# Patient Record
Sex: MALE | Race: WHITE | NOT HISPANIC OR LATINO
[De-identification: names, ages, dates, MRNs, and addresses within clinical notes are randomized per-mention and may not be internally consistent; named-entity substitution may affect disease eponyms.]

---

## 2017-05-09 ENCOUNTER — APPOINTMENT (OUTPATIENT)
Dept: SURGICAL ONCOLOGY | Facility: CLINIC | Age: 62
End: 2017-05-09

## 2017-05-09 VITALS
BODY MASS INDEX: 23.34 KG/M2 | HEIGHT: 70 IN | WEIGHT: 163 LBS | OXYGEN SATURATION: 94 % | SYSTOLIC BLOOD PRESSURE: 138 MMHG | HEART RATE: 62 BPM | DIASTOLIC BLOOD PRESSURE: 77 MMHG

## 2017-05-09 DIAGNOSIS — Z92.21 PERSONAL HISTORY OF ANTINEOPLASTIC CHEMOTHERAPY: ICD-10-CM

## 2017-05-09 DIAGNOSIS — M87.052 IDIOPATHIC ASEPTIC NECROSIS OF LEFT FEMUR: ICD-10-CM

## 2017-05-09 DIAGNOSIS — D03.59 MELANOMA IN SITU OF OTHER PART OF TRUNK: ICD-10-CM

## 2017-05-09 DIAGNOSIS — M26.69 OTHER SPECIFIED DISORDERS OF TEMPOROMANDIBULAR JOINT: ICD-10-CM

## 2017-05-09 DIAGNOSIS — M87.051 IDIOPATHIC ASEPTIC NECROSIS OF RIGHT FEMUR: ICD-10-CM

## 2017-05-09 DIAGNOSIS — H33.23 SEROUS RETINAL DETACHMENT, BILATERAL: ICD-10-CM

## 2017-05-09 DIAGNOSIS — Z87.81 PERSONAL HISTORY OF (HEALED) TRAUMATIC FRACTURE: ICD-10-CM

## 2017-05-09 DIAGNOSIS — Z78.9 OTHER SPECIFIED HEALTH STATUS: ICD-10-CM

## 2017-05-09 PROBLEM — Z00.00 ENCOUNTER FOR PREVENTIVE HEALTH EXAMINATION: Noted: 2017-05-09

## 2017-05-12 ENCOUNTER — RESULT REVIEW (OUTPATIENT)
Age: 62
End: 2017-05-12

## 2017-05-12 ENCOUNTER — OUTPATIENT (OUTPATIENT)
Dept: OUTPATIENT SERVICES | Facility: HOSPITAL | Age: 62
LOS: 1 days | End: 2017-05-12
Payer: COMMERCIAL

## 2017-05-12 DIAGNOSIS — D03.59 MELANOMA IN SITU OF OTHER PART OF TRUNK: ICD-10-CM

## 2017-05-12 PROCEDURE — 88321 CONSLTJ&REPRT SLD PREP ELSWR: CPT

## 2017-05-18 ENCOUNTER — OUTPATIENT (OUTPATIENT)
Dept: OUTPATIENT SERVICES | Facility: HOSPITAL | Age: 62
LOS: 1 days | End: 2017-05-18
Payer: COMMERCIAL

## 2017-05-18 VITALS
SYSTOLIC BLOOD PRESSURE: 132 MMHG | HEIGHT: 68.5 IN | WEIGHT: 167.99 LBS | TEMPERATURE: 98 F | RESPIRATION RATE: 16 BRPM | DIASTOLIC BLOOD PRESSURE: 80 MMHG | HEART RATE: 60 BPM

## 2017-05-18 DIAGNOSIS — C43.59 MALIGNANT MELANOMA OF OTHER PART OF TRUNK: ICD-10-CM

## 2017-05-18 DIAGNOSIS — H26.9 UNSPECIFIED CATARACT: Chronic | ICD-10-CM

## 2017-05-18 DIAGNOSIS — C43.9 MALIGNANT MELANOMA OF SKIN, UNSPECIFIED: ICD-10-CM

## 2017-05-18 LAB
BUN SERPL-MCNC: 12 MG/DL — SIGNIFICANT CHANGE UP (ref 7–23)
CALCIUM SERPL-MCNC: 9.4 MG/DL — SIGNIFICANT CHANGE UP (ref 8.4–10.5)
CHLORIDE SERPL-SCNC: 103 MMOL/L — SIGNIFICANT CHANGE UP (ref 98–107)
CO2 SERPL-SCNC: 28 MMOL/L — SIGNIFICANT CHANGE UP (ref 22–31)
CREAT SERPL-MCNC: 1.15 MG/DL — SIGNIFICANT CHANGE UP (ref 0.5–1.3)
GLUCOSE SERPL-MCNC: 86 MG/DL — SIGNIFICANT CHANGE UP (ref 70–99)
HCT VFR BLD CALC: 40.4 % — SIGNIFICANT CHANGE UP (ref 39–50)
HGB BLD-MCNC: 12.9 G/DL — LOW (ref 13–17)
MCHC RBC-ENTMCNC: 31.9 % — LOW (ref 32–36)
MCHC RBC-ENTMCNC: 34.3 PG — HIGH (ref 27–34)
MCV RBC AUTO: 107.4 FL — HIGH (ref 80–100)
PLATELET # BLD AUTO: 283 K/UL — SIGNIFICANT CHANGE UP (ref 150–400)
PMV BLD: 12.3 FL — SIGNIFICANT CHANGE UP (ref 7–13)
POTASSIUM SERPL-MCNC: 4.5 MMOL/L — SIGNIFICANT CHANGE UP (ref 3.5–5.3)
POTASSIUM SERPL-SCNC: 4.5 MMOL/L — SIGNIFICANT CHANGE UP (ref 3.5–5.3)
RBC # BLD: 3.76 M/UL — LOW (ref 4.2–5.8)
RBC # FLD: 14.7 % — HIGH (ref 10.3–14.5)
SODIUM SERPL-SCNC: 146 MMOL/L — HIGH (ref 135–145)
WBC # BLD: 87.32 K/UL — CRITICAL HIGH (ref 3.8–10.5)
WBC # FLD AUTO: 87.32 K/UL — CRITICAL HIGH (ref 3.8–10.5)

## 2017-05-18 PROCEDURE — 93010 ELECTROCARDIOGRAM REPORT: CPT

## 2017-05-18 NOTE — H&P PST ADULT - ITE SK HX ROS MEA POS PC
tumor/left upper back biopsy showed melanoma multiple skin moles; left upper back biopsy showed melanoma/tumor

## 2017-05-18 NOTE — H&P PST ADULT - NSANTHOSAYNRD_GEN_A_CORE
No. JACQUELINE screening performed.  STOP BANG Legend: 0-2 = LOW Risk; 3-4 = INTERMEDIATE Risk; 5-8 = HIGH Risk/never tested

## 2017-05-18 NOTE — H&P PST ADULT - PROBLEM SELECTOR PLAN 1
Scheduled for wide excision melanoma left back on 5/31/17.  Labs pending.  EKG in chart.  Preop instructions provided to pt.   Pt going for medical clearance to PCP, will obtain copy

## 2017-05-18 NOTE — H&P PST ADULT - FAMILY HISTORY
Father  Still living? No  Family history of multiple myeloma, Age at diagnosis: Age Unknown     Mother  Still living? Yes, Estimated age: 81-90  Family history of malignant melanoma, Age at diagnosis: Age Unknown

## 2017-05-18 NOTE — H&P PST ADULT - NEGATIVE GENERAL GENITOURINARY SYMPTOMS
no urinary hesitancy/no renal colic/no dysuria/normal urinary frequency/no nocturia/no incontinence/no hematuria

## 2017-05-18 NOTE — H&P PST ADULT - ASSESSMENT
RIGHT RETINAL DETACHMENT 61 yrs old male presents for preop eval to have wide excision melanoma left back on 5/31/17.

## 2017-05-18 NOTE — H&P PST ADULT - PMH
Avascular Necrosis of Femur Head  B/L  Bulging Disc  LUMBAR  CLL (Chronic Lymphocytic Leukemia)  SINCE 1988  Deviated nasal septum    Herniated Cervical Disc    Melanoma    Osteopenia    TMJ Syndrome  limited jaw opening due to TMJ Avascular Necrosis of Femur Head  B/L  Bulging Disc  LUMBAR  CLL (Chronic Lymphocytic Leukemia)  SINCE 1988  Deviated nasal septum    Herniated Cervical Disc    Melanoma    Nasal congestion    Osteopenia    Rosacea    TMJ Syndrome  limited jaw opening due to TMJ

## 2017-05-18 NOTE — H&P PST ADULT - PSH
Abnormal Jaw Closure  LEFT JAW SURGERY - 1988  Avascular Necrosis of Femur Head  LEFT - CORE DECOMPRESSION - 1993  Avascular Necrosis of Femur Head  RIGHT - CORE DECOMPRESSION- 1993  S/P Splenectomy  1993  S/P Tonsillectomy  1960  Status Post Eye Surgery X 3  3/2011 right eye scleral buckle; left eye in 2013 Abnormal Jaw Closure  LEFT JAW SURGERY - 1988  Avascular Necrosis of Femur Head  LEFT - CORE DECOMPRESSION - 1993  Avascular Necrosis of Femur Head  RIGHT - CORE DECOMPRESSION- 1993  Bilateral cataracts  removedin 2011  S/P Splenectomy  1993  S/P Tonsillectomy  1960  Status Post Eye Surgery X 3  3/2011 right eye scleral buckle; left eye in 2013

## 2017-05-18 NOTE — H&P PST ADULT - HISTORY OF PRESENT ILLNESS
61 yrs old male with h/o     56 Y/O MALE PRESENTS WITH H/O RIGHT RETINAL DETACHMENT. HE HAD 2  RIGHT EYE PROCEDURES WITHIN THE LAST MONTH FOR RETINAL TEAR . HE IS HERE FOR PST PRIOR TO SCLERAL BUCKLE. 61 yrs old male who had biopsy of a skin mole on the left upper back that showed melanoma and so pt present for  preop eval to have wide excision melanoma left back on 5/31/17. Pt stated that he has many skin moles and so he goes to his dermatologist every year for skin check.

## 2017-05-31 ENCOUNTER — RESULT REVIEW (OUTPATIENT)
Age: 62
End: 2017-05-31

## 2017-05-31 ENCOUNTER — APPOINTMENT (OUTPATIENT)
Dept: SURGICAL ONCOLOGY | Facility: AMBULATORY SURGERY CENTER | Age: 62
End: 2017-05-31

## 2017-05-31 ENCOUNTER — OUTPATIENT (OUTPATIENT)
Dept: OUTPATIENT SERVICES | Facility: HOSPITAL | Age: 62
LOS: 1 days | Discharge: HOME | End: 2017-05-31
Payer: COMMERCIAL

## 2017-05-31 VITALS
HEART RATE: 64 BPM | RESPIRATION RATE: 18 BRPM | TEMPERATURE: 98 F | SYSTOLIC BLOOD PRESSURE: 113 MMHG | OXYGEN SATURATION: 100 % | DIASTOLIC BLOOD PRESSURE: 64 MMHG

## 2017-05-31 VITALS
TEMPERATURE: 98 F | OXYGEN SATURATION: 100 % | RESPIRATION RATE: 16 BRPM | SYSTOLIC BLOOD PRESSURE: 128 MMHG | DIASTOLIC BLOOD PRESSURE: 72 MMHG | WEIGHT: 167.55 LBS | HEART RATE: 58 BPM

## 2017-05-31 DIAGNOSIS — H26.9 UNSPECIFIED CATARACT: Chronic | ICD-10-CM

## 2017-05-31 DIAGNOSIS — C43.59 MALIGNANT MELANOMA OF OTHER PART OF TRUNK: ICD-10-CM

## 2017-05-31 PROCEDURE — 88305 TISSUE EXAM BY PATHOLOGIST: CPT | Mod: 26

## 2017-05-31 NOTE — ASU DISCHARGE PLAN (ADULT/PEDIATRIC). - INSTRUCTIONS
progress slowly,avoid any foods that are spicy, greasy or fatty, increase fluids and resume regular diet in am for 10 days appointment with Dr. Yu

## 2017-05-31 NOTE — ASU DISCHARGE PLAN (ADULT/PEDIATRIC). - NOTIFY
Bleeding that does not stop/Swelling that continues/Unable to Urinate/Persistent Nausea and Vomiting/Pain not relieved by Medications/Fever greater than 101

## 2017-06-01 ENCOUNTER — TRANSCRIPTION ENCOUNTER (OUTPATIENT)
Age: 62
End: 2017-06-01

## 2017-06-06 LAB — SURGICAL PATHOLOGY STUDY: SIGNIFICANT CHANGE UP

## 2017-06-08 ENCOUNTER — APPOINTMENT (OUTPATIENT)
Dept: SURGICAL ONCOLOGY | Facility: CLINIC | Age: 62
End: 2017-06-08

## 2017-06-08 VITALS
HEART RATE: 58 BPM | RESPIRATION RATE: 15 BRPM | BODY MASS INDEX: 24.14 KG/M2 | OXYGEN SATURATION: 98 % | SYSTOLIC BLOOD PRESSURE: 121 MMHG | WEIGHT: 163 LBS | DIASTOLIC BLOOD PRESSURE: 71 MMHG | HEIGHT: 69 IN

## 2017-10-05 ENCOUNTER — APPOINTMENT (OUTPATIENT)
Dept: SURGICAL ONCOLOGY | Facility: CLINIC | Age: 62
End: 2017-10-05

## 2018-07-20 PROBLEM — J34.2 DEVIATED NASAL SEPTUM: Chronic | Status: ACTIVE | Noted: 2017-05-18

## 2018-07-20 PROBLEM — R09.81 NASAL CONGESTION: Chronic | Status: ACTIVE | Noted: 2017-05-18

## 2018-07-20 PROBLEM — L71.9 ROSACEA, UNSPECIFIED: Chronic | Status: ACTIVE | Noted: 2017-05-18

## 2018-07-20 PROBLEM — C43.9 MALIGNANT MELANOMA OF SKIN, UNSPECIFIED: Chronic | Status: ACTIVE | Noted: 2017-05-18

## 2018-07-23 RX ORDER — IMMUNE GLOBULIN,GAMMA(IGG) 5 %
30 VIAL (ML) INTRAVENOUS ONCE
Qty: 0 | Refills: 0 | Status: COMPLETED | OUTPATIENT
Start: 2018-07-24 | End: 2018-07-24

## 2018-07-23 RX ORDER — ACETAMINOPHEN 500 MG
650 TABLET ORAL ONCE
Qty: 0 | Refills: 0 | Status: COMPLETED | OUTPATIENT
Start: 2018-07-24 | End: 2018-07-24

## 2018-07-23 RX ORDER — DIPHENHYDRAMINE HCL 50 MG
25 CAPSULE ORAL ONCE
Qty: 0 | Refills: 0 | Status: COMPLETED | OUTPATIENT
Start: 2018-07-24 | End: 2018-07-24

## 2018-07-24 ENCOUNTER — OUTPATIENT (OUTPATIENT)
Dept: OUTPATIENT SERVICES | Facility: HOSPITAL | Age: 63
LOS: 1 days | Discharge: HOME | End: 2018-07-24
Payer: COMMERCIAL

## 2018-07-24 DIAGNOSIS — H26.9 UNSPECIFIED CATARACT: Chronic | ICD-10-CM

## 2018-07-24 DIAGNOSIS — C91.10 CHRONIC LYMPHOCYTIC LEUKEMIA OF B-CELL TYPE NOT HAVING ACHIEVED REMISSION: ICD-10-CM

## 2018-07-24 PROCEDURE — 96375 TX/PRO/DX INJ NEW DRUG ADDON: CPT

## 2018-07-24 PROCEDURE — 96365 THER/PROPH/DIAG IV INF INIT: CPT

## 2018-07-24 PROCEDURE — 96366 THER/PROPH/DIAG IV INF ADDON: CPT

## 2018-07-24 RX ORDER — DEXAMETHASONE 0.5 MG/5ML
10 ELIXIR ORAL ONCE
Qty: 0 | Refills: 0 | Status: COMPLETED | OUTPATIENT
Start: 2018-07-24 | End: 2018-07-24

## 2018-07-24 RX ADMIN — Medication 102 MILLIGRAM(S): at 09:45

## 2018-07-24 RX ADMIN — Medication 25 MILLIGRAM(S): at 09:35

## 2018-07-24 RX ADMIN — Medication 75 GRAM(S): at 10:26

## 2018-07-24 RX ADMIN — Medication 650 MILLIGRAM(S): at 09:36

## 2018-07-25 PROBLEM — M26.69: Status: RESOLVED | Noted: 2017-05-09 | Resolved: 2018-07-25

## 2018-08-21 ENCOUNTER — OUTPATIENT (OUTPATIENT)
Dept: OUTPATIENT SERVICES | Facility: HOSPITAL | Age: 63
LOS: 1 days | End: 2018-08-21
Payer: COMMERCIAL

## 2018-08-21 DIAGNOSIS — C91.10 CHRONIC LYMPHOCYTIC LEUKEMIA OF B-CELL TYPE NOT HAVING ACHIEVED REMISSION: ICD-10-CM

## 2018-08-21 DIAGNOSIS — H26.9 UNSPECIFIED CATARACT: Chronic | ICD-10-CM

## 2018-08-21 PROCEDURE — 96366 THER/PROPH/DIAG IV INF ADDON: CPT

## 2018-08-21 PROCEDURE — 96375 TX/PRO/DX INJ NEW DRUG ADDON: CPT

## 2018-08-21 PROCEDURE — 96365 THER/PROPH/DIAG IV INF INIT: CPT

## 2018-08-21 RX ORDER — IMMUNE GLOBULIN,GAMMA(IGG) 5 %
30 VIAL (ML) INTRAVENOUS ONCE
Qty: 0 | Refills: 0 | Status: COMPLETED | OUTPATIENT
Start: 2018-08-21 | End: 2018-08-21

## 2018-08-21 RX ORDER — DIPHENHYDRAMINE HCL 50 MG
25 CAPSULE ORAL ONCE
Qty: 0 | Refills: 0 | Status: COMPLETED | OUTPATIENT
Start: 2018-08-21 | End: 2018-08-21

## 2018-08-21 RX ORDER — ACETAMINOPHEN 500 MG
650 TABLET ORAL ONCE
Qty: 0 | Refills: 0 | Status: COMPLETED | OUTPATIENT
Start: 2018-08-21 | End: 2018-08-21

## 2018-08-21 RX ORDER — DEXAMETHASONE 0.5 MG/5ML
10 ELIXIR ORAL ONCE
Qty: 0 | Refills: 0 | Status: COMPLETED | OUTPATIENT
Start: 2018-08-21 | End: 2018-08-21

## 2018-08-21 RX ADMIN — Medication 102 MILLIGRAM(S): at 10:09

## 2018-08-21 RX ADMIN — Medication 25 MILLIGRAM(S): at 10:08

## 2018-08-21 RX ADMIN — Medication 75 GRAM(S): at 10:39

## 2018-08-21 RX ADMIN — Medication 650 MILLIGRAM(S): at 10:08

## 2018-09-18 ENCOUNTER — OUTPATIENT (OUTPATIENT)
Dept: OUTPATIENT SERVICES | Facility: HOSPITAL | Age: 63
LOS: 1 days | End: 2018-09-18
Payer: COMMERCIAL

## 2018-09-18 VITALS
SYSTOLIC BLOOD PRESSURE: 122 MMHG | TEMPERATURE: 98 F | OXYGEN SATURATION: 100 % | DIASTOLIC BLOOD PRESSURE: 73 MMHG | HEART RATE: 55 BPM | HEIGHT: 70 IN | RESPIRATION RATE: 16 BRPM | WEIGHT: 160.94 LBS

## 2018-09-18 VITALS
DIASTOLIC BLOOD PRESSURE: 65 MMHG | HEART RATE: 62 BPM | OXYGEN SATURATION: 97 % | TEMPERATURE: 98 F | RESPIRATION RATE: 14 BRPM | SYSTOLIC BLOOD PRESSURE: 119 MMHG

## 2018-09-18 DIAGNOSIS — H26.9 UNSPECIFIED CATARACT: Chronic | ICD-10-CM

## 2018-09-18 DIAGNOSIS — C91.10 CHRONIC LYMPHOCYTIC LEUKEMIA OF B-CELL TYPE NOT HAVING ACHIEVED REMISSION: ICD-10-CM

## 2018-09-18 PROCEDURE — 96375 TX/PRO/DX INJ NEW DRUG ADDON: CPT

## 2018-09-18 PROCEDURE — 96365 THER/PROPH/DIAG IV INF INIT: CPT

## 2018-09-18 PROCEDURE — 96366 THER/PROPH/DIAG IV INF ADDON: CPT

## 2018-09-18 RX ORDER — IMMUNE GLOBULIN,GAMMA(IGG) 5 %
30 VIAL (ML) INTRAVENOUS ONCE
Qty: 0 | Refills: 0 | Status: COMPLETED | OUTPATIENT
Start: 2018-09-18 | End: 2018-09-18

## 2018-09-18 RX ORDER — ACETAMINOPHEN 500 MG
650 TABLET ORAL ONCE
Qty: 0 | Refills: 0 | Status: COMPLETED | OUTPATIENT
Start: 2018-09-18 | End: 2018-09-18

## 2018-09-18 RX ORDER — DEXAMETHASONE 0.5 MG/5ML
10 ELIXIR ORAL ONCE
Qty: 0 | Refills: 0 | Status: COMPLETED | OUTPATIENT
Start: 2018-09-18 | End: 2018-09-18

## 2018-09-18 RX ORDER — IMMUNE GLOBULIN (HUMAN) 10 G/100ML
30 INJECTION INTRAVENOUS; SUBCUTANEOUS ONCE
Qty: 0 | Refills: 0 | Status: DISCONTINUED | OUTPATIENT
Start: 2018-09-18 | End: 2018-09-18

## 2018-09-18 RX ORDER — DIPHENHYDRAMINE HCL 50 MG
25 CAPSULE ORAL ONCE
Qty: 0 | Refills: 0 | Status: COMPLETED | OUTPATIENT
Start: 2018-09-18 | End: 2018-09-18

## 2018-09-18 RX ADMIN — Medication 25 MILLIGRAM(S): at 10:17

## 2018-09-18 RX ADMIN — Medication 102 MILLIGRAM(S): at 10:19

## 2018-09-18 RX ADMIN — Medication 75 GRAM(S): at 11:07

## 2018-09-18 RX ADMIN — Medication 650 MILLIGRAM(S): at 10:17

## 2018-09-18 NOTE — DISCHARGE INSTRUCTIONS: CHEMOTHERAPY - DC SYMPTOM 1
Fever of 100.5F or above Fever of 100.5F or above . Call with questions and concerns.Continue home medications

## 2018-10-16 ENCOUNTER — OUTPATIENT (OUTPATIENT)
Dept: OUTPATIENT SERVICES | Facility: HOSPITAL | Age: 63
LOS: 1 days | End: 2018-10-16
Payer: COMMERCIAL

## 2018-10-16 VITALS
OXYGEN SATURATION: 96 % | RESPIRATION RATE: 14 BRPM | TEMPERATURE: 98 F | SYSTOLIC BLOOD PRESSURE: 125 MMHG | DIASTOLIC BLOOD PRESSURE: 68 MMHG | HEART RATE: 68 BPM

## 2018-10-16 VITALS
SYSTOLIC BLOOD PRESSURE: 139 MMHG | TEMPERATURE: 98 F | OXYGEN SATURATION: 99 % | DIASTOLIC BLOOD PRESSURE: 76 MMHG | HEIGHT: 70 IN | RESPIRATION RATE: 14 BRPM | WEIGHT: 162.04 LBS | HEART RATE: 67 BPM

## 2018-10-16 DIAGNOSIS — H26.9 UNSPECIFIED CATARACT: Chronic | ICD-10-CM

## 2018-10-16 DIAGNOSIS — C91.10 CHRONIC LYMPHOCYTIC LEUKEMIA OF B-CELL TYPE NOT HAVING ACHIEVED REMISSION: ICD-10-CM

## 2018-10-16 PROCEDURE — 96365 THER/PROPH/DIAG IV INF INIT: CPT

## 2018-10-16 PROCEDURE — 96366 THER/PROPH/DIAG IV INF ADDON: CPT

## 2018-10-16 PROCEDURE — 96375 TX/PRO/DX INJ NEW DRUG ADDON: CPT

## 2018-10-16 RX ORDER — IMMUNE GLOBULIN,GAMMA(IGG) 5 %
30 VIAL (ML) INTRAVENOUS ONCE
Qty: 0 | Refills: 0 | Status: COMPLETED | OUTPATIENT
Start: 2018-10-16 | End: 2018-10-16

## 2018-10-16 RX ORDER — MOMETASONE FUROATE 50 UG/1
2 SPRAY NASAL
Qty: 0 | Refills: 0 | COMMUNITY

## 2018-10-16 RX ORDER — DEXAMETHASONE 0.5 MG/5ML
10 ELIXIR ORAL ONCE
Qty: 0 | Refills: 0 | Status: COMPLETED | OUTPATIENT
Start: 2018-10-16 | End: 2018-10-16

## 2018-10-16 RX ORDER — CETIRIZINE HYDROCHLORIDE 10 MG/1
2.5 TABLET ORAL
Qty: 0 | Refills: 0 | COMMUNITY

## 2018-10-16 RX ORDER — DIPHENHYDRAMINE HCL 50 MG
25 CAPSULE ORAL ONCE
Qty: 0 | Refills: 0 | Status: COMPLETED | OUTPATIENT
Start: 2018-10-16 | End: 2018-10-16

## 2018-10-16 RX ORDER — ACETAMINOPHEN 500 MG
650 TABLET ORAL ONCE
Qty: 0 | Refills: 0 | Status: COMPLETED | OUTPATIENT
Start: 2018-10-16 | End: 2018-10-16

## 2018-10-16 RX ORDER — IMMUNE GLOBULIN (HUMAN) 10 G/100ML
30 INJECTION INTRAVENOUS; SUBCUTANEOUS ONCE
Qty: 0 | Refills: 0 | Status: DISCONTINUED | OUTPATIENT
Start: 2018-10-16 | End: 2018-10-16

## 2018-10-16 RX ADMIN — Medication 75 GRAM(S): at 10:50

## 2018-10-16 RX ADMIN — Medication 650 MILLIGRAM(S): at 10:29

## 2018-10-16 RX ADMIN — Medication 25 MILLIGRAM(S): at 10:29

## 2018-10-16 RX ADMIN — Medication 102 MILLIGRAM(S): at 10:05

## 2018-11-13 ENCOUNTER — OUTPATIENT (OUTPATIENT)
Dept: OUTPATIENT SERVICES | Facility: HOSPITAL | Age: 63
LOS: 1 days | End: 2018-11-13
Payer: COMMERCIAL

## 2018-11-13 VITALS
RESPIRATION RATE: 15 BRPM | OXYGEN SATURATION: 99 % | WEIGHT: 162.92 LBS | HEIGHT: 70 IN | DIASTOLIC BLOOD PRESSURE: 71 MMHG | HEART RATE: 59 BPM | TEMPERATURE: 98 F | SYSTOLIC BLOOD PRESSURE: 135 MMHG

## 2018-11-13 VITALS
SYSTOLIC BLOOD PRESSURE: 123 MMHG | RESPIRATION RATE: 14 BRPM | HEART RATE: 71 BPM | DIASTOLIC BLOOD PRESSURE: 69 MMHG | OXYGEN SATURATION: 97 % | TEMPERATURE: 98 F

## 2018-11-13 DIAGNOSIS — H26.9 UNSPECIFIED CATARACT: Chronic | ICD-10-CM

## 2018-11-13 DIAGNOSIS — C91.10 CHRONIC LYMPHOCYTIC LEUKEMIA OF B-CELL TYPE NOT HAVING ACHIEVED REMISSION: ICD-10-CM

## 2018-11-13 PROCEDURE — 96366 THER/PROPH/DIAG IV INF ADDON: CPT

## 2018-11-13 PROCEDURE — 96365 THER/PROPH/DIAG IV INF INIT: CPT

## 2018-11-13 PROCEDURE — 96367 TX/PROPH/DG ADDL SEQ IV INF: CPT

## 2018-11-13 RX ORDER — ACETAMINOPHEN 500 MG
650 TABLET ORAL ONCE
Qty: 0 | Refills: 0 | Status: COMPLETED | OUTPATIENT
Start: 2018-11-13 | End: 2018-11-13

## 2018-11-13 RX ORDER — IMMUNE GLOBULIN (HUMAN) 10 G/100ML
30 INJECTION INTRAVENOUS; SUBCUTANEOUS ONCE
Qty: 0 | Refills: 0 | Status: DISCONTINUED | OUTPATIENT
Start: 2018-11-13 | End: 2018-11-13

## 2018-11-13 RX ORDER — IMMUNE GLOBULIN,GAMMA(IGG) 5 %
30 VIAL (ML) INTRAVENOUS ONCE
Qty: 0 | Refills: 0 | Status: COMPLETED | OUTPATIENT
Start: 2018-11-13 | End: 2018-11-13

## 2018-11-13 RX ORDER — DIPHENHYDRAMINE HCL 50 MG
25 CAPSULE ORAL ONCE
Qty: 0 | Refills: 0 | Status: COMPLETED | OUTPATIENT
Start: 2018-11-13 | End: 2018-11-13

## 2018-11-13 RX ORDER — DEXAMETHASONE 0.5 MG/5ML
10 ELIXIR ORAL ONCE
Qty: 0 | Refills: 0 | Status: COMPLETED | OUTPATIENT
Start: 2018-11-13 | End: 2018-11-13

## 2018-11-13 RX ADMIN — Medication 25 MILLIGRAM(S): at 10:09

## 2018-11-13 RX ADMIN — Medication 650 MILLIGRAM(S): at 10:09

## 2018-11-13 RX ADMIN — Medication 102 MILLIGRAM(S): at 10:08

## 2018-11-13 RX ADMIN — Medication 75 GRAM(S): at 10:54

## 2018-12-10 RX ORDER — IMMUNE GLOBULIN (HUMAN) 10 G/100ML
30 INJECTION INTRAVENOUS; SUBCUTANEOUS ONCE
Qty: 0 | Refills: 0 | Status: DISCONTINUED | OUTPATIENT
Start: 2018-12-11 | End: 2018-12-11

## 2018-12-11 ENCOUNTER — OUTPATIENT (OUTPATIENT)
Dept: OUTPATIENT SERVICES | Facility: HOSPITAL | Age: 63
LOS: 1 days | End: 2018-12-11
Payer: COMMERCIAL

## 2018-12-11 VITALS
TEMPERATURE: 98 F | RESPIRATION RATE: 14 BRPM | OXYGEN SATURATION: 96 % | SYSTOLIC BLOOD PRESSURE: 126 MMHG | DIASTOLIC BLOOD PRESSURE: 70 MMHG | HEART RATE: 63 BPM

## 2018-12-11 VITALS
RESPIRATION RATE: 14 BRPM | HEART RATE: 62 BPM | OXYGEN SATURATION: 97 % | SYSTOLIC BLOOD PRESSURE: 131 MMHG | HEIGHT: 70 IN | TEMPERATURE: 98 F | WEIGHT: 162.04 LBS | DIASTOLIC BLOOD PRESSURE: 74 MMHG

## 2018-12-11 DIAGNOSIS — C91.10 CHRONIC LYMPHOCYTIC LEUKEMIA OF B-CELL TYPE NOT HAVING ACHIEVED REMISSION: ICD-10-CM

## 2018-12-11 DIAGNOSIS — H26.9 UNSPECIFIED CATARACT: Chronic | ICD-10-CM

## 2018-12-11 PROCEDURE — 96367 TX/PROPH/DG ADDL SEQ IV INF: CPT

## 2018-12-11 PROCEDURE — 96366 THER/PROPH/DIAG IV INF ADDON: CPT

## 2018-12-11 PROCEDURE — 96365 THER/PROPH/DIAG IV INF INIT: CPT

## 2018-12-11 RX ORDER — SODIUM CHLORIDE 9 MG/ML
3 INJECTION INTRAMUSCULAR; INTRAVENOUS; SUBCUTANEOUS ONCE
Qty: 0 | Refills: 0 | Status: COMPLETED | OUTPATIENT
Start: 2018-12-11 | End: 2018-12-11

## 2018-12-11 RX ORDER — DIPHENHYDRAMINE HCL 50 MG
25 CAPSULE ORAL ONCE
Qty: 0 | Refills: 0 | Status: COMPLETED | OUTPATIENT
Start: 2018-12-11 | End: 2018-12-11

## 2018-12-11 RX ORDER — IMMUNE GLOBULIN,GAMMA(IGG) 5 %
30 VIAL (ML) INTRAVENOUS ONCE
Qty: 0 | Refills: 0 | Status: COMPLETED | OUTPATIENT
Start: 2018-12-11 | End: 2018-12-11

## 2018-12-11 RX ORDER — ACETAMINOPHEN 500 MG
650 TABLET ORAL ONCE
Qty: 0 | Refills: 0 | Status: COMPLETED | OUTPATIENT
Start: 2018-12-11 | End: 2018-12-11

## 2018-12-11 RX ORDER — DEXAMETHASONE 0.5 MG/5ML
10 ELIXIR ORAL ONCE
Qty: 0 | Refills: 0 | Status: COMPLETED | OUTPATIENT
Start: 2018-12-11 | End: 2018-12-11

## 2018-12-11 RX ADMIN — Medication 25 MILLIGRAM(S): at 09:53

## 2018-12-11 RX ADMIN — Medication 75 GRAM(S): at 10:35

## 2018-12-11 RX ADMIN — SODIUM CHLORIDE 3 MILLILITER(S): 9 INJECTION INTRAMUSCULAR; INTRAVENOUS; SUBCUTANEOUS at 13:40

## 2018-12-11 RX ADMIN — Medication 102 MILLIGRAM(S): at 09:53

## 2018-12-11 RX ADMIN — Medication 650 MILLIGRAM(S): at 09:51

## 2019-09-26 ENCOUNTER — OUTPATIENT (OUTPATIENT)
Dept: OUTPATIENT SERVICES | Facility: HOSPITAL | Age: 64
LOS: 1 days | End: 2019-09-26
Payer: COMMERCIAL

## 2019-09-26 VITALS
HEART RATE: 59 BPM | TEMPERATURE: 98 F | WEIGHT: 188.05 LBS | RESPIRATION RATE: 16 BRPM | DIASTOLIC BLOOD PRESSURE: 69 MMHG | OXYGEN SATURATION: 99 % | SYSTOLIC BLOOD PRESSURE: 126 MMHG | HEIGHT: 70 IN

## 2019-09-26 DIAGNOSIS — J34.2 DEVIATED NASAL SEPTUM: ICD-10-CM

## 2019-09-26 DIAGNOSIS — J34.89 OTHER SPECIFIED DISORDERS OF NOSE AND NASAL SINUSES: ICD-10-CM

## 2019-09-26 DIAGNOSIS — H26.9 UNSPECIFIED CATARACT: Chronic | ICD-10-CM

## 2019-09-26 DIAGNOSIS — Z01.818 ENCOUNTER FOR OTHER PREPROCEDURAL EXAMINATION: ICD-10-CM

## 2019-09-26 LAB
ALBUMIN SERPL ELPH-MCNC: 4.6 G/DL — SIGNIFICANT CHANGE UP (ref 3.3–5)
ALP SERPL-CCNC: 58 U/L — SIGNIFICANT CHANGE UP (ref 40–120)
ALT FLD-CCNC: 19 U/L — SIGNIFICANT CHANGE UP (ref 12–78)
ANION GAP SERPL CALC-SCNC: 3 MMOL/L — LOW (ref 5–17)
APTT BLD: 31.7 SEC — SIGNIFICANT CHANGE UP (ref 28.5–37)
AST SERPL-CCNC: 7 U/L — LOW (ref 15–37)
BILIRUB SERPL-MCNC: 2.3 MG/DL — HIGH (ref 0.2–1.2)
BUN SERPL-MCNC: 18 MG/DL — SIGNIFICANT CHANGE UP (ref 7–23)
CALCIUM SERPL-MCNC: 9 MG/DL — SIGNIFICANT CHANGE UP (ref 8.5–10.1)
CHLORIDE SERPL-SCNC: 107 MMOL/L — SIGNIFICANT CHANGE UP (ref 96–108)
CO2 SERPL-SCNC: 32 MMOL/L — HIGH (ref 22–31)
CREAT SERPL-MCNC: 0.96 MG/DL — SIGNIFICANT CHANGE UP (ref 0.5–1.3)
GLUCOSE SERPL-MCNC: 97 MG/DL — SIGNIFICANT CHANGE UP (ref 70–99)
HCT VFR BLD CALC: 29.1 % — LOW (ref 39–50)
HGB BLD-MCNC: 9.8 G/DL — LOW (ref 13–17)
INR BLD: 0.99 RATIO — SIGNIFICANT CHANGE UP (ref 0.88–1.16)
MCHC RBC-ENTMCNC: 33.7 GM/DL — SIGNIFICANT CHANGE UP (ref 32–36)
MCHC RBC-ENTMCNC: 38.1 PG — HIGH (ref 27–34)
MCV RBC AUTO: 113.2 FL — HIGH (ref 80–100)
NRBC # BLD: 0 /100 WBCS — SIGNIFICANT CHANGE UP (ref 0–0)
PLATELET # BLD AUTO: 433 K/UL — HIGH (ref 150–400)
POTASSIUM SERPL-MCNC: 4.4 MMOL/L — SIGNIFICANT CHANGE UP (ref 3.5–5.3)
POTASSIUM SERPL-SCNC: 4.4 MMOL/L — SIGNIFICANT CHANGE UP (ref 3.5–5.3)
PROT SERPL-MCNC: 7 G/DL — SIGNIFICANT CHANGE UP (ref 6–8.3)
PROTHROM AB SERPL-ACNC: 11.3 SEC — SIGNIFICANT CHANGE UP (ref 10–12.9)
RBC # BLD: 2.57 M/UL — LOW (ref 4.2–5.8)
RBC # FLD: 14.8 % — HIGH (ref 10.3–14.5)
SODIUM SERPL-SCNC: 142 MMOL/L — SIGNIFICANT CHANGE UP (ref 135–145)
WBC # BLD: 14.4 K/UL — HIGH (ref 3.8–10.5)
WBC # FLD AUTO: 14.4 K/UL — HIGH (ref 3.8–10.5)

## 2019-09-26 PROCEDURE — 93010 ELECTROCARDIOGRAM REPORT: CPT

## 2019-09-26 PROCEDURE — 36415 COLL VENOUS BLD VENIPUNCTURE: CPT

## 2019-09-26 PROCEDURE — 93005 ELECTROCARDIOGRAM TRACING: CPT

## 2019-09-26 PROCEDURE — 85610 PROTHROMBIN TIME: CPT

## 2019-09-26 PROCEDURE — G0463: CPT

## 2019-09-26 PROCEDURE — 85730 THROMBOPLASTIN TIME PARTIAL: CPT

## 2019-09-26 PROCEDURE — 85027 COMPLETE CBC AUTOMATED: CPT

## 2019-09-26 PROCEDURE — 80053 COMPREHEN METABOLIC PANEL: CPT

## 2019-09-26 RX ORDER — FLUTICASONE PROPIONATE 50 MCG
1 SPRAY, SUSPENSION NASAL
Qty: 0 | Refills: 0 | DISCHARGE

## 2019-09-26 RX ORDER — FOLIC ACID 0.8 MG
1 TABLET ORAL
Qty: 0 | Refills: 0 | DISCHARGE

## 2019-09-26 NOTE — H&P PST ADULT - NSICDXFAMILYHX_GEN_ALL_CORE_FT
FAMILY HISTORY:  FH: blood dyscrasia, MGUS  FH: hypertension, mother  FH: renal failure, mother    Father  Still living? No  Family history of multiple myeloma, Age at diagnosis: Age Unknown    Mother  Still living? Yes, Estimated age: 81-90  Family history of malignant melanoma, Age at diagnosis: Age Unknown

## 2019-09-26 NOTE — H&P PST ADULT - HISTORY OF PRESENT ILLNESS
63 yo male scheduled for Septonasal Reconstruction Bilateral Submucous Resection Turbinates Nasal Valve repair Open Reduction Nasal Fracture on 10/8/19 with Dr Madera.  Patient states he broke his nose 2 -3 times a s a child.  Patient has had  sinus infections, difficulty breathing from right nostril.

## 2019-09-26 NOTE — H&P PST ADULT - NSANTHOSAYNRD_GEN_A_CORE
No. JACQUELINE screening performed.  STOP BANG Legend: 0-2 = LOW Risk; 3-4 = INTERMEDIATE Risk; 5-8 = HIGH Risk

## 2019-09-26 NOTE — H&P PST ADULT - ASSESSMENT
63 yo male scheduled for Septonasal Reconstruction Bilateral Submucous Resection Turbinates Nasal Valve repair Open Reduction Nasal Fracture on 10/8/19 with Dr Madera.

## 2019-09-26 NOTE — H&P PST ADULT - NSICDXPASTMEDICALHX_GEN_ALL_CORE_FT
PAST MEDICAL HISTORY:  Anemia     Avascular Necrosis of Femur Head B/L    Bulging Disc LUMBAR    CLL (Chronic Lymphocytic Leukemia) SINCE 1988    Deviated nasal septum     Deviated nasal septum     Herniated Cervical Disc     Melanoma     Nasal congestion     Osteopenia     Other specified disorders of nose and nasal sinuses     Rosacea     TMJ Syndrome limited jaw opening due to TMJ

## 2019-09-26 NOTE — H&P PST ADULT - NSICDXPROBLEM_GEN_ALL_CORE_FT
63 yo male scheduled for Septonasal Reconstruction Bilateral Submucous Resection Turbinates Nasal Valve repair Open Reduction Nasal Fracture on 10/8/19 with Dr Madera.  Check labs cbc cmp pt ptt  ekg  medical clearance  oncology clearance  patient is aware that he may be here 3-6 hours post op to be monitored  10/1 stop multivitamin  morning of surgery no medication  Dr Sandra spoke with patient and his wife about anesthesia  Patient verbalizes understanding of instruction

## 2019-10-07 ENCOUNTER — TRANSCRIPTION ENCOUNTER (OUTPATIENT)
Age: 64
End: 2019-10-07

## 2019-10-08 ENCOUNTER — OUTPATIENT (OUTPATIENT)
Dept: OUTPATIENT SERVICES | Facility: HOSPITAL | Age: 64
LOS: 1 days | End: 2019-10-08
Payer: COMMERCIAL

## 2019-10-08 ENCOUNTER — RESULT REVIEW (OUTPATIENT)
Age: 64
End: 2019-10-08

## 2019-10-08 VITALS
SYSTOLIC BLOOD PRESSURE: 119 MMHG | RESPIRATION RATE: 18 BRPM | DIASTOLIC BLOOD PRESSURE: 75 MMHG | HEART RATE: 75 BPM | OXYGEN SATURATION: 98 %

## 2019-10-08 VITALS
SYSTOLIC BLOOD PRESSURE: 138 MMHG | OXYGEN SATURATION: 99 % | WEIGHT: 160.06 LBS | HEART RATE: 61 BPM | RESPIRATION RATE: 13 BRPM | HEIGHT: 70 IN | TEMPERATURE: 98 F | DIASTOLIC BLOOD PRESSURE: 69 MMHG

## 2019-10-08 DIAGNOSIS — Z01.818 ENCOUNTER FOR OTHER PREPROCEDURAL EXAMINATION: ICD-10-CM

## 2019-10-08 DIAGNOSIS — J34.89 OTHER SPECIFIED DISORDERS OF NOSE AND NASAL SINUSES: ICD-10-CM

## 2019-10-08 DIAGNOSIS — H26.9 UNSPECIFIED CATARACT: Chronic | ICD-10-CM

## 2019-10-08 DIAGNOSIS — J34.2 DEVIATED NASAL SEPTUM: ICD-10-CM

## 2019-10-08 PROCEDURE — 88304 TISSUE EXAM BY PATHOLOGIST: CPT | Mod: 26

## 2019-10-08 PROCEDURE — 88311 DECALCIFY TISSUE: CPT

## 2019-10-08 PROCEDURE — 88304 TISSUE EXAM BY PATHOLOGIST: CPT

## 2019-10-08 PROCEDURE — C1889: CPT

## 2019-10-08 PROCEDURE — 30465 REPAIR NASAL STENOSIS: CPT

## 2019-10-08 PROCEDURE — 30520 REPAIR OF NASAL SEPTUM: CPT

## 2019-10-08 PROCEDURE — 88311 DECALCIFY TISSUE: CPT | Mod: 26

## 2019-10-08 PROCEDURE — 30140 RESECT INFERIOR TURBINATE: CPT | Mod: 50

## 2019-10-08 RX ORDER — FOLIC ACID 0.8 MG
1 TABLET ORAL
Qty: 0 | Refills: 0 | DISCHARGE

## 2019-10-08 RX ORDER — CEFAZOLIN SODIUM 1 G
2000 VIAL (EA) INJECTION ONCE
Refills: 0 | Status: DISCONTINUED | OUTPATIENT
Start: 2019-10-08 | End: 2019-10-08

## 2019-10-08 RX ORDER — ONDANSETRON 8 MG/1
4 TABLET, FILM COATED ORAL ONCE
Refills: 0 | Status: DISCONTINUED | OUTPATIENT
Start: 2019-10-08 | End: 2019-10-08

## 2019-10-08 RX ORDER — AZELASTINE HYDROCHLORIDE AND FLUTICASONE PROPIONATE 137; 50 UG/1; UG/1
1 SPRAY, METERED NASAL
Qty: 0 | Refills: 0 | DISCHARGE

## 2019-10-08 RX ORDER — HYDROMORPHONE HYDROCHLORIDE 2 MG/ML
0.5 INJECTION INTRAMUSCULAR; INTRAVENOUS; SUBCUTANEOUS
Refills: 0 | Status: DISCONTINUED | OUTPATIENT
Start: 2019-10-08 | End: 2019-10-08

## 2019-10-08 RX ORDER — OXYCODONE HYDROCHLORIDE 5 MG/1
5 TABLET ORAL ONCE
Refills: 0 | Status: DISCONTINUED | OUTPATIENT
Start: 2019-10-08 | End: 2019-10-08

## 2019-10-08 RX ORDER — SODIUM CHLORIDE 9 MG/ML
1000 INJECTION, SOLUTION INTRAVENOUS
Refills: 0 | Status: DISCONTINUED | OUTPATIENT
Start: 2019-10-08 | End: 2019-10-08

## 2019-10-08 RX ORDER — RANITIDINE HYDROCHLORIDE 150 MG/1
1 TABLET, FILM COATED ORAL
Qty: 0 | Refills: 0 | DISCHARGE

## 2019-10-08 RX ORDER — FOLIC ACID 0.8 MG
1 TABLET ORAL
Qty: 0 | Refills: 0 | DISCHARGE
Start: 2019-10-08

## 2019-10-08 RX ORDER — OXYCODONE HYDROCHLORIDE 5 MG/1
10 TABLET ORAL ONCE
Refills: 0 | Status: DISCONTINUED | OUTPATIENT
Start: 2019-10-08 | End: 2019-10-08

## 2019-10-08 RX ADMIN — SODIUM CHLORIDE 50 MILLILITER(S): 9 INJECTION, SOLUTION INTRAVENOUS at 07:03

## 2019-10-08 NOTE — ASU PATIENT PROFILE, ADULT - PMH
Anemia    Avascular Necrosis of Femur Head  B/L  Bulging Disc  LUMBAR  CLL (Chronic Lymphocytic Leukemia)  SINCE 1988  Deviated nasal septum    Deviated nasal septum    Herniated Cervical Disc    Melanoma    Nasal congestion    Osteopenia    Other specified disorders of nose and nasal sinuses    Rosacea    TMJ Syndrome  limited jaw opening due to TMJ

## 2019-10-08 NOTE — BRIEF OPERATIVE NOTE - NSICDXBRIEFPOSTOP_GEN_ALL_CORE_FT
POST-OP DIAGNOSIS:  Nasal valve stenosis 08-Oct-2019 14:14:28  Gentry Madera  Closed fracture of nasal bone, sequela 08-Oct-2019 14:14:15  Gentry Madera  Deviation of inferior turbinate 08-Oct-2019 14:13:59  Gentry Madera  Deviated nasal septum 08-Oct-2019 14:13:49  Gentry Madera

## 2019-10-08 NOTE — ASU DISCHARGE PLAN (ADULT/PEDIATRIC) - CARE COORDINATION DISCHARGE PLANNING
This note also relates to the following rows which could not be included:  Pulse - Cannot attach notes to unvalidated device data       07/11/18 0640 07/11/18 1045   Vital Signs   /60 (!) 114/50   Temp 98.1 °F (36.7 °C) 98.1 °F (36.7 °C)   Weight 236 lb 15.9 oz (107.5 kg) 233 lb 0.4 oz (105.7 kg)   Weight Method Bed scale Bed scale   Post-Hemodialysis Assessment   Post-Treatment Procedures --  Blood returned; Access bleeding time < 10 minutes   Machine Disinfection Process --  Acid/Vinegar Clean;Heat Disinfect; Exterior Machine Disinfection   Total Liters Processed (l/min) --  67.2 l/min   Dialyzer Clearance --  Heavily streaked   Duration of Treatment (minutes) --  210 minutes   Heparin amount administered during treatment (units) --  0 units   Hemodialysis Intake (ml) --  700 ml   Hemodialysis Output (ml) --  2200 ml   NET Removed (ml) --  1500 ml   Tolerated Treatment --  Good   Stable treatment. Patient tolerated fluid removal well. Dressing dry and intact. Treatment record printed and scanned for EMR. Report given to [facility] AND [facility].
No
fall

## 2019-10-08 NOTE — ASU PREOP CHECKLIST - BMI (KG/M2)
After Visit Summary - Transition      Demographics     Patient transferred to Dr Terrell to reschedule 6-5 case for diarrhea caused by < in her methadone.  Office notified too by epic.    Last edited by Macey Porter RN on 06/01/17 at 1217    Address: Home Phone: Work Phone: Mobile Phone:    [address]   [place] [address]    [phone] -- [phone]    SSN: Insurance: Marital Status: Mormon:    [id number] [facility] MEDICARE SOLUTION [facility] [employer] (None, None)      Additional Demographic Information     Date Of Birth Sex Race Ethnicity Preferred Language Preferred Written Language    1966 Female White Not of  or  Origin English English      Family Information     None           Patient Discharge Summary   6/5/2017    Randi rAcos            Please bring this medication reconciliation form to your next doctor’s appointment(s). Please update the form if you stop taking any of these medications or you start taking any new medications including over the counter medications. Also please carry a copy of this form with you at all times in the event of an emergency.    A copy of these discharge instructions was reviewed with and given to the patient/patient representative/Guardian/Caregiver at discharge.          The doctor who took care of you in the hospital     Provider Specialty    Alexx Terrell MD Gastroenterology      Admission Dx     screening/Medicare      Problem List as of 6/5/2017  Date Reviewed: 5/17/2017             ICD-10-CM Priority Class Noted - Resolved    RESOLVED: Type II or unspecified type diabetes mellitus without mention of complication, not stated as uncontrolled E11.9   6/19/2014 - 2/21/2017    Essential hypertension, benign I10   6/19/2014 - Present    Pure hypercholesterolemia E78.00   6/19/2014 - Present    ADHD (attention deficit hyperactivity disorder) F90.9   6/19/2014 - Present    Anxiety state,  unspecified F41.1   6/19/2014 - Present    Tobacco abuse Z72.0   6/19/2014 - Present    Asthma J45.909   6/19/2014 - Present    GERD (gastroesophageal reflux disease) K21.9   6/19/2014 - Present    Allergic rhinitis J30.9   6/19/2014 - Present    Chronic back pain M54.9, G89.29   9/3/2014 - Present    RESOLVED: Recurrent abdominal wound abscess T81.4XXA   1/14/2015 - 5/22/2015    RESOLVED: Abscess of abdominal wall L02.211   1/14/2015 - 5/22/2015    RESOLVED: Cellulitis of abdominal wall L03.311   2/23/2015 - 5/22/2015    Open abdominal wall wound S31.109A   6/16/2015 - Present    DM (diabetes mellitus), type 2, uncontrolled (CMS/[facility]) E11.65   6/16/2015 - Present    Abscess or cellulitis of abdominal wall [id number]   6/16/2015 - Present    Failed back syndrome of lumbar spine M96.1 Low  8/18/2015 - Present    Chronic constipation K59.09 Low  10/8/2016 - Present    Medication management Z79.899 Low  3/22/2017 - Present      Allergies as of 6/5/2017        Reactions    Penicillins RASH           Discharge Medications              What to Do with Your Medications      CONTINUE taking these medications which have NOT CHANGED        Details    Morning Noon Evening Bedtime As needed    * alprazolam 2 MG tablet   Commonly known as:  XANAX        Take 2 mg by mouth 3 times daily as needed.                            * ALPRAZolam 1 MG tablet   Commonly known as:  XANAX        Take 1 tablet by mouth 2 times daily as needed for Anxiety.   Authorizing Provider:  Dc Dawkins                            amLODIPine 5 MG tablet   Commonly known as:  NORVASC        Take 1 tablet by mouth daily.   Authorizing Provider:  Dc Dawkins                            * B-D ULTRA-FINE 33 LANCETS Misc        CHECK BLOOD SUGAR 3 TO 4 TIMES DAILY.   Authorizing Provider:  Dc Dawkins                            * PHARMACIST CHOICE LANCETS Misc        Test blood sugar 2 times daily as directed. Diagnosis: E 11.65 DM II uncontrolled.  Meter: Accu check   Authorizing Provider:  Dc Dawkins                            blood glucose test strip        Test blood sugar 2 times daily as directed. Diagnosis: E 11.65 DM II uncontrolled. Meter: Accu check   Authorizing Provider:  Dc Dawkins                            cholecalciferol 1000 UNITS tablet   Commonly known as:  VITAMIN D3        Take 1,000 Units by mouth daily.                            esomeprazole 40 MG capsule   Commonly known as:  NexIUM        Take 1 capsule by mouth every other day.   Authorizing Provider:  Dc Dawkins                            fluticasone 220 MCG/ACT inhaler   Commonly known as:  FLOVENT HFA        Inhale 1 puff into the lungs 2 times daily.   Authorizing Provider:  Dc Dawkins                            fluticasone 50 MCG/ACT nasal spray   Commonly known as:  FLONASE        Spray 2 sprays in each nostril daily.   Authorizing Provider:  Dc Dawkins                            HUMALOG KWIKPEN 100 UNIT/ML pen-injector        Generic drug:  insulin lispro   USE SLIDING SCALE 3 TIMES DAILY BEFORE MEALS   Authorizing Provider:  Dc Dawkins                            insulin glargine 100 UNIT/ML pen-injector   Commonly known as:  LANTUS SOLOSTAR        Inject 70 Units into the skin nightly.   Authorizing Provider:  Dc Dawkins                            lidocaine 5 % ointment   Commonly known as:  XYLOCAINE        Apply 2 grams up to 4 times daily to affected area   Authorizing Provider:  Dc Dawkins   Comment:  90 day supply                            losartan-hydrochlorothiazide 100-12.5 MG per tablet   Commonly known as:  HYZAAR        Take 1 tablet by mouth daily.   Authorizing Provider:  Dc Dawkins                            metformin 500 MG 24 hr tablet   Commonly known as:  GLUCOPHAGE-XR        Take 2 tablets by mouth daily (with breakfast).   Authorizing Provider:  Dc Dawkins                            methaDONE 10  MG tablet   Commonly known as:  DOLOPHINE        Take 3 in morning, 2 afternoon and 2 pm for 2 weeks then 2-2-2 the next 2 weeks.   Authorizing Provider:  Dc Dawkins                            * NYSTOP 984802 UNIT/GM powder        Generic drug:  nystatin   Apply tid prn   Authorizing Provider:  Dc Dawkins                            * nystatin [id number] UNIT/GM cream   Commonly known as:  MYCOSTATIN        APPLY TO AFFECTED AREA AS NEEDED.   Authorizing Provider:  Dc Dawkins                            PHARMACIST CHOICE AUTOCODE SYS w/Device Kit        Test blood sugar 2 times daily as directed. Diagnosis: E11.65 DM II uncontrolled. Meter: Accu check   Authorizing Provider:  Dc Dawkins                            polyethylene glycol powder   Commonly known as:  MIRALAX        Take 17 g by mouth daily.   Authorizing Provider:  Dc Dawkins                            pravastatin 40 MG tablet   Commonly known as:  PRAVACHOL        Take 1 tablet by mouth daily.   Authorizing Provider:  Dc Dawkins                            PROAIR  (90 Base) MCG/ACT inhaler        Generic drug:  albuterol   INHALE 2 PUFFS INTO THE LUNGS EVERY 4 HOURS AS NEEDED FOR SHORTNESS OF BREATH OR WHEEZE.   Authorizing Provider:  Dc SABILLONKO UNIFINE PENTIPS PLUS 31G X 5 MM Misc        Generic drug:  Insulin Pen Needle   USE THREE TO FOUR TIMES DAILY   Authorizing Provider:  Dc Dawkins                                                   * Notice:  This list has 6 medication(s) that are the same as other medications prescribed for you. Read the directions carefully, and ask your doctor or other care provider to review them with you.      STOP taking these medications, discuss with your Pharmacist        Reason for stopping Comments    HYDROmorphone 4 MG tablet   Commonly known as:  DILAUDID             NOVOLOG FLEXPEN 100 UNIT/ML pen-injector   Generic drug:  insulin aspart                                        Contact your doctor for follow-up appointment if not already scheduled.     Follow-up information has not been specified.      Pending Labs/Results     Any lab or diagnostic test results that are \"pending\" at time of discharge are listed below. If no results display then none were \"pending\" at time of discharge. Depending on when the test was completed results may be available within 3-5 days. Results can be reviewed with your provider at your next visits or through [facility]. If needed contact the provider office for additional information.         Height and Weight     Date and Time Height Weight Birthweight BMI (Calculated) [facility]    06/05/17 0820 5' 5\" (1.651 m) 117.3 kg -- 43.12 SLF      Smoking Status     Tobacco Use     Current Every Day Smoker; Smoked 0.5 packs/day for 20 years; Smoked: Cigarettes.    Smokeless Tobacco:  Never used smokeless tobacco.            Code Status Information     Code Status    Full Resuscitation      Diet     Start       Ordered    06/05/17 0958  Regular Diet  DIET EFFECTIVE NOW     Question:  Diet Modifiers  Answer:  Regular    06/05/17 0957      Your To Do List     Future Appointments Provider Department Dept Phone    6/7/2017 5:30 PM [facility] - MRI Scan [phone]    1. No dietary restrictions. 2. You will be asked to remove all eye makeup, hair pins, barrettes, jewelry, watches, eyeglasses, dentures/partials, billfold and all metal before entering the scan room. 3. Wear clothing that has no metal on it. You may be asked to put on gown. 4. Patients taking or being given oral sedation MUST be NPO for 4 hours prior to exam and have someone to take them home. Let them know they may have to stay 1 hours after exam for observation. 5. Tell patient to arrive 15 minutes prior to exam for registration. 6. Read to patient exam date and time to confirm and name and address of site being scheduled at - Ask if they need directions to site.     6/14/2017 11:00 AM Dc Dawkins MD [facility] [phone]    7/21/2017 1:00 PM Dc Dawkins MD [facility] [phone]      Functional & Cognitive Status       Most Recent Value    Are you deaf or do you have serious difficulty  hearing?  N    Are you blind or do you have serious difficulty seeing, even when wearing glasses? N    Because of a physical, mental, or emotional condition, do you have serious difficulty concentrating, remembering or making decisions?      Do you have serious difficulty walking or climbing stairs?     Do you have difficulty dressing or bathing?     Because of a physical, mental, or emotional condition, do you have difficulty doing errands alone?       Isolation     No Isolation      Vital Signs and Pain Assessment       Most Recent Value    Temperature 97.9 °F (36.6 °C) Filed at: 06/05/2017 0820    Temperature Source Temporal Art Filed at: 06/05/2017 0820    Heart Rate 84 Filed at: 06/05/2017 0945    Heart Rate Source Monitor Filed at: 06/05/2017 0945    /57 Filed at: 06/05/2017 0945    BP Method Automatic Filed at: 06/05/2017 0945    Resp Rate 18 Filed at: 06/05/2017 0945    Pulse Ox 90 % Filed at: 06/05/2017 0945    Pain Type Chronic pain Filed at: 06/05/2017 0820    Pain at Discharge 0 Filed at: 06/05/2017 0945      Patient Lines/Drains/Airways Status    Active LDA's (Lines/Drains/Airways/Wounds)        Wound Abdomen Midline;Superior Non-pressure ulcer    Date First Assessed 01/14/15        Time First Assessed 1022   Days: 873    Modifier: Midline;Superior   Present at Time of Hospital Admission: Yes     Wound Type: Non-pressure ulcer   Location: Abdomen            Wound Abdomen Midline;Middle Surgical incision    Date First Assessed 01/14/15        Time First Assessed 1024   Days: 873    Modifier: Midline;Middle   Present at Time of Hospital Admission: Yes     Wound Type: Surgical incision   Location: Abdomen            Wound  Abdomen Right Non-pressure ulcer    Date First Assessed 01/14/15        Time First Assessed 1026   Days: 873    Laterality: Right   Initial Pressure Injury Stage: Not a pressure ulcer     Wound Type: Non-pressure ulcer   Location: Abdomen                 Assessment       Most Recent Value    Sensory Support Devices Eyeglasses, Dentures Filed at: 06/05/2017 0816      Learning Assessment Questions     Does the primary learner have any barriers to learning?:  No Barriers JESSICA Dawson RN (Nurse) - 06/05/17 0827    What is the primary language of the primary learner?:  English JESSICA Dawson RN (Nurse) - 06/05/17 0827    Is an  required?:  No JESSICA Dawson RN (Nurse) - 06/05/17 0827    How does the primary learner prefer to learn new concepts?:  Listening, Reading JESSICA Dawson RN (Nurse) - 06/05/17 0827        Discharge Instructions        POST COLONOSCOPY CARE    During your procedure today we found:  A normal appearing colon.      You may experience abdominal bloating or cramps due to air used to inflate the colon during the procedure.  This is common and can be relieved by walking, lying on your left side with knees bent, and passing gas.  A small amount of rectal bleeding may occur, especially if polyps or biopsies were removed. Do not use laxatives or enemas for one week following your procedure.    Your last dose of pain medication was at_______none_________    If any of the following problems occur, call us at [phone].    Severe pain/ discomfort in abdomen  Nausea and/or vomiting  Temperature above 101 degrees F  New/ increased bleeding from mouth or rectum, or black, tarry stools  Abdominal bloating not relieved by belching or passing gas  Chest pain or shortness of breath    Follow Up Plan:  1. Resume your regular diet  2. Colonoscopy in 10 years.    Due to sedation you received, you may not remember the procedure or the doctor’s explanation afterward.   Our medications sometimes cause dizziness, drowsiness and impaired judgment.  Therefore, please follow these recommendations for the next 24 hours.    Do not drive a car or operate any machinery. Have a responsible adult drive you home.  Do not make critical decisions or sign any legal documents.  Do not drink alcohol  Do not return to work, or perform any tasks that require coordinated activity    Patient and patient representative have verbalized  understanding of these instructions and have  received a copy.     We would like to take this opportunity to thank you for choosing [facility]. Because your confidence in your caregivers is very important to us, it is our commitment to always treat you and your family with courtesy and respect. Our goal is to always answer any questions or worries or concerns you may have about the care you received If you have any suggestions for improvement or worries or concerns please do not hesitate to let us know.                                                                         Signature of  Patient Representative:             Signature of Discharge Nurse :     Date:    Time:           Discharge References/Attachments     None      Recommendations for Discharge        Recommendations for Discharge Planning    Recommendations for Discharge: Nursing - Medication Strategies    Recommendations for Discharge: SW/CM    Recommendations for Discharge: RT    Recommendation for Discharge: PT    Recommendations for Discharge: OT    Recommendations for Discharge: SLP    Recommendations for Discharge: Nutrition                         Patient Care Team        Relationship Specialty Notifications Start End    Dc Dawkins MD PCP - General Family Practice  5/30/14       Lab Results     Procedure Component Value Units Date/Time    Potassium Level [[id number]] Collected:  06/05/17 0815    Order Status:  Completed Specimen:  Blood+grp Updated:  06/05/17 0838      Potassium 3.7 mmol/L       Microbiology Results     None       23

## 2019-10-08 NOTE — BRIEF OPERATIVE NOTE - NSICDXBRIEFPROCEDURE_GEN_ALL_CORE_FT
PROCEDURES:  Deviated nasal septum repair 08-Oct-2019 14:12:10  Gentry Madera  ORIF or OREF, fracture, nasal bone 08-Oct-2019 14:11:45  Gentry Madera  Reconstruction, nasal septum, with submucous resection 08-Oct-2019 14:11:32  Gentry Madera

## 2019-10-08 NOTE — ASU DISCHARGE PLAN (ADULT/PEDIATRIC) - CARE PROVIDER_API CALL
Gentry Madera)  Facial Plastic and Reconstructive Surgery; Otolaryngology  [address] [place] [address]  Phone: [phone]  Fax: [phone]  Follow Up Time:

## 2019-10-08 NOTE — ASU PATIENT PROFILE, ADULT - PSH
Abnormal Jaw Closure  LEFT JAW SURGERY - 1988  Avascular Necrosis of Femur Head  LEFT - CORE DECOMPRESSION - 1993  Avascular Necrosis of Femur Head  RIGHT - CORE DECOMPRESSION- 1993  Bilateral cataracts  removed in 2011  S/P Splenectomy  1993  S/P Tonsillectomy  1960  Status Post Eye Surgery X 3  3/2011 right eye scleral buckle; left eye in 2013

## 2019-10-08 NOTE — BRIEF OPERATIVE NOTE - NSICDXBRIEFPREOP_GEN_ALL_CORE_FT
PRE-OP DIAGNOSIS:  Open fracture of nasal bone, sequela 08-Oct-2019 14:13:31  Gentry Madera  Hypertrophy, nasal, turbinate 08-Oct-2019 14:13:18  Gentry Madera  Deviated nasal septum 08-Oct-2019 14:13:10  Gentry Madera

## 2019-10-10 LAB — SURGICAL PATHOLOGY STUDY: SIGNIFICANT CHANGE UP

## 2020-01-03 NOTE — ASU DISCHARGE PLAN (ADULT/PEDIATRIC). - NURSING INSTRUCTIONS
Facsimile Cover Letter 1/3/2020 [facility] 
[address] 
[phone] To:   
Phone:  
Fax:  
 
 
From:   
Phone:   
Fax: CONFIDENTIALITY NOTICE The document accompanying this telecopy transmission contain confidential information, belonging to the sender, which is legally privileged. This information is intended only for the use of the individual or entity named above. The authorized recipient of this information is prohibited from disclosing this information to any other party and is required to destroy the information after its stated need has been fulfilled, unless otherwise required by state law. If you are not the intended recipient, you are hereby notified that any disclosure, copying, distribution, or action taken in reliance on the contents of these documents is strictly prohibited. If you have received this telecopy in error, please notify the sender immediately to arrange for return of these documents. Narcotic pain medicine is constipating , Buy over the counter stool softener and take as instructed on the bottle.   DO NOT take any Tylenol (Acetaminophen) or narcotics containing Tylenol until after  _8:25 pm_____ . You received Tylenol during your operation and it can cause damage to your liver if too much is taken within a 24 hour time period.

## 2020-06-17 ENCOUNTER — RESULT REVIEW (OUTPATIENT)
Age: 65
End: 2020-06-17

## 2020-07-27 ENCOUNTER — INPATIENT (INPATIENT)
Facility: HOSPITAL | Age: 65
LOS: 0 days | Discharge: HOME | DRG: 641 | End: 2020-07-28
Attending: FAMILY MEDICINE | Admitting: FAMILY MEDICINE
Payer: COMMERCIAL

## 2020-07-27 VITALS
RESPIRATION RATE: 17 BRPM | SYSTOLIC BLOOD PRESSURE: 117 MMHG | TEMPERATURE: 97 F | DIASTOLIC BLOOD PRESSURE: 65 MMHG | OXYGEN SATURATION: 100 % | HEART RATE: 78 BPM | WEIGHT: 149.91 LBS

## 2020-07-27 DIAGNOSIS — R55 SYNCOPE AND COLLAPSE: ICD-10-CM

## 2020-07-27 DIAGNOSIS — C91.10 CHRONIC LYMPHOCYTIC LEUKEMIA OF B-CELL TYPE NOT HAVING ACHIEVED REMISSION: ICD-10-CM

## 2020-07-27 DIAGNOSIS — I48.91 UNSPECIFIED ATRIAL FIBRILLATION: ICD-10-CM

## 2020-07-27 DIAGNOSIS — D64.9 ANEMIA, UNSPECIFIED: ICD-10-CM

## 2020-07-27 DIAGNOSIS — H26.9 UNSPECIFIED CATARACT: Chronic | ICD-10-CM

## 2020-07-27 PROBLEM — J34.89 OTHER SPECIFIED DISORDERS OF NOSE AND NASAL SINUSES: Chronic | Status: ACTIVE | Noted: 2019-09-26

## 2020-07-27 PROBLEM — J34.2 DEVIATED NASAL SEPTUM: Chronic | Status: ACTIVE | Noted: 2019-09-26

## 2020-07-27 LAB
ALBUMIN SERPL ELPH-MCNC: 4.2 G/DL — SIGNIFICANT CHANGE UP (ref 3.3–5)
ALP SERPL-CCNC: 56 U/L — SIGNIFICANT CHANGE UP (ref 40–120)
ALT FLD-CCNC: 11 U/L — LOW (ref 12–78)
ANION GAP SERPL CALC-SCNC: 9 MMOL/L — SIGNIFICANT CHANGE UP (ref 5–17)
ANISOCYTOSIS BLD QL: SLIGHT — SIGNIFICANT CHANGE UP
APPEARANCE UR: CLEAR — SIGNIFICANT CHANGE UP
APTT BLD: 27.7 SEC — SIGNIFICANT CHANGE UP (ref 27.5–35.5)
AST SERPL-CCNC: 5 U/L — LOW (ref 15–37)
BASOPHILS # BLD AUTO: 0 K/UL — SIGNIFICANT CHANGE UP (ref 0–0.2)
BASOPHILS NFR BLD AUTO: 0 % — SIGNIFICANT CHANGE UP (ref 0–2)
BILIRUB DIRECT SERPL-MCNC: 0.3 MG/DL — HIGH (ref 0.05–0.2)
BILIRUB INDIRECT FLD-MCNC: 2.9 MG/DL — HIGH (ref 0.2–1)
BILIRUB SERPL-MCNC: 2.6 MG/DL — HIGH (ref 0.2–1.2)
BILIRUB SERPL-MCNC: 3.2 MG/DL — HIGH (ref 0.2–1.2)
BILIRUB UR-MCNC: NEGATIVE — SIGNIFICANT CHANGE UP
BLD GP AB SCN SERPL QL: SIGNIFICANT CHANGE UP
BUN SERPL-MCNC: 19 MG/DL — SIGNIFICANT CHANGE UP (ref 7–23)
CALCIUM SERPL-MCNC: 8.8 MG/DL — SIGNIFICANT CHANGE UP (ref 8.5–10.1)
CHLORIDE SERPL-SCNC: 107 MMOL/L — SIGNIFICANT CHANGE UP (ref 96–108)
CK MB CFR SERPL CALC: <1 NG/ML — SIGNIFICANT CHANGE UP (ref 0–3.6)
CO2 SERPL-SCNC: 25 MMOL/L — SIGNIFICANT CHANGE UP (ref 22–31)
COLOR SPEC: YELLOW — SIGNIFICANT CHANGE UP
CREAT SERPL-MCNC: 1.3 MG/DL — SIGNIFICANT CHANGE UP (ref 0.5–1.3)
DIFF PNL FLD: ABNORMAL
DIR ANTIGLOB POLYSPECIFIC INTERPRETATION: SIGNIFICANT CHANGE UP
EOSINOPHIL # BLD AUTO: 0 K/UL — SIGNIFICANT CHANGE UP (ref 0–0.5)
EOSINOPHIL NFR BLD AUTO: 0 % — SIGNIFICANT CHANGE UP (ref 0–6)
EPI CELLS # UR: SIGNIFICANT CHANGE UP
GIANT PLATELETS BLD QL SMEAR: PRESENT — SIGNIFICANT CHANGE UP
GLUCOSE SERPL-MCNC: 152 MG/DL — HIGH (ref 70–99)
GLUCOSE UR QL: NEGATIVE — SIGNIFICANT CHANGE UP
HCT VFR BLD CALC: 24.8 % — LOW (ref 39–50)
HGB BLD-MCNC: 8.1 G/DL — LOW (ref 13–17)
HOWELL-JOLLY BOD BLD QL SMEAR: PRESENT — SIGNIFICANT CHANGE UP
INR BLD: 1.14 RATIO — SIGNIFICANT CHANGE UP (ref 0.88–1.16)
KETONES UR-MCNC: NEGATIVE — SIGNIFICANT CHANGE UP
LACTATE SERPL-SCNC: 1.6 MMOL/L — SIGNIFICANT CHANGE UP (ref 0.7–2)
LACTATE SERPL-SCNC: 2.7 MMOL/L — HIGH (ref 0.7–2)
LEUKOCYTE ESTERASE UR-ACNC: NEGATIVE — SIGNIFICANT CHANGE UP
LG PLATELETS BLD QL AUTO: SLIGHT — SIGNIFICANT CHANGE UP
LYMPHOCYTES # BLD AUTO: 63.8 K/UL — HIGH (ref 1–3.3)
LYMPHOCYTES # BLD AUTO: 86 % — HIGH (ref 13–44)
MACROCYTES BLD QL: SIGNIFICANT CHANGE UP
MAGNESIUM SERPL-MCNC: 2.2 MG/DL — SIGNIFICANT CHANGE UP (ref 1.6–2.6)
MANUAL SMEAR VERIFICATION: SIGNIFICANT CHANGE UP
MCHC RBC-ENTMCNC: 32.7 GM/DL — SIGNIFICANT CHANGE UP (ref 32–36)
MCHC RBC-ENTMCNC: 37.3 PG — HIGH (ref 27–34)
MCV RBC AUTO: 114.3 FL — HIGH (ref 80–100)
MONOCYTES # BLD AUTO: 1.48 K/UL — HIGH (ref 0–0.9)
MONOCYTES NFR BLD AUTO: 2 % — SIGNIFICANT CHANGE UP (ref 2–14)
NEUTROPHILS # BLD AUTO: 8.9 K/UL — HIGH (ref 1.8–7.4)
NEUTROPHILS NFR BLD AUTO: 10 % — LOW (ref 43–77)
NEUTS BAND # BLD: 2 % — SIGNIFICANT CHANGE UP (ref 0–8)
NITRITE UR-MCNC: NEGATIVE — SIGNIFICANT CHANGE UP
NRBC # BLD: 0 — SIGNIFICANT CHANGE UP
NRBC # BLD: SIGNIFICANT CHANGE UP /100 WBCS (ref 0–0)
NT-PROBNP SERPL-SCNC: 900 PG/ML — HIGH (ref 0–125)
PH UR: 8 — SIGNIFICANT CHANGE UP (ref 5–8)
PLAT MORPH BLD: ABNORMAL
PLATELET # BLD AUTO: 446 K/UL — HIGH (ref 150–400)
POLYCHROMASIA BLD QL SMEAR: SLIGHT — SIGNIFICANT CHANGE UP
POTASSIUM SERPL-MCNC: 3.9 MMOL/L — SIGNIFICANT CHANGE UP (ref 3.5–5.3)
POTASSIUM SERPL-SCNC: 3.9 MMOL/L — SIGNIFICANT CHANGE UP (ref 3.5–5.3)
PROT SERPL-MCNC: 6.9 G/DL — SIGNIFICANT CHANGE UP (ref 6–8.3)
PROT UR-MCNC: 15
PROTHROM AB SERPL-ACNC: 13.3 SEC — SIGNIFICANT CHANGE UP (ref 10.6–13.6)
RBC # BLD: 2.17 M/UL — LOW (ref 4.2–5.8)
RBC # BLD: 2.28 M/UL — LOW (ref 4.2–5.8)
RBC # FLD: 16.2 % — HIGH (ref 10.3–14.5)
RBC BLD AUTO: ABNORMAL
RBC CASTS # UR COMP ASSIST: SIGNIFICANT CHANGE UP /HPF (ref 0–4)
RETICS #: 31.7 K/UL — SIGNIFICANT CHANGE UP (ref 25–125)
RETICS/RBC NFR: 1.4 % — SIGNIFICANT CHANGE UP (ref 0.5–2.5)
SARS-COV-2 RNA SPEC QL NAA+PROBE: SIGNIFICANT CHANGE UP
SMUDGE CELLS # BLD: PRESENT — SIGNIFICANT CHANGE UP
SODIUM SERPL-SCNC: 141 MMOL/L — SIGNIFICANT CHANGE UP (ref 135–145)
SP GR SPEC: 1.01 — SIGNIFICANT CHANGE UP (ref 1.01–1.02)
TROPONIN I SERPL-MCNC: <.015 NG/ML — SIGNIFICANT CHANGE UP (ref 0.01–0.04)
TSH SERPL-MCNC: 5.82 UIU/ML — HIGH (ref 0.36–3.74)
UROBILINOGEN FLD QL: NEGATIVE — SIGNIFICANT CHANGE UP
WBC # BLD: 74.19 K/UL — CRITICAL HIGH (ref 3.8–10.5)
WBC # FLD AUTO: 74.19 K/UL — CRITICAL HIGH (ref 3.8–10.5)

## 2020-07-27 PROCEDURE — 99223 1ST HOSP IP/OBS HIGH 75: CPT

## 2020-07-27 PROCEDURE — 70450 CT HEAD/BRAIN W/O DYE: CPT | Mod: 26

## 2020-07-27 PROCEDURE — 93010 ELECTROCARDIOGRAM REPORT: CPT

## 2020-07-27 PROCEDURE — 71045 X-RAY EXAM CHEST 1 VIEW: CPT | Mod: 26

## 2020-07-27 PROCEDURE — 99284 EMERGENCY DEPT VISIT MOD MDM: CPT

## 2020-07-27 RX ORDER — ACETAMINOPHEN 500 MG
650 TABLET ORAL ONCE
Refills: 0 | Status: COMPLETED | OUTPATIENT
Start: 2020-07-27 | End: 2020-07-27

## 2020-07-27 RX ORDER — L.ACIDOPH/B.ANIMALIS/B.LONGUM 15B CELL
0 CAPSULE ORAL
Qty: 0 | Refills: 0 | DISCHARGE

## 2020-07-27 RX ORDER — FOLIC ACID 0.8 MG
1 TABLET ORAL DAILY
Refills: 0 | Status: DISCONTINUED | OUTPATIENT
Start: 2020-07-27 | End: 2020-07-28

## 2020-07-27 RX ORDER — SODIUM CHLORIDE 9 MG/ML
1000 INJECTION, SOLUTION INTRAVENOUS
Refills: 0 | Status: DISCONTINUED | OUTPATIENT
Start: 2020-07-27 | End: 2020-07-28

## 2020-07-27 RX ORDER — HEPARIN SODIUM 5000 [USP'U]/ML
5000 INJECTION INTRAVENOUS; SUBCUTANEOUS EVERY 8 HOURS
Refills: 0 | Status: DISCONTINUED | OUTPATIENT
Start: 2020-07-27 | End: 2020-07-28

## 2020-07-27 RX ORDER — ONDANSETRON 8 MG/1
4 TABLET, FILM COATED ORAL ONCE
Refills: 0 | Status: COMPLETED | OUTPATIENT
Start: 2020-07-27 | End: 2020-07-27

## 2020-07-27 RX ORDER — DIPHENHYDRAMINE HCL 50 MG
25 CAPSULE ORAL ONCE
Refills: 0 | Status: COMPLETED | OUTPATIENT
Start: 2020-07-27 | End: 2020-07-27

## 2020-07-27 RX ORDER — SODIUM CHLORIDE 9 MG/ML
1000 INJECTION INTRAMUSCULAR; INTRAVENOUS; SUBCUTANEOUS ONCE
Refills: 0 | Status: COMPLETED | OUTPATIENT
Start: 2020-07-27 | End: 2020-07-27

## 2020-07-27 RX ADMIN — ONDANSETRON 4 MILLIGRAM(S): 8 TABLET, FILM COATED ORAL at 08:28

## 2020-07-27 RX ADMIN — Medication 650 MILLIGRAM(S): at 10:05

## 2020-07-27 RX ADMIN — SODIUM CHLORIDE 1000 MILLILITER(S): 9 INJECTION INTRAMUSCULAR; INTRAVENOUS; SUBCUTANEOUS at 08:28

## 2020-07-27 RX ADMIN — SODIUM CHLORIDE 50 MILLILITER(S): 9 INJECTION, SOLUTION INTRAVENOUS at 15:59

## 2020-07-27 RX ADMIN — Medication 25 MILLIGRAM(S): at 10:05

## 2020-07-27 RX ADMIN — SODIUM CHLORIDE 50 MILLILITER(S): 9 INJECTION, SOLUTION INTRAVENOUS at 22:18

## 2020-07-27 RX ADMIN — HEPARIN SODIUM 5000 UNIT(S): 5000 INJECTION INTRAVENOUS; SUBCUTANEOUS at 22:18

## 2020-07-27 NOTE — H&P ADULT - PROBLEM SELECTOR PLAN 2
? from Imbruvica, hold it as per hem. Monitor on tele. DENNIS score 0, hold off AC.   Continue with iv hydration.   Cards eval appreciated.

## 2020-07-27 NOTE — H&P ADULT - PROBLEM SELECTOR PLAN 1
Vasovagal vs Cardiac - Cards eval appreciated, continue with Aspirin, DENNIS score 0 hold off Anti coagulation. Follow up Echo. Continue with gentle hydration.

## 2020-07-27 NOTE — ED PROVIDER NOTE - PROGRESS NOTE DETAILS
notified cardiology Dr. EMILIANO Harden, will consult, paged heme/onc Dr. LISSA Godinez, awaiting call back spoke with Dr. Godinez, case discussed, labs reviwed, patient to get one unit PRBC in the ER, premedicate with tylenol and bendaryl, may dc home after transfusion and follow up as outpatient if cardiac cleared in ER spoke with Dr. Kong, will admit to Dr. Wood

## 2020-07-27 NOTE — H&P ADULT - NSICDXFAMILYHX_GEN_ALL_CORE_FT
FAMILY HISTORY:  Family history of malignant melanoma, age 89  Family history of multiple myeloma  FH: blood dyscrasia, MGUS  FH: hypertension, mother  FH: renal failure, mother

## 2020-07-27 NOTE — CONSULT NOTE ADULT - SUBJECTIVE AND OBJECTIVE BOX
Patient is a 64y old  Male who presents with a chief complaint of dizziness/syncope    HPI:  65 yo male well known to our group (currently following with Dr. Godinez) with long standing history of CLL dating back to 1990s, previously treated with Fludarabine intermittently up until 2013 and again treated with Fludarabine in 5/2019 as he declined other type of treatment at that time. Noted to have fluctuating WBC counts but most recently in 2020 developed anemia. He has never had any systemic LAD and never had organomegally but due to cytopenias (specifically macrocytic anemia), repeat bone marrow aspirate/biopsy was done which showed evidence of CLL and myelodysplasia with fibrosis. [facility] were not diagnostic for any specific entity. [facility] March 2020 [facility], he was started on Imbruvica at 420mg daily with instructions to hydrate aggressively which he has not been able to do.   On 7/26/20 he had episode of syncope where he reports having lost consciousness; he presented to the ER where he was found to have Atrial Fibrillation (rate controlled) and seen by cardiology.  Asked now for hematology evaluation       ROS:  Negative except for: syncope, denies any headache, chest pain SOB, Diarrhea or constipation    PAST MEDICAL & SURGICAL HISTORY:  Anemia  Other specified disorders of nose and nasal sinuses  Deviated nasal septum  Rosacea  Nasal congestion  Deviated nasal septum  Melanoma  Bulging Disc: LUMBAR  Herniated Cervical Disc  TMJ Syndrome: limited jaw opening due to TMJ  Avascular Necrosis of Femur Head: B/L  CLL (Chronic Lymphocytic Leukemia): SINCE 1988  Osteopenia  Bilateral cataracts: removed in 2011  S/P Tonsillectomy: 1960  Status Post Eye Surgery X 3: 3/2011 right eye scleral buckle; left eye in 2013  Abnormal Jaw Closure: LEFT JAW SURGERY - 1988  Avascular Necrosis of Femur Head: LEFT - CORE DECOMPRESSION - 1993  Avascular Necrosis of Femur Head: RIGHT - CORE DECOMPRESSION- 1993  S/P Splenectomy: 1993      SOCIAL HISTORY:  Lives at home with wife  denies excessive ETOH use; Denies tobacco use    FAMILY HISTORY:  FH: renal failure: mother  FH: hypertension: mother  FH: blood dyscrasia: MGUS/Waldenstrom's Macroblobulinemia in mother who is 90    MEDICATIONS  (STANDING):    folic acid 1mg daily  Imbruvica 420mg daily  Pepcid 20mg daily  MVI daily  Aztaline spray  Allegra 60mg daily      Allergies    No Known Allergies      Vital Signs Last 24 Hrs  T(C): 36.4 (27 Jul 2020 11:14), Max: 36.4 (27 Jul 2020 10:07)  T(F): 97.6 (27 Jul 2020 11:14), Max: 97.6 (27 Jul 2020 10:07)  HR: 90 (27 Jul 2020 11:14) (78 - 90)  BP: 124/63 (27 Jul 2020 11:14) (115/64 - 124/63)  RR: 16 (27 Jul 2020 11:14) (16 - 17)  SpO2: 100% (27 Jul 2020 11:14) (100% - 100%)    PHYSICAL EXAM  General: adult in NAD  HEENT: clear oropharynx, anicteric sclera, pink conjunctivae  Neck: supple  CV: normal S1S2 with no murmur rubs or gallops  Lungs: clear to auscultation, no wheezes, no rhales  Abdomen: soft non-tender non-distended, no hepato/splenomegaly  Ext: no clubbing cyanosis or edema  Skin: no rashes and no petichiae  Neuro: alert and oriented X3 no focal deficits      LABS:    CBC Full  -  ( 27 Jul 2020 07:57 )  WBC Count : 74.19 K/uL  RBC Count : 2.17 M/uL  Hemoglobin : 8.1 g/dL  Hematocrit : 24.8 %  Platelet Count - Automated : 446 K/uL  Mean Cell Volume : 114.3 fl  Mean Cell Hemoglobin : 37.3 pg  Mean Cell Hemoglobin Concentration : 32.7 gm/dL  Auto Neutrophil # : 8.90 K/uL  Auto Lymphocyte # : 63.80 K/uL  Auto Monocyte # : 1.48 K/uL  Auto Eosinophil # : 0.00 K/uL  Auto Basophil # : 0.00 K/uL  Auto Neutrophil % : 10.0 %  Auto Lymphocyte % : 86.0 %  Auto Monocyte % : 2.0 %  Auto Eosinophil % : 0.0 %  Auto Basophil % : 0.0 %    07-27    141  |  107  |  19  ----------------------------<  152<H>  3.9   |  25  |  1.30    Ca    8.8      27 Jul 2020 07:57  Mg     2.2     07-27    TPro  6.9  /  Alb  4.2  /  TBili  2.6<H>  /  DBili  x   /  AST  5<L>  /  ALT  11<L>  /  AlkPhos  56  07-27    PT/INR - ( 27 Jul 2020 07:57 )   PT: 13.3 sec;   INR: 1.14 ratio    PTT - ( 27 Jul 2020 07:57 )  PTT:27.7 sec    BLOOD SMEAR INTERPRETATION:    * RBC - macrocytic, hypochromic, no anisiocytosis or poikiolocytosis; no evidence of polychromasia  * WBC - increased WBCS with predominantly lymphocytes mostly small and mature; neutrophils with normal morphology, few smudge cells; no left shift, no blasts  * Platelets - normal in number and morphology

## 2020-07-27 NOTE — H&P ADULT - HISTORY OF PRESENT ILLNESS
64 M with pmhx cll, on chemo p/w syncope. Patient states after taking shower he felt very dizzy almost about to pass out.   Patient with long standing history CLL dating back to 1990s, treated with Fludarabine, s/p  repeat bone marrow aspirate/ biopsy shows CLL and myelodysplasia with fibrosis. Patient was started on Imbruca On 7/26/20.   No hx chest pain / palpitations.  Denies head injury.     In the ed patient was given fluid bolus for low blood pressure, seen by cards as patient noted to have A.fib with rvr. 64 M with pmhx CLL on chemo p/w syncope. Patient states after taking shower he felt very dizzy almost about to pass out.   Patient with long standing history CLL dating back to 1990s, treated with Fludarabine, s/p  repeat bone marrow aspirate/ biopsy shows CLL and myelodysplasia with fibrosis. Patient was started on Imbruca On 7/26/20.   No hx chest pain / palpitations.  Denies head injury.     In the ed patient was given fluid bolus for low blood pressure, seen by cards as patient noted to have A.fib with rvr.   CT head no bleed/ CVA.

## 2020-07-27 NOTE — ED PROVIDER NOTE - CARE PLAN
Principal Discharge DX:	Syncope, unspecified syncope type Principal Discharge DX:	Syncope, unspecified syncope type  Secondary Diagnosis:	Anemia, unspecified type

## 2020-07-27 NOTE — ED PROVIDER NOTE - OBJECTIVE STATEMENT
64 male h/o CLL on oral Imbruvica daily presents to ER by ambulance from home with report of syncope. Patient states he felt well yesterday and woke up as usual this morning, took his shower and was to prepare his breakfast, felt sudden onset of dizziness, light headed, wife helped him to the bathroom and passed out, lowered to the ground. Patient states he became diaphoretic, does not recall passing out, feeling nausea and light headed. Denies chest pain, no headache, no vomiting, had normal BM today.

## 2020-07-27 NOTE — ED ADULT TRIAGE NOTE - CHIEF COMPLAINT QUOTE
Lightheaded this am.  Up to BR.  Passed out in BR.  Amnesic to event.  Hx leukemia.  Anemia with last transfusion 30 years ago.  Initail BP 90/60

## 2020-07-27 NOTE — H&P ADULT - NSICDXPASTSURGICALHX_GEN_ALL_CORE_FT
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

## 2020-07-27 NOTE — CONSULT NOTE ADULT - ASSESSMENT
64 male h/o CLL on oral Imbruvica daily presents to ER by ambulance from home with report of syncope.    syncope/dizziness  - pt with  - EKG: a fib not found on previous EKG  - Monitor and replete lytes, keep K>4, Mg>2.  - Patient is not complaining of any cardiac symptoms at this time.  - No clear evidence of acute ischemia, trops negative. Will follow up second set.    - No meaningful evidence of volume overload.  - Previous TTE shows ___.  - BP well controlled, monitor routine hemodynamics.    - Other cardiovascular workup will depend on clinical course.  - All other workup per primary team.  - Will continue to follow. 64 male h/o CLL on oral Imbruvica daily presents to ER by ambulance from home with report of syncope. Patient began to feel lightheaded this morning while making his morning coffee and synopsized in his bathroom, witnessed by wife. Patient presenting with syncopal episodes likely 2/2 dehydration in setting of Imbruvica treatment.     syncope/dizziness  - pt with CLL had syncopal episodes after feeling lightheaded this morning, likely vasovagal episode 2/2 dehydration vs anemia vs CLL crisis  - patient appears dry on physical exam  - BP well controlled, monitor routine hemodynamics.  - Monitor and replete lytes, keep K>4, Mg>2.  - lactate 2/7, TSH 5.82, , WBC 74.19, Hgb 8.1  - No clear evidence of acute ischemia, trops negative. Will follow up second set.    a-fib  - pt currently rate controlled a-fib, doesn't take cardiac medications  - EKG: a fib not found on previous EKG  - recommend remote tele  - CHADVASC 0  - will discuss AC with heme/onc in setting of afib and CLL    - Other cardiovascular workup will depend on clinical course.  - All other workup per primary team.  - Will continue to follow. 64 male h/o CLL on oral Imbruvica daily presents to ER by ambulance from home with report of syncope. Patient began to feel lightheaded this morning while making his morning coffee and synopsized in his bathroom, witnessed by wife. Patient presenting with syncopal episodes likely 2/2 dehydration in setting of Imbruvica treatment.     syncope/dizziness  - pt with CLL had syncopal episodes after feeling lightheaded this morning, likely vasovagal episode 2/2 dehydration vs anemia vs CLL crisis  - patient appears dry on physical exam  - BP well controlled, monitor routine hemodynamics.  - Monitor and replete lytes, keep K>4, Mg>2.  - lactate 2/7, TSH 5.82, , WBC 74.19, Hgb 8.1  - No clear evidence of acute ischemia, trops negative. Will follow up second set.    a-fib  - pt currently rate controlled a-fib, doesn't take cardiac medications  - EKG: a fib not found on previous EKG  - recommend remote tele  - CHADVASC 1 (pt will turn 65 on 8/9/20)  - will discuss AC with heme/onc in setting of afib and CLL    - Other cardiovascular workup will depend on clinical course.  - All other workup per primary team.  - Will continue to follow. 64 male h/o CLL on oral Imbruvica daily presents to ER by ambulance from home with report of syncope. Patient began to feel lightheaded this morning while making his morning coffee and synopsized in his bathroom, witnessed by wife. Patient presenting with syncopal episodes likely 2/2 dehydration in setting of Imbruvica treatment.     syncope/dizziness  - pt with CLL had syncopal episodes after feeling lightheaded this morning, likely vasovagal episode 2/2 dehydration vs anemia vs CLL crisis  - patient appears dry on physical exam  - BP well controlled, monitor routine hemodynamics.  - Monitor and replete lytes, keep K>4, Mg>2.  - lactate 2/7, TSH 5.82, , WBC 74.19, Hgb 8.1  - No clear evidence of acute ischemia, trops negative. Will follow up second set.    a-fib  - pt currently rate controlled a-fib, doesn't take cardiac medications  - EKG: a fib not found on previous EKG  - recommend remote tele  - CHADVASC 1 (pt will turn 65 on 8/9/20)  - recommend ASA  - will discuss AC with heme/onc in setting of afib and CLL    - Other cardiovascular workup will depend on clinical course.  - All other workup per primary team.  - Will continue to follow. 64 male h/o CLL on oral Imbruvica daily presents to ER by ambulance from home with report of syncope. Patient began to feel lightheaded this morning while making his morning coffee and synopsized in his bathroom, witnessed by wife. Patient presenting with syncopal episodes likely 2/2 dehydration in setting of Imbruvica treatment.     syncope/dizziness  - pt with CLL had syncopal episodes after feeling lightheaded this morning, likely vasovagal episode 2/2 dehydration   - patient appears dry on physical exam  - check orthostatics.   - IVF  - BP well controlled, monitor routine hemodynamics.  - Monitor and replete lytes, keep K>4, Mg>2.  - lactate 2/7, TSH 5.82, , WBC 74.19, Hgb 8.1  - No clear evidence of acute ischemia, trops negative. Will follow up second set.    a-fib  - pt currently rate controlled a-fib, doesn't take cardiac medications  - this may be related to the Imbruvica  - EKG: a fib not found on previous EKG  - recommend remote tele  - CHADVASC 1 (pt will turn 65 on 8/9/20)  - recommend ASA  - currently rate controlled without AVN agents.     - Further cardiac workup will depend on clinical course.   - Other cardiovascular workup will depend on clinical course.  - All other workup per primary team.  - Will continue to follow.

## 2020-07-27 NOTE — CONSULT NOTE ADULT - SUBJECTIVE AND OBJECTIVE BOX
For episode of loss of consciousness and the prodrome of feeling lightheaded, ?blurry vision, sweaty, this is most likely vasovagal syncopal event with the patient being hypotensive.  There is no clear signs on examination to suggest a new cerebrovascular accident has ensued and there is no history to suggest underlying epilepsy but with episode of grunting or snoring would recommend at some point EEG   tele eval   monitor sbp   spoke to family

## 2020-07-27 NOTE — H&P ADULT - PROBLEM SELECTOR PLAN 3
Leukocytosis form CLL, follow up blood cultures r/o occult infection. ID consult  appreciated.   Monitor off abx.

## 2020-07-27 NOTE — CONSULT NOTE ADULT - SUBJECTIVE AND OBJECTIVE BOX
HPI:  63 yo male PMH CLL on Imbruvica at 420mg daily started in March 2020 presented to the hospital with cc of syncope where he reports having lost consciousness found to have Atrial Fibrillation and seen by cardiology. Also hypotensive. Denies fever chills n/v/d CP SOB abd pain urinary symptoms. No rash.  WBC 74K. No recent antibiotic use.    Infectious DIsease consult was called to evaluate pt or possible infectious process.      Past Medical & Surgical Hx:  PAST MEDICAL & SURGICAL HISTORY:  Anemia  Other specified disorders of nose and nasal sinuses  Deviated nasal septum  Rosacea  Nasal congestion  Deviated nasal septum  Melanoma  Bulging Disc: LUMBAR  Herniated Cervical Disc  TMJ Syndrome: limited jaw opening due to TMJ  Avascular Necrosis of Femur Head: B/L  CLL (Chronic Lymphocytic Leukemia): SINCE 1988  Osteopenia  Bilateral cataracts: removed in 2011  S/P Tonsillectomy: 1960  Status Post Eye Surgery X 3: 3/2011 right eye scleral buckle; left eye in 2013  Abnormal Jaw Closure: LEFT JAW SURGERY - 1988  Avascular Necrosis of Femur Head: LEFT - CORE DECOMPRESSION - 1993  Avascular Necrosis of Femur Head: RIGHT - CORE DECOMPRESSION- 1993  S/P Splenectomy: 1993      Social History--  EtOH: denies   Tobacco: denies   Drug Use: denies     FAMILY HISTORY:  FH: renal failure: mother  FH: hypertension: mother  FH: blood dyscrasia: MGUS  Family history of malignant melanoma: age 89  Family history of multiple myeloma      Allergies  No Known Allergies    Intolerances  NONE      Home Medications:  folic acid 1 mg oral tablet: 1 tab(s) orally once a day (27 Jul 2020 13:01)  Imbruvica 420 mg oral tablet: 1 tab(s) orally once a day (27 Jul 2020 13:01)      Current Inpatient Medications :    ANTIBIOTICS:       OTHER RELEVANT MEDICATIONS :  folic acid 1 milliGRAM(s) Oral daily  heparin   Injectable 5000 Unit(s) SubCutaneous every 8 hours  lactated ringers. 1000 milliLiter(s) IV Continuous <Continuous>      ROS:  CONSTITUTIONAL:  Negative fever or chills  EYES:  Negative  blurry vision or double vision  CARDIOVASCULAR:  Negative for chest pain or palpitations  RESPIRATORY:  Negative for cough, wheezing, or SOB   GASTROINTESTINAL:  Negative for nausea, vomiting, diarrhea, constipation, or abdominal pain  GENITOURINARY:  Negative frequency, urgency , dysuria or hematuria   NEUROLOGIC:  No headache, confusion, dizziness, lightheadedness +syncope  All other systems were reviewed and are negative      Physical Exam:  Vital Signs Last 24 Hrs  T(C): 37.2 (27 Jul 2020 19:27), Max: 37.2 (27 Jul 2020 19:27)  T(F): 98.9 (27 Jul 2020 19:27), Max: 98.9 (27 Jul 2020 19:27)  HR: 73 (27 Jul 2020 19:27) (67 - 90)  BP: 118/67 (27 Jul 2020 19:27) (115/64 - 124/63)  BP(mean): 74 (27 Jul 2020 14:30) (74 - 74)  RR: 18 (27 Jul 2020 19:27) (16 - 18)  SpO2: 94% (27 Jul 2020 19:27) (94% - 100%)  Weight (kg): 68 (07-27 @ 14:30)    General: in no acute distress  Eyes: sclera anicteric, pupils equal and reactive to light  ENMT: buccal mucosa moist, pharynx not injected  Neck: supple, trachea midline  Lungs: clear, no wheeze/rhonchi  Cardiovascular: regular rate and rhythm, S1 S2  Abdomen: soft, nontender, no organomegaly present, bowel sounds normal  Neurological:  alert and oriented x3, Cranial Nerves II-XII grossly intact  Skin:no increased ecchymosis/petechiae/purpura  Lymph Nodes: no palpable cervical/supraclavicular lymph nodes enlargements  Extremities: no cyanosis/clubbing/edema    Labs:                         8.1    74.19 )-----------( 446      ( 27 Jul 2020 07:57 )             24.8   Manual Differential (07.27.20 @ 07:57)    Platelet Morphology: Abnormal    Jeong-Jolly Bodies: Present    Macrocytosis: Moderate    Anisocytosis: Slight    Red Cell Morphology: Abnormal    Giant Platelets: Present    Large Platelets: Slight    Manual Smear Verification: Performed    Polychromasia: Slight    Band Neutrophils %: 2.0 %    Nucleated RBC: 0    Smudge Cells: Present    07-27    141  |  107  |  19  ----------------------------<  152<H>  3.9   |  25  |  1.30    Ca    8.8      27 Jul 2020 07:57  Mg     2.2     07-27    TPro  x   /  Alb  x   /  TBili  3.2<H>  /  DBili  .30<H>  /  AST  x   /  ALT  x   /  AlkPhos  x   07-27      RECENT CULTURES:  pending    RADIOLOGY & ADDITIONAL STUDIES:    EXAM:  XR CHEST PORTABLE URGENT 1V                            PROCEDURE DATE:  07/27/2020          INTERPRETATION:  CLINICAL INDICATION: 64 years  Male with syncope.    COMPARISON: None    2 AP views of the chest demonstrate the lungs to be clear. There is no pleural effusion. There is no pneumothorax.    The heart is normal in size. There is no mediastinal or hilar mass.    The pulmonary vasculature is normal.    Mild thoracic degenerative changes are present.    IMPRESSION:    No active disease.    Assessment :   63 yo male PMH CLL on Imbruvica at 420mg daily started in March 2020 presented to the hospital with cc of syncope where he reports having lost consciousness found to have Atrial Fibrillation and seen by cardiology. Also hypotensive. Denies fever chills n/v/d CP SOB abd pain urinary symptoms. No rash.  WBC 74K. No recent antibiotic use. Doubt infectious. Likely dehydration. Was instructed by his oncologist to aggressively hydrate while on Imbruvica, he was unable to do so. Leukocytosis sec CLL thus not a reliable indicator for infectious process.      Plan :   Defer antibiotics  Trend temps and cbc  Fu cultures  IVF  Heme onc following    D/w Dr Tang      Continue with present regime .  Approptiate use of antibiotics and adverse effects reviewed.      I have discussed the above plan of care with patient/family in detail. They expressed understanding of the treatment plan . Risks, benefits and alternatives discussed in detail. I have asked if they have any questions or concerns and appropriately addressed them to the best of my ability .      > 45 minutes spent in direct patient care reviewing  the notes, lab data/ imaging , discussion with multidisciplinary team. All questions were addressed and answered to the best of my capacity .    Thank you for allowing me to participate in the care of your patient .      Corin Espinal MD  Infectious Disease  [phone]

## 2020-07-27 NOTE — CONSULT NOTE ADULT - ASSESSMENT
63 yo male with history of CLL previously treated with Fludarabine, more recently found to have progressive macrocytic anemia with repeat bone marrow in 2/2020 showed CLL with complex cytogenetics including add13q, del11q, t(5;8) with no clear targets on [employer] (JUAN DIEGO, APC, FLT1 and subclonal SF3B1 mutations); thought to possibly also have MDS contributing to anemia.  Patient was started on Imbruvica 420mg daily in 3/2020 with little change in blood indices (WBC 70K), now presenting after syncopal episode and found in ER to have A-fib; currently rhythm is normal back in sinus    - appreciate cardiology input and patient will be started on aspirin due to DENNIS score of 0  - A-fib likely induced as a result of Imbruvica which can cause cardiac arrythmias; okay to hold imbruvica while inpatient  - would check LDH, Total and Direct Bili, Haptoglobin and Marva test to rule out hemolytic anemia that can at times be associated with CLL  - agree to hydrate and transfuse 1 unit pRBC as ordered  - case discussed with Cardiology (Dr. Trenton Harden)  - will follow with you.

## 2020-07-27 NOTE — CONSULT NOTE ADULT - ATTENDING COMMENTS
I saw and examined the patient personally. Spoke with above provider regarding this case. I reviewed the above findings completely.  I agree with the above history, physical, and plan which I have edited where appropriate.     64 male h/o CLL on oral Imbruvica daily presents to ER by ambulance from home with report of syncope.   - he had a syncopal episode that likely was vagal. he appears dry on exam. check orthostatics.   - Now in AF. monitor tele. Appears that Imbruvica may have caused AF. would not stop treatment as pt is rate controlled. CHADSVaSc 1 start ASA  - Monitor and replete electrolytes. Keep K>4.0 and Mg>2.0.  - trend lactate Cr.   - Further cardiac workup will depend on clinical course.

## 2020-07-27 NOTE — CONSULT NOTE ADULT - SUBJECTIVE AND OBJECTIVE BOX
CHARTING IN PROGRESS    Patient is a 64y old  Male who presents with a chief complaint of syncope    HPI: 64 male h/o CLL on oral Imbruvica daily presents to ER by ambulance from home with report of syncope. Patient states he felt well yesterday and woke up as usual this morning, took his shower and was to prepare his breakfast, felt sudden onset of dizziness, light headed, wife helped him to the bathroom and passed out, lowered to the ground. Patient states he became diaphoretic, does not recall passing out, feeling nausea and light headed. Denies chest pain, no headache, no vomiting, had normal BM today    PAST MEDICAL & SURGICAL HISTORY:  Anemia  Other specified disorders of nose and nasal sinuses  Deviated nasal septum  Rosacea  Nasal congestion  Deviated nasal septum  Melanoma  Bulging Disc: LUMBAR  Herniated Cervical Disc  TMJ Syndrome: limited jaw opening due to TMJ  Avascular Necrosis of Femur Head: B/L  CLL (Chronic Lymphocytic Leukemia): SINCE 1988  Osteopenia  Bilateral cataracts: removed in 2011  S/P Tonsillectomy: 1960  Status Post Eye Surgery X 3: 3/2011 right eye scleral buckle; left eye in 2013  Abnormal Jaw Closure: LEFT JAW SURGERY - 1988  Avascular Necrosis of Femur Head: LEFT - CORE DECOMPRESSION - 1993  Avascular Necrosis of Femur Head: RIGHT - CORE DECOMPRESSION- 1993  S/P Splenectomy: 1993    ECHO  FINDINGS:    MEDICATIONS  (STANDING):    MEDICATIONS  (PRN):      FAMILY HISTORY:  FH: renal failure: mother  FH: hypertension: mother  FH: blood dyscrasia: MGUS  Family history of malignant melanoma (Mother): age 89  Family history of multiple myeloma (Father)  Denies Family history of CAD or early MI    Constitutional: denies fever, chills  HEENT: denies blurry vision, difficulty hearing  Respiratory: denies SOB, PARKER, cough  Cardiovascular: denies CP, palpitations, orthopnea, PND, LE edema  Gastrointestinal: denies nausea, vomiting, abdominal pain  Genitourinary: denies urinary changes  Skin: Denies rashes, itching  Neurologic: denies headache, weakness, dizziness  Hematology/Oncology: denies bleeding, easy bruising  ROS negative except as noted above    SOCIAL HISTORY:  No tobacco, Alcohol or Ddrug use    Vital Signs Last 24 Hrs  T(C): 36.3 (27 Jul 2020 07:37), Max: 36.3 (27 Jul 2020 07:37)  T(F): 97.4 (27 Jul 2020 07:37), Max: 97.4 (27 Jul 2020 07:37)  HR: 82 (27 Jul 2020 08:00) (78 - 82)  BP: 118/56 (27 Jul 2020 08:00) (117/65 - 118/56)  RR: 17 (27 Jul 2020 07:37) (17 - 17)  SpO2: 100% (27 Jul 2020 07:37) (100% - 100%)    Physical Exam:  General: Well developed, well nourished, NAD  HEENT: NCAT, PERRLA, EOMI bl, moist mucous membranes   Neck: Supple, nontender, no mass  Neurology: A&Ox3, nonfocal, sensation intact   Respiratory: CTA B/L, No W/R/R  CV: RRR, +S1/S2, no murmurs, rubs or gallops  Abdominal: Soft, NT, ND +BSx4, no palpable masses  Extremities: No C/C/E, + peripheral pulses  MSK: Normal ROM, no joint erythema or warmth, no joint swelling   Heme: No obvious ecchymosis or petechiae   Skin: warm, dry, normal color      ECG: afib    I&O's Detail      LABS:                        8.1    74.19 )-----------( 446      ( 27 Jul 2020 07:57 )             24.8     07-27    141  |  107  |  19  ----------------------------<  152<H>  3.9   |  25  |  1.30    Ca    8.8      27 Jul 2020 07:57  Mg     2.2     07-27    TPro  6.9  /  Alb  4.2  /  TBili  2.6<H>  /  DBili  x   /  AST  5<L>  /  ALT  11<L>  /  AlkPhos  56  07-27    CARDIAC MARKERS ( 27 Jul 2020 07:57 )  <.015 ng/mL / x     / x     / x     / <1.0 ng/mL      PT/INR - ( 27 Jul 2020 07:57 )   PT: 13.3 sec;   INR: 1.14 ratio         PTT - ( 27 Jul 2020 07:57 )  PTT:27.7 sec    I&O's Summary    BNPSerum Pro-Brain Natriuretic Peptide: 900 pg/mL (07-27 @ 07:57)    RADIOLOGY & ADDITIONAL STUDIES: Patient is a 64y old  Male who presents with a chief complaint of syncope    HPI: 64 male h/o CLL on oral Imbruvica daily presents to ER by ambulance from home with report of syncope. Patient states he felt well yesterday and woke up as usual this morning, took his shower and was to prepare his breakfast, felt sudden onset of dizziness, light headed, wife helped him to the bathroom and passed out, lowered to the ground. Patient states he became diaphoretic, does not recall passing out, feeling nausea and light headed. Denies chest pain, no headache, no vomiting, had normal BM today    PAST MEDICAL & SURGICAL HISTORY:  Anemia  Other specified disorders of nose and nasal sinuses  Deviated nasal septum  Rosacea  Nasal congestion  Deviated nasal septum  Melanoma  Bulging Disc: LUMBAR  Herniated Cervical Disc  TMJ Syndrome: limited jaw opening due to TMJ  Avascular Necrosis of Femur Head: B/L  CLL (Chronic Lymphocytic Leukemia): SINCE 1988  Osteopenia  Bilateral cataracts: removed in 2011  S/P Tonsillectomy: 1960  Status Post Eye Surgery X 3: 3/2011 right eye scleral buckle; left eye in 2013  Abnormal Jaw Closure: LEFT JAW SURGERY - 1988  Avascular Necrosis of Femur Head: LEFT - CORE DECOMPRESSION - 1993  Avascular Necrosis of Femur Head: RIGHT - CORE DECOMPRESSION- 1993  S/P Splenectomy: 1993    ECHO  FINDINGS:    MEDICATIONS  (STANDING):    MEDICATIONS  (PRN):      FAMILY HISTORY:  FH: renal failure: mother  FH: hypertension: mother  FH: blood dyscrasia: MGUS  Family history of malignant melanoma (Mother): age 89  Family history of multiple myeloma (Father)  Denies Family history of CAD or early MI    Constitutional: denies fever, chills  HEENT: denies blurry vision, difficulty hearing  Respiratory: denies SOB, PARKER, cough  Cardiovascular: denies CP, palpitations, orthopnea, PND, LE edema  Gastrointestinal: denies nausea, vomiting, abdominal pain  Genitourinary: denies urinary changes  Skin: Denies rashes, itching  Neurologic: denies headache, weakness, dizziness  Hematology/Oncology: denies bleeding, easy bruising  ROS negative except as noted above    SOCIAL HISTORY:  No tobacco, Alcohol or Ddrug use    Vital Signs Last 24 Hrs  T(C): 36.3 (27 Jul 2020 07:37), Max: 36.3 (27 Jul 2020 07:37)  T(F): 97.4 (27 Jul 2020 07:37), Max: 97.4 (27 Jul 2020 07:37)  HR: 82 (27 Jul 2020 08:00) (78 - 82)  BP: 118/56 (27 Jul 2020 08:00) (117/65 - 118/56)  RR: 17 (27 Jul 2020 07:37) (17 - 17)  SpO2: 100% (27 Jul 2020 07:37) (100% - 100%)    Physical Exam:  General: Well developed, well nourished, NAD  HEENT: NCAT, PERRLA, EOMI bl, dry mucous membranes   Neck: Supple, nontender, no mass  Neurology: A&Ox3, nonfocal, sensation intact   Respiratory: CTA B/L, No W/R/R  CV: RRR, +S1/S2, no murmurs, rubs or gallops  Abdominal: Soft, NT, ND +BSx4, no palpable masses  Extremities: No C/C/E, + peripheral pulses  MSK: Normal ROM, no joint erythema or warmth, no joint swelling   Heme: No obvious ecchymosis or petechiae   Skin: warm, dry, normal color      ECG: afib    I&O's Detail      LABS:                        8.1    74.19 )-----------( 446      ( 27 Jul 2020 07:57 )             24.8     07-27    141  |  107  |  19  ----------------------------<  152<H>  3.9   |  25  |  1.30    Ca    8.8      27 Jul 2020 07:57  Mg     2.2     07-27    TPro  6.9  /  Alb  4.2  /  TBili  2.6<H>  /  DBili  x   /  AST  5<L>  /  ALT  11<L>  /  AlkPhos  56  07-27    CARDIAC MARKERS ( 27 Jul 2020 07:57 )  <.015 ng/mL / x     / x     / x     / <1.0 ng/mL      PT/INR - ( 27 Jul 2020 07:57 )   PT: 13.3 sec;   INR: 1.14 ratio         PTT - ( 27 Jul 2020 07:57 )  PTT:27.7 sec    I&O's Summary    BNPSerum Pro-Brain Natriuretic Peptide: 900 pg/mL (07-27 @ 07:57)    RADIOLOGY & ADDITIONAL STUDIES:

## 2020-07-27 NOTE — ED ADULT NURSE NOTE - OBJECTIVE STATEMENT
pt with complaints of syncope this morning. pt was making a cup of coffee and became lightheaded and passed out. pt was feeling weak upon arrival, pt able to verbalize needs and follow commands. pt also nauseous and was dry heaving.

## 2020-07-27 NOTE — ED PROVIDER NOTE - CLINICAL SUMMARY MEDICAL DECISION MAKING FREE TEXT BOX
syncope, diaphoresis, h/o CLL, f/u labs, cardiac enzymes, cultures, ekg, chest xray, iv fluids, orthostatics

## 2020-07-28 ENCOUNTER — TRANSCRIPTION ENCOUNTER (OUTPATIENT)
Age: 65
End: 2020-07-28

## 2020-07-28 VITALS
SYSTOLIC BLOOD PRESSURE: 133 MMHG | OXYGEN SATURATION: 94 % | TEMPERATURE: 98 F | RESPIRATION RATE: 18 BRPM | DIASTOLIC BLOOD PRESSURE: 56 MMHG | HEART RATE: 49 BPM

## 2020-07-28 LAB
BASOPHILS # BLD AUTO: 0 K/UL — SIGNIFICANT CHANGE UP (ref 0–0.2)
BASOPHILS NFR BLD AUTO: 0 % — SIGNIFICANT CHANGE UP (ref 0–2)
CULTURE RESULTS: NO GROWTH — SIGNIFICANT CHANGE UP
EOSINOPHIL # BLD AUTO: 0 K/UL — SIGNIFICANT CHANGE UP (ref 0–0.5)
EOSINOPHIL NFR BLD AUTO: 0 % — SIGNIFICANT CHANGE UP (ref 0–6)
HAPTOGLOB SERPL-MCNC: 114 MG/DL — SIGNIFICANT CHANGE UP (ref 34–200)
HCT VFR BLD CALC: 25.2 % — LOW (ref 39–50)
HCV AB S/CO SERPL IA: 0.08 S/CO — SIGNIFICANT CHANGE UP (ref 0–0.99)
HCV AB SERPL-IMP: SIGNIFICANT CHANGE UP
HGB BLD-MCNC: 8.1 G/DL — LOW (ref 13–17)
LDH SERPL L TO P-CCNC: 228 U/L — SIGNIFICANT CHANGE UP (ref 50–242)
LYMPHOCYTES # BLD AUTO: 58.86 K/UL — HIGH (ref 1–3.3)
LYMPHOCYTES # BLD AUTO: 89 % — HIGH (ref 13–44)
MCHC RBC-ENTMCNC: 32.1 GM/DL — SIGNIFICANT CHANGE UP (ref 32–36)
MCHC RBC-ENTMCNC: 36.5 PG — HIGH (ref 27–34)
MCV RBC AUTO: 113.5 FL — HIGH (ref 80–100)
MONOCYTES # BLD AUTO: 0.66 K/UL — SIGNIFICANT CHANGE UP (ref 0–0.9)
MONOCYTES NFR BLD AUTO: 1 % — LOW (ref 2–14)
NEUTROPHILS # BLD AUTO: 6.61 K/UL — SIGNIFICANT CHANGE UP (ref 1.8–7.4)
NEUTROPHILS NFR BLD AUTO: 10 % — LOW (ref 43–77)
NRBC # BLD: SIGNIFICANT CHANGE UP /100 WBCS (ref 0–0)
PLATELET # BLD AUTO: 416 K/UL — HIGH (ref 150–400)
RBC # BLD: 2.22 M/UL — LOW (ref 4.2–5.8)
RBC # FLD: 20.9 % — HIGH (ref 10.3–14.5)
SARS-COV-2 IGG SERPL QL IA: NEGATIVE — SIGNIFICANT CHANGE UP
SARS-COV-2 IGM SERPL IA-ACNC: 0.3 INDEX — SIGNIFICANT CHANGE UP
SPECIMEN SOURCE: SIGNIFICANT CHANGE UP
WBC # BLD: 66.14 K/UL — CRITICAL HIGH (ref 3.8–10.5)
WBC # FLD AUTO: 66.14 K/UL — CRITICAL HIGH (ref 3.8–10.5)

## 2020-07-28 PROCEDURE — 83605 ASSAY OF LACTIC ACID: CPT

## 2020-07-28 PROCEDURE — 82248 BILIRUBIN DIRECT: CPT

## 2020-07-28 PROCEDURE — 86769 SARS-COV-2 COVID-19 ANTIBODY: CPT

## 2020-07-28 PROCEDURE — 83735 ASSAY OF MAGNESIUM: CPT

## 2020-07-28 PROCEDURE — 85610 PROTHROMBIN TIME: CPT

## 2020-07-28 PROCEDURE — 71045 X-RAY EXAM CHEST 1 VIEW: CPT

## 2020-07-28 PROCEDURE — 86803 HEPATITIS C AB TEST: CPT

## 2020-07-28 PROCEDURE — 99053 MED SERV 10PM-8AM 24 HR FAC: CPT

## 2020-07-28 PROCEDURE — 86900 BLOOD TYPING SEROLOGIC ABO: CPT

## 2020-07-28 PROCEDURE — P9016: CPT

## 2020-07-28 PROCEDURE — 85045 AUTOMATED RETICULOCYTE COUNT: CPT

## 2020-07-28 PROCEDURE — 84484 ASSAY OF TROPONIN QUANT: CPT

## 2020-07-28 PROCEDURE — 82553 CREATINE MB FRACTION: CPT

## 2020-07-28 PROCEDURE — 86923 COMPATIBILITY TEST ELECTRIC: CPT

## 2020-07-28 PROCEDURE — 99232 SBSQ HOSP IP/OBS MODERATE 35: CPT

## 2020-07-28 PROCEDURE — 95816 EEG AWAKE AND DROWSY: CPT

## 2020-07-28 PROCEDURE — 86850 RBC ANTIBODY SCREEN: CPT

## 2020-07-28 PROCEDURE — 85730 THROMBOPLASTIN TIME PARTIAL: CPT

## 2020-07-28 PROCEDURE — 81001 URINALYSIS AUTO W/SCOPE: CPT

## 2020-07-28 PROCEDURE — 84443 ASSAY THYROID STIM HORMONE: CPT

## 2020-07-28 PROCEDURE — 99285 EMERGENCY DEPT VISIT HI MDM: CPT

## 2020-07-28 PROCEDURE — 87040 BLOOD CULTURE FOR BACTERIA: CPT

## 2020-07-28 PROCEDURE — 86901 BLOOD TYPING SEROLOGIC RH(D): CPT

## 2020-07-28 PROCEDURE — 96374 THER/PROPH/DIAG INJ IV PUSH: CPT

## 2020-07-28 PROCEDURE — 87635 SARS-COV-2 COVID-19 AMP PRB: CPT

## 2020-07-28 PROCEDURE — 80053 COMPREHEN METABOLIC PANEL: CPT

## 2020-07-28 PROCEDURE — 86880 COOMBS TEST DIRECT: CPT

## 2020-07-28 PROCEDURE — 87086 URINE CULTURE/COLONY COUNT: CPT

## 2020-07-28 PROCEDURE — 83615 LACTATE (LD) (LDH) ENZYME: CPT

## 2020-07-28 PROCEDURE — 85027 COMPLETE CBC AUTOMATED: CPT

## 2020-07-28 PROCEDURE — 36415 COLL VENOUS BLD VENIPUNCTURE: CPT

## 2020-07-28 PROCEDURE — 70450 CT HEAD/BRAIN W/O DYE: CPT

## 2020-07-28 PROCEDURE — 83010 ASSAY OF HAPTOGLOBIN QUANT: CPT

## 2020-07-28 PROCEDURE — 83880 ASSAY OF NATRIURETIC PEPTIDE: CPT

## 2020-07-28 PROCEDURE — 82962 GLUCOSE BLOOD TEST: CPT

## 2020-07-28 PROCEDURE — 36430 TRANSFUSION BLD/BLD COMPNT: CPT

## 2020-07-28 PROCEDURE — 93005 ELECTROCARDIOGRAM TRACING: CPT

## 2020-07-28 RX ADMIN — HEPARIN SODIUM 5000 UNIT(S): 5000 INJECTION INTRAVENOUS; SUBCUTANEOUS at 05:01

## 2020-07-28 RX ADMIN — Medication 1 MILLIGRAM(S): at 11:16

## 2020-07-28 RX ADMIN — HEPARIN SODIUM 5000 UNIT(S): 5000 INJECTION INTRAVENOUS; SUBCUTANEOUS at 14:27

## 2020-07-28 NOTE — DISCHARGE NOTE PROVIDER - NSDCMRMEDTOKEN_GEN_ALL_CORE_FT
folic acid 1 mg oral tablet: 1 tab(s) orally once a day  Imbruvica 420 mg oral tablet: 1 tab(s) orally once a day

## 2020-07-28 NOTE — PROGRESS NOTE ADULT - SUBJECTIVE AND OBJECTIVE BOX
SARAHI BARNHART is a 64yMale , patient examined and chart reviewed.     INTERVAL HPI/ OVERNIGHT EVENTS:   Feeling better, No events.   Afebrile.    PAST MEDICAL & SURGICAL HISTORY:  Anemia  Other specified disorders of nose and nasal sinuses  Deviated nasal septum  Rosacea  Nasal congestion  Deviated nasal septum  Melanoma  Bulging Disc: LUMBAR  Herniated Cervical Disc  TMJ Syndrome: limited jaw opening due to TMJ  Avascular Necrosis of Femur Head: B/L  CLL (Chronic Lymphocytic Leukemia): SINCE   Osteopenia  Bilateral cataracts: removed in   S/P Tonsillectomy: 1960  Status Post Eye Surgery X 3: 3/2011 right eye scleral buckle; left eye in   Abnormal Jaw Closure: LEFT JAW SURGERY -   Avascular Necrosis of Femur Head: LEFT - CORE DECOMPRESSION -   Avascular Necrosis of Femur Head: RIGHT - CORE DECOMPRESSION-   S/P Splenectomy:       For details regarding the patient's social history, family history, and other miscellaneous elements, please refer the initial infectious diseases consultation and/or the admitting history and physical examination for this admission.    ROS:  CONSTITUTIONAL:  Negative fever or chills  EYES:  Negative  blurry vision or double vision  CARDIOVASCULAR:  Negative for chest pain or palpitations  RESPIRATORY:  Negative for cough, wheezing, or SOB   GASTROINTESTINAL:  Negative for nausea, vomiting, diarrhea, constipation, or abdominal pain  GENITOURINARY:  Negative frequency, urgency or dysuria  NEUROLOGIC:  No headache, confusion, dizziness, lightheadedness  All other systems were reviewed and are negative       Current inpatient medications :    ANTIBIOTICS/RELEVANT:      folic acid 1 milliGRAM(s) Oral daily  heparin   Injectable 5000 Unit(s) SubCutaneous every 8 hours  lactated ringers. 1000 milliLiter(s) IV Continuous <Continuous>      Objective:     @  @ 07:00  --------------------------------------------------------  IN: 600 mL / OUT: 0 mL / NET: 600 mL     @  @ 12:50  --------------------------------------------------------  IN: 220 mL / OUT: 0 mL / NET: 220 mL      T(C): 36.6 (20 @ 11:15), Max: 37.2 (20 @ 19:27)  HR: 53 (20 @ 11:15) (53 - 73)  BP: 121/63 (20 @ 11:15) (112/70 - 121/69)  RR: 16 (20 @ 11:15) (16 - 18)  SpO2: 94% (20 @ 11:15) (94% - 98%)    Physical Exam:  General: well developed well nourished, in no acute distress  Eyes: sclera anicteric, pupils equal and reactive to light  ENMT: buccal mucosa moist, pharynx not injected  Neck: supple, trachea midline  Lungs: clear, no wheeze/rhonchi  Cardiovascular: regular rate and rhythm, S1 S2  Abdomen: soft, nontender, no organomegaly present, bowel sounds normal  Neurological: alert and oriented x3, Cranial Nerves II-XII grossly intact  Skin: no increased ecchymosis/petechiae/purpura  Lymph Nodes: no palpable cervical/supraclavicular lymph nodes enlargements  Extremities: no cyanosis/clubbing/edema      LABS:                        8.1    74.19 )-----------( 446      ( 2020 07:57 )             24.8           141  |  107  |  19  ----------------------------<  152<H>  3.9   |  25  |  1.30    Ca    8.8      2020 07:57  Mg     2.2         TPro  x   /  Alb  x   /  TBili  3.2<H>  /  DBili  .30<H>  /  AST  x   /  ALT  x   /  AlkPhos  x         PT/INR - ( 2020 07:57 )   PT: 13.3 sec;   INR: 1.14 ratio         PTT - ( 2020 07:57 )  PTT:27.7 sec  Urinalysis Basic - ( 2020 11:17 )    Color: Yellow / Appearance: Clear / S.010 / pH: x  Gluc: x / Ketone: Negative  / Bili: Negative / Urobili: Negative   Blood: x / Protein: 15 / Nitrite: Negative   Leuk Esterase: Negative / RBC: 0-2 /HPF / WBC x   Sq Epi: x / Non Sq Epi: Occasional / Bacteria: x    MICROBIOLOGY:  Culture - Urine (collected 2020 16:18)  Source: .Urine Clean Catch (Midstream)  Final Report (2020 11:08):    No growth      RADIOLOGY & ADDITIONAL STUDIES:  EXAM:  XR CHEST PORTABLE URGENT 1V                            PROCEDURE DATE:  2020          INTERPRETATION:  CLINICAL INDICATION: 64 years  Male with syncope.    COMPARISON: None    2 AP views of the chest demonstrate the lungs to be clear. There is no pleural effusion. There is no pneumothorax.    The heart is normal in size. There is no mediastinal or hilar mass.    The pulmonary vasculature is normal.    Mild thoracic degenerative changes are present.    IMPRESSION:    No active disease.    Assessment :   63 yo male PMH CLL on Imbruvica at 420mg daily started in 2020 presented to the hospital with cc of syncope where he reports having lost consciousness found to have Atrial Fibrillation. Sp hypotension. WBC 74K. No recent antibiotic use. Doubt infectious. Likely dehydration. Was instructed by his oncologist to aggressively hydrate while on Imbruvica, he was unable to do so. Leukocytosis sec CLL thus not a reliable indicator for infectious process. Overall clinically improved with hydration.       Plan :   Defer antibiotics  Trend temps and cbc  Fu cultures  IVF  Heme onc following    D/w Dr Tang        Continue with present regiment.  Appropriate use of antibiotics and adverse effects reviewed.      I have discussed the above plan of care with patient/ family in detail. They expressed understanding of the  treatment plan . Risks, benefits and alternatives discussed in detail. I have asked if they have any questions or concerns and appropriately addressed them to the best of my ability .    > 35 minutes were spent in direct patient care reviewing notes, medications ,labs data/ imaging , discussion with multidisciplinary team.    Thank you for allowing me to participate in care of your patient .    Corin Espinal MD  Infectious Disease  [phone]

## 2020-07-28 NOTE — PROGRESS NOTE ADULT - PROBLEM SELECTOR PLAN 1
Vasovagal / from Imbruvica - Cards eval appreciated, continue with Aspirin, DENNIS score 0 hold off Anti coagulation. s/p hydration, d/c planning.

## 2020-07-28 NOTE — DISCHARGE NOTE PROVIDER - NSDCCPCAREPLAN_GEN_ALL_CORE_FT
PRINCIPAL DISCHARGE DIAGNOSIS  Diagnosis: Syncope, unspecified syncope type  Assessment and Plan of Treatment: vasovagal, s/p prbc transfusion, cards, hem consulted.      SECONDARY DISCHARGE DIAGNOSES  Diagnosis: Anemia, unspecified type  Assessment and Plan of Treatment: s/p prbc transfusion.

## 2020-07-28 NOTE — PROGRESS NOTE ADULT - ASSESSMENT
64 male h/o CLL on oral Imbruvica daily presents to ER by ambulance from home with report of syncope. Patient began to feel lightheaded this morning while making his morning coffee and synopsized in his bathroom, witnessed by wife. Patient presenting with syncopal episodes likely 2/2 dehydration in setting of Imbruvica treatment.     - Syncope   - Episode likely vasovagal in the setting of dehydration    - Lactate, Blood: 1.6 <--2.7   - Orthostatics negative in ED  - Patient receiving IVF at present. Encourage Po hydration   - BP: 121/69 (07-28-20 @ 07:30) (112/70 - 124/63) BP stable   - No clear evidence of acute ischemia. Troponin I, Serum: <.015 ng/mL    A-fib  - Found to be in AFib on admission EKG. Appears that Imbruvica may have caused AF. Would not stop treatment   - Patient was on telemetry which showed no A fib, sinus edwar 50-60s, rate controlled. Likely paroxysmal  - Pt doesn't take cardiac medications  - Would not initiate any meds at this point. [id number] Score 1. Would start ASA  - Patient can follow up outpatient for Holter to assess A fib burden     - Monitor and replete lytes, keep K>4, Mg>2.  - All other medical needs as per primary team.  - Other cardiovascular workup will depend on clinical course.  - Will continue to follow.    Loli Bush, MS [facility], [facility]  Nurse Practitioner- Cardiology   Spectra #[phone]
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  Started on Imbruvica 420mg daily in 3/2020 with little change in blood indices (WBC 70K)  Adm w syncope thought vasovagal and also found w paroxysmal A fib  Hgb 8.1 on adm and given one unit PRBC      -no repeat CBC available at present  -clinically stable no recur of symptoms  -will follow up in office if being discharged, otherwise labs ordered for tomorrow AM
64 M with pmhx cll, on chemo p/w syncope.

## 2020-07-28 NOTE — DISCHARGE NOTE PROVIDER - CARE PROVIDER_API CALL
Reagan Mason  NEUROLOGY  [address] [place] [address]  Phone: [phone]  Fax: [phone]  Follow Up Time:

## 2020-07-28 NOTE — PROGRESS NOTE ADULT - ATTENDING COMMENTS
I personally saw and examined the patient in detail.  I have spoken to the above provider regarding the assessment and plan of care.  I reviewed the above assessment and plan of care, and agree.  I have made changes in the body of the note where appropriate.  no cardiac objection to d/c

## 2020-07-28 NOTE — DISCHARGE NOTE NURSING/CASE MANAGEMENT/SOCIAL WORK - PATIENT PORTAL LINK FT
You can access the FollowMyHealth Patient Portal offered by [facility] by registering at the following website: [email or web address]. By joining [facility]’s FollowMyHealth portal, you will also be able to view your health information using other applications (apps) compatible with our system.

## 2020-07-28 NOTE — PROGRESS NOTE ADULT - SUBJECTIVE AND OBJECTIVE BOX
Neurology follow up note    SARAHI [id number]      Interval History:    Patient feels ok no new complaints.    MEDICATIONS    folic acid 1 milliGRAM(s) Oral daily  heparin   Injectable 5000 Unit(s) SubCutaneous every 8 hours  lactated ringers. 1000 milliLiter(s) IV Continuous <Continuous>      Allergies    No Known Allergies    Intolerances          Weight (kg): 68 ( @ 14:30)    Vital Signs Last 24 Hrs  T(C): 36.8 (2020 07:30), Max: 37.2 (2020 19:27)  T(F): 98.3 (2020 07:30), Max: 98.9 (2020 19:27)  HR: 58 (2020 07:30) (58 - 90)  BP: 121/69 (2020 07:30) (112/70 - 124/63)  BP(mean): 74 (2020 14:30) (74 - 74)  RR: 18 (2020 07:30) (16 - 18)  SpO2: 97% (2020 07:30) (94% - 100%)    REVIEW OF SYSTEMS:  Constitutional:  No fever, chills, night sweats.  Head:  No headaches.  Eyes:  No double vision or blurry vision.  Ears:  Positive history of ringing in his ears for last few months.  Neck:  No neck pain.  Cardiovascular:  No chest pain.  Respiratory:  No shortness of breath.  Abdomen:  No nausea, vomiting or abdominal pain. Extremities/Neurological:  No numbness or tingling.  Musculoskeletal:  No joint pain.  General:  No history of seeing flashing lights or weird smell such as burning rubber.  No history of upon awakening tongue bite marks or loss of urine.    PHYSICAL EXAMINATION:   HEENT:  Head:  Normocephalic, atraumatic.  Eyes:  No scleral icterus.  Ears:  Hearing bilaterally intact.  NECK:  Supple.  CARDIOVASCULAR:  S1 and S2 heard.  RESPIRATORY:  Good air entry bilaterally.  ABDOMEN:  Soft and nontender.  EXTREMITIES:  No clubbing or cyanosis were noted.      NEUROLOGIC:  The patient is awake, alert, and oriented x3.  Extraocular movement were intact.  Pupils were equal, round, and reactive bilaterally 3 mm to 2 mm.  Speech was fluent.  Smile symmetric.  Motor:  Bilateral upper and lower were 5/5.  Sensory:  Bilateral upper and lower were intact to light touch.  No drift.  Finger-to-nose within normal limits.              LABS:  CBC Full  -  ( 2020 16:12 )  WBC Count : x  RBC Count : 2.28 M/uL  Hemoglobin : x  Hematocrit : x  Platelet Count - Automated : x  Mean Cell Volume : x  Mean Cell Hemoglobin : x  Mean Cell Hemoglobin Concentration : x  Auto Neutrophil # : x  Auto Lymphocyte # : x  Auto Monocyte # : x  Auto Eosinophil # : x  Auto Basophil # : x  Auto Neutrophil % : x  Auto Lymphocyte % : x  Auto Monocyte % : x  Auto Eosinophil % : x  Auto Basophil % : x    Urinalysis Basic - ( 2020 11:17 )    Color: Yellow / Appearance: Clear / S.010 / pH: x  Gluc: x / Ketone: Negative  / Bili: Negative / Urobili: Negative   Blood: x / Protein: 15 / Nitrite: Negative   Leuk Esterase: Negative / RBC: 0-2 /HPF / WBC x   Sq Epi: x / Non Sq Epi: Occasional / Bacteria: x          141  |  107  |  19  ----------------------------<  152<H>  3.9   |  25  |  1.30    Ca    8.8      2020 07:57  Mg     2.2     27    TPro  x   /  Alb  x   /  TBili  3.2<H>  /  DBili  .30<H>  /  AST  x   /  ALT  x   /  AlkPhos  x       Hemoglobin A1C:     LIVER FUNCTIONS - ( 2020 07:57 )  Alb: 4.2 g/dL / Pro: 6.9 g/dL / ALK PHOS: 56 U/L / ALT: 11 U/L / AST: 5 U/L / GGT: x           Vitamin B12   PT/INR - ( 2020 07:57 )   PT: 13.3 sec;   INR: 1.14 ratio         PTT - ( 2020 07:57 )  PTT:27.7 sec      RADIOLOGY    ANALYSIS AND PLAN:  This is a 64-year-old with episode of loss of consciousness.  1.	For episode of loss of consciousness, suspect most likely syncopal event with the patient being hypotensive leading to cerebral hypoperfusion with a prodrome of feeling lightheaded, warm, sweaty, and nauseous, but secondary to the episode of grunting and snoring, I would recommend to rule out any type drop attacks, which I suspect less likely.  2.	I will plan for CT imaging of the brain.  3.	I will plan for EEG.  4.	Monitor systolic blood pressure.  5.	Telemetry evaluation to rule out underlying arrhythmia.  6.	Physical therapy evaluation.  7.	Fall precaution.  8.	I spoke with spouse, Javier in greater detail.  Her telephone number is [phone].  9.	Greater than 85 minutes of time was spent with the patient, plan of care, reviewing data, speaking to family and multidisciplinary healthcare team.    Thank you for the courtesy of consultation. Neurology follow up note    SARAHI [id number]      Interval History:    Patient feels ok no new complaints.    MEDICATIONS    folic acid 1 milliGRAM(s) Oral daily  heparin   Injectable 5000 Unit(s) SubCutaneous every 8 hours  lactated ringers. 1000 milliLiter(s) IV Continuous <Continuous>      Allergies    No Known Allergies    Intolerances          Weight (kg): 68 ( @ 14:30)    Vital Signs Last 24 Hrs  T(C): 36.8 (2020 07:30), Max: 37.2 (2020 19:27)  T(F): 98.3 (2020 07:30), Max: 98.9 (2020 19:27)  HR: 58 (2020 07:30) (58 - 90)  BP: 121/69 (2020 07:30) (112/70 - 124/63)  BP(mean): 74 (2020 14:30) (74 - 74)  RR: 18 (2020 07:30) (16 - 18)  SpO2: 97% (2020 07:30) (94% - 100%)    REVIEW OF SYSTEMS:  Constitutional:  No fever, chills, night sweats.  Head:  No headaches.  Eyes:  No double vision or blurry vision.  Ears:  Positive history of ringing in his ears for last few months.  Neck:  No neck pain.  Cardiovascular:  No chest pain.  Respiratory:  No shortness of breath.  Abdomen:  No nausea, vomiting or abdominal pain. Extremities/Neurological:  No numbness or tingling.  Musculoskeletal:  No joint pain.  General:  No history of seeing flashing lights or weird smell such as burning rubber.  No history of upon awakening tongue bite marks or loss of urine.    PHYSICAL EXAMINATION:   HEENT:  Head:  Normocephalic, atraumatic.  Eyes:  No scleral icterus.  Ears:  Hearing bilaterally intact.  NECK:  Supple.  CARDIOVASCULAR:  S1 and S2 heard.  RESPIRATORY:  Good air entry bilaterally.  ABDOMEN:  Soft and nontender.  EXTREMITIES:  No clubbing or cyanosis were noted.      NEUROLOGIC:  The patient is awake, alert, and oriented x3.  Extraocular movement were intact.  Pupils were equal, round, and reactive bilaterally 3 mm to 2 mm.  Speech was fluent.  Smile symmetric.  Motor:  Bilateral upper and lower were 5/5.  Sensory:  Bilateral upper and lower were intact to light touch.  No drift.  Finger-to-nose within normal limits.              LABS:  CBC Full  -  ( 2020 16:12 )  WBC Count : x  RBC Count : 2.28 M/uL  Hemoglobin : x  Hematocrit : x  Platelet Count - Automated : x  Mean Cell Volume : x  Mean Cell Hemoglobin : x  Mean Cell Hemoglobin Concentration : x  Auto Neutrophil # : x  Auto Lymphocyte # : x  Auto Monocyte # : x  Auto Eosinophil # : x  Auto Basophil # : x  Auto Neutrophil % : x  Auto Lymphocyte % : x  Auto Monocyte % : x  Auto Eosinophil % : x  Auto Basophil % : x    Urinalysis Basic - ( 2020 11:17 )    Color: Yellow / Appearance: Clear / S.010 / pH: x  Gluc: x / Ketone: Negative  / Bili: Negative / Urobili: Negative   Blood: x / Protein: 15 / Nitrite: Negative   Leuk Esterase: Negative / RBC: 0-2 /HPF / WBC x   Sq Epi: x / Non Sq Epi: Occasional / Bacteria: x          141  |  107  |  19  ----------------------------<  152<H>  3.9   |  25  |  1.30    Ca    8.8      2020 07:57  Mg     2.2     27    TPro  x   /  Alb  x   /  TBili  3.2<H>  /  DBili  .30<H>  /  AST  x   /  ALT  x   /  AlkPhos  x       Hemoglobin A1C:     LIVER FUNCTIONS - ( 2020 07:57 )  Alb: 4.2 g/dL / Pro: 6.9 g/dL / ALK PHOS: 56 U/L / ALT: 11 U/L / AST: 5 U/L / GGT: x           Vitamin B12   PT/INR - ( 2020 07:57 )   PT: 13.3 sec;   INR: 1.14 ratio         PTT - ( 2020 07:57 )  PTT:27.7 sec      RADIOLOGY    ANALYSIS AND PLAN:  This is a 64-year-old with episode of loss of consciousness.  1.	For episode of loss of consciousness, suspect most likely syncopal event with the patient being hypotensive leading to cerebral hypoperfusion with a prodrome of feeling lightheaded, warm, sweaty, and nauseous, but secondary to the episode of grunting and snoring, I would recommend to rule out any type drop attacks, which I suspect less likely.  2.	CT imaging of the brain negative   3.	EEG.  4.	Monitor systolic blood pressure.  5.	Telemetry evaluation to rule out underlying arrhythmia.  6.	Physical therapy evaluation.  7.	Fall precaution.  8.	I spoke with spouse, Javier in greater detail.  Her telephone number is [phone]  9.	If EEG ok ok to dc form neurology   10.	Greater than 45 minutes of time was spent with the patient, plan of care, reviewing data, speaking to family and multidisciplinary healthcare team.

## 2020-07-28 NOTE — PROGRESS NOTE ADULT - SUBJECTIVE AND OBJECTIVE BOX
All interim records and events noted.    post one unit PRBC yesterday tolerated well  does not feel diff today  no recur syncope  wants to go home      MEDICATIONS  (STANDING):  folic acid 1 milliGRAM(s) Oral daily  heparin   Injectable 5000 Unit(s) SubCutaneous every 8 hours  lactated ringers. 1000 milliLiter(s) (50 mL/Hr) IV Continuous <Continuous>    MEDICATIONS  (PRN):      Vital Signs Last 24 Hrs  T(C): 36.6 (28 Jul 2020 11:15), Max: 37.2 (27 Jul 2020 19:27)  T(F): 97.9 (28 Jul 2020 11:15), Max: 98.9 (27 Jul 2020 19:27)  HR: 53 (28 Jul 2020 11:15) (53 - 73)  BP: 121/63 (28 Jul 2020 11:15) (112/70 - 121/69)  BP(mean): 74 (27 Jul 2020 14:30) (74 - 74)  RR: 16 (28 Jul 2020 11:15) (16 - 18)  SpO2: 94% (28 Jul 2020 11:15) (94% - 98%)    PHYSICAL EXAM  General: well developed thin man up in bed, in no acute distress  Head: atraumatic, normocephalic  ENT: sclera anicteric, buccal mucosa moist  Neck: supple, trachea midline, no palpable masses  CV: S1 S2, regular rate and rhythm  Lungs: clear to auscultation, no wheezes/rhonchi  Abdomen: soft, nontender, bowel sounds present, no palpable masses  Extrem: no clubbing/cyanosis/edema  Skin: no significant increased ecchymosis/petechiae  Neuro: alert and oriented X3,  no focal deficits      LABS:             8.1    74.19 )-----------( 446      ( 07-27 @ 07:57 )             24.8       07-27    141  |  107  |  19  ----------------------------<  152<H>  3.9   |  25  |  1.30    Ca    8.8      27 Jul 2020 07:57  Mg     2.2     07-27    TPro  x   /  Alb  x   /  TBili  3.2<H>  /  DBili  .30<H>  /  AST  x   /  ALT  x   /  AlkPhos  x   07-27 07-27 @ 07:57  PT13.3 INR1.14  PTT27.7      RADIOLOGY & ADDITIONAL STUDIES:    IMPRESSION/RECOMMENDATIONS:

## 2020-07-28 NOTE — DISCHARGE NOTE PROVIDER - HOSPITAL COURSE
64 M with pmhx CLL on chemo p/w syncope. Patient states after taking shower he felt very dizzy almost about to pass out.     Patient with long standing history CLL dating back to 1990s, treated with Fludarabine, s/p  repeat bone marrow aspirate/ biopsy shows CLL and myelodysplasia with fibrosis. Patient was started on Imbruca On 7/26/20.     No hx chest pain / palpitations.  Denies head injury.         In the ed patient was given fluid bolus for low blood pressure, seen by cards as patient noted to have A.fib with rvr.     CT head no bleed/ CVA.         - Syncope     - Episode likely vasovagal in the setting of dehydration      - Lactate, Blood: 1.6 <--2.7     - Orthostatics negative in ED    - Patient received IVF, encourage Po hydration

## 2020-07-28 NOTE — PROGRESS NOTE ADULT - SUBJECTIVE AND OBJECTIVE BOX
Patient is a 64y old  Male who presents with a chief complaint of cardiac arrythmia (2020 14:15)      SUBJECTIVE / OVERNIGHT EVENTS: no events over night.     MEDICATIONS  (STANDING):  folic acid 1 milliGRAM(s) Oral daily  heparin   Injectable 5000 Unit(s) SubCutaneous every 8 hours  lactated ringers. 1000 milliLiter(s) (50 mL/Hr) IV Continuous <Continuous>    MEDICATIONS  (PRN):      CAPILLARY BLOOD GLUCOSE        I&O's Summary    2020 07:  -  2020 07:00  --------------------------------------------------------  IN: 600 mL / OUT: 0 mL / NET: 600 mL    :  -  2020 18:17  --------------------------------------------------------  IN: 220 mL / OUT: 0 mL / NET: 220 mL      T(C): 36.7 (20 @ 16:04), Max: 36.8 (20 @ 07:30)  HR: 49 (20 @ 16:04) (49 - 58)  BP: 133/56 (20 @ 16:04) (121/63 - 133/56)  RR: 18 (20 @ 16:04) (16 - 18)  SpO2: 94% (20 @ 16:04) (94% - 97%)    PHYSICAL EXAM:  GENERAL: NAD, well-developed  HEAD:  Atraumatic, Normocephalic  EYES: EOMI, PERRLA, conjunctiva and sclera clear  NECK: Supple, No JVD  CHEST/LUNG: Clear to auscultation bilaterally; No wheeze  HEART: Regular rate and rhythm; No murmurs, rubs, or gallops  ABDOMEN: Soft, Nontender, Nondistended; Bowel sounds present  EXTREMITIES:  2+ Peripheral Pulses, No clubbing, cyanosis, or edema  PSYCH: AAOx3  NEUROLOGY: non-focal  SKIN: No rashes or lesions    LABS:                        8.1    66.14 )-----------( 416      ( 2020 14:55 )             25.2     07    141  |  107  |  19  ----------------------------<  152<H>  3.9   |  25  |  1.30    Ca    8.8      2020 07:57  Mg     2.2         TPro  x   /  Alb  x   /  TBili  3.2<H>  /  DBili  .30<H>  /  AST  x   /  ALT  x   /  AlkPhos  x       PT/INR - ( 2020 07:57 )   PT: 13.3 sec;   INR: 1.14 ratio         PTT - ( 2020 07:57 )  PTT:27.7 sec  CARDIAC MARKERS ( 2020 07:57 )  <.015 ng/mL / x     / x     / x     / <1.0 ng/mL      Urinalysis Basic - ( 2020 11:17 )    Color: Yellow / Appearance: Clear / S.010 / pH: x  Gluc: x / Ketone: Negative  / Bili: Negative / Urobili: Negative   Blood: x / Protein: 15 / Nitrite: Negative   Leuk Esterase: Negative / RBC: 0-2 /HPF / WBC x   Sq Epi: x / Non Sq Epi: Occasional / Bacteria: x        RADIOLOGY & ADDITIONAL TESTS:    Imaging Personally Reviewed:    Consultant(s) Notes Reviewed:      Care Discussed with Consultants/Other Providers:

## 2020-07-28 NOTE — PROGRESS NOTE ADULT - SUBJECTIVE AND OBJECTIVE BOX
[facility] -- Sveta Romano, Ebony Sparrow, Dereck Rodriguez Savella, Goodger: Office # [phone]    Follow Up:  Syncope     Subjective/Observations: Patient seen and examined. Patient awake and alert, resting comfortably in bed. No complaints of chest pain, SOB, LE edema, cough. No signs of orthopnea or PND. No dizziness or lightheadedness. Ambulating to bathroom without any difficulty.     REVIEW OF SYSTEMS: All review of systems is negative for eye, ENT, GI, , allergic, dermatologic, musculoskeletal and neurologic except as described above    PAST MEDICAL & SURGICAL HISTORY:  Anemia  Anemia  Other specified disorders of nose and nasal sinuses  Deviated nasal septum  Rosacea  Nasal congestion  Deviated nasal septum  Melanoma  Bulging Disc: LUMBAR  Herniated Cervical Disc  TMJ Syndrome: limited jaw opening due to TMJ  Avascular Necrosis of Femur Head: B/L  CLL (Chronic Lymphocytic Leukemia): SINCE 1988  Osteopenia  Bilateral cataracts: removed in 2011  S/P Tonsillectomy: 1960  Status Post Eye Surgery X 3: 3/2011 right eye scleral buckle; left eye in 2013  Abnormal Jaw Closure: LEFT JAW SURGERY - 1988  Avascular Necrosis of Femur Head: LEFT - CORE DECOMPRESSION - 1993  Avascular Necrosis of Femur Head: RIGHT - CORE DECOMPRESSION- 1993  S/P Splenectomy: 1993    MEDICATIONS  (STANDING):  folic acid 1 milliGRAM(s) Oral daily  heparin   Injectable 5000 Unit(s) SubCutaneous every 8 hours  lactated ringers. 1000 milliLiter(s) (50 mL/Hr) IV Continuous <Continuous>    MEDICATIONS  (PRN):    Allergies  No Known Allergies    Vital Signs Last 24 Hrs  T(C): 36.8 (28 Jul 2020 07:30), Max: 37.2 (27 Jul 2020 19:27)  T(F): 98.3 (28 Jul 2020 07:30), Max: 98.9 (27 Jul 2020 19:27)  HR: 58 (28 Jul 2020 07:30) (58 - 90)  BP: 121/69 (28 Jul 2020 07:30) (112/70 - 124/63)  BP(mean): 74 (27 Jul 2020 14:30) (74 - 74)  RR: 18 (28 Jul 2020 07:30) (16 - 18)  SpO2: 97% (28 Jul 2020 07:30) (94% - 100%)    I&O's Summary    27 Jul 2020 07:01  -  28 Jul 2020 07:00  --------------------------------------------------------  IN: 600 mL / OUT: 0 mL / NET: 600 mL    28 Jul 2020 07:01  -  28 Jul 2020 10:59  --------------------------------------------------------  IN: 220 mL / OUT: 0 mL / NET: 220 mL      Weight (kg): 68 (07-27 @ 14:30)    TELE: SB 50-60s   PHYSICAL EXAM:  Appearance: NAD, no distress, alert, Well developed   HEENT: Moist Mucous Membranes, Anicteric  Cardiovascular: Regular rate and rhythm, Normal S1 S2, No JVD, No murmurs, No rubs, gallops or clicks  Respiratory: Non-labored, Clear to auscultation, No rales, No rhonchi, No wheezing.   Gastrointestinal:  Soft, Non-tender, + BS  Neurologic: Non-focal  Skin: Warm and dry, No visible rashes or ulcers, No ecchymosis, No cyanosis  Musculoskeletal: No clubbing, No cyanosis, No joint swelling/tenderness  Psychiatry: Mood & affect appropriate  Lymph: No peripheral edema.     LABS: All Labs Reviewed:                        8.1    74.19 )-----------( 446      ( 27 Jul 2020 07:57 )             24.8     27 Jul 2020 07:57    141    |  107    |  19     ----------------------------<  152    3.9     |  25     |  1.30     Ca    8.8        27 Jul 2020 07:57  Mg     2.2       27 Jul 2020 07:57    TPro  x      /  Alb  x      /  TBili  3.2    /  DBili  .30    /  AST  x      /  ALT  x      /  AlkPhos  x      27 Jul 2020 16:12  TPro  6.9    /  Alb  4.2    /  TBili  2.6    /  DBili  x      /  AST  5      /  ALT  11     /  AlkPhos  56     27 Jul 2020 07:57  PT/INR - ( 27 Jul 2020 07:57 )   PT: 13.3 sec;   INR: 1.14 ratio    PTT - ( 27 Jul 2020 07:57 )  PTT:27.7 sec  Troponin I, Serum: <.015 ng/mL (07-27-20 @ 07:57)  Lactate, Blood: 1.6 mmol/L (07-27-20 @ 10:13)  Lactate, Blood: 2.7 mmol/L (07-27-20 @ 08:13)    12 Lead ECG:   Ventricular Rate 70 BPM  Atrial Rate 67 BPM  QRS Duration 96 ms  Q-T Interval 398 ms  QTC Calculation(Bezet) 429 ms  R Axis 50 degrees  T Axis 14 degrees  Diagnosis Line Atrial fibrillation  Abnormal ECG  When compared with ECG of 26-SEP-2019 10:16,  Atrial fibrillation has replaced Sinus rhythm  Confirmed by AJAY LAGUERRE ([id number]) on 7/27/2020 7:37:50 PM (07-27-20 @ 07:55)    < from: CT Head No Cont (07.27.20 @ 15:12) >  IMPRESSION:  1)  unremarkable CT study of the brain..  2)  moderate right frontal and ethmoid sinus opacification.  < end of copied text >    < from: Xray Chest 1 View- PORTABLE-Urgent (07.27.20 @ 08:21) >  The heart is normal in size. There is no mediastinal or hilar mass.  The pulmonary vasculature is normal.  Mild thoracic degenerative changes are present.    IMPRESSION:  No active disease.  < end of copied text >

## 2020-08-01 LAB
CULTURE RESULTS: SIGNIFICANT CHANGE UP
SPECIMEN SOURCE: SIGNIFICANT CHANGE UP

## 2021-05-08 ENCOUNTER — EMERGENCY (EMERGENCY)
Facility: HOSPITAL | Age: 66
LOS: 1 days | Discharge: HOME | End: 2021-05-08
Attending: EMERGENCY MEDICINE | Admitting: EMERGENCY MEDICINE
Payer: MEDICARE

## 2021-05-08 VITALS
HEIGHT: 70 IN | RESPIRATION RATE: 18 BRPM | OXYGEN SATURATION: 99 % | WEIGHT: 147.93 LBS | SYSTOLIC BLOOD PRESSURE: 158 MMHG | TEMPERATURE: 98 F | DIASTOLIC BLOOD PRESSURE: 92 MMHG | HEART RATE: 73 BPM

## 2021-05-08 VITALS
RESPIRATION RATE: 18 BRPM | HEART RATE: 65 BPM | TEMPERATURE: 99 F | OXYGEN SATURATION: 97 % | DIASTOLIC BLOOD PRESSURE: 81 MMHG | SYSTOLIC BLOOD PRESSURE: 147 MMHG

## 2021-05-08 DIAGNOSIS — H26.9 UNSPECIFIED CATARACT: Chronic | ICD-10-CM

## 2021-05-08 LAB
ALBUMIN SERPL ELPH-MCNC: 4.1 G/DL — SIGNIFICANT CHANGE UP (ref 3.3–5)
ALP SERPL-CCNC: 93 U/L — SIGNIFICANT CHANGE UP (ref 40–120)
ALT FLD-CCNC: 13 U/L — SIGNIFICANT CHANGE UP (ref 12–78)
ANION GAP SERPL CALC-SCNC: 5 MMOL/L — SIGNIFICANT CHANGE UP (ref 5–17)
ANISOCYTOSIS BLD QL: SLIGHT — SIGNIFICANT CHANGE UP
APPEARANCE UR: CLEAR — SIGNIFICANT CHANGE UP
AST SERPL-CCNC: 12 U/L — LOW (ref 15–37)
BASOPHILS # BLD AUTO: 0 K/UL — SIGNIFICANT CHANGE UP (ref 0–0.2)
BASOPHILS NFR BLD AUTO: 0 % — SIGNIFICANT CHANGE UP (ref 0–2)
BILIRUB SERPL-MCNC: 1.4 MG/DL — HIGH (ref 0.2–1.2)
BILIRUB UR-MCNC: NEGATIVE — SIGNIFICANT CHANGE UP
BLD GP AB SCN SERPL QL: SIGNIFICANT CHANGE UP
BUN SERPL-MCNC: 16 MG/DL — SIGNIFICANT CHANGE UP (ref 7–23)
CALCIUM SERPL-MCNC: 9.1 MG/DL — SIGNIFICANT CHANGE UP (ref 8.5–10.1)
CHLORIDE SERPL-SCNC: 105 MMOL/L — SIGNIFICANT CHANGE UP (ref 96–108)
CO2 SERPL-SCNC: 30 MMOL/L — SIGNIFICANT CHANGE UP (ref 22–31)
COLOR SPEC: YELLOW — SIGNIFICANT CHANGE UP
CREAT SERPL-MCNC: 1.1 MG/DL — SIGNIFICANT CHANGE UP (ref 0.5–1.3)
DIFF PNL FLD: NEGATIVE — SIGNIFICANT CHANGE UP
EOSINOPHIL # BLD AUTO: 0 K/UL — SIGNIFICANT CHANGE UP (ref 0–0.5)
EOSINOPHIL NFR BLD AUTO: 0 % — SIGNIFICANT CHANGE UP (ref 0–6)
GLUCOSE SERPL-MCNC: 95 MG/DL — SIGNIFICANT CHANGE UP (ref 70–99)
GLUCOSE UR QL: NEGATIVE — SIGNIFICANT CHANGE UP
HCT VFR BLD CALC: 41.5 % — SIGNIFICANT CHANGE UP (ref 39–50)
HGB BLD-MCNC: 13.5 G/DL — SIGNIFICANT CHANGE UP (ref 13–17)
HOWELL-JOLLY BOD BLD QL SMEAR: PRESENT — SIGNIFICANT CHANGE UP
HYPOSEGMENTATION: PRESENT — SIGNIFICANT CHANGE UP
KETONES UR-MCNC: NEGATIVE — SIGNIFICANT CHANGE UP
LEUKOCYTE ESTERASE UR-ACNC: NEGATIVE — SIGNIFICANT CHANGE UP
LYMPHOCYTES # BLD AUTO: 0.31 K/UL — LOW (ref 1–3.3)
LYMPHOCYTES # BLD AUTO: 7 % — LOW (ref 13–44)
MCHC RBC-ENTMCNC: 32.5 GM/DL — SIGNIFICANT CHANGE UP (ref 32–36)
MCHC RBC-ENTMCNC: 33.2 PG — SIGNIFICANT CHANGE UP (ref 27–34)
MCV RBC AUTO: 102 FL — HIGH (ref 80–100)
MONOCYTES # BLD AUTO: 0.79 K/UL — SIGNIFICANT CHANGE UP (ref 0–0.9)
MONOCYTES NFR BLD AUTO: 18 % — HIGH (ref 2–14)
NEUTROPHILS # BLD AUTO: 3.15 K/UL — SIGNIFICANT CHANGE UP (ref 1.8–7.4)
NEUTROPHILS NFR BLD AUTO: 69 % — SIGNIFICANT CHANGE UP (ref 43–77)
NEUTS BAND # BLD: 3 % — SIGNIFICANT CHANGE UP (ref 0–8)
NITRITE UR-MCNC: NEGATIVE — SIGNIFICANT CHANGE UP
NRBC # BLD: 0 — SIGNIFICANT CHANGE UP
NRBC # BLD: SIGNIFICANT CHANGE UP /100 WBCS (ref 0–0)
PH UR: 7 — SIGNIFICANT CHANGE UP (ref 5–8)
PLAT MORPH BLD: NORMAL — SIGNIFICANT CHANGE UP
PLATELET # BLD AUTO: 293 K/UL — SIGNIFICANT CHANGE UP (ref 150–400)
POIKILOCYTOSIS BLD QL AUTO: SLIGHT — SIGNIFICANT CHANGE UP
POTASSIUM SERPL-MCNC: 3.7 MMOL/L — SIGNIFICANT CHANGE UP (ref 3.5–5.3)
POTASSIUM SERPL-SCNC: 3.7 MMOL/L — SIGNIFICANT CHANGE UP (ref 3.5–5.3)
PROT SERPL-MCNC: 7.9 G/DL — SIGNIFICANT CHANGE UP (ref 6–8.3)
PROT UR-MCNC: NEGATIVE — SIGNIFICANT CHANGE UP
RBC # BLD: 4.07 M/UL — LOW (ref 4.2–5.8)
RBC # FLD: 14.2 % — SIGNIFICANT CHANGE UP (ref 10.3–14.5)
RBC BLD AUTO: ABNORMAL
SODIUM SERPL-SCNC: 140 MMOL/L — SIGNIFICANT CHANGE UP (ref 135–145)
SP GR SPEC: 1 — LOW (ref 1.01–1.02)
UROBILINOGEN FLD QL: NEGATIVE — SIGNIFICANT CHANGE UP
VARIANT LYMPHS # BLD: 3 % — SIGNIFICANT CHANGE UP (ref 0–6)
WBC # BLD: 4.37 K/UL — SIGNIFICANT CHANGE UP (ref 3.8–10.5)
WBC # FLD AUTO: 4.37 K/UL — SIGNIFICANT CHANGE UP (ref 3.8–10.5)

## 2021-05-08 PROCEDURE — 99284 EMERGENCY DEPT VISIT MOD MDM: CPT | Mod: 25

## 2021-05-08 PROCEDURE — 81003 URINALYSIS AUTO W/O SCOPE: CPT

## 2021-05-08 PROCEDURE — 93010 ELECTROCARDIOGRAM REPORT: CPT

## 2021-05-08 PROCEDURE — 93005 ELECTROCARDIOGRAM TRACING: CPT

## 2021-05-08 PROCEDURE — 70450 CT HEAD/BRAIN W/O DYE: CPT | Mod: 26,MG

## 2021-05-08 PROCEDURE — G1004: CPT

## 2021-05-08 PROCEDURE — 86850 RBC ANTIBODY SCREEN: CPT

## 2021-05-08 PROCEDURE — 70498 CT ANGIOGRAPHY NECK: CPT | Mod: 26,MG

## 2021-05-08 PROCEDURE — 80053 COMPREHEN METABOLIC PANEL: CPT

## 2021-05-08 PROCEDURE — 71046 X-RAY EXAM CHEST 2 VIEWS: CPT

## 2021-05-08 PROCEDURE — 85025 COMPLETE CBC W/AUTO DIFF WBC: CPT

## 2021-05-08 PROCEDURE — 71046 X-RAY EXAM CHEST 2 VIEWS: CPT | Mod: 26

## 2021-05-08 PROCEDURE — 99285 EMERGENCY DEPT VISIT HI MDM: CPT

## 2021-05-08 PROCEDURE — 86901 BLOOD TYPING SEROLOGIC RH(D): CPT

## 2021-05-08 PROCEDURE — 86900 BLOOD TYPING SEROLOGIC ABO: CPT

## 2021-05-08 PROCEDURE — 36415 COLL VENOUS BLD VENIPUNCTURE: CPT

## 2021-05-08 PROCEDURE — 70496 CT ANGIOGRAPHY HEAD: CPT

## 2021-05-08 PROCEDURE — 70450 CT HEAD/BRAIN W/O DYE: CPT | Mod: XU

## 2021-05-08 PROCEDURE — 70496 CT ANGIOGRAPHY HEAD: CPT | Mod: 26,MG

## 2021-05-08 PROCEDURE — 70498 CT ANGIOGRAPHY NECK: CPT

## 2021-05-08 RX ORDER — FOLIC ACID 0.8 MG
1 TABLET ORAL DAILY
Refills: 0 | Status: DISCONTINUED | OUTPATIENT
Start: 2021-05-08 | End: 2021-05-11

## 2021-05-08 RX ORDER — ALLOPURINOL 300 MG
100 TABLET ORAL DAILY
Refills: 0 | Status: DISCONTINUED | OUTPATIENT
Start: 2021-05-08 | End: 2021-05-11

## 2021-05-08 RX ORDER — SODIUM CHLORIDE 9 MG/ML
1000 INJECTION INTRAMUSCULAR; INTRAVENOUS; SUBCUTANEOUS ONCE
Refills: 0 | Status: COMPLETED | OUTPATIENT
Start: 2021-05-08 | End: 2021-05-08

## 2021-05-08 RX ORDER — MECLIZINE HCL 12.5 MG
12.5 TABLET ORAL THREE TIMES A DAY
Refills: 0 | Status: DISCONTINUED | OUTPATIENT
Start: 2021-05-08 | End: 2021-05-11

## 2021-05-08 RX ORDER — LORATADINE 10 MG/1
10 TABLET ORAL DAILY
Refills: 0 | Status: DISCONTINUED | OUTPATIENT
Start: 2021-05-08 | End: 2021-05-11

## 2021-05-08 RX ORDER — ASPIRIN/CALCIUM CARB/MAGNESIUM 324 MG
325 TABLET ORAL DAILY
Refills: 0 | Status: DISCONTINUED | OUTPATIENT
Start: 2021-05-08 | End: 2021-05-11

## 2021-05-08 RX ORDER — FLUTICASONE PROPIONATE 50 MCG
1 SPRAY, SUSPENSION NASAL
Refills: 0 | Status: DISCONTINUED | OUTPATIENT
Start: 2021-05-08 | End: 2021-05-11

## 2021-05-08 RX ORDER — IBRUTINIB 140 MG/1
1 TABLET, FILM COATED ORAL
Qty: 0 | Refills: 0 | DISCHARGE

## 2021-05-08 RX ORDER — POTASSIUM CHLORIDE 20 MEQ
10 PACKET (EA) ORAL DAILY
Refills: 0 | Status: DISCONTINUED | OUTPATIENT
Start: 2021-05-08 | End: 2021-05-11

## 2021-05-08 RX ORDER — ASPIRIN/CALCIUM CARB/MAGNESIUM 324 MG
1 TABLET ORAL
Qty: 30 | Refills: 0
Start: 2021-05-08 | End: 2021-06-06

## 2021-05-08 RX ORDER — ALLOPURINOL 300 MG
100 TABLET ORAL ONCE
Refills: 0 | Status: DISCONTINUED | OUTPATIENT
Start: 2021-05-08 | End: 2021-05-08

## 2021-05-08 RX ORDER — ATORVASTATIN CALCIUM 80 MG/1
80 TABLET, FILM COATED ORAL AT BEDTIME
Refills: 0 | Status: DISCONTINUED | OUTPATIENT
Start: 2021-05-08 | End: 2021-05-11

## 2021-05-08 RX ORDER — ATORVASTATIN CALCIUM 80 MG/1
1 TABLET, FILM COATED ORAL
Qty: 30 | Refills: 0
Start: 2021-05-08

## 2021-05-08 RX ORDER — HEPARIN SODIUM 5000 [USP'U]/ML
5000 INJECTION INTRAVENOUS; SUBCUTANEOUS EVERY 12 HOURS
Refills: 0 | Status: DISCONTINUED | OUTPATIENT
Start: 2021-05-08 | End: 2021-05-11

## 2021-05-08 RX ADMIN — SODIUM CHLORIDE 1000 MILLILITER(S): 9 INJECTION INTRAMUSCULAR; INTRAVENOUS; SUBCUTANEOUS at 10:39

## 2021-05-08 RX ADMIN — Medication 100 MILLIGRAM(S): at 18:19

## 2021-05-08 NOTE — CONSULT NOTE ADULT - ATTENDING COMMENTS
I saw and examined the patient personally. Spoke with above provider regarding this case. I reviewed the above findings completely.  I agree with the above history, physical, and plan which I have edited where appropriate.     pt being worked up for tia/cva. if neuro feels strongly that this was a tia/cva then may need ac given run of AF seen previously. at that point would need to confer with heme/onc to assess risk in setting of cll and current therapies he is receiving. this was all explained to pt and wife.

## 2021-05-08 NOTE — CONSULT NOTE ADULT - ASSESSMENT
65M PMH CLL, osteopenia, Anemia, presents with c/o dizziness and lightheadedness last night with some confusion couple days back. He gets gamma globulin infusion monthly and last infusion last week . Denies head spinning. No LOC  Patient on chemotherapy for CLL.     TIA  - Patient asymptomatic at present   - CT head negative. Labs unremarkable.   - CTA pending.   - Patient was admitted here July 2020, was found to be in A fib possibly related to Imbruvica, no A fib noted on tele during that time. Did not follow up outpatient.   - Patient denies any palpitations and is asymptomatic at present  - EKG showed SR, tele with SR, no A fib noted.   - Would recommend Neuro consult and if patient has TIA, recommend AC for thromboembolism prevention.   - Recommend outpatient follow up for Holter monitoring to assess for A fib  - Other cardiovascular workup will depend on clinical course.  - Will continue to follow if admitted     Loli Bush MS FNP, [facility]  Nurse Practitioner- Cardiology   Spectra #[phone] 65M PMH CLL, osteopenia, Anemia, presents with c/o dizziness and lightheadedness last night with some confusion couple days back. He gets gamma globulin infusion monthly and last infusion last week . Denies head spinning. No LOC  Patient on chemotherapy for CLL.     TIA  - Patient asymptomatic at present   - CT head negative. Labs unremarkable.   - CTA pending.   - Patient was admitted here July 2020, was found to be in A fib possibly related to Imbruvica, no A fib noted on tele during that time. Did not follow up outpatient.   - Patient denies any palpitations and is asymptomatic at present  - EKG showed SR, tele with SR, no A fib noted.   - Would recommend Neuro consult and if patient has TIA may need AC. This becomes complex given his underlying CLL   - Other cardiovascular workup will depend on clinical course.  - Will continue to follow if admitted     MS JILLIAN PeacockP, [facility]  Nurse Practitioner- Cardiology   Spectra #[phone]

## 2021-05-08 NOTE — ED PROVIDER NOTE - CARE PROVIDER_API CALL
Reagan Mason)  Neurology  [address] [place] [address]  Phone: [phone]  Fax: [phone]  Follow Up Time: 1-3 Days

## 2021-05-08 NOTE — ED PROVIDER NOTE - PROGRESS NOTE DETAILS
All results were explained to patient and/or family and a copy of all available results given.  Wife was present. As per Giacomo, order CT angio of head and neck.  Pt ate to take his meds.  pt felt better.

## 2021-05-08 NOTE — ED PROVIDER NOTE - OBJECTIVE STATEMENT
Dizzy last night c weakness.  s/p gamma globulin infusion last week and pt gets it monthly.  "No spinning".  As pt was walking, pt felt tired.  As per wife, pt gave wrong doctor's name that he had seen in decades.  No loc.  Pt was seen here for similar episode, seen here in July 2020, dx c dehydration.  pt is not dizzy in the ED. No other complaints.  pmd- E jasper, onc sharad. neuro- none

## 2021-05-08 NOTE — ED ADULT NURSE NOTE - CHPI ED NUR SYMPTOMS NEG
no blurred vision/no change in level of consciousness/no fever/no loss of consciousness/no nausea/no numbness/no vomiting

## 2021-05-08 NOTE — ED ADULT NURSE NOTE - OBJECTIVE STATEMENT
a/ox4 patient came to ED c.o dizziness and weakness today. Per wife, she states patient has been more confused as well. Patient not dizzy while in the ED today. Hx CLL - receives monthly infusions of gamma globulin. Awaiting further lab and radiology reports at this time.

## 2021-05-08 NOTE — ED PROVIDER NOTE - NSFOLLOWUPINSTRUCTIONS_ED_ALL_ED_FT
[employer]® CareNotes®     :  [facility]  	                       WEAKNESS - AfterCare(R) Instructions(ER/ED)           Weakness    WHAT YOU NEED TO KNOW:    Weakness is a loss of muscle strength. It may be caused by brain, nerve, or muscle problems. Physical and mental conditions such as heart problems, pregnancy, dehydration, or depression may also cause weakness. Reactions to certain drugs can cause weakness. Parts of your body may become weak if you need to wear a cast or splint or have been on bed rest for a long time.    DISCHARGE INSTRUCTIONS:    Call 911 for any of the following:   •You have any of the following signs of a stroke: ?Numbness or drooping on one side of your face       ?Weakness in an arm or leg      ?Confusion or difficulty speaking      ?Dizziness, a severe headache, or vision loss      •You lose feeling in your weakened body area.      •You have electric shock-like feelings down your arms and legs when you flex or move your neck.      •You have sudden or increased trouble speaking, swallowing, or breathing.      Return to the emergency department if:   •You have severe pain in your back, arms, or legs that worsens.      •You have sudden or worsened muscle weakness or loss of movement.      •You are not able to control when you urinate or have a bowel movement.      Contact your healthcare provider if:   •You feel depressed or anxious.       •You have questions or concerns about your condition or care.       Manage weakness:   •Use assistive devices as directed. These help protect you from injury. Examples include a walker or cane. Have someone install handrails in your home. These will help you get out of a bathtub or stand up from a toilet. Use a shower chair so you can sit while you shower. Sit down on the toilet or another chair to dry off and put on your clothes. Get help going up and down stairs if your legs are weak.       •Go to physical or occupational therapy if directed. A physical therapist can teach you exercises to help strengthen weak muscles. An occupational therapist can show you ways to do your daily activities more easily. For example, light forks and spoons can be easier to use if you have hand weakness. You may also learn ways to organize your household items so you are not moving heavy items.      •Balance rest with exercise. Exercise can help increase your muscle strength and energy. Do not exercise for long periods at a time. Take breaks often to rest. Too much exercise can cause muscle strain or make you more tired. Ask your healthcare provider how much exercise is right for you.      •Eat a variety of healthy foods. Too much or too little food may cause weakness or tiredness. Ask your healthcare provider what a healthy amount of food is for you. Healthy foods include fruits, vegetables, whole-grain breads, low-fat dairy products, lean meats and fish, nuts, and cooked beans.      •Do not smoke. Nicotine and other chemicals in cigarettes and cigars can make your symptoms worse, and can cause lung damage. Ask your healthcare provider for information if you currently smoke and need help to quit. E-cigarettes or smokeless tobacco still contain nicotine. Talk to your healthcare provider before you use these products.       •Do not use caffeine, alcohol, or illegal drugs. These may cause muscle twitching, which could lead to worsened weakness.       Follow up with your healthcare provider as directed: Write down your questions so you remember to ask them during your visits.       © Copyright [employer] 2021           back to top                          © Copyright [employer] 2021

## 2021-05-08 NOTE — ED PROVIDER NOTE - PATIENT PORTAL LINK FT
You can access the FollowMyHealth Patient Portal offered by [facility] by registering at the following website: [email or web address]. By joining [employer]’s FollowMyHealth portal, you will also be able to view your health information using other applications (apps) compatible with our system.

## 2021-05-08 NOTE — CONSULT NOTE ADULT - SUBJECTIVE AND OBJECTIVE BOX
[facility] - Sveta Romano, Ebony Sparrow, Jennifer, Dereck, Hosea Wyman  Office Number: [phone]    Initial Consult Note  CHIEF COMPLAINT: Patient is a 65y old  Male who presents with a chief complaint of weakness  HPI: 65M PMH CLL, osteopenia, Anemia, presents with c/o dizziness. Patient was dizzy last night with weakness. He gets gamma globulin infusion monthly and last infusion last week . Denies head spinning.  As pt was walking, pt felt tired.  As per wife, pt gave wrong doctor's name that he had seen in decades.  No LOC  Pt was seen here for similar episode, seen here in July 2020, dx c dehydration. Pt is not dizzy in the ED. No other complaints    In ED, T(F): 98, HR: 73, BP: 158/92, RR: 18, SpO2: 99%. Labs unremarkable. CT head negative. Patient was admitted here July 2020, was found to be in A fib possibly related to Imbruvica, no A fib noted on tele. Was told to follow up outpatient for Holter to assess A fib burden.     Allergies  No Known Allergies    PAST MEDICAL & SURGICAL HISTORY:  Osteopenia    CLL (Chronic Lymphocytic Leukemia)  SINCE 1988    Avascular Necrosis of Femur Head  B/L    TMJ Syndrome  limited jaw opening due to TMJ    Herniated Cervical Disc    Bulging Disc  LUMBAR    Melanoma    Deviated nasal septum    Nasal congestion    Rosacea    Deviated nasal septum    Other specified disorders of nose and nasal sinuses    Anemia    Anemia    S/P Splenectomy  1993    Avascular Necrosis of Femur Head  RIGHT - CORE DECOMPRESSION- 1993    Avascular Necrosis of Femur Head  LEFT - CORE DECOMPRESSION - 1993    Abnormal Jaw Closure  LEFT JAW SURGERY - 1988    Status Post Eye Surgery X 3  3/2011 right eye scleral buckle; left eye in 2013    S/P Tonsillectomy  1960    Bilateral cataracts  removed in 2011    MEDICATIONS  (STANDING):  allopurinol 100 milliGRAM(s) Oral daily  aspirin 325 milliGRAM(s) Oral daily  atorvastatin 80 milliGRAM(s) Oral at bedtime  fluticasone propionate 50 MICROgram(s)/spray Nasal Spray 1 Spray(s) Both Nostrils two times a day  folic acid 1 milliGRAM(s) Oral daily  heparin   Injectable 5000 Unit(s) SubCutaneous every 12 hours  loratadine 10 milliGRAM(s) Oral daily  potassium chloride    Tablet ER 10 milliEquivalent(s) Oral daily    MEDICATIONS  (PRN):  meclizine 12.5 milliGRAM(s) Oral three times a day PRN Dizziness    FAMILY HISTORY:  Family history of multiple myeloma    Family history of malignant melanoma  age 89    FH: blood dyscrasia  MGUS    FH: hypertension  mother    FH: renal failure  mother    SOCIAL HISTORY  Marital Status:    Occupation:   Lives with: wife     SUBSTANCE USE  Tobacco Usage:  ( x) None ( ) never smoked   ( ) former smoker  ( ) current smoker; Packs per day:   Alcohol Usage: ( ) none  (x ) occasional ( ) 2-3 times a week ( ) daily; Last drink:   Recreational drugs (x ) None    REVIEW OF SYSTEMS   CONSTITUTIONAL: + weakness, No fevers, No chills, No fatigue, No weight gain  EYES: No vision changes, No vertigo, No throat pain   ENT: No congestion, No ear pain, No sore throat.  NECK: No pain, No stiffness  RESPIRATORY: No shortness of breath, No cough, No wheezing, No hemoptysis  CARDIOVASCULAR: No chest pain. No palpitations, No PARKER, No orthopnea, No PND, No pleuritic pain  GASTROINTESTINAL: No abdominal pain, No nausea, No vomiting, No hematemesis, No diarrhea No constipation. No melena  GENITOURINARY: No dysuria, No frequency, No incontinence, No hematuria  NEUROLOGICAL: No dizziness, No lightheadedness, No syncope, No LOC, No headache, No numbness, No weakness  MUSCULOSKELETAL: No joint pain, No joint swelling.  PSYCHIATRIC: No anxiety, No depression  DERMATOLOGY: No diaphoresis. No itching, No rashes, No pressure ulcers  HEME/LYMPH: No easy bruising, or bleeding gums  All other review of systems is negative unless indicated above.    VITAL SIGNS:   Vital Signs Last 24 Hrs  T(C): 36.7 (08 May 2021 10:09), Max: 36.7 (08 May 2021 10:09)  T(F): 98 (08 May 2021 10:09), Max: 98 (08 May 2021 10:09)  HR: 73 (08 May 2021 10:09) (73 - 73)  BP: 158/92 (08 May 2021 10:09) (158/92 - 158/92)  BP(mean): --  RR: 18 (08 May 2021 10:09) (18 - 18)  SpO2: 99% (08 May 2021 10:09) (99% - 99%)    Physical Exam:  Appearance: NAD, no distress, alert,   HEENT: Moist Mucous Membranes, Anicteric  Cardiovascular: Regular rate and rhythm, Normal S1 S2, No JVD, No murmurs, No rubs, gallops or clicks  Respiratory: Lungs clear to auscultation. No rales, No rhonchi, No wheezing. No tenderness to palpation  Gastrointestinal:  Soft, Non-tender, + BS  Neurologic: Non-focal  Skin: Warm and dry, No rashes, No ecchymosis, No cyanosis, No ulcers   Musculoskeletal: No clubbing, No cyanosis  Vascular: Peripheral pulses palpable 2+ bilaterally  Psychiatry: Mood & affect appropriate  Lymph: No peripheral edema.     I&O's Summary    LABS: All Labs Reviewed:                        13.5   4.37  )-----------( 293      ( 08 May 2021 10:47 )             41.5     08 May 2021 10:47    140    |  105    |  16     ----------------------------<  95     3.7     |  30     |  1.10     Ca    9.1        08 May 2021 10:47    TPro  7.9    /  Alb  4.1    /  TBili  1.4    /  DBili  x      /  AST  12     /  ALT  13     /  AlkPhos  93     08 May 2021 10:47   [facility] - Sveta Romano, Ebony Sparrow, Jennifer, Dereck, Hosea Wyman  Office Number: [phone]    Initial Consult Note  CHIEF COMPLAINT: Patient is a 65y old  Male who presents with a chief complaint of weakness  HPI: 65M PMH CLL, osteopenia, Anemia, presents with c/o dizziness. Patient was dizzy last night with weakness. His wife had to help him ambulate to room. He gets gamma globulin infusion monthly and last infusion last week . Denies head spinning.  As pt was walking, pt felt tired.  As per wife, pt gave wrong doctor's name that he had seen in decades, couple days back.  No LOC  Pt was seen here for similar episode, seen here in July 2020, dx c dehydration. Pt is not dizzy in the ED. No other complaints. Patient on chemotherapy for CLL. Denies any prior episodes, Admits that he is staying hydrated unlike last admission where he was dehydrated.     In ED, T(F): 98, HR: 73, BP: 158/92, RR: 18, SpO2: 99%. Labs unremarkable. CT head negative. CTA pending. Patient was admitted here July 2020, was found to be in A fib possibly related to Imbruvica, no A fib noted on tele. Din not follow up outpatient. Patient denies any palpitations and is asymptomatic at present. Patient wife reports a lot of stress in life at present.     Allergies  No Known Allergies    PAST MEDICAL & SURGICAL HISTORY:  Osteopenia    CLL (Chronic Lymphocytic Leukemia)  SINCE 1988    Avascular Necrosis of Femur Head  B/L    TMJ Syndrome  limited jaw opening due to TMJ    Herniated Cervical Disc    Bulging Disc  LUMBAR    Melanoma    Deviated nasal septum    Nasal congestion    Rosacea    Deviated nasal septum    Other specified disorders of nose and nasal sinuses    Anemia    Anemia    S/P Splenectomy  1993    Avascular Necrosis of Femur Head  RIGHT - CORE DECOMPRESSION- 1993    Avascular Necrosis of Femur Head  LEFT - CORE DECOMPRESSION - 1993    Abnormal Jaw Closure  LEFT JAW SURGERY - 1988    Status Post Eye Surgery X 3  3/2011 right eye scleral buckle; left eye in 2013    S/P Tonsillectomy  1960    Bilateral cataracts  removed in 2011    MEDICATIONS  (STANDING):  allopurinol 100 milliGRAM(s) Oral daily  aspirin 325 milliGRAM(s) Oral daily  atorvastatin 80 milliGRAM(s) Oral at bedtime  fluticasone propionate 50 MICROgram(s)/spray Nasal Spray 1 Spray(s) Both Nostrils two times a day  folic acid 1 milliGRAM(s) Oral daily  heparin   Injectable 5000 Unit(s) SubCutaneous every 12 hours  loratadine 10 milliGRAM(s) Oral daily  potassium chloride    Tablet ER 10 milliEquivalent(s) Oral daily    MEDICATIONS  (PRN):  meclizine 12.5 milliGRAM(s) Oral three times a day PRN Dizziness    FAMILY HISTORY:  Family history of multiple myeloma    Family history of malignant melanoma  age 89    FH: blood dyscrasia  MGUS    FH: hypertension  mother    FH: renal failure  mother    SOCIAL HISTORY  Marital Status:    Occupation:   Lives with: wife     SUBSTANCE USE  Tobacco Usage:  ( x) None ( ) never smoked   ( ) former smoker  ( ) current smoker; Packs per day:   Alcohol Usage: ( ) none  (x ) occasional ( ) 2-3 times a week ( ) daily; Last drink:   Recreational drugs (x ) None    REVIEW OF SYSTEMS   CONSTITUTIONAL: + weakness, No fevers, No chills, No fatigue, No weight gain  EYES: No vision changes, No vertigo, No throat pain   ENT: No congestion, No ear pain, No sore throat.  NECK: No pain, No stiffness  RESPIRATORY: No shortness of breath, No cough, No wheezing, No hemoptysis  CARDIOVASCULAR: No chest pain. No palpitations, No PARKER, No orthopnea, No PND, No pleuritic pain  GASTROINTESTINAL: No abdominal pain, No nausea, No vomiting, No hematemesis, No diarrhea No constipation. No melena  GENITOURINARY: No dysuria, No frequency, No incontinence, No hematuria  NEUROLOGICAL: No dizziness, + lightheadedness, No syncope, No LOC, No headache, No numbness, No weakness  MUSCULOSKELETAL: No joint pain, No joint swelling.  PSYCHIATRIC: No anxiety, No depression  DERMATOLOGY: No diaphoresis. No itching, No rashes, No pressure ulcers  HEME/LYMPH: No easy bruising, or bleeding gums  All other review of systems is negative unless indicated above.    VITAL SIGNS:   Vital Signs Last 24 Hrs  T(C): 36.7 (08 May 2021 10:09), Max: 36.7 (08 May 2021 10:09)  T(F): 98 (08 May 2021 10:09), Max: 98 (08 May 2021 10:09)  HR: 73 (08 May 2021 10:09) (73 - 73)  BP: 158/92 (08 May 2021 10:09) (158/92 - 158/92)  BP(mean): --  RR: 18 (08 May 2021 10:09) (18 - 18)  SpO2: 99% (08 May 2021 10:09) (99% - 99%)    Physical Exam:  Appearance: NAD, no distress, alert, Well developed   HEENT: Moist Mucous Membranes, Anicteric  Cardiovascular: Regular rate and rhythm, Normal S1 S2, No JVD, + murmurs, No rubs, gallops or clicks  Respiratory: Lungs clear to auscultation. No rales, No rhonchi, No wheezing. No tenderness to palpation  Gastrointestinal:  Soft, Non-tender, + BS  Neurologic: Non-focal  Skin: Warm and dry, No rashes, No ecchymosis, No cyanosis, No ulcers   Musculoskeletal: No clubbing, No cyanosis  Vascular: Peripheral pulses palpable 2+ bilaterally  Psychiatry: Mood & affect appropriate  Lymph: No peripheral edema.     I&O's Summary    LABS: All Labs Reviewed:                        13.5   4.37  )-----------( 293      ( 08 May 2021 10:47 )             41.5     08 May 2021 10:47    140    |  105    |  16     ----------------------------<  95     3.7     |  30     |  1.10     Ca    9.1        08 May 2021 10:47    TPro  7.9    /  Alb  4.1    /  TBili  1.4    /  DBili  x      /  AST  12     /  ALT  13     /  AlkPhos  93     08 May 2021 10:47

## 2021-05-10 LAB — MANUAL DIF COMMENT BLD-IMP: SIGNIFICANT CHANGE UP

## 2021-05-17 NOTE — ED ADULT NURSE NOTE - CAS EDN DISCHARGE ASSESSMENT
Current warfarin dose:  Warfarin maintenance plan:  2.5 mg (2.5 mg x 1) every Mon, Wed, Sat; 1.25 mg (2.5 mg x 0.5) all other days   Full warfarin instructions:  2.5 mg every Mon, Wed, Sat; 1.25 mg all other days   Weekly warfarin total:  12.5 mg     Last INR result:  INR   Date Value Ref Range Status   05/13/2021 3.5 (A) 0.90 - 1.10 Final     Last office visit: 8/17/2020     Refill authorized per [facility] protocol.    Beto DIOP RN, CACP    Alert and oriented to person, place and time

## 2021-07-15 NOTE — ED ADULT NURSE NOTE - CHPI ED NUR SYMPTOMS POS
Patient is in the supine position.   The body was positioned using the following devices: safety strap.   weakness/CONFUSION/DIZZINESS

## 2021-09-14 ENCOUNTER — INPATIENT (INPATIENT)
Facility: HOSPITAL | Age: 66
LOS: 1 days | Discharge: ANOTHER HOSPITAL | DRG: 101 | End: 2021-09-16
Attending: FAMILY MEDICINE | Admitting: FAMILY MEDICINE
Payer: MEDICARE

## 2021-09-14 VITALS
TEMPERATURE: 98 F | RESPIRATION RATE: 18 BRPM | OXYGEN SATURATION: 100 % | WEIGHT: 158.29 LBS | HEIGHT: 70 IN | HEART RATE: 71 BPM | SYSTOLIC BLOOD PRESSURE: 173 MMHG | DIASTOLIC BLOOD PRESSURE: 96 MMHG

## 2021-09-14 DIAGNOSIS — I63.9 CEREBRAL INFARCTION, UNSPECIFIED: ICD-10-CM

## 2021-09-14 DIAGNOSIS — H26.9 UNSPECIFIED CATARACT: Chronic | ICD-10-CM

## 2021-09-14 LAB
ALBUMIN SERPL ELPH-MCNC: 3.9 G/DL — SIGNIFICANT CHANGE UP (ref 3.3–5)
ALP SERPL-CCNC: 72 U/L — SIGNIFICANT CHANGE UP (ref 40–120)
ALT FLD-CCNC: 25 U/L — SIGNIFICANT CHANGE UP (ref 12–78)
ANION GAP SERPL CALC-SCNC: 5 MMOL/L — SIGNIFICANT CHANGE UP (ref 5–17)
ANISOCYTOSIS BLD QL: SLIGHT — SIGNIFICANT CHANGE UP
APPEARANCE UR: CLEAR — SIGNIFICANT CHANGE UP
APTT BLD: 33.5 SEC — SIGNIFICANT CHANGE UP (ref 27.5–35.5)
AST SERPL-CCNC: 24 U/L — SIGNIFICANT CHANGE UP (ref 15–37)
BASO STIPL BLD QL SMEAR: PRESENT — SIGNIFICANT CHANGE UP
BASOPHILS # BLD AUTO: 0 K/UL — SIGNIFICANT CHANGE UP (ref 0–0.2)
BASOPHILS NFR BLD AUTO: 0 % — SIGNIFICANT CHANGE UP (ref 0–2)
BILIRUB SERPL-MCNC: 1.3 MG/DL — HIGH (ref 0.2–1.2)
BILIRUB UR-MCNC: NEGATIVE — SIGNIFICANT CHANGE UP
BUN SERPL-MCNC: 17 MG/DL — SIGNIFICANT CHANGE UP (ref 7–23)
CALCIUM SERPL-MCNC: 8.7 MG/DL — SIGNIFICANT CHANGE UP (ref 8.5–10.1)
CHLORIDE SERPL-SCNC: 107 MMOL/L — SIGNIFICANT CHANGE UP (ref 96–108)
CO2 SERPL-SCNC: 29 MMOL/L — SIGNIFICANT CHANGE UP (ref 22–31)
COLOR SPEC: YELLOW — SIGNIFICANT CHANGE UP
CREAT SERPL-MCNC: 0.94 MG/DL — SIGNIFICANT CHANGE UP (ref 0.5–1.3)
DIFF PNL FLD: NEGATIVE — SIGNIFICANT CHANGE UP
EOSINOPHIL # BLD AUTO: 0 K/UL — SIGNIFICANT CHANGE UP (ref 0–0.5)
EOSINOPHIL NFR BLD AUTO: 0 % — SIGNIFICANT CHANGE UP (ref 0–6)
GLUCOSE SERPL-MCNC: 103 MG/DL — HIGH (ref 70–99)
GLUCOSE UR QL: NEGATIVE — SIGNIFICANT CHANGE UP
HCT VFR BLD CALC: 35.3 % — LOW (ref 39–50)
HGB BLD-MCNC: 11.9 G/DL — LOW (ref 13–17)
HOWELL-JOLLY BOD BLD QL SMEAR: PRESENT — SIGNIFICANT CHANGE UP
HYPOSEGMENTATION: PRESENT — SIGNIFICANT CHANGE UP
INR BLD: 0.99 RATIO — SIGNIFICANT CHANGE UP (ref 0.88–1.16)
KETONES UR-MCNC: NEGATIVE — SIGNIFICANT CHANGE UP
LEUKOCYTE ESTERASE UR-ACNC: NEGATIVE — SIGNIFICANT CHANGE UP
LG PLATELETS BLD QL AUTO: SLIGHT — SIGNIFICANT CHANGE UP
LYMPHOCYTES # BLD AUTO: 0.23 K/UL — LOW (ref 1–3.3)
LYMPHOCYTES # BLD AUTO: 6 % — LOW (ref 13–44)
MACROCYTES BLD QL: SLIGHT — SIGNIFICANT CHANGE UP
MANUAL SMEAR VERIFICATION: SIGNIFICANT CHANGE UP
MCHC RBC-ENTMCNC: 33.7 GM/DL — SIGNIFICANT CHANGE UP (ref 32–36)
MCHC RBC-ENTMCNC: 34.4 PG — HIGH (ref 27–34)
MCV RBC AUTO: 102 FL — HIGH (ref 80–100)
MICROCYTES BLD QL: SLIGHT — SIGNIFICANT CHANGE UP
MONOCYTES # BLD AUTO: 0.68 K/UL — SIGNIFICANT CHANGE UP (ref 0–0.9)
MONOCYTES NFR BLD AUTO: 18 % — HIGH (ref 2–14)
NEUTROPHILS # BLD AUTO: 2.78 K/UL — SIGNIFICANT CHANGE UP (ref 1.8–7.4)
NEUTROPHILS NFR BLD AUTO: 71 % — SIGNIFICANT CHANGE UP (ref 43–77)
NEUTS BAND # BLD: 3 % — SIGNIFICANT CHANGE UP (ref 0–8)
NITRITE UR-MCNC: NEGATIVE — SIGNIFICANT CHANGE UP
NRBC # BLD: 0 — SIGNIFICANT CHANGE UP
NRBC # BLD: SIGNIFICANT CHANGE UP /100 WBCS (ref 0–0)
PH UR: 8 — SIGNIFICANT CHANGE UP (ref 5–8)
PLAT MORPH BLD: NORMAL — SIGNIFICANT CHANGE UP
PLATELET # BLD AUTO: 290 K/UL — SIGNIFICANT CHANGE UP (ref 150–400)
POTASSIUM SERPL-MCNC: 3.9 MMOL/L — SIGNIFICANT CHANGE UP (ref 3.5–5.3)
POTASSIUM SERPL-SCNC: 3.9 MMOL/L — SIGNIFICANT CHANGE UP (ref 3.5–5.3)
PROT SERPL-MCNC: 7.4 G/DL — SIGNIFICANT CHANGE UP (ref 6–8.3)
PROT UR-MCNC: NEGATIVE — SIGNIFICANT CHANGE UP
PROTHROM AB SERPL-ACNC: 11.6 SEC — SIGNIFICANT CHANGE UP (ref 10.6–13.6)
RBC # BLD: 3.46 M/UL — LOW (ref 4.2–5.8)
RBC # FLD: 15 % — HIGH (ref 10.3–14.5)
RBC BLD AUTO: ABNORMAL
SARS-COV-2 RNA SPEC QL NAA+PROBE: SIGNIFICANT CHANGE UP
SODIUM SERPL-SCNC: 141 MMOL/L — SIGNIFICANT CHANGE UP (ref 135–145)
SP GR SPEC: 1.01 — SIGNIFICANT CHANGE UP (ref 1.01–1.02)
TROPONIN I SERPL-MCNC: <.015 NG/ML — SIGNIFICANT CHANGE UP (ref 0.01–0.04)
UROBILINOGEN FLD QL: NEGATIVE — SIGNIFICANT CHANGE UP
VARIANT LYMPHS # BLD: 2 % — SIGNIFICANT CHANGE UP (ref 0–6)
WBC # BLD: 3.76 K/UL — LOW (ref 3.8–10.5)
WBC # FLD AUTO: 3.76 K/UL — LOW (ref 3.8–10.5)

## 2021-09-14 PROCEDURE — 70450 CT HEAD/BRAIN W/O DYE: CPT | Mod: 26,59,MA

## 2021-09-14 PROCEDURE — 0042T: CPT

## 2021-09-14 PROCEDURE — 70498 CT ANGIOGRAPHY NECK: CPT | Mod: 26,MA

## 2021-09-14 PROCEDURE — 93010 ELECTROCARDIOGRAM REPORT: CPT

## 2021-09-14 PROCEDURE — 99285 EMERGENCY DEPT VISIT HI MDM: CPT

## 2021-09-14 PROCEDURE — 71045 X-RAY EXAM CHEST 1 VIEW: CPT | Mod: 26

## 2021-09-14 PROCEDURE — 70496 CT ANGIOGRAPHY HEAD: CPT | Mod: 26,MA

## 2021-09-14 RX ORDER — CHLORHEXIDINE GLUCONATE 213 G/1000ML
1 SOLUTION TOPICAL
Refills: 0 | Status: DISCONTINUED | OUTPATIENT
Start: 2021-09-14 | End: 2021-09-16

## 2021-09-14 RX ORDER — FLUTICASONE PROPIONATE 50 MCG
1 SPRAY, SUSPENSION NASAL
Qty: 0 | Refills: 0 | DISCHARGE

## 2021-09-14 RX ORDER — NICARDIPINE HYDROCHLORIDE 30 MG/1
5 CAPSULE, EXTENDED RELEASE ORAL
Qty: 40 | Refills: 0 | Status: DISCONTINUED | OUTPATIENT
Start: 2021-09-14 | End: 2021-09-14

## 2021-09-14 RX ORDER — ATORVASTATIN CALCIUM 80 MG/1
80 TABLET, FILM COATED ORAL AT BEDTIME
Refills: 0 | Status: DISCONTINUED | OUTPATIENT
Start: 2021-09-15 | End: 2021-09-16

## 2021-09-14 RX ORDER — ALTEPLASE 100 MG
6.5 KIT INTRAVENOUS ONCE
Refills: 0 | Status: COMPLETED | OUTPATIENT
Start: 2021-09-14 | End: 2021-09-14

## 2021-09-14 RX ORDER — ALTEPLASE 100 MG
58.2 KIT INTRAVENOUS ONCE
Refills: 0 | Status: COMPLETED | OUTPATIENT
Start: 2021-09-14 | End: 2021-09-14

## 2021-09-14 RX ADMIN — ALTEPLASE 58.2 MILLIGRAM(S): KIT at 18:17

## 2021-09-14 RX ADMIN — ALTEPLASE 6.5 MILLIGRAM(S): KIT at 18:16

## 2021-09-14 RX ADMIN — ALTEPLASE 58.2 MILLIGRAM(S): KIT at 19:12

## 2021-09-14 RX ADMIN — ALTEPLASE 390 MILLIGRAM(S): KIT at 18:15

## 2021-09-14 NOTE — ED PROVIDER NOTE - PHYSICAL EXAMINATION
Vital signs as available reviewed.  General:  Comfortable, no acute distress.  Head:  Normocephalic, atraumatic.  Eyes:  Conjunctiva pink, no icterus.  Cardiovascular:  Regular rate, no obvious murmur.  Respiratory:  Clear to auscultation, good air entry bilaterally.  Abdomen:  Soft, non-tender.  Musculoskeletal:  No deformity or calf tenderness.  Neurologic: see stroke tab.  Skin:  Warm and dry.

## 2021-09-14 NOTE — ED PROVIDER NOTE - NSSTROKETPADOOR_OTHER_FT
patient initially with mild stoke symptoms but symptoms rapidly progressive in ED to NIH of 10+. TPA given ~15 minutes after worsening or stroke symptoms.

## 2021-09-14 NOTE — ED ADULT NURSE REASSESSMENT NOTE - NS ED NURSE REASSESS COMMENT FT1
Patient is very agitated/ shouting cannt express whats wrong with him, bladder scanned and noted with >493ml, straight catheterized by Sadia Mendoza and was able to output og 500ml. Patient is comfortable after the procedure.

## 2021-09-14 NOTE — PATIENT PROFILE ADULT - NSPROMEDSBROUGHTTOHOSP_GEN_A_NUR
EMERGENCY DEPARTMENT ENCOUNTER    CHIEF COMPLAINT  Chief Complaint: trach replacement  History given by: patient  History limited by: none  Room Number: 13/13  PMD: Provider, No Known      HPI:  Pt is a 58 y.o. female who presents complaining of needing trach replacement. Pt states that she sneezed and the trach came out PTA. Per pt, [facility] replaced her trach yesterday and downsized it to a #6. Pt states since they changed the trach she had right jaw and neck pain that is worsening. Pt states she had had a trach for several months. Pt states that is not actively using the trach and is on oxygen at night.         Duration/Onset/Timing: constant, PTA  Location: neck  Radiation: none  Quality: NA  Intensity/Severity: moderate  Associated Symptoms: right jaw and neck pain  Aggravating or Alleviating Factors: none  Previous Episodes: none      PAST MEDICAL HISTORY  Active Ambulatory Problems     Diagnosis Date Noted   • No Active Ambulatory Problems     Resolved Ambulatory Problems     Diagnosis Date Noted   • No Resolved Ambulatory Problems     No Additional Past Medical History       PAST SURGICAL HISTORY  History reviewed. No pertinent surgical history.    FAMILY HISTORY  History reviewed. No pertinent family history.    SOCIAL HISTORY  Social History     Socioeconomic History   • Marital status:      Spouse name: Not on file   • Number of children: Not on file   • Years of education: Not on file   • Highest education level: Not on file   Social Needs   • Financial resource strain: Not on file   • Food insecurity - worry: Not on file   • Food insecurity - inability: Not on file   • Transportation needs - medical: Not on file   • Transportation needs - non-medical: Not on file   Occupational History   • Not on file   Tobacco Use   • Smoking status: Not on file   Substance and Sexual Activity   • Alcohol use: Not on file   • Drug use: Not on file   • Sexual activity: Not on file   Other Topics Concern   • Not on  file   Social History Narrative   • Not on file       ALLERGIES  Hydrocodone; Strawberry; and Zithromax [azithromycin]    REVIEW OF SYSTEMS  Review of Systems   Constitutional: Negative for chills and fever.        (+) trach replacement   HENT: Negative for sore throat.         (+) jaw pain   Respiratory: Negative for shortness of breath.    Cardiovascular: Negative for chest pain.   Gastrointestinal: Negative for nausea and vomiting.   Genitourinary: Negative for dysuria.   Musculoskeletal: Positive for neck pain. Negative for back pain.   Skin: Negative for rash.   Neurological: Negative for dizziness.   Psychiatric/Behavioral: The patient is not nervous/anxious.        PHYSICAL EXAM  ED Triage Vitals [12/18/18 1944]   Temp Heart Rate Resp BP SpO2   98.6 °F (37 °C) 65 18 100/63 100 %      Temp src Heart Rate Source Patient Position BP Location FiO2 (%)   Tympanic -- -- -- --       Physical Exam   Constitutional: No distress.   HENT:   Head: Normocephalic and atraumatic.   Mouth/Throat: Oropharynx is clear and moist.   Eyes: Conjunctivae are normal.   Neck:   There is a relatively small trach stoma without drainage or bleeding.    Cardiovascular: Normal rate and regular rhythm.   Pulmonary/Chest: Breath sounds normal. No respiratory distress.   Pt's O2 sats are 100% on 2L.   Respirations unlabored    Abdominal: There is no tenderness.   Musculoskeletal: She exhibits no edema or tenderness.   Neurological: She is alert.   Skin: No rash noted.   Nursing note and vitals reviewed.      LAB RESULTS  Lab Results (last 24 hours)     ** No results found for the last 24 hours. **        .       PROCEDURES  Procedures      PROGRESS AND CONSULTS     2019-Placed call to pulmonology for consult.     2033-Discussed pt case with  (pulmonology) who ask I transfer pt [facility] for overnight observation wihout trach in place.He was fearful pt would become hypoxic without trach in place. I will place call for transfer.      2038-Placed call to  (PCP) for consult. Dr Suarez answered and said she cannot admit to [facility].  She said she would text Dr DHILLON    2230 - Still no answer and have called [facility] multiple times over the last hour without response.  Will contact Dr Stack about possible admission for further eval,    2300-Dr Stack will admit.  Pt watching TV in NAD.  Sats upper 90s on 2L n/c.        MEDICAL DECISION MAKING  Results were reviewed/discussed with the patient and they were also made aware of online access. Pt also made aware that some labs, such as cultures, will not be resulted during ER visit and follow up with PMD is necessary.     MDM  Number of Diagnoses or Management Options     Amount and/or Complexity of Data Reviewed  Decide to obtain previous medical records or to obtain history from someone other than the patient: yes  Review and summarize past medical records: yes (Reviewed recent hospitalization of [facility]. )  Discuss the patient with other providers: yes ( (pulmonology))           DIAGNOSIS  Final diagnoses:   Tracheostomy complication, unspecified complication type (CMS/HCC)       DISPOSITION  ADMISSION    Discussed treatment plan and reason for admission with pt/family and admitting physician.  Pt/family voiced understanding of the plan for admission for further testing/treatment as needed.         Latest Documented Vital Signs:  As of 11:00 PM  BP- 100/71 HR- 64 Temp- 98.6 °F (37 °C) (Tympanic) O2 sat- 98%    --  Documentation assistance provided by emilia Em for .  Information recorded by the scribe was done at my direction and has been verified and validated by me.     Savi Em  12/18/18 [id number]       Andrea Bone MD  12/18/18 [id number]     no

## 2021-09-14 NOTE — PATIENT PROFILE ADULT - NSTRANSFERBELONGINGSRESP_GEN_A_NUR
Subjective





- Date & Time of Evaluation


Date of Evaluation: 11/17/18


Time of Evaluation: 08:12





Objective





- Vital Signs/Intake and Output


Vital Signs (last 24 hours): 


                                        











Temp Pulse Resp BP Pulse Ox


 


 98.3 F   67   19   134/68   96 


 


 11/17/18 05:53  11/17/18 05:53  11/17/18 05:53  11/17/18 05:53  11/17/18 05:53











- Medications


Medications: 


                               Current Medications





Piperacillin Sod/Tazobactam (Sod 2.25 gm/ Sodium Chloride)  100 mls @ 100 mls/hr

IVPB Q8 DONNELL; Protocol


   Last Admin: 11/17/18 00:32 Dose:  100 mls/hr


Lactated Ringer's (Lactated Ringer's)  1,000 mls @ 125 mls/hr IV .Q8H [facility]


   Last Admin: 11/17/18 06:24 Dose:  125 mls/hr


Ibuprofen (Motrin Tab)  600 mg PO Q6 PRN


   PRN Reason: Pain, moderate (4-7)


   Last Admin: 11/17/18 00:29 Dose:  600 mg


Morphine Sulfate (Morphine)  4 mg IVP Q4 PRN


   PRN Reason: Pain, severe (8-10)


Ondansetron HCl (Zofran Inj)  4 mg IVP Q4 PRN


   PRN Reason: Nausea/Vomiting


Pantoprazole Sodium (Protonix Inj)  40 mg IVP DAILY [facility]


   Last Admin: 11/16/18 10:00 Dose:  40 mg











- Labs


Labs: 


                                        





                                 11/16/18 04:49 





                                 11/16/18 04:49 





                                        











PT  12.4 Seconds (9.8-13.1)   11/16/18  04:49    


 


INR  1.1   11/16/18  04:49    


 


APTT  29.2 Seconds (25.6-37.1)   11/16/18  04:49 yes

## 2021-09-14 NOTE — ED PROVIDER NOTE - OBJECTIVE STATEMENT
66 m history of CLL here with wife complaining of slurred speech. patient noted to have slurred speech at 1630. Wife drove straight here. She reports he was normal just prior to this. they report prior episiodes of confusion and dizziness but never speech problems like this. wife denies blood thinners but does note there is a blood thinning component to his CLL treatment medication (name unknown). 66 m history of CLL here with wife complaining of slurred speech. patient noted to have slurred speech at 1630. Wife drove straight here. She reports he was normal just prior to this. they report prior episodes of confusion and dizziness but never speech problems like this. wife denies blood thinners but does note there is a blood thinning component to his CLL treatment medication (name unknown).

## 2021-09-14 NOTE — H&P ADULT - HISTORY OF PRESENT ILLNESS
SARAHI BARNHART is a 65 YO Male brought to ED complaining of altered mental status with slurred speech and right side weak ness.  Patient noted to have slurred speech at 1630 today. Wife drove straight [facility] ED.  She reports he was normal just prior to this.  She report prior episodes of confusion and dizziness but never speech problems like this. Wife denies blood thinners but does note there is a blood thinning component to his CLL treatment medication (name unknown).

## 2021-09-14 NOTE — CONSULT NOTE ADULT - SUBJECTIVE AND OBJECTIVE BOX
S/P tPA candidate.  bolus 6 15PM    dose was calculated at 0.9 mg/kg, 10% was given over bolus, next will be run over next hour.  The patient will be administered ICU setting.  We will place the patient on Neuro checks every 15 minutes for the first two hours followed by every 30 minutes for the next six hours, and for every hour for a total of 24 hours.  I will recommend to keep a systolic blood pressure less than 180 and a diastolic blood pressure less than 105 and if necessary, use nicardipine drip.  I will recommend no anticoagulation or antiplatelet therapy for the next 24 hours.  Time frame to possibly start if no bleeding would be after 11:25 following day.  No NG tube placements for the next 24 hours.  No blood draws for the next four to six hours.  We will plan for an MRI  of the brain.  Echocardiogram.  If any changes in clinical examination, I will recommend repeat CT imaging of the brain stat.  Greater than 60 minutes of critical care time was spent with the patient, plan of care, speaking to physicians, and tPA.  After a lengthy discussion with ER physician and spouse, there apparently was no contraindications for tPA.    Thank you for the courtesy of consultation.

## 2021-09-14 NOTE — H&P ADULT - NSHPLABSRESULTS_GEN_ALL_CORE
11.9   3.76  )-----------( 290      ( 14 Sep 2021 17:52 )             35.3     14 Sep 2021 17:52    141    |  107    |  17     ----------------------------<  103    3.9     |  29     |  0.94     Ca    8.7        14 Sep 2021 17:52    TPro  7.4    /  Alb  3.9    /  TBili  1.3    /  DBili  x      /  AST  24     /  ALT  25     /  AlkPhos  72     14 Sep 2021 17:52    LIVER FUNCTIONS - ( 14 Sep 2021 17:52 )  Alb: 3.9 g/dL / Pro: 7.4 g/dL / ALK PHOS: 72 U/L / ALT: 25 U/L / AST: 24 U/L / GGT: x           PT/INR - ( 14 Sep 2021 17:52 )   PT: 11.6 sec;   INR: 0.99 ratio      PTT - ( 14 Sep 2021 17:52 )  PTT:33.5 sec  CAPILLARY BLOOD GLUCOSE    POCT Blood Glucose.: 107 mg/dL (14 Sep 2021 16:59)    CARDIAC MARKERS ( 14 Sep 2021 17:52 )  <.015 ng/mL / x     / x     / x     / x        Urinalysis Basic - ( 14 Sep 2021 18:56 )    Color: Yellow / Appearance: Clear / S.010 / pH: x  Gluc: x / Ketone: Negative  / Bili: Negative / Urobili: Negative   Blood: x / Protein: Negative / Nitrite: Negative   Leuk Esterase: Negative / RBC: x / WBC x   Sq Epi: x / Non Sq Epi: x / Bacteria: x 11.9   3.76  )-----------( 290      ( 14 Sep 2021 17:52 )             35.3     14 Sep 2021 17:52    141    |  107    |  17     ----------------------------<  103    3.9     |  29     |  0.94     Ca    8.7        14 Sep 2021 17:52    TPro  7.4    /  Alb  3.9    /  TBili  1.3    /  DBili  x      /  AST  24     /  ALT  25     /  AlkPhos  72     14 Sep 2021 17:52    LIVER FUNCTIONS - ( 14 Sep 2021 17:52 )  Alb: 3.9 g/dL / Pro: 7.4 g/dL / ALK PHOS: 72 U/L / ALT: 25 U/L / AST: 24 U/L / GGT: x           PT/INR - ( 14 Sep 2021 17:52 )   PT: 11.6 sec;   INR: 0.99 ratio      PTT - ( 14 Sep 2021 17:52 )  PTT:33.5 sec  CAPILLARY BLOOD GLUCOSE    POCT Blood Glucose.: 107 mg/dL (14 Sep 2021 16:59)    CARDIAC MARKERS ( 14 Sep 2021 17:52 )  <.015 ng/mL / x     / x     / x     / x        Urinalysis Basic - ( 14 Sep 2021 18:56 )    Color: Yellow / Appearance: Clear / S.010 / pH: x  Gluc: x / Ketone: Negative  / Bili: Negative / Urobili: Negative   Blood: x / Protein: Negative / Nitrite: Negative   Leuk Esterase: Negative / RBC: x / WBC x   Sq Epi: x / Non Sq Epi: x / Bacteria: x    < from: CT Brain Stroke Protocol (21 @ 17:50) >    IMPRESSION:    CT brain: There is no acute lobar infarction, intracranial hemorrhage, mass lesion, mass effect or herniation. Further correlation with MRI of the brain is suggested for more detailed evaluation if there are no contraindications.    CTA brain: There is no large vessel occlusion or aneurysm involving major proximal intracranial arteries.    CTA NECK: There is no stenosis involving major neck arteries.    CT perfusion: Multiple areas of increased time to maximum as described may be artifactual however, attention on follow-up MRI is suggested.    < end of copied text >

## 2021-09-14 NOTE — ED ADULT NURSE NOTE - NSIMPLEMENTINTERV_GEN_ALL_ED
Implemented All Fall with Harm Risk Interventions:  [place] to call system. Call bell, personal items and telephone within reach. Instruct patient to call for assistance. Room bathroom lighting operational. Non-slip footwear when patient is off stretcher. Physically safe environment: no spills, clutter or unnecessary equipment. Stretcher in lowest position, wheels locked, appropriate side rails in place. Provide visual cue, wrist band, yellow gown, etc. Monitor gait and stability. Monitor for mental status changes and reorient to person, place, and time. Review medications for side effects contributing to fall risk. Reinforce activity limits and safety measures with patient and family. Provide visual clues: red socks.

## 2021-09-14 NOTE — H&P ADULT - ASSESSMENT
SARAHI BARNHART is a 67 YO Male brought to ED complaining of altered mental status with slurred speech and right side weak ness.  Patient with acute CVA embolic treated with tPA in the ED and admitted to ICU.  Patient will be monitored in ICU for further treatment and management.

## 2021-09-14 NOTE — ED PROVIDER NOTE - NS ED ROS FT
Constitutional: No fever.  Neurological: No headache. + slurred speech and word finding difficulty.  Eyes: No vision changes.   Ears, Nose, Mouth, Throat: No congestion.  Cardiovascular: No chest pain.  Respiratory: No difficulty breathing.  Gastrointestinal: No nausea or vomiting.  Genitourinary: No dysuria.  Musculoskeletal: No joint pain.  Integumentary (skin and/or breast): No rash.

## 2021-09-14 NOTE — ED PROVIDER NOTE - PROGRESS NOTE DETAILS
upon arrival back from CT patient reported his right arm was feeling strange. patient noted to have new drift on right upper extremity as well as new right facial droop. patient also seen by Dr. Gooden. given evolving / advancing symptoms TPA given. Bolus / drip time 18:15. patient tba ICU. while trying to give TPA patient also agitated, aphasic, pulling on penis. patient previously had asked to use urinal but was unable to urinate. patient straight cath'ed, urine sent, patient became more calm.

## 2021-09-14 NOTE — ED ADULT NURSE NOTE - OBJECTIVE STATEMENT
Pt to ED with sudden onset of slurred speech and increasing confusion, aphasic, unable to follow commands or answer questions. BS done, EKG complete, TPA started @ 16:17, spouse at bedside, safety maintained, history of CLL, will continue to monitor

## 2021-09-14 NOTE — CONSULT NOTE ADULT - SUBJECTIVE AND OBJECTIVE BOX
SARAHI BARNHART  [id number]  Patient is a 66y old  Male who presents with a chief complaint of   HPI:  65 yo M PMH CLL came into ED with wife after pt developed slurred speech.  As per wife patient noted to have slurred speech at 1630, and wife drove straight to ER. She reports he was normal just prior to this. They report prior episodes of confusion and dizziness but never speech problems like this. Wife denies blood thinners. while in ER pt initially had RUE weakness and drift, went for head CT and, patient reported his right arm was feeling strange. pt was found to have worsening R UE weakness, not able to hold arm up at all.  Pt was seen by neuro in ER and pt candidate for tPA. And was administered 1815    Pt admitted to the ICU s/p tpA for q1h neuro checks       REVIEW OF SYSTEMS:    Gen: No fever  EYES/ENT: No visual changes;  No vertigo or throat pain   NECK: No pain   RES:  No shortness of breath or Cough  CARD: No chest pain   GI: No abdominal pain  : No dysuria  NEURO: No weakness  SKIN: No itching, rashes     Physical Exam:  Vital Signs Last 24 Hrs  T(C): 36.6 (14 Sep 2021 16:48), Max: 36.6 (14 Sep 2021 16:48)  T(F): 97.9 (14 Sep 2021 16:48), Max: 97.9 (14 Sep 2021 16:48)  HR: 79 (14 Sep 2021 18:30) (71 - 83)  BP: 165/79 (14 Sep 2021 18:30) (165/79 - 180/95)  BP(mean): --  RR: 18 (14 Sep 2021 18:30) (17 - 18)  SpO2: 100% (14 Sep 2021 18:30) (100% - 100%)    Gen:  Awake, alert   CNS: non focal 	  Neck: no JVD  RES : clear , no wheezing                      CVS: Regular  rhythm. Normal S1/S2  Abd: Soft, non-distended. Bowel sounds present.  Skin: No rash.  Ext:  no edema    ============================I/O===========================   I&O's Detail    ============================ LABS =========================                        11.9   3.76  )-----------( 290      ( 14 Sep 2021 17:52 )             35.3     09-14    141  |  107  |  17  ----------------------------<  103<H>  3.9   |  29  |  0.94    Ca    8.7      14 Sep 2021 17:52    TPro  7.4  /  Alb  3.9  /  TBili  1.3<H>  /  DBili  x   /  AST  24  /  ALT  25  /  AlkPhos  72  09-14    LIVER FUNCTIONS - ( 14 Sep 2021 17:52 )  Alb: 3.9 g/dL / Pro: 7.4 g/dL / ALK PHOS: 72 U/L / ALT: 25 U/L / AST: 24 U/L / GGT: x           PT/INR - ( 14 Sep 2021 17:52 )   PT: 11.6 sec;   INR: 0.99 ratio         PTT - ( 14 Sep 2021 17:52 )  PTT:33.5 sec      ======================Micro/Rad/Cardio=================    Echo: pending   ======================================================  PAST MEDICAL & SURGICAL HISTORY:  Osteopenia    CLL (Chronic Lymphocytic Leukemia)  SINCE 1988    Avascular Necrosis of Femur Head  B/L    TMJ Syndrome  limited jaw opening due to TMJ    Herniated Cervical Disc    Bulging Disc  LUMBAR    Melanoma    Deviated nasal septum    Nasal congestion    Rosacea    Deviated nasal septum    Other specified disorders of nose and nasal sinuses    Anemia    S/P Splenectomy  1993    Avascular Necrosis of Femur Head  RIGHT - CORE DECOMPRESSION- 1993    Avascular Necrosis of Femur Head  LEFT - CORE DECOMPRESSION - 1993    Abnormal Jaw Closure  LEFT JAW SURGERY - 1988    Status Post Eye Surgery X 3  3/2011 right eye scleral buckle; left eye in 2013    S/P Tonsillectomy  1960    Bilateral cataracts  removed in 2011            ====================ASSESSMENT ==============  1. Acute CVA s/p tPA        Plan:  .cvatx    -Admit to ICU for q1 hour neuro checks, s/p tPA   -if any changes in neuro status in next 24 hours, urgent CT head to be performed   -keep a systolic blood pressure less than 180 and a diastolic blood pressure less than 105 and if necessary, with nicardipine drip  -Neurology following   -lipid profile, Hemoglobin A1c, Carotid Dopplers   -SCD's  -Speech eval   -Echocardiogram  -PT/OT eval  -Repeat MR Brain in 24 hours,   -If repeat MRI in AM without bleed, start Aspirn 325mg, and high does statin 24 hours post tPA         -keep a systolic blood pressure less than 180 and a diastolic blood pressure less than 105 and if necessary, with nicardipine drip.      Patient requires continuous monitoring with bedside rhythm monitoring, pulse oximetry, ventilator/ bipap  monitoring and intermittent blood gas analysis.    Care plan discussed with ICU care team.    Patient remained critical; required more than usual care; I have spent 35 minutes providing non-routine care, revaluated multiple times during the day.     SARAHI BARNHART  MRN-[id number]  Patient is a 66y old  Male who presents with a chief complaint of   HPI:  67 yo M PMH CLL came into ED with wife after pt developed slurred speech.  As per wife patient noted to have slurred speech at 1630, and wife drove straight to ER. She reports he was normal just prior to this. They report prior episodes of confusion and dizziness but never speech problems like this. Wife denies blood thinners. while in ER pt initially had RUE weakness and drift, went for head CT and, patient reported his right arm was feeling strange. pt was found to have worsening R UE weakness, not able to hold arm up at all.  Pt was seen by neuro in ER and pt candidate for tPA. And was administered 1815    Pt admitted to the ICU s/p tpA for q1h neuro checks       REVIEW OF SYSTEMS:    ROS not able to be obtained 2/2 aphasia      Physical Exam:  Vital Signs Last 24 Hrs  T(C): 36.6 (14 Sep 2021 16:48), Max: 36.6 (14 Sep 2021 16:48)  T(F): 97.9 (14 Sep 2021 16:48), Max: 97.9 (14 Sep 2021 16:48)  HR: 79 (14 Sep 2021 18:30) (71 - 83)  BP: 165/79 (14 Sep 2021 18:30) (165/79 - 180/95)  BP(mean): --  RR: 18 (14 Sep 2021 18:30) (17 - 18)  SpO2: 100% (14 Sep 2021 18:30) (100% - 100%)    Gen:  Awake, alert, confused  CNS: awake alert confused, slurred speech aphasic, unable to raise RUE at all other extremities intact, pupil equal and reactive, 	  Neck: no JVD  RES : clear , no wheezing                      CVS: Regular  rhythm. Normal S1/S2  Abd: Soft, non-distended. Bowel sounds present.  Skin: No rash.  Ext:  no edema    ============================I/O===========================   I&O's Detail    ============================ LABS =========================                        11.9   3.76  )-----------( 290      ( 14 Sep 2021 17:52 )             35.3     09-14    141  |  107  |  17  ----------------------------<  103<H>  3.9   |  29  |  0.94    Ca    8.7      14 Sep 2021 17:52    TPro  7.4  /  Alb  3.9  /  TBili  1.3<H>  /  DBili  x   /  AST  24  /  ALT  25  /  AlkPhos  72  09-14    LIVER FUNCTIONS - ( 14 Sep 2021 17:52 )  Alb: 3.9 g/dL / Pro: 7.4 g/dL / ALK PHOS: 72 U/L / ALT: 25 U/L / AST: 24 U/L / GGT: x           PT/INR - ( 14 Sep 2021 17:52 )   PT: 11.6 sec;   INR: 0.99 ratio         PTT - ( 14 Sep 2021 17:52 )  PTT:33.5 sec      ======================Micro/Rad/Cardio=================    Echo: pending   ======================================================  PAST MEDICAL & SURGICAL HISTORY:  Osteopenia    CLL (Chronic Lymphocytic Leukemia)  SINCE 1988    Avascular Necrosis of Femur Head  B/L    TMJ Syndrome  limited jaw opening due to TMJ    Herniated Cervical Disc    Bulging Disc  LUMBAR    Melanoma    Deviated nasal septum    Nasal congestion    Rosacea    Deviated nasal septum    Other specified disorders of nose and nasal sinuses    Anemia    S/P Splenectomy  1993    Avascular Necrosis of Femur Head  RIGHT - CORE DECOMPRESSION- 1993    Avascular Necrosis of Femur Head  LEFT - CORE DECOMPRESSION - 1993    Abnormal Jaw Closure  LEFT JAW SURGERY - 1988    Status Post Eye Surgery X 3  3/2011 right eye scleral buckle; left eye in 2013    S/P Tonsillectomy  1960    Bilateral cataracts  removed in 2011            ====================ASSESSMENT ==============  1. Acute CVA s/p tPA        Plan:  .cvatx    -Admit to ICU for q1 hour neuro checks, s/p tPA   -if any changes in neuro status in next 24 hours, urgent CT head to be performed   -keep a systolic blood pressure less than 180 and a diastolic blood pressure less than 105 and if necessary, with nicardipine drip  -Neurology following   -lipid profile, Hemoglobin A1c, Carotid Dopplers   -SCD's  -Speech eval   -Echocardiogram  -PT/OT eval  -Repeat MR Brain in 24 hours,   -If repeat MRI in AM without bleed, start Aspirn 325mg, and high does statin 24 hours post tPA         -keep a systolic blood pressure less than 180 and a diastolic blood pressure less than 105 and if necessary, with nicardipine drip.      Patient requires continuous monitoring with bedside rhythm monitoring, pulse oximetry, ventilator/ bipap  monitoring and intermittent blood gas analysis.    Care plan discussed with ICU care team.    Patient remained critical; required more than usual care; I have spent 35 minutes providing non-routine care, revaluated multiple times during the day.

## 2021-09-15 DIAGNOSIS — R47.81 SLURRED SPEECH: ICD-10-CM

## 2021-09-15 DIAGNOSIS — C91.10 CHRONIC LYMPHOCYTIC LEUKEMIA OF B-CELL TYPE NOT HAVING ACHIEVED REMISSION: ICD-10-CM

## 2021-09-15 DIAGNOSIS — I63.9 CEREBRAL INFARCTION, UNSPECIFIED: ICD-10-CM

## 2021-09-15 LAB
A1C WITH ESTIMATED AVERAGE GLUCOSE RESULT: 5.5 % — SIGNIFICANT CHANGE UP (ref 4–5.6)
ALBUMIN SERPL ELPH-MCNC: 4.1 G/DL — SIGNIFICANT CHANGE UP (ref 3.3–5)
ALP SERPL-CCNC: 79 U/L — SIGNIFICANT CHANGE UP (ref 40–120)
ALT FLD-CCNC: 24 U/L — SIGNIFICANT CHANGE UP (ref 12–78)
ANION GAP SERPL CALC-SCNC: 8 MMOL/L — SIGNIFICANT CHANGE UP (ref 5–17)
AST SERPL-CCNC: 27 U/L — SIGNIFICANT CHANGE UP (ref 15–37)
BASOPHILS # BLD AUTO: 0.01 K/UL — SIGNIFICANT CHANGE UP (ref 0–0.2)
BASOPHILS NFR BLD AUTO: 0.2 % — SIGNIFICANT CHANGE UP (ref 0–2)
BILIRUB SERPL-MCNC: 1.9 MG/DL — HIGH (ref 0.2–1.2)
BUN SERPL-MCNC: 13 MG/DL — SIGNIFICANT CHANGE UP (ref 7–23)
CALCIUM SERPL-MCNC: 8.8 MG/DL — SIGNIFICANT CHANGE UP (ref 8.5–10.1)
CHLORIDE SERPL-SCNC: 108 MMOL/L — SIGNIFICANT CHANGE UP (ref 96–108)
CHOLEST SERPL-MCNC: 189 MG/DL — SIGNIFICANT CHANGE UP
CO2 SERPL-SCNC: 24 MMOL/L — SIGNIFICANT CHANGE UP (ref 22–31)
COVID-19 SPIKE DOMAIN AB INTERP: POSITIVE
COVID-19 SPIKE DOMAIN ANTIBODY RESULT: 10.7 U/ML — HIGH
CREAT SERPL-MCNC: 1 MG/DL — SIGNIFICANT CHANGE UP (ref 0.5–1.3)
CULTURE RESULTS: NO GROWTH — SIGNIFICANT CHANGE UP
EOSINOPHIL # BLD AUTO: 0 K/UL — SIGNIFICANT CHANGE UP (ref 0–0.5)
EOSINOPHIL NFR BLD AUTO: 0 % — SIGNIFICANT CHANGE UP (ref 0–6)
ESTIMATED AVERAGE GLUCOSE: 111 MG/DL — SIGNIFICANT CHANGE UP (ref 68–114)
GLUCOSE SERPL-MCNC: 110 MG/DL — HIGH (ref 70–99)
HCT VFR BLD CALC: 37.2 % — LOW (ref 39–50)
HDLC SERPL-MCNC: 47 MG/DL — SIGNIFICANT CHANGE UP
HGB BLD-MCNC: 12.9 G/DL — LOW (ref 13–17)
IMM GRANULOCYTES NFR BLD AUTO: 6.9 % — HIGH (ref 0–1.5)
LIPID PNL WITH DIRECT LDL SERPL: 115 MG/DL — HIGH
LYMPHOCYTES # BLD AUTO: 0.38 K/UL — LOW (ref 1–3.3)
LYMPHOCYTES # BLD AUTO: 6.4 % — LOW (ref 13–44)
MAGNESIUM SERPL-MCNC: 1.9 MG/DL — SIGNIFICANT CHANGE UP (ref 1.6–2.6)
MCHC RBC-ENTMCNC: 34.7 GM/DL — SIGNIFICANT CHANGE UP (ref 32–36)
MCHC RBC-ENTMCNC: 34.8 PG — HIGH (ref 27–34)
MCV RBC AUTO: 100.3 FL — HIGH (ref 80–100)
MONOCYTES # BLD AUTO: 1.36 K/UL — HIGH (ref 0–0.9)
MONOCYTES NFR BLD AUTO: 22.9 % — HIGH (ref 2–14)
NEUTROPHILS # BLD AUTO: 3.78 K/UL — SIGNIFICANT CHANGE UP (ref 1.8–7.4)
NEUTROPHILS NFR BLD AUTO: 63.6 % — SIGNIFICANT CHANGE UP (ref 43–77)
NON HDL CHOLESTEROL: 141 MG/DL — HIGH
NRBC # BLD: 0 /100 WBCS — SIGNIFICANT CHANGE UP (ref 0–0)
PHOSPHATE SERPL-MCNC: 2.8 MG/DL — SIGNIFICANT CHANGE UP (ref 2.5–4.5)
PLATELET # BLD AUTO: 318 K/UL — SIGNIFICANT CHANGE UP (ref 150–400)
POTASSIUM SERPL-MCNC: 3.4 MMOL/L — LOW (ref 3.5–5.3)
POTASSIUM SERPL-SCNC: 3.4 MMOL/L — LOW (ref 3.5–5.3)
PROT SERPL-MCNC: 7.6 G/DL — SIGNIFICANT CHANGE UP (ref 6–8.3)
RBC # BLD: 3.71 M/UL — LOW (ref 4.2–5.8)
RBC # FLD: 15.3 % — HIGH (ref 10.3–14.5)
SARS-COV-2 IGG+IGM SERPL QL IA: 10.7 U/ML — HIGH
SARS-COV-2 IGG+IGM SERPL QL IA: POSITIVE
SODIUM SERPL-SCNC: 140 MMOL/L — SIGNIFICANT CHANGE UP (ref 135–145)
SPECIMEN SOURCE: SIGNIFICANT CHANGE UP
TRIGL SERPL-MCNC: 130 MG/DL — SIGNIFICANT CHANGE UP
WBC # BLD: 5.94 K/UL — SIGNIFICANT CHANGE UP (ref 3.8–10.5)
WBC # FLD AUTO: 5.94 K/UL — SIGNIFICANT CHANGE UP (ref 3.8–10.5)

## 2021-09-15 PROCEDURE — 99291 CRITICAL CARE FIRST HOUR: CPT

## 2021-09-15 PROCEDURE — 93306 TTE W/DOPPLER COMPLETE: CPT | Mod: 26

## 2021-09-15 PROCEDURE — 70450 CT HEAD/BRAIN W/O DYE: CPT | Mod: 26

## 2021-09-15 PROCEDURE — 70450 CT HEAD/BRAIN W/O DYE: CPT | Mod: 26,77

## 2021-09-15 RX ORDER — ENOXAPARIN SODIUM 100 MG/ML
40 INJECTION SUBCUTANEOUS DAILY
Refills: 0 | Status: DISCONTINUED | OUTPATIENT
Start: 2021-09-15 | End: 2021-09-16

## 2021-09-15 RX ORDER — INFLUENZA VIRUS VACCINE 15; 15; 15; 15 UG/.5ML; UG/.5ML; UG/.5ML; UG/.5ML
0.5 SUSPENSION INTRAMUSCULAR ONCE
Refills: 0 | Status: DISCONTINUED | OUTPATIENT
Start: 2021-09-15 | End: 2021-09-16

## 2021-09-15 RX ORDER — MIDAZOLAM HYDROCHLORIDE 1 MG/ML
2 INJECTION, SOLUTION INTRAMUSCULAR; INTRAVENOUS ONCE
Refills: 0 | Status: DISCONTINUED | OUTPATIENT
Start: 2021-09-15 | End: 2021-09-15

## 2021-09-15 RX ORDER — POTASSIUM CHLORIDE 20 MEQ
10 PACKET (EA) ORAL
Refills: 0 | Status: COMPLETED | OUTPATIENT
Start: 2021-09-15 | End: 2021-09-15

## 2021-09-15 RX ORDER — ACETAMINOPHEN 500 MG
1000 TABLET ORAL ONCE
Refills: 0 | Status: COMPLETED | OUTPATIENT
Start: 2021-09-15 | End: 2021-09-15

## 2021-09-15 RX ORDER — HALOPERIDOL DECANOATE 100 MG/ML
5 INJECTION INTRAMUSCULAR ONCE
Refills: 0 | Status: COMPLETED | OUTPATIENT
Start: 2021-09-15 | End: 2021-09-15

## 2021-09-15 RX ADMIN — Medication 2 MILLIGRAM(S): at 17:55

## 2021-09-15 RX ADMIN — MIDAZOLAM HYDROCHLORIDE 2 MILLIGRAM(S): 1 INJECTION, SOLUTION INTRAMUSCULAR; INTRAVENOUS at 16:33

## 2021-09-15 RX ADMIN — Medication 100 MILLIEQUIVALENT(S): at 08:52

## 2021-09-15 RX ADMIN — CHLORHEXIDINE GLUCONATE 1 APPLICATION(S): 213 SOLUTION TOPICAL at 08:52

## 2021-09-15 RX ADMIN — MIDAZOLAM HYDROCHLORIDE 2 MILLIGRAM(S): 1 INJECTION, SOLUTION INTRAMUSCULAR; INTRAVENOUS at 09:57

## 2021-09-15 RX ADMIN — Medication 1000 MILLIGRAM(S): at 09:20

## 2021-09-15 RX ADMIN — Medication 100 MILLIEQUIVALENT(S): at 10:36

## 2021-09-15 RX ADMIN — Medication 400 MILLIGRAM(S): at 08:51

## 2021-09-15 RX ADMIN — Medication 100 MILLIEQUIVALENT(S): at 12:15

## 2021-09-15 RX ADMIN — MIDAZOLAM HYDROCHLORIDE 2 MILLIGRAM(S): 1 INJECTION, SOLUTION INTRAMUSCULAR; INTRAVENOUS at 17:18

## 2021-09-15 RX ADMIN — HALOPERIDOL DECANOATE 5 MILLIGRAM(S): 100 INJECTION INTRAMUSCULAR at 17:35

## 2021-09-15 NOTE — PROGRESS NOTE ADULT - SUBJECTIVE AND OBJECTIVE BOX
Patient is a 66y old  Male who presents with a chief complaint of CVA, slurred speech, Rt side weakness (14 Sep 2021 19:33)    24 hour events: ***    REVIEW OF SYSTEMS  Constitutional: No fever, chills, fatigue  Neuro: No headache, numbness, weakness  Resp: No cough, wheezing, shortness of breath  CVS: No chest pain, palpitations, leg swelling  GI: No abdominal pain, nausea, vomiting, diarrhea   : No dysuria, frequency, incontinence  Skin: No itching, burning, rashes, or lesions   Msk: No joint pain or swelling  Psych: No depression, anxiety, mood swings  Heme: No bleeding    T(F): 101.3 (09-15-21 @ 07:40), Max: 101.3 (09-15-21 @ 07:40)  HR: 88 (09-15-21 @ 06:12) (71 - 97)  BP: 116/73 (09-15-21 @ 06:12) (116/73 - 180/95)  RR: 42 (09-15-21 @ 06:12) (17 - 46)  SpO2: 100% (09-15-21 @ 06:12) (94% - 100%)  Wt(kg): --            I&O's Summary     @ 07:01  -  09-15 @ 07:00  --------------------------------------------------------  IN: 0 mL / OUT: 1250 mL / NET: -1250 mL      PHYSICAL EXAM  General:   CNS:   HEENT:   Resp:   CVS:   Abd:   Ext:   Skin:     MEDICATIONS      atorvastatin Oral      acetaminophen  IVPB .. IV Intermittent            potassium chloride  10 mEq/100 mL IVPB IV Intermittent      chlorhexidine 4% Liquid Topical                            12.9   5.94  )-----------( 318      ( 15 Sep 2021 06:05 )             37.2     Bands 3.0    09-15    140  |  108  |  13  ----------------------------<  110<H>  3.4<L>   |  24  |  1.00    Ca    8.8      15 Sep 2021 06:05  Phos  2.8     -15  Mg     1.9     09-15    TPro  7.6  /  Alb  4.1  /  TBili  1.9<H>  /  DBili  x   /  AST  27  /  ALT  24  /  AlkPhos  79  09-15      CARDIAC MARKERS ( 14 Sep 2021 17:52 )  <.015 ng/mL / x     / x     / x     / x          PT/INR - ( 14 Sep 2021 17:52 )   PT: 11.6 sec;   INR: 0.99 ratio         PTT - ( 14 Sep 2021 17:52 )  PTT:33.5 sec  Urinalysis Basic - ( 14 Sep 2021 18:56 )    Color: Yellow / Appearance: Clear / S.010 / pH: x  Gluc: x / Ketone: Negative  / Bili: Negative / Urobili: Negative   Blood: x / Protein: Negative / Nitrite: Negative   Leuk Esterase: Negative / RBC: x / WBC x   Sq Epi: x / Non Sq Epi: x / Bacteria: x            Radiology: ***  Bedside lung ultrasound: ***  Bedside ECHO: ***    CENTRAL LINE: Y/N          DATE INSERTED:              REMOVE: Y/N  COMER: Y/N                        DATE INSERTED:              REMOVE: Y/N  A-LINE: Y/N                       DATE INSERTED:              REMOVE: Y/N    GLOBAL ISSUE/BEST PRACTICE  Analgesia:   Sedation:   CAM-ICU:   HOB elevation: yes  Stress ulcer prophylaxis:   VTE prophylaxis:   Glycemic control:   Nutrition:     CODE STATUS: ***  [facility] discussion: Y       Patient is a 66y old  Male who presents with a chief complaint of CVA, slurred speech, Rt side weakness (14 Sep 2021 19:33)    24 hour events: Patient was seen and examined at bedside. Patient moaning and moving around in bed. When asking questions, patient does not respond in cohesive manner. However, patient is able to understand and is frustrated when not able to reply back cohesively. Otherwise unable to attain thorough history due to patients aphasia.     REVIEW OF SYSTEMS  Unable to attain as patient experiencing speech deficit.     T(F): 101.3 (09-15-21 @ 07:40), Max: 101.3 (09-15-21 @ 07:40)  HR: 88 (09-15-21 @ 06:12) (71 - 97)  BP: 116/73 (09-15-21 @ 06:12) (116/73 - 180/95)  RR: 42 (09-15-21 @ 06:12) (17 - 46)  SpO2: 100% (09-15-21 @ 06:12) (94% - 100%)  Wt(kg): --            I&O's Summary     @ 07:01  -  09-15 @ 07:00  --------------------------------------------------------  IN: 0 mL / OUT: 1250 mL / NET: -1250 mL      PHYSICAL EXAM  General: Uncomfortable in bed, moaning and moving around.   CNS: Patient able to comprehend conversation but unable to make cohesive speech. Unable to assess for further CNS deficits as patient does not follow commands.   HEENT: PERRLA  Resp: CTA BL, no wheezing, ronchi, rales  CVS: S1 S2 heard, RRR, no murmurs, rubs, gallops  Abd: Soft, non distended, non tender to palpation, normoactive bowel sounds   Ext: R hand in rigid posture, difficult to straighten out   Skin: Warm to touch     MEDICATIONS      atorvastatin Oral      acetaminophen  IVPB .. IV Intermittent            potassium chloride  10 mEq/100 mL IVPB IV Intermittent      chlorhexidine 4% Liquid Topical                            12.9   5.94  )-----------( 318      ( 15 Sep 2021 06:05 )             37.2     Bands 3.0    09-15    140  |  108  |  13  ----------------------------<  110<H>  3.4<L>   |  24  |  1.00    Ca    8.8      15 Sep 2021 06:05  Phos  2.8     09-15  Mg     1.9     09-15    TPro  7.6  /  Alb  4.1  /  TBili  1.9<H>  /  DBili  x   /  AST  27  /  ALT  24  /  AlkPhos  79  09-15      CARDIAC MARKERS ( 14 Sep 2021 17:52 )  <.015 ng/mL / x     / x     / x     / x          PT/INR - ( 14 Sep 2021 17:52 )   PT: 11.6 sec;   INR: 0.99 ratio         PTT - ( 14 Sep 2021 17:52 )  PTT:33.5 sec  Urinalysis Basic - ( 14 Sep 2021 18:56 )    Color: Yellow / Appearance: Clear / S.010 / pH: x  Gluc: x / Ketone: Negative  / Bili: Negative / Urobili: Negative   Blood: x / Protein: Negative / Nitrite: Negative   Leuk Esterase: Negative / RBC: x / WBC x   Sq Epi: x / Non Sq Epi: x / Bacteria: x            Radiology: ***  Bedside lung ultrasound: ***  Bedside ECHO: ***    CENTRAL LINE: Y/N          DATE INSERTED:              REMOVE: Y/N  COMER: Y/N                        DATE INSERTED:              REMOVE: Y/N  A-LINE: Y/N                       DATE INSERTED:              REMOVE: Y/N    GLOBAL ISSUE/BEST PRACTICE  Analgesia:   Sedation:   CAM-ICU:   HOB elevation: yes  Stress ulcer prophylaxis:   VTE prophylaxis:   Glycemic control:   Nutrition:     CODE STATUS: ***  [facility] discussion: Y

## 2021-09-15 NOTE — SWALLOW BEDSIDE ASSESSMENT ADULT - SWALLOW EVAL: PROGNOSIS
DIAGNOSIS CONTINUED: there is a decline in status, please hold PO and notify SLP. Feed only when fully awake/alert, hold PO in the setting of lethargy. Position upright 90 degrees, small bite/sip, pace meal slowly. Discussed with RN and MD.

## 2021-09-15 NOTE — SPEECH LANGUAGE PATHOLOGY EVALUATION - COMMENTS
Consult received and chart reviewed. Per discussion with ICU PA, pt lethargic and with decreased cooperation at this time. PA reported to hold speech/language assessment at this time and f/u when pt is more awake/alert/cooperative. Will continue to follow, as pt is appropriate.

## 2021-09-15 NOTE — DIETITIAN INITIAL EVALUATION ADULT. - CHIEF COMPLAINT
66y Male with PMH of CLL. Pt admitted on 9/14 complaining of altered mental status. S/p CVA presenting with slurred speech and Rt side weakness.

## 2021-09-15 NOTE — PROGRESS NOTE ADULT - ASSESSMENT
67 y/o male with PMHx CLL, anemia, melanoma, osteopenia admitted for CVA workup and speech deficits.     #Neuro  - s/p Alteplase tx   - Repeat CT scan 9/15 shows no evidence of ischemia or bleed  - MRI scheduled for afternoon today  - If no signs of bleeding will start ASA 81  - Continue w/ Atorvastatin  80mg   - Speech eval ordered  - Dysphagia screening recc soft diet with thin liquids  - PT/OT ordered     #Cardio  - Maintain SBP <180 and Diastolic < 105   - follow up TTE and EKG results to r/o cardiogenic etiology     #Pulm  - No active issues     #ID  - Febrile to 101.3  - Follow CBC/WBC  - BCx ordered, will f/u results     #GI  - NPO  - No active issues    #Renal  - K: 3.4, repleted w/ KCL  - follow up BMP     #Endo  - No active issues     #Heme   - continue with SCDs, patient not on anticoags currently given possible CVA

## 2021-09-15 NOTE — CONSULT NOTE ADULT - ASSESSMENT
67 y/o male with PMHx CLL followed by heme/onc , anemia, melanoma, osteopenia admitted for right facial droop, right hemiparesis  and expressive aphasia.  Admitted to the ICU and is S/P TPA.     CVA  - Patient with new right sided UE weakness, facial droop and expressive aphasia  -  S/P Altepase without improvement in symptoms  -  9/15 CT head without acute ICH  -  Neuro following most likely CVA  left MCA, awaiting MRI  - Permissive HTN for SBP less than 180 and diastolic less [id number]  - BP: 137/81 (09-15-21 @ 16:00) (116/73 - 180/95)  - EKG SR @ 73 bpm   - Tele SR 70-90 no arrhythmias detected   - Follow up echo  - Follow up carotid dopplers  - Continue Statin if cleared by swallow eval  - Plavix when cleared by neuro    CLL  - Heme/onc following   - further management per team      - Monitor and replete lytes, keep K>4, Mg>2.  - All other medical needs as per primary team.  - Other cardiovascular workup will depend on clinical course.  - Will continue to follow.    Twila Alford, MS ANP, AGACNP  Nurse Practitioner- Cardiology   Spectra #[phone] 65 y/o male with PMHx CLL followed by heme/onc , anemia, melanoma, osteopenia admitted for right facial droop, right hemiparesis  and expressive aphasia.  Admitted to the ICU and is S/P TPA.     CVA  - Patient with new right sided UE weakness, facial droop and expressive aphasia  -  S/P Altepase without improvement in symptoms  -  9/15 CT head without acute ICH  -  Neuro following most likely CVA  left MCA, awaiting MRI  - Permissive HTN for SBP less than 180 and diastolic less [id number]  - BP: 137/81 (09-15-21 @ 16:00) (116/73 - 180/95)  - EKG SR @ 73 bpm   - Tele SR 70-90 no arrhythmias detected   - Follow up echo  - Follow up carotid dopplers  - Continue Statin if cleared by swallow eval  - Plavix when cleared by neuro  - May need outpatient MAYNOR and ILR    CLL  - Heme/onc following   - further management per team      - Monitor and replete lytes, keep K>4, Mg>2.  - All other medical needs as per primary team.  - Other cardiovascular workup will depend on clinical course.  - Will continue to follow.    Twila Alford, MS ANP, AGACNP  Nurse Practitioner- Cardiology   Spectra #[phone] 65 y/o male with PMHx CLL followed by heme/onc , anemia, melanoma, osteopenia admitted for right facial droop, right hemiparesis  and expressive aphasia.  Admitted to the ICU and is S/P TPA.     CVA  - Patient with new right sided UE weakness, facial droop and expressive aphasia  -  S/P Altepase without improvement in symptoms  -  9/15 CT head without acute ICH  -  Neuro following most likely CVA  left MCA, awaiting MRI  - Permissive HTN for SBP less than 180 and diastolic less [id number]  - BP: 137/81 (09-15-21 @ 16:00) (116/73 - 180/95)  - EKG SR @ 73 bpm   - Tele SR 70-90 no arrhythmias detected but patient with remote history of AFib   - Follow up echo  - Follow up carotid dopplers  - Continue Statin if cleared by swallow eval  - Plavix when cleared by neuro  - May need outpatient MAYNOR and ILR    AFib  - Patient seen on a prior admission for syncope 7/2020 and found to have brief Afib  - Tele during this admission SR however will need to be anticoagulated when cleared by neuro  - Continue tele monitoring     CLL  - Heme/onc following   - further management per team      - Monitor and replete lytes, keep K>4, Mg>2.  - All other medical needs as per primary team.  - Other cardiovascular workup will depend on clinical course.  - Will continue to follow.    Twila Alford, MS ANP, AGACNP  Nurse Practitioner- Cardiology   Spectra #[phone]

## 2021-09-15 NOTE — CONSULT NOTE ADULT - ATTENDING COMMENTS
Patient with suspected acute CVA. For MRI.  PErmissive HTN for now.  Monitor for AF.  Check echo.  May need outpatient MAYNOR and ILR

## 2021-09-15 NOTE — SWALLOW BEDSIDE ASSESSMENT ADULT - PHARYNGEAL PHASE
Delayed pharyngeal swallow significantly delayed cough response x1 trial, not reduplicated upon several additional trials/Delayed pharyngeal swallow

## 2021-09-15 NOTE — CONSULT NOTE ADULT - ASSESSMENT
67 y/o man w CLL first dx'd 1/1991 post multiple regimens presently on Venetoclax since 11/2020, followed by our office and Dr Theresa Haji at [facility], frequent sinus infections on IVIG, adm w acute onset of confusion/lethargy/right arm weakness/slur speech, post stroke protocol, no improvmeent in sx.  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 CBC had showned progressive macrocytic anemia, bone marrow w CLL and new del 17p in add to  the knwon del 11q and del 13q. 9/2020-2/20221 on Gazyva/ibrutinib, 11/2021 started on Venetoclax.  Last seen in office 9/3/21 wbc 4.4, Hgb 14.7 mcv 101.2 plts 237    CTA head here no acute ds, CT perfusion w multi areas of incr time to max, MRI recommended    -acute neuro event w clear radiographic deficits, post stroke protocol no improvement. Unlikely due to chemo regimen, has been on Venetoclax since 11/2020, ?immune sequelae of 3rd COVID vaccine  -continue acute care  -Neurology following  -CBC stable    thank you, will follow w you

## 2021-09-15 NOTE — DIETITIAN INITIAL EVALUATION ADULT. - ORAL INTAKE PTA/DIET HISTORY
Unable to obtain subjective information at this time. Unable to obtain subjective information at this time; pt noted with aphagia

## 2021-09-15 NOTE — SWALLOW BEDSIDE ASSESSMENT ADULT - COMMENTS
Upon arrival, pt sleeping in bed. Pt was arousable to voice, but required persistent cueing to maintain arousal. Pt was agreeable to assessment. Pt was positioned upright in bed. Pts O2 sats were trending high 90s pre/during/post PO trials. Pt demonstrated difficulty following low level directives. Pt's vocal quality/intensity was WFL. Pt's speech production was mildly dysarthric. Nonverbal pain scale 0/10.    CT head 9/14: "There is no acute lobar infarction, intracranial hemorrhage, mass lesion, mass effect or herniation"  CXR 9/14 results pending. Pt's WBC is WFL, +fever.

## 2021-09-15 NOTE — CONSULT NOTE ADULT - SUBJECTIVE AND OBJECTIVE BOX
DOCUMENTATION IN PROGRESS   [facility] - Sveta Romano Grossman, [place], Dereck Deleon Goodger, [place]  Office Number: [phone]    Initial Consult Note  CHIEF COMPLAINT: Patient is a 66y old  Male who presents with a chief complaint of CVA, slurred speech, Rt side weakness (15 Sep 2021 12:36)    HPI:  SARAHI BARNHART is a 65 YO Male brought to ED complaining of altered mental status with slurred speech and right side weak ness.  Patient noted to have slurred speech at 1630 today. Wife drove straight [facility] ED.  She reports he was normal just prior to this.  She report prior episodes of confusion and dizziness but never speech problems like this. Wife denies blood thinners but does note there is a blood thinning component to his CLL treatment medication (name unknown).   (14 Sep 2021 19:33)    Allergies    No Known Allergies    Intolerances      PAST MEDICAL & SURGICAL HISTORY:  Osteopenia    CLL (Chronic Lymphocytic Leukemia)  SINCE 1988    Avascular Necrosis of Femur Head  B/L    TMJ Syndrome  limited jaw opening due to TMJ    Herniated Cervical Disc    Bulging Disc  LUMBAR    Melanoma    Deviated nasal septum    Nasal congestion    Rosacea    Deviated nasal septum    Other specified disorders of nose and nasal sinuses    Anemia    S/P Splenectomy  1993    Avascular Necrosis of Femur Head  RIGHT - CORE DECOMPRESSION- 1993    Avascular Necrosis of Femur Head  LEFT - CORE DECOMPRESSION - 1993    Abnormal Jaw Closure  LEFT JAW SURGERY - 1988    Status Post Eye Surgery X 3  3/2011 right eye scleral buckle; left eye in 2013    S/P Tonsillectomy  1960    Bilateral cataracts  removed in 2011      MEDICATIONS  (STANDING):  atorvastatin 80 milliGRAM(s) Oral at bedtime  chlorhexidine 4% Liquid 1 Application(s) Topical <User Schedule>  influenza   Vaccine 0.5 milliLiter(s) IntraMuscular once  midazolam Injectable 2 milliGRAM(s) IV Push once    MEDICATIONS  (PRN):    FAMILY HISTORY:  Family history of multiple myeloma    Family history of malignant melanoma  age 89    FH: blood dyscrasia  MGUS    FH: hypertension  mother    FH: renal failure  mother      SOCIAL HISTORY    Marital Status:   Occupation:   Lives with:     SUBSTANCE USE  Tobacco Usage:  ( ) None ( ) never smoked   ( ) former smoker  ( ) current smoker; Packs per day:   Alcohol Usage: ( ) none  ( ) occasional ( ) 2-3 times a week ( ) daily; Last drink:   Recreational drugs ( ) None    REVIEW OF SYSTEMS   CONSTITUTIONAL: No fevers, No chills, No fatigue, No weight gain  EYES: No vision changes, No vertigo, No throat pain   ENT: No congestion, No ear pain, No sore throat.  NECK: No pain, No stiffness  RESPIRATORY: No shortness of breath, No cough, No wheezing, No hemoptysis  CARDIOVASCULAR: No chest pain. No palpitations, No PARKER, No orthopnea, No PND, No pleuritic pain  GASTROINTESTINAL: No abdominal pain, No nausea, No vomiting, No hematemesis, No diarrhea No constipation. No melena  GENITOURINARY: No dysuria, No frequency, No incontinence, No hematuria  NEUROLOGICAL: No dizziness, No lightheadedness, No syncope, No LOC, No headache, No numbness, No weakness  MUSCULOSKELETAL: No joint pain, No joint swelling.  PSYCHIATRIC: No anxiety, No depression  DERMATOLOGY: No diaphoresis. No itching, No rashes, No pressure ulcers  HEME/LYMPH: No easy bruising, or bleeding gums  All other review of systems is negative unless indicated above.    VITAL SIGNS:   Vital Signs Last 24 Hrs  T(C): 38.1 (15 Sep 2021 11:45), Max: 38.5 (15 Sep 2021 07:40)  T(F): 100.5 (15 Sep 2021 11:45), Max: 101.3 (15 Sep 2021 07:40)  HR: 78 (15 Sep 2021 13:00) (70 - 97)  BP: 125/69 (15 Sep 2021 13:00) (116/73 - 180/95)  BP(mean): 88 (15 Sep 2021 13:00) (88 - 116)  RR: 20 (15 Sep 2021 13:00) (17 - 46)  SpO2: 98% (15 Sep 2021 13:00) (94% - 100%)    Physical Exam:    Appearance: NAD, no distress, alert,   HEENT: Moist Mucous Membranes, Anicteric  Cardiovascular: Regular rate and rhythm, Normal S1 S2, No JVD, No murmurs, No rubs, gallops or clicks  Respiratory: Lungs clear to auscultation. No rales, No rhonchi, No wheezing. No tenderness to palpation  Gastrointestinal:  Soft, Non-tender, + BS  Neurologic: Non-focal  Skin: Warm and dry, No rashes, No ecchymosis, No cyanosis, No ulcers   Musculoskeletal: No clubbing, No cyanosis  Vascular: Peripheral pulses palpable 2+ bilaterally  Psychiatry: Mood & affect appropriate  Lymph: No peripheral edema.     I&O's Summary    14 Sep 2021 07:01  -  15 Sep 2021 07:00  --------------------------------------------------------  IN: 0 mL / OUT: 1250 mL / NET: -1250 mL    15 Sep 2021 07:01  -  15 Sep 2021 15:56  --------------------------------------------------------  IN: 100 mL / OUT: 210 mL / NET: -110 mL        LABS: All Labs Reviewed:                        12.9   5.94  )-----------( 318      ( 15 Sep 2021 06:05 )             37.2                         11.9   3.76  )-----------( 290      ( 14 Sep 2021 17:52 )             35.3     15 Sep 2021 06:05    140    |  108    |  13     ----------------------------<  110    3.4     |  24     |  1.00   14 Sep 2021 17:52    141    |  107    |  17     ----------------------------<  103    3.9     |  29     |  0.94     Ca    8.8        15 Sep 2021 06:05  Ca    8.7        14 Sep 2021 17:52  Phos  2.8       15 Sep 2021 06:05  Mg     1.9       15 Sep 2021 06:05    TPro  7.6    /  Alb  4.1    /  TBili  1.9    /  DBili  x      /  AST  27     /  ALT  24     /  AlkPhos  79     15 Sep 2021 06:05  TPro  7.4    /  Alb  3.9    /  TBili  1.3    /  DBili  x      /  AST  24     /  ALT  25     /  AlkPhos  72     14 Sep 2021 17:52    PT/INR - ( 14 Sep 2021 17:52 )   PT: 11.6 sec;   INR: 0.99 ratio         PTT - ( 14 Sep 2021 17:52 )  PTT:33.5 sec  Troponin I, Serum: <.015 ng/mL (09-14-21 @ 17:52)      Blood Culture:       Triglycerides, Serum: 130 mg/dL (09-15-21 @ 11:20)  Cholesterol, Serum: 189 mg/dL (09-15-21 @ 11:20)      12 Lead ECG:   Systolic  mmHg  Diastolic BP 87 mmHg  Ventricular Rate 76 BPM  Atrial Rate 76 BPM  P-R Interval 158 ms  QRS Duration 98 ms  Q-T Interval 388 ms  QTC Calculation(Bazett) 436 ms  P Axis82 degrees  R Axis 77 degrees  T Axis 71 degrees  Dignosis Line Normal sinus rhythm  Normal ECG  Confirmed by EARL DELEON ([id number]) on 9/15/2021 10:40:18 AM (09-14-21 @ 16:58)    < from: CT Head No Cont (09.15.21 @ 10:27) >  EXAM:  CT BRAIN                        PROCEDURE DATE:  09/15/2021    INTERPRETATION:  Noncontrast CT of the brain.  CLINICAL INDICATION:  s/p tpa, ams  TECHNIQUE : Axial CT scanning of the brain was obtained from the skull base to the vertex without the administration of intravenous contrast. Sagittal and coronal reformats were provided.  COMPARISON: CT brain 9/14/2021 10/22/1990.  FINDINGS:  No hydrocephalus, mass effect, midline shift, acute intracranial hemorrhage, or brain edema.  Bilateral ethmoid and left frontal sinus mucosal thickening. Mastoid air cells clear.  IMPRESSION:  No hydrocephalus, acute intracranial hemorrhage, mass effect, or brain edema.  --- End of Report ---  < end of copied text >        < from: CT Angio Neck w/ IV Cont (09.14.21 @ 17:53) >  EXAM:  CT PERFUSION W MAPS IC                        EXAM:  CT ANGIO BRAIN (W)AW IC                        EXAM:  CT BRAIN STROKE PROTOCOL                        EXAM:  CT ANGIO NECK (W)AW IC                        PROCEDURE DATE:  09/14/2021    INTERPRETATION:  CT OF THE HEAD WITHOUT CONTRAST, CT ANGIOGRAPHY OF HEAD AND NECK AND CT PERFUSION WITH INTRAVENOUS CONTRAST.  CLINICAL INDICATION: Code stroke.  TECHNIQUE: Initially volumetric CT acquisition was performed through the brain and reviewed using brain and bone window technique. Sagittal and coronal reconstructed images were obtained. Dose optimization techniques were utilized including kVp/mA modulation along with iterative reconstructions.  Subsequently volumetric acquisition was performed from the thoracic inlet to the vertex.  RAPID artificial intelligence was used for preliminary evaluation of intracranial hemorrhage.  A total of 50 cc of Omnipaque 350 was injected intravenously without complications.  Dose optimization techniques were utilized including kVp/mA modulation along with iterative reconstructions.  MIP image analysis was performed on a separate workstation.  CT brain perfusion was performed after intravenous injection 50 mL of intravenouscontrast Omnipaque 350. Dose optimization techniques were utilized including kVp/mA modulation along with iterative reconstructions. 3D image analysis was performed on a dedicated workstation by a neuroradiologist.  COMPARISON: CT brain and CT angiogram head and neck, 5/8/2021.  FINDINGS:  Head CT:  The ventricular and sulcal size and configuration is age appropriate. There are scattered white matter hypodensities which are nonspecific but most commonly represent chronic microvascular ischemic changes.  There is no acute loss of gray-white differentiation.  There is no evidence of mass effect, midline shift, acute intracranial hemorrhage, or extra-axial collections.  There is bilateral scleral banding. There is scattered opacification bilaterally with complexes, mild mucosal thickening of the left maxillary sinus and opacification of the left frontal sinus which is increased since prior.    CT angiography:  NECK: The left vertebral artery emanates directly from the aortic arch, an anatomic variation. Origin of supraaortic arteries are patent. The common carotid arteries are normal in course and caliber. There are atherosclerotic plaques at common carotid bifurcations and carotid bulbs without evidence of significant segmental stenosis.  The right internal carotid artery is normal in caliber. There is 0 % stenosis using NASCET criteria (normal right ICA caliber is 4.6 mm).  The left internal carotid artery is normal in caliber. There is 0 % stenosis using NASCETcriteria (normal left ICA caliber is 4.4 mm).  The left vertebral artery is hypoplastic compared to the right. Bilateral vertebral arteries are normal in course and contour, without evidence of dissection or occlusion.  Degenerative changes of the bony cervical spine are noted.    BRAIN:  The petrocavernous and supraclinoid ICA segments are normal in caliber.  The visualized MCA and MILLY branches are normal without evidence of stenosis or aneurysm. The ACOM is present. Bilateral posterior communicating arteries are present.  The vertebral arteries, visualized cerebellar arteries, basilar and bilateral posterior cerebral arteries are patent without evidence of stenoses or aneurysm.  Other findings: There is a stable hypodense nodule in the right thyroid lobe.  CT Perfusion:  Findings:  The regional cerebral blood flow (CBF), cerebral blood volume (CBV),for the MILLY, MCA, and PCA territories are within normal limits. There is increased time to maximum in the right temporal andbilateral occipital lobes which may be artifactual in nature.      IMPRESSION:  CT brain: There is no acute lobar infarction, intracranial hemorrhage, mass lesion, mass effect or herniation. Further correlation with MRI of the brain is suggested for more detailed evaluation if there are no contraindications.  CTA brain: There is no large vessel occlusion or aneurysm involving major proximal intracranial arteries.  CTA NECK: There is no stenosis involving major neck arteries.  CT perfusion: Multiple areas of increased time to maximum as described may be artifactual however, attention on follow-up MRI is suggested.  NASCET CAROTID STENOSIS CRITERIA (distal normal appearing ICA as denominator for measurement): 0%-none, 1-49%-mild, 50-70%-moderate, 70-89%-severe, 90-99%-critical.  Notification to clinician of alert:  Provider   Dr. AL QUINTERO  was notified about the final results at 9/14/2021 1752 PM via telephone by neuroradiologist SOURAV Josue. Readback confirmationwas obtained.  --- End of Report ---  < end of copied text >      [facility] - Sveta Romano Grossman, [place], Dereck Deleon, Hosea Wyman  Office Number: [phone]    Initial Consult Note  CHIEF COMPLAINT: Patient is a 66y old  Male who presents with a chief complaint of CVA, slurred speech, Rt side weakness (15 Sep 2021 12:36)    HPI:  SARAHI BARNHART is a 65 YO Male brought to ED complaining of altered mental status with slurred speech and right side weak ness.  Patient noted to have slurred speech at 1630 today. Wife drove straight [facility] ED.  She reports he was normal just prior to this.  She report prior episodes of confusion and dizziness but never speech problems like this. Wife denies blood thinners but does note there is a blood thinning component to his CLL treatment medication (name unknown).   (14 Sep 2021 19:33)    Allergies    No Known Allergies    Intolerances      PAST MEDICAL & SURGICAL HISTORY:  Osteopenia    CLL (Chronic Lymphocytic Leukemia)  SINCE 1988    Avascular Necrosis of Femur Head  B/L    TMJ Syndrome  limited jaw opening due to TMJ    Herniated Cervical Disc    Bulging Disc  LUMBAR    Melanoma    Deviated nasal septum    Nasal congestion    Rosacea    Deviated nasal septum    Other specified disorders of nose and nasal sinuses    Anemia    S/P Splenectomy  1993    Avascular Necrosis of Femur Head  RIGHT - CORE DECOMPRESSION- 1993    Avascular Necrosis of Femur Head  LEFT - CORE DECOMPRESSION - 1993    Abnormal Jaw Closure  LEFT JAW SURGERY - 1988    Status Post Eye Surgery X 3  3/2011 right eye scleral buckle; left eye in 2013    S/P Tonsillectomy  1960    Bilateral cataracts  removed in 2011      MEDICATIONS  (STANDING):  atorvastatin 80 milliGRAM(s) Oral at bedtime  chlorhexidine 4% Liquid 1 Application(s) Topical <User Schedule>  influenza   Vaccine 0.5 milliLiter(s) IntraMuscular once  midazolam Injectable 2 milliGRAM(s) IV Push once    MEDICATIONS  (PRN):    FAMILY HISTORY:  Family history of multiple myeloma    Family history of malignant melanoma  age 89    FH: blood dyscrasia  MGUS    FH: hypertension  mother    FH: renal failure  mother      SOCIAL HISTORY    Marital Status:   Occupation:   Lives with:     SUBSTANCE USE  Tobacco Usage:  ( ) None ( ) never smoked   ( ) former smoker  ( ) current smoker; Packs per day:   Alcohol Usage: ( ) none  ( ) occasional ( ) 2-3 times a week ( ) daily; Last drink:   Recreational drugs ( ) None    REVIEW OF SYSTEMS   CONSTITUTIONAL: No fevers, No chills, No fatigue, No weight gain  EYES: No vision changes, No vertigo, No throat pain   ENT: No congestion, No ear pain, No sore throat.  NECK: No pain, No stiffness  RESPIRATORY: No shortness of breath, No cough, No wheezing, No hemoptysis  CARDIOVASCULAR: No chest pain. No palpitations, No PARKER, No orthopnea, No PND, No pleuritic pain  GASTROINTESTINAL: No abdominal pain, No nausea, No vomiting, No hematemesis, No diarrhea No constipation. No melena  GENITOURINARY: No dysuria, No frequency, No incontinence, No hematuria  NEUROLOGICAL: No dizziness, No lightheadedness, No syncope, No LOC, No headache, No numbness, No weakness  MUSCULOSKELETAL: No joint pain, No joint swelling.  PSYCHIATRIC: No anxiety, No depression  DERMATOLOGY: No diaphoresis. No itching, No rashes, No pressure ulcers  HEME/LYMPH: No easy bruising, or bleeding gums  All other review of systems is negative unless indicated above.    VITAL SIGNS:   Vital Signs Last 24 Hrs  T(C): 38.1 (15 Sep 2021 11:45), Max: 38.5 (15 Sep 2021 07:40)  T(F): 100.5 (15 Sep 2021 11:45), Max: 101.3 (15 Sep 2021 07:40)  HR: 78 (15 Sep 2021 13:00) (70 - 97)  BP: 125/69 (15 Sep 2021 13:00) (116/73 - 180/95)  BP(mean): 88 (15 Sep 2021 13:00) (88 - 116)  RR: 20 (15 Sep 2021 13:00) (17 - 46)  SpO2: 98% (15 Sep 2021 13:00) (94% - 100%)    Physical Exam:    Appearance: NAD, no distress, alert,   HEENT: Moist Mucous Membranes, Anicteric  Cardiovascular: Regular rate and rhythm, Normal S1 S2, No JVD, No murmurs, No rubs, gallops or clicks  Respiratory: Lungs clear to auscultation. No rales, No rhonchi, No wheezing. No tenderness to palpation  Gastrointestinal:  Soft, Non-tender, + BS  Neurologic: Non-focal  Skin: Warm and dry, No rashes, No ecchymosis, No cyanosis, No ulcers   Musculoskeletal: No clubbing, No cyanosis  Vascular: Peripheral pulses palpable 2+ bilaterally  Psychiatry: Mood & affect appropriate  Lymph: No peripheral edema.     I&O's Summary    14 Sep 2021 07:01  -  15 Sep 2021 07:00  --------------------------------------------------------  IN: 0 mL / OUT: 1250 mL / NET: -1250 mL    15 Sep 2021 07:01  -  15 Sep 2021 15:56  --------------------------------------------------------  IN: 100 mL / OUT: 210 mL / NET: -110 mL        LABS: All Labs Reviewed:                        12.9   5.94  )-----------( 318      ( 15 Sep 2021 06:05 )             37.2                         11.9   3.76  )-----------( 290      ( 14 Sep 2021 17:52 )             35.3     15 Sep 2021 06:05    140    |  108    |  13     ----------------------------<  110    3.4     |  24     |  1.00   14 Sep 2021 17:52    141    |  107    |  17     ----------------------------<  103    3.9     |  29     |  0.94     Ca    8.8        15 Sep 2021 06:05  Ca    8.7        14 Sep 2021 17:52  Phos  2.8       15 Sep 2021 06:05  Mg     1.9       15 Sep 2021 06:05    TPro  7.6    /  Alb  4.1    /  TBili  1.9    /  DBili  x      /  AST  27     /  ALT  24     /  AlkPhos  79     15 Sep 2021 06:05  TPro  7.4    /  Alb  3.9    /  TBili  1.3    /  DBili  x      /  AST  24     /  ALT  25     /  AlkPhos  72     14 Sep 2021 17:52    PT/INR - ( 14 Sep 2021 17:52 )   PT: 11.6 sec;   INR: 0.99 ratio         PTT - ( 14 Sep 2021 17:52 )  PTT:33.5 sec  Troponin I, Serum: <.015 ng/mL (09-14-21 @ 17:52)      Blood Culture:       Triglycerides, Serum: 130 mg/dL (09-15-21 @ 11:20)  Cholesterol, Serum: 189 mg/dL (09-15-21 @ 11:20)      12 Lead ECG:   Systolic  mmHg  Diastolic BP 87 mmHg  Ventricular Rate 76 BPM  Atrial Rate 76 BPM  P-R Interval 158 ms  QRS Duration 98 ms  Q-T Interval 388 ms  QTC Calculation(Bazett) 436 ms  P Axis82 degrees  R Axis 77 degrees  T Axis 71 degrees  Dignosis Line Normal sinus rhythm  Normal ECG  Confirmed by EARL DELEON ([id number]) on 9/15/2021 10:40:18 AM (09-14-21 @ 16:58)    < from: CT Head No Cont (09.15.21 @ 10:27) >  EXAM:  CT BRAIN                        PROCEDURE DATE:  09/15/2021    INTERPRETATION:  Noncontrast CT of the brain.  CLINICAL INDICATION:  s/p tpa, ams  TECHNIQUE : Axial CT scanning of the brain was obtained from the skull base to the vertex without the administration of intravenous contrast. Sagittal and coronal reformats were provided.  COMPARISON: CT brain 9/14/2021 10/22/1990.  FINDINGS:  No hydrocephalus, mass effect, midline shift, acute intracranial hemorrhage, or brain edema.  Bilateral ethmoid and left frontal sinus mucosal thickening. Mastoid air cells clear.  IMPRESSION:  No hydrocephalus, acute intracranial hemorrhage, mass effect, or brain edema.  --- End of Report ---  < end of copied text >        < from: CT Angio Neck w/ IV Cont (09.14.21 @ 17:53) >  EXAM:  CT PERFUSION W MAPS IC                        EXAM:  CT ANGIO BRAIN (W)AW IC                        EXAM:  CT BRAIN STROKE PROTOCOL                        EXAM:  CT ANGIO NECK (W)AW IC                        PROCEDURE DATE:  09/14/2021    INTERPRETATION:  CT OF THE HEAD WITHOUT CONTRAST, CT ANGIOGRAPHY OF HEAD AND NECK AND CT PERFUSION WITH INTRAVENOUS CONTRAST.  CLINICAL INDICATION: Code stroke.  TECHNIQUE: Initially volumetric CT acquisition was performed through the brain and reviewed using brain and bone window technique. Sagittal and coronal reconstructed images were obtained. Dose optimization techniques were utilized including kVp/mA modulation along with iterative reconstructions.  Subsequently volumetric acquisition was performed from the thoracic inlet to the vertex.  RAPID artificial intelligence was used for preliminary evaluation of intracranial hemorrhage.  A total of 50 cc of Omnipaque 350 was injected intravenously without complications.  Dose optimization techniques were utilized including kVp/mA modulation along with iterative reconstructions.  MIP image analysis was performed on a separate workstation.  CT brain perfusion was performed after intravenous injection 50 mL of intravenouscontrast Omnipaque 350. Dose optimization techniques were utilized including kVp/mA modulation along with iterative reconstructions. 3D image analysis was performed on a dedicated workstation by a neuroradiologist.  COMPARISON: CT brain and CT angiogram head and neck, 5/8/2021.  FINDINGS:  Head CT:  The ventricular and sulcal size and configuration is age appropriate. There are scattered white matter hypodensities which are nonspecific but most commonly represent chronic microvascular ischemic changes.  There is no acute loss of gray-white differentiation.  There is no evidence of mass effect, midline shift, acute intracranial hemorrhage, or extra-axial collections.  There is bilateral scleral banding. There is scattered opacification bilaterally with complexes, mild mucosal thickening of the left maxillary sinus and opacification of the left frontal sinus which is increased since prior.    CT angiography:  NECK: The left vertebral artery emanates directly from the aortic arch, an anatomic variation. Origin of supraaortic arteries are patent. The common carotid arteries are normal in course and caliber. There are atherosclerotic plaques at common carotid bifurcations and carotid bulbs without evidence of significant segmental stenosis.  The right internal carotid artery is normal in caliber. There is 0 % stenosis using NASCET criteria (normal right ICA caliber is 4.6 mm).  The left internal carotid artery is normal in caliber. There is 0 % stenosis using NASCETcriteria (normal left ICA caliber is 4.4 mm).  The left vertebral artery is hypoplastic compared to the right. Bilateral vertebral arteries are normal in course and contour, without evidence of dissection or occlusion.  Degenerative changes of the bony cervical spine are noted.    BRAIN:  The petrocavernous and supraclinoid ICA segments are normal in caliber.  The visualized MCA and MILLY branches are normal without evidence of stenosis or aneurysm. The ACOM is present. Bilateral posterior communicating arteries are present.  The vertebral arteries, visualized cerebellar arteries, basilar and bilateral posterior cerebral arteries are patent without evidence of stenoses or aneurysm.  Other findings: There is a stable hypodense nodule in the right thyroid lobe.  CT Perfusion:  Findings:  The regional cerebral blood flow (CBF), cerebral blood volume (CBV),for the MILLY, MCA, and PCA territories are within normal limits. There is increased time to maximum in the right temporal andbilateral occipital lobes which may be artifactual in nature.      IMPRESSION:  CT brain: There is no acute lobar infarction, intracranial hemorrhage, mass lesion, mass effect or herniation. Further correlation with MRI of the brain is suggested for more detailed evaluation if there are no contraindications.  CTA brain: There is no large vessel occlusion or aneurysm involving major proximal intracranial arteries.  CTA NECK: There is no stenosis involving major neck arteries.  CT perfusion: Multiple areas of increased time to maximum as described may be artifactual however, attention on follow-up MRI is suggested.  NASCET CAROTID STENOSIS CRITERIA (distal normal appearing ICA as denominator for measurement): 0%-none, 1-49%-mild, 50-70%-moderate, 70-89%-severe, 90-99%-critical.  Notification to clinician of alert:  Provider   Dr. AL QUINTERO  was notified about the final results at 9/14/2021 1752 PM via telephone by neuroradiologist SOURAV Josue. Readback confirmationwas obtained.  --- End of Report ---  < end of copied text >      [facility] - Sveta Romano, Ebony Sparrow, Dereck Deleon, Hosea Wyman  Office Number: [phone]    Initial Consult Note  CHIEF COMPLAINT: Patient is a 66y old  Male who presents with a chief complaint of CVA, slurred speech, Rt side weakness (15 Sep 2021 12:36)    HPI:  SARAHI BARNHART is a 67 YO Male brought to ED complaining of altered mental status with slurred speech and right side weak ness.  67 yo M PMH CLL came into ED with wife after pt developed slurred speech.  As per wife patient noted to have slurred speech at 1630, and wife drove straight to ER. She reports he was normal just prior to this. They report prior episodes of confusion and dizziness but never speech problems like this. Wife denies blood thinners. while in ER pt initially had RUE weakness and drift, went for head CT and, patient reported his right arm was feeling strange. pt was found to have worsening R UE weakness, not able to hold arm up at all.  Pt was seen by neuro in ER and pt candidate for tPA. And was administered 1815.  Pt admitted to the ICU s/p tpA for q1h neuro checks.       Patient seen and examined. Patient awake, alert, resting comfortably in bed. He is able to answer no to questions and follow simple commands. He is frustrated at inability to communicate. Tolerating room air.       Allergies    No Known Allergies    Intolerances      PAST MEDICAL & SURGICAL HISTORY:  Osteopenia    CLL (Chronic Lymphocytic Leukemia)  SINCE 1988    Avascular Necrosis of Femur Head  B/L    TMJ Syndrome  limited jaw opening due to TMJ    Herniated Cervical Disc    Bulging Disc  LUMBAR    Melanoma    Deviated nasal septum    Nasal congestion    Rosacea    Deviated nasal septum    Other specified disorders of nose and nasal sinuses    Anemia    S/P Splenectomy  1993    Avascular Necrosis of Femur Head  RIGHT - CORE DECOMPRESSION- 1993    Avascular Necrosis of Femur Head  LEFT - CORE DECOMPRESSION - 1993    Abnormal Jaw Closure  LEFT JAW SURGERY - 1988    Status Post Eye Surgery X 3  3/2011 right eye scleral buckle; left eye in 2013    S/P Tonsillectomy  1960    Bilateral cataracts  removed in 2011      MEDICATIONS  (STANDING):  atorvastatin 80 milliGRAM(s) Oral at bedtime  chlorhexidine 4% Liquid 1 Application(s) Topical <User Schedule>  influenza   Vaccine 0.5 milliLiter(s) IntraMuscular once  midazolam Injectable 2 milliGRAM(s) IV Push once    MEDICATIONS  (PRN):    FAMILY HISTORY:  Family history of multiple myeloma    Family history of malignant melanoma  age 89    FH: blood dyscrasia  MGUS    FH: hypertension  mother    FH: renal failure  mother      SOCIAL HISTORY    REVIEW OF SYSTEMS   CONSTITUTIONAL: No fevers, No chills, No fatigue, No weight gain  EYES: No vision changes, No vertigo, No throat pain   ENT: No congestion, No ear pain, No sore throat.  NECK: No pain, No stiffness  RESPIRATORY: No shortness of breath, No cough, No wheezing, No hemoptysis  CARDIOVASCULAR: No chest pain. No palpitations, No PARKER, No orthopnea, No PND, No pleuritic pain  GASTROINTESTINAL: No abdominal pain, No nausea, No vomiting, No hematemesis, No diarrhea No constipation. No melena  GENITOURINARY: No dysuria, No frequency, No incontinence, No hematuria  NEUROLOGICAL: No dizziness, No lightheadedness, No syncope, No LOC, No headache, No numbness, No weakness + right side facial droop + right hemiparesis, expressive aphasia  MUSCULOSKELETAL: No joint pain, No joint swelling.  PSYCHIATRIC: No anxiety, No depression  DERMATOLOGY: No diaphoresis. No itching, No rashes, No pressure ulcers  HEME/LYMPH: No easy bruising, or bleeding gums  All other review of systems is negative unless indicated above.    VITAL SIGNS:   Vital Signs Last 24 Hrs  T(C): 38.1 (15 Sep 2021 11:45), Max: 38.5 (15 Sep 2021 07:40)  T(F): 100.5 (15 Sep 2021 11:45), Max: 101.3 (15 Sep 2021 07:40)  HR: 78 (15 Sep 2021 13:00) (70 - 97)  BP: 125/69 (15 Sep 2021 13:00) (116/73 - 180/95)  BP(mean): 88 (15 Sep 2021 13:00) (88 - 116)  RR: 20 (15 Sep 2021 13:00) (17 - 46)  SpO2: 98% (15 Sep 2021 13:00) (94% - 100%)    Physical Exam:    Appearance: NAD, alert, Well developed   HEENT: Moist Mucous Membranes, Anicteric  Cardiovascular: Regular rate and rhythm, Normal S1 S2, No JVD, No murmurs, No rubs, gallops or clicks  Respiratory: Lungs clear to auscultation. No rales, No rhonchi, No wheezing. No tenderness to palpation  Gastrointestinal:  Soft, Non-tender, + BS  Skin: Warm and dry, No rashes, No ecchymosis, No cyanosis, No ulcers   Musculoskeletal: No clubbing, No cyanosis  Vascular: Peripheral pulses palpable 2+ bilaterally  Psychiatry: Mood & affect frustrated  Lymph: No peripheral edema.     I&O's Summary    14 Sep 2021 07:01  -  15 Sep 2021 07:00  --------------------------------------------------------  IN: 0 mL / OUT: 1250 mL / NET: -1250 mL    15 Sep 2021 07:01  -  15 Sep 2021 15:56  --------------------------------------------------------  IN: 100 mL / OUT: 210 mL / NET: -110 mL        LABS: All Labs Reviewed:                        12.9   5.94  )-----------( 318      ( 15 Sep 2021 06:05 )             37.2                         11.9   3.76  )-----------( 290      ( 14 Sep 2021 17:52 )             35.3     15 Sep 2021 06:05    140    |  108    |  13     ----------------------------<  110    3.4     |  24     |  1.00   14 Sep 2021 17:52    141    |  107    |  17     ----------------------------<  103    3.9     |  29     |  0.94     Ca    8.8        15 Sep 2021 06:05  Ca    8.7        14 Sep 2021 17:52  Phos  2.8       15 Sep 2021 06:05  Mg     1.9       15 Sep 2021 06:05    TPro  7.6    /  Alb  4.1    /  TBili  1.9    /  DBili  x      /  AST  27     /  ALT  24     /  AlkPhos  79     15 Sep 2021 06:05  TPro  7.4    /  Alb  3.9    /  TBili  1.3    /  DBili  x      /  AST  24     /  ALT  25     /  AlkPhos  72     14 Sep 2021 17:52    PT/INR - ( 14 Sep 2021 17:52 )   PT: 11.6 sec;   INR: 0.99 ratio         PTT - ( 14 Sep 2021 17:52 )  PTT:33.5 sec  Troponin I, Serum: <.015 ng/mL (09-14-21 @ 17:52)      Blood Culture:       Triglycerides, Serum: 130 mg/dL (09-15-21 @ 11:20)  Cholesterol, Serum: 189 mg/dL (09-15-21 @ 11:20)      12 Lead ECG:   Systolic  mmHg  Diastolic BP 87 mmHg  Ventricular Rate 76 BPM  Atrial Rate 76 BPM  P-R Interval 158 ms  QRS Duration 98 ms  Q-T Interval 388 ms  QTC Calculation(Bazett) 436 ms  P Axis82 degrees  R Axis 77 degrees  T Axis 71 degrees  Dignosis Line Normal sinus rhythm  Normal ECG  Confirmed by EARL DELEON ([id number]) on 9/15/2021 10:40:18 AM (09-14-21 @ 16:58)    < from: CT Head No Cont (09.15.21 @ 10:27) >  EXAM:  CT BRAIN                        PROCEDURE DATE:  09/15/2021    INTERPRETATION:  Noncontrast CT of the brain.  CLINICAL INDICATION:  s/p tpa, ams  TECHNIQUE : Axial CT scanning of the brain was obtained from the skull base to the vertex without the administration of intravenous contrast. Sagittal and coronal reformats were provided.  COMPARISON: CT brain 9/14/2021 10/22/1990.  FINDINGS:  No hydrocephalus, mass effect, midline shift, acute intracranial hemorrhage, or brain edema.  Bilateral ethmoid and left frontal sinus mucosal thickening. Mastoid air cells clear.  IMPRESSION:  No hydrocephalus, acute intracranial hemorrhage, mass effect, or brain edema.  --- End of Report ---  < end of copied text >        < from: CT Angio Neck w/ IV Cont (09.14.21 @ 17:53) >  EXAM:  CT PERFUSION W MAPS IC                        EXAM:  CT ANGIO BRAIN (W)AW IC                        EXAM:  CT BRAIN STROKE PROTOCOL                        EXAM:  CT ANGIO NECK (W)AW IC                        PROCEDURE DATE:  09/14/2021    INTERPRETATION:  CT OF THE HEAD WITHOUT CONTRAST, CT ANGIOGRAPHY OF HEAD AND NECK AND CT PERFUSION WITH INTRAVENOUS CONTRAST.  CLINICAL INDICATION: Code stroke.  TECHNIQUE: Initially volumetric CT acquisition was performed through the brain and reviewed using brain and bone window technique. Sagittal and coronal reconstructed images were obtained. Dose optimization techniques were utilized including kVp/mA modulation along with iterative reconstructions.  Subsequently volumetric acquisition was performed from the thoracic inlet to the vertex.  RAPID artificial intelligence was used for preliminary evaluation of intracranial hemorrhage.  A total of 50 cc of Omnipaque 350 was injected intravenously without complications.  Dose optimization techniques were utilized including kVp/mA modulation along with iterative reconstructions.  MIP image analysis was performed on a separate workstation.  CT brain perfusion was performed after intravenous injection 50 mL of intravenouscontrast Omnipaque 350. Dose optimization techniques were utilized including kVp/mA modulation along with iterative reconstructions. 3D image analysis was performed on a dedicated workstation by a neuroradiologist.  COMPARISON: CT brain and CT angiogram head and neck, 5/8/2021.  FINDINGS:  Head CT:  The ventricular and sulcal size and configuration is age appropriate. There are scattered white matter hypodensities which are nonspecific but most commonly represent chronic microvascular ischemic changes.  There is no acute loss of gray-white differentiation.  There is no evidence of mass effect, midline shift, acute intracranial hemorrhage, or extra-axial collections.  There is bilateral scleral banding. There is scattered opacification bilaterally with complexes, mild mucosal thickening of the left maxillary sinus and opacification of the left frontal sinus which is increased since prior.    CT angiography:  NECK: The left vertebral artery emanates directly from the aortic arch, an anatomic variation. Origin of supraaortic arteries are patent. The common carotid arteries are normal in course and caliber. There are atherosclerotic plaques at common carotid bifurcations and carotid bulbs without evidence of significant segmental stenosis.  The right internal carotid artery is normal in caliber. There is 0 % stenosis using NASCET criteria (normal right ICA caliber is 4.6 mm).  The left internal carotid artery is normal in caliber. There is 0 % stenosis using NASCETcriteria (normal left ICA caliber is 4.4 mm).  The left vertebral artery is hypoplastic compared to the right. Bilateral vertebral arteries are normal in course and contour, without evidence of dissection or occlusion.  Degenerative changes of the bony cervical spine are noted.    BRAIN:  The petrocavernous and supraclinoid ICA segments are normal in caliber.  The visualized MCA and MILLY branches are normal without evidence of stenosis or aneurysm. The ACOM is present. Bilateral posterior communicating arteries are present.  The vertebral arteries, visualized cerebellar arteries, basilar and bilateral posterior cerebral arteries are patent without evidence of stenoses or aneurysm.  Other findings: There is a stable hypodense nodule in the right thyroid lobe.  CT Perfusion:  Findings:  The regional cerebral blood flow (CBF), cerebral blood volume (CBV),for the MILLY, MCA, and PCA territories are within normal limits. There is increased time to maximum in the right temporal andbilateral occipital lobes which may be artifactual in nature.      IMPRESSION:  CT brain: There is no acute lobar infarction, intracranial hemorrhage, mass lesion, mass effect or herniation. Further correlation with MRI of the brain is suggested for more detailed evaluation if there are no contraindications.  CTA brain: There is no large vessel occlusion or aneurysm involving major proximal intracranial arteries.  CTA NECK: There is no stenosis involving major neck arteries.  CT perfusion: Multiple areas of increased time to maximum as described may be artifactual however, attention on follow-up MRI is suggested.  NASCET CAROTID STENOSIS CRITERIA (distal normal appearing ICA as denominator for measurement): 0%-none, 1-49%-mild, 50-70%-moderate, 70-89%-severe, 90-99%-critical.  Notification to clinician of alert:  Provider   Dr. AL QUINTERO  was notified about the final results at 9/14/2021 1752 PM via telephone by neuroradiologist SOURAV Josue. Readback confirmationwas obtained.  --- End of Report ---  < end of copied text >      [facility] - Sveta Romano, Ebony Sparrow, Dereck Deleon, Hosea Wyman  Office Number: [phone]    Initial Consult Note  CHIEF COMPLAINT: Patient is a 66y old  Male who presents with a chief complaint of CVA, slurred speech, Rt side weakness (15 Sep 2021 12:36)    HPI:  SARAHI BARNHART is a 67 YO Male brought to ED complaining of altered mental status with slurred speech and right side weak ness.  65 yo M PMH AFib CLL came into ED with wife after pt developed slurred speech.  As per wife patient noted to have slurred speech at 1630, and wife drove straight to ER. She reports he was normal just prior to this. They report prior episodes of confusion and dizziness but never speech problems like this. Wife denies blood thinners. while in ER pt initially had RUE weakness and drift, went for head CT and, patient reported his right arm was feeling strange. pt was found to have worsening R UE weakness, not able to hold arm up at all.  Pt was seen by neuro in ER and pt candidate for tPA. And was administered 1815.  Pt admitted to the ICU s/p tpA for q1h neuro checks.       Patient seen and examined. Patient awake, alert, resting comfortably in bed. He is able to answer no to questions and follow simple commands. He is frustrated at inability to communicate. Tolerating room air.       Allergies    No Known Allergies    Intolerances      PAST MEDICAL & SURGICAL HISTORY:  Osteopenia    CLL (Chronic Lymphocytic Leukemia)  SINCE 1988    Avascular Necrosis of Femur Head  B/L    TMJ Syndrome  limited jaw opening due to TMJ    Herniated Cervical Disc    Bulging Disc  LUMBAR    Melanoma    Deviated nasal septum    Nasal congestion    Rosacea    Deviated nasal septum    Other specified disorders of nose and nasal sinuses    Anemia    S/P Splenectomy  1993    Avascular Necrosis of Femur Head  RIGHT - CORE DECOMPRESSION- 1993    Avascular Necrosis of Femur Head  LEFT - CORE DECOMPRESSION - 1993    Abnormal Jaw Closure  LEFT JAW SURGERY - 1988    Status Post Eye Surgery X 3  3/2011 right eye scleral buckle; left eye in 2013    S/P Tonsillectomy  1960    Bilateral cataracts  removed in 2011      MEDICATIONS  (STANDING):  atorvastatin 80 milliGRAM(s) Oral at bedtime  chlorhexidine 4% Liquid 1 Application(s) Topical <User Schedule>  influenza   Vaccine 0.5 milliLiter(s) IntraMuscular once  midazolam Injectable 2 milliGRAM(s) IV Push once    MEDICATIONS  (PRN):    FAMILY HISTORY:  Family history of multiple myeloma    Family history of malignant melanoma  age 89    FH: blood dyscrasia  MGUS    FH: hypertension  mother    FH: renal failure  mother      SOCIAL HISTORY    REVIEW OF SYSTEMS   CONSTITUTIONAL: No fevers, No chills, No fatigue, No weight gain  EYES: No vision changes, No vertigo, No throat pain   ENT: No congestion, No ear pain, No sore throat.  NECK: No pain, No stiffness  RESPIRATORY: No shortness of breath, No cough, No wheezing, No hemoptysis  CARDIOVASCULAR: No chest pain. No palpitations, No PARKER, No orthopnea, No PND, No pleuritic pain  GASTROINTESTINAL: No abdominal pain, No nausea, No vomiting, No hematemesis, No diarrhea No constipation. No melena  GENITOURINARY: No dysuria, No frequency, No incontinence, No hematuria  NEUROLOGICAL: No dizziness, No lightheadedness, No syncope, No LOC, No headache, No numbness, No weakness + right side facial droop + right hemiparesis, expressive aphasia  MUSCULOSKELETAL: No joint pain, No joint swelling.  PSYCHIATRIC: No anxiety, No depression  DERMATOLOGY: No diaphoresis. No itching, No rashes, No pressure ulcers  HEME/LYMPH: No easy bruising, or bleeding gums  All other review of systems is negative unless indicated above.    VITAL SIGNS:   Vital Signs Last 24 Hrs  T(C): 38.1 (15 Sep 2021 11:45), Max: 38.5 (15 Sep 2021 07:40)  T(F): 100.5 (15 Sep 2021 11:45), Max: 101.3 (15 Sep 2021 07:40)  HR: 78 (15 Sep 2021 13:00) (70 - 97)  BP: 125/69 (15 Sep 2021 13:00) (116/73 - 180/95)  BP(mean): 88 (15 Sep 2021 13:00) (88 - 116)  RR: 20 (15 Sep 2021 13:00) (17 - 46)  SpO2: 98% (15 Sep 2021 13:00) (94% - 100%)    Physical Exam:    Appearance: NAD, alert, Well developed   HEENT: Moist Mucous Membranes, Anicteric  Cardiovascular: Regular rate and rhythm, Normal S1 S2, No JVD, No murmurs, No rubs, gallops or clicks  Respiratory: Lungs clear to auscultation. No rales, No rhonchi, No wheezing. No tenderness to palpation  Gastrointestinal:  Soft, Non-tender, + BS  Skin: Warm and dry, No rashes, No ecchymosis, No cyanosis, No ulcers   Musculoskeletal: No clubbing, No cyanosis  Vascular: Peripheral pulses palpable 2+ bilaterally  Psychiatry: Mood & affect frustrated  Lymph: No peripheral edema.     I&O's Summary    14 Sep 2021 07:01  -  15 Sep 2021 07:00  --------------------------------------------------------  IN: 0 mL / OUT: 1250 mL / NET: -1250 mL    15 Sep 2021 07:01  -  15 Sep 2021 15:56  --------------------------------------------------------  IN: 100 mL / OUT: 210 mL / NET: -110 mL        LABS: All Labs Reviewed:                        12.9   5.94  )-----------( 318      ( 15 Sep 2021 06:05 )             37.2                         11.9   3.76  )-----------( 290      ( 14 Sep 2021 17:52 )             35.3     15 Sep 2021 06:05    140    |  108    |  13     ----------------------------<  110    3.4     |  24     |  1.00   14 Sep 2021 17:52    141    |  107    |  17     ----------------------------<  103    3.9     |  29     |  0.94     Ca    8.8        15 Sep 2021 06:05  Ca    8.7        14 Sep 2021 17:52  Phos  2.8       15 Sep 2021 06:05  Mg     1.9       15 Sep 2021 06:05    TPro  7.6    /  Alb  4.1    /  TBili  1.9    /  DBili  x      /  AST  27     /  ALT  24     /  AlkPhos  79     15 Sep 2021 06:05  TPro  7.4    /  Alb  3.9    /  TBili  1.3    /  DBili  x      /  AST  24     /  ALT  25     /  AlkPhos  72     14 Sep 2021 17:52    PT/INR - ( 14 Sep 2021 17:52 )   PT: 11.6 sec;   INR: 0.99 ratio         PTT - ( 14 Sep 2021 17:52 )  PTT:33.5 sec  Troponin I, Serum: <.015 ng/mL (09-14-21 @ 17:52)      Blood Culture:       Triglycerides, Serum: 130 mg/dL (09-15-21 @ 11:20)  Cholesterol, Serum: 189 mg/dL (09-15-21 @ 11:20)      12 Lead ECG:   Systolic  mmHg  Diastolic BP 87 mmHg  Ventricular Rate 76 BPM  Atrial Rate 76 BPM  P-R Interval 158 ms  QRS Duration 98 ms  Q-T Interval 388 ms  QTC Calculation(Bazett) 436 ms  P Axis82 degrees  R Axis 77 degrees  T Axis 71 degrees  Dignosis Line Normal sinus rhythm  Normal ECG  Confirmed by EARL DELEON ([id number]) on 9/15/2021 10:40:18 AM (09-14-21 @ 16:58)    < from: CT Head No Cont (09.15.21 @ 10:27) >  EXAM:  CT BRAIN                        PROCEDURE DATE:  09/15/2021    INTERPRETATION:  Noncontrast CT of the brain.  CLINICAL INDICATION:  s/p tpa, ams  TECHNIQUE : Axial CT scanning of the brain was obtained from the skull base to the vertex without the administration of intravenous contrast. Sagittal and coronal reformats were provided.  COMPARISON: CT brain 9/14/2021 10/22/1990.  FINDINGS:  No hydrocephalus, mass effect, midline shift, acute intracranial hemorrhage, or brain edema.  Bilateral ethmoid and left frontal sinus mucosal thickening. Mastoid air cells clear.  IMPRESSION:  No hydrocephalus, acute intracranial hemorrhage, mass effect, or brain edema.  --- End of Report ---  < end of copied text >        < from: CT Angio Neck w/ IV Cont (09.14.21 @ 17:53) >  EXAM:  CT PERFUSION W MAPS IC                        EXAM:  CT ANGIO BRAIN (W)AW IC                        EXAM:  CT BRAIN STROKE PROTOCOL                        EXAM:  CT ANGIO NECK (W)AW IC                        PROCEDURE DATE:  09/14/2021    INTERPRETATION:  CT OF THE HEAD WITHOUT CONTRAST, CT ANGIOGRAPHY OF HEAD AND NECK AND CT PERFUSION WITH INTRAVENOUS CONTRAST.  CLINICAL INDICATION: Code stroke.  TECHNIQUE: Initially volumetric CT acquisition was performed through the brain and reviewed using brain and bone window technique. Sagittal and coronal reconstructed images were obtained. Dose optimization techniques were utilized including kVp/mA modulation along with iterative reconstructions.  Subsequently volumetric acquisition was performed from the thoracic inlet to the vertex.  RAPID artificial intelligence was used for preliminary evaluation of intracranial hemorrhage.  A total of 50 cc of Omnipaque 350 was injected intravenously without complications.  Dose optimization techniques were utilized including kVp/mA modulation along with iterative reconstructions.  MIP image analysis was performed on a separate workstation.  CT brain perfusion was performed after intravenous injection 50 mL of intravenouscontrast Omnipaque 350. Dose optimization techniques were utilized including kVp/mA modulation along with iterative reconstructions. 3D image analysis was performed on a dedicated workstation by a neuroradiologist.  COMPARISON: CT brain and CT angiogram head and neck, 5/8/2021.  FINDINGS:  Head CT:  The ventricular and sulcal size and configuration is age appropriate. There are scattered white matter hypodensities which are nonspecific but most commonly represent chronic microvascular ischemic changes.  There is no acute loss of gray-white differentiation.  There is no evidence of mass effect, midline shift, acute intracranial hemorrhage, or extra-axial collections.  There is bilateral scleral banding. There is scattered opacification bilaterally with complexes, mild mucosal thickening of the left maxillary sinus and opacification of the left frontal sinus which is increased since prior.    CT angiography:  NECK: The left vertebral artery emanates directly from the aortic arch, an anatomic variation. Origin of supraaortic arteries are patent. The common carotid arteries are normal in course and caliber. There are atherosclerotic plaques at common carotid bifurcations and carotid bulbs without evidence of significant segmental stenosis.  The right internal carotid artery is normal in caliber. There is 0 % stenosis using NASCET criteria (normal right ICA caliber is 4.6 mm).  The left internal carotid artery is normal in caliber. There is 0 % stenosis using NASCETcriteria (normal left ICA caliber is 4.4 mm).  The left vertebral artery is hypoplastic compared to the right. Bilateral vertebral arteries are normal in course and contour, without evidence of dissection or occlusion.  Degenerative changes of the bony cervical spine are noted.    BRAIN:  The petrocavernous and supraclinoid ICA segments are normal in caliber.  The visualized MCA and MILLY branches are normal without evidence of stenosis or aneurysm. The ACOM is present. Bilateral posterior communicating arteries are present.  The vertebral arteries, visualized cerebellar arteries, basilar and bilateral posterior cerebral arteries are patent without evidence of stenoses or aneurysm.  Other findings: There is a stable hypodense nodule in the right thyroid lobe.  CT Perfusion:  Findings:  The regional cerebral blood flow (CBF), cerebral blood volume (CBV),for the MILLY, MCA, and PCA territories are within normal limits. There is increased time to maximum in the right temporal andbilateral occipital lobes which may be artifactual in nature.      IMPRESSION:  CT brain: There is no acute lobar infarction, intracranial hemorrhage, mass lesion, mass effect or herniation. Further correlation with MRI of the brain is suggested for more detailed evaluation if there are no contraindications.  CTA brain: There is no large vessel occlusion or aneurysm involving major proximal intracranial arteries.  CTA NECK: There is no stenosis involving major neck arteries.  CT perfusion: Multiple areas of increased time to maximum as described may be artifactual however, attention on follow-up MRI is suggested.  NASCET CAROTID STENOSIS CRITERIA (distal normal appearing ICA as denominator for measurement): 0%-none, 1-49%-mild, 50-70%-moderate, 70-89%-severe, 90-99%-critical.  Notification to clinician of alert:  Provider   Dr. AL QUINTERO  was notified about the final results at 9/14/2021 1752 PM via telephone by neuroradiologist SOURAV Josue. Readback confirmationwas obtained.  --- End of Report ---  < end of copied text >

## 2021-09-15 NOTE — PROGRESS NOTE ADULT - SUBJECTIVE AND OBJECTIVE BOX
Neurology follow up note    SARAHI [id number]      Interval History:    Patient resting in bed     Allergies    No Known Allergies    Intolerances        MEDICATIONS    atorvastatin 80 milliGRAM(s) Oral at bedtime  chlorhexidine 4% Liquid 1 Application(s) Topical <User Schedule>  potassium chloride  10 mEq/100 mL IVPB 10 milliEquivalent(s) IV Intermittent every 1 hour          Height (cm): 177.8 ( @ 16:48)  Weight (kg): 71.8 ( @ 18:08)  BMI (kg/m2): 22.7 ( @ 18:08)    Vital Signs Last 24 Hrs  T(C): 38.5 (15 Sep 2021 07:40), Max: 38.5 (15 Sep 2021 07:40)  T(F): 101.3 (15 Sep 2021 07:40), Max: 101.3 (15 Sep 2021 07:40)  HR: 88 (15 Sep 2021 06:12) (71 - 97)  BP: 116/73 (15 Sep 2021 06:12) (116/73 - 180/95)  BP(mean): 90 (15 Sep 2021 06:12) (90 - 116)  RR: 42 (15 Sep 2021 06:12) (17 - 46)  SpO2: 100% (15 Sep 2021 06:12) (94% - 100%)      REVIEW OF SYSTEMS:  Very limited secondary to the patient is having problems expressing himself.    PHYSICAL EXAMINATION:   HEENT:  Head:  Normocephalic, atraumatic.  Eyes:  No scleral icterus.  Ears:  Hearing appeared to be intact.  NECK:  Supple.  CARDIOVASCULAR:  S1 and S2 heard.  RESPIRATORY:  Good air entry bilaterally.  ABDOMEN:  Soft, nontender.  EXTREMITIES:  No clubbing or cyanosis was noted.      NEUROLOGIC:  The patient was awake and alert.  Secondary to having severe expressive aphasia the patient was unable to say his age or month.  No words were articulated, at times just sounds.  The patient was able to follow commands, was able to close his eyes and squeeze my hand.  Gaze: no  gaze preference today  On/off blink to visual threat.  Had difficulty to try to ascertain any type of field cut.   no right facial droop was noted today.  Motor:  Positive right upper extremity weakness with stiffness would say overall strength was 2/5.  Left upper extremity was 4+/5.  Bilateral lower extremities were 4/5 with stimuli.  Sensory:   unreliable today Language:  The patient had severe aphasia and dysarthria.             LABS:  CBC Full  -  ( 15 Sep 2021 06:05 )  WBC Count : 5.94 K/uL  RBC Count : 3.71 M/uL  Hemoglobin : 12.9 g/dL  Hematocrit : 37.2 %  Platelet Count - Automated : 318 K/uL  Mean Cell Volume : 100.3 fl  Mean Cell Hemoglobin : 34.8 pg  Mean Cell Hemoglobin Concentration : 34.7 gm/dL  Auto Neutrophil # : 3.78 K/uL  Auto Lymphocyte # : 0.38 K/uL  Auto Monocyte # : 1.36 K/uL  Auto Eosinophil # : 0.00 K/uL  Auto Basophil # : 0.01 K/uL  Auto Neutrophil % : 63.6 %  Auto Lymphocyte % : 6.4 %  Auto Monocyte % : 22.9 %  Auto Eosinophil % : 0.0 %  Auto Basophil % : 0.2 %    Urinalysis Basic - ( 14 Sep 2021 18:56 )    Color: Yellow / Appearance: Clear / S.010 / pH: x  Gluc: x / Ketone: Negative  / Bili: Negative / Urobili: Negative   Blood: x / Protein: Negative / Nitrite: Negative   Leuk Esterase: Negative / RBC: x / WBC x   Sq Epi: x / Non Sq Epi: x / Bacteria: x      -15    140  |  108  |  13  ----------------------------<  110<H>  3.4<L>   |  24  |  1.00    Ca    8.8      15 Sep 2021 06:05  Phos  2.8     09-15  Mg     1.9     09-15    TPro  7.6  /  Alb  4.1  /  TBili  1.9<H>  /  DBili  x   /  AST  27  /  ALT  24  /  AlkPhos  79  09-15    Hemoglobin A1C:     LIVER FUNCTIONS - ( 15 Sep 2021 06:05 )  Alb: 4.1 g/dL / Pro: 7.6 g/dL / ALK PHOS: 79 U/L / ALT: 24 U/L / AST: 27 U/L / GGT: x           Vitamin B12   PT/INR - ( 14 Sep 2021 17:52 )   PT: 11.6 sec;   INR: 0.99 ratio         PTT - ( 14 Sep 2021 17:52 )  PTT:33.5 sec      RADIOLOGY        ANALYSIS AND PLAN:  This is a 66-year-old with  h/o eft gaze preference, right facial droop, right hemiparesis, and expressive aphasia.    Clinical impression is most likely cerebrovascular accident of the left middle cerebral artery territory now with temperatures  The patient will be admitted to ICU setting.  Neuro checks every hour for the next total of 24 hours.  Would recommend to keep the systolic blood pressure less than 180 and diastolic blood pressure less than 105.  If necessary, I would recommend nicardipine drip.  No blood thinning medication, antiplatelets, anticoagulation for the next 24 hours.  After repeat imaging, if there are no signs of bleeding, then okay to start the patient on Plavix 75 mg after 07:10, 09/15/2021.  We will plan for MRI imaging of the brain when possible at present machine not working will repeat head ct now and 24 hours after TPA  We will recommend echocardiogram.  Would check TSH, lipid panel, hemoglobin A1c.  Would recommend Speech and Swallow evaluation.  plan for EEG   temperatures infection work and agree with empiric antibiotics   I would recommend high dose statin if able to swallow.    Spoke with spouse, Javier,  9/15  She understands the reasoning and thought process.  Home number is [phone], cell number is [phone].  Advance care directives were discussed.    Greater than 30 minutes of critical care time spent with patient and plan of care.    Thank you for the courtesy of consultation.

## 2021-09-15 NOTE — SWALLOW BEDSIDE ASSESSMENT ADULT - SWALLOW EVAL: DIAGNOSIS
Pt p/w a mild oral dysphagia when given regular solids marked by adequate retrieval and containment, mildly prolonged mastication/manipulation and transfer, and adequate clearance post swallow. Functional oral phase when given puree, nectar thick liquids, and thin liquids marked by adequate retrieval and containment, timely manipulation and transfer, and adequate clearance post swallow. Pharyngeal dysphagia marked by suspected brief delay in swallow onset, +laryngeal elevation to palpation. Pt p/w a significantly delayed cough response s/p administration of thin liquids. Suspect cough was not in relation to aspiration event given significant delay, no coughing observed upon several additional trials, and O2 sats remaining at baseline/WFL. Pt did not demonstrate overt s/sx of aspiration with nectar, puree, and regular solids. Recommend PO diet initiation of soft solids with thin liquids via single/small cup sips +aspiration precautions. Continue to monitor mental status closely; if

## 2021-09-15 NOTE — PROGRESS NOTE ADULT - SUBJECTIVE AND OBJECTIVE BOX
Patient is a 66y old  Male who presents with a chief complaint of CVA, slurred speech, Rt side weakness (15 Sep 2021 15  remains in Icu with severe aphasia      INTERVAL HPI/OVERNIGHT EVENTS:  T(C): 37.7 (09-15-21 @ 20:00), Max: 38.5 (09-15-21 @ 07:40)  HR: 94 (09-15-21 @ 20:00) (70 - 98)  BP: 169/88 (09-15-21 @ 20:00) (116/73 - 169/88)  RR: 18 (09-15-21 @ 20:00) (18 - 46)  SpO2: 94% (09-15-21 @ 20:00) (94% - 100%)  Wt(kg): --  I&O's Summary    14 Sep 2021 07:01  -  15 Sep 2021 07:00  --------------------------------------------------------  IN: 0 mL / OUT: 1250 mL / NET: -1250 mL    15 Sep 2021 07:01  -  15 Sep 2021 21:38  --------------------------------------------------------  IN: 150 mL / OUT: 610 mL / NET: -460 mL        LABS:                        12.9   5.94  )-----------( 318      ( 15 Sep 2021 06:05 )             37.2     15    140  |  108  |  13  ----------------------------<  110<H>  3.4<L>   |  24  |  1.00    Ca    8.8      15 Sep 2021 06:05  Phos  2.8     15  Mg     1.9     09-15    TPro  7.6  /  Alb  4.1  /  TBili  1.9<H>  /  DBili  x   /  AST  27  /  ALT  24  /  AlkPhos  79  09-15    PT/INR - ( 14 Sep 2021 17:52 )   PT: 11.6 sec;   INR: 0.99 ratio         PTT - ( 14 Sep 2021 17:52 )  PTT:33.5 sec  Urinalysis Basic - ( 14 Sep 2021 18:56 )    Color: Yellow / Appearance: Clear / S.010 / pH: x  Gluc: x / Ketone: Negative  / Bili: Negative / Urobili: Negative   Blood: x / Protein: Negative / Nitrite: Negative   Leuk Esterase: Negative / RBC: x / WBC x   Sq Epi: x / Non Sq Epi: x / Bacteria: x      CAPILLARY BLOOD GLUCOSE            Urinalysis Basic - ( 14 Sep 2021 18:56 )    Color: Yellow / Appearance: Clear / S.010 / pH: x  Gluc: x / Ketone: Negative  / Bili: Negative / Urobili: Negative   Blood: x / Protein: Negative / Nitrite: Negative   Leuk Esterase: Negative / RBC: x / WBC x   Sq Epi: x / Non Sq Epi: x / Bacteria: x        MEDICATIONS  (STANDING):  atorvastatin 80 milliGRAM(s) Oral at bedtime  chlorhexidine 4% Liquid 1 Application(s) Topical <User Schedule>  enoxaparin Injectable 40 milliGRAM(s) SubCutaneous daily  influenza   Vaccine 0.5 milliLiter(s) IntraMuscular once    MEDICATIONS  (PRN):      REVIEW OF SYSTEMS:    severe aphasia    RADIOLOGY & ADDITIONAL TESTS:    Imaging Personally Reviewed:  [ ] YES  [ ] NO    Consultant(s) Notes Reviewed:  [ ] YES  [ ] NO    PHYSICAL EXAM:  GENERAL: NAD, well-groomed, well-developed  HEAD:  Atraumatic, Normocephalic  EYES: EOMI, PERRLA, conjunctiva and sclera clear  ENMT: No tonsillar erythema, exudates, or enlargement; Moist mucous membranes, Good dentition, No lesions  NECK: Supple, No JVD, Normal thyroid   NERVOUS SYSTEM:  alert, severe aphasia, approx 2/5 rue strength  CHEST/LUNG: Clear to percussion bilaterally; No rales, rhonchi, wheezing, or rubs  HEART: Regular rate and rhythm; No murmurs, rubs, or gallops  ABDOMEN: Soft, Nontender, Nondistended; Bowel sounds present  EXTREMITIES:  2+ Peripheral Pulses, No clubbing, cyanosis, or edema  LYMPH: No lymphadenopathy noted  SKIN: No rashes or lesions    Care Discussed with Consultants/Other Providers [ ] YES  [ ] NO

## 2021-09-15 NOTE — SWALLOW BEDSIDE ASSESSMENT ADULT - SLP PERTINENT HISTORY OF CURRENT PROBLEM
Per charting, "65 yo M PMH CLL came into ED with wife after pt developed slurred speech.  As per wife patient noted to have slurred speech at 1630, and wife drove straight to ER. She reports he was normal just prior to this. They report prior episodes of confusion and dizziness but never speech problems like this. Wife denies blood thinners. while in ER pt initially had RUE weakness and drift, went for head CT and, patient reported his right arm was feeling strange. pt was found to have worsening R UE weakness, not able to hold arm up at all.  Pt was seen by neuro in ER and pt candidate for tPA. And was administered 1815"

## 2021-09-15 NOTE — DIETITIAN INITIAL EVALUATION ADULT. - OTHER INFO
Current:   -Intake: NPO status - awaiting SLP consult s/p CVA    Obtain weight hx when able. Current:   -Intake: NPO status; SLP at bedside during assessment  -GI: no BM noted thus far  -Swallow: per SLP (9/15), recommend soft diet with thin liquids

## 2021-09-15 NOTE — PROGRESS NOTE ADULT - ATTENDING COMMENTS
66M h/o CLL, MDS, melanoma, osteopenia p/w acute CVA s/p tPA.     Neuro: s/p tPA (endtime 7:15pm), repeat CT this AM with no acute findings, will have 24hr CT this evening, MRI when able  - continue statin; can start ASA if CT head with no bleed  - EEG per neuro  - low threshold for LP when out of tPA window given fevers with AMS  - blood pressure at goal  CV: no acute issues   Pulm: no acute issues  GI: S/S eval - diet as recommended  Renal: no acute issues, monitor UOP and electrolytes  - conde placed for urinary retention, can do TOV when out of bed  ID: Tm 101.3 this AM - UA negative, blood cultures sent  - given fevers and AMS, low threshold for LP 24hrs post-tPA  Endo: no active issues, check HbA1c  Heme: continue SCD dvt ppx, can start chemical ppx 24hrs post-tPA     Continue monitoring in ICU at this time. Wife updated at bedside

## 2021-09-15 NOTE — CONSULT NOTE ADULT - SUBJECTIVE AND OBJECTIVE BOX
All records reviewed.  Wife present    HPI:  65 y/o man w CLL first dx'd 1991 post multiple regimens presently on Venetoclax since 2020, followed by our office and Dr Theresa Haji at [facility], frequent sinus infections on IVIG, adm w acute onset of confusion/lethargy/right arm weakness/slur speech, post stroke protocol, no improvmeent in sx.  Had 3rd dose of COVID vaccine Aug  Pt well known to us, first dx'd CLL , post RT to spleen  and then splenectomy , post numerous courses Fludarbine, 3/2020 change to Ibrutinib. CBC had showned progressive macrocytic anemia, bone marrow w CLL and new del 17p in add to  the knwon del 11q and del 13q. 2020- on Gazyva/ibrutinib, 2021 started on Venetoclax.  Last seen in office 9/3/21 wbc 4.4, Hgb 14.7 mcv 101.2 plts 237    CTA head here no acute ds, CT perfusion w multi areas of incr time to max, MRI recommended          PAST MEDICAL & SURGICAL HISTORY:  Osteopenia    CLL (Chronic Lymphocytic Leukemia)  SINCE     Avascular Necrosis of Femur Head  B/L    TMJ Syndrome  limited jaw opening due to TMJ    Herniated Cervical Disc    Bulging Disc  LUMBAR    Melanoma    Deviated nasal septum    Nasal congestion    Rosacea    Deviated nasal septum    Other specified disorders of nose and nasal sinuses    Anemia    S/P Splenectomy      Avascular Necrosis of Femur Head  RIGHT - CORE DECOMPRESSION-     Avascular Necrosis of Femur Head  LEFT - CORE DECOMPRESSION -     Abnormal Jaw Closure  LEFT JAW SURGERY -     Status Post Eye Surgery X 3  3/2011 right eye scleral buckle; left eye in     S/P Tonsillectomy  1960    Bilateral cataracts  removed in         Review of System:  see above. Pt unable to give    MEDICATIONS  (STANDING):  atorvastatin 80 milliGRAM(s) Oral at bedtime  chlorhexidine 4% Liquid 1 Application(s) Topical <User Schedule>    MEDICATIONS  (PRN):      Allergies    No Known Allergies    Intolerances        SOCIAL HISTORY:  former smnoker    FAMILY HISTORY:  Family history of multiple myeloma father, Waldenstrom macrglobulinemia mother    Family history of malignant melanoma  age 89    FH: hypertension  mother    FH: renal failure  mother        Vital Signs Last 24 Hrs  T(C): 38.1 (15 Sep 2021 11:45), Max: 38.5 (15 Sep 2021 07:40)  T(F): 100.5 (15 Sep 2021 11:45), Max: 101.3 (15 Sep 2021 07:40)  HR: 70 (15 Sep 2021 11:00) (70 - 97)  BP: 128/67 (15 Sep 2021 11:00) (116/73 - 180/95)  BP(mean): 92 (15 Sep 2021 11:00) (90 - 116)  RR: 19 (15 Sep 2021 11:00) (17 - 46)  SpO2: 98% (15 Sep 2021 11:00) (94% - 100%)    PHYSICAL EXAM:      General:thin chronically ill appearing man in bed, moves all extrem spontaneously, moans, doesn ot answer quetions or ackowledge speaker,, in no acute distress  ENMT:buccal mucosa moist  Neck:supple, trachea midline  Lungs:clear, no wheeze/rhonchi  Cardiovascular:regular rate and rhythm, S1 S2  Abdomen:soft, nontender, no organomegaly present, bowel sounds normal  Neurological:see above  Skin:no increased ecchymosis/petechiae/purpura  Lymph Nodes:no palpable cervical/supraclavicular lymph nodes enlargements  Extremities:no cyanosis/clubbing/edema        LABS:                        12.9   5.94  )-----------( 318      ( 15 Sep 2021 06:05 )             37.2     09-15 @ 06:05  WBC5.94  RBC3.71 Hgb12.9 Hct37.2  MRA797.3  Irjp628  Auto Yhotxs84.6 Band-- Auto Lymph6.4 Atypical Lymph-- Reactive Lymph-- Auto Mono22.9 Auto Eos0.0 Auto Baso0.2        09-14 @ 17:52  WBC3.76  RBC3.46 Hgb11.9 Hct35.3  VEB048.0  Dpsb022  Auto Ayfyja77.0 Band3.0 Auto Lymph6.0 Atypical Lymph-- Reactive Lymph2.0 Auto Mono18.0 Auto Eos0.0 Auto Baso0.0          09-15    140  |  108  |  13  ----------------------------<  110<H>  3.4<L>   |  24  |  1.00    Ca    8.8      15 Sep 2021 06:05  Phos  2.8     09-15  Mg     1.9     09-15    TPro  7.6  /  Alb  4.1  /  TBili  1.9<H>  /  DBili  x   /  AST  27  /  ALT  24  /  AlkPhos  79  09-15    09-14 @ 17:52  PT11.6 INR0.99  PTT33.5    Urinalysis Basic - ( 14 Sep 2021 18:56 )    Color: Yellow / Appearance: Clear / S.010 / pH: x  Gluc: x / Ketone: Negative  / Bili: Negative / Urobili: Negative   Blood: x / Protein: Negative / Nitrite: Negative   Leuk Esterase: Negative / RBC: x / WBC x   Sq Epi: x / Non Sq Epi: x / Bacteria: x        PERIPHERAL BLOOD SMEAR REVIEW:      RADIOLOGY & ADDITIONAL STUDIES:  < from: CT Head No Cont (09.15.21 @ 10:27) >    EXAM:  CT BRAIN                            PROCEDURE DATE:  09/15/2021          INTERPRETATION:  Noncontrast CT of the brain.    CLINICAL INDICATION:  s/p tpa, ams    TECHNIQUE : Axial CT scanning of the brain was obtained from the skull base to the vertex without the administration of intravenous contrast. Sagittal and coronal reformats were provided.    COMPARISON: CT brain 2021 10/22/1990.    FINDINGS:    No hydrocephalus, mass effect, midline shift, acute intracranial hemorrhage, or brain edema.    Bilateral ethmoid and left frontal sinus mucosal thickening. Mastoid air cells clear.    IMPRESSION:    No hydrocephalus, acute intracranial hemorrhage, mass effect, or brain edema.      < end of copied text >  < from: CT Perfusion w/ Maps w/ IV Cont (21 @ 17:52) >    EXAM:  CT PERFUSION W MAPS IC                          EXAM:  CT ANGIO BRAIN (W)AW IC                          EXAM:  CT BRAIN STROKE PROTOCOL                          EXAM:  CT ANGIO NECK (W)AW IC                            PROCEDURE DATE:  2021          INTERPRETATION:  CT OF THE HEAD WITHOUT CONTRAST, CT ANGIOGRAPHY OF HEAD AND NECK AND CT PERFUSION WITH INTRAVENOUS CONTRAST.    CLINICAL INDICATION: Code stroke.    TECHNIQUE: Initially volumetric CT acquisition was performed through the brain and reviewed using brain and bone window technique. Sagittal and coronal reconstructed images were obtained. Dose optimization techniques were utilized including kVp/mA modulation along with iterative reconstructions.  Subsequently volumetric acquisition was performed from the thoracic inlet to the vertex.    RAPID artificial intelligence was used for preliminary evaluation of intracranial hemorrhage.    A total of 50 cc of Omnipaque 350 was injected intravenously without complications.  Dose optimization techniques were utilized including kVp/mA modulation along with iterative reconstructions.  MIP image analysis was performed on a separate workstation.  CT brain perfusion was performed after intravenous injection 50 mL of intravenouscontrast Omnipaque 350. Dose optimization techniques were utilized including kVp/mA modulation along with iterative reconstructions. 3D image analysis was performed on a dedicated workstation by a neuroradiologist.      COMPARISON: CT brain and CT angiogram head and neck, 2021.    FINDINGS:    Head CT:    The ventricular and sulcal size and configuration is age appropriate. There are scattered white matter hypodensities which are nonspecific but most commonly represent chronic microvascular ischemic changes.  There is no acute loss of gray-white differentiation.    There is no evidence of mass effect, midline shift, acute intracranial hemorrhage, or extra-axial collections.    There is bilateral scleral banding. There is scattered opacification bilaterally with complexes, mild mucosal thickening of the left maxillary sinus and opacification of the left frontal sinus which is increased since prior.    CT angiography:    NECK: The left vertebral artery emanates directly from the aortic arch, an anatomic variation. Origin of supraaortic arteries are patent. The common carotid arteries are normal in course and caliber. There are atherosclerotic plaques at common carotid bifurcations and carotid bulbs without evidence of significant segmental stenosis.    The right internal carotid artery is normal in caliber. There is 0 % stenosis using NASCET criteria (normal right ICA caliber is 4.6 mm).    The left internal carotid artery is normal in caliber. There is 0 % stenosis using NASCETcriteria (normal left ICA caliber is 4.4 mm).    The left vertebral artery is hypoplastic compared to the right. Bilateral vertebral arteries are normal in course and contour, without evidence of dissection or occlusion.    Degenerative changes of the bony cervical spine are noted.    BRAIN:    The petrocavernous and supraclinoid ICA segments are normal in caliber.  The visualized MCA and MILLY branches are normal without evidence of stenosis or aneurysm. The ACOM is present. Bilateral posterior communicating arteries are present.  The vertebral arteries, visualized cerebellar arteries, basilar and bilateral posterior cerebral arteries are patent without evidence of stenoses or aneurysm.    Other findings: There is a stable hypodense nodule in the right thyroid lobe.    CT Perfusion:      Findings:    The regional cerebral blood flow (CBF), cerebral blood volume (CBV),for the MILLY, MCA, and PCA territories are within normal limits. There is increased time to maximum in the right temporal andbilateral occipital lobes which may be artifactual in nature.      IMPRESSION:    CT brain: There is no acute lobar infarction, intracranial hemorrhage, mass lesion, mass effect or herniation. Further correlation with MRI of the brain is suggested for more detailed evaluation if there are no contraindications.    CTA brain: There is no large vessel occlusion or aneurysm involving major proximal intracranial arteries.    CTA NECK: There is no stenosis involving major neck arteries.    CT perfusion: Multiple areas of increased time to maximum as described may be artifactual however, attention on follow-up MRI is suggested.        NASCET CAROTID STENOSIS CRITERIA (distal normal appearing ICA as denominator for measurement): 0%-none, 1-49%-mild, 50-70%-moderate, 70-89%-severe, 90-99%-critical.      Notification to clinician of alert:    Provider   Dr. AL QUINTERO  was notified about the final results at 2021 1752 PM via telephone by neuroradiologist SOURAV Josue. Readback confirmationwas obtained.      < end of copied text >

## 2021-09-15 NOTE — PROGRESS NOTE ADULT - ASSESSMENT
67 y/o male with PMHx CLL, anemia, melanoma, osteopenia admitted for CVA workup and speech deficits.   s/p lytics  start asa today  s & s eval  statin  PT eval  cont ICU monitoring  cardiac workup to eval for cardioembolic etiology

## 2021-09-16 ENCOUNTER — INPATIENT (INPATIENT)
Facility: HOSPITAL | Age: 66
LOS: 14 days | Discharge: NURSING, REHAB OR LONG-TERM CARE | DRG: 97 | End: 2021-10-01
Attending: INTERNAL MEDICINE | Admitting: PSYCHIATRY & NEUROLOGY
Payer: MEDICARE

## 2021-09-16 ENCOUNTER — TRANSCRIPTION ENCOUNTER (OUTPATIENT)
Age: 66
End: 2021-09-16

## 2021-09-16 VITALS
RESPIRATION RATE: 18 BRPM | OXYGEN SATURATION: 97 % | DIASTOLIC BLOOD PRESSURE: 83 MMHG | SYSTOLIC BLOOD PRESSURE: 146 MMHG | HEART RATE: 74 BPM | TEMPERATURE: 98 F

## 2021-09-16 VITALS — HEART RATE: 65 BPM | RESPIRATION RATE: 20 BRPM | OXYGEN SATURATION: 98 %

## 2021-09-16 DIAGNOSIS — H26.9 UNSPECIFIED CATARACT: Chronic | ICD-10-CM

## 2021-09-16 DIAGNOSIS — R56.9 UNSPECIFIED CONVULSIONS: ICD-10-CM

## 2021-09-16 LAB
ALBUMIN SERPL ELPH-MCNC: 3.9 G/DL — SIGNIFICANT CHANGE UP (ref 3.3–5)
ALBUMIN SERPL ELPH-MCNC: 4.3 G/DL — SIGNIFICANT CHANGE UP (ref 3.3–5)
ALP SERPL-CCNC: 72 U/L — SIGNIFICANT CHANGE UP (ref 40–120)
ALP SERPL-CCNC: 74 U/L — SIGNIFICANT CHANGE UP (ref 40–120)
ALT FLD-CCNC: 16 U/L — SIGNIFICANT CHANGE UP (ref 10–45)
ALT FLD-CCNC: 23 U/L — SIGNIFICANT CHANGE UP (ref 12–78)
ANION GAP SERPL CALC-SCNC: 15 MMOL/L — SIGNIFICANT CHANGE UP (ref 5–17)
ANION GAP SERPL CALC-SCNC: 5 MMOL/L — SIGNIFICANT CHANGE UP (ref 5–17)
AST SERPL-CCNC: 28 U/L — SIGNIFICANT CHANGE UP (ref 10–40)
AST SERPL-CCNC: 29 U/L — SIGNIFICANT CHANGE UP (ref 15–37)
BASOPHILS # BLD AUTO: 0 K/UL — SIGNIFICANT CHANGE UP (ref 0–0.2)
BASOPHILS NFR BLD AUTO: 0 % — SIGNIFICANT CHANGE UP (ref 0–2)
BILIRUB SERPL-MCNC: 2.2 MG/DL — HIGH (ref 0.2–1.2)
BILIRUB SERPL-MCNC: 2.6 MG/DL — HIGH (ref 0.2–1.2)
BUN SERPL-MCNC: 17 MG/DL — SIGNIFICANT CHANGE UP (ref 7–23)
BUN SERPL-MCNC: 23 MG/DL — SIGNIFICANT CHANGE UP (ref 7–23)
CALCIUM SERPL-MCNC: 9.1 MG/DL — SIGNIFICANT CHANGE UP (ref 8.5–10.1)
CALCIUM SERPL-MCNC: 9.3 MG/DL — SIGNIFICANT CHANGE UP (ref 8.4–10.5)
CHLORIDE SERPL-SCNC: 100 MMOL/L — SIGNIFICANT CHANGE UP (ref 96–108)
CHLORIDE SERPL-SCNC: 106 MMOL/L — SIGNIFICANT CHANGE UP (ref 96–108)
CO2 SERPL-SCNC: 22 MMOL/L — SIGNIFICANT CHANGE UP (ref 22–31)
CO2 SERPL-SCNC: 26 MMOL/L — SIGNIFICANT CHANGE UP (ref 22–31)
CREAT SERPL-MCNC: 0.98 MG/DL — SIGNIFICANT CHANGE UP (ref 0.5–1.3)
CREAT SERPL-MCNC: 1.14 MG/DL — SIGNIFICANT CHANGE UP (ref 0.5–1.3)
EOSINOPHIL # BLD AUTO: 0 K/UL — SIGNIFICANT CHANGE UP (ref 0–0.5)
EOSINOPHIL NFR BLD AUTO: 0 % — SIGNIFICANT CHANGE UP (ref 0–6)
GLUCOSE SERPL-MCNC: 107 MG/DL — HIGH (ref 70–99)
GLUCOSE SERPL-MCNC: 133 MG/DL — HIGH (ref 70–99)
HCT VFR BLD CALC: 36.6 % — LOW (ref 39–50)
HCT VFR BLD CALC: 37.1 % — LOW (ref 39–50)
HGB BLD-MCNC: 12.1 G/DL — LOW (ref 13–17)
HGB BLD-MCNC: 12.6 G/DL — LOW (ref 13–17)
LYMPHOCYTES # BLD AUTO: 0.6 K/UL — LOW (ref 1–3.3)
LYMPHOCYTES # BLD AUTO: 11 % — LOW (ref 13–44)
MAGNESIUM SERPL-MCNC: 2.3 MG/DL — SIGNIFICANT CHANGE UP (ref 1.6–2.6)
MCHC RBC-ENTMCNC: 33.1 GM/DL — SIGNIFICANT CHANGE UP (ref 32–36)
MCHC RBC-ENTMCNC: 33.6 PG — SIGNIFICANT CHANGE UP (ref 27–34)
MCHC RBC-ENTMCNC: 34 GM/DL — SIGNIFICANT CHANGE UP (ref 32–36)
MCHC RBC-ENTMCNC: 34.1 PG — HIGH (ref 27–34)
MCV RBC AUTO: 100.3 FL — HIGH (ref 80–100)
MCV RBC AUTO: 101.7 FL — HIGH (ref 80–100)
MONOCYTES # BLD AUTO: 1.9 K/UL — HIGH (ref 0–0.9)
MONOCYTES NFR BLD AUTO: 35 % — HIGH (ref 2–14)
NEUTROPHILS # BLD AUTO: 2.94 K/UL — SIGNIFICANT CHANGE UP (ref 1.8–7.4)
NEUTROPHILS NFR BLD AUTO: 52 % — SIGNIFICANT CHANGE UP (ref 43–77)
NRBC # BLD: 0 /100 WBCS — SIGNIFICANT CHANGE UP (ref 0–0)
NRBC # BLD: SIGNIFICANT CHANGE UP /100 WBCS (ref 0–0)
PHOSPHATE SERPL-MCNC: 2.8 MG/DL — SIGNIFICANT CHANGE UP (ref 2.5–4.5)
PLATELET # BLD AUTO: 272 K/UL — SIGNIFICANT CHANGE UP (ref 150–400)
PLATELET # BLD AUTO: 296 K/UL — SIGNIFICANT CHANGE UP (ref 150–400)
POTASSIUM SERPL-MCNC: 3.7 MMOL/L — SIGNIFICANT CHANGE UP (ref 3.5–5.3)
POTASSIUM SERPL-MCNC: 4.1 MMOL/L — SIGNIFICANT CHANGE UP (ref 3.5–5.3)
POTASSIUM SERPL-SCNC: 3.7 MMOL/L — SIGNIFICANT CHANGE UP (ref 3.5–5.3)
POTASSIUM SERPL-SCNC: 4.1 MMOL/L — SIGNIFICANT CHANGE UP (ref 3.5–5.3)
PROT SERPL-MCNC: 7.1 G/DL — SIGNIFICANT CHANGE UP (ref 6–8.3)
PROT SERPL-MCNC: 7.5 G/DL — SIGNIFICANT CHANGE UP (ref 6–8.3)
RBC # BLD: 3.6 M/UL — LOW (ref 4.2–5.8)
RBC # BLD: 3.7 M/UL — LOW (ref 4.2–5.8)
RBC # FLD: 14.9 % — HIGH (ref 10.3–14.5)
RBC # FLD: 15 % — HIGH (ref 10.3–14.5)
SODIUM SERPL-SCNC: 137 MMOL/L — SIGNIFICANT CHANGE UP (ref 135–145)
SODIUM SERPL-SCNC: 137 MMOL/L — SIGNIFICANT CHANGE UP (ref 135–145)
WBC # BLD: 5.44 K/UL — SIGNIFICANT CHANGE UP (ref 3.8–10.5)
WBC # BLD: 5.99 K/UL — SIGNIFICANT CHANGE UP (ref 3.8–10.5)
WBC # FLD AUTO: 5.44 K/UL — SIGNIFICANT CHANGE UP (ref 3.8–10.5)
WBC # FLD AUTO: 5.99 K/UL — SIGNIFICANT CHANGE UP (ref 3.8–10.5)

## 2021-09-16 PROCEDURE — 85730 THROMBOPLASTIN TIME PARTIAL: CPT

## 2021-09-16 PROCEDURE — 87086 URINE CULTURE/COLONY COUNT: CPT

## 2021-09-16 PROCEDURE — 84484 ASSAY OF TROPONIN QUANT: CPT

## 2021-09-16 PROCEDURE — 80053 COMPREHEN METABOLIC PANEL: CPT

## 2021-09-16 PROCEDURE — 70551 MRI BRAIN STEM W/O DYE: CPT | Mod: 26

## 2021-09-16 PROCEDURE — 70498 CT ANGIOGRAPHY NECK: CPT | Mod: MA

## 2021-09-16 PROCEDURE — 93005 ELECTROCARDIOGRAM TRACING: CPT

## 2021-09-16 PROCEDURE — U0005: CPT

## 2021-09-16 PROCEDURE — 70496 CT ANGIOGRAPHY HEAD: CPT | Mod: MA

## 2021-09-16 PROCEDURE — 71045 X-RAY EXAM CHEST 1 VIEW: CPT

## 2021-09-16 PROCEDURE — 99285 EMERGENCY DEPT VISIT HI MDM: CPT | Mod: 25

## 2021-09-16 PROCEDURE — 70551 MRI BRAIN STEM W/O DYE: CPT

## 2021-09-16 PROCEDURE — 86769 SARS-COV-2 COVID-19 ANTIBODY: CPT

## 2021-09-16 PROCEDURE — 85610 PROTHROMBIN TIME: CPT

## 2021-09-16 PROCEDURE — 0042T: CPT

## 2021-09-16 PROCEDURE — 93306 TTE W/DOPPLER COMPLETE: CPT

## 2021-09-16 PROCEDURE — 37195 THROMBOLYTIC THERAPY STROKE: CPT

## 2021-09-16 PROCEDURE — 84100 ASSAY OF PHOSPHORUS: CPT

## 2021-09-16 PROCEDURE — 99223 1ST HOSP IP/OBS HIGH 75: CPT

## 2021-09-16 PROCEDURE — U0003: CPT

## 2021-09-16 PROCEDURE — 82962 GLUCOSE BLOOD TEST: CPT

## 2021-09-16 PROCEDURE — 83735 ASSAY OF MAGNESIUM: CPT

## 2021-09-16 PROCEDURE — 99233 SBSQ HOSP IP/OBS HIGH 50: CPT

## 2021-09-16 PROCEDURE — 97163 PT EVAL HIGH COMPLEX 45 MIN: CPT

## 2021-09-16 PROCEDURE — 93010 ELECTROCARDIOGRAM REPORT: CPT

## 2021-09-16 PROCEDURE — 87040 BLOOD CULTURE FOR BACTERIA: CPT

## 2021-09-16 PROCEDURE — 95816 EEG AWAKE AND DROWSY: CPT

## 2021-09-16 PROCEDURE — 99291 CRITICAL CARE FIRST HOUR: CPT

## 2021-09-16 PROCEDURE — 95720 EEG PHY/QHP EA INCR W/VEEG: CPT

## 2021-09-16 PROCEDURE — 80061 LIPID PANEL: CPT

## 2021-09-16 PROCEDURE — 92610 EVALUATE SWALLOWING FUNCTION: CPT

## 2021-09-16 PROCEDURE — 36415 COLL VENOUS BLD VENIPUNCTURE: CPT

## 2021-09-16 PROCEDURE — 83036 HEMOGLOBIN GLYCOSYLATED A1C: CPT

## 2021-09-16 PROCEDURE — 70450 CT HEAD/BRAIN W/O DYE: CPT | Mod: MA

## 2021-09-16 PROCEDURE — 81003 URINALYSIS AUTO W/O SCOPE: CPT

## 2021-09-16 PROCEDURE — 85025 COMPLETE CBC W/AUTO DIFF WBC: CPT

## 2021-09-16 RX ORDER — ATORVASTATIN CALCIUM 80 MG/1
1 TABLET, FILM COATED ORAL
Qty: 0 | Refills: 0 | DISCHARGE
Start: 2021-09-16

## 2021-09-16 RX ORDER — ALLOPURINOL 300 MG
1 TABLET ORAL
Qty: 0 | Refills: 0 | DISCHARGE

## 2021-09-16 RX ORDER — FOLIC ACID 0.8 MG
1 TABLET ORAL DAILY
Refills: 0 | Status: DISCONTINUED | OUTPATIENT
Start: 2021-09-16 | End: 2021-10-01

## 2021-09-16 RX ORDER — FEXOFENADINE HCL 30 MG
1 TABLET ORAL
Qty: 0 | Refills: 0 | DISCHARGE

## 2021-09-16 RX ORDER — LEVETIRACETAM 250 MG/1
500 TABLET, FILM COATED ORAL EVERY 12 HOURS
Refills: 0 | Status: DISCONTINUED | OUTPATIENT
Start: 2021-09-16 | End: 2021-09-16

## 2021-09-16 RX ORDER — ENOXAPARIN SODIUM 100 MG/ML
40 INJECTION SUBCUTANEOUS DAILY
Refills: 0 | Status: DISCONTINUED | OUTPATIENT
Start: 2021-09-16 | End: 2021-09-17

## 2021-09-16 RX ORDER — SODIUM CHLORIDE 9 MG/ML
1000 INJECTION INTRAMUSCULAR; INTRAVENOUS; SUBCUTANEOUS
Refills: 0 | Status: DISCONTINUED | OUTPATIENT
Start: 2021-09-16 | End: 2021-09-18

## 2021-09-16 RX ORDER — ATORVASTATIN CALCIUM 80 MG/1
80 TABLET, FILM COATED ORAL AT BEDTIME
Refills: 0 | Status: DISCONTINUED | OUTPATIENT
Start: 2021-09-16 | End: 2021-10-01

## 2021-09-16 RX ORDER — ENOXAPARIN SODIUM 100 MG/ML
40 INJECTION SUBCUTANEOUS
Qty: 0 | Refills: 0 | DISCHARGE
Start: 2021-09-16

## 2021-09-16 RX ORDER — ASPIRIN/CALCIUM CARB/MAGNESIUM 324 MG
1 TABLET ORAL
Qty: 0 | Refills: 0 | DISCHARGE

## 2021-09-16 RX ORDER — DEXMEDETOMIDINE HYDROCHLORIDE IN 0.9% SODIUM CHLORIDE 4 UG/ML
0.05 INJECTION INTRAVENOUS
Qty: 400 | Refills: 0 | Status: DISCONTINUED | OUTPATIENT
Start: 2021-09-16 | End: 2021-09-16

## 2021-09-16 RX ORDER — LACOSAMIDE 50 MG/1
100 TABLET ORAL ONCE
Refills: 0 | Status: DISCONTINUED | OUTPATIENT
Start: 2021-09-16 | End: 2021-09-30

## 2021-09-16 RX ORDER — ASPIRIN/CALCIUM CARB/MAGNESIUM 324 MG
300 TABLET ORAL DAILY
Refills: 0 | Status: DISCONTINUED | OUTPATIENT
Start: 2021-09-16 | End: 2021-09-17

## 2021-09-16 RX ORDER — LEVETIRACETAM 250 MG/1
1000 TABLET, FILM COATED ORAL ONCE
Refills: 0 | Status: DISCONTINUED | OUTPATIENT
Start: 2021-09-16 | End: 2021-09-16

## 2021-09-16 RX ORDER — ASPIRIN/CALCIUM CARB/MAGNESIUM 324 MG
81 TABLET ORAL DAILY
Refills: 0 | Status: DISCONTINUED | OUTPATIENT
Start: 2021-09-16 | End: 2021-09-16

## 2021-09-16 RX ORDER — VENETOCLAX 100 MG/1
3 TABLET, FILM COATED ORAL
Qty: 0 | Refills: 0 | DISCHARGE

## 2021-09-16 RX ADMIN — SODIUM CHLORIDE 50 MILLILITER(S): 9 INJECTION INTRAMUSCULAR; INTRAVENOUS; SUBCUTANEOUS at 18:20

## 2021-09-16 RX ADMIN — DEXMEDETOMIDINE HYDROCHLORIDE IN 0.9% SODIUM CHLORIDE 0.9 MICROGRAM(S)/KG/HR: 4 INJECTION INTRAVENOUS at 07:48

## 2021-09-16 RX ADMIN — ENOXAPARIN SODIUM 40 MILLIGRAM(S): 100 INJECTION SUBCUTANEOUS at 12:09

## 2021-09-16 NOTE — DISCHARGE NOTE NURSING/CASE MANAGEMENT/SOCIAL WORK - PATIENT PORTAL LINK FT
You can access the FollowMyHealth Patient Portal offered by [facility] by registering at the following website: [email or web address]. By joining [facility]’s FollowMyHealth portal, you will also be able to view your health information using other applications (apps) compatible with our system.

## 2021-09-16 NOTE — H&P ADULT - NSHPPHYSICALEXAM_GEN_ALL_CORE
GENERAL EXAM:  Constitutional: Not awake.   HEENT: PERRL, pupils symmetric b/l.       NEUROLOGICAL EXAM:  MS:  Not awake, limited due to sedation given   CN: Pupils symmetric b/l.   V1-3 intact, no facial asymmetry,   Motor: Strength: 5/5 4x.   Tone: normal. Bulk: normal.   DTR 2+ symm.    Plantar flex b/l.   Sensation: intact to 4x.   Coordination: deferred   Gait:  deferred     NIHSS  mRS GENERAL EXAM:  Constitutional: Not awake.   HEENT: PERRL, pupils symmetric b/l.       NEUROLOGICAL EXAM:  MS:  Not awake, limited due to sedation given   CN: Pupils symmetric b/l.   V1-3 intact, no facial asymmetry,   Motor: Strength: all extremities are antigravity. Spontaneous movement noted.   Tone: normal. Bulk: normal.    Plantar flex b/l.   Sensation: intact to 4x.   Coordination: deferred   Gait:  deferred

## 2021-09-16 NOTE — PHYSICAL THERAPY INITIAL EVALUATION ADULT - IMPAIRMENTS FOUND, PT EVAL
arousal, attention, and cognition/cognitive impairment/decreased midline orientation/fine motor/gait, locomotion, and balance/gross motor/muscle strength/neuromotor development and sensory integration/tone

## 2021-09-16 NOTE — PROGRESS NOTE ADULT - SUBJECTIVE AND OBJECTIVE BOX
Patient is a 66y old  Male who presents with a chief complaint of CVA, slurred speech, Rt side weakness (16 Sep 2021 12:47)      INTERVAL /OVERNIGHT EVENTS: resting in bed    MEDICATIONS  (STANDING):  atorvastatin 80 milliGRAM(s) Oral at bedtime  chlorhexidine 4% Liquid 1 Application(s) Topical <User Schedule>  enoxaparin Injectable 40 milliGRAM(s) SubCutaneous daily  influenza   Vaccine 0.5 milliLiter(s) IntraMuscular once    MEDICATIONS  (PRN):      Allergies    No Known Allergies    Intolerances        REVIEW OF SYSTEMS:  unable to obtain    Vital Signs Last 24 Hrs  T(C): 36.7 (16 Sep 2021 15:40), Max: 38.3 (15 Sep 2021 18:00)  T(F): 98.1 (16 Sep 2021 15:40), Max: 100.9 (15 Sep 2021 18:00)  HR: 74 (16 Sep 2021 15:40) (64 - 105)  BP: 146/83 (16 Sep 2021 15:40) (96/52 - 169/88)  BP(mean): 80 (16 Sep 2021 14:00) (69 - 113)  RR: 18 (16 Sep 2021 15:40) (16 - 221)  SpO2: 97% (16 Sep 2021 15:40) (93% - 98%)    PHYSICAL EXAM:  GENERAL: NAD, well-groomed, well-developed  HEAD:  Atraumatic, Normocephalic  EYES: EOMI, PERRLA, conjunctiva and sclera clear  ENMT: No tonsillar erythema, exudates, or enlargement; Moist mucous membranes, Good dentition, No lesions  NECK: Supple, No JVD, Normal thyroid  NERVOUS SYSTEM:  unable to perform  CHEST/LUNG: Clear to auscultation bilaterally; No rales, rhonchi, wheezing, or rubs  HEART: Regular rate and rhythm; No murmurs, rubs, or gallops  ABDOMEN: Soft, Nontender, Nondistended; Bowel sounds present  EXTREMITIES:  2+ Peripheral Pulses, No clubbing, cyanosis, or edema  LYMPH: No lymphadenopathy noted  SKIN: No rashes or lesions    LABS:                        12.6   5.44  )-----------( 272      ( 16 Sep 2021 06:07 )             37.1     16 Sep 2021 06:07    137    |  106    |  17     ----------------------------<  107    3.7     |  26     |  0.98     Ca    9.1        16 Sep 2021 06:07  Phos  2.8       16 Sep 2021 06:07  Mg     2.3       16 Sep 2021 06:07    TPro  7.5    /  Alb  3.9    /  TBili  2.6    /  DBili  x      /  AST  29     /  ALT  23     /  AlkPhos  74     16 Sep 2021 06:07    PT/INR - ( 14 Sep 2021 17:52 )   PT: 11.6 sec;   INR: 0.99 ratio         PTT - ( 14 Sep 2021 17:52 )  PTT:33.5 sec  Urinalysis Basic - ( 14 Sep 2021 18:56 )    Color: Yellow / Appearance: Clear / S.010 / pH: x  Gluc: x / Ketone: Negative  / Bili: Negative / Urobili: Negative   Blood: x / Protein: Negative / Nitrite: Negative   Leuk Esterase: Negative / RBC: x / WBC x   Sq Epi: x / Non Sq Epi: x / Bacteria: x      CAPILLARY BLOOD GLUCOSE          RADIOLOGY & ADDITIONAL TESTS:    Notes Reviewed:  [x ] YES  [ ] NO    Care Discussed with Consultants/Other Providers [x ] YES  [ ] NO

## 2021-09-16 NOTE — PHYSICAL THERAPY INITIAL EVALUATION ADULT - PHYSICAL ASSIST/NONPHYSICAL ASSIST: SUPINE/SIT, REHAB EVAL
Please see patient MyChart message inquiring about referral to dietician. A referral to [facility] was offered in last result note from provider. Please advise.     Ady GEORGE RN     set-up required/verbal cues/nonverbal cues (demo/gestures)/1 person + 1 person to manage equipment

## 2021-09-16 NOTE — H&P ADULT - ATTENDING COMMENTS
history as above with addendum:  progressive change in MS for the last 1-2 months with sudden onset aphasia/hemiparesis on 9/14. treated as stroke and received tPA.  MRI and vascular imaging normal  no change in MS    transferred for further management  will be on EEG  no AEDs  will consider additional etiology for his MS like autoimmune /paraneoplastic due to his history of CLL

## 2021-09-16 NOTE — PROGRESS NOTE ADULT - ASSESSMENT
65 y/o man w CLL first dx'd 1/1991 post multiple regimens presently on Venetoclax since 11/2020, followed by our office and Dr Theresa Haji at [facility], frequent sinus infections on IVIG, adm w acute onset of confusion/lethargy/right arm weakness/slur speech, post stroke protocol, no improvmeent in sx.  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 CBC had showned progressive macrocytic anemia, bone marrow w CLL and new del 17p in add to  the knwon del 11q and del 13q. 9/2020-2/20221 on Gazyva/ibrutinib, 11/2021 started on Venetoclax.  Last seen in office 9/3/21 wbc 4.4, Hgb 14.7 mcv 101.2 plts 237    CTA head here no acute ds, CT perfusion w multi areas of incr time to max, MRI recommended    -acute neuro event w/o clear radiographic deficits, post stroke protocol no improvement. Unlikely due to chemo regimen, has been on Venetoclax since 11/2020, ?immune sequelae of 3rd COVID vaccine  -continue acute care  -Neurology following with mild improvement today.  EEG abnormal with left hemisphere slowing. Being transferred to [place] for additional evaluation   -CBC stable  have reached out to Dr. Haji for additional evaluation at [place]

## 2021-09-16 NOTE — H&P ADULT - ASSESSMENT
Patient is a 67 yo Rt handed male PMH CLL on Venetoclax, MDS, melanoma, paroxysmal AFib (not on AC) presenting as transfer from [place] for event capture. Patient was initially at  [facility] on 9/14 with confusion, aphasia and R sided weakness. With Brain MRI negative. EEG note to show discharges from Left hemisphere with RUE spasticity There is concern for subclinical seizures thus transferred to [facility] for event capture.     Impression: RUE spasiticity with AMS transferred for event capture   Plan:   [x]Admit to EMU    [x]vEEG   [x]Hold on starting AED's   [x]seizure precautions   [x]telemetry   [x]baseline EKG  [x]Continue routine labs   []heme/onc to continue to follow per consult note from [facility]  [x]Rescue: 1st-Ativan 1mg, 2nd-Vimpat 100mg IV if GTC refractory to ativan  [x]continue on MIVF at 50cc/hr   []speech eval   [x]DVt ppx     Case to be seen with Attending Neurologist, Dr. Azul.  Patient is a 67 yo Rt handed male PMH CLL on Venetoclax, MDS, melanoma, paroxysmal AFib (not on AC) presenting as transfer from [place] for event capture. Patient was initially at  [facility] [place] on 9/14 with confusion, aphasia and R sided weakness. With Brain MRI negative. EEG note to show discharges from Left hemisphere with RUE spasticity There is concern for subclinical seizures thus transferred to [facility] for event capture. Heme/onc to follow in the AM and will address home chemo medications in the AM.   Patient had 3rd Covid vaccine in August.       Impression: RUE spasiticity with AMS transferred for event capture   Plan:   [x]Admit to EMU    [x]vEEG   [x]Hold on starting AED's   [x]seizure precautions   [x]telemetry   [x]baseline EKG  [x]Continue routine labs   []heme/onc to continue to follow tomorrow.   [x]Rescue: 1st-Ativan 1mg, 2nd-Vimpat 100mg IV if GTC refractory to ativan  [x]continue on MIVF at 50cc/hr   []speech eval   [x]DVt ppx     Case to be seen with Attending Neurologist, Dr. Azul.

## 2021-09-16 NOTE — PHYSICAL THERAPY INITIAL EVALUATION ADULT - RANGE OF MOTION EXAMINATION, REHAB EVAL
Right UE in flexor synergy pattern and hypertonicity noted, passively WFL but /c increased tone (shoulder internally rotation, adduction and elbow and wrist flexion), RLE WFL but noted /c some tone as well (passively WFL)/Left UE ROM was WFL (within functional limits)/Left LE ROM was WFL (within functional limits)/deficits as listed below

## 2021-09-16 NOTE — CHART NOTE - NSCHARTNOTEFT_GEN_A_CORE

## 2021-09-16 NOTE — PHYSICAL THERAPY INITIAL EVALUATION ADULT - GAIT DEVIATIONS NOTED, PT EVAL
ataxic and hemiplegic gait/decreased kalie/decreased velocity of limb motion/decreased step length/decreased stride length/decreased weight-shifting ability

## 2021-09-16 NOTE — PROGRESS NOTE ADULT - SUBJECTIVE AND OBJECTIVE BOX
Neurology follow up note    SARAHI [id number]      Interval History:    Patient resting in bed     Allergies    No Known Allergies    Intolerances        MEDICATIONS    atorvastatin 80 milliGRAM(s) Oral at bedtime  chlorhexidine 4% Liquid 1 Application(s) Topical <User Schedule>  dexMEDEtomidine Infusion 0.05 MICROgram(s)/kG/Hr IV Continuous <Continuous>  enoxaparin Injectable 40 milliGRAM(s) SubCutaneous daily  influenza   Vaccine 0.5 milliLiter(s) IntraMuscular once              Vital Signs Last 24 Hrs  T(C): 36.7 (16 Sep 2021 07:35), Max: 38.3 (15 Sep 2021 18:00)  T(F): 98 (16 Sep 2021 07:35), Max: 100.9 (15 Sep 2021 18:00)  HR: 76 (16 Sep 2021 09:31) (70 - 105)  BP: 148/84 (16 Sep 2021 09:31) (121/72 - 169/88)  BP(mean): 88 (16 Sep 2021 09:00) (88 - 113)  RR: 17 (16 Sep 2021 09:00) (17 - 221)  SpO2: 96% (16 Sep 2021 09:31) (93% - 99%)    REVIEW OF SYSTEMS:  Very limited secondary to the patient is having problems expressing himself.    PHYSICAL EXAMINATION:   HEENT:  Head:  Normocephalic, atraumatic.  Eyes:  No scleral icterus.  Ears:  Hearing appeared to be intact.  NECK:  Supple.  CARDIOVASCULAR:  S1 and S2 heard.  RESPIRATORY:  Good air entry bilaterally.  ABDOMEN:  Soft, nontender.  EXTREMITIES:  No clubbing or cyanosis was noted.      NEUROLOGIC:  The patient was awake and alert.  Secondary to having severe expressive aphasia would only say HI.  No other  words were articulated, at times just sounds.  The patient was able to follow some commands, was able to close his eyes and squeeze my hand.  Gaze: No gaze preference today  On/off blink to visual threat.  Had difficulty to try to ascertain any type of field cut.   no right facial droop was noted today.  Motor:  Positive right upper extremity weakness with stiffness would say overall strength was 2/5.  Left upper extremity was 4+/5.  Bilateral lower extremities were 4/5 with stimuli.  Sensory:   unreliable today Language:  The patient had severe aphasia and dysarthria.                  LABS:  CBC Full  -  ( 16 Sep 2021 06:07 )  WBC Count : 5.44 K/uL  RBC Count : 3.70 M/uL  Hemoglobin : 12.6 g/dL  Hematocrit : 37.1 %  Platelet Count - Automated : 272 K/uL  Mean Cell Volume : 100.3 fl  Mean Cell Hemoglobin : 34.1 pg  Mean Cell Hemoglobin Concentration : 34.0 gm/dL  Auto Neutrophil # : 2.94 K/uL  Auto Lymphocyte # : 0.60 K/uL  Auto Monocyte # : 1.90 K/uL  Auto Eosinophil # : 0.00 K/uL  Auto Basophil # : 0.00 K/uL  Auto Neutrophil % : 52.0 %  Auto Lymphocyte % : 11.0 %  Auto Monocyte % : 35.0 %  Auto Eosinophil % : 0.0 %  Auto Basophil % : 0.0 %    Urinalysis Basic - ( 14 Sep 2021 18:56 )    Color: Yellow / Appearance: Clear / S.010 / pH: x  Gluc: x / Ketone: Negative  / Bili: Negative / Urobili: Negative   Blood: x / Protein: Negative / Nitrite: Negative   Leuk Esterase: Negative / RBC: x / WBC x   Sq Epi: x / Non Sq Epi: x / Bacteria: x          137  |  106  |  17  ----------------------------<  107<H>  3.7   |  26  |  0.98    Ca    9.1      16 Sep 2021 06:07  Phos  2.8       Mg     2.3         TPro  7.5  /  Alb  3.9  /  TBili  2.6<H>  /  DBili  x   /  AST  29  /  ALT  23  /  AlkPhos  74  16    Hemoglobin A1C:   Lipid Panel 09-15 @ 11:20  Total Cholesterol, Serum 189  LDL --  Triglycerides 130    LIVER FUNCTIONS - ( 16 Sep 2021 06:07 )  Alb: 3.9 g/dL / Pro: 7.5 g/dL / ALK PHOS: 74 U/L / ALT: 23 U/L / AST: 29 U/L / GGT: x           Vitamin B12   PT/INR - ( 14 Sep 2021 17:52 )   PT: 11.6 sec;   INR: 0.99 ratio         PTT - ( 14 Sep 2021 17:52 )  PTT:33.5 sec      RADIOLOGY      Greater than 45 minutes of time was spent with the patient, plan of care, reviewing data, with greater than 50% of the visit was spent counseling and/or coordinating care with multidisciplinary healthcare team   Neurology follow up note    SARAHI [id number]      Interval History:    Patient resting in bed     Allergies    No Known Allergies    Intolerances        MEDICATIONS    atorvastatin 80 milliGRAM(s) Oral at bedtime  chlorhexidine 4% Liquid 1 Application(s) Topical <User Schedule>  dexMEDEtomidine Infusion 0.05 MICROgram(s)/kG/Hr IV Continuous <Continuous>  enoxaparin Injectable 40 milliGRAM(s) SubCutaneous daily  influenza   Vaccine 0.5 milliLiter(s) IntraMuscular once              Vital Signs Last 24 Hrs  T(C): 36.7 (16 Sep 2021 07:35), Max: 38.3 (15 Sep 2021 18:00)  T(F): 98 (16 Sep 2021 07:35), Max: 100.9 (15 Sep 2021 18:00)  HR: 76 (16 Sep 2021 09:31) (70 - 105)  BP: 148/84 (16 Sep 2021 09:31) (121/72 - 169/88)  BP(mean): 88 (16 Sep 2021 09:00) (88 - 113)  RR: 17 (16 Sep 2021 09:00) (17 - 221)  SpO2: 96% (16 Sep 2021 09:31) (93% - 99%)    REVIEW OF SYSTEMS:  Very limited secondary to the patient is having problems expressing himself.    PHYSICAL EXAMINATION:   HEENT:  Head:  Normocephalic, atraumatic.  Eyes:  No scleral icterus.  Ears:  Hearing appeared to be intact.  NECK:  Supple.  CARDIOVASCULAR:  S1 and S2 heard.  RESPIRATORY:  Good air entry bilaterally.  ABDOMEN:  Soft, nontender.  EXTREMITIES:  No clubbing or cyanosis was noted.      NEUROLOGIC:  The patient was awake and alert.  Secondary to having severe expressive aphasia would only say HI.  No other  words were articulated, at times just sounds.  The patient was able to follow some commands, was able to close his eyes and squeeze my hand.  Gaze: No gaze preference today  On/off blink to visual threat.  Had difficulty to try to ascertain any type of field cut.   no right facial droop was noted today.  Motor:  Positive right upper extremity weakness with  less stiffness would say overall strength was 1/5.  Left upper extremity was 4+/5.  Bilateral lower extremities were 4/5 with stimuli.  Sensory:   unreliable today Language:  The patient had severe aphasia and dysarthria.                  LABS:  CBC Full  -  ( 16 Sep 2021 06:07 )  WBC Count : 5.44 K/uL  RBC Count : 3.70 M/uL  Hemoglobin : 12.6 g/dL  Hematocrit : 37.1 %  Platelet Count - Automated : 272 K/uL  Mean Cell Volume : 100.3 fl  Mean Cell Hemoglobin : 34.1 pg  Mean Cell Hemoglobin Concentration : 34.0 gm/dL  Auto Neutrophil # : 2.94 K/uL  Auto Lymphocyte # : 0.60 K/uL  Auto Monocyte # : 1.90 K/uL  Auto Eosinophil # : 0.00 K/uL  Auto Basophil # : 0.00 K/uL  Auto Neutrophil % : 52.0 %  Auto Lymphocyte % : 11.0 %  Auto Monocyte % : 35.0 %  Auto Eosinophil % : 0.0 %  Auto Basophil % : 0.0 %    Urinalysis Basic - ( 14 Sep 2021 18:56 )    Color: Yellow / Appearance: Clear / S.010 / pH: x  Gluc: x / Ketone: Negative  / Bili: Negative / Urobili: Negative   Blood: x / Protein: Negative / Nitrite: Negative   Leuk Esterase: Negative / RBC: x / WBC x   Sq Epi: x / Non Sq Epi: x / Bacteria: x          137  |  106  |  17  ----------------------------<  107<H>  3.7   |  26  |  0.98    Ca    9.1      16 Sep 2021 06:07  Phos  2.8       Mg     2.3         TPro  7.5  /  Alb  3.9  /  TBili  2.6<H>  /  DBili  x   /  AST  29  /  ALT  23  /  AlkPhos  74  16    Hemoglobin A1C:   Lipid Panel 09-15 @ 11:20  Total Cholesterol, Serum 189  LDL --  Triglycerides 130    LIVER FUNCTIONS - ( 16 Sep 2021 06:07 )  Alb: 3.9 g/dL / Pro: 7.5 g/dL / ALK PHOS: 74 U/L / ALT: 23 U/L / AST: 29 U/L / GGT: x           Vitamin B12   PT/INR - ( 14 Sep 2021 17:52 )   PT: 11.6 sec;   INR: 0.99 ratio         PTT - ( 14 Sep 2021 17:52 )  PTT:33.5 sec      RADIOLOGY  ANALYSIS AND PLAN:  This is a 66-year-old with  h/o , right hemiparesis, and expressive aphasia.    Clinical impression at present unknown but with patient having continuous symptoms and with a normal mri and EEG showing continuous focal slowing in left hemisphere question underlying seizure   activity  will repeat EEG today no technician tomorrow will start on keppra boulus 1000mg now then 500 bid would if possible transfer for continuos EEG monitoring   The patient will be admitted to ICU setting.  plan for EEG   h/o temperatures infection work and agree with empiric antibiotics if need no new temperatures      Spoke with spouse, Javier,    She understands the reasoning and thought process.  Home number is [phone], cell number is [phone].  Advance care directives were discussed.    Greater than 30 minutes of critical care time spent with patient and plan of care.    Thank you for the courtesy of consultation.     Neurology follow up note    SARAHI [id number]      Interval History:    Patient resting in bed     Allergies    No Known Allergies    Intolerances        MEDICATIONS    atorvastatin 80 milliGRAM(s) Oral at bedtime  chlorhexidine 4% Liquid 1 Application(s) Topical <User Schedule>  dexMEDEtomidine Infusion 0.05 MICROgram(s)/kG/Hr IV Continuous <Continuous>  enoxaparin Injectable 40 milliGRAM(s) SubCutaneous daily  influenza   Vaccine 0.5 milliLiter(s) IntraMuscular once              Vital Signs Last 24 Hrs  T(C): 36.7 (16 Sep 2021 07:35), Max: 38.3 (15 Sep 2021 18:00)  T(F): 98 (16 Sep 2021 07:35), Max: 100.9 (15 Sep 2021 18:00)  HR: 76 (16 Sep 2021 09:31) (70 - 105)  BP: 148/84 (16 Sep 2021 09:31) (121/72 - 169/88)  BP(mean): 88 (16 Sep 2021 09:00) (88 - 113)  RR: 17 (16 Sep 2021 09:00) (17 - 221)  SpO2: 96% (16 Sep 2021 09:31) (93% - 99%)    REVIEW OF SYSTEMS:  Very limited secondary to the patient is having problems expressing himself.    PHYSICAL EXAMINATION:   HEENT:  Head:  Normocephalic, atraumatic.  Eyes:  No scleral icterus.  Ears:  Hearing appeared to be intact.  NECK:  Supple.  CARDIOVASCULAR:  S1 and S2 heard.  RESPIRATORY:  Good air entry bilaterally.  ABDOMEN:  Soft, nontender.  EXTREMITIES:  No clubbing or cyanosis was noted.      NEUROLOGIC:  The patient was awake and alert.  Secondary to having severe expressive aphasia would only say HI.  No other  words were articulated, at times just sounds.  The patient was able to follow some commands, was able to close his eyes and squeeze my hand.  Gaze: No gaze preference today  On/off blink to visual threat.  Had difficulty to try to ascertain any type of field cut.   no right facial droop was noted today.  Motor:  Positive right upper extremity weakness with  less stiffness would say overall strength was 1/5.  Left upper extremity was 4+/5.  Bilateral lower extremities were 4/5 with stimuli.  Sensory:   unreliable today Language:  The patient had severe aphasia and dysarthria.                  LABS:  CBC Full  -  ( 16 Sep 2021 06:07 )  WBC Count : 5.44 K/uL  RBC Count : 3.70 M/uL  Hemoglobin : 12.6 g/dL  Hematocrit : 37.1 %  Platelet Count - Automated : 272 K/uL  Mean Cell Volume : 100.3 fl  Mean Cell Hemoglobin : 34.1 pg  Mean Cell Hemoglobin Concentration : 34.0 gm/dL  Auto Neutrophil # : 2.94 K/uL  Auto Lymphocyte # : 0.60 K/uL  Auto Monocyte # : 1.90 K/uL  Auto Eosinophil # : 0.00 K/uL  Auto Basophil # : 0.00 K/uL  Auto Neutrophil % : 52.0 %  Auto Lymphocyte % : 11.0 %  Auto Monocyte % : 35.0 %  Auto Eosinophil % : 0.0 %  Auto Basophil % : 0.0 %    Urinalysis Basic - ( 14 Sep 2021 18:56 )    Color: Yellow / Appearance: Clear / S.010 / pH: x  Gluc: x / Ketone: Negative  / Bili: Negative / Urobili: Negative   Blood: x / Protein: Negative / Nitrite: Negative   Leuk Esterase: Negative / RBC: x / WBC x   Sq Epi: x / Non Sq Epi: x / Bacteria: x          137  |  106  |  17  ----------------------------<  107<H>  3.7   |  26  |  0.98    Ca    9.1      16 Sep 2021 06:07  Phos  2.8       Mg     2.3         TPro  7.5  /  Alb  3.9  /  TBili  2.6<H>  /  DBili  x   /  AST  29  /  ALT  23  /  AlkPhos  74  16    Hemoglobin A1C:   Lipid Panel 09-15 @ 11:20  Total Cholesterol, Serum 189  LDL --  Triglycerides 130    LIVER FUNCTIONS - ( 16 Sep 2021 06:07 )  Alb: 3.9 g/dL / Pro: 7.5 g/dL / ALK PHOS: 74 U/L / ALT: 23 U/L / AST: 29 U/L / GGT: x           Vitamin B12   PT/INR - ( 14 Sep 2021 17:52 )   PT: 11.6 sec;   INR: 0.99 ratio         PTT - ( 14 Sep 2021 17:52 )  PTT:33.5 sec      RADIOLOGY  ANALYSIS AND PLAN:  This is a 66-year-old with  h/o , right hemiparesis, and expressive aphasia.    Clinical impression at present unknown but with patient having continuous symptoms and with a normal mri and EEG showing continuous focal slowing in left hemisphere question underlying seizure   activity  will repeat EEG today no technician tomorrow will start on keppra boulus 1000mg now then 500 bid would if possible transfer for continuos EEG monitoring   plan for repeat EEG today if possible    h/o temperatures infection work and agree with empiric antibiotics if needed  no new temperatures      Spoke with spouse, Javier,  9/15  called today no response   She understands the reasoning and thought process.  Home number is [phone], cell number is [phone].  Advance care directives were discussed.    Greater than 30 minutes of critical care time spent with patient and plan of care.    Thank you for the courtesy of consultation.

## 2021-09-16 NOTE — PROGRESS NOTE ADULT - SUBJECTIVE AND OBJECTIVE BOX
Interval History:  events of above noted.  MRI negative for stroke but abnormal EEG.  being transferred to [place] for additional evaluation and treatment    Chart reviewed and events noted;   Overnight events:    MEDICATIONS  (STANDING):  atorvastatin 80 milliGRAM(s) Oral at bedtime  chlorhexidine 4% Liquid 1 Application(s) Topical <User Schedule>  enoxaparin Injectable 40 milliGRAM(s) SubCutaneous daily  influenza   Vaccine 0.5 milliLiter(s) IntraMuscular once    MEDICATIONS  (PRN):      Vital Signs Last 24 Hrs  T(C): 36.7 (16 Sep 2021 07:35), Max: 38.3 (15 Sep 2021 18:00)  T(F): 98 (16 Sep 2021 07:35), Max: 100.9 (15 Sep 2021 18:00)  HR: 76 (16 Sep 2021 09:31) (72 - 105)  BP: 148/84 (16 Sep 2021 09:31) (121/72 - 169/88)  BP(mean): 88 (16 Sep 2021 09:00) (88 - 113)  RR: 17 (16 Sep 2021 09:00) (17 - 221)  SpO2: 96% (16 Sep 2021 09:31) (93% - 98%)    PHYSICAL EXAM  General: adult in NAD  HEENT: clear oropharynx, anicteric sclera, pink conjunctivae  Neck: supple  CV: normal S1S2 with no murmur rubs or gallops  Lungs: clear to auscultation, no wheezes, no rhales  Abdomen: soft non-tender non-distended, no hepato/splenomegaly  Ext: no clubbing cyanosis or edema  Skin: no rashes and no petichiae  Neuro: alert. aphasic right arm paresis.  speech is mildly improved      LABS:  CBC Full  -  ( 16 Sep 2021 06:07 )  WBC Count : 5.44 K/uL  RBC Count : 3.70 M/uL  Hemoglobin : 12.6 g/dL  Hematocrit : 37.1 %  Platelet Count - Automated : 272 K/uL  Mean Cell Volume : 100.3 fl  Mean Cell Hemoglobin : 34.1 pg  Mean Cell Hemoglobin Concentration : 34.0 gm/dL  Auto Neutrophil # : 2.94 K/uL  Auto Lymphocyte # : 0.60 K/uL  Auto Monocyte # : 1.90 K/uL  Auto Eosinophil # : 0.00 K/uL  Auto Basophil # : 0.00 K/uL  Auto Neutrophil % : 52.0 %  Auto Lymphocyte % : 11.0 %  Auto Monocyte % : 35.0 %  Auto Eosinophil % : 0.0 %  Auto Basophil % : 0.0 %    09-16    137  |  106  |  17  ----------------------------<  107<H>  3.7   |  26  |  0.98    Ca    9.1      16 Sep 2021 06:07  Phos  2.8     09-16  Mg     2.3     09-16    TPro  7.5  /  Alb  3.9  /  TBili  2.6<H>  /  DBili  x   /  AST  29  /  ALT  23  /  AlkPhos  74  09-16    PT/INR - ( 14 Sep 2021 17:52 )   PT: 11.6 sec;   INR: 0.99 ratio         PTT - ( 14 Sep 2021 17:52 )  PTT:33.5 sec    fe studies      WBC trend  5.44 K/uL (09-16-21 @ 06:07)  5.94 K/uL (09-15-21 @ 06:05)  3.76 K/uL (09-14-21 @ 17:52)      Hgb trend  12.6 g/dL (09-16-21 @ 06:07)  12.9 g/dL (09-15-21 @ 06:05)  11.9 g/dL (09-14-21 @ 17:52)      plt trend  272 K/uL (09-16-21 @ 06:07)  318 K/uL (09-15-21 @ 06:05)  290 K/uL (09-14-21 @ 17:52)        RADIOLOGY & ADDITIONAL STUDIES:  < from: MR Head No Cont (09.16.21 @ 10:28) >  EXAM:  MR BRAIN                            PROCEDURE DATE:  09/16/2021          INTERPRETATION:  Clinical indication: Status post TPA    MRI of the brain was performed using sagittal T1, axial T1 and T2 T2 FLAIR diffusion and gradient echo sequence.    This exam is compared with prior noncontrast head CT performed on September 15, 2021.    The lateral ventricles have a normal configuration    There is no evidence acute hemorrhage mass mass effect identified.    Evaluation of the diffusion weighted sequence demonstrates no abnormal areas of restricted diffusion to suggest acute infarct.    Partial empty is sella seen.    The large vessels demonstrate normal flow voids    Bilateral maxillary, ethmoid and frontal sinus mucosal thickening is seen.    Postop changes compatible with bilateral calcific surgery and scleral banding is seen.    IMPRESSION: No acute territorial infarcts seen.    --- End of Report ---            MITCHELL BOYLE MD; Attending Radiologist  This document has been electronically signed. Sep 16 2021 10:42AM    < end of copied text >

## 2021-09-16 NOTE — PHYSICAL THERAPY INITIAL EVALUATION ADULT - PERTINENT HX OF CURRENT PROBLEM, REHAB EVAL
As per H&P:"SARAHI BARNHART is a 67 YO Male brought to ED complaining of altered mental status with slurred speech and right side weakness."

## 2021-09-16 NOTE — PROGRESS NOTE ADULT - ASSESSMENT
67 y/o male with PMHx CLL, anemia, melanoma, osteopenia admitted for CVA workup and speech deficits.     #Neuro  - s/p Alteplase tx   - Repeat CT scan 9/15 shows no evidence of ischemia or bleed  - MRI scheduled for afternoon today  - If no signs of bleeding will start ASA 81  - Continue w/ Atorvastatin  80mg   - Speech eval ordered  - Dysphagia screening recc soft diet with thin liquids  - PT/OT ordered     #Cardio  - Maintain SBP <180 and Diastolic < 105   - follow up TTE and EKG results to r/o cardiogenic etiology     #Pulm  - No active issues     #ID  - Low grade temp 100F  - Follow CBC/WBC  - UA negative  - BCx ordered, will f/u results   - Consider LP given fever and AMS     #GI  - Dysphasia 3 - soft thin liquids   - No active issues    #Renal  - No active issues  - follow up BMP     #Endo  - No active issues   - Lipid panel - no significant findings   - HgbA1C- 5.5     #Heme   - Started on Lovenox 40mg subq qd   65 y/o male with PMHx CLL, anemia, melanoma, osteopenia admitted for CVA workup and speech deficits.     #Neuro  - s/p Alteplase tx   - As per neuro- suspicion of L MCA stroke given global aphasia and signs on unilateral extremity weakness   - Repeat CT scan 9/15 shows no evidence of ischemia or bleed  - MRI performed, pending results  - If no signs of bleeding will start on full dose AC and ASA 81  - EEG reveals - presence of continuous focal slowing in the left hemisphere with higher amplitude theta and delta waves. Suggests an underlying focal abnormality.  - Continue w/ Atorvastatin  80mg   - Speech eval ordered  - Dysphagia screening recc soft diet with thin liquids  - PT/OT following - will see patient 24hrs after tPA  #Cardio  - Maintain SBP <180 and Diastolic < 105   - follow up TTE and EKG results to r/o cardiogenic etiology     #Pulm  - No active issues     #ID  - Low grade temp 100F  - Follow CBC/WBC  - UA negative  - BCx ordered, will f/u results   - Less likely to perform LP given EEG results indicating less infectious etiology of AMS     #GI  - Dysphasia 3 - soft thin liquids   - No active issues    #Renal  - No active issues  - follow up BMP     #Endo  - No active issues   - Lipid panel - no significant findings   - HgbA1C- 5.5     #Heme   - on Lovenox 40mg subq qd, will change to full dose after MRI

## 2021-09-16 NOTE — PHYSICAL THERAPY INITIAL EVALUATION ADULT - MANUAL MUSCLE TESTING RESULTS, REHAB EVAL
LUE/LLE >4+/5, Right shoulder < 3-/5, elbow to hand 1 to 2-/5 grossly; RLE </ 2-/5/grossly assessed due to

## 2021-09-16 NOTE — PROGRESS NOTE ADULT - REASON FOR ADMISSION
CVA, slurred speech, Rt side weakness

## 2021-09-16 NOTE — DISCHARGE NOTE PROVIDER - NSDCMRMEDTOKEN_GEN_ALL_CORE_FT
atorvastatin 80 mg oral tablet: 1 tab(s) orally once a day (at bedtime)  enoxaparin: 40 unit(s) subcutaneous once a day  folic acid 1 mg oral tablet: 1 tab(s) orally once a day

## 2021-09-16 NOTE — DISCHARGE NOTE PROVIDER - CARE PROVIDER_API CALL
Mirian Haji (MD; MS)  Hematology; Medical Oncology  [address] [place] [address]  Phone: [phone]  Fax: [phone]  Follow Up Time:

## 2021-09-16 NOTE — PHYSICAL THERAPY INITIAL EVALUATION ADULT - ADDITIONAL COMMENTS
Pt /c expressive vs. receptive aphasia noted. Difficult to assess due to cognition. As per EMR, pt life with his wife in a private home and was independent /c all functional mobility and ADLs prior to admission.

## 2021-09-16 NOTE — H&P ADULT - NSHPLABSRESULTS_GEN_ALL_CORE
Labs:   cbc                      12.6   5.44  )-----------( 272      ( 16 Sep 2021 06:07 )             37.1     [id number]    137  |  106  |  17  ----------------------------<  107<H>  3.7   |  26  |  0.98    Ca    9.1      16 Sep 2021 06:07  Phos  2.8     09-16  Mg     2.3     09-16    TPro  7.5  /  Alb  3.9  /  TBili  2.6<H>  /  DBili  x   /  AST  29  /  ALT  23  /  AlkPhos  74  09-16    CoagsPT/INR - ( 14 Sep 2021 17:52 )   PT: 11.6 sec;   INR: 0.99 ratio         PTT - ( 14 Sep 2021 17:52 )  PTT:33.5 sec  Lipids09-15 Chol 189 LDL -- HDL 47 Trig 130  A1C  CardiacMarkersCARDIAC MARKERS ( 14 Sep 2021 17:52 )  <.015 ng/mL / x     / x     / x     / x          LFTsLIVER FUNCTIONS - ( 16 Sep 2021 06:07 )  Alb: 3.9 g/dL / Pro: 7.5 g/dL / ALK PHOS: 74 U/L / ALT: 23 U/L / AST: 29 U/L / GGT: x             Radiology:  MRI head w/o contrast on 9/16: IMPRESSION: No acute territorial infarcts seen.  EEG:    EXAM:  EEG-AWAKE AND DROWSY        PROCEDURE DATE:  09/16/2021        INTERPRETATION:  GENERAL DESCRIPTION:  The EEG was recorded with a 32-channel digital EEG machine. Standard interval 10/20 lateral replacements were used.    CONDITIONS OF RECORDING: EEG was carried out with the patient in the sleeping state.    DESCRIPTION: The resting waking and background rhythms consist of moderate voltage, asymmetrical activity. The frequency spectrum shows a moderate amount of theta and beta activity and a small amount of delta activity from the rigth hemisphere. In the left hemisphere there was higher amplitude slower waves.  No clear posterior dominant  was seen.      There is intermittent focal slowing in the left hemisphere with higher amplitude delta waves.    Hyperventilation was not performed because a medical reasons. Intermittent photic stimulation from 2-20 flashes per second fails to elicit any abnormality.    There were no areas of epileptiform discharges or electrographic seizures.    IMPRESSION: Abnormal EEG due to the presence of focal slowing in the left hemisphere with higher amplitude theta and delta waves. This suggests an underlying focal abnormality. Recommend correlate with imaging if necessary. EEG done on 9/15/2021 showed continuous focal slowing with higher amplitude theta and delta waves from the left hemisphere.

## 2021-09-16 NOTE — PHYSICAL THERAPY INITIAL EVALUATION ADULT - IMPAIRMENTS CONTRIBUTING TO GAIT DEVIATIONS, PT EVAL
right UE > RLE/ataxic/impaired balance/cognition/impaired coordination/decreased flexibility/impaired motor control/abnormal muscle tone/narrow base of support/impaired postural control/decreased ROM/decreased strength

## 2021-09-16 NOTE — PHYSICAL THERAPY INITIAL EVALUATION ADULT - GENERAL OBSERVATIONS, REHAB EVAL
Pt rec'd in ICU bed, awake, Nsg student present. Pt is A&Ox2-3? Pt noted to have some expressive vs. receptive aphasia and difficulties getting words out. Pt was calm and cooperative and agreeable /c PT eval.

## 2021-09-16 NOTE — DISCHARGE NOTE PROVIDER - HOSPITAL COURSE
66M h/o CLL on Venetoclax, MDS, melanoma, paroxysmal AFib (not on AC) initially presenting 9/14 with aphasia and R sided deficits, s/p tPA. CTH, CTA, CT perfusion with no acute findings. MRI performed 9/16 with no ischemic findings. EEG performed 9/15 with diffuse L hemispheric slowing. Pt remains aphasic with AMS. +RUE spasticity. Neurology following, concerned for subclinical seizures, believed less likely to be encephalitis as cause of symptoms and given tPA no LP initially performed. To be transferred to EMU at [facility] for continuous EEG monitoring and ongoing w/u.

## 2021-09-16 NOTE — PROGRESS NOTE ADULT - SUBJECTIVE AND OBJECTIVE BOX
Patient is a 66y old  Male who presents with a chief complaint of CVA, slurred speech, Rt side weakness (15 Sep 2021 21:38)    24 hour events:     REVIEW OF SYSTEMS  Unable to attain due to speech deficits     T(F): 100 (21 @ 04:16), Max: 101.3 (09-15-21 @ 07:40)  HR: 80 (21 @ 07:00) (70 - 105)  BP: 135/67 (21 @ 07:00) (121/72 - 169/88)  RR: 17 (21 @ 07:00) (17 - 221)  SpO2: 96% (21 @ 07:00) (93% - 99%)  Wt(kg): --            I&O's Summary    09-15 @ 07:01  -   @ 07:00  --------------------------------------------------------  IN: 150 mL / OUT: 1180 mL / NET: -1030 mL      PHYSICAL EXAM  General:   CNS:   HEENT:   Resp:   CVS:   Abd:   Ext:   Skin:     MEDICATIONS      atorvastatin Oral      dexMEDEtomidine Infusion IV Continuous      enoxaparin Injectable SubCutaneous          influenza   Vaccine IntraMuscular    chlorhexidine 4% Liquid Topical                            12.6   5.44  )-----------( 272      ( 16 Sep 2021 06:07 )             37.1     Bands 2.0        137  |  106  |  17  ----------------------------<  107<H>  3.7   |  26  |  0.98    Ca    9.1      16 Sep 2021 06:07  Phos  2.8     -  Mg     2.3     -16    TPro  7.5  /  Alb  3.9  /  TBili  2.6<H>  /  DBili  x   /  AST  29  /  ALT  23  /  AlkPhos  74  09-16      CARDIAC MARKERS ( 14 Sep 2021 17:52 )  <.015 ng/mL / x     / x     / x     / x          PT/INR - ( 14 Sep 2021 17:52 )   PT: 11.6 sec;   INR: 0.99 ratio         PTT - ( 14 Sep 2021 17:52 )  PTT:33.5 sec  Urinalysis Basic - ( 14 Sep 2021 18:56 )    Color: Yellow / Appearance: Clear / S.010 / pH: x  Gluc: x / Ketone: Negative  / Bili: Negative / Urobili: Negative   Blood: x / Protein: Negative / Nitrite: Negative   Leuk Esterase: Negative / RBC: x / WBC x   Sq Epi: x / Non Sq Epi: x / Bacteria: x      Catheterized Catheterized   No growth -- 09-15 @ 01:23    CENTRAL LINE: N  COMER: Y  A-LINE: N    GLOBAL ISSUE/BEST PRACTICE  Analgesia: N  Sedation: N  CAM-ICU: N  HOB elevation: yes  Stress ulcer prophylaxis: N  VTE prophylaxis: Y  Glycemic control: N  Nutrition: Dysphasia 3 soft thins     CODE STATUS:   [facility] discussion:        Patient is a 66y old  Male who presents with a chief complaint of CVA, slurred speech, Rt side weakness (15 Sep 2021 21:38)    24 hour events: No acute overnight events. Patient was seen and examined at bedside. Still experiencing speech deficits. Unable to obtain meaningful hx given speech deficit.     REVIEW OF SYSTEMS  Unable to attain due to speech deficits     T(F): 100 (21 @ 04:16), Max: 101.3 (09-15-21 @ 07:40)  HR: 80 (21 @ 07:00) (70 - 105)  BP: 135/67 (21 @ 07:00) (121/72 - 169/88)  RR: 17 (21 @ 07:00) (17 - 221)  SpO2: 96% (21 @ 07:00) (93% - 99%)  Wt(kg): --            I&O's Summary    09-15 @ 07:01  -   @ 07:00  --------------------------------------------------------  IN: 150 mL / OUT: 1180 mL / NET: -1030 mL      PHYSICAL EXAM  General: Patient comfortable in bed, more alert and awake this AM  CNS: Patient able to comprehend conversation but unable to make cohesive speech. Unable to assess for further CNS deficits as patient does not follow commands.   HEENT: PERRLA  Resp: CTA BL, no wheezing, ronchi, rales  CVS: S1 S2 heard, RRR, no murmurs, rubs, gallops  Abd: Soft, non distended, non tender to palpation, normoactive bowel sounds   Ext: R hand rigidity less severe, more flaccid paralysis   Skin: Warm to touch    MEDICATIONS      atorvastatin Oral      dexMEDEtomidine Infusion IV Continuous      enoxaparin Injectable SubCutaneous          influenza   Vaccine IntraMuscular    chlorhexidine 4% Liquid Topical                            12.6   5.44  )-----------( 272      ( 16 Sep 2021 06:07 )             37.1     Bands 2.0        137  |  106  |  17  ----------------------------<  107<H>  3.7   |  26  |  0.98    Ca    9.1      16 Sep 2021 06:07  Phos  2.8     -  Mg     2.3     -    TPro  7.5  /  Alb  3.9  /  TBili  2.6<H>  /  DBili  x   /  AST  29  /  ALT  23  /  AlkPhos  74  -16      CARDIAC MARKERS ( 14 Sep 2021 17:52 )  <.015 ng/mL / x     / x     / x     / x          PT/INR - ( 14 Sep 2021 17:52 )   PT: 11.6 sec;   INR: 0.99 ratio         PTT - ( 14 Sep 2021 17:52 )  PTT:33.5 sec  Urinalysis Basic - ( 14 Sep 2021 18:56 )    Color: Yellow / Appearance: Clear / S.010 / pH: x  Gluc: x / Ketone: Negative  / Bili: Negative / Urobili: Negative   Blood: x / Protein: Negative / Nitrite: Negative   Leuk Esterase: Negative / RBC: x / WBC x   Sq Epi: x / Non Sq Epi: x / Bacteria: x      Catheterized Catheterized   No growth -- 09-15 @ 01:23    CENTRAL LINE: N  COMER: Y  A-LINE: N    GLOBAL ISSUE/BEST PRACTICE  Analgesia: N  Sedation: N  CAM-ICU: N  HOB elevation: yes  Stress ulcer prophylaxis: N  VTE prophylaxis: Y  Glycemic control: N  Nutrition: Dysphasia 3 soft thins     CODE STATUS:   [facility] discussion:

## 2021-09-16 NOTE — PHYSICAL THERAPY INITIAL EVALUATION ADULT - TRANSFER SAFETY CONCERNS NOTED: SIT/STAND, REHAB EVAL
ataxic/decreased balance during turns/decreased safety awareness/losing balance/decreased proprioception/decreased sequencing ability/decreased weight-shifting ability

## 2021-09-16 NOTE — DISCHARGE NOTE PROVIDER - CARE PROVIDERS DIRECT ADDRESSES
[email or web address]

## 2021-09-16 NOTE — PHYSICAL THERAPY INITIAL EVALUATION ADULT - PLANNED THERAPY INTERVENTIONS, PT EVAL
Modified Main Score pre-admission: 0 and at PT eval: 4/balance training/bed mobility training/gait training/strengthening/transfer training

## 2021-09-16 NOTE — PROGRESS NOTE ADULT - ASSESSMENT
65 y/o male with PMHx CLL, anemia, melanoma, osteopenia admitted for CVA workup and speech deficits.   s/p lytics  start asa   s & s eval  statin  PT  and OT eval  check EEG  cont ICU monitoring  cardiac workup to eval for cardioembolic etiology

## 2021-09-16 NOTE — PHYSICAL THERAPY INITIAL EVALUATION ADULT - IMPAIRED TRANSFERS: SIT/STAND, REHAB EVAL
ataxic/impaired balance/cognition/impaired coordination/decreased flexibility/impaired motor control/abnormal muscle tone/narrow base of support/impaired postural control/decreased strength

## 2021-09-16 NOTE — PROGRESS NOTE ADULT - SUBJECTIVE AND OBJECTIVE BOX
[facility] -- Sveta Romano, Ebony Sparrow, Dereck Rodriguez Savella  Office # [phone]      Follow Up:  CVA PAF    Subjective/Observations: Patient seen and examined. Events noted. Resting  in bed. Unable to provide meaningful information.       REVIEW OF SYSTEMS: Limited 2/2 comorbidities     PAST MEDICAL & SURGICAL HISTORY:  Osteopenia    CLL (Chronic Lymphocytic Leukemia)  SINCE 1988    Avascular Necrosis of Femur Head  B/L    TMJ Syndrome  limited jaw opening due to TMJ    Herniated Cervical Disc    Bulging Disc  LUMBAR    Melanoma    Deviated nasal septum    Nasal congestion    Rosacea    Deviated nasal septum    Other specified disorders of nose and nasal sinuses    Anemia    S/P Splenectomy  1993    Avascular Necrosis of Femur Head  RIGHT - CORE DECOMPRESSION- 1993    Avascular Necrosis of Femur Head  LEFT - CORE DECOMPRESSION - 1993    Abnormal Jaw Closure  LEFT JAW SURGERY - 1988    Status Post Eye Surgery X 3  3/2011 right eye scleral buckle; left eye in 2013    S/P Tonsillectomy  1960    Bilateral cataracts  removed in 2011        MEDICATIONS  (STANDING):  atorvastatin 80 milliGRAM(s) Oral at bedtime  chlorhexidine 4% Liquid 1 Application(s) Topical <User Schedule>  enoxaparin Injectable 40 milliGRAM(s) SubCutaneous daily  influenza   Vaccine 0.5 milliLiter(s) IntraMuscular once    MEDICATIONS  (PRN):      Allergies    No Known Allergies    Intolerances            Vital Signs Last 24 Hrs  T(C): 36.7 (16 Sep 2021 07:35), Max: 38.3 (15 Sep 2021 18:00)  T(F): 98 (16 Sep 2021 07:35), Max: 100.9 (15 Sep 2021 18:00)  HR: 64 (16 Sep 2021 12:00) (64 - 105)  BP: 96/52 (16 Sep 2021 12:00) (96/52 - 169/88)  BP(mean): 69 (16 Sep 2021 12:00) (69 - 113)  RR: 19 (16 Sep 2021 12:00) (16 - 221)  SpO2: 98% (16 Sep 2021 12:00) (93% - 98%)    I&O's Summary    15 Sep 2021 07:01  -  16 Sep 2021 07:00  --------------------------------------------------------  IN: 150 mL / OUT: 1180 mL / NET: -1030 mL    16 Sep 2021 07:01  -  16 Sep 2021 13:18  --------------------------------------------------------  IN: 289.4 mL / OUT: 240 mL / NET: 49.4 mL          PHYSICAL EXAM:  TELE:   Constitutional: NAD, awake    HEENT: Moist Mucous Membranes, Anicteric  Pulmonary: Decreased breath sounds b/l. No rales, crackles or wheeze appreciated.   Cardiovascular: Regular, S1 and S2, No murmurs, rubs, gallops or clicks  Gastrointestinal: Bowel Sounds present, soft, nontender.   Lymph: No peripheral edema. No lymphadenopathy.  Skin: No visible rashes or ulcers.  Psych:  unable to assess    LABS: All Labs Reviewed:                        12.6   5.44  )-----------( 272      ( 16 Sep 2021 06:07 )             37.1                         12.9   5.94  )-----------( 318      ( 15 Sep 2021 06:05 )             37.2                         11.9   3.76  )-----------( 290      ( 14 Sep 2021 17:52 )             35.3     16 Sep 2021 06:07    137    |  106    |  17     ----------------------------<  107    3.7     |  26     |  0.98   15 Sep 2021 06:05    140    |  108    |  13     ----------------------------<  110    3.4     |  24     |  1.00   14 Sep 2021 17:52    141    |  107    |  17     ----------------------------<  103    3.9     |  29     |  0.94     Ca    9.1        16 Sep 2021 06:07  Ca    8.8        15 Sep 2021 06:05  Ca    8.7        14 Sep 2021 17:52  Phos  2.8       16 Sep 2021 06:07  Phos  2.8       15 Sep 2021 06:05  Mg     2.3       16 Sep 2021 06:07  Mg     1.9       15 Sep 2021 06:05    TPro  7.5    /  Alb  3.9    /  TBili  2.6    /  DBili  x      /  AST  29     /  ALT  23     /  AlkPhos  74     16 Sep 2021 06:07  TPro  7.6    /  Alb  4.1    /  TBili  1.9    /  DBili  x      /  AST  27     /  ALT  24     /  AlkPhos  79     15 Sep 2021 06:05  TPro  7.4    /  Alb  3.9    /  TBili  1.3    /  DBili  x      /  AST  24     /  ALT  25     /  AlkPhos  72     14 Sep 2021 17:52    PT/INR - ( 14 Sep 2021 17:52 )   PT: 11.6 sec;   INR: 0.99 ratio         PTT - ( 14 Sep 2021 17:52 )  PTT:33.5 sec  CARDIAC MARKERS ( 14 Sep 2021 17:52 )  <.015 ng/mL / x     / x     / x     / x                < from: MR Head No Cont (09.16.21 @ 10:28) >    EXAM:  MR BRAIN                            PROCEDURE DATE:  09/16/2021          INTERPRETATION:  Clinical indication: Status post TPA    MRI of the brain was performed using sagittal T1, axial T1 and T2 T2 FLAIR diffusion and gradient echo sequence.    This exam is compared with prior noncontrast head CT performed on September 15, 2021.    The lateral ventricles have a normal configuration    There is no evidence acute hemorrhage mass mass effect identified.    Evaluation of the diffusion weighted sequence demonstrates no abnormal areas of restricted diffusion to suggest acute infarct.    Partial empty is sella seen.    The large vessels demonstrate normal flow voids    Bilateral maxillary, ethmoid and frontal sinus mucosal thickening is seen.    Postop changes compatible with bilateral calcific surgery and scleral banding is seen.    IMPRESSION: No acute territorial infarcts seen.    --- End of Report ---            MITCHELL BOYLE MD; Attending Radiologist  This document has been electronically signed. Sep 16 2021 10:42AM    < end of copied text >

## 2021-09-16 NOTE — PROGRESS NOTE ADULT - ATTENDING COMMENTS
66M h/o CLL, MDS, melanoma, osteopenia a/w aphasia and R sided deficits s/p tPA for presumed L MCA CVA, though now with normal MRI, L hemispheric slowing on EEG and RUE spasticity concerning for possible seizures    Neuro: MRI this AM with no evidence of ischemia, EEG with L hemispheric slowing and remains aphasic - concern for seizures  - continue statin  - repeat EEG ordered, hold keppra per EMU attending at [facility] prior to transfer  - LP d/w neurology - low suspicion infectious etiology as slowing only one hemisphere, no longer spiking low grade temperatures  - to be transferred for continuous EEG monitoring, wife (Javier) updated  CV: no acute issues   Pulm: no acute issues  GI: continue diet  Renal: no acute issues, monitor UOP and electrolytes  ID: monitor off abx  Heme: dvt ppx with lovenox    Pt to be transferred to [facility] for ongoing evaluation, continuous EEG monitoring. Pt remains symptomatic. MRI without evidence of CVA. Discussed with wife (Javier), neuro and heme/onc. 66M h/o CLL, MDS, melanoma, osteopenia a/w aphasia and R sided deficits s/p tPA for presumed L MCA CVA, though now with normal MRI, L hemispheric slowing on EEG and RUE spasticity concerning for possible seizures    Neuro: MRI this AM with no evidence of ischemia, EEG with L hemispheric slowing and remains aphasic - concern for seizures  - continue statin  - repeat EEG ordered, hold keppra per EMU attending at [facility] prior to transfer  - LP d/w neurology - low suspicion infectious etiology as slowing only one hemisphere, no longer spiking low grade temperatures  - to be transferred for continuous EEG monitoring, wife (Javier) updated  CV: no acute issues   Pulm: no acute issues  GI: continue diet  Renal: no acute issues, monitor UOP and electrolytes  ID: no travels/exposures to put at risk for meningitis/encephalitis - per wife at bedside, has been mostly home with few contacts out of fear of Covid; will monitor off abx  Heme: dvt ppx with lovenox    Pt to be transferred to [facility] for ongoing evaluation, continuous EEG monitoring. Pt remains symptomatic. MRI without evidence of CVA. Discussed with wife (Javier), neuro and heme/onc.

## 2021-09-16 NOTE — PROGRESS NOTE ADULT - ASSESSMENT
67 y/o male with PMHx CLL followed by heme/onc , anemia, melanoma, osteopenia admitted for right facial droop, right hemiparesis  and expressive aphasia.  Admitted to the ICU and is S/P TPA.     CVA  - Patient with new right sided UE weakness, facial droop and expressive aphasia  -  S/P Altepase without improvement in symptoms  -  9/15 CT head without acute ICH  - 9/16 MRI neg for infarct  - likely then neuro defects may be related to seizures  - cont with Neuro w/u  - may be transferred for continuous EEG.   - neuro checks per ICU  - high risk for decompensation.     PAF  - Patient seen on a prior admission for syncope 7/2020 and found to have brief Afib  - Tele during this admission SR   - If no definitive CVA may consider defering AC for now. Will need to fu with neuro.   - Continue tele monitoring     CLL  - Heme/onc following   - further management per team      - Monitor and replete lytes, keep K>4, Mg>2.  - All other medical needs as per ICU team.  - Other cardiovascular workup will depend on clinical course.  - Will continue to follow.       Upon my evaluation, this patient is at high risk for imminent or life threatening deterioration due to CVA, seizure  and other active medical issues which require my direct attention, intervention, and personal management.  I have personally spent >30 minutes  of critical care time exclusive of time spent on separate billing procedures. This includes review of laboratory data, radiology results, discussion with primary team\patient, and monitoring for potential decompensation. Interventions were performed as documented above.

## 2021-09-17 LAB
ANION GAP SERPL CALC-SCNC: 15 MMOL/L — SIGNIFICANT CHANGE UP (ref 5–17)
APPEARANCE CSF: CLEAR — SIGNIFICANT CHANGE UP
APPEARANCE UR: CLEAR — SIGNIFICANT CHANGE UP
BILIRUB UR-MCNC: NEGATIVE — SIGNIFICANT CHANGE UP
BUN SERPL-MCNC: 26 MG/DL — HIGH (ref 7–23)
CALCIUM SERPL-MCNC: 9.8 MG/DL — SIGNIFICANT CHANGE UP (ref 8.4–10.5)
CHLORIDE SERPL-SCNC: 103 MMOL/L — SIGNIFICANT CHANGE UP (ref 96–108)
CO2 SERPL-SCNC: 21 MMOL/L — LOW (ref 22–31)
COLOR CSF: SIGNIFICANT CHANGE UP
COLOR SPEC: YELLOW — SIGNIFICANT CHANGE UP
COVID-19 SPIKE DOMAIN AB INTERP: POSITIVE
COVID-19 SPIKE DOMAIN ANTIBODY RESULT: 9.72 U/ML — HIGH
CREAT SERPL-MCNC: 0.98 MG/DL — SIGNIFICANT CHANGE UP (ref 0.5–1.3)
CRYPTOC AG CSF-ACNC: NEGATIVE — SIGNIFICANT CHANGE UP
CSF PCR RESULT: SIGNIFICANT CHANGE UP
DIFF PNL FLD: ABNORMAL
FOLATE SERPL-MCNC: >20 NG/ML — SIGNIFICANT CHANGE UP
GLUCOSE BLDC GLUCOMTR-MCNC: 115 MG/DL — HIGH (ref 70–99)
GLUCOSE CSF-MCNC: 73 MG/DL — HIGH (ref 40–70)
GLUCOSE SERPL-MCNC: 122 MG/DL — HIGH (ref 70–99)
GLUCOSE UR QL: NEGATIVE — SIGNIFICANT CHANGE UP
GRAM STN FLD: SIGNIFICANT CHANGE UP
HCT VFR BLD CALC: 36.9 % — LOW (ref 39–50)
HGB BLD-MCNC: 12.3 G/DL — LOW (ref 13–17)
KETONES UR-MCNC: ABNORMAL
LABORATORY COMMENT REPORT: SIGNIFICANT CHANGE UP
LDH CSF L TO P-CCNC: 26 U/L — SIGNIFICANT CHANGE UP
LDH FLD-CCNC: 26 U/L — SIGNIFICANT CHANGE UP
LEUKOCYTE ESTERASE UR-ACNC: ABNORMAL
LYMPHOCYTES # CSF: 20 % — LOW (ref 40–80)
MAGNESIUM SERPL-MCNC: 2.3 MG/DL — SIGNIFICANT CHANGE UP (ref 1.6–2.6)
MCHC RBC-ENTMCNC: 33.3 GM/DL — SIGNIFICANT CHANGE UP (ref 32–36)
MCHC RBC-ENTMCNC: 34.2 PG — HIGH (ref 27–34)
MCV RBC AUTO: 102.5 FL — HIGH (ref 80–100)
MONOS+MACROS NFR CSF: 33 % — SIGNIFICANT CHANGE UP (ref 15–45)
NEUTROPHILS # CSF: 47 % — HIGH (ref 0–6)
NIGHT BLUE STAIN TISS: SIGNIFICANT CHANGE UP
NITRITE UR-MCNC: NEGATIVE — SIGNIFICANT CHANGE UP
NRBC # BLD: 0 /100 WBCS — SIGNIFICANT CHANGE UP (ref 0–0)
NRBC NFR CSF: 3 /UL — SIGNIFICANT CHANGE UP (ref 0–5)
PH UR: 6 — SIGNIFICANT CHANGE UP (ref 5–8)
PHOSPHATE SERPL-MCNC: 3.8 MG/DL — SIGNIFICANT CHANGE UP (ref 2.5–4.5)
PLATELET # BLD AUTO: 292 K/UL — SIGNIFICANT CHANGE UP (ref 150–400)
POTASSIUM SERPL-MCNC: 3.8 MMOL/L — SIGNIFICANT CHANGE UP (ref 3.5–5.3)
POTASSIUM SERPL-SCNC: 3.8 MMOL/L — SIGNIFICANT CHANGE UP (ref 3.5–5.3)
PROT CSF-MCNC: 55 MG/DL — HIGH (ref 15–45)
PROT UR-MCNC: ABNORMAL
RBC # BLD: 3.6 M/UL — LOW (ref 4.2–5.8)
RBC # CSF: 1 /UL — HIGH (ref 0–0)
RBC # FLD: 14.7 % — HIGH (ref 10.3–14.5)
SARS-COV-2 IGG+IGM SERPL QL IA: 9.72 U/ML — HIGH
SARS-COV-2 IGG+IGM SERPL QL IA: POSITIVE
SODIUM SERPL-SCNC: 139 MMOL/L — SIGNIFICANT CHANGE UP (ref 135–145)
SOURCE HSV 1/2: SIGNIFICANT CHANGE UP
SP GR SPEC: 1.03 — HIGH (ref 1.01–1.02)
SPECIMEN SOURCE: SIGNIFICANT CHANGE UP
SPECIMEN SOURCE: SIGNIFICANT CHANGE UP
TUBE TYPE: SIGNIFICANT CHANGE UP
UROBILINOGEN FLD QL: NEGATIVE — SIGNIFICANT CHANGE UP
VIT B12 SERPL-MCNC: 421 PG/ML — SIGNIFICANT CHANGE UP (ref 232–1245)
WBC # BLD: 4.97 K/UL — SIGNIFICANT CHANGE UP (ref 3.8–10.5)
WBC # FLD AUTO: 4.97 K/UL — SIGNIFICANT CHANGE UP (ref 3.8–10.5)

## 2021-09-17 PROCEDURE — 99223 1ST HOSP IP/OBS HIGH 75: CPT

## 2021-09-17 PROCEDURE — 93010 ELECTROCARDIOGRAM REPORT: CPT

## 2021-09-17 PROCEDURE — 88188 FLOWCYTOMETRY/READ 9-15: CPT

## 2021-09-17 PROCEDURE — 99222 1ST HOSP IP/OBS MODERATE 55: CPT

## 2021-09-17 PROCEDURE — 99233 SBSQ HOSP IP/OBS HIGH 50: CPT

## 2021-09-17 PROCEDURE — 95720 EEG PHY/QHP EA INCR W/VEEG: CPT

## 2021-09-17 RX ORDER — IMMUNE GLOBULIN (HUMAN) 10 G/100ML
25 INJECTION INTRAVENOUS; SUBCUTANEOUS DAILY
Refills: 0 | Status: DISCONTINUED | OUTPATIENT
Start: 2021-09-17 | End: 2021-09-20

## 2021-09-17 RX ORDER — ENOXAPARIN SODIUM 100 MG/ML
40 INJECTION SUBCUTANEOUS ONCE
Refills: 0 | Status: COMPLETED | OUTPATIENT
Start: 2021-09-18 | End: 2021-09-18

## 2021-09-17 RX ORDER — ASPIRIN/CALCIUM CARB/MAGNESIUM 324 MG
300 TABLET ORAL DAILY
Refills: 0 | Status: DISCONTINUED | OUTPATIENT
Start: 2021-09-18 | End: 2021-09-18

## 2021-09-17 RX ADMIN — IMMUNE GLOBULIN (HUMAN) 41.67 GRAM(S): 10 INJECTION INTRAVENOUS; SUBCUTANEOUS at 23:16

## 2021-09-17 RX ADMIN — Medication 1 MILLIGRAM(S): at 12:32

## 2021-09-17 RX ADMIN — Medication 1 MILLIGRAM(S): at 17:03

## 2021-09-17 NOTE — CONSULT NOTE ADULT - ASSESSMENT
Patient is a 65 yo Rt handed male PMH CLL on Venetoclax, MDS, melanoma, paroxysmal AFib (not on AC) presenting as transfer from [place] for event capture. Vascular Neurology consulted for concern of vascular etiology for symptoms. Patient was initially at  [facility] on 9/14 with confusion, aphasia and R sided weakness. s/p tPA. Imaging at  including CTH, CTA and MRI negative for any acute pathology. EEG showing L sided slowing. Transferred to [facility] for evaluation for possible epileptogenic etiology. Overnight EEG with L sided slowing but not epileptiform activity. Hx concerning for rapid cognitive decline since onset of symptoms 3 days ago. Exam concerning for minimal verbal output and receptive aphasia with RUE plegia and sensory loss.      Impression  Rapid progressive Global aphasia vs encephalopathy 2/2 epileptogenic vs structural vs autoimmune etiology in the setting of known CLL. low suspicion for ischemic intracranial etiology in the setting of negative MRI.     Recommendations   - MRI brain with and without contrast   - LP after MRI   - Continue EEG monitoring   - Speech therapy      Patient is a 67 yo Rt handed male PMH CLL on Venetoclax, MDS, melanoma, paroxysmal AFib (not on AC) presenting as transfer from [place] for event capture. Vascular Neurology consulted for concern of vascular etiology for symptoms. Patient was initially at  [facility] on 9/14 with confusion, aphasia and R sided weakness. s/p tPA. Imaging at  including CTH, CTA and MRI negative for any acute pathology. EEG showing L sided slowing. Transferred to [facility] for evaluation for possible epileptogenic etiology. Overnight EEG with L sided slowing but not epileptiform activity. Hx concerning for rapid cognitive decline since onset of symptoms 3 days ago. Exam concerning for minimal verbal output and receptive aphasia with RUE plegia and sensory loss.      Impression  Rapid progressive Global aphasia (Expressive > Recpetive) vs encephalopathy 2/2 epileptogenic vs structural vs autoimmune etiology in the setting of known CLL. low suspicion for ischemic intracranial etiology in the setting of negative MRI, possibly stroke mimic    Recommendations   - MRI brain with and without contrast   - LP after MRI   - Contact Cardiologist and clarify hx of afib. Wife states he never had afib. If patient had Afib, from stroke standpoint patient should be on AC. If not Afib hx then patient should be on ASA 81mg daily.   - Continue EEG monitoring   - Speech/ Swallow therapy   - PT/OT  - Rest of care per primary team    Case discussed with neurology attending Dr. Smith

## 2021-09-17 NOTE — CHART NOTE - NSCHARTNOTEFT_GEN_A_CORE
Spoke to On-call fellow Dr. Raza who is recommending patient be started on IVIG at 2g/kg for 5 days. Spoke to Nurse regarding plan and awaiting for accurate weight to be entered into EMR to proceed with plan. No further interventions needed during this time.

## 2021-09-17 NOTE — CONSULT NOTE ADULT - ATTENDING COMMENTS
Briefly   65 yo Rt handed male CLL on Venetoclax, MDS, melanoma, paroxysmal AFib? (not on AC) presenting as transfer from [place] for event capture. Vascular Neurology consulted for concern of vascular etiology for symptoms. Patient was initially at  [facility] on 9/14 with confusion, aphasia and R sided weakness. s/p tPA. Imaging at  including CTH, CTA and MRI negative for any acute pathology. EEG showing L sided slowing. Transferred to [facility] for evaluation for possible epileptogenic etiology. Overnight EEG with L sided slowing but not epileptiform activity. Hx concerning for rapid cognitive decline since onset of symptoms 3 days ago. Exam concerning for minimal verbal output and receptive aphasia with RUE plegia and sensory loss.      Impression  Rapid progressive Global aphasia (Expressive > Recpetive) vs encephalopathy 2/2 epileptogenic vs structural vs autoimmune etiology in the setting of known CLL. low suspicion for ischemic intracranial etiology in the setting of negative MRI, possibly stroke mimic    Recommendations   - MRI brain with and without contrast   - LP after MRI   - Contact Cardiologist and clarify hx of afib. Wife states he never had afib. If patient had Afib, from stroke standpoint patient should be on AC. If not Afib hx then patient should be on ASA 81mg daily.   - Continue EEG monitoring   - Speech/ Swallow therapy   - PT/OT  - Rest of care per primary team    agree with above

## 2021-09-17 NOTE — CONSULT NOTE ADULT - SUBJECTIVE AND OBJECTIVE BOX
Hematology Consult Note    HPI:  MRN-[id number]    HPI:  Patient is a 65 yo Rt handed male PMH CLL on Venetoclax, MDS, melanoma, paroxysmal AFib (not on AC) presenting as transfer from [place] for event capture. Patient was initially at  [facility] on 9/14 with confusion, aphasia and R sided weakness. Patient was stroke code and was given s/p tPA. On admission, patient had CTH, CTA, CT perfusion during code stroke protocol with no acute findings. MRI Brain was  performed at  9/16 with no ischemic findings. EEG performed 9/15 showed diffuse L hemispheric slowing. Pt remained aphasic with AMS. +RUE spasticity was noted on exam. Patient was thus transferred to [facility] for EMU admission to evaluate for subclinical seizures.  Of note, patient does not have hx of seizures.         (16 Sep 2021 16:41)      PAST MEDICAL & SURGICAL HISTORY:  Osteopenia    CLL (Chronic Lymphocytic Leukemia)  SINCE 1988    Avascular Necrosis of Femur Head  B/L    TMJ Syndrome  limited jaw opening due to TMJ    Herniated Cervical Disc    Bulging Disc  LUMBAR    Melanoma    Deviated nasal septum    Nasal congestion    Rosacea    Deviated nasal septum    Other specified disorders of nose and nasal sinuses    Anemia    S/P Splenectomy  1993    Avascular Necrosis of Femur Head  RIGHT - CORE DECOMPRESSION- 1993    Avascular Necrosis of Femur Head  LEFT - CORE DECOMPRESSION - 1993    Abnormal Jaw Closure  LEFT JAW SURGERY - 1988    Status Post Eye Surgery X 3  3/2011 right eye scleral buckle; left eye in 2013    S/P Tonsillectomy  1960    Bilateral cataracts  removed in 2011        FAMILY HISTORY:  Family history of multiple myeloma    Family history of malignant melanoma  age 89    FH: blood dyscrasia  MGUS    FH: hypertension  mother    FH: renal failure  mother        MEDICATIONS  (STANDING):  atorvastatin 80 milliGRAM(s) Oral at bedtime  folic acid 1 milliGRAM(s) Oral daily  sodium chloride 0.9%. 1000 milliLiter(s) (50 mL/Hr) IV Continuous <Continuous>    MEDICATIONS  (PRN):  lacosamide IVPB 100 milliGRAM(s) IV Intermittent once PRN Seizure  LORazepam   Injectable 1 milliGRAM(s) IV Push once PRN Seizure      Allergies    No Known Allergies    Intolerances        SOCIAL HISTORY: No EtOH, no tobacco    REVIEW OF SYSTEMS:    CONSTITUTIONAL: No weakness, fevers or chills  EYES/ENT: No visual changes;  No vertigo or throat pain   NECK: No pain or stiffness  RESPIRATORY: No cough, wheezing, hemoptysis; No shortness of breath  CARDIOVASCULAR: No chest pain or palpitations  GASTROINTESTINAL: No abdominal or epigastric pain. No nausea, vomiting, or hematemesis; No diarrhea or constipation. No melena or hematochezia.  GENITOURINARY: No dysuria, frequency or hematuria  NEUROLOGICAL: No numbness or weakness  SKIN: No itching, burning, rashes, or lesions   All other review of systems is negative unless indicated above.        T(F): 97.7 (09-17-21 @ 11:23), Max: 98.6 (09-16-21 @ 19:46)  HR: 84 (09-17-21 @ 11:23)  BP: 154/95 (09-17-21 @ 11:23)  RR: 18 (09-17-21 @ 11:23)  SpO2: 97% (09-17-21 @ 11:23)  Wt(kg): --    GENERAL: NAD, well-developed  HEAD:  Atraumatic, Normocephalic  EYES: EOMI, PERRLA, conjunctiva and sclera clear  NECK: Supple, No JVD  CHEST/LUNG: Clear to auscultation bilaterally; No wheeze  HEART: Regular rate and rhythm; No murmurs, rubs, or gallops  ABDOMEN: Soft, Nontender, Nondistended; Bowel sounds present  EXTREMITIES:  2+ Peripheral Pulses, No clubbing, cyanosis, or edema  NEUROLOGY: non-focal  SKIN: No rashes or lesions                          12.3   4.97  )-----------( 292      ( 17 Sep 2021 06:59 )             36.9       09-17    139  |  103  |  26<H>  ----------------------------<  122<H>  3.8   |  21<L>  |  0.98    Ca    9.8      17 Sep 2021 06:59  Phos  3.8     09-17  Mg     2.3     09-17    TPro  7.1  /  Alb  4.3  /  TBili  2.2<H>  /  DBili  x   /  AST  28  /  ALT  16  /  AlkPhos  72  09-16      Magnesium, Serum: 2.3 mg/dL (09-17 @ 06:59)  Phosphorus Level, Serum: 3.8 mg/dL (09-17 @ 06:59)      Folate, Serum: >20.0 ng/mL (09-17-21 @ 00:00)  Vitamin B12, Serum: 421 pg/mL (09-17-21 @ 00:00)   Hematology Consult Note    HPI:  [id number]    HPI:  Patient is a 65 yo Rt handed male PMH CLL on Venetoclax, MDS, melanoma, paroxysmal AFib (not on AC) presenting as transfer from [place] for event capture. Patient was initially at  [facility] on 9/14 with confusion, aphasia and R sided weakness. Patient was stroke code and was given s/p tPA. On admission, patient had CTH, CTA, CT perfusion during code stroke protocol with no acute findings. MRI Brain was  performed at  9/16 with no ischemic findings. EEG performed 9/15 showed diffuse L hemispheric slowing. Pt remained aphasic with AMS. +RUE spasticity was noted on exam. Patient was thus transferred to [facility] for EMU admission to evaluate for subclinical seizures.  Of note, patient does not have hx of seizures.    (16 Sep 2021 16:41)    Brief CLL Hx:   CLL first dx'd 1/1991 post multiple regimens presently on Venetoclax since 11/2020, followed Dr Haji  Had 3rd dose of COVID vaccine Aug  Hx: post RT to spleen 1992 and then splenectomy 1993, post numerous courses Fludarbine, 3/2020 change to Ibrutinib. CBC had showed progressive macrocytic anemia, bone marrow w CLL and new del 17p in add to  the known del 11q and del 13q. 9/2020-2/20221 on Gazyva/ibrutinib, 11/2021 started on Venetoclax.  Last seen in office 9/3/21 wbc 4.4, Hgb 14.7 mcv 101.2 plts 237    Hematology notified of patient's admission by patient's hematologist, Dr. Haji, requested hematology team follow; patient was being followed by hematology at [place] as well. Patient's venetoclax being held. Patient currently aphasic, unable to provide hx; patient's wife, Javier, at the bedside, provided collateral hx. Symptoms started suddenly this past Tuesday 9/14/21, she brought him to the ER within 10 minutes, where he was given TPA as above. Patient's wife reports that he has not been having LAD, NS, fevers, weight changes. Was taking ventoclax up until the day of admission. Denies any other systemic symptoms; no recent rashes or trips. Denies any sick contacts. Was vaccinated for COVID-19 earlier in February 2021; received the [employer] Booster in late August, had no reactions at the time.    PAST MEDICAL & SURGICAL HISTORY:  Osteopenia    CLL (Chronic Lymphocytic Leukemia)  SINCE 1988    Avascular Necrosis of Femur Head  B/L    TMJ Syndrome  limited jaw opening due to TMJ    Herniated Cervical Disc    Bulging Disc  LUMBAR    Melanoma    Deviated nasal septum    Nasal congestion    Rosacea    Deviated nasal septum    Other specified disorders of nose and nasal sinuses    Anemia    S/P Splenectomy  1993    Avascular Necrosis of Femur Head  RIGHT - CORE DECOMPRESSION- 1993    Avascular Necrosis of Femur Head  LEFT - CORE DECOMPRESSION - 1993    Abnormal Jaw Closure  LEFT JAW SURGERY - 1988    Status Post Eye Surgery X 3  3/2011 right eye scleral buckle; left eye in 2013    S/P Tonsillectomy  1960    Bilateral cataracts  removed in 2011        FAMILY HISTORY:  Family history of multiple myeloma    Family history of malignant melanoma  age 89    FH: blood dyscrasia  MGUS    FH: hypertension  mother    FH: renal failure  mother        MEDICATIONS  (STANDING):  atorvastatin 80 milliGRAM(s) Oral at bedtime  folic acid 1 milliGRAM(s) Oral daily  sodium chloride 0.9%. 1000 milliLiter(s) (50 mL/Hr) IV Continuous <Continuous>    MEDICATIONS  (PRN):  lacosamide IVPB 100 milliGRAM(s) IV Intermittent once PRN Seizure  LORazepam   Injectable 1 milliGRAM(s) IV Push once PRN Seizure      Allergies:  No Known Allergies / Intolerances    SOCIAL HISTORY:   , lives with wife  Retired, used to work as     REVIEW OF SYSTEMS: Unable to obtain except as in HPI    T(F): 97.7 (09-17-21 @ 11:23), Max: 98.6 (09-16-21 @ 19:46)  HR: 84 (09-17-21 @ 11:23)  BP: 154/95 (09-17-21 @ 11:23)  RR: 18 (09-17-21 @ 11:23)  SpO2: 97% (09-17-21 @ 11:23)  Wt(kg): --    PHYSICAL EXAM:  Constitutional: NAD, calm, awake, making eye contact, but minimally verbal; EEG leads in place.  Lungs:  Non-labored, breath sounds are clear bilaterally, No wheezing, rales or rhonchi  Cardiovascular: RRR.  S1 and S2 positive.  No murmurs, rubs, gallops or clicks  Gastrointestinal: Bowel Sounds present, soft, nontender.   Lymph: No peripheral edema. No cervical lymphadenopathy.  Neurological: unable to assess, lethargic  Skin: No rashes or ulcers   Psych:  unable to assess    Neurological Exam (from neurology note)  Mental Status: Awake, alert, attentive b/l. Minimal verbal output only to noxious stimuli (replies only Ow and grunts to pain).  Follow few simple commands (stick out tongue, blink eyes), comprehension appears intact, appears aphasic.  Cranial Nerves: PERRL, EOMI (tracks spontaneously), VFF, no nystagmus.  No facial asymmetry.  Hearing intact.    Motor: Patient does not cooperative for motor strength exam. LUE and b/l LEs antigravity and without drift. RUE 4/5 distally, 3/5 proximally.   Pronator drift: unable to assess  Dysmetria: Unable to assess  Tremor: No resting, postural or action tremor.  No myoclonus.  Sensation: grimaces to noxious stimuli LUE, b/l LEs. RUE with grossly decreased sensation to noxious stimuli   Deep Tendon Reflexes: 2+ bilateral biceps, triceps, brachioradialis, knee and ankle  Toes flexor bilaterally  Gait:  Unable to assess                              12.3   4.97  )-----------( 292      ( 17 Sep 2021 06:59 )             36.9       09-17    139  |  103  |  26<H>  ----------------------------<  122<H>  3.8   |  21<L>  |  0.98    Ca    9.8      17 Sep 2021 06:59  Phos  3.8     09-17  Mg     2.3     09-17    TPro  7.1  /  Alb  4.3  /  TBili  2.2<H>  /  DBili  x   /  AST  28  /  ALT  16  /  AlkPhos  72  09-16      Magnesium, Serum: 2.3 mg/dL (09-17 @ 06:59)  Phosphorus Level, Serum: 3.8 mg/dL (09-17 @ 06:59)      Folate, Serum: >20.0 ng/mL (09-17-21 @ 00:00)  Vitamin B12, Serum: 421 pg/mL (09-17-21 @ 00:00)    < from: MR Head No Cont (09.16.21 @ 10:28) >    EXAM:  MR BRAIN                            PROCEDURE DATE:  09/16/2021          INTERPRETATION:  Clinical indication: Status post TPA    MRI of the brain was performed using sagittal T1, axial T1 and T2 T2 FLAIR diffusion and gradient echo sequence.    This exam is compared with prior noncontrast head CT performed on September 15, 2021.    The lateral ventricles have a normal configuration    There is no evidence acute hemorrhage mass mass effect identified.    Evaluation of the diffusion weighted sequence demonstrates no abnormal areas of restricted diffusion to suggest acute infarct.    Partial empty is sella seen.    The large vessels demonstrate normal flow voids    Bilateral maxillary, ethmoid and frontal sinus mucosal thickening is seen.    Postop changes compatible with bilateral calcific surgery and scleral banding is seen.    IMPRESSION: No acute territorial infarcts seen.    --- End of Report ---    MITCHELL BOYLE MD; Attending Radiologist  This document has been electronically signed. Sep 16 2021 10:42AM    < end of copied text >  < from: CT Head No Cont (09.15.21 @ 19:57) >    EXAM:  CT BRAIN                          PROCEDURE DATE:  09/15/2021      INTERPRETATION:  CT HEAD WITHOUT CONTRAST    INDICATION: 66 years old. Male. s/p tpa s/p tpa. CVA    COMPARISON: 9/15/2021.    TECHNIQUE: Noncontrast axial CT head was obtained from the skull base to vertex.    FINDINGS:  No acute intracranial hemorrhage, mass effect or midline shift.  The ventricles and cortical sulci are within normal limits for age.  Scattered hypodensities in the periventricular white matter arenonspecific, but likely sequela of small vessel ischemic disease.    Near complete opacification of the left frontal sinus. Moderate mucosal thickening of the bilateral ethmoid air cells and mild mucosal thickening of the bilateral maxillary sinuses.The mastoid air cells are well aerated. The native ocular lenses are surgically absent. Status post bilateral scleral banding.  No displaced calvarial fracture.    IMPRESSION:  No acute intracranial hemorrhage or mass effect.    --- End of Report ---    GLORIA TORO MD; Attending Radiologist  This document has been electronically signed. Sep 15 2021  8:13PM    < end of copied text >  < from: CT Angio Neck w/ IV Cont (09.14.21 @ 17:53) >    EXAM:  CT PERFUSION W MAPS IC                          EXAM:  CT ANGIO BRAIN (W)AW IC                          EXAM:  CT BRAIN STROKE PROTOCOL                          EXAM:  CT ANGIO NECK (W)AW IC                            PROCEDURE DATE:  09/14/2021          INTERPRETATION:  CT OF THE HEAD WITHOUT CONTRAST, CT ANGIOGRAPHY OF HEAD AND NECK AND CT PERFUSION WITH INTRAVENOUS CONTRAST.    CLINICAL INDICATION: Code stroke.    TECHNIQUE: Initially volumetric CT acquisition was performed through the brain and reviewed using brain and bone window technique. Sagittal and coronal reconstructed images were obtained. Dose optimization techniques were utilized including kVp/mA modulation along with iterative reconstructions.  Subsequently volumetric acquisition was performed from the thoracic inlet to the vertex.    RAPID artificial intelligence was used for preliminary evaluation of intracranial hemorrhage.    A total of 50 cc of Omnipaque 350 was injected intravenously without complications.  Dose optimization techniques were utilized including kVp/mA modulation along with iterative reconstructions.  MIP image analysis was performed on a separate workstation.  CT brain perfusion was performed after intravenous injection 50 mL of intravenouscontrast Omnipaque 350. Dose optimization techniques were utilized including kVp/mA modulation along with iterative reconstructions. 3D image analysis was performed on a dedicated workstation by a neuroradiologist.      COMPARISON: CT brain and CT angiogram head and neck, 5/8/2021.    FINDINGS:    Head CT:    The ventricular and sulcal size and configuration is age appropriate. There are scattered white matter hypodensities which are nonspecific but most commonly represent chronic microvascular ischemic changes.  There is no acute loss of gray-white differentiation.    There is no evidence of mass effect, midline shift, acute intracranial hemorrhage, or extra-axial collections.    There is bilateral scleral banding. There is scattered opacification bilaterally with complexes, mild mucosal thickening of the left maxillary sinus and opacification of the left frontal sinus which is increased since prior.    CT angiography:    NECK: The left vertebral artery emanates directly from the aortic arch, an anatomic variation. Origin of supraaortic arteries are patent. The common carotid arteries are normal in course and caliber. There are atherosclerotic plaques at common carotid bifurcations and carotid bulbs without evidence of significant segmental stenosis.    The right internal carotid artery is normal in caliber. There is 0 % stenosis using NASCET criteria (normal right ICA caliber is 4.6 mm).    The left internal carotid artery is normal in caliber. There is 0 % stenosis using NASCETcriteria (normal left ICA caliber is 4.4 mm).    The left vertebral artery is hypoplastic compared to the right. Bilateral vertebral arteries are normal in course and contour, without evidence of dissection or occlusion.    Degenerative changes of the bony cervical spine are noted.    BRAIN:    The petrocavernous and supraclinoid ICA segments are normal in caliber.  The visualized MCA and MILLY branches are normal without evidence of stenosis or aneurysm. The ACOM is present. Bilateral posterior communicating arteries are present.  The vertebral arteries, visualized cerebellar arteries, basilar and bilateral posterior cerebral arteries are patent without evidence of stenoses or aneurysm.    Other findings: There is a stable hypodense nodule in the right thyroid lobe.    CT Perfusion:    Findings:    The regional cerebral blood flow (CBF), cerebral blood volume (CBV),for the MILLY, MCA, and PCA territories are within normal limits. There is increased time to maximum in the right temporal andbilateral occipital lobes which may be artifactual in nature.    IMPRESSION:    CT brain: There is no acute lobar infarction, intracranial hemorrhage, mass lesion, mass effect or herniation. Further correlation with MRI of the brain is suggested for more detailed evaluation if there are no contraindications.    CTA brain: There is no large vessel occlusion or aneurysm involving major proximal intracranial arteries.    CTA NECK: There is no stenosis involving major neck arteries.    CT perfusion: Multiple areas of increased time to maximum as described may be artifactual however, attention on follow-up MRI is suggested.    NASCET CAROTID STENOSIS CRITERIA (distal normal appearing ICA as denominator for measurement): 0%-none, 1-49%-mild, 50-70%-moderate, 70-89%-severe, 90-99%-critical.    Notification to clinician of alert:    Provider   Dr. AL QUINTERO  was notified about the final results at 9/14/2021 1752 PM via telephone by neuroradiologist SOURAV Matos. Readback confirmationwas obtained.    --- End of Report ---    JONO MATOS MD; Attending Radiologist  This document has been electronically signed. Sep 14 2021  5:56PM    < end of copied text >

## 2021-09-17 NOTE — EEG REPORT - NS EEG TEXT BOX
EPILEPSY MONITORING UNIT REPORT   [facility]: [address], [place] [address], Ph#: [phone] [facility]: [address], [place] [address], Ph#: [phone] Office: [address] [place] [address], [place] [address] Ph#: [phone]  Patient Name: Daniel Pinto   Age: 66 year, : 1955 MRN #: [id number], [facility]: [facility] 46 Referring Physician: Dr. MEDINA        ER admit        OSH transfer           Study Information:  EEG Recording Technique: The patient underwent continuous Video-EEG monitoring, using Telemetry System hardware on the XLTek Digital System. EEG and video data were stored on a computer hard drive with important events saved in digital archive files. The material was reviewed by a physician (electroencephalographer / epileptologist) on a daily basis. Franko and seizure detection algorithms were utilized and reviewed. An EEG Technician attended to the patient, and was available throughout daytime work hours.  The epilepsy center neurologist was available in person or on call 24-hours per day.  EEG Placement and Labeling of Electrodes: The EEG was performed utilizing 20 channel referential EEG connections (coronal over temporal over parasagittal montage) using all standard 10-20 electrode placements with EKG, with additional electrodes placed in the inferior temporal region using the modified 10-10 montage electrode placements for elective admissions, or if deemed necessary. Recording was at a sampling rate of 256 samples per second per channel. Time synchronized digital video recording was done simultaneously with EEG recording. A low light infrared camera was used for low light recording.   History:  Patient is a 67 yo Rt handed male PMH CLL on Venetoclax, MDS, melanoma, paroxysmal AFib (not on AC) presenting as transfer from [place] for event capture. Patient was initially at  [facility] on  with confusion, aphasia and R sided weakness. Patient was stroke code and was given s/p tPA. On admission, patient had CTH, CTA, CT perfusion during code stroke protocol with no acute findings. MRI Brain was  performed at   with no ischemic findings. EEG performed 9/15 showed diffuse L hemispheric slowing. Pt remained aphasic with AMS. +RUE spasticity was noted on exam. Patient was thus transferred to [facility] for EMU admission to evaluate for subclinical seizures.  Patient does not have hx of seizures.  Home Antiepileptic Medication and Device  none  Interpretation:  Day 1 – 	Start: 2021  16:23    	End: mm/dd/yyyy  08:00 	Duration: 15 hr  11 min  Daily EEG Visual Analysis  FINDINGS:  The background was continuous, spontaneously variable and reactive. During wakefulness, the posterior dominant rhythm consisted of asymmetric – seen only over right - well-modulated 8-9 Hz activity, with amplitude to 30 uV, that attenuated to eye opening.  Low amplitude frontal beta was noted in wakefulness.  Background Slowing: Diffuse theta slowing.  Focal Slowing:  There is continuous polymorphic delta slowing over the left hemisphere throughout the recording.  Sleep Background: Drowsiness was characterized by fragmentation, attenuation, and slowing of the background activity.   Sleep was characterized by the presence of vertex waves, asymmetric sleep spindles and K-complexes that appear attenuated over the left.  Other Non-Epileptiform Findings: None were present.  Activation Procedures:  Hyperventilation was not performed.   Photic stimulation was not performed.  Interictal Epileptiform Activity:  None were present.  Events: No events or seizures recorded.  Artifacts: Intermittent myogenic and movement artifacts were noted.  ECG: The heart rate on single channel ECG was predominantly between 60-70 BPM.  AEDs:  none  EEG Summary:  Abnormal EEG in the awake, drowsy and asleep states. Continuous polymorphic delta slowing, focal, left hemisphere Background slowing, generalized  Impression/Clinical Correlate:  This is an abnormal EEG record. No epileptiform pattern or seizures were seen. There is evidence of structural abnormality affecting the left hemisphere. There is mild diffuse cerebral dysfunction.   Tone Raza MD Fellow, [facility]     Darrius Nails MD, PhD Director, Epilepsy Division, [facility]   ------------------------------------ EEG Reading Room: [phone] On Call Service After Hours: [phone]

## 2021-09-17 NOTE — PROCEDURE NOTE - ADDITIONAL PROCEDURE DETAILS
LP performed, aseptic technique adhered to. Patient instructed to lay flat for 1 hour, patient's wife and KALEN Cerrato also aware. Instructed to monitor for post LP headache.

## 2021-09-17 NOTE — PROGRESS NOTE ADULT - ASSESSMENT
Patient is a 67 yo Rt handed male PMH CLL on Venetoclax, MDS, melanoma, paroxysmal AFib (not on AC) presenting as transfer from [place] for event capture. Patient was initially at  [facility] on 9/14 with confusion, aphasia and R sided weakness. With Brain MRI negative. EEG note to show discharges from Left hemisphere with RUE spasticity There is concern for subclinical seizures thus transferred to [facility] for event capture. Cardiology consulted.  Heme/onc to follow this AM and will followup on recommendations on heme/onc medications.  Patient was noted to have episodes agitation with episodes of somnolence with out medication needed.   Patient had 3rd Covid vaccine in August.       Impression: RUE spasticity with AMS transferred for event capture   Plan:   [x]Admit to EMU    [x]vEEG   [x]Hold on starting AED's   [x]seizure precautions   [x]telemetry   [x]baseline EKG  [x]Continue routine labs   []heme/onc to continue to follow on recommendations. . Follows Dr. Godinez and  Dr. Valenzuela as an outpatient.   [x]Rescue: 1st-Ativan 1mg, 2nd-Vimpat 100mg IV if GTC refractory to ativan  [x]continue on MIVF at 50cc/hr   []speech eval   [x]DVt ppx     Case to be seen with Attending Neurologist, Dr. Azul.    Patient is a 67 yo Rt handed male PMH CLL on Venetoclax, MDS, melanoma, paroxysmal AFib (not on AC) presenting as transfer from [place] for event capture. Patient was initially at  [facility] on 9/14 with confusion, aphasia and R sided weakness. With Brain MRI negative. EEG note to show discharges from Left hemisphere with RUE spasticity There is concern for subclinical seizures thus transferred to [facility] for event capture. Cardiology consulted.  Heme/onc to follow this AM and will followup on recommendations on heme/onc medications.  Patient was noted to have episodes agitation with episodes of somnolence with out medication needed. Patient had LP this afternoon and will follow up on CSF results. Cardiology states to hold off on AC as there is no definitive CVA noted. Continue with ASA and statin.   Patient had 3rd Covid vaccine in August.       Impression: RUE spasticity with AMS transferred for event capture   Plan:   [x]LP completed- will follow up on CSF results  [x]Cardiology consulted- will follow up on recommendations.   [x]vEEG   [x]Hold on starting AED's   [x]seizure precautions  [x]telemetry   [x]baseline EKG  [x]Continue routine labs  [x]heme/onc to continue to follow on recommendations. . Follows Dr. Godinez and  Dr. Valenzuela as an outpatient.   [x]Rescue: 1st-Ativan 1mg, 2nd-Vimpat 100mg IV if GTC refractory to ativan  [x]continue on MIVF at 50cc/hr   []speech eval   [x]DVt ppx     Case to be seen with Attending Neurologist, Dr. Azul.

## 2021-09-17 NOTE — CONSULT NOTE ADULT - ASSESSMENT
67 y/o male with PMHx CLL (dx in 1/1991, on tx w/ venetoclax) followed by heme/onc, anemia, hx of melanoma, osteopenia admitted for right facial droop, right hemiparesis and expressive aphasia.  Admitted to the ICU at [place] prior to admission, s/p TPA, transferred to [facility] for evaluation of seizures. Hematology following given hx of CLL.    #CLL  #AMS  -CBC w/ diff shows stable disease  -imaging reviewed; treated initially as code stroke at [place] on day of presentation, but MRI negative for CVA or structural lesion  -patient continues to be aphasic today, though appears to understand and follow simple commands  -do not suspect 2/2 patient's CLL treatment (venetoclax); CLL increases risk of infection (has hx of recurrent sinus infections, on IVIG) so infectious etiology higher on ddx  -Discussed with neurology team, recommended checking LP and including flow cytometry, cytopathology for malignant cells in addition to standard studies (cell count, protein, glucose). Also recommended checking autoimmune encephalitis panel and infectious PCR panel  -Patient reports that he was in his usual state of health until this week; had recently gotten 3rd dose of COVID-19 vaccine, ?immune sequelae from vaccine?  -would check fibrinogen, ferritin, triglycerides, and soluble IL-2r with next set of labs; suspicion for HLH is low, but would follow up CSF studies to see if there is evidence of hemophagocytosis on cytopathology  -EEG abnormal w/ L hemisphere slowing; acute care per neurology team  -would hold venetoclax for now    Please call with any questions.    Aldair Henley MD, MPH  Hematology / Oncology Fellow, PGY7  p

## 2021-09-17 NOTE — CONSULT NOTE ADULT - SUBJECTIVE AND OBJECTIVE BOX
[facility] - Sveta Romano Grossman, Wachsman, Pannella, Patel, Savella  Office Number: [phone]    Initial Consult Note    CHIEF COMPLAINT: Patient is a 66y old  Male who presents with a chief complaint of CVA    HPI:  [id number]    HPI:  Patient is a 65 yo Rt handed male PMH CLL on Venetoclax, MDS, melanoma, paroxysmal AFib (not on AC) presenting as transfer from  for event capture. Patient was initially at  [facility] on 9/14 with confusion, aphasia and R sided weakness. Patient was stroke code and was given s/p tPA. On admission, patient had CTH, CTA, CT perfusion during code stroke protocol with no acute findings. MRI Brain was  performed at  9/16 with no ischemic findings. EEG performed 9/15 showed diffuse L hemispheric slowing. Pt remained aphasic with AMS. +RUE spasticity was noted on exam. Patient was thus transferred to [facility] for EMU admission to evaluate for subclinical seizures.  Of note, patient does not have hx of seizures.         (16 Sep 2021 16:41)      PAST MEDICAL & SURGICAL HISTORY:  Osteopenia    CLL (Chronic Lymphocytic Leukemia)  SINCE 1988    Avascular Necrosis of Femur Head  B/L    TMJ Syndrome  limited jaw opening due to TMJ    Herniated Cervical Disc    Bulging Disc  LUMBAR    Melanoma    Deviated nasal septum    Nasal congestion    Rosacea    Deviated nasal septum    Other specified disorders of nose and nasal sinuses    Anemia    S/P Splenectomy  1993    Avascular Necrosis of Femur Head  RIGHT - CORE DECOMPRESSION- 1993    Avascular Necrosis of Femur Head  LEFT - CORE DECOMPRESSION - 1993    Abnormal Jaw Closure  LEFT JAW SURGERY - 1988    Status Post Eye Surgery X 3  3/2011 right eye scleral buckle; left eye in 2013    S/P Tonsillectomy  1960    Bilateral cataracts  removed in 2011        SOCIAL HISTORY:  No tobacco, ethanol, or drug abuse.    FAMILY HISTORY:  Family history of multiple myeloma    Family history of malignant melanoma  age 89    FH: blood dyscrasia  MGUS    FH: hypertension  mother    FH: renal failure  mother      No family history of acute MI or sudden cardiac death.    MEDICATIONS  (STANDING):  aspirin Suppository 300 milliGRAM(s) Rectal daily  atorvastatin 80 milliGRAM(s) Oral at bedtime  enoxaparin Injectable 40 milliGRAM(s) SubCutaneous daily  folic acid 1 milliGRAM(s) Oral daily  sodium chloride 0.9%. 1000 milliLiter(s) (50 mL/Hr) IV Continuous <Continuous>    MEDICATIONS  (PRN):  lacosamide IVPB 100 milliGRAM(s) IV Intermittent once PRN Seizure  LORazepam   Injectable 1 milliGRAM(s) IV Push once PRN Seizure      Allergies    No Known Allergies    Intolerances        REVIEW OF SYSTEMS:    Unable to assess given his mental status    VITAL SIGNS:   Vital Signs Last 24 Hrs  T(C): 36.8 (17 Sep 2021 07:25), Max: 37 (16 Sep 2021 19:46)  T(F): 98.3 (17 Sep 2021 07:25), Max: 98.6 (16 Sep 2021 19:46)  HR: 67 (17 Sep 2021 07:25) (64 - 101)  BP: 149/88 (17 Sep 2021 07:25) (96/52 - 168/77)  BP(mean): 80 (16 Sep 2021 14:00) (69 - 84)  RR: 18 (17 Sep 2021 07:25) (16 - 20)  SpO2: 98% (17 Sep 2021 07:25) (96% - 99%)    I&O's Summary    16 Sep 2021 07:01  -  17 Sep 2021 07:00  --------------------------------------------------------  IN: 50 mL / OUT: 0 mL / NET: 50 mL        On Exam:    Constitutional: NAD, lethargic and confused  Lungs:  Non-labored, breath sounds are clear bilaterally, No wheezing, rales or rhonchi  Cardiovascular: RRR.  S1 and S2 positive.  No murmurs, rubs, gallops or clicks  Gastrointestinal: Bowel Sounds present, soft, nontender.   Lymph: No peripheral edema. No cervical lymphadenopathy.  Neurological: unable to assess, lethargic  Skin: No rashes or ulcers   Psych:  unable to assess    LABS: All Labs Reviewed:                        12.3   4.97  )-----------( 292      ( 17 Sep 2021 06:59 )             36.9                         12.1   5.99  )-----------( 296      ( 16 Sep 2021 17:56 )             36.6                         12.6   5.44  )-----------( 272      ( 16 Sep 2021 06:07 )             37.1     17 Sep 2021 06:59    139    |  103    |  26     ----------------------------<  122    3.8     |  21     |  0.98   16 Sep 2021 17:56    137    |  100    |  23     ----------------------------<  133    4.1     |  22     |  1.14   16 Sep 2021 06:07    137    |  106    |  17     ----------------------------<  107    3.7     |  26     |  0.98     Ca    9.8        17 Sep 2021 06:59  Ca    9.3        16 Sep 2021 17:56  Ca    9.1        16 Sep 2021 06:07  Phos  3.8       17 Sep 2021 06:59  Phos  2.8       16 Sep 2021 06:07  Phos  2.8       15 Sep 2021 06:05  Mg     2.3       17 Sep 2021 06:59  Mg     2.3       16 Sep 2021 06:07  Mg     1.9       15 Sep 2021 06:05    TPro  7.1    /  Alb  4.3    /  TBili  2.2    /  DBili  x      /  AST  28     /  ALT  16     /  AlkPhos  72     16 Sep 2021 17:56  TPro  7.5    /  Alb  3.9    /  TBili  2.6    /  DBili  x      /  AST  29     /  ALT  23     /  AlkPhos  74     16 Sep 2021 06:07  TPro  7.6    /  Alb  4.1    /  TBili  1.9    /  DBili  x      /  AST  27     /  ALT  24     /  AlkPhos  79     15 Sep 2021 06:05          Blood Culture: Organism --  Gram Stain Blood -- Gram Stain --  Specimen Source .Blood Blood  Culture-Blood --    Organism --  Gram Stain Blood -- Gram Stain --  Specimen Source Catheterized Catheterized  Culture-Blood --            RADIOLOGY:    EKG: sr

## 2021-09-17 NOTE — PROGRESS NOTE ADULT - SUBJECTIVE AND OBJECTIVE BOX
[id number]  Patient is a 66y old  Male who presents with a chief complaint of   HPI:  [id number]  Subjective: There were no overnight events.     Objective  MEDICATIONS  (STANDING):  aspirin Suppository 300 milliGRAM(s) Rectal daily  atorvastatin 80 milliGRAM(s) Oral at bedtime  enoxaparin Injectable 40 milliGRAM(s) SubCutaneous daily  folic acid 1 milliGRAM(s) Oral daily  sodium chloride 0.9%. 1000 milliLiter(s) (50 mL/Hr) IV Continuous <Continuous>    MEDICATIONS  (PRN):  lacosamide IVPB 100 milliGRAM(s) IV Intermittent once PRN Seizure  LORazepam   Injectable 1 milliGRAM(s) IV Push once PRN Seizure    Vital Signs Last 24 Hrs  T(C): 36.8 (17 Sep 2021 07:25), Max: 37 (16 Sep 2021 19:46)  T(F): 98.3 (17 Sep 2021 07:25), Max: 98.6 (16 Sep 2021 19:46)  HR: 67 (17 Sep 2021 07:25) (64 - 101)  BP: 149/88 (17 Sep 2021 07:25) (96/52 - 168/77)  BP(mean): 80 (16 Sep 2021 14:00) (69 - 84)  RR: 18 (17 Sep 2021 07:25) (16 - 20)  SpO2: 98% (17 Sep 2021 07:25) (96% - 99%)      GENERAL EXAM:  Constitutional: Not awake.   HEENT: PERRL, pupils symmetric b/l.       NEUROLOGICAL EXAM:  MS: intermittently is awake and agitated, followed by somnolence.    CN: Pupils symmetric b/l.   V1-3 intact, no facial asymmetry,   Motor: Strength: all extremities are antigravity. Spontaneous movement noted.   Tone: normal. Bulk: normal.    Plantar flex b/l.       Labs:   cbc                      12.3   4.97  )-----------( 292      ( 17 Sep 2021 06:59 )             36.9     [id number]    139  |  103  |  26<H>  ----------------------------<  122<H>  3.8   |  21<L>  |  0.98    Ca    9.8      17 Sep 2021 06:59  Phos  3.8     -  Mg     2.3     -    TPro  7.1  /  Alb  4.3  /  TBili  2.2<H>  /  DBili  x   /  AST  28  /  ALT  16  /  AlkPhos  72  -16    Coags  Lipids09-15 Chol 189 LDL -- HDL 47 Trig 130  A1C  CardiacMarkers    LFTsLIVER FUNCTIONS - ( 16 Sep 2021 17:56 )  Alb: 4.3 g/dL / Pro: 7.1 g/dL / ALK PHOS: 72 U/L / ALT: 16 U/L / AST: 28 U/L / GGT: x           UAUrinalysis Basic - ( 17 Sep 2021 06:59 )    Color: Yellow / Appearance: Clear / S.028 / pH: x  Gluc: x / Ketone: Small  / Bili: Negative / Urobili: Negative   Blood: x / Protein: 30 mg/dL / Nitrite: Negative   Leuk Esterase: Small / RBC: 91 /hpf / WBC 12 /HPF   Sq Epi: x / Non Sq Epi: 1 /hpf / Bacteria: Negative    Radiology:     [id number]  Patient is a 66y old  Male who presents with a chief complaint of AMS    HPI:  [id number]  Subjective: There were no overnight events.     Objective  MEDICATIONS  (STANDING):  aspirin Suppository 300 milliGRAM(s) Rectal daily  atorvastatin 80 milliGRAM(s) Oral at bedtime  enoxaparin Injectable 40 milliGRAM(s) SubCutaneous daily  folic acid 1 milliGRAM(s) Oral daily  sodium chloride 0.9%. 1000 milliLiter(s) (50 mL/Hr) IV Continuous <Continuous>    MEDICATIONS  (PRN):  lacosamide IVPB 100 milliGRAM(s) IV Intermittent once PRN Seizure  LORazepam   Injectable 1 milliGRAM(s) IV Push once PRN Seizure    Vital Signs Last 24 Hrs  T(C): 36.8 (17 Sep 2021 07:25), Max: 37 (16 Sep 2021 19:46)  T(F): 98.3 (17 Sep 2021 07:25), Max: 98.6 (16 Sep 2021 19:46)  HR: 67 (17 Sep 2021 07:25) (64 - 101)  BP: 149/88 (17 Sep 2021 07:25) (96/52 - 168/77)  BP(mean): 80 (16 Sep 2021 14:00) (69 - 84)  RR: 18 (17 Sep 2021 07:25) (16 - 20)  SpO2: 98% (17 Sep 2021 07:25) (96% - 99%)      GENERAL EXAM:  Constitutional: Not awake.   HEENT: PERRL, pupils symmetric b/l.       NEUROLOGICAL EXAM:  MS: intermittently is awake and agitated, followed by somnolence.    CN: Pupils symmetric b/l.   V1-3 intact, no facial asymmetry,   Motor: Strength: all extremities are antigravity. Spontaneous movement noted.   Tone: normal. Bulk: normal.    Plantar flex b/l.       Labs:   cbc                      12.3   4.97  )-----------( 292      ( 17 Sep 2021 06:59 )             36.9     [id number]    139  |  103  |  26<H>  ----------------------------<  122<H>  3.8   |  21<L>  |  0.98    Ca    9.8      17 Sep 2021 06:59  Phos  3.8     -  Mg     2.3     -    TPro  7.1  /  Alb  4.3  /  TBili  2.2<H>  /  DBili  x   /  AST  28  /  ALT  16  /  AlkPhos  72  -16    Coags  Lipids09-15 Chol 189 LDL -- HDL 47 Trig 130  A1C  CardiacMarkers    LFTsLIVER FUNCTIONS - ( 16 Sep 2021 17:56 )  Alb: 4.3 g/dL / Pro: 7.1 g/dL / ALK PHOS: 72 U/L / ALT: 16 U/L / AST: 28 U/L / GGT: x           UAUrinalysis Basic - ( 17 Sep 2021 06:59 )    Color: Yellow / Appearance: Clear / S.028 / pH: x  Gluc: x / Ketone: Small  / Bili: Negative / Urobili: Negative   Blood: x / Protein: 30 mg/dL / Nitrite: Negative   Leuk Esterase: Small / RBC: 91 /hpf / WBC 12 /HPF   Sq Epi: x / Non Sq Epi: 1 /hpf / Bacteria: Negative    Radiology:

## 2021-09-17 NOTE — CONSULT NOTE ADULT - ASSESSMENT
67 y/o male with PMHx CLL followed by heme/onc, anemia, melanoma, osteopenia admitted for right facial droop, right hemiparesis and expressive aphasia.    Admitted to the ICU at [place] yesterday and is S/P TPA. He has now been transferred for evaluation of his seizures.    CVA  - Patient with new right sided UE weakness, facial droop and expressive aphasia  - s/p tpa without improvement in symptoms  - 9/15 CT head without acute ICH  - 9/16 MRI neg for infarct  - possibly neuro defects may be related to seizures  - getting continuous eeg  - cont asa and statin    PAF  - Patient seen on a prior admission for syncope 7/2020 and found to have brief Afib  - Tele during this admission SR   - echocardiogram 9/15 unremarkable  - If no definitive CVA may consider deferring AC for now. Will need to fu with neuro.   - Continue to monitor    CLL  - Heme/onc to evaluate    - Monitor and replete lytes, keep K>4, Mg>2.  - All other medical needs as per Neuro team  - Other cardiovascular workup will depend on clinical course.  - Will continue to follow.

## 2021-09-17 NOTE — CONSULT NOTE ADULT - SUBJECTIVE AND OBJECTIVE BOX
HPI:  MRN-[id number]    HPI:  Patient is a 67 yo Rt handed male PMH CLL on Venetoclax, MDS, melanoma, paroxysmal AFib (not on AC) presenting as transfer from [place] for event capture. Vascular Neurology consulted for concern of vascular etiology for symptoms. Patient was initially at  [facility] on  with confusion, aphasia and R sided weakness. Patient was stroke code and was given s/p tPA. On admission, patient had CTH, CTA, CT perfusion during code stroke protocol with no acute findings. MRI Brain was  performed at   with no ischemic findings. EEG performed 9/15 showed diffuse L hemispheric slowing. Collateral  per wife at bedside who notes that patient was normal until 4pm Tuesday when he had sudden onset confusion and change in behavior. She states that since onset he had rapidly worsened. Patient was thus transferred to [facility] for EMU admission to evaluate for subclinical seizures.  Of note, patient does not have hx of seizures. Per EMU team EEG overnight did not show seizures however did show L sided slowing concerning for a possible structural etiology.     ROS: Unable to obtain due to mental status     PAST MEDICAL & SURGICAL HISTORY:  Osteopenia    CLL (Chronic Lymphocytic Leukemia)  SINCE     Avascular Necrosis of Femur Head  B/L    TMJ Syndrome  limited jaw opening due to TMJ    Herniated Cervical Disc    Bulging Disc  LUMBAR    Melanoma    Deviated nasal septum    Nasal congestion    Rosacea    Deviated nasal septum    Other specified disorders of nose and nasal sinuses    Anemia    S/P Splenectomy      Avascular Necrosis of Femur Head  RIGHT - CORE DECOMPRESSION-     Avascular Necrosis of Femur Head  LEFT - CORE DECOMPRESSION -     Abnormal Jaw Closure  LEFT JAW SURGERY -     Status Post Eye Surgery X 3  3/2011 right eye scleral buckle; left eye in     S/P Tonsillectomy  1960    Bilateral cataracts  removed in       FAMILY HISTORY:  Family history of multiple myeloma    Family history of malignant melanoma  age 89    FH: blood dyscrasia  MGUS    FH: hypertension  mother    FH: renal failure  mother        SOCIAL HISTORY: SOCIAL HISTORY:     Marital Status: (  )   (  ) Single  (  )   (  )      Occupation:      Lives: (  ) alone  (  ) with children   (  ) with spouse  (  ) with parents  (  ) other     Illicit Drug Use: (  ) never used  (  ) other _____     Tobacco Use:  (  ) never smoked  (  ) former smoker  (  ) current smoker  (  ) pack year  (  ) last cigarette date     Alcohol Use:      Sexual History:        MEDICATIONS  Home Medications:  atorvastatin 80 mg oral tablet: 1 tab(s) orally once a day (at bedtime) (16 Sep 2021 11:42)  enoxaparin: 40 unit(s) subcutaneous once a day (16 Sep 2021 11:42)  folic acid 1 mg oral tablet: 1 tab(s) orally once a day (14 Sep 2021 22:59)      MEDICATIONS  (STANDING):  atorvastatin 80 milliGRAM(s) Oral at bedtime  folic acid 1 milliGRAM(s) Oral daily  sodium chloride 0.9%. 1000 milliLiter(s) (50 mL/Hr) IV Continuous <Continuous>    MEDICATIONS  (PRN):  lacosamide IVPB 100 milliGRAM(s) IV Intermittent once PRN Seizure  LORazepam   Injectable 1 milliGRAM(s) IV Push once PRN Seizure      ALLERGIES/INTOLERANCES:  Allergies  No Known Allergies    Intolerances      OBJECTIVE:  VITALS   Vital Signs Last 24 Hrs  T(C): 36.5 (17 Sep 2021 11:23), Max: 37 (16 Sep 2021 19:46)  T(F): 97.7 (17 Sep 2021 11:23), Max: 98.6 (16 Sep 2021 19:46)  HR: 84 (17 Sep 2021 11:23) (64 - 101)  BP: 154/95 (17 Sep 2021 11:23) (96/52 - 168/77)  BP(mean): 80 (16 Sep 2021 14:00) (69 - 84)  RR: 18 (17 Sep 2021 11:23) (17 - 20)  SpO2: 97% (17 Sep 2021 11:23) (96% - 99%)    PHYSICAL EXAM:  Neurological Exam:  Mental Status: Awake, alert, attentive b/l. minimal verbal output only to noxious stimuli (replies only Ow and grunts to pain).      Cranial Nerves: PERRL, EOMI (tracks spontaneously), VFF, no nystagmus.  No facial asymmetry.  Hearing intact.      Motor: Patient does not cooperative for motor strength exam. LUE and b/l LEs antigravity. RUE with no spontaneous movement. RUE does not withdraw to noxious stimuli  Pronator drift: unable to assess  Dysmetria: Unable to assess  Tremor: No resting, postural or action tremor.  No myoclonus.  Sensation: grimaces to noxious stimuli LUE, b/l LEs. RUE with grossly decreased sensation to noxious stimuli   Deep Tendon Reflexes: 2+ bilateral biceps, triceps, brachioradialis, knee and ankle  Toes flexor bilaterally  Gait:  Unable to assess    LABORATORY:  CBC                       12.3   4.97  )-----------( 292      ( 17 Sep 2021 06:59 )             36.9     Chem     139  |  103  |  26<H>  ----------------------------<  122<H>  3.8   |  21<L>  |  0.98    Ca    9.8      17 Sep 2021 06:59  Phos  3.8       Mg     2.3         TPro  7.1  /  Alb  4.3  /  TBili  2.2<H>  /  DBili  x   /  AST  28  /  ALT  16  /  AlkPhos  72  -    LFTs LIVER FUNCTIONS - ( 16 Sep 2021 17:56 )  Alb: 4.3 g/dL / Pro: 7.1 g/dL / ALK PHOS: 72 U/L / ALT: 16 U/L / AST: 28 U/L / GGT: x           Coagulopathy   Lipid Panel 09-15 Chol 189 LDL -- HDL 47 Trig 130  A1c   Cardiac enzymes     U/A Urinalysis Basic - ( 17 Sep 2021 06:59 )    Color: Yellow / Appearance: Clear / S.028 / pH: x  Gluc: x / Ketone: Small  / Bili: Negative / Urobili: Negative   Blood: x / Protein: 30 mg/dL / Nitrite: Negative   Leuk Esterase: Small / RBC: 91 /hpf / WBC 12 /HPF   Sq Epi: x / Non Sq Epi: 1 /hpf / Bacteria: Negative      CSF  Immunological  Other    STUDIES & IMAGING:  Studies (EKG, EEG, EMG, etc):     Radiology (XR, CT, MR, U/S, TTE/MAYNOR): HPI:  [id number]    HPI:  Patient is a 67 yo Rt handed male PMH CLL on Venetoclax, MDS, melanoma, paroxysmal AFib (not on AC) presenting as transfer from [place] for event capture. Vascular Neurology consulted for concern of vascular etiology for symptoms. Patient was initially at  [facility] on  with confusion, aphasia and R sided weakness. Patient was stroke code and was given s/p tPA. On admission, patient had CTH, CTA, CT perfusion during code stroke protocol with no acute findings. MRI Brain was  performed at   with no ischemic findings. EEG performed 9/15 showed diffuse L hemispheric slowing. Collateral  per wife at bedside who notes that patient was normal until 4pm Tuesday when he had sudden onset confusion and change in behavior. She states that since onset he had rapidly worsened. Patient was thus transferred to [facility] for EMU admission to evaluate for subclinical seizures.  Of note, patient does not have hx of seizures. Per EMU team EEG overnight did not show seizures however did show L sided slowing concerning for a possible structural etiology.     ROS: Unable to obtain due to mental status     PAST MEDICAL & SURGICAL HISTORY:  Osteopenia    CLL (Chronic Lymphocytic Leukemia)  SINCE     Avascular Necrosis of Femur Head  B/L    TMJ Syndrome  limited jaw opening due to TMJ    Herniated Cervical Disc    Bulging Disc  LUMBAR    Melanoma    Deviated nasal septum    Nasal congestion    Rosacea    Deviated nasal septum    Other specified disorders of nose and nasal sinuses    Anemia    S/P Splenectomy      Avascular Necrosis of Femur Head  RIGHT - CORE DECOMPRESSION-     Avascular Necrosis of Femur Head  LEFT - CORE DECOMPRESSION -     Abnormal Jaw Closure  LEFT JAW SURGERY -     Status Post Eye Surgery X 3  3/2011 right eye scleral buckle; left eye in     S/P Tonsillectomy  1960    Bilateral cataracts  removed in       FAMILY HISTORY:  Family history of multiple myeloma    Family history of malignant melanoma  age 89    FH: blood dyscrasia  MGUS    FH: hypertension  mother    FH: renal failure  mother        SOCIAL HISTORY: SOCIAL HISTORY:     Marital Status: (  )   (  ) Single  (  )   (  )      Occupation:      Lives: (  ) alone  (  ) with children   (  ) with spouse  (  ) with parents  (  ) other     Illicit Drug Use: (  ) never used  (  ) other _____     Tobacco Use:  (  ) never smoked  (  ) former smoker  (  ) current smoker  (  ) pack year  (  ) last cigarette date     Alcohol Use:      Sexual History:        MEDICATIONS  Home Medications:  atorvastatin 80 mg oral tablet: 1 tab(s) orally once a day (at bedtime) (16 Sep 2021 11:42)  enoxaparin: 40 unit(s) subcutaneous once a day (16 Sep 2021 11:42)  folic acid 1 mg oral tablet: 1 tab(s) orally once a day (14 Sep 2021 22:59)      MEDICATIONS  (STANDING):  atorvastatin 80 milliGRAM(s) Oral at bedtime  folic acid 1 milliGRAM(s) Oral daily  sodium chloride 0.9%. 1000 milliLiter(s) (50 mL/Hr) IV Continuous <Continuous>    MEDICATIONS  (PRN):  lacosamide IVPB 100 milliGRAM(s) IV Intermittent once PRN Seizure  LORazepam   Injectable 1 milliGRAM(s) IV Push once PRN Seizure      ALLERGIES/INTOLERANCES:  Allergies  No Known Allergies    Intolerances      OBJECTIVE:  VITALS   Vital Signs Last 24 Hrs  T(C): 36.5 (17 Sep 2021 11:23), Max: 37 (16 Sep 2021 19:46)  T(F): 97.7 (17 Sep 2021 11:23), Max: 98.6 (16 Sep 2021 19:46)  HR: 84 (17 Sep 2021 11:23) (64 - 101)  BP: 154/95 (17 Sep 2021 11:23) (96/52 - 168/77)  BP(mean): 80 (16 Sep 2021 14:00) (69 - 84)  RR: 18 (17 Sep 2021 11:23) (17 - 20)  SpO2: 97% (17 Sep 2021 11:23) (96% - 99%)    PHYSICAL EXAM:  Neurological Exam:  Mental Status: Awake, alert, attentive b/l. minimal verbal output only to noxious stimuli (replies only Ow and grunts to pain).  Follow few simple commands     Cranial Nerves: PERRL, EOMI (tracks spontaneously), VFF, no nystagmus.  No facial asymmetry.  Hearing intact.      Motor: Patient does not cooperative for motor strength exam. LUE and b/l LEs antigravity and without drift. RUE 4/5 distally, 3/5 proximally.   Pronator drift: unable to assess  Dysmetria: Unable to assess  Tremor: No resting, postural or action tremor.  No myoclonus.  Sensation: grimaces to noxious stimuli LUE, b/l LEs. RUE with grossly decreased sensation to noxious stimuli   Deep Tendon Reflexes: 2+ bilateral biceps, triceps, brachioradialis, knee and ankle  Toes flexor bilaterally  Gait:  Unable to assess    LABORATORY:  CBC                       12.3   4.97  )-----------( 292      ( 17 Sep 2021 06:59 )             36.9     Chem     139  |  103  |  26<H>  ----------------------------<  122<H>  3.8   |  21<L>  |  0.98    Ca    9.8      17 Sep 2021 06:59  Phos  3.8       Mg     2.3         TPro  7.1  /  Alb  4.3  /  TBili  2.2<H>  /  DBili  x   /  AST  28  /  ALT  16  /  AlkPhos  72      LFTs LIVER FUNCTIONS - ( 16 Sep 2021 17:56 )  Alb: 4.3 g/dL / Pro: 7.1 g/dL / ALK PHOS: 72 U/L / ALT: 16 U/L / AST: 28 U/L / GGT: x           Coagulopathy   Lipid Panel 09-15 Chol 189 LDL -- HDL 47 Trig 130  A1c   Cardiac enzymes     U/A Urinalysis Basic - ( 17 Sep 2021 06:59 )    Color: Yellow / Appearance: Clear / S.028 / pH: x  Gluc: x / Ketone: Small  / Bili: Negative / Urobili: Negative   Blood: x / Protein: 30 mg/dL / Nitrite: Negative   Leuk Esterase: Small / RBC: 91 /hpf / WBC 12 /HPF   Sq Epi: x / Non Sq Epi: 1 /hpf / Bacteria: Negative      CSF  Immunological  Other    STUDIES & IMAGING:  Studies (EKG, EEG, EMG, etc):     Radiology (XR, CT, MR, U/S, TTE/MAYNOR):

## 2021-09-18 LAB
ANION GAP SERPL CALC-SCNC: 15 MMOL/L — SIGNIFICANT CHANGE UP (ref 5–17)
BUN SERPL-MCNC: 23 MG/DL — SIGNIFICANT CHANGE UP (ref 7–23)
CALCIUM SERPL-MCNC: 9.2 MG/DL — SIGNIFICANT CHANGE UP (ref 8.4–10.5)
CHLORIDE SERPL-SCNC: 105 MMOL/L — SIGNIFICANT CHANGE UP (ref 96–108)
CO2 SERPL-SCNC: 19 MMOL/L — LOW (ref 22–31)
CREAT SERPL-MCNC: 0.84 MG/DL — SIGNIFICANT CHANGE UP (ref 0.5–1.3)
GLUCOSE SERPL-MCNC: 95 MG/DL — SIGNIFICANT CHANGE UP (ref 70–99)
HCT VFR BLD CALC: 35.8 % — LOW (ref 39–50)
HGB BLD-MCNC: 12.1 G/DL — LOW (ref 13–17)
MAGNESIUM SERPL-MCNC: 2.3 MG/DL — SIGNIFICANT CHANGE UP (ref 1.6–2.6)
MCHC RBC-ENTMCNC: 33.8 GM/DL — SIGNIFICANT CHANGE UP (ref 32–36)
MCHC RBC-ENTMCNC: 34.9 PG — HIGH (ref 27–34)
MCV RBC AUTO: 103.2 FL — HIGH (ref 80–100)
NRBC # BLD: 0 /100 WBCS — SIGNIFICANT CHANGE UP (ref 0–0)
PHOSPHATE SERPL-MCNC: 3.2 MG/DL — SIGNIFICANT CHANGE UP (ref 2.5–4.5)
PLATELET # BLD AUTO: 282 K/UL — SIGNIFICANT CHANGE UP (ref 150–400)
POTASSIUM SERPL-MCNC: 3.9 MMOL/L — SIGNIFICANT CHANGE UP (ref 3.5–5.3)
POTASSIUM SERPL-SCNC: 3.9 MMOL/L — SIGNIFICANT CHANGE UP (ref 3.5–5.3)
RBC # BLD: 3.47 M/UL — LOW (ref 4.2–5.8)
RBC # FLD: 14.9 % — HIGH (ref 10.3–14.5)
SODIUM SERPL-SCNC: 139 MMOL/L — SIGNIFICANT CHANGE UP (ref 135–145)
WBC # BLD: 5.33 K/UL — SIGNIFICANT CHANGE UP (ref 3.8–10.5)
WBC # FLD AUTO: 5.33 K/UL — SIGNIFICANT CHANGE UP (ref 3.8–10.5)

## 2021-09-18 PROCEDURE — 95720 EEG PHY/QHP EA INCR W/VEEG: CPT

## 2021-09-18 PROCEDURE — 99232 SBSQ HOSP IP/OBS MODERATE 35: CPT

## 2021-09-18 PROCEDURE — 99233 SBSQ HOSP IP/OBS HIGH 50: CPT

## 2021-09-18 RX ORDER — OLANZAPINE 15 MG/1
2.5 TABLET, FILM COATED ORAL DAILY
Refills: 0 | Status: DISCONTINUED | OUTPATIENT
Start: 2021-09-18 | End: 2021-09-19

## 2021-09-18 RX ORDER — METOPROLOL TARTRATE 50 MG
5 TABLET ORAL ONCE
Refills: 0 | Status: COMPLETED | OUTPATIENT
Start: 2021-09-18 | End: 2021-09-18

## 2021-09-18 RX ORDER — ASPIRIN/CALCIUM CARB/MAGNESIUM 324 MG
81 TABLET ORAL DAILY
Refills: 0 | Status: DISCONTINUED | OUTPATIENT
Start: 2021-09-18 | End: 2021-10-01

## 2021-09-18 RX ORDER — DIPHENHYDRAMINE HCL 50 MG
25 CAPSULE ORAL EVERY 4 HOURS
Refills: 0 | Status: DISCONTINUED | OUTPATIENT
Start: 2021-09-18 | End: 2021-09-27

## 2021-09-18 RX ORDER — ACETAMINOPHEN 500 MG
650 TABLET ORAL EVERY 6 HOURS
Refills: 0 | Status: DISCONTINUED | OUTPATIENT
Start: 2021-09-18 | End: 2021-10-01

## 2021-09-18 RX ORDER — OLANZAPINE 15 MG/1
2.5 TABLET, FILM COATED ORAL ONCE
Refills: 0 | Status: COMPLETED | OUTPATIENT
Start: 2021-09-18 | End: 2021-09-19

## 2021-09-18 RX ADMIN — Medication 650 MILLIGRAM(S): at 16:46

## 2021-09-18 RX ADMIN — Medication 1 MILLIGRAM(S): at 00:34

## 2021-09-18 RX ADMIN — ENOXAPARIN SODIUM 40 MILLIGRAM(S): 100 INJECTION SUBCUTANEOUS at 16:46

## 2021-09-18 RX ADMIN — Medication 5 MILLIGRAM(S): at 16:46

## 2021-09-18 RX ADMIN — IMMUNE GLOBULIN (HUMAN) 41.67 GRAM(S): 10 INJECTION INTRAVENOUS; SUBCUTANEOUS at 17:35

## 2021-09-18 RX ADMIN — OLANZAPINE 2.5 MILLIGRAM(S): 15 TABLET, FILM COATED ORAL at 13:12

## 2021-09-18 RX ADMIN — Medication 25 MILLIGRAM(S): at 16:46

## 2021-09-18 RX ADMIN — Medication 81 MILLIGRAM(S): at 13:12

## 2021-09-18 RX ADMIN — Medication 1 MILLIGRAM(S): at 13:12

## 2021-09-18 NOTE — PROGRESS NOTE ADULT - ASSESSMENT
Patient is a 67 yo Rt handed male PMH CLL on Venetoclax, MDS, melanoma, paroxysmal AFib (not on AC) presenting as transfer from [place] for event capture. Patient was initially at  [facility] [place] on 9/14 with confusion, aphasia and R sided weakness. With Brain MRI negative. EEG note to show discharges from Left hemisphere with RUE spasticity There is concern for subclinical seizures thus transferred to [facility] for event capture. Cardiology consulted.  Heme/onc to follow this AM and will followup on recommendations on heme/onc medications.  Patient was noted to have episodes agitation with episodes of somnolence with out medication needed. Patient had LP this afternoon and will follow up on CSF results. Cardiology states to hold off on AC as there is no definitive CVA noted. Continue with ASA and statin.   Patient had 3rd Covid vaccine in August.       Impression: RUE spasticity with AMS transferred for event capture   Plan:   [] IVIG x 5 days (9/17 - 9/21)  [x]LP completed- Glucose 73, Protein 55, WBC 3  [x]Cardiology consulted- choosing to defer AC at this time   [x]vEEG   [x]Hold on starting AED's   [x]heme/onc to continue to follow on recommendations. . Follows Dr. Godinez and  Dr. Valenzuela as an outpatient.   [] Passes bedside swallow, Swallow evaluation pending.   [x]continue on MIVF at 50cc/hr   []speech eval   [x]DVt ppx     Rescue: 1st-Ativan 1mg, 2nd-Vimpat 100mg IV if GTC refractory to ativan      **INCOMPLETE**    Patient is a 65 yo Rt handed male PMH CLL on Venetoclax, MDS, melanoma, paroxysmal AFib (not on AC) presenting as transfer from [place] for event capture. Patient was initially at  [facility] on 9/14 with confusion, aphasia and R sided weakness. With Brain MRI negative. EEG note to show discharges from Left hemisphere with RUE spasticity There is concern for subclinical seizures thus transferred to [facility] for event capture. Cardiology consulted.  Heme/onc to follow this AM and will followup on recommendations on heme/onc medications.  Patient was noted to have episodes agitation with episodes of somnolence with out medication needed. Patient had LP this afternoon and will follow up on CSF results. Cardiology states to hold off on AC as there is no definitive CVA noted. Continue with ASA and statin. Improved s/p 1 dose of IVIG. Still with some mild cognitive delay and now with visual hallucinations. Poss related to ativan, will dc. Wife notes that patient gets IVIG treatment every month as part of his CLL treatment.         Impression: RUE spasticity with AMS transferred for event capture   Plan:   [] IVIG x 5 days (9/17 - 9/21)  [] F/u on Monday with Dr. Montserrat Haji about his outpt IVIG treatment  [] Added Zyprexa 2.5mg qd   [x]LP completed- Glucose 73, Protein 55, WBC 3  [] MR brain w/wo contrast to be considered by Dr. stephen during the week   [x]Cardiology consulted- choosing to defer AC at this time   [x]vEEG   [x]Hold on starting AED's   [x]heme/onc to continue to follow on recommendations. . Follows Dr. Godinez and  Dr. Valenzuela as an outpatient.   [] Passes bedside swallow, Swallow evaluation pending.   [x]continue on MIVF at 50cc/hr   []speech eval   [x]DVt ppx     Rescue: 1st-Ativan 1mg, 2nd-Vimpat 100mg IV if GTC refractory to ativan      Case discussed with neurology attending Dr. Funez

## 2021-09-18 NOTE — PROGRESS NOTE ADULT - ASSESSMENT
65 y/o male with PMHx CLL followed by heme/onc, anemia, melanoma, osteopenia admitted for right facial droop, right hemiparesis and expressive aphasia.    Admitted to the ICU at [facility] yesterday and is S/P TPA. He has now been transferred for evaluation of his seizures.    CVA  - Patient with new right sided UE weakness, facial droop and expressive aphasia  - s/p tpa without improvement in symptoms  - 9/15 CT head without acute ICH  - 9/16 MRI neg for infarct  - possibly neuro defects may be related to seizures  - getting continuous eeg  - cont asa and statin    PAF  - Patient seen on a prior admission for syncope 7/2020 and found to have brief Afib  - Tele during this admission SR   - echocardiogram 9/15 unremarkable  - If no definitive CVA may consider deferring AC for now. Will need to fu with neuro.   - Continue to monitor    CLL  - Heme/onc to evaluate    - Monitor and replete lytes, keep K>4, Mg>2.  - All other medical needs as per Neuro team  - Other cardiovascular workup will depend on clinical course.  - Will continue to follow.

## 2021-09-18 NOTE — OCCUPATIONAL THERAPY INITIAL EVALUATION ADULT - COGNITIVE, VISUAL PERCEPTUAL, OT EVAL
Pt will be oriented x3-4 100% of the time within 4 weeks.  Pt will follow 75% multi step commands within 4 weeks.

## 2021-09-18 NOTE — OCCUPATIONAL THERAPY INITIAL EVALUATION ADULT - ADL RETRAINING, OT EVAL
Pt will be I with grooming & self feeding within 4 weeks. Pt will be I with UB dressing within 4 weeks.

## 2021-09-18 NOTE — OCCUPATIONAL THERAPY INITIAL EVALUATION ADULT - NS ASR FOLLOW COMMAND OT EVAL
100% of the time/able to follow single-step instructions/unable to follow multi-step instructions 100% of the time/able to follow single-step instructions/unable to follow multi-step instructions/aphasia/oral apraxia

## 2021-09-18 NOTE — OCCUPATIONAL THERAPY INITIAL EVALUATION ADULT - PHYSICAL ASSIST/NONPHYSICAL ASSIST: SIT/SUPINE, REHAB EVAL
Medicare Wellness Visit  Plan for Preventive Care    A good way for you to stay healthy is to use preventive care.  Medicare covers many services that can help you stay healthy.* The goal of these services is to find any health problems as quickly as possible. Finding problems early can help make them easier to treat.  Your personal plan below lists the services you may need and when they are due.     Health Maintenance Summary     Shingles Vaccine (1 of 2)  Overdue - never done    Medicare Wellness Visit (Yearly)  Overdue - never done    Hepatitis C Screening (Once)  Postponed until 4/6/2022    Pneumococcal Vaccine 65+ (1 of 1 - PPSV23)  Postponed until 6/17/2022    Influenza Vaccine (Season Ended)  Next due on 8/1/2021    Depression Screening (Yearly)  Next due on 6/17/2022    DTaP/Tdap/Td Vaccine (2 - Td or Tdap)  Next due on 3/20/2025    Colorectal Cancer Screen-   Next due on 6/11/2031    COVID-19 Vaccine   Completed    Hepatitis B Vaccine   Aged Out    Meningococcal Vaccine   Aged Out    HPV Vaccine   Aged Out           Preventive Care for Women and Men    Heart Screenings (Cardiovascular):  · Blood tests are used to check your cholesterol, lipid and triglyceride levels. High levels can increase your risk for heart disease and stroke. High levels can be treated with medications, diet and exercise. Lowering your levels can help keep your heart and blood vessels healthy.  Your provider will order these tests if they are needed.    · An ultrasound is done to see if you have an abdominal aortic aneurysm (AAA).  This is an enlargement of one of the main blood vessels that delivers blood to the body.   In the [place], 9,000 deaths are caused by AAA.  You may not even know you have this problem and as many as 1 in 3 people will have a serious problem if it is not treated.  Early diagnosis allows for more effective treatment and cure.  If you have a family history of AAA or are a male age 65-75 who has smoked,  you are at higher risk of an AAA.  Your provider can order this test, if needed.    Colorectal Screening:  · There are many tests that are used to check for cancer of your colon and rectum. You and your provider should discuss what test is best for you and when to have it done.  Options include:  · Screening Colonoscopy: exam of the entire colon, seen through a flexible lighted tube.  · Flexible Sigmoidoscopy: exam of the last third (sigmoid portion) of the colon and rectum, seen through a flexible lighted tube.  · Cologuard DNA stool test: a sample of your stool is used to screen for cancer and unseen blood in your stool.  · Fecal Occult Blood Test: a sample of your stool is studied to find any unseen blood    Flu Shot:  · An immunization that helps to prevent influenza (the flu). You should get this every year. The best time to get the shot is in the fall.    Pneumococcal Shot:  • Vaccines are available that can help prevent pneumococcal disease, which is any type of infection caused by Streptococcus pneumoniae bacteria.   Their use can prevent some cases of pneumonia, meningitis, and sepsis. There are two types of pneumococcal vaccines:   o Conjugate vaccines (PCV-13 or Prevnar 13®) - helps protect against the 13 types of pneumococcal bacteria that are the most common causes of serious infections in children and adults.    o Polysaccharide vaccine (PPSV23 or Zldvyjrpo24®) - helps protect against 23 types of pneumococcal bacteria for patients who are recommended to get it.  These vaccines should be given at least 12 months apart.  A booster is usually not needed.     Hepatitis B Shot:  · An immunization that helps to protect people from getting Hepatitis B. Hepatitis B is a virus that spreads through contact with infected blood or body fluids. Many people with the virus do not have symptoms.  The virus can lead to serious problems, such as liver disease. Some people are at higher risk than others. Your doctor will  tell you if you need this shot.     Diabetes Screening:  · A test to measure sugar (glucose) in your blood is called a fasting blood sugar. Fasting means you cannot have food or drink for at least 8 hours before the test. This test can detect diabetes long before you may notice symptoms.    Glaucoma Screening:  · Glaucoma screening is performed by your eye doctor. The test measures the fluid pressure inside your eyes to determine if you have glaucoma.     Hepatitis C Screening:  · A blood test to see if you have the hepatitis C virus.  Hepatitis C attacks the liver and is a major cause of chronic liver disease.  Medicare will cover a single screening for all adults born between 1945 & 1965, or high risk patients (people who have injected illegal drugs or people who have had blood transfusions).  High risk patients who continue to inject illegal drugs can be screened for Hepatitis C every year.    Smoking and Tobacco-Use Cessation Counseling:  · Tobacco is the single greatest cause of disease and early death in our country today. Medication and counseling together can increase a person’s chance of quitting for good.   · Medicare covers two quitting attempts per year, with four counseling sessions per attempt (eight sessions in a 12 month period)    Preventive Screening tests for Women    Screening Mammograms and Breast Exams:  · An x-ray of your breasts to check for breast cancer before you or your doctor may be able to feel it.  If breast cancer is found early it can usually be treated with success.    Pelvic Exams and Pap Tests:  · An exam to check for cervical and vaginal cancer. A Pap test is a lab test in which cells are taken from your cervix and sent to the lab to look for signs of cervical cancer. If cancer of the cervix is found early, chances for a cure are good. Testing can generally end at age 65, or if a woman has a hysterectomy for a benign condition. Your provider may recommend more frequent testing if  certain abnormal results are found.    Bone Mass Measurements:  · A painless x-ray of your bone density to see if you are at risk for a broken bone. Bone density refers to the thickness of bones or how tightly the bone tissue is packed.    Preventive Screening tests for Men    Prostate Screening:  · Should you have a prostate cancer test (PSA)?  It is up to you to decide if you want a prostate cancer test. Talk to your clinician to find out if the test is right for you.  Things for you to consider and talk about should include:  · Benefits and harms of the test  · Your family history  · How your race/ethnicity may influence the test  · If the test may impact other medical conditions you have  · Your values on screenings and treatments    *Medicare pays for many preventive services to keep you healthy. For some of these services, you might have to pay a deductible, coinsurance, and / or copayment.  The amounts vary depending on the type of services you need and the kind of Medicare health plan you have.    For further details on screenings offered by Medicare please visit: https://www.medicare.gov/coverage/preventive-screening-services    verbal cues/nonverbal cues (demo/gestures)/1 person assist

## 2021-09-18 NOTE — SWALLOW BEDSIDE ASSESSMENT ADULT - SLP GENERAL OBSERVATIONS
Pt found in bed, awake and alert. +VEEG, B/L mittens and soft vest restraint noted. Pt A&O to name and place. Follows simple commands but difficulty with multi-step commands. Delayed responses to y/n questions and directives. However, answers appropriate. Cooperative for evaluation. On room air. Reduced hand-eye coordination, spastic movements noted, suspected motor planning deficits RUE noted when self feeding.

## 2021-09-18 NOTE — SWALLOW BEDSIDE ASSESSMENT ADULT - SLP PERTINENT HISTORY OF CURRENT PROBLEM
H&P: Patient is a 65 yo Rt handed male PMH CLL on Venetoclax, MDS, melanoma, paroxysmal AFib (not on AC) presenting as transfer from [place] for event capture. Patient was initially at  [facility] on 9/14 with confusion, aphasia and R sided weakness. Patient was stroke code and was given s/p tPA. On admission, patient had CTH, CTA, CT perfusion during code stroke protocol with no acute findings. MRI Brain was  performed at  9/16 with no ischemic findings. EEG performed 9/15 showed diffuse L hemispheric slowing. Pt remained aphasic with AMS. +RUE spasticity was noted on exam. Patient was thus transferred to [facility] for EMU admission to evaluate for subclinical seizures.  Of note, patient does not have hx of seizures. pt admitted under neurology service.

## 2021-09-18 NOTE — OCCUPATIONAL THERAPY INITIAL EVALUATION ADULT - PERSONAL SAFETY AND JUDGMENT, REHAB EVAL
A/P yo 36y s/p , PPD #2, stable  - Pain: Motrin prn  - GI: Reg diet  - Encourage breastfeeding  - DVT prophylaxis: early ambulation  - Dispo: PP2 impaired

## 2021-09-18 NOTE — OCCUPATIONAL THERAPY INITIAL EVALUATION ADULT - PRECAUTIONS/LIMITATIONS, REHAB EVAL
fall precautions Patient is a 67 yo Rt handed male PMH CLL on Venetoclax, MDS, melanoma, paroxysmal AFib (not on AC) presenting as transfer from [place] for event capture. Patient was initially at  [facility] on 9/14 with confusion, aphasia and R sided weakness. With Brain MRI negative. EEG note to show discharges from Left hemisphere with RUE spasticity There is concern for subclinical seizures thus transferred to [facility] for event capture. Cardiology consulted.  Heme/onc to follow this AM and will followup on recommendations on heme/onc medications.  Patient was noted to have episodes agitation with episodes of somnolence with out medication needed. Patient had LP this afternoon and will follow up on CSF results. Cardiology states to hold off on AC as there is no definitive CVA noted. Continue with ASA and statin. Improved s/p 1 dose of IVIG. Still with some mild cognitive delay and now with visual hallucinations. Poss related to ativan, will dc. Wife notes that patient gets IVIG treatment every month as part of his CLL treatment./fall precautions Patient is a 66 year old male with PMH CLL on Venetoclax, MDS, melanoma, paroxysmal AFib (not on AC) presenting as transfer from [place] for event capture. Patient was initially at  [facility] on 9/14 with confusion, aphasia and R sided weakness. Pt s/p TPA at [place]. Pt with Brain MRI negative. EEG note to show discharges from Left hemisphere with RUE spasticity There is concern for subclinical seizures thus transferred to [facility] for event capture./fall precautions

## 2021-09-18 NOTE — SWALLOW BEDSIDE ASSESSMENT ADULT - SWALLOW EVAL: DIAGNOSIS
Patient is a 67 yo Rt handed male PMH CLL, MDS, melanoma, paroxysmal AFib (not on AC) presenting as transfer for event capture. Initially presented with aphasia and R sided weakness and treated for CVA, s/p tPA. Imaging negative for acute findings. Pt now started on IVIG. Pt seen for bedside swallow evaluation. Pt presents with evidence of an oropharyngeal dysphagia characterized by s/s indicative of laryngeal penetration/aspiration on thin liquid and hard solid and delayed pharyngeal swallows. pt will benefit from formal speech-language evaluation as word finding difficulties, delayed responses  and difficulty following multi-step commands noted. Suspect RUE motor planning deficits. Pt will benefit from OT and PT evaluations.

## 2021-09-18 NOTE — PHYSICAL THERAPY INITIAL EVALUATION ADULT - DISCHARGE DISPOSITION, PT EVAL
TBD upon further functional eval. Acute Rehab; Pt with limitations in self-care & home management, will benefit from Acute rehab prior to D/C home to increase strength, balance and endurance to improve functional mobility to level necessary for safe return home/rehabilitation facility

## 2021-09-18 NOTE — PHYSICAL THERAPY INITIAL EVALUATION ADULT - PATIENT/FAMILY AGREES WITH PLAN
TBD upon further functional eval. Acute Rehab; Pt with limitations in self-care & home management, will benefit from Acute rehab prior to D/C home to increase strength, balance and endurance to improve functional mobility to level necessary for safe return home Acute Rehab; Pt with limitations in self-care & home management, will benefit from Acute rehab prior to D/C home to increase strength, balance and endurance to improve functional mobility to level necessary for safe return home/yes

## 2021-09-18 NOTE — PHYSICAL THERAPY INITIAL EVALUATION ADULT - PERTINENT HX OF CURRENT PROBLEM, REHAB EVAL
67 yo Rt handed male PMH CLL on Venetoclax, MDS, melanoma, paroxysmal AFib (not on AC) presenting as transfer from [place] for event capture. Patient was initially at  [facility] on 9/14 with confusion, aphasia and R sided weakness. With Brain MRI negative. EEG note to show discharges from Left hemisphere with RUE spasticity There is concern for subclinical seizures thus transferred to [facility] for event capture.

## 2021-09-18 NOTE — OCCUPATIONAL THERAPY INITIAL EVALUATION ADULT - PERTINENT HX OF CURRENT PROBLEM, REHAB EVAL
65 yo Rt handed male PMH CLL on Venetoclax, MDS, melanoma, paroxysmal AFib (not on AC) presenting as transfer from [place] for event capture. Patient was initially at  [facility] on 9/14 with confusion, aphasia and R sided weakness. With Brain MRI negative. EEG note to show discharges from Left hemisphere with RUE spasticity There is concern for subclinical seizures thus transferred to [facility] for event capture.

## 2021-09-18 NOTE — PHYSICAL THERAPY INITIAL EVALUATION ADULT - ADDITIONAL COMMENTS
Patient lives in pvt house with family  3 steps to enter. can function on one level.  Patient ambulated without AD independent.

## 2021-09-18 NOTE — PROGRESS NOTE ADULT - SUBJECTIVE AND OBJECTIVE BOX
[facility] - Sveta Romano, Kyara, Ebony, Jennifer, Hosea Hraden  Office Number:  [phone]    Patient resting comfortably in bed in NAD.  Confused.  Unable to provide meaningful interval history.  Getting vidoe EEG    F/U for:CVA    Telemetry:  SR    MEDICATIONS  (STANDING):  aspirin Suppository 300 milliGRAM(s) Rectal daily  atorvastatin 80 milliGRAM(s) Oral at bedtime  enoxaparin Injectable 40 milliGRAM(s) SubCutaneous once  folic acid 1 milliGRAM(s) Oral daily  immune   globulin 10% (GAMMAGARD) IVPB 25 Gram(s) IV Intermittent daily  metoprolol tartrate Injectable 5 milliGRAM(s) IV Push once  sodium chloride 0.9%. 1000 milliLiter(s) (50 mL/Hr) IV Continuous <Continuous>    MEDICATIONS  (PRN):  lacosamide IVPB 100 milliGRAM(s) IV Intermittent once PRN Seizure  LORazepam   Injectable 1 milliGRAM(s) IV Push once PRN Seizure      Allergies    No Known Allergies           Vital Signs Last 24 Hrs  T(C): 36.7 (18 Sep 2021 05:15), Max: 37 (17 Sep 2021 16:13)  T(F): 98 (18 Sep 2021 05:15), Max: 98.6 (17 Sep 2021 16:13)  HR: 97 (18 Sep 2021 05:15) (67 - 117)  BP: 135/84 (18 Sep 2021 05:15) (130/87 - 165/79)  BP(mean): --  RR: 19 (18 Sep 2021 05:15) (18 - 22)  SpO2: 97% (18 Sep 2021 05:15) (93% - 98%)    I&O's Summary    17 Sep 2021 07:01  -  18 Sep 2021 07:00  --------------------------------------------------------  IN: 550 mL / OUT: 400 mL / NET: 150 mL        ON EXAM:    Constitutional: NAD, lethargic and confused  Lungs:  Non-labored, breath sounds are clear bilaterally, No wheezing, rales or rhonchi  Cardiovascular: RRR.  S1 and S2 positive.  No murmurs, rubs, gallops or clicks  Gastrointestinal: Bowel Sounds present, soft, nontender.   Lymph: No peripheral edema. No cervical lymphadenopathy.  Neurological: unable to assess, lethargic  Skin: No rashes or ulcers   Psych:  unable to assess    LABS: All Labs Reviewed:                        12.3   4.97  )-----------( 292      ( 17 Sep 2021 06:59 )             36.9                         12.1   5.99  )-----------( 296      ( 16 Sep 2021 17:56 )             36.6                         12.6   5.44  )-----------( 272      ( 16 Sep 2021 06:07 )             37.1     17 Sep 2021 06:59    139    |  103    |  26     ----------------------------<  122    3.8     |  21     |  0.98   16 Sep 2021 17:56    137    |  100    |  23     ----------------------------<  133    4.1     |  22     |  1.14   16 Sep 2021 06:07    137    |  106    |  17     ----------------------------<  107    3.7     |  26     |  0.98     Ca    9.8        17 Sep 2021 06:59  Ca    9.3        16 Sep 2021 17:56  Ca    9.1        16 Sep 2021 06:07  Phos  3.8       17 Sep 2021 06:59  Phos  2.8       16 Sep 2021 06:07  Mg     2.3       17 Sep 2021 06:59  Mg     2.3       16 Sep 2021 06:07    TPro  7.1    /  Alb  4.3    /  TBili  2.2    /  DBili  x      /  AST  28     /  ALT  16     /  AlkPhos  72     16 Sep 2021 17:56  TPro  7.5    /  Alb  3.9    /  TBili  2.6    /  DBili  x      /  AST  29     /  ALT  23     /  AlkPhos  74     16 Sep 2021 06:07          Blood Culture: Organism --  Gram Stain Blood -- Gram Stain   polymorphonuclear leukocytes seen  No organisms seen  by cytocentrifuge  Specimen Source .CSF CSF  Culture-Blood --    Organism --  Gram Stain Blood -- Gram Stain --  Specimen Source .Blood Blood  Culture-Blood --    Organism --  Gram Stain Blood -- Gram Stain --  Specimen Source Catheterized Catheterized  Culture-Blood --

## 2021-09-18 NOTE — SWALLOW BEDSIDE ASSESSMENT ADULT - PHARYNGEAL PHASE
+ audible swallows significant amount of delayed coughing post swallow on thin liquid and slight change in vocal quality (wet) on nectar thick liquid Cough post oral intake

## 2021-09-18 NOTE — SWALLOW BEDSIDE ASSESSMENT ADULT - COMMENTS
Continued: EEG: IMPRESSION: Abnormal EEG due to the presence of focal slowing in the left hemisphere with higher amplitude theta and delta waves. This suggests an underlying focal abnormality. Recommend correlate with imaging if necessary. EEG done on 9/15/2021 showed continuous focal slowing with higher amplitude theta and delta waves from the left hemisphere  Stroke service consulted: Impression Rapid progressive Global aphasia (Expressive > Receptive) vs encephalopathy 2/2 epileptogenic vs structural vs autoimmune etiology in the setting of known CLL. low suspicion for ischemic intracranial etiology in the setting of negative MRI, possibly stroke mimic. Cardiology consulted: hx of syncope and Afib. Defer AC if no definitive cva.  Pt started IVIG on 9/17/21. Mental status noted to be improved post IVIG.     SWALLOW HISTORY: Pt seen for bedside swallow evaluation on 9/15/21 at [facility]. Per report, recommendations included: "soft solids (dysphagia 3) with thin liquids via single/small cup sips +aspiration precautions. allow for swallow between intakes; alternate food with liquid; check mouth frequently for oral residue/pocketing; maintain upright posture during/after eating for 30 mins; no straws; oral hygiene; position upright (90 degrees); small sips/bites" As per documentation, Speech-Language evaluation unable to be completed at that time due to lethargy.     Hematology following given hx of CLL: Remains aphasic. Do not suspect 2/2 patient's CLL treatment. Infectious etiology on DDX. Recommended checking LP and including flow cytometry, cytopathology for malignant cells in addition to standard studies. Also check  autoimmune encephalitis panel and infectious PCR panel. Pt received 3rd dose of vaccine,  ? immune sequelae from vaccine.

## 2021-09-18 NOTE — SWALLOW BEDSIDE ASSESSMENT ADULT - ADDITIONAL RECOMMENDATIONS
Goals:   Swallow: 1. Pt/family/caregiver will demonstrate understanding and carryover of dysphagia management (safe swallow guidelines, compensatory strategies, dysphagia diet).  2. Pt will tolerate recommended diet with no overt, clinical s/s of aspiration.  Language goals pending results of formal speech-language evaluation.

## 2021-09-18 NOTE — EEG REPORT - NS EEG TEXT BOX
EPILEPSY MONITORING UNIT REPORT   [facility]: [address], [place] [address], Ph#: [phone] [facility]: [address], [place] [address], Ph#: [phone] Office: [address] [place] [address], [place] [address] Ph#: [phone]  Patient Name: Daniel Pinto   Age: 66 year, : 1955 MRN #: [id number], [facility]: [facility] Referring Physician: Dr. MEDINA        ER admit        OSH transfer           Study Information:  EEG Recording Technique: The patient underwent continuous Video-EEG monitoring, using Telemetry System hardware on the XLTek Digital System. EEG and video data were stored on a computer hard drive with important events saved in digital archive files. The material was reviewed by a physician (electroencephalographer / epileptologist) on a daily basis. Franko and seizure detection algorithms were utilized and reviewed. An EEG Technician attended to the patient, and was available throughout daytime work hours.  The epilepsy center neurologist was available in person or on call 24-hours per day.  EEG Placement and Labeling of Electrodes: The EEG was performed utilizing 20 channel referential EEG connections (coronal over temporal over parasagittal montage) using all standard 10-20 electrode placements with EKG, with additional electrodes placed in the inferior temporal region using the modified 10-10 montage electrode placements for elective admissions, or if deemed necessary. Recording was at a sampling rate of 256 samples per second per channel. Time synchronized digital video recording was done simultaneously with EEG recording. A low light infrared camera was used for low light recording.   History:  Patient is a 65 yo Rt handed male PMH CLL on Venetoclax, MDS, melanoma, paroxysmal AFib (not on AC) presenting as transfer from [place] for event capture. Patient was initially at  [facility] on  with confusion, aphasia and R sided weakness. Patient was stroke code and was given s/p tPA. On admission, patient had CTH, CTA, CT perfusion during code stroke protocol with no acute findings. MRI Brain was  performed at   with no ischemic findings. EEG performed 9/15 showed diffuse L hemispheric slowing. Pt remained aphasic with AMS. +RUE spasticity was noted on exam. Patient was thus transferred to [facility] for EMU admission to evaluate for subclinical seizures.  Patient does not have hx of seizures.  Home Antiepileptic Medication and Device  none  Interpretation:  Day 1 – 	Start: 2021  16:23    	End: mm/dd/yyyy  08:00 	Duration: 15 hr  11 min  Daily EEG Visual Analysis  FINDINGS:  The background was continuous, spontaneously variable and reactive. During wakefulness, the posterior dominant rhythm consisted of asymmetric – seen only over right - well-modulated 8-9 Hz activity, with amplitude to 30 uV, that attenuated to eye opening.  Low amplitude frontal beta was noted in wakefulness.  Background Slowing: Diffuse theta slowing.  Focal Slowing:  There is intermittent polymorphic delta slowing over the left hemisphere.  Sleep Background: Drowsiness was characterized by fragmentation, attenuation, and slowing of the background activity.   Sleep was characterized by the presence of vertex waves, asymmetric sleep spindles and K-complexes that appear attenuated over the left.  Other Non-Epileptiform Findings: None were present.  Activation Procedures:  Hyperventilation was not performed.   Photic stimulation was not performed.  Interictal Epileptiform Activity:  None were present.  Events: No events or seizures recorded.  Artifacts: Intermittent myogenic and movement artifacts were noted.  ECG: The heart rate on single channel ECG was predominantly between 110-120 BPM.  AEDs:  none  EEG Summary:  Abnormal EEG in the awake, drowsy and asleep states. Intermittent polymorphic delta slowing, focal, left hemisphere Background slowing, generalized  Impression/Clinical Correlate:  This is an abnormal EEG record. No epileptiform pattern or seizures were seen. There is evidence of a functional abnormality affecting the left hemisphere. There is mild diffuse cerebral dysfunction.   Preliminary Fellow Report, final report pending attending review  Devan Lovelace MD Epilepsy Fellow  Reading Room: [phone] On Call Service After Hours: [phone]      EPILEPSY MONITORING UNIT REPORT   [facility]: [address], [place] [address], Ph#: [phone] [facility]: [address] [place] [address], [place] [address], Ph#: [phone] Office: [address] [place] [address], [place] [address] Ph#: [phone]  Patient Name: Daniel Pinto   Age: 66 year, : 1955 MRN #: [id number], [facility]: [address] [place] [address] Referring Physician: OS transfer           Study Information:  EEG Recording Technique: The patient underwent continuous Video-EEG monitoring, using Telemetry System hardware on the XLTek Digital System. EEG and video data were stored on a computer hard drive with important events saved in digital archive files. The material was reviewed by a physician (electroencephalographer / epileptologist) on a daily basis. Franko and seizure detection algorithms were utilized and reviewed. An EEG Technician attended to the patient, and was available throughout daytime work hours.  The epilepsy center neurologist was available in person or on call 24-hours per day.  EEG Placement and Labeling of Electrodes: The EEG was performed utilizing 20 channel referential EEG connections (coronal over temporal over parasagittal montage) using all standard 10-20 electrode placements with EKG, with additional electrodes placed in the inferior temporal region using the modified 10-10 montage electrode placements for elective admissions, or if deemed necessary. Recording was at a sampling rate of 256 samples per second per channel. Time synchronized digital video recording was done simultaneously with EEG recording. A low light infrared camera was used for low light recording.   History:  Patient is a 65 yo Rt handed male PMH CLL on Venetoclax, MDS, melanoma, paroxysmal AFib (not on AC) presenting as transfer from [place] for event capture. Patient was initially at  [facility] on  with confusion, aphasia and R sided weakness. Patient was stroke code and was given s/p tPA. On admission, patient had CTH, CTA, CT perfusion during code stroke protocol with no acute findings. MRI Brain was  performed at   with no ischemic findings. EEG performed 9/15 showed diffuse L hemispheric slowing. Pt remained aphasic with AMS. +RUE spasticity was noted on exam. Patient was thus transferred to [facility] for EMU admission to evaluate for subclinical seizures.  Patient does not have hx of seizures.  Home Antiepileptic Medication and Device  none  Interpretation: Day 2 – 	Start: 2021  08:00    	End: 2021  08:00 	Duration: 24h  Daily EEG Visual Analysis  FINDINGS:  The background was continuous, spontaneously variable and reactive. During wakefulness, the posterior dominant rhythm consisted of asymmetric 8-9 Hz activity at30 uV, that was better formed over the right hemisphere.  Background Slowing: No generalized background slowing was present.  Focal Slowing:  Recording started with continuous polymorphic delta slowing over the left hemisphere, which transitioned to intermittent polymorphic delta slowing in the latter half of the recording.  Sleep Background: Drowsiness was characterized by fragmentation, attenuation, and slowing of the background activity.   Sleep was characterized by the presence of vertex waves, asymmetric sleep spindles and K-complexes that appear attenuated over the left.  Other Non-Epileptiform Findings: None were present.  Activation Procedures:  Hyperventilation was not performed.   Photic stimulation was not performed.  Interictal Epileptiform Activity:  None were present.  Events: No events or seizures recorded.  Artifacts: Intermittent myogenic and movement artifacts were noted.  ECG: The heart rate on single channel ECG was predominantly between 60-70 BPM.  AEDs:  none  EEG Summary:  Abnormal EEG in the awake, drowsy and asleep states. - Continuous polymorphic delta slowing in the left hemisphere, slowly transitioning to intermittent slowing in the latter half of the recording recording.  Impression/Clinical Correlate:  This is an abnormal EEG record. No epileptiform pattern or seizures were seen. There is evidence for transient and reversible cause of functional abnormality affecting the left hemisphere.    Devan Lovelace MD Epilepsy Fellow  Reading Room: [phone] On Call Service After Hours: [phone]

## 2021-09-18 NOTE — PROGRESS NOTE ADULT - SUBJECTIVE AND OBJECTIVE BOX
Interval History:  Patient seen and examined at the bedside. Noted to have episodes of afib in 110-160s overnight. Cardiology deferring AC for now. Started IVIG x 5 days yesterday. Overnight noted to be getting out of bed. More alert s/p IVIG.     MEDICATIONS  (STANDING):  aspirin Suppository 300 milliGRAM(s) Rectal daily  atorvastatin 80 milliGRAM(s) Oral at bedtime  enoxaparin Injectable 40 milliGRAM(s) SubCutaneous once  folic acid 1 milliGRAM(s) Oral daily  immune   globulin 10% (GAMMAGARD) IVPB 25 Gram(s) IV Intermittent daily  metoprolol tartrate Injectable 5 milliGRAM(s) IV Push once  sodium chloride 0.9%. 1000 milliLiter(s) (50 mL/Hr) IV Continuous <Continuous>    MEDICATIONS  (PRN):  lacosamide IVPB 100 milliGRAM(s) IV Intermittent once PRN Seizure  LORazepam   Injectable 1 milliGRAM(s) IV Push once PRN Seizure    Allergies  No Known Allergies    Intolerances      ROS: Pertinent positives in HPI, all other ROS were reviewed and are negative.      Vital Signs Last 24 Hrs  T(C): 36.7 (18 Sep 2021 05:15), Max: 37 (17 Sep 2021 16:13)  T(F): 98 (18 Sep 2021 05:15), Max: 98.6 (17 Sep 2021 16:13)  HR: 97 (18 Sep 2021 05:15) (67 - 117)  BP: 135/84 (18 Sep 2021 05:15) (130/87 - 165/79)  BP(mean): --  RR: 19 (18 Sep 2021 05:15) (18 - 22)  SpO2: 97% (18 Sep 2021 05:15) (93% - 98%)    NEUROLOGICAL EXAM:  MS: AAOX3, fluent, attends b/l; recent and remote memory intact; normal attention, language and fund of knowledge.   CN: VFF, EOMI, PERRL, no BREANNA, no APD,  V1-3 intact, no facial asymmetry, t/p midline, SCM/trap intact.  Eyes-Fundi: no papilledema.  Motor: Strength: 5/5 4x. Tone: normal. Bulk: normal. DTR 2+ symm.  Plantar flex b/l. Sensation: intact to LT/PP/Vibration/Position/Temperature 4x.   Coordination: intact 4x.   Gait:  Romberg negative, pull test negative; walks with narrow base, pivots in 2 steps.      Labs:   cbc                      12.1   5.33  )-----------( 282      ( 18 Sep 2021 07:52 )             35.8     [id number]    139  |  105  |  23  ----------------------------<  95  3.9   |  19<L>  |  0.84    Ca    9.2      18 Sep 2021 07:52  Phos  3.2     09-  Mg     2.3     -18    TPro  7.1  /  Alb  4.3  /  TBili  2.2<H>  /  DBili  x   /  AST  28  /  ALT  16  /  AlkPhos  72  09-16    Coags  Lipids09-15 Chol 189 LDL -- HDL 47 Trig 130  A1C  CardiacMarkers    LFTsLIVER FUNCTIONS - ( 16 Sep 2021 17:56 )  Alb: 4.3 g/dL / Pro: 7.1 g/dL / ALK PHOS: 72 U/L / ALT: 16 U/L / AST: 28 U/L / GGT: x           UAUrinalysis Basic - ( 17 Sep 2021 06:59 )    Color: Yellow / Appearance: Clear / S.028 / pH: x  Gluc: x / Ketone: Small  / Bili: Negative / Urobili: Negative   Blood: x / Protein: 30 mg/dL / Nitrite: Negative   Leuk Esterase: Small / RBC: 91 /hpf / WBC 12 /HPF   Sq Epi: x / Non Sq Epi: 1 /hpf / Bacteria: Negative     Interval History:  Patient seen and examined at the bedside. Noted to have episodes of afib in 110-160s overnight. Cardiology deferring AC for now. Started IVIG x 5 days yesterday. Overnight noted to be getting out of bed. More alert s/p IVIG. Patient with some visual hallucinations this AM.     MEDICATIONS  (STANDING):  aspirin Suppository 300 milliGRAM(s) Rectal daily  atorvastatin 80 milliGRAM(s) Oral at bedtime  enoxaparin Injectable 40 milliGRAM(s) SubCutaneous once  folic acid 1 milliGRAM(s) Oral daily  immune   globulin 10% (GAMMAGARD) IVPB 25 Gram(s) IV Intermittent daily  metoprolol tartrate Injectable 5 milliGRAM(s) IV Push once  sodium chloride 0.9%. 1000 milliLiter(s) (50 mL/Hr) IV Continuous <Continuous>    MEDICATIONS  (PRN):  lacosamide IVPB 100 milliGRAM(s) IV Intermittent once PRN Seizure  LORazepam   Injectable 1 milliGRAM(s) IV Push once PRN Seizure    Allergies  No Known Allergies    Intolerances      ROS: Pertinent positives in HPI, all other ROS were reviewed and are negative.      Vital Signs Last 24 Hrs  T(C): 36.7 (18 Sep 2021 05:15), Max: 37 (17 Sep 2021 16:13)  T(F): 98 (18 Sep 2021 05:15), Max: 98.6 (17 Sep 2021 16:13)  HR: 97 (18 Sep 2021 05:15) (67 - 117)  BP: 135/84 (18 Sep 2021 05:15) (130/87 - 165/79)  BP(mean): --  RR: 19 (18 Sep 2021 05:15) (18 - 22)  SpO2: 97% (18 Sep 2021 05:15) (93% - 98%)    NEUROLOGICAL EXAM:  MS: AAOX3, fluent, attends b/l; recent and remote memory intact; normal attention, language and fund of knowledge. Some mild cognitive delay. Able to follow simple and complex cross commands   CN: VFF, EOMI, PERRL, no BREANNA, no APD,  V1-3 intact, no facial asymmetry, t/p midline, SCM/trap intact.  Motor: Strength: 5/5 4x. Tone: normal. Bulk: normal. DTR 2+ symm.  Plantar flex b/l. Sensation: intact to LT 4x.   Coordination: intact FTN.   Gait:  Deferred       Labs:   cbc                      12.1   5.33  )-----------( 282      ( 18 Sep 2021 07:52 )             35.8     [id number]    139  |  105  |  23  ----------------------------<  95  3.9   |  19<L>  |  0.84    Ca    9.2      18 Sep 2021 07:52  Phos  3.2     09-18  Mg     2.3     -18    TPro  7.1  /  Alb  4.3  /  TBili  2.2<H>  /  DBili  x   /  AST  28  /  ALT  16  /  AlkPhos  72  09-16    Coags  Lipids09-15 Chol 189 LDL -- HDL 47 Trig 130  A1C  CardiacMarkers    LFTsLIVER FUNCTIONS - ( 16 Sep 2021 17:56 )  Alb: 4.3 g/dL / Pro: 7.1 g/dL / ALK PHOS: 72 U/L / ALT: 16 U/L / AST: 28 U/L / GGT: x           UAUrinalysis Basic - ( 17 Sep 2021 06:59 )    Color: Yellow / Appearance: Clear / S.028 / pH: x  Gluc: x / Ketone: Small  / Bili: Negative / Urobili: Negative   Blood: x / Protein: 30 mg/dL / Nitrite: Negative   Leuk Esterase: Small / RBC: 91 /hpf / WBC 12 /HPF   Sq Epi: x / Non Sq Epi: 1 /hpf / Bacteria: Negative

## 2021-09-19 LAB
ANION GAP SERPL CALC-SCNC: 15 MMOL/L — SIGNIFICANT CHANGE UP (ref 5–17)
BUN SERPL-MCNC: 22 MG/DL — SIGNIFICANT CHANGE UP (ref 7–23)
CALCIUM SERPL-MCNC: 9.8 MG/DL — SIGNIFICANT CHANGE UP (ref 8.4–10.5)
CHLORIDE SERPL-SCNC: 104 MMOL/L — SIGNIFICANT CHANGE UP (ref 96–108)
CO2 SERPL-SCNC: 22 MMOL/L — SIGNIFICANT CHANGE UP (ref 22–31)
CREAT SERPL-MCNC: 1.06 MG/DL — SIGNIFICANT CHANGE UP (ref 0.5–1.3)
FERRITIN SERPL-MCNC: 607 NG/ML — HIGH (ref 30–400)
FIBRINOGEN PPP-MCNC: 666 MG/DL — HIGH (ref 290–520)
GLUCOSE SERPL-MCNC: 106 MG/DL — HIGH (ref 70–99)
HCT VFR BLD CALC: 37.8 % — LOW (ref 39–50)
HGB BLD-MCNC: 12.6 G/DL — LOW (ref 13–17)
MAGNESIUM SERPL-MCNC: 2.2 MG/DL — SIGNIFICANT CHANGE UP (ref 1.6–2.6)
MCHC RBC-ENTMCNC: 33.3 GM/DL — SIGNIFICANT CHANGE UP (ref 32–36)
MCHC RBC-ENTMCNC: 34 PG — SIGNIFICANT CHANGE UP (ref 27–34)
MCV RBC AUTO: 101.9 FL — HIGH (ref 80–100)
NRBC # BLD: 0 /100 WBCS — SIGNIFICANT CHANGE UP (ref 0–0)
PHOSPHATE SERPL-MCNC: 3.2 MG/DL — SIGNIFICANT CHANGE UP (ref 2.5–4.5)
PLATELET # BLD AUTO: 292 K/UL — SIGNIFICANT CHANGE UP (ref 150–400)
POTASSIUM SERPL-MCNC: 3.3 MMOL/L — LOW (ref 3.5–5.3)
POTASSIUM SERPL-SCNC: 3.3 MMOL/L — LOW (ref 3.5–5.3)
RBC # BLD: 3.71 M/UL — LOW (ref 4.2–5.8)
RBC # FLD: 14.6 % — HIGH (ref 10.3–14.5)
SODIUM SERPL-SCNC: 141 MMOL/L — SIGNIFICANT CHANGE UP (ref 135–145)
TRIGL SERPL-MCNC: 160 MG/DL — HIGH
WBC # BLD: 4.6 K/UL — SIGNIFICANT CHANGE UP (ref 3.8–10.5)
WBC # FLD AUTO: 4.6 K/UL — SIGNIFICANT CHANGE UP (ref 3.8–10.5)

## 2021-09-19 PROCEDURE — 99233 SBSQ HOSP IP/OBS HIGH 50: CPT

## 2021-09-19 PROCEDURE — 95720 EEG PHY/QHP EA INCR W/VEEG: CPT

## 2021-09-19 PROCEDURE — 99232 SBSQ HOSP IP/OBS MODERATE 35: CPT

## 2021-09-19 RX ORDER — OLANZAPINE 15 MG/1
5 TABLET, FILM COATED ORAL DAILY
Refills: 0 | Status: DISCONTINUED | OUTPATIENT
Start: 2021-09-19 | End: 2021-09-20

## 2021-09-19 RX ORDER — DIPHENHYDRAMINE HCL 50 MG
25 CAPSULE ORAL ONCE
Refills: 0 | Status: COMPLETED | OUTPATIENT
Start: 2021-09-19 | End: 2021-09-19

## 2021-09-19 RX ORDER — LANOLIN ALCOHOL/MO/W.PET/CERES
3 CREAM (GRAM) TOPICAL AT BEDTIME
Refills: 0 | Status: DISCONTINUED | OUTPATIENT
Start: 2021-09-19 | End: 2021-09-21

## 2021-09-19 RX ORDER — POTASSIUM CHLORIDE 20 MEQ
40 PACKET (EA) ORAL ONCE
Refills: 0 | Status: COMPLETED | OUTPATIENT
Start: 2021-09-19 | End: 2021-09-19

## 2021-09-19 RX ORDER — SODIUM CHLORIDE 9 MG/ML
1000 INJECTION INTRAMUSCULAR; INTRAVENOUS; SUBCUTANEOUS
Refills: 0 | Status: DISCONTINUED | OUTPATIENT
Start: 2021-09-19 | End: 2021-09-20

## 2021-09-19 RX ORDER — ENOXAPARIN SODIUM 100 MG/ML
40 INJECTION SUBCUTANEOUS DAILY
Refills: 0 | Status: DISCONTINUED | OUTPATIENT
Start: 2021-09-19 | End: 2021-10-01

## 2021-09-19 RX ADMIN — OLANZAPINE 2.5 MILLIGRAM(S): 15 TABLET, FILM COATED ORAL at 00:06

## 2021-09-19 RX ADMIN — Medication 40 MILLIEQUIVALENT(S): at 12:20

## 2021-09-19 RX ADMIN — Medication 25 MILLIGRAM(S): at 12:20

## 2021-09-19 RX ADMIN — Medication 25 MILLIGRAM(S): at 18:37

## 2021-09-19 RX ADMIN — ATORVASTATIN CALCIUM 80 MILLIGRAM(S): 80 TABLET, FILM COATED ORAL at 00:06

## 2021-09-19 RX ADMIN — IMMUNE GLOBULIN (HUMAN) 41.67 GRAM(S): 10 INJECTION INTRAVENOUS; SUBCUTANEOUS at 13:12

## 2021-09-19 RX ADMIN — ENOXAPARIN SODIUM 40 MILLIGRAM(S): 100 INJECTION SUBCUTANEOUS at 12:23

## 2021-09-19 RX ADMIN — Medication 650 MILLIGRAM(S): at 12:20

## 2021-09-19 RX ADMIN — Medication 1 MILLIGRAM(S): at 12:20

## 2021-09-19 RX ADMIN — OLANZAPINE 5 MILLIGRAM(S): 15 TABLET, FILM COATED ORAL at 13:13

## 2021-09-19 RX ADMIN — Medication 81 MILLIGRAM(S): at 12:19

## 2021-09-19 RX ADMIN — SODIUM CHLORIDE 50 MILLILITER(S): 9 INJECTION INTRAMUSCULAR; INTRAVENOUS; SUBCUTANEOUS at 18:39

## 2021-09-19 NOTE — PROGRESS NOTE ADULT - ASSESSMENT
Patient is a 65 yo Rt handed male PMH CLL on Venetoclax, MDS, melanoma, paroxysmal AFib (not on AC) presenting as transfer from [place] for event capture. Patient was initially at  [facility] on 9/14 with confusion, aphasia and R sided weakness. With Brain MRI negative. EEG note to show discharges from Left hemisphere with RUE spasticity There is concern for subclinical seizures thus transferred to [facility] for event capture. Cardiology consulted.  Heme/onc to follow this AM and will followup on recommendations on heme/onc medications.  Patient was noted to have episodes agitation with episodes of somnolence with out medication needed. Patient had LP this afternoon and will follow up on CSF results. Cardiology states to hold off on AC as there is no definitive CVA noted. Continue with ASA and statin. Improved s/p 1 dose of IVIG. Still with some mild cognitive delay and now with visual hallucinations. Poss related to ativan, will dc. Wife notes that patient gets IVIG treatment every month as part of his CLL treatment.     Impression: RUE spasticity with AMS transferred for event capture   Plan:   [] IVIG x 5 days (9/17 - 9/21)  [] F/u on Monday with Dr. Montserrat Haji about his outpt IVIG treatment  [] Increased Zyprexa 5mg qd   [x] LP completed- Glucose 73, Protein 55, WBC 3  [] MR brain w/wo contrast to be considered by Dr. stephen during the week   [x] Cardiology consulted- choosing to defer AC at this time   [] Optho consulted  [x] continue vEEG   [x] Hold on starting AED's   [x] heme/onc to continue to follow on recommendations. Follows Dr. Godinez and  Dr. Valenzuela as an outpatient.   [] Passes bedside swallow, Swallow evaluation pending.   [] speech eval   [] constant observation due to agitation  [x] DVT ppx     Rescue: 1st-Ativan 1mg, 2nd-Vimpat 100mg IV if GTC refractory to ativan    Case discussed with neurology attending Dr. Funez

## 2021-09-19 NOTE — SPEECH LANGUAGE PATHOLOGY EVALUATION - SLP PERTINENT HISTORY OF CURRENT PROBLEM
H&P: Patient is a 65 yo Rt handed male PMH CLL on Venetoclax, MDS, melanoma, paroxysmal AFib (not on AC) presenting as transfer from [place] for event capture. Patient was initially at  [facility] on 9/14 with confusion, aphasia and R sided weakness. Patient was stroke code and was given s/p tPA. On admission, patient had CTH, CTA, CT perfusion during code stroke protocol with no acute findings. MRI Brain was  performed at  9/16 with no ischemic findings. EEG performed 9/15 showed diffuse L hemispheric slowing. Pt remained aphasic with AMS. +RUE spasticity was noted on exam. Patient was thus transferred to [facility] for EMU admission to evaluate for subclinical seizures.  Of note, patient does not have hx of seizures. pt admitted under neurology service.

## 2021-09-19 NOTE — SPEECH LANGUAGE PATHOLOGY EVALUATION - COMMENTS
· Comments	Continued: EEG: IMPRESSION: Abnormal EEG due to the presence of focal slowing in the left hemisphere with higher amplitude theta and delta waves. This suggests an underlying focal abnormality. Recommend correlate with imaging if necessary. EEG done on 9/15/2021 showed continuous focal slowing with higher amplitude theta and delta waves from the left hemisphere  Stroke service consulted: Impression Rapid progressive Global aphasia (Expressive > Receptive) vs encephalopathy 2/2 epileptogenic vs structural vs autoimmune etiology in the setting of known CLL. low suspicion for ischemic intracranial etiology in the setting of negative MRI, possibly stroke mimic. Cardiology consulted: hx of syncope and Afib. Defer AC if no definitive cva.  Pt started IVIG on 9/17/21. Mental status noted to be improved post IVIG.     SWALLOW HISTORY: Pt seen for bedside swallow evaluation on 9/15/21 at [facility]. Per report, recommendations included: "soft solids (dysphagia 3) with thin liquids via single/small cup sips +aspiration precautions. allow for swallow between intakes; alternate food with liquid; check mouth frequently for oral residue/pocketing; maintain upright posture during/after eating for 30 mins; no straws; oral hygiene; position upright (90 degrees); small sips/bites" As per documentation, Speech-Language evaluation unable to be completed at that time due to lethargy.     Hematology following given hx of CLL: Remains aphasic. Do not suspect 2/2 patient's CLL treatment. Infectious etiology on DDX. Recommended checking LP and including flow cytometry, cytopathology for malignant cells in addition to standard studies. Also check  autoimmune encephalitis panel and infectious PCR panel. Pt received 3rd dose of vaccine,  ? immune sequelae from vaccine. further assessment as language skills improve subjectively judged as wfl not tested, spouse to bring not tested as glasses not present, spouse to bring from home

## 2021-09-19 NOTE — PROGRESS NOTE ADULT - SUBJECTIVE AND OBJECTIVE BOX
[facility] - Sveta Romano, Ebony Sparrow, Jennifer, Hosea Harden  Office Number:  [phone]    Patient resting comfortably in bed in NAD.  No overnight events.  Completed video EEG.  Unable to provide any  meaningful interval history.    F/U for:  AF    Telemetry: SR     MEDICATIONS  (STANDING):  aspirin  chewable 81 milliGRAM(s) Oral daily  atorvastatin 80 milliGRAM(s) Oral at bedtime  enoxaparin Injectable 40 milliGRAM(s) SubCutaneous daily  folic acid 1 milliGRAM(s) Oral daily  immune   globulin 10% (GAMMAGARD) IVPB 25 Gram(s) IV Intermittent daily  OLANZapine 5 milliGRAM(s) Oral daily  potassium chloride   Solution 40 milliEquivalent(s) Oral once    MEDICATIONS  (PRN):  acetaminophen   Tablet .. 650 milliGRAM(s) Oral every 6 hours PRN Mild Pain (1 - 3), Moderate Pain (4 - 6), Severe Pain (7 - 10)  diphenhydrAMINE 25 milliGRAM(s) Oral every 4 hours PRN Rash and/or Itching  lacosamide IVPB 100 milliGRAM(s) IV Intermittent once PRN Seizure  LORazepam   Injectable 1 milliGRAM(s) IV Push once PRN Seizure      Allergies    No Known Allergies    Intolerances        Vital Signs Last 24 Hrs  T(C): 36.9 (19 Sep 2021 08:58), Max: 36.9 (19 Sep 2021 08:58)  T(F): 98.4 (19 Sep 2021 08:58), Max: 98.4 (19 Sep 2021 08:58)  HR: 100 (19 Sep 2021 08:58) (80 - 100)  BP: 142/71 (19 Sep 2021 08:58) (117/61 - 142/71)  BP(mean): --  RR: 18 (19 Sep 2021 08:58) (18 - 18)  SpO2: 95% (19 Sep 2021 08:58) (95% - 98%)    I&O's Summary    18 Sep 2021 07:01  -  19 Sep 2021 07:00  --------------------------------------------------------  IN: 560 mL / OUT: 300 mL / NET: 260 mL    19 Sep 2021 07:01  -  19 Sep 2021 11:51  --------------------------------------------------------  IN: 0 mL / OUT: 100 mL / NET: -100 mL        ON EXAM:    Constitutional: NAD, lethargic and confused  Lungs:  Non-labored, breath sounds are clear bilaterally, No wheezing, rales or rhonchi  Cardiovascular: RRR.  S1 and S2 positive.  No murmurs, rubs, gallops or clicks  Gastrointestinal: Bowel Sounds present, soft, nontender.   Lymph: No peripheral edema. No cervical lymphadenopathy.  Neurological: unable to assess, lethargic  Skin: No rashes or ulcers   Psych:  unable to assess    LABS: All Labs Reviewed:                        12.6   4.60  )-----------( 292      ( 19 Sep 2021 05:59 )             37.8                         12.1   5.33  )-----------( 282      ( 18 Sep 2021 07:52 )             35.8                         12.3   4.97  )-----------( 292      ( 17 Sep 2021 06:59 )             36.9     19 Sep 2021 05:59    141    |  104    |  22     ----------------------------<  106    3.3     |  22     |  1.06   18 Sep 2021 07:52    139    |  105    |  23     ----------------------------<  95     3.9     |  19     |  0.84   17 Sep 2021 06:59    139    |  103    |  26     ----------------------------<  122    3.8     |  21     |  0.98     Ca    9.8        19 Sep 2021 05:59  Ca    9.2        18 Sep 2021 07:52  Ca    9.8        17 Sep 2021 06:59  Phos  3.2       19 Sep 2021 05:59  Phos  3.2       18 Sep 2021 07:52  Phos  3.8       17 Sep 2021 06:59  Mg     2.2       19 Sep 2021 05:59  Mg     2.3       18 Sep 2021 07:52  Mg     2.3       17 Sep 2021 06:59    TPro  7.1    /  Alb  4.3    /  TBili  2.2    /  DBili  x      /  AST  28     /  ALT  16     /  AlkPhos  72     16 Sep 2021 17:56          Blood Culture: Organism --  Gram Stain Blood -- Gram Stain   polymorphonuclear leukocytes seen  No organisms seen  by cytocentrifuge  Specimen Source .CSF CSF  Culture-Blood --    Organism --  Gram Stain Blood -- Gram Stain --  Specimen Source .Blood Blood  Culture-Blood --    Organism --  Gram Stain Blood -- Gram Stain --  Specimen Source Catheterized Catheterized  Culture-Blood --

## 2021-09-19 NOTE — PROGRESS NOTE ADULT - SUBJECTIVE AND OBJECTIVE BOX
Interval History:  Patient seen and examined at the bedside. Overnight noted to be getting out of bed, not sleeping well, pulling at lines. More alert s/p IVIG. Patient with some visual hallucinations this AM. Wife noted that the patient's hallucinations of ants are similar to the last time he had a retinal detachment.     MEDICATIONS  (STANDING):  aspirin Suppository 300 milliGRAM(s) Rectal daily  atorvastatin 80 milliGRAM(s) Oral at bedtime  enoxaparin Injectable 40 milliGRAM(s) SubCutaneous once  folic acid 1 milliGRAM(s) Oral daily  immune   globulin 10% (GAMMAGARD) IVPB 25 Gram(s) IV Intermittent daily  metoprolol tartrate Injectable 5 milliGRAM(s) IV Push once  sodium chloride 0.9%. 1000 milliLiter(s) (50 mL/Hr) IV Continuous <Continuous>    MEDICATIONS  (PRN):  lacosamide IVPB 100 milliGRAM(s) IV Intermittent once PRN Seizure  LORazepam   Injectable 1 milliGRAM(s) IV Push once PRN Seizure    Allergies  No Known Allergies    Intolerances    ROS: Pertinent positives in HPI, all other ROS were reviewed and are negative.      Vital Signs Last 24 Hrs  T(C): 36.7 (18 Sep 2021 05:15), Max: 37 (17 Sep 2021 16:13)  T(F): 98 (18 Sep 2021 05:15), Max: 98.6 (17 Sep 2021 16:13)  HR: 97 (18 Sep 2021 05:15) (67 - 117)  BP: 135/84 (18 Sep 2021 05:15) (130/87 - 165/79)  BP(mean): --  RR: 19 (18 Sep 2021 05:15) (18 - 22)  SpO2: 97% (18 Sep 2021 05:15) (93% - 98%)    NEUROLOGICAL EXAM:  MS: AAOX2, fluent, attends b/l; recent and remote memory intact; normal attention, language and fund of knowledge. Some mild cognitive delay. Able to follow simple and complex cross commands   CN: VFF, EOMI, PERRL, no BREANNA, no APD,  V1-3 intact, no facial asymmetry, t/p midline, SCM/trap intact.  Motor: Strength: 5/5 4x. Tone: normal. Bulk: normal. DTR 2+ symm.  Plantar flex b/l. Sensation: intact to LT 4x.   Coordination: intact FTN.   Gait:  Deferred       Labs:   cbc                      12.1   5.33  )-----------( 282      ( 18 Sep 2021 07:52 )             35.8     [id number]    139  |  105  |  23  ----------------------------<  95  3.9   |  19<L>  |  0.84    Ca    9.2      18 Sep 2021 07:52  Phos  3.2     09-18  Mg     2.3     -18    TPro  7.1  /  Alb  4.3  /  TBili  2.2<H>  /  DBili  x   /  AST  28  /  ALT  16  /  AlkPhos  72  09-16    Coags  Lipids09-15 Chol 189 LDL -- HDL 47 Trig 130  A1C  CardiacMarkers    LFTsLIVER FUNCTIONS - ( 16 Sep 2021 17:56 )  Alb: 4.3 g/dL / Pro: 7.1 g/dL / ALK PHOS: 72 U/L / ALT: 16 U/L / AST: 28 U/L / GGT: x           UAUrinalysis Basic - ( 17 Sep 2021 06:59 )    Color: Yellow / Appearance: Clear / S.028 / pH: x  Gluc: x / Ketone: Small  / Bili: Negative / Urobili: Negative   Blood: x / Protein: 30 mg/dL / Nitrite: Negative   Leuk Esterase: Small / RBC: 91 /hpf / WBC 12 /HPF   Sq Epi: x / Non Sq Epi: 1 /hpf / Bacteria: Negative

## 2021-09-19 NOTE — CONSULT NOTE ADULT - ASSESSMENT
Assessment and Recommendations:  66y male w/ pmhx/ochx of CLL, MDS, melanoma, paroxysmal afib, CEIOL OU, RD repair w/ SB OU consulted for visual hallucinations. VA at least 20/70 OU, no APD, IOP WNL, CVF grossly full although poor to fixation, EOM grossly full, anterior exam wnl and DFE reveals scleral buckle OU with laser scars, however otherwise flat with no tears, holes or detachments.     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     Outpatient follow-up: Patient should follow-up with his/her ophthalmologist or with [facility] Department of Ophthalmology within 1 week of after discharge at:    [address]  [place], [place] [id number] [phone]    Benji Sow MD, PGY-3  Also available on Microsoft Teams   Assessment and Recommendations:  66y male w/ pmhx/ochx of CLL, MDS, melanoma, paroxysmal afib, CEIOL OU, RD repair w/ SB OU consulted for visual hallucinations. VA at least 20/70 OU, no APD, IOP WNL, CVF grossly full although poor to fixation, EOM grossly full, anterior exam wnl and DFE reveals scleral buckle OU with laser scars, however otherwise flat with no tears, holes or detachments.     #visual hallucinations   - pt has history of retinal detachments with scleral buckle repair - DFE reveals scleral buckle OU with laser scars OU - otherwise flat and attached 360 OU  - pt at time of exam denied "ants" however pt was "not himself" as pt received benadryl and was lethargic/agitated  - likely 2/2 AMS that pt is being worked up for presumed seizure activity and presumed autoimmune encephalitis.     d/w chief resident Dr. Gandhi.    Outpatient follow-up: Patient should follow-up with his/her ophthalmologist or with [facility] Department of Ophthalmology within 1 week of after discharge at:    [address]  [place] [address]  [phone]    Benji Sow MD, PGY-3  Also available on Microsoft Teams

## 2021-09-19 NOTE — CONSULT NOTE ADULT - SUBJECTIVE AND OBJECTIVE BOX
Health system DEPARTMENT OF OPHTHALMOLOGY - INITIAL ADULT CONSULT  -----------------------------------------------------------------------------  Benji Sow MD PGY-3  -----------------------------------------------------------------------------    HPI:  67 YO M PMH CLL, MDS, melanoma, paroxysmal afib admitted for altered mental status likely 2/2 autoimmune encephalitis and ophthalmology consulted for visual hallucinations. As per the wife, the pt reports seeing "ants" intermittently in both eyes several days ago, and that the pt also had similar visual hallucinations when he had retinal detachments in the past. Pt on history taking currently denying flashes, floaters, "ants", curtains, decreased vision. Pt denies recent ocular trauma, eye pain. Pt was slightly agitated at the time of original consult, pulling at lines, flailing limbs and stating he is "falling off the bed".         PMH:  CLL, MDS, melanoma, paroxysmal afib   POcHx: denies CEIOL OU, retinal detachments OU with scleral buckle OU, and laser retinopexy OU  FH: denies glc/amd  Social History: denies etoh/tobacco  Ophthalmic Medications: none  Allergies: NKDA    Review of Systems:  Constitutional: No fever, chills  Eyes: No blurry vision, flashes, floaters, FBS, erythema, discharge, double vision, OU  Neuro: No tremors  Cardiovascular: No chest pain, palpitations  Respiratory: No SOB, no cough  GI: No nausea, vomiting, abdominal pain  : No dysuria  Skin: no rash  Psych: no depression  Endocrine: no polyuria, polydipsia  Heme/lymph: no swelling    VITALS: T(C): 36.7 (09-19-21 @ 12:40)  T(F): 98.1 (09-19-21 @ 12:40), Max: 98.4 (09-19-21 @ 08:58)  HR: 99 (09-19-21 @ 12:40) (80 - 100)  BP: 131/85 (09-19-21 @ 12:40) (126/65 - 142/71)  RR:  (18 - 18)  SpO2:  (95% - 98%)  Wt(kg): --  General: AAO x 1 to person, lethargic and agitated    Ophthalmology Exam:  Visual acuity (cc near): at least  20/70 however pt is poor to re-direction and following commands  Pupils: PERRL OU, no APD  Ttono: 13, 15   Extraocular movements (EOMs): Full OU, no pain, no diplopia  Confrontational Visual Field (CVF): Full OU however poor to fixation  Color Plates: 3/12 OU however no red desaturation OU    Pen Light Exam (PLE)  External: Flat OU  Lids/Lashes/Lacrimal Ducts: Flat OU    Sclera/Conjunctiva: W+Q OU  Cornea: Cl OU  Anterior Chamber: D+F OU    Iris: Flat OU  Lens: PCIOL OU    Fundus Exam: dilated with 1% tropicamide and 2.5% phenylephrine  Approval obtained from primary team for dilation  Patient aware that pupils can remained dilated for at least 4-6 hours  Exam performed with 20D lens    Vitreous: wnl OU  Disc, cup/disc: sharp and pink, 0.45 with PPA OU  Macula: wnl OU  Vessels: wnl OU  Periphery: laser scars inferiorly and temporally OD, and laser scars temporally OS. Scleral buckle OU. No tears, holes or detachments    Labs/Imaging:  IMPRESSION: No acute territorial infarcts seen.    --- End of Report ---  MITCHELL BOYLE MD; Attending Radiologist  This document has been electronically signed. Sep 16 2021 10:42AM

## 2021-09-19 NOTE — PROGRESS NOTE ADULT - ASSESSMENT
65 y/o male with PMHx CLL followed by heme/onc, anemia, melanoma, osteopenia admitted for right facial droop, right hemiparesis and expressive aphasia.    Admitted to the ICU at [place] yesterday and is S/P TPA. He has now been transferred for evaluation of his seizures.    CVA  - Patient with new right sided UE weakness, facial droop and expressive aphasia  - s/p tpa without improvement in symptoms  - 9/15 CT head without acute ICH  - 9/16 MRI neg for infarct  - neurology follow up.  s/p video eeg  - cont asa and statin    PAF  - Patient seen on a prior admission for syncope 7/2020 and found to have brief Afib  - Tele during this admission SR   - echocardiogram 9/15 unremarkable  - If no definitive CVA may consider deferring AC for now. Will need to fu with neuro.   - Continue to monitor    CLL  - Heme/onc to evaluate    - Monitor and replete lytes, keep K>4, Mg>2.  - All other medical needs as per Neuro team  - Other cardiovascular workup will depend on clinical course.  - Will continue to follow.

## 2021-09-19 NOTE — SPEECH LANGUAGE PATHOLOGY EVALUATION - SLP GENERAL OBSERVATIONS
Pt found in bed, eating lunch. Spouse present. P+VEEG, on room air. Pt cooperative and communicative. 1:1 present.

## 2021-09-19 NOTE — SPEECH LANGUAGE PATHOLOGY EVALUATION - SLP DIAGNOSIS
Pt presents with expressive language deficits of semantic paraphasias and word finding errors in structured tasks and at the conversation level. Receptively, patient demonstrating difficulty following multi-step commands. Some self monitoring and ability to self correct noted. Pt is impulsive with reduced safety awareness.

## 2021-09-19 NOTE — EEG REPORT - NS EEG TEXT BOX
EPILEPSY MONITORING UNIT REPORT   [facility]: [address], [place] [address], Ph#: [phone] [facility]: [address] [place] [address], [place] [address], Ph#: [phone] Office: [address] [place] [address], [place] [address] Ph#: [phone]  Patient Name: Daniel Pinto   Age: 66 year, : 1955 MRN #: [id number], [facility]: [address] [place] [address] Referring Physician: OS transfer           Study Information:  EEG Recording Technique: The patient underwent continuous Video-EEG monitoring, using Telemetry System hardware on the XLTek Digital System. EEG and video data were stored on a computer hard drive with important events saved in digital archive files. The material was reviewed by a physician (electroencephalographer / epileptologist) on a daily basis. Franko and seizure detection algorithms were utilized and reviewed. An EEG Technician attended to the patient, and was available throughout daytime work hours.  The epilepsy center neurologist was available in person or on call 24-hours per day.  EEG Placement and Labeling of Electrodes: The EEG was performed utilizing 20 channel referential EEG connections (coronal over temporal over parasagittal montage) using all standard 10-20 electrode placements with EKG, with additional electrodes placed in the inferior temporal region using the modified 10-10 montage electrode placements for elective admissions, or if deemed necessary. Recording was at a sampling rate of 256 samples per second per channel. Time synchronized digital video recording was done simultaneously with EEG recording. A low light infrared camera was used for low light recording.   History:  Patient is a 67 yo Rt handed male PMH CLL on Venetoclax, MDS, melanoma, paroxysmal AFib (not on AC) presenting as transfer from [place] for event capture. Patient was initially at  [facility] on  with confusion, aphasia and R sided weakness. Patient was stroke code and was given s/p tPA. On admission, patient had CTH, CTA, CT perfusion during code stroke protocol with no acute findings. MRI Brain was  performed at   with no ischemic findings. EEG performed 9/15 showed diffuse L hemispheric slowing. Pt remained aphasic with AMS. +RUE spasticity was noted on exam. Patient was thus transferred to [facility] for EMU admission to evaluate for subclinical seizures.  Patient does not have hx of seizures.  Home Antiepileptic Medication and Device  none  Interpretation: Day 3 – 	Start: 2021  08:00    	End: 2021  08:00 	Duration: 24h  Daily EEG Visual Analysis   FINDINGS:  The background was continuous, spontaneously variable and reactive. During wakefulness, the posterior dominant rhythm consisted of asymmetric 8-9 Hz activity at 30 uV, that was better formed over the right hemisphere.  Background Slowing: No generalized background slowing was present.  Focal Slowing:  Intermittent polymorphic delta slowing over the left hemisphere.  Sleep Background: Drowsiness was characterized by fragmentation, attenuation, and slowing of the background activity.   Sleep was characterized by the presence of vertex waves, asymmetric sleep spindles and K-complexes that appear attenuated over the left.  Other Non-Epileptiform Findings: None were present.  Activation Procedures:  Hyperventilation was not performed.   Photic stimulation was not performed.  Interictal Epileptiform Activity:  None were present.  Events: No events or seizures recorded.  Artifacts: Intermittent myogenic and movement artifacts were noted.  ECG: The heart rate on single channel ECG was predominantly between 60-70 BPM.  AEDs:  none  EEG Summary:  Abnormal EEG in the awake, drowsy and asleep states. - Intermittent polymorphic delta slowing in the left hemisphere  Impression/Clinical Correlate:  This is an abnormal EEG record. No epileptiform pattern or seizures were seen. There is evidence for transient and reversible cause of functional abnormality affecting the left hemisphere.    Preliminary Fellow Report, final report pending attending review  Devan Lovelace MD Epilepsy Fellow  Reading Room: [phone] On Call Service After Hours: [phone]    EPILEPSY MONITORING UNIT REPORT   [facility]: [address], [place] [address], Ph#: [phone] [facility]: [address] [place] [address], [place] [address], Ph#: [phone] Office: [address] [place] [address], [place] [address] Ph#: [phone]  Patient Name: Daniel Pinto   Age: 66 year, : 1955 MRN #: [id number], [facility]: [address] [place] [address] Referring Physician: OS transfer           Study Information:  EEG Recording Technique: The patient underwent continuous Video-EEG monitoring, using Telemetry System hardware on the XLTek Digital System. EEG and video data were stored on a computer hard drive with important events saved in digital archive files. The material was reviewed by a physician (electroencephalographer / epileptologist) on a daily basis. Franko and seizure detection algorithms were utilized and reviewed. An EEG Technician attended to the patient, and was available throughout daytime work hours.  The epilepsy center neurologist was available in person or on call 24-hours per day.  EEG Placement and Labeling of Electrodes: The EEG was performed utilizing 20 channel referential EEG connections (coronal over temporal over parasagittal montage) using all standard 10-20 electrode placements with EKG, with additional electrodes placed in the inferior temporal region using the modified 10-10 montage electrode placements for elective admissions, or if deemed necessary. Recording was at a sampling rate of 256 samples per second per channel. Time synchronized digital video recording was done simultaneously with EEG recording. A low light infrared camera was used for low light recording.   History:  Patient is a 65 yo Rt handed male PMH CLL on Venetoclax, MDS, melanoma, paroxysmal AFib (not on AC) presenting as transfer from [place] for event capture. Patient was initially at  [facility] on  with confusion, aphasia and R sided weakness. Patient was stroke code and was given s/p tPA. On admission, patient had CTH, CTA, CT perfusion during code stroke protocol with no acute findings. MRI Brain was  performed at   with no ischemic findings. EEG performed 9/15 showed diffuse L hemispheric slowing. Pt remained aphasic with AMS. +RUE spasticity was noted on exam. Patient was thus transferred to [facility] for EMU admission to evaluate for subclinical seizures.  Patient does not have hx of seizures.  Home Antiepileptic Medication and Device  none  Interpretation: Day 3 – 	Start: 2021  08:00    	End: 2021  08:00 	Duration: 24h  Daily EEG Visual Analysis   FINDINGS:  The background was continuous, spontaneously variable and reactive. During wakefulness, the posterior dominant rhythm consisted of asymmetric 8-9 Hz activity at 30 uV, that was better formed over the right hemisphere.  Background Slowing: No generalized background slowing was present.  Focal Slowing:  Intermittent polymorphic delta slowing over the left hemisphere.  Sleep Background: Drowsiness was characterized by fragmentation, attenuation, and slowing of the background activity.   Sleep was characterized by the presence of vertex waves, asymmetric sleep spindles and K-complexes that appear attenuated over the left.  Other Non-Epileptiform Findings: None were present.  Activation Procedures:  Hyperventilation was not performed.   Photic stimulation was not performed.  Interictal Epileptiform Activity:  None were present.  Events: Multiple push button events at 1803, 1822, 1848, and 1958 for unclear reason. No additional associated EEG abnormalities recorded at those times.  No seizures recorded.  Artifacts: Intermittent myogenic and movement artifacts were noted.  ECG: The heart rate on single channel ECG was predominantly between 60-70 BPM.  AEDs:  none  EEG Summary:  Abnormal EEG in the awake, drowsy and asleep states. - Intermittent polymorphic delta slowing in the left hemisphere  Impression/Clinical Correlate:  This is an abnormal EEG record. No epileptiform pattern or seizures were seen. There is evidence for transient and reversible cause of functional abnormality affecting the left hemisphere.    Preliminary Fellow Report, final report pending attending review  Devan Lovelace MD Epilepsy Fellow  Reading Room: [phone] On Call Service After Hours: [phone]    EPILEPSY MONITORING UNIT REPORT   [facility]: [address], [place] [address], Ph#: [phone] [facility]: [address] [place] [address], [place] [address], Ph#: [phone] Office: [address] [place] [address], [place] [address] Ph#: [phone]  Patient Name: Daniel Pinto   Age: 66 year, : 1955 MRN #: [id number], [facility]: [address] [place] [address] Referring Physician: OS transfer           Study Information:  EEG Recording Technique: The patient underwent continuous Video-EEG monitoring, using Telemetry System hardware on the XLTek Digital System. EEG and video data were stored on a computer hard drive with important events saved in digital archive files. The material was reviewed by a physician (electroencephalographer / epileptologist) on a daily basis. Franko and seizure detection algorithms were utilized and reviewed. An EEG Technician attended to the patient, and was available throughout daytime work hours.  The epilepsy center neurologist was available in person or on call 24-hours per day.  EEG Placement and Labeling of Electrodes: The EEG was performed utilizing 20 channel referential EEG connections (coronal over temporal over parasagittal montage) using all standard 10-20 electrode placements with EKG, with additional electrodes placed in the inferior temporal region using the modified 10-10 montage electrode placements for elective admissions, or if deemed necessary. Recording was at a sampling rate of 256 samples per second per channel. Time synchronized digital video recording was done simultaneously with EEG recording. A low light infrared camera was used for low light recording.   History:  Patient is a 65 yo Rt handed male PMH CLL on Venetoclax, MDS, melanoma, paroxysmal AFib (not on AC) presenting as transfer from [place] for event capture. Patient was initially at  [facility] on  with confusion, aphasia and R sided weakness. Patient was stroke code and was given s/p tPA. On admission, patient had CTH, CTA, CT perfusion during code stroke protocol with no acute findings. MRI Brain was  performed at   with no ischemic findings. EEG performed 9/15 showed diffuse L hemispheric slowing. Pt remained aphasic with AMS. +RUE spasticity was noted on exam. Patient was thus transferred to [facility] for EMU admission to evaluate for subclinical seizures.  Patient does not have hx of seizures.  Home Antiepileptic Medication and Device  none  Interpretation: Day 3 – 	Start: 2021  08:00    	End: 2021  08:00 	Duration: 24h  Daily EEG Visual Analysis   FINDINGS:  The background was continuous, spontaneously variable and reactive. During wakefulness, the posterior dominant rhythm consisted of asymmetric 8-9 Hz activity at 30 uV, that was better formed over the right hemisphere.  Background Slowing: No generalized background slowing was present.  Focal Slowing:  Intermittent polymorphic delta slowing over the left hemisphere.  Sleep Background: Drowsiness was characterized by fragmentation, attenuation, and slowing of the background activity.   Sleep was characterized by the presence of vertex waves, asymmetric sleep spindles and K-complexes that appear attenuated over the left.  Other Non-Epileptiform Findings: None were present.  Activation Procedures:  Hyperventilation was not performed.   Photic stimulation was not performed.  Interictal Epileptiform Activity:  None were present.  Events: Multiple push button events at 1803, 1822, 1848, and 1958 for unclear reason. No additional associated EEG abnormalities recorded at those times.  No seizures recorded.  Artifacts: Intermittent myogenic and movement artifacts were noted.  ECG: The heart rate on single channel ECG was predominantly between 60-70 BPM.  AEDs:  none  EEG Summary:  Abnormal EEG in the awake, drowsy and asleep states. - Intermittent polymorphic delta slowing in the left hemisphere  Impression/Clinical Correlate:  This is an abnormal EEG record. No epileptiform pattern or seizures were seen. There is evidence for transient and reversible cause of functional abnormality affecting the left hemisphere.     Devan Lovelace MD Epilepsy Fellow  Reading Room: [phone] On Call Service After Hours: [phone]

## 2021-09-20 LAB
ANION GAP SERPL CALC-SCNC: 17 MMOL/L — SIGNIFICANT CHANGE UP (ref 5–17)
BUN SERPL-MCNC: 22 MG/DL — SIGNIFICANT CHANGE UP (ref 7–23)
CALCIUM SERPL-MCNC: 9.1 MG/DL — SIGNIFICANT CHANGE UP (ref 8.4–10.5)
CHLORIDE SERPL-SCNC: 106 MMOL/L — SIGNIFICANT CHANGE UP (ref 96–108)
CO2 SERPL-SCNC: 20 MMOL/L — LOW (ref 22–31)
CREAT SERPL-MCNC: 1 MG/DL — SIGNIFICANT CHANGE UP (ref 0.5–1.3)
CULTURE RESULTS: NO GROWTH — SIGNIFICANT CHANGE UP
CULTURE RESULTS: SIGNIFICANT CHANGE UP
CULTURE RESULTS: SIGNIFICANT CHANGE UP
GLUCOSE SERPL-MCNC: 114 MG/DL — HIGH (ref 70–99)
HCT VFR BLD CALC: 36.8 % — LOW (ref 39–50)
HGB BLD-MCNC: 12.3 G/DL — LOW (ref 13–17)
MAGNESIUM SERPL-MCNC: 2.1 MG/DL — SIGNIFICANT CHANGE UP (ref 1.6–2.6)
MCHC RBC-ENTMCNC: 33.4 GM/DL — SIGNIFICANT CHANGE UP (ref 32–36)
MCHC RBC-ENTMCNC: 35 PG — HIGH (ref 27–34)
MCV RBC AUTO: 104.8 FL — HIGH (ref 80–100)
NRBC # BLD: 0 /100 WBCS — SIGNIFICANT CHANGE UP (ref 0–0)
PHOSPHATE SERPL-MCNC: 3.5 MG/DL — SIGNIFICANT CHANGE UP (ref 2.5–4.5)
PLATELET # BLD AUTO: 259 K/UL — SIGNIFICANT CHANGE UP (ref 150–400)
POTASSIUM SERPL-MCNC: 3.6 MMOL/L — SIGNIFICANT CHANGE UP (ref 3.5–5.3)
POTASSIUM SERPL-SCNC: 3.6 MMOL/L — SIGNIFICANT CHANGE UP (ref 3.5–5.3)
RBC # BLD: 3.51 M/UL — LOW (ref 4.2–5.8)
RBC # FLD: 14.7 % — HIGH (ref 10.3–14.5)
SODIUM SERPL-SCNC: 143 MMOL/L — SIGNIFICANT CHANGE UP (ref 135–145)
SPECIMEN SOURCE: SIGNIFICANT CHANGE UP
TM INTERPRETATION: SIGNIFICANT CHANGE UP
WBC # BLD: 4.65 K/UL — SIGNIFICANT CHANGE UP (ref 3.8–10.5)
WBC # FLD AUTO: 4.65 K/UL — SIGNIFICANT CHANGE UP (ref 3.8–10.5)

## 2021-09-20 PROCEDURE — 99232 SBSQ HOSP IP/OBS MODERATE 35: CPT

## 2021-09-20 PROCEDURE — 93010 ELECTROCARDIOGRAM REPORT: CPT

## 2021-09-20 PROCEDURE — 99233 SBSQ HOSP IP/OBS HIGH 50: CPT

## 2021-09-20 RX ORDER — OLANZAPINE 15 MG/1
2.5 TABLET, FILM COATED ORAL ONCE
Refills: 0 | Status: COMPLETED | OUTPATIENT
Start: 2021-09-20 | End: 2021-09-20

## 2021-09-20 RX ORDER — OLANZAPINE 15 MG/1
2.5 TABLET, FILM COATED ORAL EVERY 6 HOURS
Refills: 0 | Status: DISCONTINUED | OUTPATIENT
Start: 2021-09-20 | End: 2021-09-21

## 2021-09-20 RX ORDER — QUETIAPINE FUMARATE 200 MG/1
12.5 TABLET, FILM COATED ORAL
Refills: 0 | Status: DISCONTINUED | OUTPATIENT
Start: 2021-09-20 | End: 2021-09-21

## 2021-09-20 RX ORDER — HALOPERIDOL DECANOATE 100 MG/ML
1 INJECTION INTRAMUSCULAR ONCE
Refills: 0 | Status: COMPLETED | OUTPATIENT
Start: 2021-09-20 | End: 2021-09-20

## 2021-09-20 RX ORDER — DIPHENHYDRAMINE HCL 50 MG
25 CAPSULE ORAL ONCE
Refills: 0 | Status: COMPLETED | OUTPATIENT
Start: 2021-09-20 | End: 2021-09-20

## 2021-09-20 RX ORDER — QUETIAPINE FUMARATE 200 MG/1
12.5 TABLET, FILM COATED ORAL
Refills: 0 | Status: DISCONTINUED | OUTPATIENT
Start: 2021-09-20 | End: 2021-09-20

## 2021-09-20 RX ADMIN — Medication 25 MILLIGRAM(S): at 02:15

## 2021-09-20 RX ADMIN — QUETIAPINE FUMARATE 12.5 MILLIGRAM(S): 200 TABLET, FILM COATED ORAL at 10:41

## 2021-09-20 RX ADMIN — HALOPERIDOL DECANOATE 1 MILLIGRAM(S): 100 INJECTION INTRAMUSCULAR at 12:09

## 2021-09-20 RX ADMIN — OLANZAPINE 2.5 MILLIGRAM(S): 15 TABLET, FILM COATED ORAL at 21:22

## 2021-09-20 RX ADMIN — QUETIAPINE FUMARATE 12.5 MILLIGRAM(S): 200 TABLET, FILM COATED ORAL at 18:33

## 2021-09-20 RX ADMIN — OLANZAPINE 2.5 MILLIGRAM(S): 15 TABLET, FILM COATED ORAL at 03:06

## 2021-09-20 RX ADMIN — OLANZAPINE 2.5 MILLIGRAM(S): 15 TABLET, FILM COATED ORAL at 15:18

## 2021-09-20 RX ADMIN — HALOPERIDOL DECANOATE 1 MILLIGRAM(S): 100 INJECTION INTRAMUSCULAR at 13:28

## 2021-09-20 RX ADMIN — Medication 3 MILLIGRAM(S): at 21:37

## 2021-09-20 NOTE — CHART NOTE - NSCHARTNOTEFT_GEN_A_CORE
H&P: Patient is a 67 yo Rt handed male PMH CLL on Venetoclax, MDS, melanoma, paroxysmal AFib (not on AC) presenting as transfer from [place] for event capture. Patient was initially at  [facility] on 9/14 with confusion, aphasia and R sided weakness. Patient was stroke code and was given s/p tPA. On admission, patient had CTH, CTA, CT perfusion during code stroke protocol with no acute findings. MRI Brain was  performed at  9/16 with no ischemic findings. EEG performed 9/15 showed diffuse L hemispheric slowing. Pt remained aphasic with AMS. +RUE spasticity was noted on exam. Patient was thus transferred to [facility] for EMU admission to evaluate for subclinical seizures.  Of note, patient does not have hx of seizures. pt admitted under neurology service.    Continued: EEG: IMPRESSION: Abnormal EEG due to the presence of focal slowing in the left hemisphere with higher amplitude theta and delta waves. This suggests an underlying focal abnormality. Recommend correlate with imaging if necessary. EEG done on 9/15/2021 showed continuous focal slowing with higher amplitude theta and delta waves from the left hemisphere  Stroke service consulted: Impression Rapid progressive Global aphasia (Expressive > Receptive) vs encephalopathy 2/2 epileptogenic vs structural vs autoimmune etiology in the setting of known CLL. low suspicion for ischemic intracranial etiology in the setting of negative MRI, possibly stroke mimic. Cardiology consulted: hx of syncope and Afib. Defer AC if no definitive cva.  Pt started IVIG on 9/17/21. Mental status noted to be improved post IVIG.     SWALLOW HISTORY: Pt seen for bedside swallow evaluation on 9/15/21 at [facility]. Per report, recommendations included: "soft solids (dysphagia 3) with thin liquids via single/small cup sips +aspiration precautions. allow for swallow between intakes; alternate food with liquid; check mouth frequently for oral residue/pocketing; maintain upright posture during/after eating for 30 mins; no straws; oral hygiene; position upright (90 degrees); small sips/bites" As per documentation, Speech-Language evaluation unable to be completed at that time due to lethargy.     Hematology following given hx of CLL: Remains aphasic. Do not suspect 2/2 patient's CLL treatment. Infectious etiology on DDX. Recommended checking LP and including flow cytometry, cytopathology for malignant cells in addition to standard studies. Also check  autoimmune encephalitis panel and infectious PCR panel. Pt received 3rd dose of vaccine,  ? immune sequelae from vaccine.    9/20: Provider contact note: Pt is increasingly agitated and uncooperative with restraints. Hitting staff H&P: Patient is a 67 yo Rt handed male PMH CLL on Venetoclax, MDS, melanoma, paroxysmal AFib (not on AC) presenting as transfer from [place] for event capture. Patient was initially at  [facility] on 9/14 with confusion, aphasia and R sided weakness. Patient was stroke code and was given s/p tPA. On admission, patient had CTH, CTA, CT perfusion during code stroke protocol with no acute findings. MRI Brain was  performed at  9/16 with no ischemic findings. EEG performed 9/15 showed diffuse L hemispheric slowing. Pt remained aphasic with AMS. +RUE spasticity was noted on exam. Patient was thus transferred to [facility] for EMU admission to evaluate for subclinical seizures.  Of note, patient does not have hx of seizures. pt admitted under neurology service.    Continued: EEG: IMPRESSION: Abnormal EEG due to the presence of focal slowing in the left hemisphere with higher amplitude theta and delta waves. This suggests an underlying focal abnormality. Recommend correlate with imaging if necessary. EEG done on 9/15/2021 showed continuous focal slowing with higher amplitude theta and delta waves from the left hemisphere  Stroke service consulted: Impression Rapid progressive Global aphasia (Expressive > Receptive) vs encephalopathy 2/2 epileptogenic vs structural vs autoimmune etiology in the setting of known CLL. low suspicion for ischemic intracranial etiology in the setting of negative MRI, possibly stroke mimic. Cardiology consulted: hx of syncope and Afib. Defer AC if no definitive cva.  Pt started IVIG on 9/17/21. Mental status noted to be improved post IVIG.     SWALLOW HISTORY: Pt seen for bedside swallow evaluation on 9/15/21 at [facility]. Per report, recommendations included: "soft solids (dysphagia 3) with thin liquids via single/small cup sips +aspiration precautions. allow for swallow between intakes; alternate food with liquid; check mouth frequently for oral residue/pocketing; maintain upright posture during/after eating for 30 mins; no straws; oral hygiene; position upright (90 degrees); small sips/bites" As per documentation, Speech-Language evaluation unable to be completed at that time due to lethargy.     Hematology following given hx of CLL: Remains aphasic. Do not suspect 2/2 patient's CLL treatment. Infectious etiology on DDX. Recommended checking LP and including flow cytometry, cytopathology for malignant cells in addition to standard studies. Also check  autoimmune encephalitis panel and infectious PCR panel. Pt received 3rd dose of vaccine,  ? immune sequelae from vaccine.    9/20: Provider contact note: Pt is increasingly agitated and uncooperative with restraints. Hitting staff    Today, pt seen for repeat swallow evaluation given reported coughing with PO intake of dysphagia II with honey thickened liquid diet. Pt awake in bed, cachectic appearing, + 1:1 observation, + posey restraints, restless, easily distracted, unable to sit still for long periods of time. Confused. Oral peripheral exam revealed dry oral mucosa. Pt cachectic appearing. PO trials included thin and thick puree and honey thick liquids via teaspoon and cup.     Impressions: Pt is 67 yo M presenting as transfer for event capture. Now presenting with 1. an oral and suspected pharyngeal dysphagia marked by suspected uncontrolled loss, episodes of delayed pharyngeal swallows, audible (suspect discoordinated) swallows (most notable with honey thick liquids, and immediate and delayed coughing post PO intake of thick puree suggestive of laryngeal penetration/aspiration. 2. Cognitive linguistic impairments.     Recommendations:  NPO, with non-oral nutrition/hydration/medications is recommended given coughing noted with PO intake. However suggest GOC discussion including risks/complications associated with laryngeal penetration/aspiration with pt and family as NGT placement likely to worsen encephalopathy and agitation.   Maintain good oral hygiene    If pt and family accept risks of aspiration and wish to pursue PO diet, would suggest dysphagia I with honey thick liquids with 100% assistance and supervision only when pt awake and alert.   Above d/w RN: Stephanie and Neuro team     Laine Dueñas: [phone]   MA CCC-SLP

## 2021-09-20 NOTE — PROGRESS NOTE ADULT - SUBJECTIVE AND OBJECTIVE BOX
Hematology Oncology Follow-up    INTERVAL HPI/OVERNIGHT EVENTS:  No o/n events, patient resting comfortably. No complaints at this time. Patient specifically denies fever, chills, dizziness, weakness, CP, palpitations, SOB, cough, N/V/D/C, dysuria, changes in bowel movements, LE edema.    Review of Systems:  General: denies fevers/chills, night sweats, malaise, changes in appetite  Head: denies HA  Eyes: denies vision change  ENT: denies oral lesions, rhinorrhea, epistaxys, sore throat, dysphagia  Respiratory: denies cough, shortness of breath, pleurisy  Cardiovascular: denies chest pain, palpitaitons, PARKER  Gastrointestinal: denies nausea, vomiting, abdominal pain, constipation, diarrhea, melena, hematochezia  MSK: denies joint pain or muscle pain  Neuro: denies headache, weakness, or parasthesias  Skin: denies rash, petichiae, echymoses  Psych: denies anxiety or sleep disturbances    VITAL SIGNS:  T(F): 98.9 (09-20-21 @ 07:25)  HR: 96 (09-20-21 @ 07:25)  BP: 165/88 (09-20-21 @ 07:25)  RR: 18 (09-20-21 @ 07:25)  SpO2: 97% (09-20-21 @ 07:25)  Wt(kg): --    09-19-21 @ 07:01  -  09-20-21 @ 07:00  --------------------------------------------------------  IN: 480 mL / OUT: 300 mL / NET: 180 mL        PHYSICAL EXAM:    Constitutional: AAOx3, NAD  Eyes: PERRL, EOMI, sclera non-icteric  Neck: supple, no masses, no JVD, no lymphadenopathy  Respiratory: CTA b/l, no wheezing, rhonchi, rales, with normal respiratory effort  Cardiovascular: RRR, normal S1S2, no M/R/G  Gastrointestinal: soft, NTND, no masses palpable, BS normal in all four quadrants, no HSM  Extremities:  no edema  MSK: no obvious abnormalities, normal ROM, no lymphadenopathy  Neurological: Grossly intact  Skin: Normal temperature, no rash, no echymoses, no petichiae  Psych: normal affect    MEDICATIONS  (STANDING):  aspirin  chewable 81 milliGRAM(s) Oral daily  atorvastatin 80 milliGRAM(s) Oral at bedtime  enoxaparin Injectable 40 milliGRAM(s) SubCutaneous daily  folic acid 1 milliGRAM(s) Oral daily  melatonin 3 milliGRAM(s) Oral at bedtime  QUEtiapine 12.5 milliGRAM(s) Oral two times a day  sodium chloride 0.9%. 1000 milliLiter(s) (50 mL/Hr) IV Continuous <Continuous>    MEDICATIONS  (PRN):  acetaminophen   Tablet .. 650 milliGRAM(s) Oral every 6 hours PRN Mild Pain (1 - 3), Moderate Pain (4 - 6), Severe Pain (7 - 10)  diphenhydrAMINE 25 milliGRAM(s) Oral every 4 hours PRN Rash and/or Itching  lacosamide IVPB 100 milliGRAM(s) IV Intermittent once PRN Seizure  LORazepam   Injectable 1 milliGRAM(s) IV Push once PRN Seizure      No Known Allergies      LABS:                        12.3   4.65  )-----------( 259      ( 20 Sep 2021 06:41 )             36.8     09-20    143  |  106  |  22  ----------------------------<  114<H>  3.6   |  20<L>  |  1.00    Ca    9.1      20 Sep 2021 06:41  Phos  3.5     09-20  Mg     2.1     09-20             Ferritin, Serum: 607 ng/mL *H* (09-19-21 @ 09:58)    Folate, Serum: >20.0 ng/mL (09-17-21 @ 00:00)  Vitamin B12, Serum: 421 pg/mL (09-17-21 @ 00:00)          RADIOLOGY & ADDITIONAL TESTS:  Studies reviewed. Hematology Oncology Follow-up    INTERVAL HPI/OVERNIGHT EVENTS:  Over the weekend, speech improved (oriented to person, speaking, non-aphasic)  Course complicated by hallucinations  Wife Javier at the bedside; discussed plan with her    Review of Systems: ROS otherwise negative    VITAL SIGNS:  T(F): 98.9 (09-20-21 @ 07:25)  HR: 96 (09-20-21 @ 07:25)  BP: 165/88 (09-20-21 @ 07:25)  RR: 18 (09-20-21 @ 07:25)  SpO2: 97% (09-20-21 @ 07:25)  Wt(kg): --    09-19-21 @ 07:01  -  09-20-21 @ 07:00  --------------------------------------------------------  IN: 480 mL / OUT: 300 mL / NET: 180 mL    PHYSICAL EXAM:    Constitutional: alert, oriented to self and year but not to place; appears restless in bed; in restraints  Eyes: PERRL, EOMI, sclera non-icteric  Neck: supple, no masses, no JVD, no lymphadenopathy  Respiratory: CTA b/l, no wheezing, rhonchi, rales, with normal respiratory effort  Cardiovascular: RRR, normal S1S2, no M/R/G  Gastrointestinal: soft, NTND, no masses palpable, BS normal in all four quadrants, no HSM  Extremities:  no edema  MSK: no obvious abnormalities, normal ROM, no lymphadenopathy  Neurological: Grossly intact  Skin: Normal temperature, no rash; ecchymoses in the left arm  Psych: restless, but able to be calmed    Physical Exam: Neurological Exam:  Mental Status: Eyes open, oriented to person, year, month but not place. Attention impaired required repeated prompting to stay on task. No dysarthria, aphasia or neglect. Can name objects 3/3 (pen, watch and phone). Unable to recall 3 words at 5 minutes even with choices.   Cranial Nerves: PERRL, EOMI, no nystagmus or diplopia. No facial asymmetry.  Hearing intact to finger rub bilaterally.  Tongue, uvula and palate midline.    	Motor: moving all 4 extremities equally w 5/5 strength  	Tone: normal.                  	Pronator drift: none                 	Dysmetria: Patient unable to cooperate  	No truncal ataxia.    	Tremor: No resting, postural or action tremor.  No myoclonus.  	Sensation: intact to light touch      MEDICATIONS  (STANDING):  aspirin  chewable 81 milliGRAM(s) Oral daily  atorvastatin 80 milliGRAM(s) Oral at bedtime  enoxaparin Injectable 40 milliGRAM(s) SubCutaneous daily  folic acid 1 milliGRAM(s) Oral daily  melatonin 3 milliGRAM(s) Oral at bedtime  QUEtiapine 12.5 milliGRAM(s) Oral two times a day  sodium chloride 0.9%. 1000 milliLiter(s) (50 mL/Hr) IV Continuous <Continuous>    MEDICATIONS  (PRN):  acetaminophen   Tablet .. 650 milliGRAM(s) Oral every 6 hours PRN Mild Pain (1 - 3), Moderate Pain (4 - 6), Severe Pain (7 - 10)  diphenhydrAMINE 25 milliGRAM(s) Oral every 4 hours PRN Rash and/or Itching  lacosamide IVPB 100 milliGRAM(s) IV Intermittent once PRN Seizure  LORazepam   Injectable 1 milliGRAM(s) IV Push once PRN Seizure      No Known Allergies    LABS:                        12.3   4.65  )-----------( 259      ( 20 Sep 2021 06:41 )             36.8     09-20    143  |  106  |  22  ----------------------------<  114<H>  3.6   |  20<L>  |  1.00    Ca    9.1      20 Sep 2021 06:41  Phos  3.5     09-20  Mg     2.1     09-20    Ferritin, Serum: 607 ng/mL *H* (09-19-21 @ 09:58)    Folate, Serum: >20.0 ng/mL (09-17-21 @ 00:00)  Vitamin B12, Serum: 421 pg/mL (09-17-21 @ 00:00)    Culture Results:   Testing in progress (09.17.21 @ 16:54)   Acid Fast Bacilli Smear:   Less than 5 cc of CSF received,   unable to perform AFB smear. (09.17.21 @ 16:54)   Culture - CSF with Gram Stain . (09.17.21 @ 16:54)   Gram Stain: polymorphonuclear leukocytes seen; No organisms seen by cytocentrifuge   Specimen Source: .CSF CSF   Culture Results:   No growth     CSF PCR Result: NotDetec: The meningitis/encephalitis (ME) panel (CSFPCR) is a PCR based assay that screens for: Escherichia coli; Haemophilus influenzae; Listeria monocytogenes; Neisseria meningitidis; Streptococcus agalactiae; Streptococcus pneumoniae; CMV; Enterovirus; HSV-1; HSV-2; Human herpesvirus 6; Parechovirus; VZV and Cryptococcus. Result should be interpreted in context of clinical presentation, imaging and other lab tests. Positive predictive value may be lower in patients with normal CSF chemistry and cell count. (09.17.21 @ 16:27) Cryptococcal Antigen - CSF (09.17.21 @ 16:27)   Cryptococcal Antigen - CSF: Negative Protein, CSF: 55 mg/dL (09.17.21 @ 13:30) Glucose, CSF: 73 mg/dL (09.17.21 @ 13:30)     Flow Cytometry (09.17.21 @ 13:30)   TM Interpretation:   Flow Cytometry Final Report   ________________________________________________________________________   Specimen: CSF   Date Collected: 09/17/2021 13:28   Date Received: 09/17/2021 13:30   Date Processed: 09/7/2021 14:00   Date Reported: 09/20/2021 13:30   Accession #: [id number]   DA--JJ   ________________________________________________________________________   CLINICAL DATA: Rule out chronic lymphocytic leukemia   ________________________________________________________________________   CD45/Side Scatter Differential   ________________________________________________________________________   DIAGNOSIS:   Cerebrospinal fluid:   - The lymphocyte immunophenotypic findings show no diagnostic abnormalities. Please see interpretation.   INTERPRETATION:   MORPHOLOGY:   CYTOSPIN: Few small lymphocytes and macrophage, few granulocytes   IMMUNOPHENOTYPE: Lymphocytes (5% of cells): Heterogeneous population of T-cells, rare to absent of   B-cells.   The lymphocyte immunophenotypic findings show no diagnostic abnormalities.   _____________________________________________________________________   Values reported are based on the lymphocyte gate. (Bright CD45 positive; low side scatter, low   forward scatter). 5_% of cells.   CD45 .......... 100 %   CD3 ........... 51 %   CD5 ........... 51 %   CD7 ........... 50 %   CD4 ........... 45 %   CD8 ........... 6 %   CD10 .......... 0 %   CD19 .......... 0 %   CD20 .......... 0 %   CD23 .......... 4 %   CD5+/CD19+ .... 0 %   kappa ......... 0 %   lambda ........ 0 %   CD38 .......... 66 %   Verified By: Ross Ramirez M.D., M.D.   (Electronic Signature)   This test was developed and its performance characteristics determined by the Flow Cytometry   Laboratory at [facility]. [address] , [place] [address].   Medical Director: Ml Lynch MD. It has not been cleared or approved by the U.S. Food and Drug   Administration.   The FDA has determined that such clearance or approval is not necessary. This test is used for   clinical purposes. It should not be regarded as investigational or for research. This laboratory is   certified under the Clinical Laboratory Improvement Amendment of 1988 ("CLIA") as qualified to   perform high complexity clinical testing.     RADIOLOGY & ADDITIONAL TESTS:  Studies reviewed.      EEG 9/20/21:  Impression/Clinical Correlate:    This is an abnormal EEG record. No epileptiform pattern or seizures were seen. There is evidence of a functional abnormality affecting the left hemisphere.

## 2021-09-20 NOTE — PROGRESS NOTE ADULT - SUBJECTIVE AND OBJECTIVE BOX
[facility] -- Sveta Romano, Ebony Sparrow Pannella, Patel, Savella  Office # [phone]      Follow Up:  PAF    Subjective/Observations: Patient seen and examined. Events noted. Resting comfortably in bed. Unable to provide meaningful information.       REVIEW OF SYSTEMS: Limited 2/2 comorbidities     PAST MEDICAL & SURGICAL HISTORY:  Osteopenia    CLL (Chronic Lymphocytic Leukemia)  SINCE 1988    Avascular Necrosis of Femur Head  B/L    TMJ Syndrome  limited jaw opening due to TMJ    Herniated Cervical Disc    Bulging Disc  LUMBAR    Melanoma    Deviated nasal septum    Nasal congestion    Rosacea    Deviated nasal septum    Other specified disorders of nose and nasal sinuses    Anemia    S/P Splenectomy  1993    Avascular Necrosis of Femur Head  RIGHT - CORE DECOMPRESSION- 1993    Avascular Necrosis of Femur Head  LEFT - CORE DECOMPRESSION - 1993    Abnormal Jaw Closure  LEFT JAW SURGERY - 1988    Status Post Eye Surgery X 3  3/2011 right eye scleral buckle; left eye in 2013    S/P Tonsillectomy  1960    Bilateral cataracts  removed in 2011        MEDICATIONS  (STANDING):  aspirin  chewable 81 milliGRAM(s) Oral daily  atorvastatin 80 milliGRAM(s) Oral at bedtime  enoxaparin Injectable 40 milliGRAM(s) SubCutaneous daily  folic acid 1 milliGRAM(s) Oral daily  immune   globulin 10% (GAMMAGARD) IVPB 25 Gram(s) IV Intermittent daily  melatonin 3 milliGRAM(s) Oral at bedtime  OLANZapine 5 milliGRAM(s) Oral daily  sodium chloride 0.9%. 1000 milliLiter(s) (50 mL/Hr) IV Continuous <Continuous>    MEDICATIONS  (PRN):  acetaminophen   Tablet .. 650 milliGRAM(s) Oral every 6 hours PRN Mild Pain (1 - 3), Moderate Pain (4 - 6), Severe Pain (7 - 10)  diphenhydrAMINE 25 milliGRAM(s) Oral every 4 hours PRN Rash and/or Itching  lacosamide IVPB 100 milliGRAM(s) IV Intermittent once PRN Seizure  LORazepam   Injectable 1 milliGRAM(s) IV Push once PRN Seizure      Allergies    No Known Allergies    Intolerances            Vital Signs Last 24 Hrs  T(C): 37.2 (20 Sep 2021 07:25), Max: 37.2 (20 Sep 2021 07:25)  T(F): 98.9 (20 Sep 2021 07:25), Max: 98.9 (20 Sep 2021 07:25)  HR: 96 (20 Sep 2021 07:25) (87 - 100)  BP: 165/88 (20 Sep 2021 07:25) (131/85 - 165/88)  BP(mean): --  RR: 18 (20 Sep 2021 07:25) (18 - 18)  SpO2: 97% (20 Sep 2021 07:25) (95% - 98%)    I&O's Summary    19 Sep 2021 07:01  -  20 Sep 2021 07:00  --------------------------------------------------------  IN: 480 mL / OUT: 300 mL / NET: 180 mL          PHYSICAL EXAM:  Constitutional: NAD, lethargic and confused  Lungs:  Non-labored, breath sounds are clear bilaterally, No wheezing, rales or rhonchi  Cardiovascular: RRR.  S1 and S2 positive.  No murmurs, rubs, gallops or clicks  Gastrointestinal: Bowel Sounds present, soft, nontender.   Lymph: No peripheral edema. No cervical lymphadenopathy.  Neurological: unable to assess, lethargic  Skin: No rashes or ulcers   Psych:  unable to assess      LABS: All Labs Reviewed:                        12.3   4.65  )-----------( 259      ( 20 Sep 2021 06:41 )             36.8                         12.6   4.60  )-----------( 292      ( 19 Sep 2021 05:59 )             37.8                         12.1   5.33  )-----------( 282      ( 18 Sep 2021 07:52 )             35.8     20 Sep 2021 06:41    143    |  106    |  22     ----------------------------<  114    3.6     |  20     |  1.00   19 Sep 2021 05:59    141    |  104    |  22     ----------------------------<  106    3.3     |  22     |  1.06   18 Sep 2021 07:52    139    |  105    |  23     ----------------------------<  95     3.9     |  19     |  0.84     Ca    9.1        20 Sep 2021 06:41  Ca    9.8        19 Sep 2021 05:59  Ca    9.2        18 Sep 2021 07:52  Phos  3.5       20 Sep 2021 06:41  Phos  3.2       19 Sep 2021 05:59  Phos  3.2       18 Sep 2021 07:52  Mg     2.1       20 Sep 2021 06:41  Mg     2.2       19 Sep 2021 05:59  Mg     2.3       18 Sep 2021 07:52

## 2021-09-20 NOTE — PROGRESS NOTE ADULT - ASSESSMENT
ASSESSMENT: Patient is a 65 yo Rt handed male PMH CLL on Venetoclax, MDS, melanoma, paroxysmal AFib (not on AC) presenting as transfer from [place] for event capture. Patient was initially at  [facility] on 9/14 with confusion, aphasia and R sided weakness. With Brain MRI negative. EEG note to show discharges from Left hemisphere with RUE spasticity There is concern for subclinical seizures thus transferred to [facility] for event capture. Cardiology consulted.  Heme/onc to follow this AM and will followup on recommendations on heme/onc medications.  Patient was noted to have episodes agitation with episodes of somnolence with out medication needed. Patient had LP this afternoon and will follow up on CSF results. Cardiology states to hold off on AC as there is no definitive CVA noted. Continue with ASA and statin. Improved s/p 1 dose of IVIG. Still with some mild cognitive delay and now with visual hallucinations. Poss related to ativan, will dc. Wife notes that patient gets IVIG treatment every month as part of his CLL treatment.     Impression: RUE spasticity with AMS transferred for event capture     PLAN:   [] IVIG x 5 days (9/17 - 9/21)  [] F/u on Monday with Dr. Montserrat Haji about his outpt IVIG treatment  [] Increased Zyprexa 5mg qd   [x] LP completed- Glucose 73, Protein 55, WBC 3  [] MR brain w/wo contrast to be considered by Dr. stephen during the week   [x] Cardiology consulted- choosing to defer AC at this time   [] Optho consulted  [x] continue vEEG   [x] Hold on starting AED's   [x] heme/onc to continue to follow on recommendations. Follows Dr. Godinez and  Dr. Valenzuela as an outpatient.   [] Passes bedside swallow, Swallow evaluation pending.   [] speech eval   [] constant observation due to agitation  [x] DVT ppx     Rescue: 1st-Ativan 1mg, 2nd-Vimpat 100mg IV if GTC refractory to ativan      CORE MEASURES:        AED levels [] Sent [] Pending [] Resulted     LFTs [] normal [] elevated      Plan and education provided to [] patient []family at bedside [] awaiting for family     Seizure Semiology  [] Tonic clonic  [] Clonic  [] Tonic  [] Unresponsive  [] Focal with impared awareness  [] Focal without impaed awareness    Obtain screening lower extremity venous ultrasound in patients who meet 1 or more of the following criteria as patient is high risk for DVT/PE on admission:   [] History of DVT/PE  []Hypercoagulable states (Factor V Leiden, Cancer, OCP, etc. )  []Prolonged immobility (hemiplegia/hemiparesis/post operative or any other extended immobilization)  [] Transferred from outside facility (Rehab or Long term care)   ASSESSMENT: Patient is a 65 yo Rt handed male PMH CLL on Venetoclax, MDS, melanoma, paroxysmal AFib (not on AC) presenting as transfer from [place] for event capture. Patient was initially at  [facility] on 9/14 with confusion, aphasia and R sided weakness. With Brain MRI negative. EEG note to show discharges from Left hemisphere with RUE spasticity There is concern for subclinical seizures thus transferred to [facility] for event capture. Cardiology consulted.  Heme/onc to follow this AM and will followup on recommendations on heme/onc medications.  Patient was noted to have episodes agitation with episodes of somnolence with out medication needed. Patient had LP this afternoon and will follow up on CSF results. Cardiology states to hold off on AC as there is no definitive CVA noted. Continue with ASA and statin. Improved s/p 1 dose of IVIG. Still with some mild cognitive delay and now with visual hallucinations. Poss related to ativan, will dc. Wife notes that patient gets IVIG treatment every month as part of his CLL treatment.     9/20: Neuro exam significant for continued agitation and restlessness, oriented to self and time but not place or situation. Diffic    Impression: RUE spasticity with AMS transferred for event capture     PLAN:   [] IVIG x 5 days (9/17 - 9/21)  [] F/u on Monday with Dr. Montserrat Haji about his outpt IVIG treatment  [] Increased Zyprexa 5mg qd   [x] LP completed- Glucose 73, Protein 55, WBC 3  [] MR brain w/wo contrast to be considered by Dr. stephen during the week   [x] Cardiology consulted- choosing to defer AC at this time   [] Optho consulted  [x] continue vEEG   [x] Hold on starting AED's   [x] heme/onc to continue to follow on recommendations. Follows Dr. Godinez and  Dr. Valenzuela as an outpatient.   [] Passes bedside swallow, Swallow evaluation pending.   [] speech eval   [] constant observation due to agitation  [x] DVT ppx     Rescue: 1st-Ativan 1mg, 2nd-Vimpat 100mg IV if GTC refractory to ativan      CORE MEASURES:        AED levels [] Sent [] Pending [] Resulted     LFTs [] normal [] elevated      Plan and education provided to [] patient []family at bedside [] awaiting for family     Seizure Semiology  [] Tonic clonic  [] Clonic  [] Tonic  [] Unresponsive  [] Focal with impared awareness  [] Focal without impaed awareness    Obtain screening lower extremity venous ultrasound in patients who meet 1 or more of the following criteria as patient is high risk for DVT/PE on admission:   [] History of DVT/PE  []Hypercoagulable states (Factor V Leiden, Cancer, OCP, etc. )  []Prolonged immobility (hemiplegia/hemiparesis/post operative or any other extended immobilization)  [] Transferred from outside facility (Rehab or Long term care)   ASSESSMENT: Patient is a 67 yo Rt handed male PMH CLL on Venetoclax, MDS, melanoma, paroxysmal AFib (not on AC) presenting as transfer from [place] for event capture. Patient was initially at  [facility] on 9/14 with confusion, aphasia and R sided weakness. With Brain MRI negative. EEG note to show discharges from Left hemisphere with RUE spasticity There is concern for subclinical seizures thus transferred to [facility] for event capture. Cardiology consulted.  Heme/onc to follow this AM and will followup on recommendations on heme/onc medications.  Patient was noted to have episodes agitation with episodes of somnolence with out medication needed. Patient had LP this afternoon and will follow up on CSF results. Cardiology states to hold off on AC as there is no definitive CVA noted. Continue with ASA and statin. Improved s/p 1 dose of IVIG. Still with some mild cognitive delay and now with visual hallucinations. Poss related to ativan, will dc. Wife notes that patient gets IVIG treatment every month as part of his CLL treatment.     9/20: Neuro exam significant for continued agitation and restlessness requiring 1:1, oriented to self and time but not place or situation.    Impression: RUE spasticity with AMS transferred for event capture     PLAN:     AMS/AIE  - D/C vEEG  - Discontinued IVIG, received 3 days, no improvement in symptoms thus far  - F/u on Monday with Heme/Onc about outpt IVIG treatment  - 1:1 Constant Observations for safety  - Started Seroquel 12.5 mg BID, if needs to remain NPO will change to Zyprexa 2.5 mg IM BID  - LP completed- Glucose 73, Protein 55, WBC 3, Gram stain no growth to date, HSV neg, Cryptococcal neg; f/u rest of results  - No MR brain w/wo contrast at this time  - Visual hallucinations, concerning due to similar presentation of retinal detachment, Optho consulted   - No seizures on EEG, continuing to hold AEDs  - Rescue: 1st-Ativan 1mg, 2nd-Vimpat 100mg IV if GTC refractory to ativan  - Medicine consult for possible transfer to medicine service    CLL  - Heme/onc to continue to follow on recommendations. Follows Dr. Godinez and  Dr. Valenzuela as an outpatient.   - Will discuss with Heme/Onc regarding ongoing CLL treatment.    PAfib   - Cardiology consulted- choosing to defer AC at this time  - Continue tele monitoring  - 12 lead EKG    Dietary  - Speech and Swallow Re-eval: Recommend NPO at this time due to concern for aspiration, if wife wants patient to eat can consider Dysphagia 1 diet with honey thickened liquids.    DVT ppx: Lovenox subq   Dispo: Pending      ASSESSMENT: Patient is a 67 yo Rt handed male PMH CLL on Venetoclax, MDS, melanoma, paroxysmal AFib (not on AC) presenting as transfer from [place] for event capture. Patient was initially at  [facility] on 9/14 with confusion, aphasia and R sided weakness. With Brain MRI negative. EEG note to show discharges from Left hemisphere with RUE spasticity There is concern for subclinical seizures thus transferred to [facility] for event capture. Cardiology consulted.  Heme/onc to follow this AM and will followup on recommendations on heme/onc medications.  Patient was noted to have episodes agitation with episodes of somnolence with out medication needed. Patient had LP this afternoon and will follow up on CSF results. Cardiology states to hold off on AC as there is no definitive CVA noted. Continue with ASA and statin. Improved s/p 1 dose of IVIG. Still with some mild cognitive delay and now with visual hallucinations. Poss related to ativan, will dc. Wife notes that patient gets IVIG treatment every month as part of his CLL treatment.     9/20: Neuro exam significant for continued agitation and restlessness requiring 1:1, oriented to self and time but not place or situation.    Impression: RUE spasticity with AMS transferred for event capture     PLAN:     AMS/AIE  - D/C vEEG  - Discontinued IVIG, received 3 days, no improvement in symptoms thus far  - 1:1 Constant Observations for safety  - Started Seroquel 12.5 mg BID, if needs to remain NPO will change to Zyprexa 2.5 mg IM BID and will have Zyprexa 2.5 mg IM PRN for breakthrough agitation.  - LP completed- Glucose 73, Protein 55, WBC 3, Gram stain no growth to date, HSV neg, Cryptococcal neg; f/u rest of results  - No MR brain w/wo contrast at this time  - Visual hallucinations, concerning due to similar presentation of retinal detachment, Optho consulted   - No seizures on EEG, continuing to hold AEDs  - Rescue: 1st-Ativan 1mg, 2nd-Vimpat 100mg IV if GTC refractory to ativan  - Medicine consult for possible transfer to medicine service    CLL  - Heme/onc to continue to follow on recommendations. Follows Dr. Godinez and  Dr. Valenzuela as an outpatient.   - Will discuss with Heme/Onc regarding ongoing CLL treatment.    PAfib   - Cardiology consulted- choosing to defer AC at this time  - Continue tele monitoring  - 12 lead EKG    Dietary  - Speech and Swallow Re-eval: Recommend NPO at this time due to concern for aspiration, if wife wants patient to eat can consider Dysphagia 1 diet with honey thickened liquids- wife willing to assume risk of aspiration with meals and patient started on recommended diet with aspiration precautions and supervision by staff of all meals.    DVT ppx: Lovenox subq   Dispo: Pending

## 2021-09-20 NOTE — CHART NOTE - NSCHARTNOTEFT_GEN_A_CORE
Called by RN to evaluate patient as patient is increasingly agitated and uncooperative with restraints.  As patient is being monitored for on EEG there is a concern that patient behavior could be subclinical seizure activity.  Upon assessment patient not participating in exam, is alert, but continuously trying to get off bed.  Spoke to EEG fellow who confirmed patient has not had any seizure activity on EEG.        Plan:    -Benadryl 25mg   -Zyprexa 2.5 im given  -Continue 1:1  -At this time will avoid further medication such as haldol or ativan as patient developed worsening  hallucinations during trial of these medications.

## 2021-09-20 NOTE — PROGRESS NOTE ADULT - SUBJECTIVE AND OBJECTIVE BOX
THE PATIENT WAS SEEN AND EXAMINED BY ME WITH THE HOUSESTAFF DURING MORNING ROUNDS.   HPI:  MRN-[id number]    HPI:  Patient is a 67 yo Rt handed male PMH CLL on Venetoclax, MDS, melanoma, paroxysmal AFib (not on AC) presenting as transfer from [place] for event capture. Patient was initially at  [facility] on 9/14 with confusion, aphasia and R sided weakness. Patient was stroke code and was given s/p tPA. On admission, patient had CTH, CTA, CT perfusion during code stroke protocol with no acute findings. MRI Brain was  performed at  9/16 with no ischemic findings. EEG performed 9/15 showed diffuse L hemispheric slowing. Pt remained aphasic with AMS. +RUE spasticity was noted on exam. Patient was thus transferred to [facility] for EMU admission to evaluate for subclinical seizures.  Of note, patient does not have hx of seizures.      ROS: Otherwise negative.     SUBJECTIVE: No events overnight.  No new neurologic complaints.      acetaminophen   Tablet .. 650 milliGRAM(s) Oral every 6 hours PRN  aspirin  chewable 81 milliGRAM(s) Oral daily  atorvastatin 80 milliGRAM(s) Oral at bedtime  diphenhydrAMINE 25 milliGRAM(s) Oral every 4 hours PRN  enoxaparin Injectable 40 milliGRAM(s) SubCutaneous daily  folic acid 1 milliGRAM(s) Oral daily  immune   globulin 10% (GAMMAGARD) IVPB 25 Gram(s) IV Intermittent daily  lacosamide IVPB 100 milliGRAM(s) IV Intermittent once PRN  LORazepam   Injectable 1 milliGRAM(s) IV Push once PRN  melatonin 3 milliGRAM(s) Oral at bedtime  OLANZapine 5 milliGRAM(s) Oral daily  sodium chloride 0.9%. 1000 milliLiter(s) IV Continuous <Continuous>      Physical Exam: Neurological Exam:  	Mental Status: Orientated to self, date and place.  Attention intact.  No dysarthria, aphasia or neglect.  	Cranial Nerves: PERRL, EOMI, no nystagmus or diplopia. No facial asymmetry.  Hearing intact to finger rub bilaterally.  Tongue, uvula and palate midline.  Sternocleidomastoid and Trapezius intact bilaterally  	Motor: moving all 4 extremities equally w 5/5 strength  	Tone: normal.                  	Pronator drift: none                 	Dysmetria: None to finger-nose-finger  	No truncal ataxia.    	Tremor: No resting, postural or action tremor.  No myoclonus.  	Sensation: intact to light touch    LABS:                        12.3   4.65  )-----------( 259      ( 20 Sep 2021 06:41 )             36.8    09-20    143  |  106  |  22  ----------------------------<  114<H>  3.6   |  20<L>  |  1.00    Ca    9.1      20 Sep 2021 06:41  Phos  3.5     09-20  Mg     2.1     09-20         COVID-19 PCR: NotDetec (14 Sep 2021 18:47)      IMAGING:     MRI  CTH  EEG     THE PATIENT WAS SEEN AND EXAMINED BY ME WITH THE HOUSESTAFF DURING MORNING ROUNDS.   HPI:  [id number]    HPI:  Patient is a 67 yo Rt handed male PMH CLL on Venetoclax, MDS, melanoma, paroxysmal AFib (not on AC) presenting as transfer from [place] for event capture. Patient was initially at  [facility] on 9/14 with confusion, aphasia and R sided weakness. Patient was stroke code and was given s/p tPA. On admission, patient had CTH, CTA, CT perfusion during code stroke protocol with no acute findings. MRI Brain was  performed at  9/16 with no ischemic findings. EEG performed 9/15 showed diffuse L hemispheric slowing. Pt remained aphasic with AMS. +RUE spasticity was noted on exam. Patient was thus transferred to [facility] for EMU admission to evaluate for subclinical seizures.  Of note, patient does not have hx of seizures.      ROS: Otherwise negative.     SUBJECTIVE: Agitated and trying to climb out of bed overnight. Given Zyprexa 2.5 mg.      acetaminophen   Tablet .. 650 milliGRAM(s) Oral every 6 hours PRN  aspirin  chewable 81 milliGRAM(s) Oral daily  atorvastatin 80 milliGRAM(s) Oral at bedtime  diphenhydrAMINE 25 milliGRAM(s) Oral every 4 hours PRN  enoxaparin Injectable 40 milliGRAM(s) SubCutaneous daily  folic acid 1 milliGRAM(s) Oral daily  immune   globulin 10% (GAMMAGARD) IVPB 25 Gram(s) IV Intermittent daily  lacosamide IVPB 100 milliGRAM(s) IV Intermittent once PRN  LORazepam   Injectable 1 milliGRAM(s) IV Push once PRN  melatonin 3 milliGRAM(s) Oral at bedtime  OLANZapine 5 milliGRAM(s) Oral daily  sodium chloride 0.9%. 1000 milliLiter(s) IV Continuous <Continuous>      Physical Exam: Neurological Exam:  	Mental Status: Eyes open, oriented to person, year, month but not place. Attention impaired required repeated prompting to stay on task. No dysarthria, aphasia or neglect. Can name objects 3/3 (pen, watch and phone). Unable to recall 3 words at 5 minutes even with choices.   	Cranial Nerves: PERRL, EOMI, no nystagmus or diplopia. No facial asymmetry.  Hearing intact to finger rub bilaterally.  Tongue, uvula and palate midline.    	Motor: moving all 4 extremities equally w 5/5 strength  	Tone: normal.                  	Pronator drift: none                 	Dysmetria: Patient unable to cooperate  	No truncal ataxia.    	Tremor: No resting, postural or action tremor.  No myoclonus.  	Sensation: intact to light touch    LABS:                        12.3   4.65  )-----------( 259      ( 20 Sep 2021 06:41 )             36.8    09-20    143  |  106  |  22  ----------------------------<  114<H>  3.6   |  20<L>  |  1.00    Ca    9.1      20 Sep 2021 06:41  Phos  3.5     09-20  Mg     2.1     09-20         COVID-19 PCR: NotDetec (14 Sep 2021 18:47)      IMAGING:     MRI 9.16.21  IMPRESSION: No acute territorial infarcts seen.    EEG 9.19.21  EEG Summary:    Abnormal EEG in the awake, drowsy and asleep states.  - Intermittent polymorphic delta slowing in the left hemisphere    Impression/Clinical Correlate:    This is an abnormal EEG record. No epileptiform pattern or seizures were seen. There is evidence for transient and reversible cause of functional abnormality affecting the left hemisphere.     EEG 9.18.21  EEG Summary:    Abnormal EEG in the awake, drowsy and asleep states.  - Continuous polymorphic delta slowing in the left hemisphere, slowly transitioning to intermittent slowing in the latter half of the recording recording.    Impression/Clinical Correlate:    This is an abnormal EEG record. No epileptiform pattern or seizures were seen. There is evidence for transient and reversible cause of functional abnormality affecting the left hemisphere     THE PATIENT WAS SEEN AND EXAMINED BY ME WITH THE HOUSESTAFF DURING MORNING ROUNDS.   HPI:  [id number]    HPI:  Patient is a 67 yo Rt handed male PMH CLL on Venetoclax, MDS, melanoma, paroxysmal AFib (not on AC) presenting as transfer from [place] for event capture. Patient was initially at  [facility] on 9/14 with confusion, aphasia and R sided weakness. Patient was stroke code and was given s/p tPA. On admission, patient had CTH, CTA, CT perfusion during code stroke protocol with no acute findings. MRI Brain was  performed at  9/16 with no ischemic findings. EEG performed 9/15 showed diffuse L hemispheric slowing. Pt remained aphasic with AMS. +RUE spasticity was noted on exam. Patient was thus transferred to [facility] for EMU admission to evaluate for subclinical seizures.  Of note, patient does not have hx of seizures.      ROS: Otherwise negative.     SUBJECTIVE: Agitated and trying to climb out of bed overnight. Given Zyprexa 2.5 mg & Benadryl 25 mg.    acetaminophen   Tablet .. 650 milliGRAM(s) Oral every 6 hours PRN  aspirin  chewable 81 milliGRAM(s) Oral daily  atorvastatin 80 milliGRAM(s) Oral at bedtime  diphenhydrAMINE 25 milliGRAM(s) Oral every 4 hours PRN  enoxaparin Injectable 40 milliGRAM(s) SubCutaneous daily  folic acid 1 milliGRAM(s) Oral daily  immune   globulin 10% (GAMMAGARD) IVPB 25 Gram(s) IV Intermittent daily  lacosamide IVPB 100 milliGRAM(s) IV Intermittent once PRN  LORazepam   Injectable 1 milliGRAM(s) IV Push once PRN  melatonin 3 milliGRAM(s) Oral at bedtime  OLANZapine 5 milliGRAM(s) Oral daily  sodium chloride 0.9%. 1000 milliLiter(s) IV Continuous <Continuous>      Physical Exam: Neurological Exam:  	Mental Status: Eyes open, oriented to person, year, month but not place. Attention impaired required repeated prompting to stay on task. No dysarthria, aphasia or neglect. Can name objects 3/3 (pen, watch and phone). Unable to recall 3 words at 5 minutes even with choices.   	Cranial Nerves: PERRL, EOMI, no nystagmus or diplopia. No facial asymmetry.  Hearing intact to finger rub bilaterally.  Tongue, uvula and palate midline.    	Motor: moving all 4 extremities equally w 5/5 strength  	Tone: normal.                  	Pronator drift: none                 	Dysmetria: Patient unable to cooperate  	No truncal ataxia.    	Tremor: No resting, postural or action tremor.  No myoclonus.  	Sensation: intact to light touch    LABS:                        12.3   4.65  )-----------( 259      ( 20 Sep 2021 06:41 )             36.8    09-20    143  |  106  |  22  ----------------------------<  114<H>  3.6   |  20<L>  |  1.00    Ca    9.1      20 Sep 2021 06:41  Phos  3.5     09-20  Mg     2.1     09-20         COVID-19 PCR: NotDetec (14 Sep 2021 18:47)      IMAGING:     MRI 9.16.21  IMPRESSION: No acute territorial infarcts seen.    EEG 9.19.21  EEG Summary:    Abnormal EEG in the awake, drowsy and asleep states.  - Intermittent polymorphic delta slowing in the left hemisphere    Impression/Clinical Correlate:    This is an abnormal EEG record. No epileptiform pattern or seizures were seen. There is evidence for transient and reversible cause of functional abnormality affecting the left hemisphere.     EEG 9.18.21  EEG Summary:    Abnormal EEG in the awake, drowsy and asleep states.  - Continuous polymorphic delta slowing in the left hemisphere, slowly transitioning to intermittent slowing in the latter half of the recording recording.    Impression/Clinical Correlate:    This is an abnormal EEG record. No epileptiform pattern or seizures were seen. There is evidence for transient and reversible cause of functional abnormality affecting the left hemisphere

## 2021-09-20 NOTE — EEG REPORT - NS EEG TEXT BOX
[facility]: [address] [place] [address] , [address], [place] [address], Ph#: [phone]  [facility]: [id number] [place] [address], [place] [address], Ph#: [phone]  Office: [address] [place] [address], [place] [address] Ph#: [phone]    Patient Name: Daniel Pinto    Age: 66 year, : 1955  MRN #: [id number], [facility]: [facility] 464D  Referring Physician: OSH transfer              Study Information:    EEG Recording Technique:  The patient underwent continuous Video-EEG monitoring, using Telemetry System hardware on the XLTek Digital System. EEG and video data were stored on a computer hard drive with important events saved in digital archive files. The material was reviewed by a physician (electroencephalographer / epileptologist) on a daily basis. Franko and seizure detection algorithms were utilized and reviewed. An EEG Technician attended to the patient, and was available throughout daytime work hours.  The epilepsy center neurologist was available in person or on call 24-hours per day.    EEG Placement and Labeling of Electrodes:  The EEG was performed utilizing 20 channel referential EEG connections (coronal over temporal over parasagittal montage) using all standard 10-20 electrode placements with EKG, with additional electrodes placed in the inferior temporal region using the modified 10-10 montage electrode placements for elective admissions, or if deemed necessary. Recording was at a sampling rate of 256 samples per second per channel. Time synchronized digital video recording was done simultaneously with EEG recording. A low light infrared camera was used for low light recording.     History:    Patient is a 65 yo Rt handed male PMH CLL on Venetoclax, MDS, melanoma, paroxysmal AFib (not on AC) presenting as transfer from [place] for event capture. Patient was initially at  [facility] on  with confusion, aphasia and R sided weakness. Patient was stroke code and was given s/p tPA. On admission, patient had CTH, CTA, CT perfusion during code stroke protocol with no acute findings. MRI Brain was  performed at   with no ischemic findings. EEG performed 9/15 showed diffuse L hemispheric slowing. Pt remained aphasic with AMS. +RUE spasticity was noted on exam. Patient was thus transferred to [facility] for EMU admission to evaluate for subclinical seizures.  Patient does not have hx of seizures.    Home Antiepileptic Medication and Device    none    Interpretation:  Day 3 – 	Start: 2021  08:00     	End: 2021  08:00  	Duration: 24h    Daily EEG Visual Analysis     FINDINGS:  The background was continuous, spontaneously variable and reactive. During wakefulness, the posterior dominant rhythm consisted of asymmetric 8-9 Hz activity at 30 uV, that was better formed over the right hemisphere.    Background Slowing:  No generalized background slowing was present.    Focal Slowing:   Intermittent polymorphic delta slowing over the left hemisphere.    Sleep Background:  Drowsiness was characterized by fragmentation, attenuation, and slowing of the background activity.    Sleep was characterized by the presence of vertex waves, asymmetric sleep spindles and K-complexes that appear attenuated over the left.    Other Non-Epileptiform Findings:  None were present.    Activation Procedures:   Hyperventilation was not performed.    Photic stimulation was not performed.    Interictal Epileptiform Activity:   None were present.    Events:  Multiple push button events for unclear reason. No additional associated EEG abnormalities recorded at those times.   No seizures recorded.    Artifacts:  Intermittent myogenic and movement artifacts were noted.    ECG:  The heart rate on single channel ECG was predominantly between 60-70 BPM.    AEDs:  none    EEG Summary:    Abnormal EEG in the awake, drowsy and asleep states.  - Intermittent polymorphic delta slowing in the left hemisphere    Impression/Clinical Correlate:    This is an abnormal EEG record. No epileptiform pattern or seizures were seen. There is evidence of a functional abnormality affecting the left hemisphere.         Luis Enrique Gamino MD  Neurology Attending Physician    Reading Room: [phone]  On Call Service After Hours: [phone]

## 2021-09-20 NOTE — PROGRESS NOTE ADULT - ASSESSMENT
65 y/o male with PMHx CLL followed by heme/onc, anemia, melanoma, osteopenia admitted for right facial droop, right hemiparesis and expressive aphasia.    Admitted to the ICU at [place] yesterday and is S/P TPA. He has now been transferred for evaluation of his seizures.    CVA?  - Patient with new right sided UE weakness, facial droop and expressive aphasia  - s/p tpa without improvement in symptoms  - 9/15 CT head without acute ICH  - 9/16 MRI neg for infarct  - neurology follow up.  s/p video eeg  - cont asa and statin    PAF  - Patient seen on a prior admission for syncope 7/2020 and found to have brief Afib  - Tele during this admission SR   - echocardiogram 9/15 unremarkable  - If no definitive CVA may consider deferring AC for now. Will need to fu with neuro.   - Continue to monitor    BP  - labile BP. high readings 2/2 agitation. cont to monitor for now. can use PRN hydralazine IV    CLL  - Heme/onc to evaluate    - Monitor and replete lytes, keep K>4, Mg>2.  - All other medical needs as per Neuro team  - Other cardiovascular workup will depend on clinical course.  - Will continue to follow.

## 2021-09-20 NOTE — PROGRESS NOTE ADULT - ASSESSMENT
65 y/o male with PMHx CLL (dx in 1/1991, on tx w/ venetoclax) followed by heme/onc, anemia, hx of melanoma, osteopenia admitted for right facial droop, right hemiparesis and expressive aphasia.  Admitted to the ICU at [place] prior to admission, s/p TPA, transferred to [facility] for evaluation of seizures. Hematology following given hx of CLL.    #CLL  #AMS, aphasia  -CBC w/ diff shows stable disease  -imaging reviewed; treated initially as code stroke at [place] on day of presentation, but MRI negative for CVA or structural lesion; performed without contrast, so unable to r/o leptomeningeal disease, but suspicion is low.  -patient's aphasia resolved over weekend, received IVIG x2 days for suspected autoimmune encephalitis but stopped by primary team, plan to switch to steroids. Patient now able to answer questions, oriented to self and time but not to situation or place; appears restless  -do not suspect patient's symptoms due to CLL treatment (currently on venetoclax x10 months; previously finished treatment with obinutuzumab). CLL increases risk of infection (has hx of recurrent sinus infections, on IVIG) so infectious etiology higher on ddx; CSF infectious PCR and gram stain negative, making it less likely. CESAR virus PCR pending to r/o PML, though suspicion is low based on MRI findings.  -autoimmune encephalitis panel pending (1-2 weeks processing time)  -Patient had recently gotten 3rd dose of COVID-19 vaccine, ?immune sequelae from vaccine? Less likely at this time, diagnosis of exclusion  -fibrinogen, ferrsitin, triglycerides all mildly elevated; soluble IL-2r pending; suspicion for HLH is low, but would follow up CSF studies to see if there is evidence of hemophagocytosis on cytopathology  -EEG abnormal w/ L hemisphere slowing; acute care per neurology team  -would continue to hold venetoclax for now  -no objections from hematology to start steroid therapy in setting of suspected autoimmune encephalitis; no evidence of systemic CLL progression or transformation to more aggressive lymphoma    Please call with any questions.    Aldair Henley MD, MPH  Hematology / Oncology Fellow, PGY7  p

## 2021-09-21 LAB
ANION GAP SERPL CALC-SCNC: 15 MMOL/L — SIGNIFICANT CHANGE UP (ref 5–17)
BUN SERPL-MCNC: 21 MG/DL — SIGNIFICANT CHANGE UP (ref 7–23)
CALCIUM SERPL-MCNC: 9.2 MG/DL — SIGNIFICANT CHANGE UP (ref 8.4–10.5)
CHLORIDE SERPL-SCNC: 102 MMOL/L — SIGNIFICANT CHANGE UP (ref 96–108)
CO2 SERPL-SCNC: 23 MMOL/L — SIGNIFICANT CHANGE UP (ref 22–31)
CREAT SERPL-MCNC: 0.89 MG/DL — SIGNIFICANT CHANGE UP (ref 0.5–1.3)
GLUCOSE SERPL-MCNC: 114 MG/DL — HIGH (ref 70–99)
HCT VFR BLD CALC: 36.9 % — LOW (ref 39–50)
HGB BLD-MCNC: 12.3 G/DL — LOW (ref 13–17)
IL2 SERPL-MCNC: 177.3 PG/ML — SIGNIFICANT CHANGE UP (ref 175.3–858.2)
MAGNESIUM SERPL-MCNC: 2.3 MG/DL — SIGNIFICANT CHANGE UP (ref 1.6–2.6)
MCHC RBC-ENTMCNC: 33.3 GM/DL — SIGNIFICANT CHANGE UP (ref 32–36)
MCHC RBC-ENTMCNC: 33.9 PG — SIGNIFICANT CHANGE UP (ref 27–34)
MCV RBC AUTO: 101.7 FL — HIGH (ref 80–100)
NRBC # BLD: 0 /100 WBCS — SIGNIFICANT CHANGE UP (ref 0–0)
PHOSPHATE SERPL-MCNC: 3 MG/DL — SIGNIFICANT CHANGE UP (ref 2.5–4.5)
PLATELET # BLD AUTO: 270 K/UL — SIGNIFICANT CHANGE UP (ref 150–400)
POTASSIUM SERPL-MCNC: 3.3 MMOL/L — LOW (ref 3.5–5.3)
POTASSIUM SERPL-SCNC: 3.3 MMOL/L — LOW (ref 3.5–5.3)
RBC # BLD: 3.63 M/UL — LOW (ref 4.2–5.8)
RBC # FLD: 14.6 % — HIGH (ref 10.3–14.5)
SODIUM SERPL-SCNC: 140 MMOL/L — SIGNIFICANT CHANGE UP (ref 135–145)
VDRL CSF-TITR: SIGNIFICANT CHANGE UP
WBC # BLD: 6.91 K/UL — SIGNIFICANT CHANGE UP (ref 3.8–10.5)
WBC # FLD AUTO: 6.91 K/UL — SIGNIFICANT CHANGE UP (ref 3.8–10.5)

## 2021-09-21 PROCEDURE — 99233 SBSQ HOSP IP/OBS HIGH 50: CPT

## 2021-09-21 PROCEDURE — 99222 1ST HOSP IP/OBS MODERATE 55: CPT

## 2021-09-21 PROCEDURE — 93010 ELECTROCARDIOGRAM REPORT: CPT

## 2021-09-21 PROCEDURE — 99232 SBSQ HOSP IP/OBS MODERATE 35: CPT

## 2021-09-21 RX ORDER — OLANZAPINE 15 MG/1
2.5 TABLET, FILM COATED ORAL
Refills: 0 | Status: DISCONTINUED | OUTPATIENT
Start: 2021-09-22 | End: 2021-09-22

## 2021-09-21 RX ORDER — PANTOPRAZOLE SODIUM 20 MG/1
40 TABLET, DELAYED RELEASE ORAL DAILY
Refills: 0 | Status: DISCONTINUED | OUTPATIENT
Start: 2021-09-21 | End: 2021-09-27

## 2021-09-21 RX ORDER — OLANZAPINE 15 MG/1
2.5 TABLET, FILM COATED ORAL EVERY 4 HOURS
Refills: 0 | Status: DISCONTINUED | OUTPATIENT
Start: 2021-09-21 | End: 2021-10-01

## 2021-09-21 RX ORDER — LANOLIN ALCOHOL/MO/W.PET/CERES
5 CREAM (GRAM) TOPICAL AT BEDTIME
Refills: 0 | Status: DISCONTINUED | OUTPATIENT
Start: 2021-09-21 | End: 2021-09-21

## 2021-09-21 RX ORDER — OLANZAPINE 15 MG/1
2.5 TABLET, FILM COATED ORAL ONCE
Refills: 0 | Status: COMPLETED | OUTPATIENT
Start: 2021-09-21 | End: 2021-09-21

## 2021-09-21 RX ORDER — POTASSIUM CHLORIDE 20 MEQ
40 PACKET (EA) ORAL ONCE
Refills: 0 | Status: DISCONTINUED | OUTPATIENT
Start: 2021-09-21 | End: 2021-09-22

## 2021-09-21 RX ORDER — LANOLIN ALCOHOL/MO/W.PET/CERES
10 CREAM (GRAM) TOPICAL AT BEDTIME
Refills: 0 | Status: DISCONTINUED | OUTPATIENT
Start: 2021-09-21 | End: 2021-10-01

## 2021-09-21 RX ORDER — QUETIAPINE FUMARATE 200 MG/1
12.5 TABLET, FILM COATED ORAL
Refills: 0 | Status: DISCONTINUED | OUTPATIENT
Start: 2021-09-21 | End: 2021-09-21

## 2021-09-21 RX ORDER — HALOPERIDOL DECANOATE 100 MG/ML
1 INJECTION INTRAMUSCULAR EVERY 6 HOURS
Refills: 0 | Status: DISCONTINUED | OUTPATIENT
Start: 2021-09-21 | End: 2021-09-22

## 2021-09-21 RX ORDER — OLANZAPINE 15 MG/1
2.5 TABLET, FILM COATED ORAL
Refills: 0 | Status: DISCONTINUED | OUTPATIENT
Start: 2021-09-21 | End: 2021-09-21

## 2021-09-21 RX ORDER — OLANZAPINE 15 MG/1
5 TABLET, FILM COATED ORAL AT BEDTIME
Refills: 0 | Status: DISCONTINUED | OUTPATIENT
Start: 2021-09-21 | End: 2021-09-23

## 2021-09-21 RX ADMIN — OLANZAPINE 2.5 MILLIGRAM(S): 15 TABLET, FILM COATED ORAL at 09:00

## 2021-09-21 RX ADMIN — OLANZAPINE 5 MILLIGRAM(S): 15 TABLET, FILM COATED ORAL at 21:41

## 2021-09-21 RX ADMIN — OLANZAPINE 2.5 MILLIGRAM(S): 15 TABLET, FILM COATED ORAL at 11:34

## 2021-09-21 RX ADMIN — OLANZAPINE 2.5 MILLIGRAM(S): 15 TABLET, FILM COATED ORAL at 15:05

## 2021-09-21 RX ADMIN — Medication 1 MILLIGRAM(S): at 11:58

## 2021-09-21 RX ADMIN — Medication 58 MILLIGRAM(S): at 10:53

## 2021-09-21 RX ADMIN — OLANZAPINE 2.5 MILLIGRAM(S): 15 TABLET, FILM COATED ORAL at 20:58

## 2021-09-21 RX ADMIN — Medication 10 MILLIGRAM(S): at 21:41

## 2021-09-21 RX ADMIN — Medication 81 MILLIGRAM(S): at 11:55

## 2021-09-21 NOTE — BH CONSULTATION LIAISON ASSESSMENT NOTE - NSBHCONSULTRECOMMENDOTHER_PSY_A_CORE FT
- start standing zyprexa 2.5mg in AM, 5mg in PM  - increase melatonin to 5mg @ bedtime  - DC seroquel 12.5mg BID - start standing zyprexa PO 2.5mg in AM, 5mg in PM  - increase melatonin to 5mg @ bedtime  - DC seroquel 12.5mg BID

## 2021-09-21 NOTE — BH CONSULTATION LIAISON ASSESSMENT NOTE - NSBHCHARTREVIEWINVESTIGATE_PSY_A_CORE FT
Ventricular Rate 96 BPM    Atrial Rate 96 BPM    P-R Interval 144 ms    QRS Duration 98 ms    Q-T Interval 370 ms    QTC Calculation(Bazett) 467 ms    P Axis 81 degrees    R Axis 49 degrees    T Axis 59 degrees    Diagnosis Line NORMAL SINUS RHYTHM  NORMAL ECG  WHEN COMPARED WITH ECG OF `9/20/21 21:02  NO SIGNIFICANT CHANGE WAS FOUND  Confirmed by NAZANIN HAYS MD ([id number]) on 9/21/2021 11:22:33 AM

## 2021-09-21 NOTE — BH CONSULTATION LIAISON ASSESSMENT NOTE - CASE SUMMARY
67 yo male w/ PMH of CLL on Venetoclax, MDS, melanoma, paroxysmal AFib (not on AC) presenting as transfer from [place] for event capture. Patient was initially at  [facility] on 9/14 with confusion, aphasia and R sided weakness. Patient was stroke code and was given s/p tPA. On admission, patient had CTH, CTA, CT perfusion during code stroke protocol with no acute findings. MRI Brain was  performed at  9/16 with no ischemic findings. EEG performed 9/15 showed diffuse L hemispheric slowing. Pt remained aphasic with AMS. +RUE spasticity was noted on exam. Patient was thus transferred to [facility] for EMU admission to evaluate for subclinical seizures. Psychiatry was consulted for acute onset of psychosis over the weekend. Pt's LP completed- Glucose 73, Protein 55, WBC 3, and cultures were negative. Pt started on IVIG 9/17, discontinued IVIG, received 3 days, no improvement in symptoms. Patient given steroids today, but according to primary team appears to have made pt more hyperactive. Pt required several PRNs yesterday for agitation (hitting himself, trying to move around).  On assessment, pt laying in bed w/ wife at bedside. Pt is AAOx2 (knows name, what floor he is on, and year, does not know month, that he is in a hospital, or why he is here, unable to explain recent events,confused, disorganized behavior, restless. rec d/c seroquel, start zyprexa 2.5mg am, 5mg po qhs, zyprexa prn, or haldol iv prn. inc melatonin. cont 1;1

## 2021-09-21 NOTE — BH CONSULTATION LIAISON ASSESSMENT NOTE - ORIENTATION
knows name, what floor he is on, and year  does not know month, that he is in a hospital, or why he is here

## 2021-09-21 NOTE — PROGRESS NOTE ADULT - ASSESSMENT
ASSESSMENT: Patient is a 65 yo Rt handed male PMH CLL on Venetoclax, MDS, melanoma, paroxysmal AFib (not on AC) presenting as transfer from [place] for event capture. Patient was initially at  [facility] on 9/14 with confusion, aphasia and R sided weakness. With Brain MRI negative. EEG note to show discharges from Left hemisphere with RUE spasticity There is concern for subclinical seizures thus transferred to [facility] for event capture. Cardiology consulted.  Heme/onc to follow this AM and will followup on recommendations on heme/onc medications.  Patient was noted to have episodes agitation with episodes of somnolence with out medication needed. Patient had LP this afternoon and will follow up on CSF results. Cardiology states to hold off on AC as there is no definitive CVA noted. Continue with ASA and statin. Improved s/p 1 dose of IVIG. Still with some mild cognitive delay and now with visual hallucinations. Poss related to ativan, will dc. Wife notes that patient gets IVIG treatment every month as part of his CLL treatment.     9/20: Neuro exam significant for continued agitation and restlessness requiring 1:1, oriented to self and time but not place or situation.    Impression: RUE spasticity with AMS transferred for event capture     PLAN:     AMS/AIE  - D/C vEEG  - Discontinued IVIG, received 3 days, no improvement in symptoms thus far  - 1:1 Constant Observations for safety  - Started Seroquel 12.5 mg BID, if needs to remain NPO will change to Zyprexa 2.5 mg IM BID and will have Zyprexa 2.5 mg IM PRN for breakthrough agitation.  - LP completed- Glucose 73, Protein 55, WBC 3, Gram stain no growth to date, HSV neg, Cryptococcal neg; f/u rest of results  - No MR brain w/wo contrast at this time  - Visual hallucinations, concerning due to similar presentation of retinal detachment, Optho consulted   - No seizures on EEG, continuing to hold AEDs  - Rescue: 1st-Ativan 1mg, 2nd-Vimpat 100mg IV if GTC refractory to ativan  - Medicine consult for possible transfer to medicine service    CLL  - Heme/onc to continue to follow on recommendations. Follows Dr. Godinez and  Dr. Valenzuela as an outpatient.   - Will discuss with Heme/Onc regarding ongoing CLL treatment.    PAfib   - Cardiology consulted- choosing to defer AC at this time  - Continue tele monitoring  - 12 lead EKG    Dietary  - Speech and Swallow Re-eval: Recommend NPO at this time due to concern for aspiration, if wife wants patient to eat can consider Dysphagia 1 diet with honey thickened liquids- wife willing to assume risk of aspiration with meals and patient started on recommended diet with aspiration precautions and supervision by staff of all meals.    DVT ppx: Lovenox subq   Dispo: Pending   ASSESSMENT: Patient is a 67 yo Rt handed male PMH CLL on Venetoclax, MDS, melanoma, paroxysmal AFib (not on AC) presenting as transfer from [place] for event capture. Patient was initially at  [facility] on 9/14 with confusion, aphasia and R sided weakness. With Brain MRI negative. EEG note to show discharges from Left hemisphere with RUE spasticity There is concern for subclinical seizures thus transferred to [facility] for event capture. Cardiology consulted.  Heme/onc to follow this AM and will followup on recommendations on heme/onc medications.  Patient was noted to have episodes agitation with episodes of somnolence with out medication needed. Patient had LP this afternoon and will follow up on CSF results. Cardiology states to hold off on AC as there is no definitive CVA noted. Continue with ASA and statin. Improved s/p 1 dose of IVIG. Still with some mild cognitive delay and now with visual hallucinations. Poss related to ativan, will dc. Wife notes that patient gets IVIG treatment every month as part of his CLL treatment.     9/21: Neuro exam significant for continued agitation and restlessness requiring 1:1, oriented to self and time but not place or situation. Patient continues to hallucinate seeing bugs in the room and has severely impaired attention.     Impression: RUE spasticity and aphasia concerning for seizures, EEG negative for seizures. Patient acutely psychotic with left hemispheric slowing concerning for AIE in the setting of CLL.      PLAN:     AMS/AIE  - D/C vEEG  - Discontinued IVIG, received 3 days, no improvement in symptoms thus far  - Started IV Steroids Solu-medrol 1000 mg IV x 3 days. Cleared by heme/onc from an oncology standpoint to start steroids.  - 1:1 Constant Observations for safety  - Seroquel discontinued due to prolonged QTc 467. Placed on Zyprexa 2.5 mg IM BID with PRN Zyprexa 2.5 mg IM for breakthrough agitation.  - Psych consulted for recommendations to treat acute psychosis and hallucinations, will f/u recommendations  - LP completed- Glucose 73, Protein 55, WBC 3, Gram stain no growth to date, HSV neg, Cryptococcal neg; f/u rest of results  - No MR brain w/wo contrast at this time  - Visual hallucinations, concerning due to similar presentation of retinal detachment, Optho consulted   - No seizures on EEG, continuing to hold AEDs  - Rescue: 1st-Ativan 1mg, 2nd-Vimpat 100mg IV if GTC refractory to ativan    CLL  - Heme/onc to continue to follow and recommendations appreciated.   - Will discuss with Heme/Onc regarding ongoing CLL treatment- no inpatient treatment at this time.  - Follows Dr. Godinez and  Dr. Valenzuela outpatient.     PAfib  - Cardiology consulted- choosing to defer AC at this time  - Continue tele monitoring  - 12 lead EKG    Dietary  - Speech and Swallow Re-eval: Recommend NPO at this time due to concern for aspiration, if wife wants patient to eat can consider Dysphagia 1 diet with honey thickened liquids- wife willing to assume risk of aspiration with meals and patient started on recommended diet with aspiration precautions and supervision by staff of all meals.    DVT ppx: Lovenox subq   Dispo: Pending

## 2021-09-21 NOTE — PROGRESS NOTE ADULT - SUBJECTIVE AND OBJECTIVE BOX
THE PATIENT WAS SEEN AND EXAMINED BY ME WITH THE HOUSESTAFF DURING MORNING ROUNDS.   HPI:  MRN-[id number]    HPI:  Patient is a 67 yo Rt handed male PMH CLL on Venetoclax, MDS, melanoma, paroxysmal AFib (not on AC) presenting as transfer from [place] for event capture. Patient was initially at  [facility] on 9/14 with confusion, aphasia and R sided weakness. Patient was stroke code and was given s/p tPA. On admission, patient had CTH, CTA, CT perfusion during code stroke protocol with no acute findings. MRI Brain was  performed at  9/16 with no ischemic findings. EEG performed 9/15 showed diffuse L hemispheric slowing. Pt remained aphasic with AMS. +RUE spasticity was noted on exam. Patient was thus transferred to [facility] for EMU admission to evaluate for subclinical seizures.  Of note, patient does not have hx of seizures.      ROS: Otherwise negative.     SUBJECTIVE: Restless, agitated and climbing out of bed overnight. Patient hallucinating and seeing bugs in the room and on his food.      acetaminophen   Tablet .. 650 milliGRAM(s) Oral every 6 hours PRN  aspirin  chewable 81 milliGRAM(s) Oral daily  atorvastatin 80 milliGRAM(s) Oral at bedtime  diphenhydrAMINE 25 milliGRAM(s) Oral every 4 hours PRN  enoxaparin Injectable 40 milliGRAM(s) SubCutaneous daily  folic acid 1 milliGRAM(s) Oral daily  lacosamide IVPB 100 milliGRAM(s) IV Intermittent once PRN  LORazepam   Injectable 1 milliGRAM(s) IV Push once PRN  melatonin 3 milliGRAM(s) Oral at bedtime  OLANZapine Injectable 2.5 milliGRAM(s) IntraMuscular every 6 hours PRN  potassium chloride   Powder 40 milliEquivalent(s) Oral once  QUEtiapine 12.5 milliGRAM(s) Oral <User Schedule>      Physical Exam: Neurological Exam:  	Mental Status: Eyes open, oriented to person, place, year but not month or situation, states "he was fainting at home". Attention severely impaired requiring continued prompting to remain on task. Can name 1/3 objects (for watch he said clock). Unable to spell WORLD forwards or backwards. On 5 minute word recall 1/3 correct, was able to say red and car with choice. No dysarthria, aphasia or neglect.  	Cranial Nerves: PERRL, EOMI, no nystagmus or diplopia. No facial asymmetry.  Hearing intact to finger rub bilaterally.  Tongue, uvula and palate midline.  Sternocleidomastoid and Trapezius intact bilaterally  	Motor: moving all 4 extremities equally at least antigravity  	Tone: normal.                  	Pronator drift: none                 	Dysmetria: None to finger-nose-finger  	No truncal ataxia.    	Tremor: No resting, postural or action tremor.  No myoclonus.  	Sensation: intact to light touch    LABS:                        12.3   6.91  )-----------( 270      ( 21 Sep 2021 05:48 )             36.9    09-21    140  |  102  |  21  ----------------------------<  114<H>  3.3<L>   |  23  |  0.89    Ca    9.2      21 Sep 2021 05:48  Phos  3.0     09-21  Mg     2.3     09-21         COVID-19 PCR: NotDetec (14 Sep 2021 18:47)      IMAGING:     MRI  CTH  EEG     THE PATIENT WAS SEEN AND EXAMINED BY ME WITH THE HOUSESTAFF DURING MORNING ROUNDS.   HPI:  [id number]    HPI:  Patient is a 67 yo Rt handed male PMH CLL on Venetoclax, MDS, melanoma, paroxysmal AFib (not on AC) presenting as transfer from [place] for event capture. Patient was initially at  [facility] on 9/14 with confusion, aphasia and R sided weakness. Patient was stroke code and was given s/p tPA. On admission, patient had CTH, CTA, CT perfusion during code stroke protocol with no acute findings. MRI Brain was  performed at  9/16 with no ischemic findings. EEG performed 9/15 showed diffuse L hemispheric slowing. Pt remained aphasic with AMS. +RUE spasticity was noted on exam. Patient was thus transferred to [facility] for EMU admission to evaluate for subclinical seizures.  Of note, patient does not have hx of seizures.      ROS: Otherwise negative.     SUBJECTIVE: Restless, agitated and climbing out of bed overnight. Patient hallucinating and seeing bugs in the room and on his food.      acetaminophen   Tablet .. 650 milliGRAM(s) Oral every 6 hours PRN  aspirin  chewable 81 milliGRAM(s) Oral daily  atorvastatin 80 milliGRAM(s) Oral at bedtime  diphenhydrAMINE 25 milliGRAM(s) Oral every 4 hours PRN  enoxaparin Injectable 40 milliGRAM(s) SubCutaneous daily  folic acid 1 milliGRAM(s) Oral daily  lacosamide IVPB 100 milliGRAM(s) IV Intermittent once PRN  LORazepam   Injectable 1 milliGRAM(s) IV Push once PRN  melatonin 3 milliGRAM(s) Oral at bedtime  OLANZapine Injectable 2.5 milliGRAM(s) IntraMuscular every 6 hours PRN  potassium chloride   Powder 40 milliEquivalent(s) Oral once  QUEtiapine 12.5 milliGRAM(s) Oral <User Schedule>      Physical Exam: Neurological Exam:  	Mental Status: Eyes open, oriented to person, place, year but not month or situation, states "he was fainting at home". Attention severely impaired requiring continued prompting to remain on task. Can name 1/3 objects (for watch he said clock). Unable to spell WORLD forwards or backwards. On 5 minute word recall 1/3 correct, was able to say red and car with choice. No dysarthria, aphasia or neglect.  	Cranial Nerves: PERRL, EOMI, no nystagmus or diplopia. No facial asymmetry.  Hearing intact to finger rub bilaterally.  Tongue, uvula and palate midline.  Sternocleidomastoid and Trapezius intact bilaterally  	Motor: moving all 4 extremities equally at least antigravity  	Tone: normal.                  	Pronator drift: none                 	Dysmetria: None to finger-nose-finger  	No truncal ataxia.    	Tremor: No resting, postural or action tremor.  No myoclonus.  	Sensation: intact to light touch    LABS:                        12.3   6.91  )-----------( 270      ( 21 Sep 2021 05:48 )             36.9    09-21    140  |  102  |  21  ----------------------------<  114<H>  3.3<L>   |  23  |  0.89    Ca    9.2      21 Sep 2021 05:48  Phos  3.0     09-21  Mg     2.3     09-21         COVID-19 PCR: NotDetec (14 Sep 2021 18:47)      IMAGING:   MRI 9.16.21  IMPRESSION: No acute territorial infarcts seen.    EEG 9.19.21  EEG Summary:    Abnormal EEG in the awake, drowsy and asleep states.  - Intermittent polymorphic delta slowing in the left hemisphere    Impression/Clinical Correlate:    This is an abnormal EEG record. No epileptiform pattern or seizures were seen. There is evidence for transient and reversible cause of functional abnormality affecting the left hemisphere.     EEG 9.18.21  EEG Summary:    Abnormal EEG in the awake, drowsy and asleep states.  - Continuous polymorphic delta slowing in the left hemisphere, slowly transitioning to intermittent slowing in the latter half of the recording recording.    Impression/Clinical Correlate:    This is an abnormal EEG record. No epileptiform pattern or seizures were seen. There is evidence for transient and reversible cause of functional abnormality affecting the left

## 2021-09-21 NOTE — PROGRESS NOTE ADULT - ASSESSMENT
67 y/o male with PMHx CLL followed by heme/onc, anemia, melanoma, osteopenia admitted for right facial droop, right hemiparesis and expressive aphasia.    Admitted to the ICU at [place] yesterday and is S/P TPA. He has now been transferred for evaluation of his seizures and evaluation of seizures    CVA? / AMS  - Patient with new right sided UE weakness, facial droop and expressive aphasia at   - s/p tpa without improvement in symptoms  - 9/15 CT head without acute ICH  - 9/16 MRI neg for infarct  - neurology follow up.  s/p video eeg  - cont asa and statin    PAF  - Patient seen on a prior admission for syncope 7/2020 and found to have brief Afib  - Tele during this admission SR   - echocardiogram 9/15 unremarkable  - If no definitive CVA may consider deferring AC for now. Will need to fu with neuro.   - Continue to monitor  - continue aspirin    BP  - labile BP. high readings 2/2 agitation. cont to monitor for now. can use PRN hydralazine IV    CLL  - follow up heme/onc    - Monitor and replete lytes, keep K>4, Mg>2.  - All other medical needs as per Neuro team  - Other cardiovascular workup will depend on clinical course.  - Will continue to follow.

## 2021-09-21 NOTE — BH CONSULTATION LIAISON ASSESSMENT NOTE - NSBHCHARTREVIEWVS_PSY_A_CORE FT
Vital Signs Last 24 Hrs  T(C): 37.2 (21 Sep 2021 07:43), Max: 37.2 (21 Sep 2021 07:43)  T(F): 99 (21 Sep 2021 07:43), Max: 99 (21 Sep 2021 07:43)  HR: 101 (21 Sep 2021 07:43) (92 - 107)  BP: 166/90 (21 Sep 2021 07:43) (153/79 - 177/82)  BP(mean): --  RR: 20 (21 Sep 2021 07:43) (18 - 20)  SpO2: 98% (21 Sep 2021 04:30) (98% - 98%)

## 2021-09-21 NOTE — BH CONSULTATION LIAISON ASSESSMENT NOTE - HPI (INCLUDE ILLNESS QUALITY, SEVERITY, DURATION, TIMING, CONTEXT, MODIFYING FACTORS, ASSOCIATED SIGNS AND SYMPTOMS)
Daniel Pinto is a 67 yo male w/ PMH of CLL on Venetoclax, MDS, melanoma, paroxysmal AFib (not on AC) presenting as transfer from [place] for event capture. Patient was initially at  [facility] on 9/14 with confusion, aphasia and R sided weakness. Patient was stroke code and was given s/p tPA. On admission, patient had CTH, CTA, CT perfusion during code stroke protocol with no acute findings. MRI Brain was  performed at  9/16 with no ischemic findings. EEG performed 9/15 showed diffuse L hemispheric slowing. Pt remained aphasic with AMS. +RUE spasticity was noted on exam. Patient was thus transferred to [facility] for EMU admission to evaluate for subclinical seizures. Psychiatry was consulted for acute onset of psychosis over the weekend. Pt's LP completed- Glucose 73, Protein 55, WBC 3, and cultures were negative. Pt started on IVIG 9/17, discontinued IVIG, received 3 days, no improvement in symptoms. Patient given steroids today, but according to primary team appears to have made pt more hyperactive. Pt required several PRNs yesterday for agitation (hitting himself, trying to move around).  On assessment, pt laying in bed w/ wife at bedside. Pt is AAOx2 (knows name, what floor he is on, and year, does not know month, that he is in a hospital, or why he is here). Pt reports he is feeling "ok". Pt unable to answer questions as answers in confusing long sentences that do not make sense.    As per wife:  Prior to admission pt was doing great, was completely independent having no problems at home. Reports pt has never seen or needed a psychiatrist in the past. States on Friday pt was nonverbal other than saying "hi", and then on Sunday pt woke up and told her that they needed to make the car payment at the end of the month and she became hopeful he was returning to baseline, but pt acutely decompensated over the day and started reporting visual and auditory hallucinations. Pt told wife there was ants on the ceiling and in his hair. Pt also had severe paranoia stating that people from [place] were going to come and kill him. Wife reports today pt still having hallucinations but also talking a lot of "gibberish about work" that didn't make sense. Wife reports  is also responding to internal stimuli and speaks to himself but is unsure of command hallucinations. Reports  has not slept in three days and is really worried for him.

## 2021-09-21 NOTE — CONSULT NOTE ADULT - SUBJECTIVE AND OBJECTIVE BOX
Patient is a 66y old  Male who presents with a chief complaint of CNS symptoms (17 Sep 2021 14:04)    Admission HPI:  Patient is a 65 yo Rt handed male PMH CLL on Venetoclax, MDS, melanoma, paroxysmal AFib (not on AC) presenting as transfer from [place] for event capture. Patient was initially at  [facility] on 9/14 with confusion, aphasia and R sided weakness. Patient was stroke code and was given s/p tPA. On admission, patient had CTH, CTA, CT perfusion during code stroke protocol with no acute findings. MRI Brain was  performed at  9/16 with no ischemic findings. EEG performed 9/15 showed diffuse L hemispheric slowing. Pt remained aphasic with AMS. +RUE spasticity was noted on exam. Patient was thus transferred to [facility] for EMU admission to evaluate for subclinical seizures.  Of note, patient does not have hx of seizures.    Interval History:  Patient diagnosed with autoimmune encephalitis- treated with IVIG.  Followed by heme-onc for CLL.  Most recent EEG:  This is an abnormal EEG record. No epileptiform pattern or seizures were seen. There is evidence of a functional abnormality affecting the left hemisphere.     REVIEW OF SYSTEMS: No chest pain, shortness of breath, nausea, vomiting or diarhea; other ROS neg     PAST MEDICAL & SURGICAL HISTORY  Osteoporosis    Osteopenia    CLL (Chronic Lymphocytic Leukemia)    CLL (Chronic Lymphocytic Leukemia)    Avascular Necrosis of Femur Head    TMJ Syndrome    Herniated Cervical Disc    Bulging Disc    Melanoma    Deviated nasal septum    Nasal congestion    Rosacea    Deviated nasal septum    Other specified disorders of nose and nasal sinuses    Anemia    S/P Splenectomy    Avascular Necrosis of Femur Head    Avascular Necrosis of Femur Head    Abnormal Jaw Closure    Status Post Eye Surgery X 3    S/P Tonsillectomy    Bilateral cataracts    FUNCTIONAL HISTORY:   Lives w family in home w 3 QAMAR.  PTA Independent    CURRENT FUNCTIONAL STATUS:  CG transfers, min A gait    FAMILY HISTORY   Family history of multiple myeloma    Family history of malignant melanoma    FH: blood dyscrasia    FH: hypertension    FH: renal failure    MEDICATIONS   acetaminophen   Tablet .. 650 milliGRAM(s) Oral every 6 hours PRN  aspirin  chewable 81 milliGRAM(s) Oral daily  atorvastatin 80 milliGRAM(s) Oral at bedtime  diphenhydrAMINE 25 milliGRAM(s) Oral every 4 hours PRN  enoxaparin Injectable 40 milliGRAM(s) SubCutaneous daily  folic acid 1 milliGRAM(s) Oral daily  lacosamide IVPB 100 milliGRAM(s) IV Intermittent once PRN  LORazepam   Injectable 1 milliGRAM(s) IV Push once PRN  melatonin 3 milliGRAM(s) Oral at bedtime  methylPREDNISolone sodium succinate IVPB 1000 milliGRAM(s) IV Intermittent daily  OLANZapine Injectable 2.5 milliGRAM(s) IntraMuscular every 6 hours PRN  pantoprazole  Injectable 40 milliGRAM(s) IV Push daily  potassium chloride   Powder 40 milliEquivalent(s) Oral once  QUEtiapine 12.5 milliGRAM(s) Oral <User Schedule>    ALLERGIES  No Known Allergies    VITALS  T(C): 37.2 (09-21-21 @ 07:43), Max: 37.2 (09-21-21 @ 07:43)  HR: 101 (09-21-21 @ 07:43) (92 - 107)  BP: 166/90 (09-21-21 @ 07:43) (153/79 - 177/82)  RR: 20 (09-21-21 @ 07:43) (18 - 20)  SpO2: 98% (09-21-21 @ 04:30) (98% - 98%)  Wt(kg): --    PHYSICAL EXAM  Constitutional - NAD, Comfortable  HEENT - NCAT, EOMI  Neck - Supple, No limited ROM  Chest - CTA bilaterally, No wheeze, No rhonchi, No crackles  Cardiovascular - Tachy, S1S2, No murmurs  Abdomen - BS+, Soft, NTND  Extremities - No C/C/E, No calf tenderness   Neurologic Exam -                    Cognitive - Awake, Alert, AAO to self, place, date, year, situation     Communication - Fluent, No dysarthria, no aphasia     Cranial Nerves - CN 2-12 intact     Motor - No focal deficits      Sensory - Intact to LT     Reflexes - DTR Intact, No primitive reflexive  Psychiatric - Mood stable, Affect WNL    RECENT LABS/IMAGING  CBC Full  -  ( 21 Sep 2021 05:48 )  WBC Count : 6.91 K/uL  RBC Count : 3.63 M/uL  Hemoglobin : 12.3 g/dL  Hematocrit : 36.9 %  Platelet Count - Automated : 270 K/uL  Mean Cell Volume : 101.7 fl  Mean Cell Hemoglobin : 33.9 pg  Mean Cell Hemoglobin Concentration : 33.3 gm/dL  Auto Neutrophil # : x  Auto Lymphocyte # : x  Auto Monocyte # : x  Auto Eosinophil # : x  Auto Basophil # : x  Auto Neutrophil % : x  Auto Lymphocyte % : x  Auto Monocyte % : x  Auto Eosinophil % : x  Auto Basophil % : x    09-21    140  |  102  |  21  ----------------------------<  114<H>  3.3<L>   |  23  |  0.89    Ca    9.2      21 Sep 2021 05:48  Phos  3.0     09-21  Mg     2.3     09-21    Impression:  65 yo with functional deficits secondary to diagnosis of autoimmune encephalitis    Plan:  PT- ROM, Bed Mob, Transfers, Amb w AD and bracing as needed  OT- ADLs, bracing  SLP- Dysphagia eval and treat  Prec- Falls, Cardiac, seizure  Monitor BP and K+  DVT Prophylaxis- Lovenox  Skin- Turn q2 h  Dispo-  Patient is a 66y old  Male who presents with a chief complaint of CNS symptoms (17 Sep 2021 14:04)    Admission HPI:  Patient is a 65 yo Rt handed male PMH CLL on Venetoclax, MDS, melanoma, paroxysmal AFib (not on AC) presenting as transfer from [place] for event capture. Patient was initially at  [facility] on 9/14 with confusion, aphasia and R sided weakness. Patient was stroke code and was given s/p tPA. On admission, patient had CTH, CTA, CT perfusion during code stroke protocol with no acute findings. MRI Brain was  performed at  9/16 with no ischemic findings. EEG performed 9/15 showed diffuse L hemispheric slowing. Pt remained aphasic with AMS. +RUE spasticity was noted on exam. Patient was thus transferred to [facility] for EMU admission to evaluate for subclinical seizures.  Of note, patient does not have hx of seizures.    Interval History:  Patient diagnosed with autoimmune encephalitis- treated with IVIG.  Followed by heme-onc for CLL.  Most recent EEG:  This is an abnormal EEG record. No epileptiform pattern or seizures were seen. There is evidence of a functional abnormality affecting the left hemisphere.     Wife at bedside    REVIEW OF SYSTEMS: Difficult to attain due to mental status.     PAST MEDICAL & SURGICAL HISTORY  Osteoporosis    Osteopenia    CLL (Chronic Lymphocytic Leukemia)    CLL (Chronic Lymphocytic Leukemia)    Avascular Necrosis of Femur Head    TMJ Syndrome    Herniated Cervical Disc    Bulging Disc    Melanoma    Deviated nasal septum    Nasal congestion    Rosacea    Deviated nasal septum    Other specified disorders of nose and nasal sinuses    Anemia    S/P Splenectomy    Avascular Necrosis of Femur Head    Avascular Necrosis of Femur Head    Abnormal Jaw Closure    Status Post Eye Surgery X 3    S/P Tonsillectomy    Bilateral cataracts    FUNCTIONAL HISTORY:   Lives w family in home w 3 QAMAR.  PTA Independent    CURRENT FUNCTIONAL STATUS:  CG transfers, min A gait    FAMILY HISTORY   Family history of multiple myeloma    Family history of malignant melanoma    FH: blood dyscrasia    FH: hypertension    FH: renal failure    MEDICATIONS   acetaminophen   Tablet .. 650 milliGRAM(s) Oral every 6 hours PRN  aspirin  chewable 81 milliGRAM(s) Oral daily  atorvastatin 80 milliGRAM(s) Oral at bedtime  diphenhydrAMINE 25 milliGRAM(s) Oral every 4 hours PRN  enoxaparin Injectable 40 milliGRAM(s) SubCutaneous daily  folic acid 1 milliGRAM(s) Oral daily  lacosamide IVPB 100 milliGRAM(s) IV Intermittent once PRN  LORazepam   Injectable 1 milliGRAM(s) IV Push once PRN  melatonin 3 milliGRAM(s) Oral at bedtime  methylPREDNISolone sodium succinate IVPB 1000 milliGRAM(s) IV Intermittent daily  OLANZapine Injectable 2.5 milliGRAM(s) IntraMuscular every 6 hours PRN  pantoprazole  Injectable 40 milliGRAM(s) IV Push daily  potassium chloride   Powder 40 milliEquivalent(s) Oral once  QUEtiapine 12.5 milliGRAM(s) Oral <User Schedule>    ALLERGIES  No Known Allergies    VITALS  T(C): 37.2 (09-21-21 @ 07:43), Max: 37.2 (09-21-21 @ 07:43)  HR: 101 (09-21-21 @ 07:43) (92 - 107)  BP: 166/90 (09-21-21 @ 07:43) (153/79 - 177/82)  RR: 20 (09-21-21 @ 07:43) (18 - 20)  SpO2: 98% (09-21-21 @ 04:30) (98% - 98%)  Wt(kg): --    PHYSICAL EXAM  Constitutional - NAD, Comfortable  HEENT - NCAT, EOMI  Neck - Supple, No limited ROM  Chest - CTA bilaterally, No wheeze, No rhonchi, No crackles  Cardiovascular - Tachy, S1S2, No murmurs  Abdomen - BS+, Soft, NTND  Extremities - No C/C/E, No calf tenderness   Neurologic Exam -                 Sitting in chair- appearing to have visual hallucinations- reaching in the air and the floor  Can be redirected briefly  Follows most instructions  Motor 5/5 UE and LEs  AAO x 2-3 w redirection     Psychiatric - Becomes restless at times    RECENT LABS/IMAGING  CBC Full  -  ( 21 Sep 2021 05:48 )  WBC Count : 6.91 K/uL  RBC Count : 3.63 M/uL  Hemoglobin : 12.3 g/dL  Hematocrit : 36.9 %  Platelet Count - Automated : 270 K/uL  Mean Cell Volume : 101.7 fl  Mean Cell Hemoglobin : 33.9 pg  Mean Cell Hemoglobin Concentration : 33.3 gm/dL  Auto Neutrophil # : x  Auto Lymphocyte # : x  Auto Monocyte # : x  Auto Eosinophil # : x  Auto Basophil # : x  Auto Neutrophil % : x  Auto Lymphocyte % : x  Auto Monocyte % : x  Auto Eosinophil % : x  Auto Basophil % : x    09-21    140  |  102  |  21  ----------------------------<  114<H>  3.3<L>   |  23  |  0.89    Ca    9.2      21 Sep 2021 05:48  Phos  3.0     09-21  Mg     2.3     09-21    Impression:  65 yo with functional deficits secondary to diagnosis of autoimmune encephalitis    Plan:  PT- ROM, Bed Mob, Transfers, Amb w AD and bracing as needed  OT- ADLs, bracing  SLP- Dysphagia eval and treat  Prec- Falls, Cardiac, seizure  Monitor BP and K+  Continue seroquel for restlessness, consider titrating up.   DVT Prophylaxis- Lovenox  Skin- Turn q2 h  Dispo- When stable Acute Rehab- can tolerate 3h/d of therapies and requires daily physician visits. D/W rehab options with patient's wife.

## 2021-09-21 NOTE — BH CONSULTATION LIAISON ASSESSMENT NOTE - SUMMARY
Daniel Pinto is a 65 yo male w/ PMH of CLL on Venetoclax, MDS, melanoma, paroxysmal AFib (not on AC) presenting as transfer from [place] for event capture. Patient was initially at  [facility] on 9/14 with confusion, aphasia and R sided weakness. Patient was stroke code and was given s/p tPA. On admission, patient had CTH, CTA, CT perfusion during code stroke protocol with no acute findings. MRI Brain was  performed at  9/16 with no ischemic findings. EEG performed 9/15 showed diffuse L hemispheric slowing. Pt remained aphasic with AMS. +RUE spasticity was noted on exam. Patient was thus transferred to [facility] for EMU admission to evaluate for subclinical seizures. Psychiatry was consulted for acute onset of psychosis over the weekend. Pt's LP completed- Glucose 73, Protein 55, WBC 3, and cultures were negative. Pt started on IVIG 9/17, discontinued IVIG, received 3 days, no improvement in symptoms. Patient given steroids today, but according to primary team appears to have made pt more hyperactive. Pt required several PRNs yesterday for agitation (hitting himself, trying to move around). Pt is AAOx2 (knows name, what floor he is on, and year, does not know month, that he is in a hospital, or why he is here). Pt unable to answer questions as answers in confusing long sentences that do not make sense. Pt has never seen or needed a psychiatrist in the past. On Sunday, pt acutely decompensated over the day and started reporting visual and auditory hallucinations. Pt told wife there was ants on the ceiling and in his hair. Pt also had severe paranoia stating that people from [place] were going to come and kill him. Wife reports today pt still having hallucinations but also talking a lot of "gibberish about work" that didn't make sense. Wife reports  is also responding to internal stimuli and speaks to himself but is unsure of command hallucinations. Reports  has not slept in three days and is really worried for him.

## 2021-09-21 NOTE — BH CONSULTATION LIAISON ASSESSMENT NOTE - CURRENT MEDICATION
MEDICATIONS  (STANDING):  aspirin  chewable 81 milliGRAM(s) Oral daily  atorvastatin 80 milliGRAM(s) Oral at bedtime  enoxaparin Injectable 40 milliGRAM(s) SubCutaneous daily  folic acid 1 milliGRAM(s) Oral daily  melatonin 3 milliGRAM(s) Oral at bedtime  methylPREDNISolone sodium succinate IVPB 1000 milliGRAM(s) IV Intermittent daily  pantoprazole  Injectable 40 milliGRAM(s) IV Push daily  potassium chloride   Powder 40 milliEquivalent(s) Oral once  QUEtiapine 12.5 milliGRAM(s) Oral <User Schedule>    MEDICATIONS  (PRN):  acetaminophen   Tablet .. 650 milliGRAM(s) Oral every 6 hours PRN Mild Pain (1 - 3), Moderate Pain (4 - 6), Severe Pain (7 - 10)  diphenhydrAMINE 25 milliGRAM(s) Oral every 4 hours PRN Rash and/or Itching  lacosamide IVPB 100 milliGRAM(s) IV Intermittent once PRN Seizure  LORazepam   Injectable 1 milliGRAM(s) IV Push once PRN Seizure  OLANZapine Injectable 2.5 milliGRAM(s) IntraMuscular every 6 hours PRN Agitation

## 2021-09-21 NOTE — BH CONSULTATION LIAISON ASSESSMENT NOTE - NSBHCHARTREVIEWLAB_PSY_A_CORE FT
12.3   6.91  )-----------( 270      ( 21 Sep 2021 05:48 )             36.9     09-21    140  |  102  |  21  ----------------------------<  114<H>  3.3<L>   |  23  |  0.89    Ca    9.2      21 Sep 2021 05:48  Phos  3.0     09-21  Mg     2.3     09-21

## 2021-09-21 NOTE — PROGRESS NOTE ADULT - SUBJECTIVE AND OBJECTIVE BOX
[facility] - Sveta Romano, Kyara, Ebony, Jennifer, Hosea Harden  Office Number:  [phone]    Patient resting comfortably in bed in NAD.  Altered and unable to provide any interval history.    F/U for:  AF    Telemetry: SR/ST     MEDICATIONS  (STANDING):  aspirin  chewable 81 milliGRAM(s) Oral daily  atorvastatin 80 milliGRAM(s) Oral at bedtime  enoxaparin Injectable 40 milliGRAM(s) SubCutaneous daily  folic acid 1 milliGRAM(s) Oral daily  melatonin 3 milliGRAM(s) Oral at bedtime  methylPREDNISolone sodium succinate IVPB 1000 milliGRAM(s) IV Intermittent daily  pantoprazole  Injectable 40 milliGRAM(s) IV Push daily  potassium chloride   Powder 40 milliEquivalent(s) Oral once  QUEtiapine 12.5 milliGRAM(s) Oral <User Schedule>    MEDICATIONS  (PRN):  acetaminophen   Tablet .. 650 milliGRAM(s) Oral every 6 hours PRN Mild Pain (1 - 3), Moderate Pain (4 - 6), Severe Pain (7 - 10)  diphenhydrAMINE 25 milliGRAM(s) Oral every 4 hours PRN Rash and/or Itching  lacosamide IVPB 100 milliGRAM(s) IV Intermittent once PRN Seizure  LORazepam   Injectable 1 milliGRAM(s) IV Push once PRN Seizure  OLANZapine Injectable 2.5 milliGRAM(s) IntraMuscular every 6 hours PRN Agitation      Allergies    No Known Allergies      Vital Signs Last 24 Hrs  T(C): 37.2 (21 Sep 2021 07:43), Max: 37.2 (21 Sep 2021 07:43)  T(F): 99 (21 Sep 2021 07:43), Max: 99 (21 Sep 2021 07:43)  HR: 101 (21 Sep 2021 07:43) (92 - 107)  BP: 166/90 (21 Sep 2021 07:43) (153/79 - 177/82)  BP(mean): --  RR: 20 (21 Sep 2021 07:43) (18 - 20)  SpO2: 98% (21 Sep 2021 04:30) (98% - 98%)    I&O's Summary    20 Sep 2021 07:01  -  21 Sep 2021 07:00  --------------------------------------------------------  IN: 0 mL / OUT: 450 mL / NET: -450 mL    21 Sep 2021 07:01  -  21 Sep 2021 10:03  --------------------------------------------------------  IN: 0 mL / OUT: 200 mL / NET: -200 mL        ON EXAM:    Constitutional: NAD, lethargic and confused  Lungs:  Non-labored, breath sounds are clear bilaterally, No wheezing, rales or rhonchi  Cardiovascular: RRR.  S1 and S2 positive.  No murmurs, rubs, gallops or clicks  Gastrointestinal: Bowel Sounds present, soft, nontender.   Lymph: No peripheral edema. No cervical lymphadenopathy.  Neurological: unable to assess, lethargic  Skin: No rashes or ulcers   Psych:  unable to assess    LABS: All Labs Reviewed:                        12.3   6.91  )-----------( 270      ( 21 Sep 2021 05:48 )             36.9                         12.3   4.65  )-----------( 259      ( 20 Sep 2021 06:41 )             36.8                         12.6   4.60  )-----------( 292      ( 19 Sep 2021 05:59 )             37.8     21 Sep 2021 05:48    140    |  102    |  21     ----------------------------<  114    3.3     |  23     |  0.89   20 Sep 2021 06:41    143    |  106    |  22     ----------------------------<  114    3.6     |  20     |  1.00   19 Sep 2021 05:59    141    |  104    |  22     ----------------------------<  106    3.3     |  22     |  1.06     Ca    9.2        21 Sep 2021 05:48  Ca    9.1        20 Sep 2021 06:41  Ca    9.8        19 Sep 2021 05:59  Phos  3.0       21 Sep 2021 05:48  Phos  3.5       20 Sep 2021 06:41  Phos  3.2       19 Sep 2021 05:59  Mg     2.3       21 Sep 2021 05:48  Mg     2.1       20 Sep 2021 06:41  Mg     2.2       19 Sep 2021 05:59            Blood Culture: Organism --  Gram Stain Blood -- Gram Stain   polymorphonuclear leukocytes seen  No organisms seen  by cytocentrifuge  Specimen Source .CSF CSF  Culture-Blood --

## 2021-09-22 LAB
ANION GAP SERPL CALC-SCNC: 14 MMOL/L — SIGNIFICANT CHANGE UP (ref 5–17)
BUN SERPL-MCNC: 34 MG/DL — HIGH (ref 7–23)
CALCIUM SERPL-MCNC: 9.5 MG/DL — SIGNIFICANT CHANGE UP (ref 8.4–10.5)
CHLORIDE SERPL-SCNC: 106 MMOL/L — SIGNIFICANT CHANGE UP (ref 96–108)
CO2 SERPL-SCNC: 23 MMOL/L — SIGNIFICANT CHANGE UP (ref 22–31)
CREAT SERPL-MCNC: 1.03 MG/DL — SIGNIFICANT CHANGE UP (ref 0.5–1.3)
GLUCOSE SERPL-MCNC: 152 MG/DL — HIGH (ref 70–99)
HCT VFR BLD CALC: 35.4 % — LOW (ref 39–50)
HGB BLD-MCNC: 11.8 G/DL — LOW (ref 13–17)
JCPYV DNA # CSF NAA+PROBE: SIGNIFICANT CHANGE UP COPIES/ML
MAGNESIUM SERPL-MCNC: 2.8 MG/DL — HIGH (ref 1.6–2.6)
MCHC RBC-ENTMCNC: 33.3 GM/DL — SIGNIFICANT CHANGE UP (ref 32–36)
MCHC RBC-ENTMCNC: 33.8 PG — SIGNIFICANT CHANGE UP (ref 27–34)
MCV RBC AUTO: 101.4 FL — HIGH (ref 80–100)
NRBC # BLD: 0 /100 WBCS — SIGNIFICANT CHANGE UP (ref 0–0)
PHOSPHATE SERPL-MCNC: 4.4 MG/DL — SIGNIFICANT CHANGE UP (ref 2.5–4.5)
PLATELET # BLD AUTO: 281 K/UL — SIGNIFICANT CHANGE UP (ref 150–400)
POTASSIUM SERPL-MCNC: 3.9 MMOL/L — SIGNIFICANT CHANGE UP (ref 3.5–5.3)
POTASSIUM SERPL-SCNC: 3.9 MMOL/L — SIGNIFICANT CHANGE UP (ref 3.5–5.3)
RBC # BLD: 3.49 M/UL — LOW (ref 4.2–5.8)
RBC # FLD: 14.7 % — HIGH (ref 10.3–14.5)
SODIUM SERPL-SCNC: 143 MMOL/L — SIGNIFICANT CHANGE UP (ref 135–145)
WBC # BLD: 9.73 K/UL — SIGNIFICANT CHANGE UP (ref 3.8–10.5)
WBC # FLD AUTO: 9.73 K/UL — SIGNIFICANT CHANGE UP (ref 3.8–10.5)

## 2021-09-22 PROCEDURE — 99232 SBSQ HOSP IP/OBS MODERATE 35: CPT

## 2021-09-22 PROCEDURE — 99233 SBSQ HOSP IP/OBS HIGH 50: CPT

## 2021-09-22 PROCEDURE — 99232 SBSQ HOSP IP/OBS MODERATE 35: CPT | Mod: GC

## 2021-09-22 RX ORDER — OLANZAPINE 15 MG/1
5 TABLET, FILM COATED ORAL
Refills: 0 | Status: DISCONTINUED | OUTPATIENT
Start: 2021-09-22 | End: 2021-09-23

## 2021-09-22 RX ADMIN — Medication 1 MILLIGRAM(S): at 12:27

## 2021-09-22 RX ADMIN — OLANZAPINE 2.5 MILLIGRAM(S): 15 TABLET, FILM COATED ORAL at 09:26

## 2021-09-22 RX ADMIN — Medication 10 MILLIGRAM(S): at 21:24

## 2021-09-22 RX ADMIN — ATORVASTATIN CALCIUM 80 MILLIGRAM(S): 80 TABLET, FILM COATED ORAL at 21:25

## 2021-09-22 RX ADMIN — OLANZAPINE 2.5 MILLIGRAM(S): 15 TABLET, FILM COATED ORAL at 22:53

## 2021-09-22 RX ADMIN — Medication 81 MILLIGRAM(S): at 12:27

## 2021-09-22 RX ADMIN — OLANZAPINE 2.5 MILLIGRAM(S): 15 TABLET, FILM COATED ORAL at 06:30

## 2021-09-22 RX ADMIN — OLANZAPINE 5 MILLIGRAM(S): 15 TABLET, FILM COATED ORAL at 21:24

## 2021-09-22 RX ADMIN — Medication 58 MILLIGRAM(S): at 06:46

## 2021-09-22 NOTE — BH CONSULTATION LIAISON PROGRESS NOTE - CASE SUMMARY
67 yo male w/ PMH of CLL on Venetoclax, MDS, melanoma, paroxysmal AFib (not on AC) presenting as transfer from [place] for event capture. Patient was initially at  [facility] on 9/14 with confusion, aphasia and R sided weakness. Patient was stroke code and was given s/p tPA. On admission, patient had CTH, CTA, CT perfusion during code stroke protocol with no acute findings. MRI Brain was  performed at  9/16 with no ischemic findings. EEG performed 9/15 showed diffuse L hemispheric slowing. Pt remained aphasic with AMS. +RUE spasticity was noted on exam. Patient was thus transferred to [facility] for EMU admission to evaluate for subclinical seizures. Psychiatry was consulted for acute onset of psychosis over the weekend. Pt's LP completed- Glucose 73, Protein 55, WBC 3, and cultures were negative. Pt started on IVIG 9/17, discontinued IVIG, received 3 days, no improvement in symptoms. Patient given steroids today, but according to primary team appears to have made pt more hyperactive. Pt required several PRNs yesterday for agitation (hitting himself, trying to move around).  On assessment, pt laying in bed w/ wife at bedside. Pt is AAOx2 (knows name, what floor he is on, and year, does not know month, that he is in a hospital, or why he is here, unable to explain recent events,confused, disorganized behavior, restless.  9/22: Covering for Dr. Fitzgerald, pt restless in bed, nonverbal, does not follow commands, per staff has had fluctuating mental status, speaks at times and can answer questions.  Agitated ON, given Zyprexa PRN x2. Recs: c/w Zyprexa 5mg PO BID, c/w Zyprexa prn, or haldol iv prn. inc melatonin. cont 1;1

## 2021-09-22 NOTE — PROGRESS NOTE ADULT - ASSESSMENT
ASSESSMENT: Patient is a 65 yo Rt handed male PMH CLL on Venetoclax, MDS, melanoma, paroxysmal AFib (not on AC) presenting as transfer from [facility] for event capture. Patient was initially at  [facility] on 9/14 with confusion, aphasia and R sided weakness. With Brain MRI negative. EEG note to show discharges from Left hemisphere with RUE spasticity There is concern for subclinical seizures thus transferred to [facility] for event capture. Cardiology consulted.  Heme/onc to follow this AM and will followup on recommendations on heme/onc medications.  Patient was noted to have episodes agitation with episodes of somnolence with out medication needed. Patient had LP this afternoon and will follow up on CSF results. Cardiology states to hold off on AC as there is no definitive CVA noted. Continue with ASA and statin. Improved s/p 1 dose of IVIG. Still with some mild cognitive delay and now with visual hallucinations. Poss related to ativan, will dc. Wife notes that patient gets IVIG treatment every month as part of his CLL treatment.     9/21: Neuro exam significant for continued agitation and restlessness requiring 1:1, oriented to self and time but not place or situation. Patient continues to hallucinate seeing bugs in the room and has severely impaired attention.     Impression: RUE spasticity and aphasia concerning for seizures, EEG negative for seizures. Patient acutely psychotic with left hemispheric slowing concerning for AIE in the setting of CLL.      PLAN:     AMS/AIE  - D/C vEEG  - Discontinued IVIG, received 3 days, no improvement in symptoms thus far  - Started IV Steroids Solu-medrol 1000 mg IV x 3 days. Cleared by heme/onc from an oncology standpoint to start steroids.  - 1:1 Constant Observations for safety  - Seroquel discontinued due to prolonged QTc 467. Placed on Zyprexa 2.5 mg IM BID with PRN Zyprexa 2.5 mg IM for breakthrough agitation.  - Psych consulted for recommendations to treat acute psychosis and hallucinations, will f/u recommendations  - LP completed- Glucose 73, Protein 55, WBC 3, Gram stain no growth to date, HSV neg, Cryptococcal neg; f/u rest of results  - No MR brain w/wo contrast at this time  - Visual hallucinations, concerning due to similar presentation of retinal detachment, Optho consulted   - No seizures on EEG, continuing to hold AEDs  - Rescue: 1st-Ativan 1mg, 2nd-Vimpat 100mg IV if GTC refractory to ativan    CLL  - Heme/onc to continue to follow and recommendations appreciated.   - Will discuss with Heme/Onc regarding ongoing CLL treatment- no inpatient treatment at this time.  - Follows Dr. Godinez and  Dr. Valenzuela outpatient.     PAfib  - Cardiology consulted- choosing to defer AC at this time  - Continue tele monitoring  - 12 lead EKG    Dietary  - Speech and Swallow Re-eval: Recommend NPO at this time due to concern for aspiration, if wife wants patient to eat can consider Dysphagia 1 diet with honey thickened liquids- wife willing to assume risk of aspiration with meals and patient started on recommended diet with aspiration precautions and supervision by staff of all meals.    DVT ppx: Lovenox subq   Dispo: Pending   ASSESSMENT: Patient is a 65 yo Rt handed male PMH CLL on Venetoclax, MDS, melanoma, paroxysmal AFib (not on AC) presenting as transfer from [place] for event capture. Patient was initially at  [facility] on 9/14 with confusion, aphasia and R sided weakness. With Brain MRI negative. EEG note to show discharges from Left hemisphere with RUE spasticity There is concern for subclinical seizures thus transferred to [facility] for event capture. Cardiology consulted.  Heme/onc to follow this AM and will followup on recommendations on heme/onc medications.  Patient was noted to have episodes agitation with episodes of somnolence with out medication needed. Patient had LP this afternoon and will follow up on CSF results. Cardiology states to hold off on AC as there is no definitive CVA noted. Continue with ASA and statin. Improved s/p 1 dose of IVIG. Still with some mild cognitive delay and now with visual hallucinations. Poss related to ativan, will dc. Wife notes that patient gets IVIG treatment every month as part of his CLL treatment.     9/21: Neuro exam significant for continued agitation and restlessness requiring 1:1, oriented to self and time but not place or situation. Patient continues to hallucinate seeing bugs in the room. Attention remains impaired with poor eye contact, but with mild improvement.      Impression: RUE spasticity and aphasia concerning for seizures, EEG negative for seizures. Patient acutely psychotic with left hemispheric slowing concerning for AIE in the setting of CLL.      PLAN:     AMS/AIE  - D/C vEEG  - Discontinued IVIG, received 3 days, no improvement in symptoms thus far  - Cleared by heme/onc from an oncology standpoint to start steroids. Steroids 1000 mg Solu-Medrol IV x 3 days (9/21-9/23)- Day 2.  - 1:1 Constant Observations for safety  - Seroquel discontinued due to prolonged QTc 467. Placed on Zyprexa 2.5 mg IM BID with PRN Zyprexa 2.5 mg IM for breakthrough agitation.  - Psych consulted appreciated: Started Zyprexa 2.5 mg in the AM and Zyprexa 5 mg at bedtime and continuing Zyprexa 2.5 mg IM PRN for breakthrough agitation. 2nd line treatment can give Haldol 1 mg IV q6hrs.   - Melatonin increased to 10 mg at bedtime  - LP completed- Glucose 73, Protein 55, WBC 3, Gram stain no growth to date, HSV neg, Cryptococcal neg; f/u rest of results  - No MR brain w/wo contrast at this time  - Visual hallucinations, concerning due to similar presentation of retinal detachment, Optho consulted   - No seizures on EEG, continuing to hold AEDs  - Rescue: 1st-Ativan 1mg, 2nd-Vimpat 100mg IV if GTC refractory to ativan    CLL  - Heme/onc to continue to follow and recommendations appreciated.   - Will discuss with Heme/Onc regarding ongoing CLL treatment- no inpatient treatment at this time.  - Follows with Dr. Godinez and  Dr. Valenzuela outpatient.     PAfib/HTN  - Cardiology recommendations appreciated: choosing to defer AC at this time; can use PRN Hydralazine IV is needed for HTN  - Continue tele monitoring  - 12 lead EKG    Dietary  - Speech and Swallow Re-eval: Recommend NPO at this time due to concern for aspiration, if wife wants patient to eat can consider Dysphagia 1 diet with honey thickened liquids- wife willing to assume risk of aspiration with meals and patient started on recommended diet with aspiration precautions and supervision by staff of all meals.    DVT ppx: Lovenox subq   Dispo: Pending   ASSESSMENT: Patient is a 67 yo Rt handed male PMH CLL on Venetoclax, MDS, melanoma, paroxysmal AFib (not on AC) presenting as transfer from [place] for event capture. Patient was initially at  [facility] on 9/14 with confusion, aphasia and R sided weakness. With Brain MRI negative. EEG note to show discharges from Left hemisphere with RUE spasticity There is concern for subclinical seizures thus transferred to [facility] for event capture. Cardiology consulted.  Heme/onc to follow this AM and will followup on recommendations on heme/onc medications.  Patient was noted to have episodes agitation with episodes of somnolence with out medication needed. Patient had LP this afternoon and will follow up on CSF results. Cardiology states to hold off on AC as there is no definitive CVA noted. Continue with ASA and statin. Improved s/p 1 dose of IVIG. Still with some mild cognitive delay and now with visual hallucinations. Poss related to ativan, will dc. Wife notes that patient gets IVIG treatment every month as part of his CLL treatment.     9/22: Neuro exam with mild improvement in agitation, restlessness, attention and eye contact. Continues to have hallucinations where he sees bugs.    Impression: RUE spasticity and aphasia concerning for seizures, EEG negative for seizures. Patient acutely psychotic with left hemispheric slowing concerning for AIE in the setting of CLL.      PLAN:     AMS/AIE  - Remains off vEEG  - Discontinued IVIG, received 3 days, no improvement in symptoms thus far  - Cleared by heme/onc from an oncology standpoint to start steroids.  - Patient with mild improvement in agitation, will continue Solu-medrol 1000 mg IV for a total of 5 days (9/21-9/25)- today Day 2.  - 1:1 Constant Observations for safety  - Seroquel discontinued due to prolonged QTc 467.  - Psych consulted appreciated.  - Increased Zyprexa to 5 mg in the AM and continuing Zyprexa 5 mg at bedtime and continuing Zyprexa 2.5 mg IM PRN for breakthrough agitation. 2nd line treatment can give Haldol 1 mg IV q6hrs.   - Melatonin increased to 10 mg at bedtime  - LP completed- Glucose 73, Protein 55, WBC 3, Gram stain no growth to date, HSV neg, Cryptococcal neg; f/u rest of results  - No MR brain w/wo contrast at this time  - Visual hallucinations, concerning due to similar presentation of retinal detachment, Optho consulted   - No seizures on EEG, continuing to hold AEDs  - Rescue: 1st-Ativan 1mg, 2nd-Vimpat 100mg IV if GTC refractory to ativan    CLL  - Heme/onc to continue to follow and recommendations appreciated.   - Will discuss with Heme/Onc regarding ongoing CLL treatment- no inpatient treatment at this time.  - Follows with Dr. Godinez and  Dr. Valenzuela outpatient.     PAfib/HTN  - Cardiology recommendations appreciated: choosing to defer AC at this time; can use PRN Hydralazine IV is needed for HTN  - Continue tele monitoring  - 12 lead EKG    Dietary  - Speech and Swallow Re-eval: Recommend NPO at this time due to concern for aspiration, if wife wants patient to eat can consider Dysphagia 1 diet with honey thickened liquids- wife willing to assume risk of aspiration with meals and patient started on recommended diet with aspiration precautions and supervision by staff of all meals.    DVT ppx: Lovenox subq   Dispo: PT/OT/PMR recommends: [facility]

## 2021-09-22 NOTE — PROGRESS NOTE ADULT - ASSESSMENT
67 y/o male with PMHx CLL followed by heme/onc, anemia, melanoma, osteopenia admitted for right facial droop, right hemiparesis and expressive aphasia.    Admitted to the ICU at [place] yesterday and is S/P TPA. He has now been transferred for evaluation of his seizures and evaluation of seizures    CVA? / AMS  - Patient with new right sided UE weakness, facial droop and expressive aphasia at   - s/p tpa without improvement in symptoms  - 9/15 CT head without acute ICH  - 9/16 MRI neg for infarct  - neurology follow up.  s/p video eeg  - cont asa and statin    PAF  - Patient seen on a prior admission for syncope 7/2020 and found to have brief Afib  - Tele during this admission SR   - echocardiogram 9/15 unremarkable  - If no definitive CVA would support remaining off AC for now.    - Continue to monitor  - continue aspirin    BP  - labile BP. high readings 2/2 agitation. cont to monitor for now. can use PRN hydralazine IV    CLL  - follow up heme/onc    - Monitor and replete lytes, keep K>4, Mg>2.  - All other medical needs as per Neuro team  - Other cardiovascular workup will depend on clinical course.  - Will continue to follow.

## 2021-09-22 NOTE — PROGRESS NOTE ADULT - SUBJECTIVE AND OBJECTIVE BOX
Hematology Oncology Follow-up    INTERVAL HPI/OVERNIGHT EVENTS:  No o/n events, patient resting comfortably. No complaints at this time. Patient specifically denies fever, chills, dizziness, weakness, CP, palpitations, SOB, cough, N/V/D/C, dysuria, changes in bowel movements, LE edema.    Review of Systems:  General: denies fevers/chills, night sweats, malaise, changes in appetite  Head: denies HA  Eyes: denies vision change  ENT: denies oral lesions, rhinorrhea, epistaxys, sore throat, dysphagia  Respiratory: denies cough, shortness of breath, pleurisy  Cardiovascular: denies chest pain, palpitaitons, PARKER  Gastrointestinal: denies nausea, vomiting, abdominal pain, constipation, diarrhea, melena, hematochezia  MSK: denies joint pain or muscle pain  Neuro: denies headache, weakness, or parasthesias  Skin: denies rash, petichiae, echymoses  Psych: denies anxiety or sleep disturbances    VITAL SIGNS:  T(F): 98.9 (09-22-21 @ 07:40)  HR: 95 (09-22-21 @ 07:40)  BP: 177/87 (09-22-21 @ 07:40)  RR: 19 (09-22-21 @ 07:40)  SpO2: 94% (09-22-21 @ 07:40)  Wt(kg): --    09-21-21 @ 07:01  -  09-22-21 @ 07:00  --------------------------------------------------------  IN: 0 mL / OUT: 200 mL / NET: -200 mL    09-22-21 @ 07:01  -  09-22-21 @ 09:31  --------------------------------------------------------  IN: 0 mL / OUT: 300 mL / NET: -300 mL        PHYSICAL EXAM:    Constitutional: AAOx3, NAD  Eyes: PERRL, EOMI, sclera non-icteric  Neck: supple, no masses, no JVD, no lymphadenopathy  Respiratory: CTA b/l, no wheezing, rhonchi, rales, with normal respiratory effort  Cardiovascular: RRR, normal S1S2, no M/R/G  Gastrointestinal: soft, NTND, no masses palpable, BS normal in all four quadrants, no HSM  Extremities:  no edema  MSK: no obvious abnormalities, normal ROM, no lymphadenopathy  Neurological: Grossly intact  Skin: Normal temperature, no rash, no echymoses, no petichiae  Psych: normal affect    MEDICATIONS  (STANDING):  aspirin  chewable 81 milliGRAM(s) Oral daily  atorvastatin 80 milliGRAM(s) Oral at bedtime  enoxaparin Injectable 40 milliGRAM(s) SubCutaneous daily  folic acid 1 milliGRAM(s) Oral daily  melatonin 10 milliGRAM(s) Oral at bedtime  methylPREDNISolone sodium succinate IVPB 1000 milliGRAM(s) IV Intermittent daily  OLANZapine 2.5 milliGRAM(s) Oral <User Schedule>  OLANZapine 5 milliGRAM(s) Oral at bedtime  pantoprazole  Injectable 40 milliGRAM(s) IV Push daily    MEDICATIONS  (PRN):  acetaminophen   Tablet .. 650 milliGRAM(s) Oral every 6 hours PRN Mild Pain (1 - 3), Moderate Pain (4 - 6), Severe Pain (7 - 10)  diphenhydrAMINE 25 milliGRAM(s) Oral every 4 hours PRN Rash and/or Itching  lacosamide IVPB 100 milliGRAM(s) IV Intermittent once PRN Seizure  LORazepam   Injectable 1 milliGRAM(s) IV Push once PRN Seizure  OLANZapine Injectable 2.5 milliGRAM(s) IntraMuscular every 4 hours PRN Agitation      No Known Allergies      LABS:                        11.8   9.73  )-----------( 281      ( 22 Sep 2021 05:35 )             35.4     09-22    143  |  106  |  34<H>  ----------------------------<  152<H>  3.9   |  23  |  1.03    Ca    9.5      22 Sep 2021 05:38  Phos  4.4     09-22  Mg     2.8     09-22             Ferritin, Serum: 607 ng/mL *H* (09-19-21 @ 09:58)    Folate, Serum: >20.0 ng/mL (09-17-21 @ 00:00)  Vitamin B12, Serum: 421 pg/mL (09-17-21 @ 00:00)          RADIOLOGY & ADDITIONAL TESTS:  Studies reviewed. Hematology Oncology Follow-up    INTERVAL HPI/OVERNIGHT EVENTS:  Continues to be very confused.  Speech is more fluid.  Has multiple bruises per wife at the bedside, in the setting of injuring self trying to get out of bed.    Review of Systems: ROS otherwise negative.    VITAL SIGNS:  T(F): 98.9 (09-22-21 @ 07:40)  HR: 95 (09-22-21 @ 07:40)  BP: 177/87 (09-22-21 @ 07:40)  RR: 19 (09-22-21 @ 07:40)  SpO2: 94% (09-22-21 @ 07:40)  Wt(kg): --    09-21-21 @ 07:01  -  09-22-21 @ 07:00  --------------------------------------------------------  IN: 0 mL / OUT: 200 mL / NET: -200 mL    09-22-21 @ 07:01  -  09-22-21 @ 09:31  --------------------------------------------------------  IN: 0 mL / OUT: 300 mL / NET: -300 mL        PHYSICAL EXAM:    Constitutional: AAOx3, but agitated, not spontaneously talkative, appears to be hallucinating  Eyes: PERRL, EOMI, sclera non-icteric  Neck: supple, no masses, no JVD, no lymphadenopathy  Respiratory: CTA b/l, no wheezing, rhonchi, rales, with normal respiratory effort  Cardiovascular: RRR, normal S1S2, no M/R/G  Gastrointestinal: soft, NTND, no masses palpable, BS normal in all four quadrants, no HSM  Extremities:  no edema  MSK: no obvious abnormalities, normal ROM, no lymphadenopathy  Neurological: Grossly intact  Skin: Normal temperature, no rash, no petichiae; multiple echymoses    MEDICATIONS  (STANDING):  aspirin  chewable 81 milliGRAM(s) Oral daily  atorvastatin 80 milliGRAM(s) Oral at bedtime  enoxaparin Injectable 40 milliGRAM(s) SubCutaneous daily  folic acid 1 milliGRAM(s) Oral daily  melatonin 10 milliGRAM(s) Oral at bedtime  methylPREDNISolone sodium succinate IVPB 1000 milliGRAM(s) IV Intermittent daily  OLANZapine 2.5 milliGRAM(s) Oral <User Schedule>  OLANZapine 5 milliGRAM(s) Oral at bedtime  pantoprazole  Injectable 40 milliGRAM(s) IV Push daily    MEDICATIONS  (PRN):  acetaminophen   Tablet .. 650 milliGRAM(s) Oral every 6 hours PRN Mild Pain (1 - 3), Moderate Pain (4 - 6), Severe Pain (7 - 10)  diphenhydrAMINE 25 milliGRAM(s) Oral every 4 hours PRN Rash and/or Itching  lacosamide IVPB 100 milliGRAM(s) IV Intermittent once PRN Seizure  LORazepam   Injectable 1 milliGRAM(s) IV Push once PRN Seizure  OLANZapine Injectable 2.5 milliGRAM(s) IntraMuscular every 4 hours PRN Agitation      No Known Allergies      LABS:                        11.8   9.73  )-----------( 281      ( 22 Sep 2021 05:35 )             35.4     09-22    143  |  106  |  34<H>  ----------------------------<  152<H>  3.9   |  23  |  1.03    Ca    9.5      22 Sep 2021 05:38  Phos  4.4     09-22  Mg     2.8     09-22    Ferritin, Serum: 607 ng/mL *H* (09-19-21 @ 09:58)    Folate, Serum: >20.0 ng/mL (09-17-21 @ 00:00)  Vitamin B12, Serum: 421 pg/mL (09-17-21 @ 00:00)    RADIOLOGY & ADDITIONAL TESTS:  Studies reviewed.

## 2021-09-22 NOTE — PROGRESS NOTE ADULT - SUBJECTIVE AND OBJECTIVE BOX
[facility]    Sveta Romano, Kyara, Ebony, Jennifer, Dereck, Hosea      [phone]    CHIEF COMPLAINT: Patient is a 66y old  Male who presents with a chief complaint of CNS symptoms (17 Sep 2021 14:04)      Follow Up: tpa for neuro sxs, hx of paf    Interim history: Unable to provide a history on the basis of a poor mental status.  Events noted.    MEDICATIONS  (STANDING):  aspirin  chewable 81 milliGRAM(s) Oral daily  atorvastatin 80 milliGRAM(s) Oral at bedtime  enoxaparin Injectable 40 milliGRAM(s) SubCutaneous daily  folic acid 1 milliGRAM(s) Oral daily  melatonin 10 milliGRAM(s) Oral at bedtime  methylPREDNISolone sodium succinate IVPB 1000 milliGRAM(s) IV Intermittent daily  OLANZapine 2.5 milliGRAM(s) Oral <User Schedule>  OLANZapine 5 milliGRAM(s) Oral at bedtime  pantoprazole  Injectable 40 milliGRAM(s) IV Push daily    MEDICATIONS  (PRN):  acetaminophen   Tablet .. 650 milliGRAM(s) Oral every 6 hours PRN Mild Pain (1 - 3), Moderate Pain (4 - 6), Severe Pain (7 - 10)  diphenhydrAMINE 25 milliGRAM(s) Oral every 4 hours PRN Rash and/or Itching  lacosamide IVPB 100 milliGRAM(s) IV Intermittent once PRN Seizure  LORazepam   Injectable 1 milliGRAM(s) IV Push once PRN Seizure  OLANZapine Injectable 2.5 milliGRAM(s) IntraMuscular every 4 hours PRN Agitation      REVIEW OF SYSTEMS: unable to provide  Vital Signs Last 24 Hrs  T(C): 37.2 (22 Sep 2021 07:40), Max: 37.2 (22 Sep 2021 07:40)  T(F): 98.9 (22 Sep 2021 07:40), Max: 98.9 (22 Sep 2021 07:40)  HR: 95 (22 Sep 2021 07:40) (86 - 100)  BP: 177/87 (22 Sep 2021 07:40) (160/90 - 177/87)  BP(mean): --  RR: 19 (22 Sep 2021 07:40) (19 - 20)  SpO2: 94% (22 Sep 2021 07:40) (94% - 95%)    I&O's Summary    21 Sep 2021 07:01  -  22 Sep 2021 07:00  --------------------------------------------------------  IN: 0 mL / OUT: 200 mL / NET: -200 mL    22 Sep 2021 07:01  -  22 Sep 2021 09:25  --------------------------------------------------------  IN: 0 mL / OUT: 300 mL / NET: -300 mL        Telemetry past 24h:    PHYSICAL EXAM:    Constitutional: well-nourished, well-developed, NAD   HEENT:  MMM, sclerae anicteric, conjunctivae clear, no oral cyanosis.  Pulmonary: Non-labored, breath sounds are clear bilaterally, No wheezing, rales or rhonchi  Cardiovascular: Regular, S1 and S2.  No murmur.  No rubs, gallops or clicks  Gastrointestinal: Bowel Sounds present, soft, nontender.   Lymph: No peripheral edema.   Neurological: unable to evaluate, poor mental status  Skin: No rashes.  Psych:  Mood & affect not evaluable    LABS: All Labs Reviewed:                        11.8   9.73  )-----------( 281      ( 22 Sep 2021 05:35 )             35.4                         12.3   6.91  )-----------( 270      ( 21 Sep 2021 05:48 )             36.9                         12.3   4.65  )-----------( 259      ( 20 Sep 2021 06:41 )             36.8     22 Sep 2021 05:38    143    |  106    |  34     ----------------------------<  152    3.9     |  23     |  1.03   21 Sep 2021 05:48    140    |  102    |  21     ----------------------------<  114    3.3     |  23     |  0.89   20 Sep 2021 06:41    143    |  106    |  22     ----------------------------<  114    3.6     |  20     |  1.00     Ca    9.5        22 Sep 2021 05:38  Ca    9.2        21 Sep 2021 05:48  Ca    9.1        20 Sep 2021 06:41  Phos  4.4       22 Sep 2021 05:38  Phos  3.0       21 Sep 2021 05:48  Phos  3.5       20 Sep 2021 06:41  Mg     2.8       22 Sep 2021 05:38  Mg     2.3       21 Sep 2021 05:48  Mg     2.1       20 Sep 2021 06:41            Blood Culture: Organism --  Gram Stain Blood -- Gram Stain   polymorphonuclear leukocytes seen  No organisms seen  by cytocentrifuge  Specimen Source .CSF CSF  Culture-Blood --            RADIOLOGY:    EKG:    Echo:

## 2021-09-22 NOTE — PROGRESS NOTE ADULT - ASSESSMENT
65 y/o male with PMHx CLL (dx in 1/1991, on tx w/ venetoclax) followed by heme/onc, anemia, hx of melanoma, osteopenia admitted for right facial droop, right hemiparesis and expressive aphasia.  Admitted to the ICU at [facility] prior to admission, s/p TPA, transferred to [facility] for evaluation of seizures. Hematology following given hx of CLL.    #CLL  #AMS, aphasia  -CBC w/ diff shows stable disease  -imaging reviewed; treated initially as code stroke at [facility] on day of presentation, but MRI negative for CVA or structural lesion; performed without contrast, so unable to r/o leptomeningeal disease, but suspicion is low.  -patient's aphasia resolved over weekend, received IVIG x2 days for suspected autoimmune encephalitis but stopped by primary team, switched over to high doses steroids (plan for 5 days methylprednisolone 1000 mg QD). Patient now able to answer questions, oriented to self and time but not to situation or place; appears restless  -do not suspect patient's symptoms due to CLL treatment (currently on venetoclax x10 months; previously finished treatment with obinutuzumab). CLL increases risk of infection (has hx of recurrent sinus infections, on IVIG) so infectious etiology higher on ddx; CSF infectious PCR and gram stain negative, making it less likely. CESAR virus PCR pending to r/o PML, though suspicion is low based on MRI findings.  -autoimmune encephalitis panel pending (1-2 weeks processing time)  -Patient had recently gotten 3rd dose of COVID-19 vaccine, ?immune sequelae from vaccine? Less likely at this time, diagnosis of exclusion  -fibrinogen, ferritin, triglycerides all mildly elevated; soluble IL-2r normal, no suspicion for HLH at this time  -cytopathology not reported (flow normal) but reported as complete; will reach out to pathology  -EEG abnormal w/ L hemisphere slowing; acute care per neurology team  -would continue to hold venetoclax for now  -no objections from hematology to start steroid therapy in setting of suspected autoimmune encephalitis; no evidence of systemic CLL progression or transformation to more aggressive lymphoma    Please call with any questions.    Aldair Henley MD, MPH  Hematology / Oncology Fellow, PGY7  p

## 2021-09-22 NOTE — BH CONSULTATION LIAISON PROGRESS NOTE - NSBHCHARTREVIEWINVESTIGATE_PSY_A_CORE FT
Ventricular Rate 96 BPM    Atrial Rate 96 BPM    P-R Interval 144 ms    QRS Duration 98 ms    Q-T Interval 370 ms    QTC Calculation(Bazett) 467 ms    P Axis 81 degrees    R Axis 49 degrees    T Axis 59 degrees    Diagnosis Line NORMAL SINUS RHYTHM  NORMAL ECG  WHEN COMPARED WITH ECG OF `9/20/21 21:02  NO SIGNIFICANT CHANGE WAS FOUND  Confirmed by NAZANIN HAYS MD ([id number]) on 9/21/2021 11:22:33 AM   < from: 12 Lead ECG (09.21.21 @ 05:02) >      Ventricular Rate 96 BPM    Atrial Rate 96 BPM    P-R Interval 144 ms    QRS Duration 98 ms    Q-T Interval 370 ms    QTC Calculation(Bazett) 467 ms    P Axis 81 degrees    R Axis 49 degrees    T Axis 59 degrees    Diagnosis Line NORMAL SINUS RHYTHM  NORMAL ECG  WHEN COMPARED WITH ECG OF `9/20/21 21:02  NO SIGNIFICANT CHANGE WAS FOUND  Confirmed by NAZANIN HAYS MD ([id number]) on 9/21/2021 11:22:33 AM    < end of copied text >

## 2021-09-22 NOTE — PROGRESS NOTE ADULT - SUBJECTIVE AND OBJECTIVE BOX
THE PATIENT WAS SEEN AND EXAMINED BY ME WITH THE HOUSESTAFF DURING MORNING ROUNDS.   HPI:  [id number]    HPI:  Patient is a 65 yo Rt handed male PMH CLL on Venetoclax, MDS, melanoma, paroxysmal AFib (not on AC) presenting as transfer from [place] for event capture. Patient was initially at  [facility] on 9/14 with confusion, aphasia and R sided weakness. Patient was stroke code and was given s/p tPA. On admission, patient had CTH, CTA, CT perfusion during code stroke protocol with no acute findings. MRI Brain was  performed at  9/16 with no ischemic findings. EEG performed 9/15 showed diffuse L hemispheric slowing. Pt remained aphasic with AMS. +RUE spasticity was noted on exam. Patient was thus transferred to [facility] for EMU admission to evaluate for subclinical seizures.  Of note, patient does not have hx of seizures.         (16 Sep 2021 16:41)      ROS: Otherwise negative.     SUBJECTIVE: No events overnight.  No new neurologic complaints.      acetaminophen   Tablet .. 650 milliGRAM(s) Oral every 6 hours PRN  aspirin  chewable 81 milliGRAM(s) Oral daily  atorvastatin 80 milliGRAM(s) Oral at bedtime  diphenhydrAMINE 25 milliGRAM(s) Oral every 4 hours PRN  enoxaparin Injectable 40 milliGRAM(s) SubCutaneous daily  folic acid 1 milliGRAM(s) Oral daily  lacosamide IVPB 100 milliGRAM(s) IV Intermittent once PRN  LORazepam   Injectable 1 milliGRAM(s) IV Push once PRN  melatonin 10 milliGRAM(s) Oral at bedtime  methylPREDNISolone sodium succinate IVPB 1000 milliGRAM(s) IV Intermittent daily  OLANZapine 2.5 milliGRAM(s) Oral <User Schedule>  OLANZapine 5 milliGRAM(s) Oral at bedtime  OLANZapine Injectable 2.5 milliGRAM(s) IntraMuscular every 4 hours PRN  pantoprazole  Injectable 40 milliGRAM(s) IV Push daily  potassium chloride   Powder 40 milliEquivalent(s) Oral once      Physical Exam: Neurological Exam:  	Mental Status: Orientated to self, date and place.  Attention intact.  No dysarthria, aphasia or neglect.  	Cranial Nerves: PERRL, EOMI, no nystagmus or diplopia. No facial asymmetry.  Hearing intact to finger rub bilaterally.  Tongue, uvula and palate midline.  Sternocleidomastoid and Trapezius intact bilaterally  	Motor: moving all 4 extremities equally w 5/5 strength  	Tone: normal.                  	Pronator drift: none                 	Dysmetria: None to finger-nose-finger  	No truncal ataxia.    	Tremor: No resting, postural or action tremor.  No myoclonus.  	Sensation: intact to light touch    LABS:                        11.8   9.73  )-----------( 281      ( 22 Sep 2021 05:35 )             35.4    09-22    143  |  106  |  34<H>  ----------------------------<  152<H>  3.9   |  23  |  1.03    Ca    9.5      22 Sep 2021 05:38  Phos  4.4     09-22  Mg     2.8     09-22         COVID-19 PCR: NotDetec (14 Sep 2021 18:47)      IMAGING:     IMAGING:   MRI 9.16.21  IMPRESSION: No acute territorial infarcts seen.    EEG 9.19.21  EEG Summary:    Abnormal EEG in the awake, drowsy and asleep states.  - Intermittent polymorphic delta slowing in the left hemisphere    Impression/Clinical Correlate:    This is an abnormal EEG record. No epileptiform pattern or seizures were seen. There is evidence for transient and reversible cause of functional abnormality affecting the left hemisphere.     EEG 9.18.21  EEG Summary:    Abnormal EEG in the awake, drowsy and asleep states.  - Continuous polymorphic delta slowing in the left hemisphere, slowly transitioning to intermittent slowing in the latter half of the recording recording.    Impression/Clinical Correlate:    This is an abnormal EEG record. No epileptiform pattern or seizures were seen. There is evidence for transient and reversible cause of functional abnormality affecting the left       THE PATIENT WAS SEEN AND EXAMINED BY ME WITH THE HOUSESTAFF DURING MORNING ROUNDS.   HPI:  [id number]    HPI:  Patient is a 65 yo Rt handed male PMH CLL on Venetoclax, MDS, melanoma, paroxysmal AFib (not on AC) presenting as transfer from [place] for event capture. Patient was initially at  [facility] on 9/14 with confusion, aphasia and R sided weakness. Patient was stroke code and was given s/p tPA. On admission, patient had CTH, CTA, CT perfusion during code stroke protocol with no acute findings. MRI Brain was  performed at  9/16 with no ischemic findings. EEG performed 9/15 showed diffuse L hemispheric slowing. Pt remained aphasic with AMS. +RUE spasticity was noted on exam. Patient was thus transferred to [facility] for EMU admission to evaluate for subclinical seizures.  Of note, patient does not have hx of seizures.      ROS: Otherwise negative.     SUBJECTIVE: No events overnight.  No new neurologic complaints.      acetaminophen   Tablet .. 650 milliGRAM(s) Oral every 6 hours PRN  aspirin  chewable 81 milliGRAM(s) Oral daily  atorvastatin 80 milliGRAM(s) Oral at bedtime  diphenhydrAMINE 25 milliGRAM(s) Oral every 4 hours PRN  enoxaparin Injectable 40 milliGRAM(s) SubCutaneous daily  folic acid 1 milliGRAM(s) Oral daily  lacosamide IVPB 100 milliGRAM(s) IV Intermittent once PRN  LORazepam   Injectable 1 milliGRAM(s) IV Push once PRN  melatonin 10 milliGRAM(s) Oral at bedtime  methylPREDNISolone sodium succinate IVPB 1000 milliGRAM(s) IV Intermittent daily  OLANZapine 2.5 milliGRAM(s) Oral <User Schedule>  OLANZapine 5 milliGRAM(s) Oral at bedtime  OLANZapine Injectable 2.5 milliGRAM(s) IntraMuscular every 4 hours PRN  pantoprazole  Injectable 40 milliGRAM(s) IV Push daily  potassium chloride   Powder 40 milliEquivalent(s) Oral once      Physical Exam: Neurological Exam:  	Mental Status: Eyes open, oriented to person, place, year but not month or situation, states "he was excited at home and fell twice, once in December and once in May". Attention moderately impaired requiring continued prompting to remain on task, poor eye contact, but improved from the prior day. Can name 1/3 objects (for watch he said hand clock). Unable to spell WORLD forwards or backwards. On 5 minute word recall 1/3 correct, was able to say red and car with choice. No dysarthria, aphasia or neglect.   	Cranial Nerves: PERRL, EOMI, no nystagmus or diplopia. No facial asymmetry.  Hearing intact to voice.  Tongue, uvula and palate midline.  Sternocleidomastoid and Trapezius intact bilaterally  	Motor: moving all 4 extremities equally at least antigravity  	Tone: normal.                  	Pronator drift: none                 	No truncal ataxia.    	Tremor: No resting, postural or action tremor.  	Sensation: intact to light touch  LABS:                        11.8   9.73  )-----------( 281      ( 22 Sep 2021 05:35 )             35.4    09-22    143  |  106  |  34<H>  ----------------------------<  152<H>  3.9   |  23  |  1.03    Ca    9.5      22 Sep 2021 05:38  Phos  4.4     09-22  Mg     2.8     09-22         COVID-19 PCR: NotDetec (14 Sep 2021 18:47)      IMAGING:     IMAGING:   MRI 9.16.21  IMPRESSION: No acute territorial infarcts seen.    EEG 9.19.21  EEG Summary:    Abnormal EEG in the awake, drowsy and asleep states.  - Intermittent polymorphic delta slowing in the left hemisphere    Impression/Clinical Correlate:    This is an abnormal EEG record. No epileptiform pattern or seizures were seen. There is evidence for transient and reversible cause of functional abnormality affecting the left hemisphere.     EEG 9.18.21  EEG Summary:    Abnormal EEG in the awake, drowsy and asleep states.  - Continuous polymorphic delta slowing in the left hemisphere, slowly transitioning to intermittent slowing in the latter half of the recording recording.    Impression/Clinical Correlate:    This is an abnormal EEG record. No epileptiform pattern or seizures were seen. There is evidence for transient and reversible cause of functional abnormality affecting the left       THE PATIENT WAS SEEN AND EXAMINED BY ME WITH THE HOUSESTAFF DURING MORNING ROUNDS.   HPI:  [id number]    HPI:  Patient is a 67 yo Rt handed male PMH CLL on Venetoclax, MDS, melanoma, paroxysmal AFib (not on AC) presenting as transfer from [place] for event capture. Patient was initially at  [facility] on 9/14 with confusion, aphasia and R sided weakness. Patient was stroke code and was given s/p tPA. On admission, patient had CTH, CTA, CT perfusion during code stroke protocol with no acute findings. MRI Brain was  performed at  9/16 with no ischemic findings. EEG performed 9/15 showed diffuse L hemispheric slowing. Pt remained aphasic with AMS. +RUE spasticity was noted on exam. Patient was thus transferred to [facility] for EMU admission to evaluate for subclinical seizures.  Of note, patient does not have hx of seizures.      ROS: Otherwise negative.     SUBJECTIVE: No events overnight.  No new neurologic complaints.      acetaminophen   Tablet .. 650 milliGRAM(s) Oral every 6 hours PRN  aspirin  chewable 81 milliGRAM(s) Oral daily  atorvastatin 80 milliGRAM(s) Oral at bedtime  diphenhydrAMINE 25 milliGRAM(s) Oral every 4 hours PRN  enoxaparin Injectable 40 milliGRAM(s) SubCutaneous daily  folic acid 1 milliGRAM(s) Oral daily  lacosamide IVPB 100 milliGRAM(s) IV Intermittent once PRN  LORazepam   Injectable 1 milliGRAM(s) IV Push once PRN  melatonin 10 milliGRAM(s) Oral at bedtime  methylPREDNISolone sodium succinate IVPB 1000 milliGRAM(s) IV Intermittent daily  OLANZapine 2.5 milliGRAM(s) Oral <User Schedule>  OLANZapine 5 milliGRAM(s) Oral at bedtime  OLANZapine Injectable 2.5 milliGRAM(s) IntraMuscular every 4 hours PRN  pantoprazole  Injectable 40 milliGRAM(s) IV Push daily  potassium chloride   Powder 40 milliEquivalent(s) Oral once      Physical Exam: Neurological Exam:  	Mental Status: Eyes open, oriented to person, place, year but not month or situation, states "he was excited at home and fell twice, once in December and once in May". Attention moderately impaired requiring continued prompting to remain on task, poor eye contact, but improved from the prior day. Can name 1/3 objects (for watch he said hand clock). Unable to spell WORLD forwards or backwards. On 5 minute word recall 1/3 correct, was able to say red and car with choice. No dysarthria, aphasia or neglect.   	Cranial Nerves: PERRL, EOMI, no nystagmus or diplopia. No facial asymmetry.  Hearing intact to voice.  Tongue, uvula and palate midline.  Sternocleidomastoid and Trapezius intact bilaterally  	Motor: moving all 4 extremities equally at least antigravity  	Tone: normal.                  	Pronator drift: none                 	No truncal ataxia.    	Tremor: No resting, postural or action tremor.  	Sensation: intact to light touch  LABS:                        11.8   9.73  )-----------( 281      ( 22 Sep 2021 05:35 )             35.4    09-22    143  |  106  |  34<H>  ----------------------------<  152<H>  3.9   |  23  |  1.03    Ca    9.5      22 Sep 2021 05:38  Phos  4.4     09-22  Mg     2.8     09-22         COVID-19 PCR: NotDetec (14 Sep 2021 18:47)       IMAGING:     MRI 9.16.21  IMPRESSION: No acute territorial infarcts seen.    EEG 9.19.21  EEG Summary:    Abnormal EEG in the awake, drowsy and asleep states.  - Intermittent polymorphic delta slowing in the left hemisphere    Impression/Clinical Correlate:    This is an abnormal EEG record. No epileptiform pattern or seizures were seen. There is evidence for transient and reversible cause of functional abnormality affecting the left hemisphere.     EEG 9.18.21  EEG Summary:    Abnormal EEG in the awake, drowsy and asleep states.  - Continuous polymorphic delta slowing in the left hemisphere, slowly transitioning to intermittent slowing in the latter half of the recording recording.    Impression/Clinical Correlate:    This is an abnormal EEG record. No epileptiform pattern or seizures were seen. There is evidence for transient and reversible cause of functional abnormality affecting the left

## 2021-09-22 NOTE — ED ADULT TRIAGE NOTE - WEIGHT IN KG
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.49]  Vaginal bleeding [N93.9]  Catastrophic antiphospholipid syndrome (HCC) [D68.61]             Anticoagulation Episode Summary     INR check location:      Preferred lab:      Send INR reminders to:      Comments:        Anticoagulation Care Providers     Provider Role Specialty Phone number    [facility] Responsible  [phone]                Refer to Patient Findings for HPI:  Patient Findings     Negatives:  Signs/symptoms of thrombosis, Signs/symptoms of bleeding, Laboratory test error suspected, Change in health, Change in alcohol use, Change in activity, Upcoming invasive procedure, Emergency department visit, Upcoming dental procedure, Missed doses, Extra doses, Change in medications, Change in diet/appetite, Hospital admission, Bruising, Other complaints          Vitals:    21 0749   BP: 101/72   BP Location: Left arm   Patient Position: Sitting   BP Cuff Size: Large adult   Pulse: 94       Verified current warfarin dosing schedule.    Medications reconciled   Pt is not on antiplatelet therapy      A/P   INR  -therapeutic.     Warfarin dosing recommendation: Increase weekly warfarin regimen as detailed above.  Stop lovenox injections.    Pt educated to contact our clinic with any changes in medications or s/s of bleeding or thrombosis. Pt is aware to seek immediate medical attention for falls, head injury or deep cuts.    Follow up appointment in 5 day(s).    Oliver Carolina  PharmD     68

## 2021-09-23 LAB
ALBUMIN SERPL ELPH-MCNC: 4.2 G/DL — SIGNIFICANT CHANGE UP (ref 3.3–5)
ALP SERPL-CCNC: 63 U/L — SIGNIFICANT CHANGE UP (ref 40–120)
ALT FLD-CCNC: 37 U/L — SIGNIFICANT CHANGE UP (ref 10–45)
ANION GAP SERPL CALC-SCNC: 13 MMOL/L — SIGNIFICANT CHANGE UP (ref 5–17)
AST SERPL-CCNC: 58 U/L — HIGH (ref 10–40)
BILIRUB SERPL-MCNC: 2 MG/DL — HIGH (ref 0.2–1.2)
BUN SERPL-MCNC: 47 MG/DL — HIGH (ref 7–23)
CALCIUM SERPL-MCNC: 9.1 MG/DL — SIGNIFICANT CHANGE UP (ref 8.4–10.5)
CHLORIDE SERPL-SCNC: 107 MMOL/L — SIGNIFICANT CHANGE UP (ref 96–108)
CO2 SERPL-SCNC: 24 MMOL/L — SIGNIFICANT CHANGE UP (ref 22–31)
CREAT SERPL-MCNC: 0.98 MG/DL — SIGNIFICANT CHANGE UP (ref 0.5–1.3)
GLUCOSE SERPL-MCNC: 155 MG/DL — HIGH (ref 70–99)
HCT VFR BLD CALC: 37 % — LOW (ref 39–50)
HGB BLD-MCNC: 12.4 G/DL — LOW (ref 13–17)
MAGNESIUM SERPL-MCNC: 3.1 MG/DL — HIGH (ref 1.6–2.6)
MCHC RBC-ENTMCNC: 33.5 GM/DL — SIGNIFICANT CHANGE UP (ref 32–36)
MCHC RBC-ENTMCNC: 34.7 PG — HIGH (ref 27–34)
MCV RBC AUTO: 103.6 FL — HIGH (ref 80–100)
NRBC # BLD: 0 /100 WBCS — SIGNIFICANT CHANGE UP (ref 0–0)
PHOSPHATE SERPL-MCNC: 3.8 MG/DL — SIGNIFICANT CHANGE UP (ref 2.5–4.5)
PLATELET # BLD AUTO: 246 K/UL — SIGNIFICANT CHANGE UP (ref 150–400)
POTASSIUM SERPL-MCNC: 4.4 MMOL/L — SIGNIFICANT CHANGE UP (ref 3.5–5.3)
POTASSIUM SERPL-SCNC: 4.4 MMOL/L — SIGNIFICANT CHANGE UP (ref 3.5–5.3)
PROT SERPL-MCNC: 7.8 G/DL — SIGNIFICANT CHANGE UP (ref 6–8.3)
RBC # BLD: 3.57 M/UL — LOW (ref 4.2–5.8)
RBC # FLD: 15.2 % — HIGH (ref 10.3–14.5)
SODIUM SERPL-SCNC: 144 MMOL/L — SIGNIFICANT CHANGE UP (ref 135–145)
WBC # BLD: 10.14 K/UL — SIGNIFICANT CHANGE UP (ref 3.8–10.5)
WBC # FLD AUTO: 10.14 K/UL — SIGNIFICANT CHANGE UP (ref 3.8–10.5)

## 2021-09-23 PROCEDURE — 99233 SBSQ HOSP IP/OBS HIGH 50: CPT

## 2021-09-23 PROCEDURE — 99232 SBSQ HOSP IP/OBS MODERATE 35: CPT

## 2021-09-23 PROCEDURE — 93010 ELECTROCARDIOGRAM REPORT: CPT

## 2021-09-23 RX ORDER — OLANZAPINE 15 MG/1
10 TABLET, FILM COATED ORAL AT BEDTIME
Refills: 0 | Status: DISCONTINUED | OUTPATIENT
Start: 2021-09-23 | End: 2021-10-01

## 2021-09-23 RX ORDER — SODIUM CHLORIDE 9 MG/ML
1000 INJECTION INTRAMUSCULAR; INTRAVENOUS; SUBCUTANEOUS
Refills: 0 | Status: DISCONTINUED | OUTPATIENT
Start: 2021-09-23 | End: 2021-09-30

## 2021-09-23 RX ORDER — HALOPERIDOL DECANOATE 100 MG/ML
1 INJECTION INTRAMUSCULAR ONCE
Refills: 0 | Status: COMPLETED | OUTPATIENT
Start: 2021-09-23 | End: 2021-09-23

## 2021-09-23 RX ADMIN — Medication 81 MILLIGRAM(S): at 11:03

## 2021-09-23 RX ADMIN — Medication 58 MILLIGRAM(S): at 05:03

## 2021-09-23 RX ADMIN — PANTOPRAZOLE SODIUM 40 MILLIGRAM(S): 20 TABLET, DELAYED RELEASE ORAL at 11:04

## 2021-09-23 RX ADMIN — ENOXAPARIN SODIUM 40 MILLIGRAM(S): 100 INJECTION SUBCUTANEOUS at 11:04

## 2021-09-23 RX ADMIN — ATORVASTATIN CALCIUM 80 MILLIGRAM(S): 80 TABLET, FILM COATED ORAL at 21:48

## 2021-09-23 RX ADMIN — HALOPERIDOL DECANOATE 1 MILLIGRAM(S): 100 INJECTION INTRAMUSCULAR at 05:01

## 2021-09-23 RX ADMIN — Medication 10 MILLIGRAM(S): at 21:48

## 2021-09-23 RX ADMIN — OLANZAPINE 5 MILLIGRAM(S): 15 TABLET, FILM COATED ORAL at 08:36

## 2021-09-23 RX ADMIN — OLANZAPINE 10 MILLIGRAM(S): 15 TABLET, FILM COATED ORAL at 21:47

## 2021-09-23 RX ADMIN — Medication 1 MILLIGRAM(S): at 14:50

## 2021-09-23 NOTE — DIETITIAN INITIAL EVALUATION ADULT. - PERTINENT LABORATORY DATA
09-23 @ 08:46: Na 144, BUN 47<H>, Cr 0.98, <H>, K+ 4.4, Phos 3.8, Mg 3.1<H>, Alk Phos 63, ALT/SGPT 37, AST/SGOT 58<H>, HbA1c --

## 2021-09-23 NOTE — PROGRESS NOTE ADULT - ASSESSMENT
67 y/o male with PMHx CLL followed by heme/onc, anemia, melanoma, osteopenia admitted for right facial droop, right hemiparesis and expressive aphasia.    Admitted to the ICU at [place] yesterday and is S/P TPA. He has now been transferred for evaluation of his seizures and evaluation of seizures    CVA? / AMS  - Patient with new right sided UE weakness, facial droop and expressive aphasia at   - s/p tpa without improvement in symptoms  - 9/15 CT head without acute ICH  - 9/16 MRI neg for infarct  - neurology follow up.  s/p video eeg  - cont asa and statin    PAF  - Patient seen on a prior admission for syncope 7/2020 and found to have brief Afib  - Tele during this admission SR   - echocardiogram 9/15 unremarkable  - If no definitive CVA would support remaining off AC for now.    - Continue to monitor  - continue aspirin    BP  - labile BP. high readings 2/2 agitation. cont to monitor for now. can use PRN hydralazine IV    CLL  - follow up heme/onc    - Monitor and replete lytes, keep K>4, Mg>2.  - All other medical needs as per Neuro team  - Other cardiovascular workup will depend on clinical course.  - Will continue to follow.

## 2021-09-23 NOTE — DIETITIAN INITIAL EVALUATION ADULT. - CHIEF COMPLAINT
Patient is a 67 yo Rt handed male PMH CLL on Venetoclax, MDS, melanoma, paroxysmal AFib (not on AC) presenting as transfer from [place] for event capture.

## 2021-09-23 NOTE — PROGRESS NOTE ADULT - SUBJECTIVE AND OBJECTIVE BOX
[facility] -- Sveta Romano, Ebony Sparrow Pannella, Patel, Savella  Office # [phone]      Follow Up:  PAF    Subjective/Observations: Patient seen and examined. Events noted. Resting comfortably in bed. alert but Unable to provide meaningful information.       REVIEW OF SYSTEMS: Limited 2/2 comorbidities     PAST MEDICAL & SURGICAL HISTORY:  Osteopenia    CLL (Chronic Lymphocytic Leukemia)  SINCE 1988    Avascular Necrosis of Femur Head  B/L    TMJ Syndrome  limited jaw opening due to TMJ    Herniated Cervical Disc    Bulging Disc  LUMBAR    Melanoma    Deviated nasal septum    Nasal congestion    Rosacea    Deviated nasal septum    Other specified disorders of nose and nasal sinuses    Anemia    S/P Splenectomy  1993    Avascular Necrosis of Femur Head  RIGHT - CORE DECOMPRESSION- 1993    Avascular Necrosis of Femur Head  LEFT - CORE DECOMPRESSION - 1993    Abnormal Jaw Closure  LEFT JAW SURGERY - 1988    Status Post Eye Surgery X 3  3/2011 right eye scleral buckle; left eye in 2013    S/P Tonsillectomy  1960    Bilateral cataracts  removed in 2011        MEDICATIONS  (STANDING):  aspirin  chewable 81 milliGRAM(s) Oral daily  atorvastatin 80 milliGRAM(s) Oral at bedtime  enoxaparin Injectable 40 milliGRAM(s) SubCutaneous daily  folic acid 1 milliGRAM(s) Oral daily  melatonin 10 milliGRAM(s) Oral at bedtime  methylPREDNISolone sodium succinate IVPB 1000 milliGRAM(s) IV Intermittent daily  OLANZapine 5 milliGRAM(s) Oral <User Schedule>  OLANZapine 5 milliGRAM(s) Oral at bedtime  pantoprazole  Injectable 40 milliGRAM(s) IV Push daily    MEDICATIONS  (PRN):  acetaminophen   Tablet .. 650 milliGRAM(s) Oral every 6 hours PRN Mild Pain (1 - 3), Moderate Pain (4 - 6), Severe Pain (7 - 10)  diphenhydrAMINE 25 milliGRAM(s) Oral every 4 hours PRN Rash and/or Itching  lacosamide IVPB 100 milliGRAM(s) IV Intermittent once PRN Seizure  LORazepam   Injectable 1 milliGRAM(s) IV Push once PRN Seizure  OLANZapine Injectable 2.5 milliGRAM(s) IntraMuscular every 4 hours PRN Agitation      Allergies    No Known Allergies    Intolerances            Vital Signs Last 24 Hrs  T(C): 36.3 (23 Sep 2021 09:14), Max: 36.8 (23 Sep 2021 04:15)  T(F): 97.3 (23 Sep 2021 09:14), Max: 98.2 (23 Sep 2021 04:15)  HR: 69 (23 Sep 2021 09:14) (67 - 100)  BP: 135/67 (23 Sep 2021 09:14) (127/86 - 164/88)  BP(mean): --  RR: 17 (23 Sep 2021 09:14) (17 - 19)  SpO2: 96% (23 Sep 2021 09:14) (95% - 97%)    I&O's Summary    22 Sep 2021 07:01  -  23 Sep 2021 07:00  --------------------------------------------------------  IN: 240 mL / OUT: 1050 mL / NET: -810 mL    23 Sep 2021 07:01  -  23 Sep 2021 09:30  --------------------------------------------------------  IN: 300 mL / OUT: 0 mL / NET: 300 mL          PHYSICAL EXAM:     Constitutional: NAD, awake and alert, well-developed  HEENT: Moist Mucous Membranes, Anicteric  Pulmonary: Non-labored, breath sounds are clear bilaterally, No wheezing, rales or rhonchi  Cardiovascular: Regular, S1 and S2, No murmurs, rubs, gallops or clicks  Gastrointestinal: Bowel Sounds present, soft, nontender.   Lymph: No peripheral edema. No lymphadenopathy.  Skin: No visible rashes or ulcers.  Psych:  Mood & affect appropriate for situation    LABS: All Labs Reviewed:                        12.4   10.14 )-----------( 246      ( 23 Sep 2021 08:46 )             37.0                         11.8   9.73  )-----------( 281      ( 22 Sep 2021 05:35 )             35.4                         12.3   6.91  )-----------( 270      ( 21 Sep 2021 05:48 )             36.9     23 Sep 2021 08:46    144    |  107    |  47     ----------------------------<  155    4.4     |  24     |  0.98   22 Sep 2021 05:38    143    |  106    |  34     ----------------------------<  152    3.9     |  23     |  1.03   21 Sep 2021 05:48    140    |  102    |  21     ----------------------------<  114    3.3     |  23     |  0.89     Ca    9.1        23 Sep 2021 08:46  Ca    9.5        22 Sep 2021 05:38  Ca    9.2        21 Sep 2021 05:48  Phos  3.8       23 Sep 2021 08:46  Phos  4.4       22 Sep 2021 05:38  Phos  3.0       21 Sep 2021 05:48  Mg     3.1       23 Sep 2021 08:46  Mg     2.8       22 Sep 2021 05:38  Mg     2.3       21 Sep 2021 05:48    TPro  7.8    /  Alb  4.2    /  TBili  2.0    /  DBili  x      /  AST  58     /  ALT  37     /  AlkPhos  63     23 Sep 2021 08:46

## 2021-09-23 NOTE — BH CONSULTATION LIAISON PROGRESS NOTE - NSBHCHARTREVIEWLAB_PSY_A_CORE FT
12.4   10.14 )-----------( 246      ( 23 Sep 2021 08:46 )             37.0     09-23    144  |  107  |  47<H>  ----------------------------<  155<H>  4.4   |  24  |  0.98    Ca    9.1      23 Sep 2021 08:46  Phos  3.8     09-23  Mg     3.1     09-23    TPro  7.8  /  Alb  4.2  /  TBili  2.0<H>  /  DBili  x   /  AST  58<H>  /  ALT  37  /  AlkPhos  63  09-23  
                      11.8   9.73  )-----------( 281      ( 22 Sep 2021 05:35 )             35.4     09-22    143  |  106  |  34<H>  ----------------------------<  152<H>  3.9   |  23  |  1.03    Ca    9.5      22 Sep 2021 05:38  Phos  4.4     09-22  Mg     2.8     09-22

## 2021-09-23 NOTE — BH CONSULTATION LIAISON PROGRESS NOTE - NSBHFUPINTERVALHXFT_PSY_A_CORE
Pt overnight needed x2 PRN Zyprexa 2.5mg IM overnight and additional dose this morning. On assessment, pt is laying in bed fidgeting arms and hands but holding hands w/ wife who is sitting at bedside. Pt is more oriented today, correctly states he is in a hospital and states correct year and knows wife's name. Pt states he is doing "ok". According to CO in room pt was not able to sleep overnight but is more calm in comparison to yesterday. Pt was still having disorganized thoughts this morning and hallucinating. Reports that he sees his brother leaning on him, brother is really wheel chair bound and is currently in a nursing home. But pt is not agitated.  Wife is still worried pt has not been sleeping but also states when she arrived this morning pt appeared improved from yesterday.
Pt overnight needed zyprexa prn, haldol prn for agitation. pt nonverbal, lethargic today not answering questions. Pt currently calm. as per staff, pt improving, overall less agitated, still confused.

## 2021-09-23 NOTE — PROGRESS NOTE ADULT - ASSESSMENT
ASSESSMENT: Patient is a 67 yo Rt handed male PMH CLL on Venetoclax, MDS, melanoma, paroxysmal AFib (not on AC) presenting as transfer from [place] for event capture. Patient was initially at  [facility] on 9/14 with confusion, aphasia and R sided weakness. With Brain MRI negative. EEG note to show discharges from Left hemisphere with RUE spasticity There is concern for subclinical seizures thus transferred to [facility] for event capture. Cardiology consulted.  Heme/onc to follow this AM and will followup on recommendations on heme/onc medications.  Patient was noted to have episodes agitation with episodes of somnolence with out medication needed. Patient had LP this afternoon and will follow up on CSF results. Cardiology states to hold off on AC as there is no definitive CVA noted. Continue with ASA and statin. Improved s/p 1 dose of IVIG. Still with some mild cognitive delay and now with visual hallucinations. Poss related to ativan, will dc. Wife notes that patient gets IVIG treatment every month as part of his CLL treatment.     9/22: Neuro exam with mild improvement in agitation, restlessness, attention and eye contact. Continues to have hallucinations where he sees bugs.    Impression: RUE spasticity and aphasia concerning for seizures, EEG negative for seizures. Patient acutely psychotic with left hemispheric slowing concerning for AIE in the setting of CLL.      PLAN:     AMS/AIE  - Remains off vEEG  - Discontinued IVIG, received 3 days, no improvement in symptoms thus far  - Cleared by heme/onc from an oncology standpoint to start steroids.  - Patient with mild improvement in agitation, will continue Solu-medrol 1000 mg IV for a total of 5 days (9/21-9/25)- today Day 2.  - 1:1 Constant Observations for safety  - Seroquel discontinued due to prolonged QTc 467.  - Psych consulted appreciated.  - Increased Zyprexa to 5 mg in the AM and continuing Zyprexa 5 mg at bedtime and continuing Zyprexa 2.5 mg IM PRN for breakthrough agitation. 2nd line treatment can give Haldol 1 mg IV q6hrs.   - Melatonin increased to 10 mg at bedtime  - LP completed- Glucose 73, Protein 55, WBC 3, Gram stain no growth to date, HSV neg, Cryptococcal neg; f/u rest of results  - No MR brain w/wo contrast at this time  - Visual hallucinations, concerning due to similar presentation of retinal detachment, Optho consulted   - No seizures on EEG, continuing to hold AEDs  - Rescue: 1st-Ativan 1mg, 2nd-Vimpat 100mg IV if GTC refractory to ativan    CLL  - Heme/onc to continue to follow and recommendations appreciated.   - Will discuss with Heme/Onc regarding ongoing CLL treatment- no inpatient treatment at this time.  - Follows with Dr. Godinez and  Dr. Valenzuela outpatient.     PAfib/HTN  - Cardiology recommendations appreciated: choosing to defer AC at this time; can use PRN Hydralazine IV is needed for HTN  - Continue tele monitoring  - 12 lead EKG    Dietary  - Speech and Swallow Re-eval: Recommend NPO at this time due to concern for aspiration, if wife wants patient to eat can consider Dysphagia 1 diet with honey thickened liquids- wife willing to assume risk of aspiration with meals and patient started on recommended diet with aspiration precautions and supervision by staff of all meals.    DVT ppx: Lovenox subq   Dispo: PT/OT/PMR recommends: AR   ASSESSMENT: Patient is a 67 yo Rt handed male PMH CLL on Venetoclax, MDS, melanoma, paroxysmal AFib (not on AC) presenting as transfer from [place] for event capture. Patient was initially at  [facility] on 9/14 with confusion, aphasia and R sided weakness. With Brain MRI negative. EEG note to show discharges from Left hemisphere with RUE spasticity There is concern for subclinical seizures thus transferred to [facility] for event capture. Cardiology consulted.  Heme/onc to follow this AM and will followup on recommendations on heme/onc medications.  Patient was noted to have episodes agitation with episodes of somnolence with out medication needed. Patient had LP this afternoon and will follow up on CSF results. Cardiology states to hold off on AC as there is no definitive CVA noted. Continue with ASA and statin. Improved s/p 1 dose of IVIG. Still with some mild cognitive delay and now with visual hallucinations. Poss related to ativan, will dc. Wife notes that patient gets IVIG treatment every month as part of his CLL treatment.     9/23: Neuro exam with mild improvement in agitation, restlessness, attention and eye contact. Did not endorse hallucinations of seeing bugs. Affect and mood improved, patient laughing and smiling and interacting with 1:1 at bedside.    Impression: RUE spasticity and aphasia concerning for seizures, EEG negative for seizures. Patient acutely psychotic with left hemispheric slowing concerning for AIE in the setting of CLL.      PLAN:     AMS/AIE  - Remains off vEEG  - Discontinued IVIG, received 3 days, no improvement in symptoms thus far  - Cleared by heme/onc from an oncology standpoint to start steroids.  - Patient with mild improvement in agitation, will continue Solu-medrol 1000 mg IV for a total of 5 days (9/21-9/25)- today Day 2.  - 1:1 Constant Observations for safety  - Seroquel discontinued due to prolonged QTc 467.  - Psych consulted appreciated.  - Continuing Zyprexa 5 mg in the AM and continuing Zyprexa 5 mg at bedtime and continuing Zyprexa 2.5 mg IM PRN for breakthrough agitation. 2nd line treatment can give Haldol 1 mg IV q6hrs.   - Continuing melatonin to 10 mg at bedtime  - LP completed- Glucose 73, Protein 55, WBC 3, Gram stain no growth to date, HSV neg, Cryptococcal neg; f/u rest of results  - No MR brain w/wo contrast at this time  - Visual hallucinations, concerning due to similar presentation of retinal detachment, Optho consulted   - No seizures on EEG, continuing to hold AEDs  - Rescue: 1st-Ativan 1mg, 2nd-Vimpat 100mg IV if GTC refractory to ativan    CLL  - Heme/onc to continue to follow and recommendations appreciated.   - Will discuss with Heme/Onc regarding ongoing CLL treatment- no inpatient treatment at this time.  - Follows with Dr. Godinez and  Dr. Valenzuela outpatient.     PAfib/HTN  - Cardiology recommendations appreciated: choosing to defer AC at this time; can use PRN Hydralazine IV is needed for HTN  - Continue tele monitoring  - 12 lead EKG    Dietary  - Speech and Swallow Re-eval: Recommend NPO at this time due to concern for aspiration, if wife wants patient to eat can consider Dysphagia 1 diet with honey thickened liquids- wife willing to assume risk of aspiration with meals and patient started on recommended diet with aspiration precautions and supervision by staff of all meals.    DVT ppx: Lovenox subq   Dispo: PT/OT/PMR recommends: AR   ASSESSMENT: Patient is a 65 yo Rt handed male PMH CLL on Venetoclax, MDS, melanoma, paroxysmal AFib (not on AC) presenting as transfer from [place] for event capture. Patient was initially at  [facility] on 9/14 with confusion, aphasia and R sided weakness. With Brain MRI negative. EEG note to show discharges from Left hemisphere with RUE spasticity There is concern for subclinical seizures thus transferred to [facility] for event capture. Cardiology consulted.  Heme/onc to follow this AM and will followup on recommendations on heme/onc medications.  Patient was noted to have episodes agitation with episodes of somnolence with out medication needed. Patient had LP this afternoon and will follow up on CSF results. Cardiology states to hold off on AC as there is no definitive CVA noted. Continue with ASA and statin. Improved s/p 1 dose of IVIG. Still with some mild cognitive delay and now with visual hallucinations. Poss related to ativan, will dc. Wife notes that patient gets IVIG treatment every month as part of his CLL treatment.     9/23: Neuro exam with mild improvement in agitation, restlessness, attention and eye contact. Affect and mood improved, patient laughing and smiling and interacting with 1:1 at bedside.    Impression: RUE spasticity and aphasia concerning for seizures, EEG negative for seizures. Patient acutely psychotic with left hemispheric slowing concerning for AIE in the setting of CLL.      PLAN:     AMS/AIE  - Remains off vEEG  - Discontinued IVIG, received 3 days, no improvement in symptoms thus far  - Cleared by heme/onc from an oncology standpoint to start steroids.  - Continuing Solu-medrol 1000 mg IV for a total of 5 days (9/21-9/25)- today Day 3.  - 1:1 Constant Observations for safety  - Seroquel discontinued due to prolonged QTc 467.  - Psych consulted appreciated.  - Continuing Zyprexa 5 mg in the AM and continuing Zyprexa 5 mg at bedtime and continuing Zyprexa 2.5 mg IM PRN for breakthrough agitation. 2nd line treatment can give Haldol 1 mg IV q6hrs.   - Continuing melatonin to 10 mg at bedtime  - LP completed- Glucose 73, Protein 55, WBC 3, Gram stain no growth to date, HSV neg, Cryptococcal neg; f/u rest of results  - No MR brain w/wo contrast at this time  - Visual hallucinations, concerning due to similar presentation of retinal detachment, Optho consulted   - No seizures on EEG, continuing to hold AEDs  - Rescue: 1st-Ativan 1mg, 2nd-Vimpat 100mg IV if GTC refractory to ativan    CLL  - Heme/onc to continue to follow and recommendations appreciated.   - Will discuss with Heme/Onc regarding ongoing CLL treatment- no inpatient treatment at this time.  - Follows with Dr. Godinez and  Dr. Valenzuela outpatient.     PAfib/HTN  - Cardiology recommendations appreciated: choosing to defer AC at this time; can use PRN Hydralazine IV is needed for HTN  - Continue tele monitoring  - 12 lead EKG    Elevated BUN/Hypermagnesemia  - BUN elevation most likely due to steroids  - Gentle hydration with NS @50ml/hr  - Mg level 3.1, asymptomatic, not on mg supplements; continue to monitor for symptoms of elevated mg and mg levels  - Consider nephrology consultation if no improvements.    Dietary  - Speech and Swallow Re-eval: Recommend NPO at this time due to concern for aspiration, if wife wants patient to eat can consider Dysphagia 1 diet with honey thickened liquids- wife willing to assume risk of aspiration with meals and patient started on recommended diet with aspiration precautions and supervision by staff of all meals.    DVT ppx: Lovenox subq   Dispo: PT/OT/PMR recommends: AR   ASSESSMENT: Patient is a 67 yo Rt handed male PMH CLL on Venetoclax, MDS, melanoma, paroxysmal AFib (not on AC) presenting as transfer from [place] for event capture. Patient was initially at  [facility] on 9/14 with confusion, aphasia and R sided weakness. With Brain MRI negative. EEG note to show discharges from Left hemisphere with RUE spasticity There is concern for subclinical seizures thus transferred to [facility] for event capture. Cardiology consulted.  Heme/onc to follow this AM and will followup on recommendations on heme/onc medications.  Patient was noted to have episodes agitation with episodes of somnolence with out medication needed. Patient had LP this afternoon and will follow up on CSF results. Cardiology states to hold off on AC as there is no definitive CVA noted. Continue with ASA and statin. Improved s/p 1 dose of IVIG. Still with some mild cognitive delay and now with visual hallucinations. Poss related to ativan, will dc. Wife notes that patient gets IVIG treatment every month as part of his CLL treatment.     9/23: Neuro exam with mild improvement in agitation, restlessness, attention and eye contact. Affect and mood improved, patient laughing and smiling and interacting with 1:1 at bedside.    Impression: RUE spasticity and aphasia concerning for seizures, EEG negative for seizures. Patient acutely psychotic with left hemispheric slowing concerning for AIE in the setting of CLL.      PLAN:     AMS/AIE  - Remains off vEEG  - Discontinued IVIG, received 3 days, no improvement in symptoms thus far  - Cleared by heme/onc from an oncology standpoint to start steroids.  - Continuing Solu-medrol 1000 mg IV for a total of 5 days (9/21-9/25)- today Day 3.  - 1:1 Constant Observations for safety  - Seroquel discontinued due to prolonged QTc 467.  - Psych consulted appreciated.  - Zyprexa changed to 10 mg at bedtime due to patient being more hyperactive at night and sleeping during the day.   - Continuing Zyprexa 2.5 mg IM PRN for breakthrough agitation. 2nd line treatment can give Haldol 1 mg IV q6hrs.   - Continuing melatonin to 10 mg at bedtime  - LP completed- Glucose 73, Protein 55, WBC 3, Gram stain no growth to date, HSV neg, Cryptococcal neg; f/u rest of results  - No MR brain w/wo contrast at this time  - Visual hallucinations, concerning due to similar presentation of retinal detachment, Optho consulted   - No seizures on EEG, continuing to hold AEDs  - Rescue: 1st-Ativan 1mg, 2nd-Vimpat 100mg IV if GTC refractory to ativan    CLL  - Heme/onc to continue to follow and recommendations appreciated.   - Will discuss with Heme/Onc regarding ongoing CLL treatment- no inpatient treatment at this time.  - Follows with Dr. Godinez and  Dr. Valenzuela outpatient.     PAfib/HTN  - Cardiology recommendations appreciated: choosing to defer AC at this time; can use PRN Hydralazine IV is needed for HTN  - Continue tele monitoring  - 12 lead EKG    Elevated BUN/Hypermagnesemia  - BUN elevation most likely due to steroids  - Gentle hydration with NS @50ml/hr  - Mg level 3.1, asymptomatic, not on mg supplements; continue to monitor for symptoms of elevated mg and mg levels  - Consider nephrology consultation if no improvements.    Dietary  - Speech and Swallow Re-eval: Recommend NPO at this time due to concern for aspiration, if wife wants patient to eat can consider Dysphagia 1 diet with honey thickened liquids- wife willing to assume risk of aspiration with meals and patient started on recommended diet with aspiration precautions and supervision by staff of all meals.    DVT ppx: Lovenox subq   Dispo: PT/OT/PMR recommends: AR   ASSESSMENT: Patient is a 65 yo Rt handed male PMH CLL on Venetoclax, MDS, melanoma, paroxysmal AFib (not on AC) presenting as transfer from [place] for event capture. Patient was initially at  [facility] on 9/14 with confusion, aphasia and R sided weakness. With Brain MRI negative. EEG note to show discharges from Left hemisphere with RUE spasticity There is concern for subclinical seizures thus transferred to [facility] for event capture. Cardiology consulted.  Heme/onc to follow this AM and will followup on recommendations on heme/onc medications.  Patient was noted to have episodes agitation with episodes of somnolence with out medication needed. Patient had LP this afternoon and will follow up on CSF results. Cardiology states to hold off on AC as there is no definitive CVA noted. Continue with ASA and statin. Improved s/p 1 dose of IVIG. Still with some mild cognitive delay and now with visual hallucinations. Poss related to ativan, will dc. Wife notes that patient gets IVIG treatment every month as part of his CLL treatment.     9/23: Neuro exam with mild improvement in agitation, restlessness, attention and eye contact. Affect and mood improved, patient laughing and smiling and interacting with 1:1 at bedside.    Impression: RUE spasticity and aphasia concerning for seizures, EEG negative for seizures. Patient acutely psychotic with left hemispheric slowing concerning for AIE in the setting of CLL.      PLAN:     AMS/AIE  - Remains off vEEG  - Discontinued IVIG, received 3 days, no improvement in symptoms thus far  - Cleared by heme/onc from an oncology standpoint to start steroids.  - Continuing Solu-medrol 1000 mg IV for a total of 5 days (9/21-9/25)- today Day 3.  - 1:1 Constant Observations for safety  - Seroquel discontinued due to prolonged QTc 467.  - Psych consulted appreciated.  - Zyprexa changed to 10 mg at bedtime due to patient being more hyperactive at night and sleeping during the day.   - Continuing Zyprexa 2.5 mg IM PRN for breakthrough agitation. 2nd line treatment can give Haldol 1 mg IV q6hrs.   - Continuing melatonin to 10 mg at bedtime  - LP completed- Glucose 73, Protein 55, WBC 3, Gram stain no growth to date, HSV neg, Cryptococcal neg; infectious w/u thus far negative; AIE panel pending f/u results  - No MR brain w/wo contrast at this time  - Visual hallucinations, concerning due to similar presentation of retinal detachment, Optho consulted   - No seizures on EEG, continuing to hold AEDs  - Rescue: 1st-Ativan 1mg, 2nd-Vimpat 100mg IV if GTC refractory to ativan    CLL  - Heme/onc to continue to follow and recommendations appreciated.   - Will discuss with Heme/Onc regarding ongoing CLL treatment- no inpatient treatment at this time.  - Flow cytometry CSF results: lymphocyte immunophenotypic findings show no diagnostic abnormalities  - Follows with Dr. Godinez and  Dr. Valenzuela outpatient.     PAfib/HTN  - Cardiology recommendations appreciated: choosing to defer AC at this time; can use PRN Hydralazine IV is needed for HTN  - Continue tele monitoring  - 12 lead EKG    Elevated BUN/Hypermagnesemia  - BUN elevation most likely due to steroids  - Gentle hydration with NS @50ml/hr  - Mg level 3.1, asymptomatic, not on mg supplements; continue to monitor for symptoms of elevated mg and mg levels  - Consider nephrology consultation if no improvements.    Dietary  - Speech and Swallow Re-eval: Recommend NPO at this time due to concern for aspiration, if wife wants patient to eat can consider Dysphagia 1 diet with honey thickened liquids- wife willing to assume risk of aspiration with meals and patient started on recommended diet with aspiration precautions and supervision by staff of all meals.    DVT ppx: Lovenox subq   Dispo: PT/OT/PMR recommends: AR

## 2021-09-23 NOTE — DIETITIAN INITIAL EVALUATION ADULT. - ORAL INTAKE PTA/DIET HISTORY
JOYCE PO intake PTA - pt non-participative in RD interview, non-responsive to RD questions. Pt with recent RD assessment 9/15. Per H&P pt on folic acid PTA. NKFA per chart.

## 2021-09-23 NOTE — DIETITIAN INITIAL EVALUATION ADULT. - PHYSCIAL ASSESSMENT
underweight No noted pressure injuries as per documentation.  Nutrition focused physical exam deferred at this time as pt unable to provide consent. Visual exam details below.

## 2021-09-23 NOTE — BH CONSULTATION LIAISON PROGRESS NOTE - NSBHPSYCHOLCOGABN_PSY_A_CORE
disoriented to time/disoriented to place/disoriented to person/disoriented to situation
disoriented to time

## 2021-09-23 NOTE — DIETITIAN INITIAL EVALUATION ADULT. - ADD RECOMMEND
Continue micronutrient supplementation as ordered as currently ordered. Provide encouragement with PO intake, menu selections, and assistance with meals as needed. Continue to monitor PO intake, labs, weights, BM, skin, clinical course.

## 2021-09-23 NOTE — BH CONSULTATION LIAISON PROGRESS NOTE - CURRENT MEDICATION
MEDICATIONS  (STANDING):  aspirin  chewable 81 milliGRAM(s) Oral daily  atorvastatin 80 milliGRAM(s) Oral at bedtime  enoxaparin Injectable 40 milliGRAM(s) SubCutaneous daily  folic acid 1 milliGRAM(s) Oral daily  melatonin 10 milliGRAM(s) Oral at bedtime  methylPREDNISolone sodium succinate IVPB 1000 milliGRAM(s) IV Intermittent daily  OLANZapine 5 milliGRAM(s) Oral <User Schedule>  OLANZapine 5 milliGRAM(s) Oral at bedtime  pantoprazole  Injectable 40 milliGRAM(s) IV Push daily  sodium chloride 0.9%. 1000 milliLiter(s) (50 mL/Hr) IV Continuous <Continuous>    MEDICATIONS  (PRN):  acetaminophen   Tablet .. 650 milliGRAM(s) Oral every 6 hours PRN Mild Pain (1 - 3), Moderate Pain (4 - 6), Severe Pain (7 - 10)  diphenhydrAMINE 25 milliGRAM(s) Oral every 4 hours PRN Rash and/or Itching  lacosamide IVPB 100 milliGRAM(s) IV Intermittent once PRN Seizure  LORazepam   Injectable 1 milliGRAM(s) IV Push once PRN Seizure  OLANZapine Injectable 2.5 milliGRAM(s) IntraMuscular every 4 hours PRN Agitation  
MEDICATIONS  (STANDING):  aspirin  chewable 81 milliGRAM(s) Oral daily  atorvastatin 80 milliGRAM(s) Oral at bedtime  enoxaparin Injectable 40 milliGRAM(s) SubCutaneous daily  folic acid 1 milliGRAM(s) Oral daily  melatonin 10 milliGRAM(s) Oral at bedtime  OLANZapine 5 milliGRAM(s) Oral <User Schedule>  OLANZapine 5 milliGRAM(s) Oral at bedtime  pantoprazole  Injectable 40 milliGRAM(s) IV Push daily    MEDICATIONS  (PRN):  acetaminophen   Tablet .. 650 milliGRAM(s) Oral every 6 hours PRN Mild Pain (1 - 3), Moderate Pain (4 - 6), Severe Pain (7 - 10)  diphenhydrAMINE 25 milliGRAM(s) Oral every 4 hours PRN Rash and/or Itching  lacosamide IVPB 100 milliGRAM(s) IV Intermittent once PRN Seizure  LORazepam   Injectable 1 milliGRAM(s) IV Push once PRN Seizure  OLANZapine Injectable 2.5 milliGRAM(s) IntraMuscular every 4 hours PRN Agitation

## 2021-09-23 NOTE — DIETITIAN INITIAL EVALUATION ADULT. - ETIOLOGY
suspected inadequate protein energy intake suspected inadequate protein energy intake in setting of increased physiological demand for nutrients 2/2 CLL, MDS

## 2021-09-23 NOTE — BH CONSULTATION LIAISON PROGRESS NOTE - NSBHCHARTREVIEWINVESTIGATE_PSY_A_CORE FT
< from: 12 Lead ECG (09.21.21 @ 05:02) >      Ventricular Rate 96 BPM    Atrial Rate 96 BPM    P-R Interval 144 ms    QRS Duration 98 ms    Q-T Interval 370 ms    QTC Calculation(Bazett) 467 ms    P Axis 81 degrees    R Axis 49 degrees    T Axis 59 degrees    Diagnosis Line NORMAL SINUS RHYTHM  NORMAL ECG  WHEN COMPARED WITH ECG OF `9/20/21 21:02  NO SIGNIFICANT CHANGE WAS FOUND  Confirmed by NAZANIN HAYS MD ([id number]) on 9/21/2021 11:22:33 AM    < end of copied text >

## 2021-09-23 NOTE — DIETITIAN INITIAL EVALUATION ADULT. - OTHER INFO
Dosing wt 129.1lb Dosing wt 129.1lb. Per RD assessment 9/15 pt 158lb - ?accuracy of weight given current wt status. Per [facility] HIE: 149.9lb (9/14/21), 147.9lb (5/2021), 149.9lb (7/2020). If dosing wt correct, suggests 18.8lb wt loss x 4 months, 12.7% - clinically significant.     Pt with no reported nausea, vomiting, diarrhea, or constipation. Last BM 9/20. Per 1:1 PCA pt with good intake this AM, 70% of meal tray consumed. Lunch yet to be consumed.

## 2021-09-23 NOTE — PROGRESS NOTE ADULT - SUBJECTIVE AND OBJECTIVE BOX
THE PATIENT WAS SEEN AND EXAMINED BY ME WITH THE HOUSESTAFF DURING MORNING ROUNDS.   HPI:  [id number]    HPI:  Patient is a 67 yo Rt handed male PMH CLL on Venetoclax, MDS, melanoma, paroxysmal AFib (not on AC) presenting as transfer from [place] for event capture. Patient was initially at  [facility] on 9/14 with confusion, aphasia and R sided weakness. Patient was stroke code and was given s/p tPA. On admission, patient had CTH, CTA, CT perfusion during code stroke protocol with no acute findings. MRI Brain was  performed at  9/16 with no ischemic findings. EEG performed 9/15 showed diffuse L hemispheric slowing. Pt remained aphasic with AMS. +RUE spasticity was noted on exam. Patient was thus transferred to [facility] for EMU admission to evaluate for subclinical seizures.  Of note, patient does not have hx of seizures.        ROS: Otherwise negative.     SUBJECTIVE: Restless and climbing out of bed overnight. Received Zyprexa 2.5 mg IM and Haldol 1 mg IV.      acetaminophen   Tablet .. 650 milliGRAM(s) Oral every 6 hours PRN  aspirin  chewable 81 milliGRAM(s) Oral daily  atorvastatin 80 milliGRAM(s) Oral at bedtime  diphenhydrAMINE 25 milliGRAM(s) Oral every 4 hours PRN  enoxaparin Injectable 40 milliGRAM(s) SubCutaneous daily  folic acid 1 milliGRAM(s) Oral daily  lacosamide IVPB 100 milliGRAM(s) IV Intermittent once PRN  LORazepam   Injectable 1 milliGRAM(s) IV Push once PRN  melatonin 10 milliGRAM(s) Oral at bedtime  methylPREDNISolone sodium succinate IVPB 1000 milliGRAM(s) IV Intermittent daily  OLANZapine 5 milliGRAM(s) Oral <User Schedule>  OLANZapine 5 milliGRAM(s) Oral at bedtime  OLANZapine Injectable 2.5 milliGRAM(s) IntraMuscular every 4 hours PRN  pantoprazole  Injectable 40 milliGRAM(s) IV Push daily      Physical Exam: Neurological Exam:  	Mental Status: Eyes open, oriented to person, place, year but not month or situation, states "he was excited at home and fell twice, once in December and once in May". Attention moderately impaired requiring continued prompting to remain on task, poor eye contact, but improved from the prior day. Can name 1/3 objects (for watch he said hand clock). Unable to spell WORLD forwards or backwards. On 5 minute word recall 1/3 correct, was able to say red and car with choice. No dysarthria, aphasia or neglect.   	Cranial Nerves: PERRL, EOMI, no nystagmus or diplopia. No facial asymmetry.  Hearing intact to voice.  Tongue, uvula and palate midline.  Sternocleidomastoid and Trapezius intact bilaterally  	Motor: moving all 4 extremities equally at least antigravity  	Tone: normal.                  	Pronator drift: none                 	No truncal ataxia.    	Tremor: No resting, postural or action tremor.  	Sensation: intact to light touch    LABS:                        11.8   9.73  )-----------( 281      ( 22 Sep 2021 05:35 )             35.4    09-22    143  |  106  |  34<H>  ----------------------------<  152<H>  3.9   |  23  |  1.03    Ca    9.5      22 Sep 2021 05:38  Phos  4.4     09-22  Mg     2.8     09-22         COVID-19 PCR: NotDetec (14 Sep 2021 18:47)      IMAGING:     MRI 9.16.21  IMPRESSION: No acute territorial infarcts seen.    EEG 9.19.21  EEG Summary:    Abnormal EEG in the awake, drowsy and asleep states.  - Intermittent polymorphic delta slowing in the left hemisphere    Impression/Clinical Correlate:    This is an abnormal EEG record. No epileptiform pattern or seizures were seen. There is evidence for transient and reversible cause of functional abnormality affecting the left hemisphere.     EEG 9.18.21  EEG Summary:    Abnormal EEG in the awake, drowsy and asleep states.  - Continuous polymorphic delta slowing in the left hemisphere, slowly transitioning to intermittent slowing in the latter half of the recording recording.    Impression/Clinical Correlate:    This is an abnormal EEG record. No epileptiform pattern or seizures were seen. There is evidence for transient and reversible cause of functional abnormality affecting the left   THE PATIENT WAS SEEN AND EXAMINED BY ME WITH THE HOUSESTAFF DURING MORNING ROUNDS.   HPI:  [id number]    HPI:  Patient is a 67 yo Rt handed male PMH CLL on Venetoclax, MDS, melanoma, paroxysmal AFib (not on AC) presenting as transfer from [place] for event capture. Patient was initially at  [facility] on 9/14 with confusion, aphasia and R sided weakness. Patient was stroke code and was given s/p tPA. On admission, patient had CTH, CTA, CT perfusion during code stroke protocol with no acute findings. MRI Brain was  performed at  9/16 with no ischemic findings. EEG performed 9/15 showed diffuse L hemispheric slowing. Pt remained aphasic with AMS. +RUE spasticity was noted on exam. Patient was thus transferred to [facility] for EMU admission to evaluate for subclinical seizures.  Of note, patient does not have hx of seizures.        ROS: Otherwise negative.     SUBJECTIVE: Restless and climbing out of bed overnight. Received Zyprexa 2.5 mg IM and Haldol 1 mg IV.      acetaminophen   Tablet .. 650 milliGRAM(s) Oral every 6 hours PRN  aspirin  chewable 81 milliGRAM(s) Oral daily  atorvastatin 80 milliGRAM(s) Oral at bedtime  diphenhydrAMINE 25 milliGRAM(s) Oral every 4 hours PRN  enoxaparin Injectable 40 milliGRAM(s) SubCutaneous daily  folic acid 1 milliGRAM(s) Oral daily  lacosamide IVPB 100 milliGRAM(s) IV Intermittent once PRN  LORazepam   Injectable 1 milliGRAM(s) IV Push once PRN  melatonin 10 milliGRAM(s) Oral at bedtime  methylPREDNISolone sodium succinate IVPB 1000 milliGRAM(s) IV Intermittent daily  OLANZapine 5 milliGRAM(s) Oral <User Schedule>  OLANZapine 5 milliGRAM(s) Oral at bedtime  OLANZapine Injectable 2.5 milliGRAM(s) IntraMuscular every 4 hours PRN  pantoprazole  Injectable 40 milliGRAM(s) IV Push daily    General: Improved mood and affect, smiling and laughing with staff.    Physical Exam: Neurological Exam:  	Mental Status: Eyes open, oriented to person, place, year but not month or situation. Attention moderately impaired with poor eye contact, but improved. No dysarthria, aphasia or neglect.   	Cranial Nerves: PERRL, EOMI, no nystagmus or diplopia. No facial asymmetry.  Hearing intact to voice.  Tongue, uvula and palate midline.  Sternocleidomastoid and Trapezius intact bilaterally  	Motor: moving all 4 extremities equally at least antigravity  	Tone: normal.                  	Pronator drift: none                 	No truncal ataxia.    	Tremor: No resting, postural or action tremor.  	Sensation: intact to light touch    LABS:                        11.8   9.73  )-----------( 281      ( 22 Sep 2021 05:35 )             35.4    09-22    143  |  106  |  34<H>  ----------------------------<  152<H>  3.9   |  23  |  1.03    Ca    9.5      22 Sep 2021 05:38  Phos  4.4     09-22  Mg     2.8     09-22         COVID-19 PCR: NotDetec (14 Sep 2021 18:47)      IMAGING:     MRI 9.16.21  IMPRESSION: No acute territorial infarcts seen.    EEG 9.19.21  EEG Summary:    Abnormal EEG in the awake, drowsy and asleep states.  - Intermittent polymorphic delta slowing in the left hemisphere    Impression/Clinical Correlate:    This is an abnormal EEG record. No epileptiform pattern or seizures were seen. There is evidence for transient and reversible cause of functional abnormality affecting the left hemisphere.     EEG 9.18.21  EEG Summary:    Abnormal EEG in the awake, drowsy and asleep states.  - Continuous polymorphic delta slowing in the left hemisphere, slowly transitioning to intermittent slowing in the latter half of the recording recording.    Impression/Clinical Correlate:    This is an abnormal EEG record. No epileptiform pattern or seizures were seen. There is evidence for transient and reversible cause of functional abnormality affecting the left

## 2021-09-23 NOTE — BH CONSULTATION LIAISON PROGRESS NOTE - NSBHCHARTREVIEWVS_PSY_A_CORE FT
Vital Signs Last 24 Hrs  T(C): 36.3 (23 Sep 2021 09:14), Max: 36.8 (23 Sep 2021 04:15)  T(F): 97.3 (23 Sep 2021 09:14), Max: 98.2 (23 Sep 2021 04:15)  HR: 69 (23 Sep 2021 09:14) (67 - 100)  BP: 135/67 (23 Sep 2021 09:14) (127/86 - 164/88)  BP(mean): --  RR: 17 (23 Sep 2021 09:14) (17 - 19)  SpO2: 96% (23 Sep 2021 09:14) (95% - 97%)
Vital Signs Last 24 Hrs  T(C): 37.2 (22 Sep 2021 07:40), Max: 37.2 (22 Sep 2021 07:40)  T(F): 98.9 (22 Sep 2021 07:40), Max: 98.9 (22 Sep 2021 07:40)  HR: 95 (22 Sep 2021 07:40) (86 - 100)  BP: 177/87 (22 Sep 2021 07:40) (160/90 - 177/87)  BP(mean): --  RR: 19 (22 Sep 2021 07:40) (19 - 20)  SpO2: 94% (22 Sep 2021 07:40) (94% - 95%)

## 2021-09-23 NOTE — BH CONSULTATION LIAISON PROGRESS NOTE - NSBHASSESSMENTFT_PSY_ALL_CORE
Daniel Pinto is a 65 yo male w/ PMH of CLL on Venetoclax, MDS, melanoma, paroxysmal AFib (not on AC) presenting as transfer from [place] for event capture. Patient was initially at  [facility] on 9/14 with confusion, aphasia and R sided weakness. Patient was stroke code and was given s/p tPA. On admission, patient had CTH, CTA, CT perfusion during code stroke protocol with no acute findings. MRI Brain was  performed at  9/16 with no ischemic findings. EEG performed 9/15 showed diffuse L hemispheric slowing. Pt remained aphasic with AMS. +RUE spasticity was noted on exam. Patient was thus transferred to [facility] for EMU admission to evaluate for subclinical seizures. Psychiatry was consulted for acute onset of psychosis over the weekend. Pt's LP completed- Glucose 73, Protein 55, WBC 3, and cultures were negative. Pt started on IVIG 9/17, discontinued IVIG, received 3 days, no improvement in symptoms. Patient given steroids today, but according to primary team appears to have made pt more hyperactive. Pt required several PRNs yesterday and pt is still having difficulty sleeping and having visual hallucinations; however pt appears more calm and oriented today.
Daniel Pinto is a 65 yo male w/ PMH of CLL on Venetoclax, MDS, melanoma, paroxysmal AFib (not on AC) presenting as transfer from [place] for event capture. Patient was initially at  [facility] on 9/14 with confusion, aphasia and R sided weakness. Patient was stroke code and was given s/p tPA. On admission, patient had CTH, CTA, CT perfusion during code stroke protocol with no acute findings. MRI Brain was  performed at  9/16 with no ischemic findings. EEG performed 9/15 showed diffuse L hemispheric slowing. Pt remained aphasic with AMS. +RUE spasticity was noted on exam. Patient was thus transferred to [facility] for EMU admission to evaluate for subclinical seizures. Psychiatry was consulted for acute onset of psychosis over the weekend. Pt's LP completed- Glucose 73, Protein 55, WBC 3, and cultures were negative. Pt started on IVIG 9/17, discontinued IVIG, received 3 days, no improvement in symptoms. Patient given steroids today, but according to primary team appears to have made pt more hyperactive. Pt required several PRNs yesterday and pt is still having difficulty sleeping and having visual hallucinations; however pt appears more calm and oriented today.

## 2021-09-23 NOTE — BH CONSULTATION LIAISON PROGRESS NOTE - NSBHCONSULTRECOMMENDOTHER_PSY_A_CORE FT
- primary team increased Zyprexa to 5mg PO Zyprexa BID, cont current dose. cont prns.   - continue to monitor EKG, QTc  - continue w/ melatonin 10mg for sleep
- primary team increased Zyprexa to 5mg PO Zyprexa BID, can continue as pt shows some improvement today and also received several PRNs of Zyprexa overnight  - continue to monitor EKG, QTc  - continue w/ melatonin 10mg for sleep

## 2021-09-23 NOTE — DIETITIAN INITIAL EVALUATION ADULT. - PERTINENT MEDS FT
MEDICATIONS  (STANDING):  aspirin  chewable 81 milliGRAM(s) Oral daily  atorvastatin 80 milliGRAM(s) Oral at bedtime  enoxaparin Injectable 40 milliGRAM(s) SubCutaneous daily  folic acid 1 milliGRAM(s) Oral daily  melatonin 10 milliGRAM(s) Oral at bedtime  methylPREDNISolone sodium succinate IVPB 1000 milliGRAM(s) IV Intermittent daily  OLANZapine 5 milliGRAM(s) Oral <User Schedule>  OLANZapine 5 milliGRAM(s) Oral at bedtime  pantoprazole  Injectable 40 milliGRAM(s) IV Push daily  sodium chloride 0.9%. 1000 milliLiter(s) (50 mL/Hr) IV Continuous <Continuous>    MEDICATIONS  (PRN):  acetaminophen   Tablet .. 650 milliGRAM(s) Oral every 6 hours PRN Mild Pain (1 - 3), Moderate Pain (4 - 6), Severe Pain (7 - 10)  diphenhydrAMINE 25 milliGRAM(s) Oral every 4 hours PRN Rash and/or Itching  lacosamide IVPB 100 milliGRAM(s) IV Intermittent once PRN Seizure  LORazepam   Injectable 1 milliGRAM(s) IV Push once PRN Seizure  OLANZapine Injectable 2.5 milliGRAM(s) IntraMuscular every 4 hours PRN Agitation

## 2021-09-23 NOTE — DIETITIAN INITIAL EVALUATION ADULT. - CONTINUE CURRENT NUTRITION CARE PLAN
yes Defer diet textures/consistencies to team/SLP. Continue diet free of therapeutic restrictions./yes

## 2021-09-24 LAB
ANION GAP SERPL CALC-SCNC: 13 MMOL/L — SIGNIFICANT CHANGE UP (ref 5–17)
ANION GAP SERPL CALC-SCNC: 13 MMOL/L — SIGNIFICANT CHANGE UP (ref 5–17)
APTT BLD: 27 SEC — LOW (ref 27.5–35.5)
B BURGDOR DNA SPEC QL NAA+PROBE: NEGATIVE — SIGNIFICANT CHANGE UP
BUN SERPL-MCNC: 43 MG/DL — HIGH (ref 7–23)
BUN SERPL-MCNC: 43 MG/DL — HIGH (ref 7–23)
CALCIUM SERPL-MCNC: 8.6 MG/DL — SIGNIFICANT CHANGE UP (ref 8.4–10.5)
CALCIUM SERPL-MCNC: 8.7 MG/DL — SIGNIFICANT CHANGE UP (ref 8.4–10.5)
CHLORIDE SERPL-SCNC: 106 MMOL/L — SIGNIFICANT CHANGE UP (ref 96–108)
CHLORIDE SERPL-SCNC: 106 MMOL/L — SIGNIFICANT CHANGE UP (ref 96–108)
CO2 SERPL-SCNC: 22 MMOL/L — SIGNIFICANT CHANGE UP (ref 22–31)
CO2 SERPL-SCNC: 26 MMOL/L — SIGNIFICANT CHANGE UP (ref 22–31)
CREAT SERPL-MCNC: 0.84 MG/DL — SIGNIFICANT CHANGE UP (ref 0.5–1.3)
CREAT SERPL-MCNC: 0.88 MG/DL — SIGNIFICANT CHANGE UP (ref 0.5–1.3)
GLUCOSE SERPL-MCNC: 139 MG/DL — HIGH (ref 70–99)
GLUCOSE SERPL-MCNC: 153 MG/DL — HIGH (ref 70–99)
HCT VFR BLD CALC: 39.9 % — SIGNIFICANT CHANGE UP (ref 39–50)
HGB BLD-MCNC: 12.5 G/DL — LOW (ref 13–17)
INR BLD: 0.97 RATIO — SIGNIFICANT CHANGE UP (ref 0.88–1.16)
MAGNESIUM SERPL-MCNC: 3.1 MG/DL — HIGH (ref 1.6–2.6)
MAGNESIUM SERPL-MCNC: 3.1 MG/DL — HIGH (ref 1.6–2.6)
MCHC RBC-ENTMCNC: 31.3 GM/DL — LOW (ref 32–36)
MCHC RBC-ENTMCNC: 34.2 PG — HIGH (ref 27–34)
MCV RBC AUTO: 109.3 FL — HIGH (ref 80–100)
NRBC # BLD: 0 /100 WBCS — SIGNIFICANT CHANGE UP (ref 0–0)
PHOSPHATE SERPL-MCNC: 4 MG/DL — SIGNIFICANT CHANGE UP (ref 2.5–4.5)
PLATELET # BLD AUTO: 182 K/UL — SIGNIFICANT CHANGE UP (ref 150–400)
POTASSIUM SERPL-MCNC: 4.4 MMOL/L — SIGNIFICANT CHANGE UP (ref 3.5–5.3)
POTASSIUM SERPL-MCNC: 6.4 MMOL/L — CRITICAL HIGH (ref 3.5–5.3)
POTASSIUM SERPL-SCNC: 4.4 MMOL/L — SIGNIFICANT CHANGE UP (ref 3.5–5.3)
POTASSIUM SERPL-SCNC: 6.4 MMOL/L — CRITICAL HIGH (ref 3.5–5.3)
PROTHROM AB SERPL-ACNC: 11.7 SEC — SIGNIFICANT CHANGE UP (ref 10.6–13.6)
RBC # BLD: 3.65 M/UL — LOW (ref 4.2–5.8)
RBC # FLD: 15.9 % — HIGH (ref 10.3–14.5)
SARS-COV-2 RNA SPEC QL NAA+PROBE: SIGNIFICANT CHANGE UP
SODIUM SERPL-SCNC: 141 MMOL/L — SIGNIFICANT CHANGE UP (ref 135–145)
SODIUM SERPL-SCNC: 145 MMOL/L — SIGNIFICANT CHANGE UP (ref 135–145)
WBC # BLD: 8.56 K/UL — SIGNIFICANT CHANGE UP (ref 3.8–10.5)
WBC # FLD AUTO: 8.56 K/UL — SIGNIFICANT CHANGE UP (ref 3.8–10.5)
WNV IGG CSF IA-ACNC: NEGATIVE — SIGNIFICANT CHANGE UP
WNV IGM CSF IA-ACNC: NEGATIVE — SIGNIFICANT CHANGE UP

## 2021-09-24 PROCEDURE — 99233 SBSQ HOSP IP/OBS HIGH 50: CPT

## 2021-09-24 PROCEDURE — 99232 SBSQ HOSP IP/OBS MODERATE 35: CPT

## 2021-09-24 RX ORDER — BACITRACIN ZINC 500 UNIT/G
1 OINTMENT IN PACKET (EA) TOPICAL
Refills: 0 | Status: DISCONTINUED | OUTPATIENT
Start: 2021-09-24 | End: 2021-10-01

## 2021-09-24 RX ADMIN — Medication 1 APPLICATION(S): at 17:59

## 2021-09-24 RX ADMIN — Medication 58 MILLIGRAM(S): at 05:03

## 2021-09-24 RX ADMIN — ENOXAPARIN SODIUM 40 MILLIGRAM(S): 100 INJECTION SUBCUTANEOUS at 11:17

## 2021-09-24 RX ADMIN — SODIUM CHLORIDE 50 MILLILITER(S): 9 INJECTION INTRAMUSCULAR; INTRAVENOUS; SUBCUTANEOUS at 05:03

## 2021-09-24 RX ADMIN — PANTOPRAZOLE SODIUM 40 MILLIGRAM(S): 20 TABLET, DELAYED RELEASE ORAL at 11:12

## 2021-09-24 RX ADMIN — ATORVASTATIN CALCIUM 80 MILLIGRAM(S): 80 TABLET, FILM COATED ORAL at 21:02

## 2021-09-24 RX ADMIN — Medication 1 MILLIGRAM(S): at 11:12

## 2021-09-24 RX ADMIN — OLANZAPINE 10 MILLIGRAM(S): 15 TABLET, FILM COATED ORAL at 21:03

## 2021-09-24 RX ADMIN — Medication 81 MILLIGRAM(S): at 11:12

## 2021-09-24 RX ADMIN — Medication 10 MILLIGRAM(S): at 21:03

## 2021-09-24 NOTE — PROGRESS NOTE ADULT - SUBJECTIVE AND OBJECTIVE BOX
INTERVAL HPI/OVERNIGHT EVENTS:  Patient S&E at bedside. No o/n events,     VITAL SIGNS:  T(F): 98.6 (09-24-21 @ 11:52)  HR: 66 (09-24-21 @ 11:52)  BP: 155/68 (09-24-21 @ 11:52)  RR: 17 (09-24-21 @ 11:52)  SpO2: 97% (09-24-21 @ 11:52)  Wt(kg): --    PHYSICAL EXAM:    Constitutional: NAD  Eyes: EOMI, sclera non-icteric  Neck: supple  Respiratory: CTAB, no wheezes or crackles   Cardiovascular: RRR  Gastrointestinal: soft, NTND, + BS  Extremities: no cyanosis, clubbing or edema   Neurological: awake and alert      MEDICATIONS  (STANDING):  aspirin  chewable 81 milliGRAM(s) Oral daily  atorvastatin 80 milliGRAM(s) Oral at bedtime  BACItracin   Ointment 1 Application(s) Topical two times a day  enoxaparin Injectable 40 milliGRAM(s) SubCutaneous daily  folic acid 1 milliGRAM(s) Oral daily  melatonin 10 milliGRAM(s) Oral at bedtime  methylPREDNISolone sodium succinate IVPB 1000 milliGRAM(s) IV Intermittent daily  OLANZapine 10 milliGRAM(s) Oral at bedtime  pantoprazole  Injectable 40 milliGRAM(s) IV Push daily  sodium chloride 0.9%. 1000 milliLiter(s) (50 mL/Hr) IV Continuous <Continuous>    MEDICATIONS  (PRN):  acetaminophen   Tablet .. 650 milliGRAM(s) Oral every 6 hours PRN Mild Pain (1 - 3), Moderate Pain (4 - 6), Severe Pain (7 - 10)  diphenhydrAMINE 25 milliGRAM(s) Oral every 4 hours PRN Rash and/or Itching  lacosamide IVPB 100 milliGRAM(s) IV Intermittent once PRN Seizure  LORazepam   Injectable 1 milliGRAM(s) IV Push once PRN Seizure  OLANZapine Injectable 2.5 milliGRAM(s) IntraMuscular every 4 hours PRN Agitation      Allergies    No Known Allergies    Intolerances        LABS:                        12.5   8.56  )-----------( 182      ( 24 Sep 2021 06:53 )             39.9     09-24    145  |  106  |  43<H>  ----------------------------<  153<H>  4.4   |  26  |  0.84    Ca    8.6      24 Sep 2021 09:28  Phos  4.0     09-24  Mg     3.1     09-24    TPro  7.8  /  Alb  4.2  /  TBili  2.0<H>  /  DBili  x   /  AST  58<H>  /  ALT  37  /  AlkPhos  63  09-23    PT/INR - ( 24 Sep 2021 06:53 )   PT: 11.7 sec;   INR: 0.97 ratio         PTT - ( 24 Sep 2021 06:53 )  PTT:27.0 sec      RADIOLOGY & ADDITIONAL TESTS:  Studies reviewed.   INTERVAL HPI/OVERNIGHT EVENTS:  Patient seen this AM during rounds, wife Javier at the bedside  Reports that he felt rested from overnight, feels more calm  Per wife, appears to have less hallucinations  Has some ongoing bruising on the arms and legs from when he was agitated  Continues to be on 1:1 observation  Denies any fevers or chills    VITAL SIGNS:  T(F): 98.6 (09-24-21 @ 11:52)  HR: 66 (09-24-21 @ 11:52)  BP: 155/68 (09-24-21 @ 11:52)  RR: 17 (09-24-21 @ 11:52)  SpO2: 97% (09-24-21 @ 11:52)  Wt(kg): --    PHYSICAL EXAM:    Constitutional: NAD  Eyes: EOMI, sclera non-icteric  Neck: supple  Respiratory: CTAB, no wheezes or crackles   Cardiovascular: RRR  Gastrointestinal: soft, NTND, + BS  Extremities: no cyanosis, clubbing or edema   Neurological: awake and alert  Skin: scattered echymoses    MEDICATIONS  (STANDING):  aspirin  chewable 81 milliGRAM(s) Oral daily  atorvastatin 80 milliGRAM(s) Oral at bedtime  BACItracin   Ointment 1 Application(s) Topical two times a day  enoxaparin Injectable 40 milliGRAM(s) SubCutaneous daily  folic acid 1 milliGRAM(s) Oral daily  melatonin 10 milliGRAM(s) Oral at bedtime  methylPREDNISolone sodium succinate IVPB 1000 milliGRAM(s) IV Intermittent daily  OLANZapine 10 milliGRAM(s) Oral at bedtime  pantoprazole  Injectable 40 milliGRAM(s) IV Push daily  sodium chloride 0.9%. 1000 milliLiter(s) (50 mL/Hr) IV Continuous <Continuous>    MEDICATIONS  (PRN):  acetaminophen   Tablet .. 650 milliGRAM(s) Oral every 6 hours PRN Mild Pain (1 - 3), Moderate Pain (4 - 6), Severe Pain (7 - 10)  diphenhydrAMINE 25 milliGRAM(s) Oral every 4 hours PRN Rash and/or Itching  lacosamide IVPB 100 milliGRAM(s) IV Intermittent once PRN Seizure  LORazepam   Injectable 1 milliGRAM(s) IV Push once PRN Seizure  OLANZapine Injectable 2.5 milliGRAM(s) IntraMuscular every 4 hours PRN Agitation      Allergies    No Known Allergies    Intolerances        LABS:                        12.5   8.56  )-----------( 182      ( 24 Sep 2021 06:53 )             39.9     09-24    145  |  106  |  43<H>  ----------------------------<  153<H>  4.4   |  26  |  0.84    Ca    8.6      24 Sep 2021 09:28  Phos  4.0     09-24  Mg     3.1     09-24    TPro  7.8  /  Alb  4.2  /  TBili  2.0<H>  /  DBili  x   /  AST  58<H>  /  ALT  37  /  AlkPhos  63  09-23    PT/INR - ( 24 Sep 2021 06:53 )   PT: 11.7 sec;   INR: 0.97 ratio         PTT - ( 24 Sep 2021 06:53 )  PTT:27.0 sec      RADIOLOGY & ADDITIONAL TESTS:  Studies reviewed.      < from: MR Head No Cont (09.16.21 @ 10:28) >    EXAM:  MR BRAIN                            PROCEDURE DATE:  09/16/2021          INTERPRETATION:  Clinical indication: Status post TPA    MRI of the brain was performed using sagittal T1, axial T1 and T2 T2 FLAIR diffusion and gradient echo sequence.    This exam is compared with prior noncontrast head CT performed on September 15, 2021.    The lateral ventricles have a normal configuration    There is no evidence acute hemorrhage mass mass effect identified.    Evaluation of the diffusion weighted sequence demonstrates no abnormal areas of restricted diffusion to suggest acute infarct.    Partial empty is sella seen.    The large vessels demonstrate normal flow voids    Bilateral maxillary, ethmoid and frontal sinus mucosal thickening is seen.    Postop changes compatible with bilateral calcific surgery and scleral banding is seen.    IMPRESSION: No acute territorial infarcts seen.    --- End of Report ---    MITCHELL BOYLE MD; Attending Radiologist  This document has been electronically signed. Sep 16 2021 10:42AM    < end of copied text >

## 2021-09-24 NOTE — PROGRESS NOTE ADULT - SUBJECTIVE AND OBJECTIVE BOX
[facility]    Sveta Romano, Kyara, Ebony, Jennifer, Dereck, Hosea      [phone]    CHIEF COMPLAINT: Patient is a 66y old  Male who presents with a chief complaint of CNS symptoms (23 Sep 2021 12:41)      Follow Up: sxs of cva    Interim history: Unable to provide a history on the basis of a poor mental status.  Events noted.    MEDICATIONS  (STANDING):  aspirin  chewable 81 milliGRAM(s) Oral daily  atorvastatin 80 milliGRAM(s) Oral at bedtime  enoxaparin Injectable 40 milliGRAM(s) SubCutaneous daily  folic acid 1 milliGRAM(s) Oral daily  melatonin 10 milliGRAM(s) Oral at bedtime  methylPREDNISolone sodium succinate IVPB 1000 milliGRAM(s) IV Intermittent daily  OLANZapine 10 milliGRAM(s) Oral at bedtime  pantoprazole  Injectable 40 milliGRAM(s) IV Push daily  sodium chloride 0.9%. 1000 milliLiter(s) (50 mL/Hr) IV Continuous <Continuous>    MEDICATIONS  (PRN):  acetaminophen   Tablet .. 650 milliGRAM(s) Oral every 6 hours PRN Mild Pain (1 - 3), Moderate Pain (4 - 6), Severe Pain (7 - 10)  diphenhydrAMINE 25 milliGRAM(s) Oral every 4 hours PRN Rash and/or Itching  lacosamide IVPB 100 milliGRAM(s) IV Intermittent once PRN Seizure  LORazepam   Injectable 1 milliGRAM(s) IV Push once PRN Seizure  OLANZapine Injectable 2.5 milliGRAM(s) IntraMuscular every 4 hours PRN Agitation      REVIEW OF SYSTEMS: unable to provide  Vital Signs Last 24 Hrs  T(C): 36.8 (24 Sep 2021 09:15), Max: 36.8 (23 Sep 2021 12:41)  T(F): 98.3 (24 Sep 2021 09:15), Max: 98.3 (23 Sep 2021 12:41)  HR: 57 (24 Sep 2021 09:15) (57 - 93)  BP: 146/78 (24 Sep 2021 09:15) (143/90 - 157/91)  BP(mean): --  RR: 17 (24 Sep 2021 09:15) (17 - 18)  SpO2: 97% (24 Sep 2021 09:15) (95% - 97%)    I&O's Summary    23 Sep 2021 07:01  -  24 Sep 2021 07:00  --------------------------------------------------------  IN: 1438 mL / OUT: 500 mL / NET: 938 mL    24 Sep 2021 07:01  -  24 Sep 2021 09:45  --------------------------------------------------------  IN: 480 mL / OUT: 0 mL / NET: 480 mL        Telemetry past 24h:    PHYSICAL EXAM:    Constitutional: well-nourished, well-developed, NAD   HEENT:  MMM, sclerae anicteric, conjunctivae clear, no oral cyanosis.  Pulmonary: Non-labored, breath sounds are clear bilaterally, No wheezing, rales or rhonchi  Cardiovascular: Regular, S1 and S2.  No murmur.  No rubs, gallops or clicks  Gastrointestinal: Bowel Sounds present, soft, nontender.   Lymph: No peripheral edema.   Neurological: unable to evaluate, poor mental status  Skin: No rashes.  Psych:  Mood & affect not evaluable    LABS: All Labs Reviewed:                        12.5   8.56  )-----------( 182      ( 24 Sep 2021 06:53 )             39.9                         12.4   10.14 )-----------( 246      ( 23 Sep 2021 08:46 )             37.0                         11.8   9.73  )-----------( 281      ( 22 Sep 2021 05:35 )             35.4     24 Sep 2021 06:46    141    |  106    |  43     ----------------------------<  139    6.4     |  22     |  0.88   23 Sep 2021 08:46    144    |  107    |  47     ----------------------------<  155    4.4     |  24     |  0.98   22 Sep 2021 05:38    143    |  106    |  34     ----------------------------<  152    3.9     |  23     |  1.03     Ca    8.7        24 Sep 2021 06:46  Ca    9.1        23 Sep 2021 08:46  Ca    9.5        22 Sep 2021 05:38  Phos  4.0       24 Sep 2021 06:46  Phos  3.8       23 Sep 2021 08:46  Phos  4.4       22 Sep 2021 05:38  Mg     3.1       24 Sep 2021 06:46  Mg     3.1       23 Sep 2021 08:46  Mg     2.8       22 Sep 2021 05:38    TPro  7.8    /  Alb  4.2    /  TBili  2.0    /  DBili  x      /  AST  58     /  ALT  37     /  AlkPhos  63     23 Sep 2021 08:46    PT/INR - ( 24 Sep 2021 06:53 )   PT: 11.7 sec;   INR: 0.97 ratio         PTT - ( 24 Sep 2021 06:53 )  PTT:27.0 sec      Blood Culture:         RADIOLOGY:    EKG:    Echo:

## 2021-09-24 NOTE — PROGRESS NOTE ADULT - SUBJECTIVE AND OBJECTIVE BOX
THE PATIENT WAS SEEN AND EXAMINED BY ME WITH THE HOUSESTAFF DURING MORNING ROUNDS.  HPI: Patient is a 67 yo Rt handed male PMH CLL on Venetoclax, MDS, melanoma, paroxysmal AFib (not on AC) presenting as transfer from [place] for event capture. Patient was initially at  [facility] on 9/14 with confusion, aphasia and R sided weakness. Patient was stroke code and was given s/p tPA. On admission, patient had CTH, CTA, CT perfusion during code stroke protocol with no acute findings. MRI Brain was  performed at  9/16 with no ischemic findings. EEG performed 9/15 showed diffuse L hemispheric slowing. Pt remained aphasic with AMS. +RUE spasticity was noted on exam. Patient was thus transferred to [facility] for EMU admission to evaluate for subclinical seizures.  Of note, patient does not have hx of seizures.    ROS: Otherwise negative.     SUBJECTIVE: No events overnight.  No new neurologic complaints.      acetaminophen   Tablet .. 650 milliGRAM(s) Oral every 6 hours PRN  aspirin  chewable 81 milliGRAM(s) Oral daily  atorvastatin 80 milliGRAM(s) Oral at bedtime  diphenhydrAMINE 25 milliGRAM(s) Oral every 4 hours PRN  enoxaparin Injectable 40 milliGRAM(s) SubCutaneous daily  folic acid 1 milliGRAM(s) Oral daily  lacosamide IVPB 100 milliGRAM(s) IV Intermittent once PRN  LORazepam   Injectable 1 milliGRAM(s) IV Push once PRN  melatonin 10 milliGRAM(s) Oral at bedtime  methylPREDNISolone sodium succinate IVPB 1000 milliGRAM(s) IV Intermittent daily  OLANZapine 10 milliGRAM(s) Oral at bedtime  OLANZapine Injectable 2.5 milliGRAM(s) IntraMuscular every 4 hours PRN  pantoprazole  Injectable 40 milliGRAM(s) IV Push daily  sodium chloride 0.9%. 1000 milliLiter(s) IV Continuous <Continuous>    General: Improved mood and affect, smiling and laughing with staff.  Physical Exam: Neurological Exam:  	Mental Status: Eyes open, oriented to person, place, year but not month or situation. Attention moderately impaired with poor eye contact, but improved. No dysarthria, aphasia or neglect.   	Cranial Nerves: PERRL, EOMI, no nystagmus or diplopia. No facial asymmetry.  Hearing intact to voice.  Tongue, uvula and palate midline.  Sternocleidomastoid and Trapezius intact bilaterally  	Motor: moving all 4 extremities equally at least antigravity  	Tone: normal.                  	Pronator drift: none                 	No truncal ataxia.    	Tremor: No resting, postural or action tremor.  	Sensation: intact to light touch    LABS:                        12.5   8.56  )-----------( 182      ( 24 Sep 2021 06:53 )             39.9    09-24    141  |  106  |  43<H>  ----------------------------<  139<H>  6.4<HH>   |  22  |  0.88    Ca    8.7      24 Sep 2021 06:46  Phos  4.0     09-24  Mg     3.1     09-24    TPro  7.8  /  Alb  4.2  /  TBili  2.0<H>  /  DBili  x   /  AST  58<H>  /  ALT  37  /  AlkPhos  63  09-23    COVID-19 PCR: NotDetec (14 Sep 2021 18:47)      IMAGING:     MRI 9.16.21  IMPRESSION: No acute territorial infarcts seen.    EEG 9.19.21  EEG Summary:  Abnormal EEG in the awake, drowsy and asleep states.  - Intermittent polymorphic delta slowing in the left hemisphere    Impression/Clinical Correlate:  This is an abnormal EEG record. No epileptiform pattern or seizures were seen. There is evidence for transient and reversible cause of functional abnormality affecting the left hemisphere.     EEG 9.18.21  EEG Summary:  Abnormal EEG in the awake, drowsy and asleep states.  - Continuous polymorphic delta slowing in the left hemisphere, slowly transitioning to intermittent slowing in the latter half of the recording recording.    Impression/Clinical Correlate:  This is an abnormal EEG record. No epileptiform pattern or seizures were seen. There is evidence for transient and reversible cause of functional abnormality affecting the left         THE PATIENT WAS SEEN AND EXAMINED BY ME WITH THE HOUSESTAFF DURING MORNING ROUNDS.  HPI: Patient is a 65 yo Rt handed male PMH CLL on Venetoclax, MDS, melanoma, paroxysmal AFib (not on AC) presenting as transfer from [place] for event capture. Patient was initially at  [facility] on 9/14 with confusion, aphasia and R sided weakness. Patient was stroke code and was given s/p tPA. On admission, patient had CTH, CTA, CT perfusion during code stroke protocol with no acute findings. MRI Brain was  performed at  9/16 with no ischemic findings. EEG performed 9/15 showed diffuse L hemispheric slowing. Pt remained aphasic with AMS. +RUE spasticity was noted on exam. Patient was thus transferred to [facility] for EMU admission to evaluate for subclinical seizures.  Of note, patient does not have hx of seizures.    ROS: Otherwise negative.     SUBJECTIVE: No events overnight.  No new neurologic complaints.      acetaminophen   Tablet .. 650 milliGRAM(s) Oral every 6 hours PRN  aspirin  chewable 81 milliGRAM(s) Oral daily  atorvastatin 80 milliGRAM(s) Oral at bedtime  diphenhydrAMINE 25 milliGRAM(s) Oral every 4 hours PRN  enoxaparin Injectable 40 milliGRAM(s) SubCutaneous daily  folic acid 1 milliGRAM(s) Oral daily  lacosamide IVPB 100 milliGRAM(s) IV Intermittent once PRN  LORazepam   Injectable 1 milliGRAM(s) IV Push once PRN  melatonin 10 milliGRAM(s) Oral at bedtime  methylPREDNISolone sodium succinate IVPB 1000 milliGRAM(s) IV Intermittent daily  OLANZapine 10 milliGRAM(s) Oral at bedtime  OLANZapine Injectable 2.5 milliGRAM(s) IntraMuscular every 4 hours PRN  pantoprazole  Injectable 40 milliGRAM(s) IV Push daily  sodium chloride 0.9%. 1000 milliLiter(s) IV Continuous <Continuous>    General: Improved mood and affect, smiling and laughing with staff.  Physical Exam: Neurological Exam:  	Mental Status: Awake lying in bed, eyes open, oriented to person, place, and month. Attention moderately impaired with poor eye contact, but improved. No dysarthria, aphasia or neglect.   	Cranial Nerves: PERRL, EOMI, no nystagmus or diplopia. No facial asymmetry.  Hearing intact to voice.  Tongue, uvula and palate midline.  Sternocleidomastoid and Trapezius intact bilaterally  	Motor: moving all 4 extremities equally at least antigravity  	Tone: normal.                  	Pronator drift: none                 	No truncal ataxia.    	Tremor: No resting, postural or action tremor.  	Sensation: intact to light touch    LABS:                        12.5   8.56  )-----------( 182      ( 24 Sep 2021 06:53 )             39.9    09-24    141  |  106  |  43<H>  ----------------------------<  139<H>  6.4<HH>   |  22  |  0.88    Ca    8.7      24 Sep 2021 06:46  Phos  4.0     09-24  Mg     3.1     09-24    TPro  7.8  /  Alb  4.2  /  TBili  2.0<H>  /  DBili  x   /  AST  58<H>  /  ALT  37  /  AlkPhos  63  09-23    COVID-19 PCR: NotDetec (14 Sep 2021 18:47)      IMAGING:     MRI 9.16.21  IMPRESSION: No acute territorial infarcts seen.    EEG 9.19.21  EEG Summary:  Abnormal EEG in the awake, drowsy and asleep states.  - Intermittent polymorphic delta slowing in the left hemisphere    Impression/Clinical Correlate:  This is an abnormal EEG record. No epileptiform pattern or seizures were seen. There is evidence for transient and reversible cause of functional abnormality affecting the left hemisphere.     EEG 9.18.21  EEG Summary:  Abnormal EEG in the awake, drowsy and asleep states.  - Continuous polymorphic delta slowing in the left hemisphere, slowly transitioning to intermittent slowing in the latter half of the recording recording.    Impression/Clinical Correlate:  This is an abnormal EEG record. No epileptiform pattern or seizures were seen. There is evidence for transient and reversible cause of functional abnormality affecting the left

## 2021-09-24 NOTE — PROGRESS NOTE ADULT - ASSESSMENT
65 y/o male with PMHx CLL followed by heme/onc, anemia, melanoma, osteopenia admitted for right facial droop, right hemiparesis and expressive aphasia.    Admitted to the ICU at [place] yesterday and is S/P TPA. He has now been transferred for evaluation of his seizures and evaluation of seizures    CVA? / AMS  - Patient with new right sided UE weakness, facial droop and expressive aphasia at   - s/p tpa without improvement in symptoms  - 9/15 CT head without acute ICH  - 9/16 MRI neg for infarct  - neurology follow up.  s/p video eeg  - cont asa and statin    PAF  - Patient seen on a prior admission for syncope 7/2020 and found to have brief Afib  - Tele during this admission SR   - echocardiogram 9/15 unremarkable  - If no definitive CVA would support remaining off AC for now.    - Continue to monitor  - continue aspirin    BP  - labile BP. high readings 2/2 agitation. cont to monitor for now. can use PRN hydralazine IV  - bp is less extreme over the past 24h    CLL  - follow up heme/onc    - Monitor and replete lytes, keep K>4, Mg>2.  - All other medical needs as per Neuro team  - Other cardiovascular workup will depend on clinical course.  - Will continue to follow.

## 2021-09-24 NOTE — PROGRESS NOTE ADULT - ASSESSMENT
67 y/o male with PMHx CLL (dx in 1/1991, on tx w/ venetoclax) followed by heme/onc, anemia, hx of melanoma, osteopenia admitted for right facial droop, right hemiparesis and expressive aphasia.  Admitted to the ICU at [place] prior to admission, s/p TPA, transferred to [facility] for evaluation of seizures. Hematology following given hx of CLL.    #CLL  #AMS, aphasia  -CBC w/ diff shows stable disease  -imaging reviewed; treated initially as code stroke at [place] on day of presentation, but MRI negative for CVA or structural lesion; performed without contrast, so unable to r/o leptomeningeal disease, but suspicion is low.  -patient's aphasia resolved over weekend after admission here, received IVIG x2 days for suspected autoimmune encephalitis but stopped by primary team, switched over to high doses steroids (plan for 5 days methylprednisolone 1000 mg QD).   -do not suspect patient's symptoms due to CLL treatment (currently on venetoclax x10 months; previously finished treatment with obinutuzumab). CLL increases risk of infection (has hx of recurrent sinus infections, on IVIG) so infectious etiology higher on ddx; CSF infectious PCR and gram stain negative, making it less likely. CESAR virus PCR pending to r/o PML, though suspicion is low based on MRI findings.  -Patient had recently gotten 3rd dose of COVID-19 vaccine, ?immune sequelae from vaccine? Less likely at this time, diagnosis of exclusion  -fibrinogen, ferritin, triglycerides all mildly elevated; soluble IL-2r normal, no suspicion for HLH at this time  -EEG abnormal w/ L hemisphere slowing; acute care per neurology team  -speech is markedly improved, now answering questions, oriented to self and time, appears calm and non-agitated; suspect improved 2/2 antipsychotics +/- steroids; will defer steroid management to neurology team  -autoimmune encephalitis panel pending (1-2 weeks processing time)  -would continue to hold venetoclax for now    Please call with any questions.    Aldair Henley MD, MPH  Hematology / Oncology Fellow, PGY7  p

## 2021-09-24 NOTE — PROGRESS NOTE ADULT - ASSESSMENT
Patient is a 65 yo Rt handed male PMH CLL on Venetoclax, MDS, melanoma, paroxysmal AFib (not on AC) presenting as transfer from [place] for event capture. Patient was initially at  [facility] on 9/14 with confusion, aphasia and R sided weakness. With Brain MRI negative. EEG note to show discharges from Left hemisphere with RUE spasticity There is concern for subclinical seizures thus transferred to [facility] for event capture. Cardiology consulted.  Heme/onc to follow this AM and will followup on recommendations on heme/onc medications.  Patient was noted to have episodes agitation with episodes of somnolence with out medication needed. Patient had LP this afternoon and will follow up on CSF results. Cardiology states to hold off on AC as there is no definitive CVA noted. Continue with ASA and statin. Improved s/p 1 dose of IVIG. Still with some mild cognitive delay and now with visual hallucinations. Poss related to ativan, will dc. Wife notes that patient gets IVIG treatment every month as part of his CLL treatment.     9/23: Neuro exam with mild improvement in agitation, restlessness, attention and eye contact. Affect and mood improved, patient laughing and smiling and interacting with 1:1 at bedside.    Impression: RUE spasticity and aphasia concerning for seizures, EEG negative for seizures. Patient acutely psychotic with left hemispheric slowing concerning for AIE in the setting of CLL.      PLAN:   AMS/AIE  - Remains off vEEG  - Discontinued IVIG, received 3 days  - Cleared by heme/onc from an oncology standpoint to start steroids.  - Continuing Solu-medrol 1000 mg IV for a total of 5 days (9/21-9/25)- today Day 4.  - 1:1 Constant Observations for safety  - Seroquel discontinued due to prolonged QTc 467.  - Psych consulted appreciated.  - Zyprexa changed to 10 mg at bedtime due to patient being more hyperactive at night and sleeping during the day.   - Continuing Zyprexa 2.5 mg IM PRN for breakthrough agitation. 2nd line treatment can give Haldol 1 mg IV q6hrs.   - Continuing melatonin to 10 mg at bedtime  - LP completed- Glucose 73, Protein 55, WBC 3, Gram stain no growth to date, HSV neg, Cryptococcal neg; infectious w/u thus far negative; AIE panel pending f/u results  - Visual hallucinations, concerning due to similar presentation of retinal detachment, Optho consulted   - No seizures on EEG, continuing to hold AEDs  - Rescue: 1st-Ativan 1mg, 2nd-Vimpat 100mg IV if GTC refractory to ativan    CLL  - Heme/onc to continue to follow and recommendations appreciated.   - Will discuss with Heme/Onc regarding ongoing CLL treatment- no inpatient treatment at this time.  - Flow cytometry CSF results: lymphocyte immunophenotypic findings show no diagnostic abnormalities  - Follows with Dr. Godinez and  Dr. Valenzuela outpatient.     PAfib/HTN  - Cardiology recommendations appreciated: choosing to defer AC at this time; can use PRN Hydralazine IV is needed for HTN  - Continue tele monitoring  - 12 lead EKG    Elevated BUN/Hypermagnesemia  - BUN elevation most likely due to steroids  - Gentle hydration with NS @50ml/hr  - Mg level 3.1, asymptomatic, not on mg supplements; continue to monitor for symptoms of elevated mg and mg levels  - Consider nephrology consultation if no improvements.    Dietary  - Speech and Swallow Re-eval: Recommend NPO at this time due to concern for aspiration, if wife wants patient to eat can consider Dysphagia 1 diet with honey thickened liquids- wife willing to assume risk of aspiration with meals and patient started on recommended diet with aspiration precautions and supervision by staff of all meals.    DVT ppx: Lovenox subq   Dispo: PT/OT/PMR recommends: [facility]     Patient is a 65 yo Rt handed male PMH CLL on Venetoclax, MDS, melanoma, paroxysmal AFib (not on AC) presenting as transfer from [place] for event capture. Patient was initially at  [facility] on 9/14 with confusion, aphasia and R sided weakness. With Brain MRI negative. EEG note to show discharges from Left hemisphere with RUE spasticity There is concern for subclinical seizures thus transferred to [facility] for event capture. Cardiology consulted.  Heme/onc to follow this AM and will followup on recommendations on heme/onc medications.  Patient was noted to have episodes agitation with episodes of somnolence with out medication needed. Patient had LP this afternoon and will follow up on CSF results. Cardiology states to hold off on AC as there is no definitive CVA noted. Continue with ASA and statin. Improved s/p 1 dose of IVIG. Still with some mild cognitive delay and now with visual hallucinations. Poss related to ativan, will dc. Wife notes that patient gets IVIG treatment every month as part of his CLL treatment.     9/23: Neuro exam with mild improvement in agitation, restlessness, attention and eye contact. Affect and mood improved, patient laughing and smiling and interacting with 1:1 at bedside.    Impression: RUE spasticity and aphasia concerning for seizures, EEG negative for seizures. Patient acutely psychotic with left hemispheric slowing concerning for AIE in the setting of CLL.      PLAN:   AMS/AIE  - Remains off vEEG  - Discontinued IVIG, received 3 days  - Cleared by heme/onc from an oncology standpoint to start steroids.  - Continuing Solu-medrol 1000 mg IV for a total of 5 days (9/21-9/25)- today Day 4.  - Will discharge to [place] on PO prednisone taper of 60qd x 1 week, then 40mg qd x 1 week, then 20mg qd x 1 week, dpef10ep qd x1week  - Discontinued 1:1 Constant Observation on 9/24 AM   - Seroquel discontinued due to prolonged QTc 467.  - Psych consulted appreciated.  - Zyprexa changed to 10 mg at bedtime due to patient being more hyperactive at night and sleeping during the day.   - Continuing Zyprexa 2.5 mg IM PRN for breakthrough agitation. 2nd line treatment can give Haldol 1 mg IV q6hrs.   - Continuing melatonin to 10 mg at bedtime  - LP completed- Glucose 73, Protein 55, WBC 3, Gram stain no growth to date, HSV neg, Cryptococcal neg; infectious w/u thus far negative; AIE panel pending f/u results  - Visual hallucinations, concerning due to similar presentation of retinal detachment, Optho consulted   - No seizures on EEG, continuing to hold AEDs  - Rescue: 1st-Ativan 1mg, 2nd-Vimpat 100mg IV if GTC refractory to ativan    CLL  - Heme/onc to continue to follow and recommendations appreciated.   - Will discuss with Heme/Onc regarding ongoing CLL treatment- no inpatient treatment at this time.  - Flow cytometry CSF results: lymphocyte immunophenotypic findings show no diagnostic abnormalities  - Follows with Dr. Godinez and  Dr. Valenzuela outpatient.     PAfib/HTN  - Cardiology recommendations appreciated: choosing to defer AC at this time; can use PRN Hydralazine IV is needed for HTN  - Continue tele monitoring  - 12 lead EKG    Elevated BUN/Hypermagnesemia  - BUN elevation most likely due to steroids  - Gentle hydration with NS @50ml/hr  - Mg level 3.1, asymptomatic, not on mg supplements; continue to monitor for symptoms of elevated mg and mg levels  - Consider nephrology consultation if no improvements.    Dietary  - Speech and Swallow Re-eval: Recommend NPO at this time due to concern for aspiration, if wife wants patient to eat can consider Dysphagia 1 diet with honey thickened liquids- wife willing to assume risk of aspiration with meals and patient started on recommended diet with aspiration precautions and supervision by staff of all meals.    DVT ppx: Lovenox subq   Dispo: PT/OT/PMR recommends: [facility]

## 2021-09-24 NOTE — PROGRESS NOTE ADULT - TIME BILLING
coordinating care.
Assessing presenting problems of acute illness, as well as medical decision making including initiating plan of care, obtaining history, examining the patient, reviewing data, reviewing radiologic exams, coordinating care with multidisciplinary team and writing this note.
Assessing presenting problems of acute illness, as well as medical decision making including initiating plan of care, obtaining history, examining the patient, reviewing data, reviewing radiologic exams, coordinating care with multidisciplinary team and writing this note.
coordinating care
coordinating care

## 2021-09-25 LAB
ALBUMIN SERPL ELPH-MCNC: 3.4 G/DL — SIGNIFICANT CHANGE UP (ref 3.3–5)
ALP SERPL-CCNC: 55 U/L — SIGNIFICANT CHANGE UP (ref 40–120)
ALT FLD-CCNC: 25 U/L — SIGNIFICANT CHANGE UP (ref 10–45)
ANION GAP SERPL CALC-SCNC: 11 MMOL/L — SIGNIFICANT CHANGE UP (ref 5–17)
AST SERPL-CCNC: 17 U/L — SIGNIFICANT CHANGE UP (ref 10–40)
BILIRUB SERPL-MCNC: 1.4 MG/DL — HIGH (ref 0.2–1.2)
BUN SERPL-MCNC: 38 MG/DL — HIGH (ref 7–23)
CALCIUM SERPL-MCNC: 8.5 MG/DL — SIGNIFICANT CHANGE UP (ref 8.4–10.5)
CHLORIDE SERPL-SCNC: 110 MMOL/L — HIGH (ref 96–108)
CO2 SERPL-SCNC: 25 MMOL/L — SIGNIFICANT CHANGE UP (ref 22–31)
CREAT SERPL-MCNC: 0.83 MG/DL — SIGNIFICANT CHANGE UP (ref 0.5–1.3)
GLUCOSE SERPL-MCNC: 130 MG/DL — HIGH (ref 70–99)
HCT VFR BLD CALC: 36.2 % — LOW (ref 39–50)
HGB BLD-MCNC: 11.4 G/DL — LOW (ref 13–17)
IL6 FLD-MCNC: 11.5 PG/ML — SIGNIFICANT CHANGE UP (ref 0–13)
MAGNESIUM SERPL-MCNC: 2.8 MG/DL — HIGH (ref 1.6–2.6)
MCHC RBC-ENTMCNC: 31.5 GM/DL — LOW (ref 32–36)
MCHC RBC-ENTMCNC: 33.1 PG — SIGNIFICANT CHANGE UP (ref 27–34)
MCV RBC AUTO: 105.2 FL — HIGH (ref 80–100)
NRBC # BLD: 0 /100 WBCS — SIGNIFICANT CHANGE UP (ref 0–0)
PLATELET # BLD AUTO: 187 K/UL — SIGNIFICANT CHANGE UP (ref 150–400)
POTASSIUM SERPL-MCNC: 4.4 MMOL/L — SIGNIFICANT CHANGE UP (ref 3.5–5.3)
POTASSIUM SERPL-SCNC: 4.4 MMOL/L — SIGNIFICANT CHANGE UP (ref 3.5–5.3)
PROT SERPL-MCNC: 6.3 G/DL — SIGNIFICANT CHANGE UP (ref 6–8.3)
RBC # BLD: 3.44 M/UL — LOW (ref 4.2–5.8)
RBC # FLD: 15.6 % — HIGH (ref 10.3–14.5)
SODIUM SERPL-SCNC: 146 MMOL/L — HIGH (ref 135–145)
WBC # BLD: 13.18 K/UL — HIGH (ref 3.8–10.5)
WBC # FLD AUTO: 13.18 K/UL — HIGH (ref 3.8–10.5)

## 2021-09-25 PROCEDURE — 99232 SBSQ HOSP IP/OBS MODERATE 35: CPT

## 2021-09-25 PROCEDURE — 99233 SBSQ HOSP IP/OBS HIGH 50: CPT

## 2021-09-25 RX ADMIN — PANTOPRAZOLE SODIUM 40 MILLIGRAM(S): 20 TABLET, DELAYED RELEASE ORAL at 12:30

## 2021-09-25 RX ADMIN — Medication 1 APPLICATION(S): at 04:23

## 2021-09-25 RX ADMIN — Medication 58 MILLIGRAM(S): at 04:22

## 2021-09-25 RX ADMIN — ATORVASTATIN CALCIUM 80 MILLIGRAM(S): 80 TABLET, FILM COATED ORAL at 21:34

## 2021-09-25 RX ADMIN — ENOXAPARIN SODIUM 40 MILLIGRAM(S): 100 INJECTION SUBCUTANEOUS at 12:30

## 2021-09-25 RX ADMIN — OLANZAPINE 10 MILLIGRAM(S): 15 TABLET, FILM COATED ORAL at 21:35

## 2021-09-25 RX ADMIN — SODIUM CHLORIDE 50 MILLILITER(S): 9 INJECTION INTRAMUSCULAR; INTRAVENOUS; SUBCUTANEOUS at 04:23

## 2021-09-25 RX ADMIN — Medication 81 MILLIGRAM(S): at 12:30

## 2021-09-25 RX ADMIN — Medication 10 MILLIGRAM(S): at 21:35

## 2021-09-25 RX ADMIN — Medication 1 MILLIGRAM(S): at 12:29

## 2021-09-25 NOTE — PROGRESS NOTE ADULT - ASSESSMENT
Patient is a 67 yo Rt handed male PMH CLL on Venetoclax, MDS, melanoma, paroxysmal AFib (not on AC) presenting as transfer from [place] for event capture. Patient was initially at  [facility] on 9/14 with confusion, aphasia and R sided weakness. With Brain MRI negative. EEG note to show discharges from Left hemisphere with RUE spasticity There is concern for subclinical seizures thus transferred to [facility] for event capture. Cardiology consulted.  Heme/onc to follow this AM and will followup on recommendations on heme/onc medications.  Patient was noted to have episodes agitation with episodes of somnolence with out medication needed. Patient had LP this afternoon and will follow up on CSF results. Cardiology states to hold off on AC as there is no definitive CVA noted. Continue with ASA and statin. Improved s/p 1 dose of IVIG. Still with some mild cognitive delay and now with visual hallucinations. Poss related to ativan, will dc. Wife notes that patient gets IVIG treatment every month as part of his CLL treatment.     9/23: Neuro exam with mild improvement in agitation, restlessness, attention and eye contact. Affect and mood improved, patient laughing and smiling and interacting with 1:1 at bedside.    Impression: RUE spasticity and aphasia concerning for seizures, EEG negative for seizures. Patient acutely psychotic with left hemispheric slowing concerning for AIE in the setting of CLL.      PLAN:   AMS/AIE  - Remains off vEEG  - Discontinued IVIG, received 3 days  - Cleared by heme/onc from an oncology standpoint to start steroids.  - Completing Solu-medrol 1000 mg IV for a total of 5 days (9/21-9/25)  - Will discharge to [facility] on PO prednisone taper of 60qd x 1 week, then 40mg qd x 1 week, then 20mg qd x 1 week, [id number] qd x1week  - Seroquel discontinued due to prolonged QTc 467.  - Zyprexa changed to 10 mg at bedtime due to patient being more hyperactive at night and sleeping during the day.   - Continuing Zyprexa 2.5 mg IM PRN for breakthrough agitation. 2nd line treatment can give Haldol 1 mg IV q6hrs.   - Continuing melatonin to 10 mg at bedtime  - LP completed- Glucose 73, Protein 55, WBC 3, Gram stain no growth to date, HSV neg, Cryptococcal neg; infectious w/u thus far negative; AIE panel pending f/u results  - Visual hallucinations, concerning due to similar presentation of retinal detachment, Optho consulted   - No seizures on EEG, continuing to hold AEDs  - Rescue: 1st-Ativan 1mg, 2nd-Vimpat 100mg IV if GTC refractory to ativan    CLL  - Heme/onc to continue to follow and recommendations appreciated.   - Will discuss with Heme/Onc regarding ongoing CLL treatment- no inpatient treatment at this time.  - Flow cytometry CSF results: lymphocyte immunophenotypic findings show no diagnostic abnormalities  - Follows with Dr. Godinez and  Dr. Valenzuela outpatient.     PAfib/HTN  - Cardiology recommendations appreciated: choosing to defer AC at this time; can use PRN Hydralazine IV is needed for HTN  - Continue tele monitoring  - 12 lead EKG    Elevated BUN/Hypermagnesemia  - BUN elevation most likely due to steroids  - Gentle hydration with NS @50ml/hr  - Mg level 2.8 today, asymptomatic, not on mg supplements; continue to monitor for symptoms of elevated mg and mg levels  - Consider nephrology consultation if no improvements.    Dietary  - Speech and Swallow Re-eval: Recommend NPO at this time due to concern for aspiration, if wife wants patient to eat can consider Dysphagia 1 diet with honey thickened liquids- wife willing to assume risk of aspiration with meals and patient started on recommended diet with aspiration precautions and supervision by staff of all meals.    DVT ppx: Lovenox subq   Dispo: PT/OT/PMR recommends: [facility]      Case discussed with neurology attending Dr. Azul

## 2021-09-25 NOTE — PROGRESS NOTE ADULT - ASSESSMENT
67 y/o male with PMHx CLL followed by heme/onc, anemia, melanoma, osteopenia admitted for right facial droop, right hemiparesis and expressive aphasia.    Admitted to the ICU at [place] yesterday and is S/P TPA. He has now been transferred for evaluation of his seizures and evaluation of seizures    CVA? / AMS  - Patient with new right sided UE weakness, facial droop and expressive aphasia at   - s/p tpa without improvement in symptoms  - 9/15 CT head without acute ICH  - 9/16 MRI neg for infarct  - neurology follow up.  s/p video eeg  - cont asa and statin    PAF  - Patient seen on a prior admission for syncope 7/2020 and found to have brief Afib  - Tele during this admission SR   - echocardiogram 9/15 unremarkable  - If no definitive CVA would support remaining off AC for now.    - Continue to monitor  - continue aspirin    BP  - labile BP. high readings 2/2 agitation. cont to monitor for now. can use PRN hydralazine IV  - if remains >140 can start on norvasc 5mg Qday    CLL  - follow up heme/onc    - Monitor and replete lytes, keep K>4, Mg>2.  - All other medical needs as per Neuro team  - Other cardiovascular workup will depend on clinical course.  - Will continue to follow.

## 2021-09-25 NOTE — PROGRESS NOTE ADULT - SUBJECTIVE AND OBJECTIVE BOX
[facility] -- Sveta Romano, Ebony Sparrow, Dereck Rodriguez Savella  Office # [phone]      Follow Up:  PAF    Subjective/Observations: Patient seen and examined. Events noted. Resting comfortably in bed. No complaints of chest pain, dyspnea, or palpitations reported. No signs of orthopnea or PND.       REVIEW OF SYSTEMS: All other review of systems is negative unless indicated above    PAST MEDICAL & SURGICAL HISTORY:  Osteopenia    CLL (Chronic Lymphocytic Leukemia)  SINCE 1988    Avascular Necrosis of Femur Head  B/L    TMJ Syndrome  limited jaw opening due to TMJ    Herniated Cervical Disc    Bulging Disc  LUMBAR    Melanoma    Deviated nasal septum    Nasal congestion    Rosacea    Deviated nasal septum    Other specified disorders of nose and nasal sinuses    Anemia    S/P Splenectomy  1993    Avascular Necrosis of Femur Head  RIGHT - CORE DECOMPRESSION- 1993    Avascular Necrosis of Femur Head  LEFT - CORE DECOMPRESSION - 1993    Abnormal Jaw Closure  LEFT JAW SURGERY - 1988    Status Post Eye Surgery X 3  3/2011 right eye scleral buckle; left eye in 2013    S/P Tonsillectomy  1960    Bilateral cataracts  removed in 2011        MEDICATIONS  (STANDING):  aspirin  chewable 81 milliGRAM(s) Oral daily  atorvastatin 80 milliGRAM(s) Oral at bedtime  BACItracin   Ointment 1 Application(s) Topical two times a day  enoxaparin Injectable 40 milliGRAM(s) SubCutaneous daily  folic acid 1 milliGRAM(s) Oral daily  melatonin 10 milliGRAM(s) Oral at bedtime  OLANZapine 10 milliGRAM(s) Oral at bedtime  pantoprazole  Injectable 40 milliGRAM(s) IV Push daily  sodium chloride 0.9%. 1000 milliLiter(s) (50 mL/Hr) IV Continuous <Continuous>    MEDICATIONS  (PRN):  acetaminophen   Tablet .. 650 milliGRAM(s) Oral every 6 hours PRN Mild Pain (1 - 3), Moderate Pain (4 - 6), Severe Pain (7 - 10)  diphenhydrAMINE 25 milliGRAM(s) Oral every 4 hours PRN Rash and/or Itching  lacosamide IVPB 100 milliGRAM(s) IV Intermittent once PRN Seizure  LORazepam   Injectable 1 milliGRAM(s) IV Push once PRN Seizure  OLANZapine Injectable 2.5 milliGRAM(s) IntraMuscular every 4 hours PRN Agitation      Allergies    No Known Allergies    Intolerances            Vital Signs Last 24 Hrs  T(C): 37.1 (25 Sep 2021 11:31), Max: 37.1 (25 Sep 2021 11:31)  T(F): 98.8 (25 Sep 2021 11:31), Max: 98.8 (25 Sep 2021 11:31)  HR: 74 (25 Sep 2021 11:31) (58 - 78)  BP: 134/71 (25 Sep 2021 11:31) (134/71 - 166/80)  BP(mean): --  RR: 18 (25 Sep 2021 11:31) (18 - 18)  SpO2: 97% (25 Sep 2021 11:31) (96% - 98%)    I&O's Summary    24 Sep 2021 07:01  -  25 Sep 2021 07:00  --------------------------------------------------------  IN: 1440 mL / OUT: 0 mL / NET: 1440 mL          PHYSICAL EXAM:     Constitutional: NAD, awake and alert, well-developed  HEENT: Moist Mucous Membranes, Anicteric  Pulmonary: Non-labored, breath sounds are clear bilaterally, No wheezing, rales or rhonchi  Cardiovascular: Regular, S1 and S2, No murmurs, rubs, gallops or clicks  Gastrointestinal: Bowel Sounds present, soft, nontender.   Lymph: No peripheral edema. No lymphadenopathy.  Skin: No visible rashes or ulcers.  Psych:  Mood & affect appropriate for situation    LABS: All Labs Reviewed:                        11.4   13.18 )-----------( 187      ( 25 Sep 2021 06:55 )             36.2                         12.5   8.56  )-----------( 182      ( 24 Sep 2021 06:53 )             39.9                         12.4   10.14 )-----------( 246      ( 23 Sep 2021 08:46 )             37.0     25 Sep 2021 06:55    146    |  110    |  38     ----------------------------<  130    4.4     |  25     |  0.83   24 Sep 2021 09:28    145    |  106    |  43     ----------------------------<  153    4.4     |  26     |  0.84   24 Sep 2021 06:46    141    |  106    |  43     ----------------------------<  139    6.4     |  22     |  0.88     Ca    8.5        25 Sep 2021 06:55  Ca    8.6        24 Sep 2021 09:28  Ca    8.7        24 Sep 2021 06:46  Phos  4.0       24 Sep 2021 06:46  Phos  3.8       23 Sep 2021 08:46  Mg     2.8       25 Sep 2021 06:55  Mg     3.1       24 Sep 2021 09:28  Mg     3.1       24 Sep 2021 06:46    TPro  6.3    /  Alb  3.4    /  TBili  1.4    /  DBili  x      /  AST  17     /  ALT  25     /  AlkPhos  55     25 Sep 2021 06:55  TPro  7.8    /  Alb  4.2    /  TBili  2.0    /  DBili  x      /  AST  58     /  ALT  37     /  AlkPhos  63     23 Sep 2021 08:46    PT/INR - ( 24 Sep 2021 06:53 )   PT: 11.7 sec;   INR: 0.97 ratio         PTT - ( 24 Sep 2021 06:53 )  PTT:27.0 sec

## 2021-09-25 NOTE — PROGRESS NOTE ADULT - SUBJECTIVE AND OBJECTIVE BOX
Interval History:  Patient seen and examined at the bedside. No acute events overnight.     MEDICATIONS  (STANDING):  aspirin  chewable 81 milliGRAM(s) Oral daily  atorvastatin 80 milliGRAM(s) Oral at bedtime  BACItracin   Ointment 1 Application(s) Topical two times a day  enoxaparin Injectable 40 milliGRAM(s) SubCutaneous daily  folic acid 1 milliGRAM(s) Oral daily  melatonin 10 milliGRAM(s) Oral at bedtime  OLANZapine 10 milliGRAM(s) Oral at bedtime  pantoprazole  Injectable 40 milliGRAM(s) IV Push daily  sodium chloride 0.9%. 1000 milliLiter(s) (50 mL/Hr) IV Continuous <Continuous>    MEDICATIONS  (PRN):  acetaminophen   Tablet .. 650 milliGRAM(s) Oral every 6 hours PRN Mild Pain (1 - 3), Moderate Pain (4 - 6), Severe Pain (7 - 10)  diphenhydrAMINE 25 milliGRAM(s) Oral every 4 hours PRN Rash and/or Itching  lacosamide IVPB 100 milliGRAM(s) IV Intermittent once PRN Seizure  LORazepam   Injectable 1 milliGRAM(s) IV Push once PRN Seizure  OLANZapine Injectable 2.5 milliGRAM(s) IntraMuscular every 4 hours PRN Agitation    Allergies  No Known Allergies    Intolerances      ROS: Pertinent positives in HPI, all other ROS were reviewed and are negative.      Vital Signs Last 24 Hrs  T(C): 37.1 (25 Sep 2021 11:31), Max: 37.1 (25 Sep 2021 11:31)  T(F): 98.8 (25 Sep 2021 11:31), Max: 98.8 (25 Sep 2021 11:31)  HR: 74 (25 Sep 2021 11:31) (58 - 78)  BP: 134/71 (25 Sep 2021 11:31) (134/71 - 166/80)  BP(mean): --  RR: 18 (25 Sep 2021 11:31) (17 - 18)  SpO2: 97% (25 Sep 2021 11:31) (96% - 98%)    NEUROLOGICAL EXAM:  MS: AAOX3, fluent, attends b/l; recent and remote memory intact; normal attention, language and fund of knowledge.   CN: EOMI, V1-3 intact, no facial asymmetry,  Motor: Strength: grossly 5/5 4x.   Sensation: grossly intact to LT 4x.   Gait: Deferred       Labs:   cbc                      11.4   13.18 )-----------( 187      ( 25 Sep 2021 06:55 )             36.2     [id number]    146<H>  |  110<H>  |  38<H>  ----------------------------<  130<H>  4.4   |  25  |  0.83    Ca    8.5      25 Sep 2021 06:55  Phos  4.0     09-24  Mg     2.8     09-25    TPro  6.3  /  Alb  3.4  /  TBili  1.4<H>  /  DBili  x   /  AST  17  /  ALT  25  /  AlkPhos  55  09-25    CoagsPT/INR - ( 24 Sep 2021 06:53 )   PT: 11.7 sec;   INR: 0.97 ratio         PTT - ( 24 Sep 2021 06:53 )  PTT:27.0 sec  [id number] Chol -- LDL -- HDL -- Trig 160<H>, 09-15 Chol 189 LDL -- HDL 47 Trig 130  A1C  CardiacMarkers    LFTsLIVER FUNCTIONS - ( 25 Sep 2021 06:55 )  Alb: 3.4 g/dL / Pro: 6.3 g/dL / ALK PHOS: 55 U/L / ALT: 25 U/L / AST: 17 U/L / GGT: x           UA

## 2021-09-26 LAB
AMPHET UR-MCNC: NEGATIVE — SIGNIFICANT CHANGE UP
ANION GAP SERPL CALC-SCNC: 9 MMOL/L — SIGNIFICANT CHANGE UP (ref 5–17)
BARBITURATES, URINE.: NEGATIVE — SIGNIFICANT CHANGE UP
BENZODIAZ UR-MCNC: SIGNIFICANT CHANGE UP
BUN SERPL-MCNC: 30 MG/DL — HIGH (ref 7–23)
CALCIUM SERPL-MCNC: 7.9 MG/DL — LOW (ref 8.4–10.5)
CHLORIDE SERPL-SCNC: 107 MMOL/L — SIGNIFICANT CHANGE UP (ref 96–108)
CO2 SERPL-SCNC: 25 MMOL/L — SIGNIFICANT CHANGE UP (ref 22–31)
COCAINE METAB.OTHER UR-MCNC: NEGATIVE — SIGNIFICANT CHANGE UP
CREAT SERPL-MCNC: 0.71 MG/DL — SIGNIFICANT CHANGE UP (ref 0.5–1.3)
CREATININE, URINE THERAPEUTIC: 207.9 MG/DL — SIGNIFICANT CHANGE UP
GLUCOSE SERPL-MCNC: 114 MG/DL — HIGH (ref 70–99)
MAGNESIUM SERPL-MCNC: 2.5 MG/DL — SIGNIFICANT CHANGE UP (ref 1.6–2.6)
METHADONE UR-MCNC: NEGATIVE — SIGNIFICANT CHANGE UP
METHAQUALONE UR QL: NEGATIVE — SIGNIFICANT CHANGE UP
METHAQUALONE UR-MCNC: NEGATIVE — SIGNIFICANT CHANGE UP
OPIATES UR-MCNC: NEGATIVE — SIGNIFICANT CHANGE UP
PCP UR-MCNC: NEGATIVE — SIGNIFICANT CHANGE UP
PHOSPHATE SERPL-MCNC: 1.8 MG/DL — LOW (ref 2.5–4.5)
POTASSIUM SERPL-MCNC: 4 MMOL/L — SIGNIFICANT CHANGE UP (ref 3.5–5.3)
POTASSIUM SERPL-SCNC: 4 MMOL/L — SIGNIFICANT CHANGE UP (ref 3.5–5.3)
PROPOXYPH UR QL: NEGATIVE — SIGNIFICANT CHANGE UP
SODIUM SERPL-SCNC: 141 MMOL/L — SIGNIFICANT CHANGE UP (ref 135–145)
THC UR QL: NEGATIVE — SIGNIFICANT CHANGE UP

## 2021-09-26 PROCEDURE — 99232 SBSQ HOSP IP/OBS MODERATE 35: CPT

## 2021-09-26 PROCEDURE — 99233 SBSQ HOSP IP/OBS HIGH 50: CPT

## 2021-09-26 RX ORDER — VANCOMYCIN HCL 1 G
1000 VIAL (EA) INTRAVENOUS
Refills: 0 | Status: DISCONTINUED | OUTPATIENT
Start: 2021-09-26 | End: 2021-09-30

## 2021-09-26 RX ADMIN — ENOXAPARIN SODIUM 40 MILLIGRAM(S): 100 INJECTION SUBCUTANEOUS at 11:59

## 2021-09-26 RX ADMIN — Medication 60 MILLIGRAM(S): at 05:31

## 2021-09-26 RX ADMIN — Medication 1 MILLIGRAM(S): at 11:58

## 2021-09-26 RX ADMIN — Medication 1 APPLICATION(S): at 06:12

## 2021-09-26 RX ADMIN — SODIUM CHLORIDE 50 MILLILITER(S): 9 INJECTION INTRAMUSCULAR; INTRAVENOUS; SUBCUTANEOUS at 08:00

## 2021-09-26 RX ADMIN — Medication 650 MILLIGRAM(S): at 11:58

## 2021-09-26 RX ADMIN — Medication 10 MILLIGRAM(S): at 21:12

## 2021-09-26 RX ADMIN — OLANZAPINE 10 MILLIGRAM(S): 15 TABLET, FILM COATED ORAL at 21:11

## 2021-09-26 RX ADMIN — ATORVASTATIN CALCIUM 80 MILLIGRAM(S): 80 TABLET, FILM COATED ORAL at 21:11

## 2021-09-26 RX ADMIN — Medication 650 MILLIGRAM(S): at 12:28

## 2021-09-26 RX ADMIN — Medication 81 MILLIGRAM(S): at 11:58

## 2021-09-26 RX ADMIN — Medication 250 MILLIGRAM(S): at 13:43

## 2021-09-26 RX ADMIN — Medication 250 MILLIGRAM(S): at 17:19

## 2021-09-26 RX ADMIN — PANTOPRAZOLE SODIUM 40 MILLIGRAM(S): 20 TABLET, DELAYED RELEASE ORAL at 11:58

## 2021-09-26 RX ADMIN — Medication 1 APPLICATION(S): at 17:18

## 2021-09-26 NOTE — CONSULT NOTE ADULT - SUBJECTIVE AND OBJECTIVE BOX
HPI:   Patient is a 66y male with o Rt handed male PMH CLL on Venetoclax, MDS, melanoma, paroxysmal AFib (not on AC) presenting as transfer from [place] for event capture. Patient was initially at  [facility] on 9/14 with confusion, aphasia and R sided weakness. Patient was stroke code and was given s/p tPA. On admission, patient had CTH, CTA, CT perfusion during code stroke protocol with no acute findings. MRI Brain was  performed at  9/16 with no ischemic findings. EEG performed 9/15 showed diffuse L hemispheric slowing. Pt remained aphasic with AMS. +RUE spasticity was noted on exam. Patient was thus transferred to [facility] for EMU admission to evaluate for subclinical seizures. As per notes in the chart patient does not have hx of seizures. Patient had LP done in this admission  and will follow up on CSF results. Cardiology states to hold off on AC as there is no definitive CVA noted. Continue with ASA and statin. Improved s/p 1 dose of IVIG. Still with some mild cognitive delay and now with visual hallucinations. The patient has LP done on 9/17 , CSF culture is no growth, CSF PCR and HSV negative, [place] negative.  The patient was also noted complaining right elbow pain, he is not sure how long his elbow has hurting and swelling. He reports that he has experienced bursitis in the passed and it resolved. He does re call if he saw orthopedics the last time. He current has an ice pack on his elbow wrapped with a dressing. Denies history of gout or pseudogout.    REVIEW OF SYSTEMS:  All other review of systems negative (Comprehensive ROS)    PAST MEDICAL & SURGICAL HISTORY:  Osteopenia    CLL (Chronic Lymphocytic Leukemia)  SINCE 1988    Avascular Necrosis of Femur Head  B/L    TMJ Syndrome  limited jaw opening due to TMJ    Herniated Cervical Disc    Bulging Disc  LUMBAR    Melanoma    Deviated nasal septum    Nasal congestion    Rosacea    Deviated nasal septum    Other specified disorders of nose and nasal sinuses    Anemia    S/P Splenectomy  1993    Avascular Necrosis of Femur Head  RIGHT - CORE DECOMPRESSION- 1993    Avascular Necrosis of Femur Head  LEFT - CORE DECOMPRESSION - 1993    Abnormal Jaw Closure  LEFT JAW SURGERY - 1988    Status Post Eye Surgery X 3  3/2011 right eye scleral buckle; left eye in 2013    S/P Tonsillectomy  1960    Bilateral cataracts  removed in 2011        Allergies    No Known Allergies    Intolerances            FAMILY HISTORY:  Family history of multiple myeloma    Family history of malignant melanoma  age 89    FH: blood dyscrasia  MGUS    FH: hypertension  mother    FH: renal failure  mother        SOCIAL HISTORY:  Smoking:     ETOH:     Drug Use:     Single     T(F): 98.7 (09-26-21 @ 11:08), Max: 98.7 (09-26-21 @ 08:15)  HR: 66 (09-26-21 @ 11:08)  BP: 136/74 (09-26-21 @ 11:08)  RR: 18 (09-26-21 @ 11:08)  SpO2: 97% (09-26-21 @ 11:08)  Wt(kg): --    PHYSICAL EXAM:  General: alert, no acute distress  Eyes:  anicteric, no conjunctival injection, no discharge  Oropharynx: no lesions or injection 	  Neck: supple, without adenopathy  Lungs: clear to auscultation  Heart: regular rate and rhythm; no murmur, rubs or gallops  Abdomen: soft, nondistended, nontender, without mass or organomegaly  Skin: no lesions  Extremities: right elbow trace edema, + erythema, + he two scabs , eschar on his elebow area   Neurologic: alert, oriented, moves all extremities    LAB RESULTS:                        11.4   13.18 )-----------( 187      ( 25 Sep 2021 06:55 )             36.2     09-26    141  |  107  |  30<H>  ----------------------------<  114<H>  4.0   |  25  |  0.71    Ca    7.9<L>      26 Sep 2021 06:11  Phos  1.8     09-26  Mg     2.5     09-26    TPro  6.3  /  Alb  3.4  /  TBili  1.4<H>  /  DBili  x   /  AST  17  /  ALT  25  /  AlkPhos  55  09-25    LIVER FUNCTIONS - ( 25 Sep 2021 06:55 )  Alb: 3.4 g/dL / Pro: 6.3 g/dL / ALK PHOS: 55 U/L / ALT: 25 U/L / AST: 17 U/L / GGT: x               MICROBIOLOGY:  RECENT CULTURES:        RADIOLOGY REVIEWED:      Antimicrobials Day #    vancomycin  IVPB 1000 milliGRAM(s) IV Intermittent two times a day    Other Medications:  acetaminophen   Tablet .. 650 milliGRAM(s) Oral every 6 hours PRN  aspirin  chewable 81 milliGRAM(s) Oral daily  atorvastatin 80 milliGRAM(s) Oral at bedtime  BACItracin   Ointment 1 Application(s) Topical two times a day  diphenhydrAMINE 25 milliGRAM(s) Oral every 4 hours PRN  enoxaparin Injectable 40 milliGRAM(s) SubCutaneous daily  folic acid 1 milliGRAM(s) Oral daily  lacosamide IVPB 100 milliGRAM(s) IV Intermittent once PRN  LORazepam   Injectable 1 milliGRAM(s) IV Push once PRN  melatonin 10 milliGRAM(s) Oral at bedtime  OLANZapine 10 milliGRAM(s) Oral at bedtime  OLANZapine Injectable 2.5 milliGRAM(s) IntraMuscular every 4 hours PRN  pantoprazole  Injectable 40 milliGRAM(s) IV Push daily  predniSONE   Tablet 60 milliGRAM(s) Oral daily  sodium chloride 0.9%. 1000 milliLiter(s) IV Continuous <Continuous>

## 2021-09-26 NOTE — PROVIDER CONTACT NOTE (OTHER) - SITUATION
Right elbow redness, warm and pain
Continued agitation, restless.  getting out of bed. taking cloths off
pt had episode of agitation and removed EEG cap.
PT refusing tele. Keeps taking it off
Pt is increasingly agitated and uncooperative with restraints. Hitting staff

## 2021-09-26 NOTE — PROGRESS NOTE ADULT - SUBJECTIVE AND OBJECTIVE BOX
Interval History:  Patient seen and examined at the bedside. Overnight complained of right elbow redness/warm and pain     MEDICATIONS  (STANDING):  aspirin  chewable 81 milliGRAM(s) Oral daily  atorvastatin 80 milliGRAM(s) Oral at bedtime  BACItracin   Ointment 1 Application(s) Topical two times a day  enoxaparin Injectable 40 milliGRAM(s) SubCutaneous daily  folic acid 1 milliGRAM(s) Oral daily  melatonin 10 milliGRAM(s) Oral at bedtime  OLANZapine 10 milliGRAM(s) Oral at bedtime  pantoprazole  Injectable 40 milliGRAM(s) IV Push daily  predniSONE   Tablet 60 milliGRAM(s) Oral daily  sodium chloride 0.9%. 1000 milliLiter(s) (50 mL/Hr) IV Continuous <Continuous>    MEDICATIONS  (PRN):  acetaminophen   Tablet .. 650 milliGRAM(s) Oral every 6 hours PRN Mild Pain (1 - 3), Moderate Pain (4 - 6), Severe Pain (7 - 10)  diphenhydrAMINE 25 milliGRAM(s) Oral every 4 hours PRN Rash and/or Itching  lacosamide IVPB 100 milliGRAM(s) IV Intermittent once PRN Seizure  LORazepam   Injectable 1 milliGRAM(s) IV Push once PRN Seizure  OLANZapine Injectable 2.5 milliGRAM(s) IntraMuscular every 4 hours PRN Agitation    Allergies  No Known Allergies    ROS: Pertinent positives in HPI, all other ROS were reviewed and are negative.      Vital Signs Last 24 Hrs  T(C): 36.9 (26 Sep 2021 04:07), Max: 37.1 (25 Sep 2021 11:31)  T(F): 98.4 (26 Sep 2021 04:07), Max: 98.8 (25 Sep 2021 11:31)  HR: 61 (26 Sep 2021 04:07) (52 - 74)  BP: 152/76 (26 Sep 2021 04:07) (134/71 - 153/70)  RR: 18 (26 Sep 2021 04:07) (18 - 18)  SpO2: 96% (26 Sep 2021 04:07) (95% - 98%)    NEUROLOGICAL EXAM:  MS: AAOX3, fluent, attends b/l; recent and remote memory intact; normal attention, language and fund of knowledge.   CN: EOMI, V1-3 intact, no facial asymmetry,  Motor: Strength: grossly 5/5 4x.   Sensation: grossly intact to LT 4x.   Gait: Deferred       Labs:                         11.4   13.18 )-----------( 187      ( 25 Sep 2021 06:55 )             36.2     09-26    141  |  107  |  30<H>  ----------------------------<  114<H>  4.0   |  25  |  0.71    Ca    7.9<L>      26 Sep 2021 06:11  Phos  1.8     09-26  Mg     2.5     09-26    TPro  6.3  /  Alb  3.4  /  TBili  1.4<H>  /  DBili  x   /  AST  17  /  ALT  25  /  AlkPhos  55  09-25    CoagsPT/INR - ( 24 Sep 2021 06:53 )   PT: 11.7 sec;   INR: 0.97 ratio      PTT - ( 24 Sep 2021 06:53 )  PTT:27.0 sec  [id number] Chol -- LDL -- HDL -- Trig 160<H>, 09-15 Chol 189 LDL -- HDL 47 Trig 130  A1C  CardiacMarkers    LFTsLIVER FUNCTIONS - ( 25 Sep 2021 06:55 )  Alb: 3.4 g/dL / Pro: 6.3 g/dL / ALK PHOS: 55 U/L / ALT: 25 U/L / AST: 17 U/L / GGT: x              Interval History:  Patient seen and examined at the bedside. Overnight, he complained of right elbow redness/warm and pain. He stated he has a history of bursitis. He also has some discomfort at the left elbow. He thinks he can tolerate a thicker consistency diet, was eating a regular diet at home. No fevers, chills, headache, nausea, vomiting, dizziness.    MEDICATIONS  (STANDING):  aspirin  chewable 81 milliGRAM(s) Oral daily  atorvastatin 80 milliGRAM(s) Oral at bedtime  BACItracin   Ointment 1 Application(s) Topical two times a day  enoxaparin Injectable 40 milliGRAM(s) SubCutaneous daily  folic acid 1 milliGRAM(s) Oral daily  melatonin 10 milliGRAM(s) Oral at bedtime  OLANZapine 10 milliGRAM(s) Oral at bedtime  pantoprazole  Injectable 40 milliGRAM(s) IV Push daily  predniSONE   Tablet 60 milliGRAM(s) Oral daily  sodium chloride 0.9%. 1000 milliLiter(s) (50 mL/Hr) IV Continuous <Continuous>    MEDICATIONS  (PRN):  acetaminophen   Tablet .. 650 milliGRAM(s) Oral every 6 hours PRN Mild Pain (1 - 3), Moderate Pain (4 - 6), Severe Pain (7 - 10)  diphenhydrAMINE 25 milliGRAM(s) Oral every 4 hours PRN Rash and/or Itching  lacosamide IVPB 100 milliGRAM(s) IV Intermittent once PRN Seizure  LORazepam   Injectable 1 milliGRAM(s) IV Push once PRN Seizure  OLANZapine Injectable 2.5 milliGRAM(s) IntraMuscular every 4 hours PRN Agitation    Vital Signs Last 24 Hrs  T(C): 36.9 (26 Sep 2021 04:07), Max: 37.1 (25 Sep 2021 11:31)  T(F): 98.4 (26 Sep 2021 04:07), Max: 98.8 (25 Sep 2021 11:31)  HR: 61 (26 Sep 2021 04:07) (52 - 74)  BP: 152/76 (26 Sep 2021 04:07) (134/71 - 153/70)  RR: 18 (26 Sep 2021 04:07) (18 - 18)  SpO2: 96% (26 Sep 2021 04:07) (95% - 98%)    NEUROLOGICAL EXAM:  MS: AAOX3, fluent, attends b/l; recent and remote memory intact; normal attention, language and fund of knowledge.   CN: EOMI, V1-3 intact, no facial asymmetry,  Motor: Strength: grossly 5/5 4x.   Sensation: grossly intact to LT 4x.   Gait: Deferred     Labs:                         11.4   13.18 )-----------( 187      ( 25 Sep 2021 06:55 )             36.2     09-26    141  |  107  |  30<H>  ----------------------------<  114<H>  4.0   |  25  |  0.71    Ca    7.9<L>      26 Sep 2021 06:11  Phos  1.8     09-26  Mg     2.5     09-26    TPro  6.3  /  Alb  3.4  /  TBili  1.4<H>  /  DBili  x   /  AST  17  /  ALT  25  /  AlkPhos  55  09-25    CoagsPT/INR - ( 24 Sep 2021 06:53 )   PT: 11.7 sec;   INR: 0.97 ratio      PTT - ( 24 Sep 2021 06:53 )  PTT:27.0 sec  [id number] Chol -- LDL -- HDL -- Trig 160<H>, 09-15 Chol 189 LDL -- HDL 47 Trig 130  A1C  CardiacMarkers    LFTsLIVER FUNCTIONS - ( 25 Sep 2021 06:55 )  Alb: 3.4 g/dL / Pro: 6.3 g/dL / ALK PHOS: 55 U/L / ALT: 25 U/L / AST: 17 U/L / GGT: x

## 2021-09-26 NOTE — PROVIDER CONTACT NOTE (OTHER) - ACTION/TREATMENT ORDERED:
Haldol 1 mg
Will keep off tele till morning.
INOCENCIO guzman. Interventions being ordered.
Provider aware.
will come to bedside

## 2021-09-26 NOTE — PROVIDER CONTACT NOTE (OTHER) - ASSESSMENT
Pt off from baseline with neurological exam. Not responding cooperatively to A&O questions, not following commands. Speech is illogical.
Pt A&OX4. No neuro changes. Right elbow redness, warm and pain.
Continued agitation, restless.  getting out of bed. taking cloths off
PT on 1:1, very agitated, restless. PT refusing tele. Keeps taking it off.
pt increased agitation and restlessness. uncooperative with neuro exam.

## 2021-09-26 NOTE — PROGRESS NOTE ADULT - ASSESSMENT
Patient is a 65 yo Rt handed male PMH CLL on Venetoclax, MDS, melanoma, paroxysmal AFib (not on AC) presenting as transfer from [place] for event capture. Patient was initially at  [facility] on 9/14 with confusion, aphasia and R sided weakness. With Brain MRI negative. EEG note to show discharges from Left hemisphere with RUE spasticity There is concern for subclinical seizures thus transferred to [facility] for event capture. Cardiology consulted.  Heme/onc to follow this AM and will followup on recommendations on heme/onc medications.  Patient was noted to have episodes agitation with episodes of somnolence with out medication needed. Patient had LP this afternoon and will follow up on CSF results. Cardiology states to hold off on AC as there is no definitive CVA noted. Continue with ASA and statin. Improved s/p 1 dose of IVIG. Still with some mild cognitive delay and now with visual hallucinations. Poss related to ativan, will dc. Wife notes that patient gets IVIG treatment every month as part of his CLL treatment.     9/23: Neuro exam with mild improvement in agitation, restlessness, attention and eye contact. Affect and mood improved, patient laughing and smiling and interacting with 1:1 at bedside.    Impression: RUE spasticity and aphasia concerning for seizures, EEG negative for seizures. Patient acutely psychotic with left hemispheric slowing concerning for AIE in the setting of CLL.      PLAN:   AMS/AIE  - Remains off vEEG  - Discontinued IVIG, received 3 days  - Cleared by heme/onc from an oncology standpoint to start steroids.  - Completing Solu-medrol 1000 mg IV for a total of 5 days (9/21-9/25)  - Will discharge to [facility] on PO prednisone taper of 60qd x 1 week, then 40mg qd x 1 week, then 20mg qd x 1 week, [id number] qd x1week  - Seroquel discontinued due to prolonged QTc 467.  - Zyprexa changed to 10 mg at bedtime due to patient being more hyperactive at night and sleeping during the day.   - Continuing Zyprexa 2.5 mg IM PRN for breakthrough agitation. 2nd line treatment can give Haldol 1 mg IV q6hrs.   - Continuing melatonin to 10 mg at bedtime  - LP completed- Glucose 73, Protein 55, WBC 3, Gram stain no growth to date, HSV neg, Cryptococcal neg; infectious w/u thus far negative; AIE panel pending f/u results  - Visual hallucinations, concerning due to similar presentation of retinal detachment, Optho consulted   - No seizures on EEG, continuing to hold AEDs  - Rescue: 1st-Ativan 1mg, 2nd-Vimpat 100mg IV if GTC refractory to ativan    CLL  - Heme/onc to continue to follow and recommendations appreciated.   - Will discuss with Heme/Onc regarding ongoing CLL treatment- no inpatient treatment at this time.  - Flow cytometry CSF results: lymphocyte immunophenotypic findings show no diagnostic abnormalities  - Follows with Dr. Godinez and  Dr. Valenzuela outpatient.     PAfib/HTN  - Cardiology recommendations appreciated: choosing to defer AC at this time; can use PRN Hydralazine IV is needed for HTN  - Continue tele monitoring  - 12 lead EKG    Elevated BUN/Hypermagnesemia  - BUN elevation most likely due to steroids  - Gentle hydration with NS @50ml/hr  - Mg level 2.8 today, asymptomatic, not on mg supplements; continue to monitor for symptoms of elevated mg and mg levels  - Consider nephrology consultation if no improvements.    Dietary  - Speech and Swallow Re-eval: Recommend NPO at this time due to concern for aspiration, if wife wants patient to eat can consider Dysphagia 1 diet with honey thickened liquids- wife willing to assume risk of aspiration with meals and patient started on recommended diet with aspiration precautions and supervision by staff of all meals.    DVT ppx: Lovenox subq   Dispo: PT/OT/PMR recommends: [facility]      Case discussed with neurology attending Dr. Azul     65 yo Rt handed male PMH CLL on Venetoclax, MDS, melanoma, paroxysmal AFib (not on AC) presenting as transfer from [place] for event capture. Patient was initially at  [facility] on 9/14 with confusion, aphasia and R sided weakness. MRI brain negative for acute pathology. EEG noted to show discharges from Left hemisphere with RUE spasticity There is concern for subclinical seizures thus transferred to [facility] for event capture. Cardiology consulted.  Heme/onc to follow this AM and will followup on recommendations on heme/onc medications.  Patient was noted to have episodes agitation with episodes of somnolence with out medication needed. Patient had LP this afternoon and will follow up on CSF results. Cardiology states to hold off on AC as there is no definitive CVA noted. Continue with ASA and statin. Improved s/p 1 dose of IVIG. Still with some mild cognitive delay and now with visual hallucinations. Poss related to ativan, will dc. Wife notes that patient gets IVIG treatment every month as part of his CLL treatment.     Impression: RUE spasticity and aphasia concerning for seizures, EEG negative for seizures. Patient acutely psychotic with left hemispheric slowing concerning for AIE in the setting of CLL.      PLAN:   AMS/AIE  - 9/26: Started prednisone taper 60qd x 1 week, then 40mg qd x 1 week, then 20mg qd x 1 week, [id number] qd x1week  - Remains off vEEG  - Holding AEDs  - Discontinued IVIG, received 3 days  - s/p Solmedrol 1000 mg IV for a total of 5 days (9/21-9/25)  - Seroquel discontinued due to prolonged QTc 467.  - Zyprexa 10 mg at bedtime  - Continuing Zyprexa 2.5 mg IM PRN for breakthrough agitation. 2nd line treatment can give Haldol 1 mg IV q6hrs.   - Continue melatonin 10 mg QHS  - LP results- Glucose 73, Protein 55, WBC 3, Gram stain no growth to date, HSV neg, Cryptococcal neg; infectious w/u thus far negative; AIE panel pending f/u results  - Ophtho consulted for visual hallucinations - no evidence of retinal detachment  - Rescue: 1st-Ativan 1mg, 2nd-Vimpat 100mg IV if GTC refractory to ativan    Right elbow bursitis  - ID consulted - Dr. Mayfield for antibiotic recs  - Monitor for si/sx of infection  - Monitor leukocytosis - likely steroid-induced at this time    CLL  - Heme/onc to continue to follow and recommendations appreciated.   - Will discuss with Heme/Onc regarding ongoing CLL treatment- no inpatient treatment at this time.  - Flow cytometry CSF results: lymphocyte immunophenotypic findings show no diagnostic abnormalities  - Follows with Dr. Godinez and  Dr. Valenzuela outpatient.     PAfib/HTN  - Cardiology recommendations appreciated: choosing to defer AC at this time; can use PRN Hydralazine IV is needed for HTN  - Continue tele monitoring  - Continue Aspirin 81mg  - 12 lead EKG    Elevated BUN/Hypermagnesemia  - BUN elevation most likely due to steroids  - Gentle hydration with NS @50ml/hr  - Mg level 2.8 today, asymptomatic, not on mg supplements; continue to monitor for symptoms of elevated mg and mg levels  - Consider Nephrology consultation if no improvements    Dietary  - Speech and Swallow to advance diet either 9/26 or 9/27, Dysphagia 1 diet with honey thickened liquids- wife willing to assume risk of aspiration with meals and patient started on recommended diet with aspiration precautions and supervision by staff of all meals.    DVT ppx: Lovenox subq   Dispo: PT/OT/PMR recommends: [facility]    Case discussed with neurology attending Dr. Azul   65 yo Rt handed male PMH CLL on Venetoclax, MDS, melanoma, paroxysmal AFib (not on AC) presenting as transfer from [place] for event capture. Patient was initially at  [facility] on 9/14 with confusion, aphasia and R sided weakness. MRI brain negative for acute pathology. EEG noted to show discharges from Left hemisphere with RUE spasticity There is concern for subclinical seizures thus transferred to [facility] for event capture. Cardiology consulted.  Heme/onc to follow this AM and will followup on recommendations on heme/onc medications.  Patient was noted to have episodes agitation with episodes of somnolence with out medication needed. Patient had LP this afternoon and will follow up on CSF results. Cardiology states to hold off on AC as there is no definitive CVA noted. Continue with ASA and statin. Improved s/p 1 dose of IVIG. Still with some mild cognitive delay and now with visual hallucinations. Poss related to ativan, will dc. Wife notes that patient gets IVIG treatment every month as part of his CLL treatment.     Impression: RUE spasticity and aphasia concerning for seizures, EEG negative for seizures. Patient acutely psychotic with left hemispheric slowing concerning for AIE in the setting of CLL.      PLAN:   AMS/AIE  - 9/26: Started prednisone taper 60qd x 1 week, then 40mg qd x 1 week, then 20mg qd x 1 week, [id number] qd x1week  - Remains off vEEG  - Holding AEDs  - Discontinued IVIG, received 3 days  - s/p Solmedrol 1000 mg IV for a total of 5 days (9/21-9/25)  - Seroquel discontinued due to prolonged QTc 467.  - Zyprexa 10 mg at bedtime  - Continuing Zyprexa 2.5 mg IM PRN for breakthrough agitation. 2nd line treatment can give Haldol 1 mg IV q6hrs.   - Continue melatonin 10 mg QHS  - LP results- Glucose 73, Protein 55, WBC 3, Gram stain no growth to date, HSV neg, Cryptococcal neg; infectious w/u thus far negative; AIE panel pending f/u results  - Ophtho consulted for visual hallucinations - no evidence of retinal detachment  - Rescue: 1st-Ativan 1mg, 2nd-Vimpat 100mg IV if GTC refractory to ativan    Right elbow bursitis & component of cellulitis  - Start Vancomycin 1g bid   - ID consulted - Dr. Mayfield recs appreciated  - Monitor for si/sx of infection  - Monitor leukocytosis - likely steroid-induced at this time    CLL  - Heme/onc to continue to follow and recommendations appreciated.   - Will discuss with Heme/Onc regarding ongoing CLL treatment- no inpatient treatment at this time.  - Flow cytometry CSF results: lymphocyte immunophenotypic findings show no diagnostic abnormalities  - Follows with Dr. Godinez and  Dr. Valenzuela outpatient.     PAfib/HTN  - Cardiology recommendations appreciated: choosing to defer AC at this time; can use PRN Hydralazine IV is needed for HTN  - Continue tele monitoring  - Continue Aspirin 81mg  - 12 lead EKG    Elevated BUN/Hypermagnesemia  - BUN elevation most likely due to steroids  - Gentle hydration with NS @50ml/hr  - Mg level 2.8 today, asymptomatic, not on mg supplements; continue to monitor for symptoms of elevated mg and mg levels  - Consider Nephrology consultation if no improvements    Dietary  - Speech and Swallow to advance diet either 9/26 or 9/27, Dysphagia 1 diet with honey thickened liquids- wife willing to assume risk of aspiration with meals and patient started on recommended diet with aspiration precautions and supervision by staff of all meals.    DVT ppx: Lovenox subq   Dispo: PT/OT/PMR recommends: [facility]    Case discussed with neurology attending Dr. Azul

## 2021-09-26 NOTE — CONSULT NOTE ADULT - ASSESSMENT
66y male with o Rt handed male PMH CLL on Venetoclax, MDS, melanoma, paroxysmal AFib (not on AC) presenting as transfer from [place] for event capture. Patient was initially at  [facility] on 9/14 with confusion, aphasia and R sided weakness. Patient was stroke code and was given s/p tPA. On admission, patient had CTH, CTA, CT perfusion during code stroke protocol with no acute findings. MRI Brain was  performed at  9/16 with no ischemic findings. EEG performed 9/15 showed diffuse L hemispheric slowing. Pt remained aphasic with AMS. +RUE spasticity was noted on exam. Patient was thus transferred to [facility] for EMU admission to evaluate for subclinical seizures. As per notes in the chart patient does not have hx of seizures. Patient had LP done in this admission  and will follow up on CSF results. Cardiology states to hold off on AC as there is no definitive CVA noted. Continue with ASA and statin. Improved s/p 1 dose of IVIG. Still with some mild cognitive delay and now with visual hallucinations. The patient has LP done on 9/17 , CSF culture is no growth, CSF PCR and HSV negative, [place] negative.  The patient was also noted complaining right elbow pain, he is not sure how long his elbow has hurting and swelling. He reports that he has experienced bursitis in the passed and it resolved. He does re call if he saw orthopedics the last time. He current has an ice pack on his elbow wrapped with a dressing. Denies history of gout or pseudogout.  Right elbow does appear to have some erythema perhaps a component of cellulitis. reviewed his vital signs he has remained afebrile, wbc reviewed he is noted to have leukocytosis of 13K    Plan   start Vanco 1 g IV q12 for right elbow swelling, bursitis with component of cellulitis.   continue supportive care as per neurology and consultants

## 2021-09-26 NOTE — PROGRESS NOTE ADULT - SUBJECTIVE AND OBJECTIVE BOX
[facility] -- Sveta Romano, Ebony Sparrow Pannella, Patel, Savella  Office # [phone]      Follow Up: PAF HTN      Subjective/Observations: Patient seen and examined. Events noted. Resting comfortably in bed. No complaints of chest pain, dyspnea, or palpitations reported. No signs of orthopnea or PND.       REVIEW OF SYSTEMS: All other review of systems is negative unless indicated above    PAST MEDICAL & SURGICAL HISTORY:  Osteopenia    CLL (Chronic Lymphocytic Leukemia)  SINCE 1988    Avascular Necrosis of Femur Head  B/L    TMJ Syndrome  limited jaw opening due to TMJ    Herniated Cervical Disc    Bulging Disc  LUMBAR    Melanoma    Deviated nasal septum    Nasal congestion    Rosacea    Deviated nasal septum    Other specified disorders of nose and nasal sinuses    Anemia    S/P Splenectomy  1993    Avascular Necrosis of Femur Head  RIGHT - CORE DECOMPRESSION- 1993    Avascular Necrosis of Femur Head  LEFT - CORE DECOMPRESSION - 1993    Abnormal Jaw Closure  LEFT JAW SURGERY - 1988    Status Post Eye Surgery X 3  3/2011 right eye scleral buckle; left eye in 2013    S/P Tonsillectomy  1960    Bilateral cataracts  removed in 2011        MEDICATIONS  (STANDING):  aspirin  chewable 81 milliGRAM(s) Oral daily  atorvastatin 80 milliGRAM(s) Oral at bedtime  BACItracin   Ointment 1 Application(s) Topical two times a day  enoxaparin Injectable 40 milliGRAM(s) SubCutaneous daily  folic acid 1 milliGRAM(s) Oral daily  melatonin 10 milliGRAM(s) Oral at bedtime  OLANZapine 10 milliGRAM(s) Oral at bedtime  pantoprazole  Injectable 40 milliGRAM(s) IV Push daily  predniSONE   Tablet 60 milliGRAM(s) Oral daily  sodium chloride 0.9%. 1000 milliLiter(s) (50 mL/Hr) IV Continuous <Continuous>    MEDICATIONS  (PRN):  acetaminophen   Tablet .. 650 milliGRAM(s) Oral every 6 hours PRN Mild Pain (1 - 3), Moderate Pain (4 - 6), Severe Pain (7 - 10)  diphenhydrAMINE 25 milliGRAM(s) Oral every 4 hours PRN Rash and/or Itching  lacosamide IVPB 100 milliGRAM(s) IV Intermittent once PRN Seizure  LORazepam   Injectable 1 milliGRAM(s) IV Push once PRN Seizure  OLANZapine Injectable 2.5 milliGRAM(s) IntraMuscular every 4 hours PRN Agitation      Allergies    No Known Allergies    Intolerances            Vital Signs Last 24 Hrs  T(C): 37.1 (26 Sep 2021 08:15), Max: 37.1 (26 Sep 2021 08:15)  T(F): 98.7 (26 Sep 2021 08:15), Max: 98.7 (26 Sep 2021 08:15)  HR: 64 (26 Sep 2021 08:15) (52 - 64)  BP: 145/75 (26 Sep 2021 08:15) (145/75 - 153/70)  BP(mean): --  RR: 18 (26 Sep 2021 08:15) (18 - 18)  SpO2: 97% (26 Sep 2021 08:15) (95% - 98%)    I&O's Summary    25 Sep 2021 07:01  -  26 Sep 2021 07:00  --------------------------------------------------------  IN: 720 mL / OUT: 0 mL / NET: 720 mL          PHYSICAL EXAM:     Constitutional: NAD, awake and alert, well-developed  HEENT: Moist Mucous Membranes, Anicteric  Pulmonary: Non-labored, breath sounds are clear bilaterally, No wheezing, rales or rhonchi  Cardiovascular: Regular, S1 and S2, No murmurs, rubs, gallops or clicks  Gastrointestinal: Bowel Sounds present, soft, nontender.   Lymph: No peripheral edema. No lymphadenopathy.  Skin: No visible rashes or ulcers.  Psych:  Mood & affect appropriate for situation    LABS: All Labs Reviewed:                        11.4   13.18 )-----------( 187      ( 25 Sep 2021 06:55 )             36.2                         12.5   8.56  )-----------( 182      ( 24 Sep 2021 06:53 )             39.9     26 Sep 2021 06:11    141    |  107    |  30     ----------------------------<  114    4.0     |  25     |  0.71   25 Sep 2021 06:55    146    |  110    |  38     ----------------------------<  130    4.4     |  25     |  0.83   24 Sep 2021 09:28    145    |  106    |  43     ----------------------------<  153    4.4     |  26     |  0.84     Ca    7.9        26 Sep 2021 06:11  Ca    8.5        25 Sep 2021 06:55  Ca    8.6        24 Sep 2021 09:28  Phos  1.8       26 Sep 2021 06:11  Phos  4.0       24 Sep 2021 06:46  Mg     2.5       26 Sep 2021 06:11  Mg     2.8       25 Sep 2021 06:55  Mg     3.1       24 Sep 2021 09:28    TPro  6.3    /  Alb  3.4    /  TBili  1.4    /  DBili  x      /  AST  17     /  ALT  25     /  AlkPhos  55     25 Sep 2021 06:55

## 2021-09-26 NOTE — PROVIDER CONTACT NOTE (OTHER) - REASON
Continued agitation, restless. getting out of bed. taking cloths off
PT refusing tele. Keeps taking it off
Pt is agitated and hitting staff
Right elbow redness, warm and pain
pt removed EEG with increased agitation

## 2021-09-26 NOTE — PROGRESS NOTE ADULT - ASSESSMENT
67 y/o male with PMHx CLL followed by heme/onc, anemia, melanoma, osteopenia admitted for right facial droop, right hemiparesis and expressive aphasia.    Admitted to the ICU at [place] yesterday and is S/P TPA. He has now been transferred for evaluation of his seizures and evaluation of seizures    CVA? / AMS  - Patient with new right sided UE weakness, facial droop and expressive aphasia at   - s/p tpa without improvement in symptoms  - 9/15 CT head without acute ICH  - 9/16 MRI neg for infarct  - neurology follow up.  s/p video eeg  - cont asa and statin    PAF  - Patient seen on a prior admission for syncope 7/2020 and found to have brief Afib  - Tele during this admission SR   - echocardiogram 9/15 unremarkable  - If no definitive CVA would support remaining off AC for now.    - Continue to monitor  - continue aspirin    BP  - BP ranging consistently in HTN territory  - if remains >140 can start on norvasc 5mg Qday    CLL  - follow up heme/onc    - Monitor and replete lytes, keep K>4, Mg>2.  - All other medical needs as per Neuro team  - Other cardiovascular workup will depend on clinical course.  - Will continue to follow.  DC planning per primary team.

## 2021-09-26 NOTE — CHART NOTE - NSCHARTNOTEFT_GEN_A_CORE
Patient is a 67 yo Rt handed male PMH CLL on Venetoclax, MDS, melanoma, paroxysmal AFib (not on AC) presenting as transfer from [place] for event capture. Patient was initially at  [facility] on 9/14 with confusion, aphasia and R sided weakness. With Brain MRI negative. EEG note to show discharges from Left hemisphere with RUE spasticity There is concern for subclinical seizures thus transferred to [facility] for event capture. Cards, heme/onc following. ID consulted 9/26 for R-elbow bursitis. Tentative plan for d/c to [place] on 9/27.    Speech & Swallow history:   9/15/21: Seen for bedside swallow evaluation at [facility] with recommendations for soft texture diet with thin liquids via single small sips   9/18/21: Seen for bedside swallow evaluation at [facility] with recommendations for Dysphagia 2 and Honey thick liquids.  Pt presented with evidence of an oropharyngeal dysphagia characterized by s/s indicative of laryngeal penetration/aspiration on thin liquid and hard solid and delayed pharyngeal swallows.   9/20/21: Pt seen for re-evaluation of swallow function given reported coughing with PO intake of dysphagia 2 with honey thickened liquid diet. Recommendations were for NPO given coughing with conservative PO textures; however, pt/family wished to accept risks of aspiration -> pt subsequently placed on a dysphagia 1 diet with honey-thick liquids and close supervision during all PO intake.     Call received from Neuro resident on this date requesting swallow re-evaluation, as pt with improvement in mentation. Pt encountered in bed, awake, on room air. A&Ox3. Pleasant and cooperative with assessment. Oral-motor evaluation unremarkable. Oral mucosa pink and moist. Pt denies difficulty swallowing, but endorses poor PO intake 2/2 dislike of pureed textures. x1 instance of cough prior to PO trials - pt endorses this is his baseline. Pt provided with PO trials of thick puree, soft solids, hard solids, honey-thick, nectar-thick, and thin liquids via cup/straw. Swallow function characterized by slightly prolonged mastication/ bolus transfer with hard solid textures. No overt signs of laryngeal penetration/aspiration observed. Pt denies difficulties. 5 p's addressed, and pt left in NAD.     Impressions: Pt presents with clinical improvement in swallow function from prior evaluations.     Recommendations:  1. Advance to a soft texture diet with thin liquids -> with upright postioning for all PO intake, small bites/sips, slow rate of intake   2. Maintain aspiration precautions   3. Monitor for s/s aspiration/laryngeal penetration. If noted:  D/C p.o. intake, provide non-oral nutrition/hydration/meds, and contact this service @ [phone]    Svetlana Saul CCC-SLP pgr #[phone] Patient is a 65 yo Rt handed male PMH CLL on Venetoclax, MDS, melanoma, paroxysmal AFib (not on AC) presenting as transfer from [place] for event capture. Patient was initially at  [facility] on 9/14 with confusion, aphasia and R sided weakness. With Brain MRI negative. EEG note to show discharges from Left hemisphere with RUE spasticity There is concern for subclinical seizures thus transferred to [facility] for event capture. Cards, heme/onc following. ID consulted 9/26 for R-elbow bursitis. Tentative plan for d/c to [place] on 9/27.    Swallow history:   9/15/21: Seen for bedside swallow evaluation at \[facility]\"" with recommendations for soft texture diet with thin liquids via single small sips   9/18/21: Seen for bedside swallow evaluation at [facility] with recommendations for Dysphagia 2 and Honey thick liquids.  Pt presented with evidence of an oropharyngeal dysphagia characterized by s/s indicative of laryngeal penetration/aspiration on thin liquid and hard solid and delayed pharyngeal swallows.   9/20/21: Pt seen for re-evaluation of swallow function given reported coughing with PO intake of dysphagia 2 with honey thickened liquid diet. Recommendations were for NPO given coughing with conservative PO textures; however, pt/family wished to accept risks of aspiration -> pt subsequently placed on a dysphagia 1 diet with honey-thick liquids and close supervision during all PO intake.     Call received from Neuro resident on this date requesting swallow re-evaluation, as pt with improvement in mentation. Pt encountered in bed, awake, on room air. A&Ox3. Pleasant and cooperative with assessment. Oral-motor evaluation unremarkable. Oral mucosa pink and moist. Pt denies difficulty swallowing, but endorses poor PO intake 2/2 dislike of pureed textures. x1 instance of cough prior to PO trials - pt endorses this is his baseline. Pt provided with PO trials of thick puree, soft solids, hard solids, honey-thick, nectar-thick, and thin liquids via cup/straw. Swallow function characterized by slightly prolonged mastication/ bolus transfer with hard solid textures. No overt signs of laryngeal penetration/aspiration observed. Pt denies difficulties. 5 p's addressed, and pt left in NAD.     Impressions: Pt presents with clinical improvement in swallow function from prior evaluations.     Recommendations:  1. Advance to a soft texture diet with thin liquids -> with upright postioning for all PO intake, small bites/sips, slow rate of intake   2. Maintain aspiration precautions   3. Monitor for s/s aspiration/laryngeal penetration. If noted:  D/C p.o. intake, provide non-oral nutrition/hydration/meds, and contact this service @ [phone]    Svetlana Saul CCC-SLP pgr #[phone] Patient is a 65 yo Rt handed male PMH CLL on Venetoclax, MDS, melanoma, paroxysmal AFib (not on AC) presenting as transfer from [place] for event capture. Patient was initially at  [facility] on 9/14 with confusion, aphasia and R sided weakness. With Brain MRI negative. EEG note to show discharges from Left hemisphere with RUE spasticity There is concern for subclinical seizures thus transferred to [facility] for event capture. Cards, heme/onc following. ID consulted 9/26 for R-elbow bursitis. Tentative plan for d/c to [place] on 9/27.    Swallow history:   9/15/21: Seen for bedside swallow evaluation at [facility] with recommendations for soft texture diet with thin liquids via single small sips   9/18/21: Seen for bedside swallow evaluation at [facility] with recommendations for Dysphagia 2 and Honey thick liquids.  Pt presented with evidence of an oropharyngeal dysphagia characterized by s/s indicative of laryngeal penetration/aspiration on thin liquid and hard solid and delayed pharyngeal swallows.   9/20/21: Pt seen for re-evaluation of swallow function given reported coughing with PO intake of dysphagia 2 with honey thickened liquid diet. Recommendations were for NPO given coughing with conservative PO textures; however, pt/family wished to accept risks of aspiration -> pt subsequently placed on a dysphagia 1 diet with honey-thick liquids and close supervision during all PO intake.     Call received from Neuro resident on this date requesting swallow re-evaluation, as pt with improvement in mentation. Pt encountered in bed, awake, on room air. A&Ox3. Pleasant and cooperative with assessment. Oral-motor evaluation unremarkable. Oral mucosa pink and moist. Pt denies difficulty swallowing, but endorses poor PO intake 2/2 dislike of pureed textures. x1 instance of cough prior to PO trials - pt endorses this is his baseline. Pt provided with PO trials of thick puree, soft solids, hard solids, honey-thick, nectar-thick, and thin liquids via cup/straw. Swallow function characterized by slightly prolonged mastication/ bolus transfer with hard solid textures. No overt signs of laryngeal penetration/aspiration observed. Pt denies difficulties. 5 p's addressed, and pt left in NAD.     Impressions: Pt presents with clinical improvement in swallow function from prior evaluations.     Recommendations:  1. Advance to a soft texture diet with thin liquids -> with upright postioning for all PO intake, small bites/sips, slow rate of intake   2. Maintain aspiration precautions   3. Monitor for s/s aspiration/laryngeal penetration. If noted:  D/C p.o. intake, provide non-oral nutrition/hydration/meds, and contact this service @ [phone]    Above discussed with Neuro team.     Svetlana Saul CCC-SLP pgr #[phone]

## 2021-09-27 ENCOUNTER — TRANSCRIPTION ENCOUNTER (OUTPATIENT)
Age: 66
End: 2021-09-27

## 2021-09-27 LAB
AMPA-R AB CBA, CSF: NEGATIVE — SIGNIFICANT CHANGE UP
AMPHIPHYSIN AB TITR CSF: NEGATIVE TITER — SIGNIFICANT CHANGE UP
ANION GAP SERPL CALC-SCNC: 13 MMOL/L — SIGNIFICANT CHANGE UP (ref 5–17)
BUN SERPL-MCNC: 20 MG/DL — SIGNIFICANT CHANGE UP (ref 7–23)
CALCIUM SERPL-MCNC: 8.3 MG/DL — LOW (ref 8.4–10.5)
CASPR2-IGG CBA, CSF: NEGATIVE — SIGNIFICANT CHANGE UP
CHLORIDE SERPL-SCNC: 100 MMOL/L — SIGNIFICANT CHANGE UP (ref 96–108)
CO2 SERPL-SCNC: 22 MMOL/L — SIGNIFICANT CHANGE UP (ref 22–31)
CREAT SERPL-MCNC: 0.86 MG/DL — SIGNIFICANT CHANGE UP (ref 0.5–1.3)
CRP SERPL-MCNC: 114 MG/L — HIGH (ref 0–4)
CV2 IGG TITR CSF: NEGATIVE TITER — SIGNIFICANT CHANGE UP
DPPX ANTIBODY IFA, CSF: NEGATIVE — SIGNIFICANT CHANGE UP
GABA-B-R AB CBA, CSF: NEGATIVE — SIGNIFICANT CHANGE UP
GAD65 AB CSF-SCNC: 0 NMOL/L — SIGNIFICANT CHANGE UP
GFAP IFA, CSF: NEGATIVE — SIGNIFICANT CHANGE UP
GLIAL NUC TYPE 1 AB TITR CSF: NEGATIVE TITER — SIGNIFICANT CHANGE UP
GLUCOSE SERPL-MCNC: 139 MG/DL — HIGH (ref 70–99)
HCT VFR BLD CALC: 38.9 % — LOW (ref 39–50)
HGB BLD-MCNC: 12.8 G/DL — LOW (ref 13–17)
HU1 AB TITR CSF IF: NEGATIVE TITER — SIGNIFICANT CHANGE UP
HU2 AB TITR CSF IF: NEGATIVE TITER — SIGNIFICANT CHANGE UP
HU3 AB TITR CSF: NEGATIVE TITER — SIGNIFICANT CHANGE UP
IGLON5 IFA, CSF: NEGATIVE — SIGNIFICANT CHANGE UP
IMMUNOLOGIST REVIEW: SIGNIFICANT CHANGE UP
LGI1-IGG CBA, CSF: NEGATIVE — SIGNIFICANT CHANGE UP
MAGNESIUM SERPL-MCNC: 2.3 MG/DL — SIGNIFICANT CHANGE UP (ref 1.6–2.6)
MCHC RBC-ENTMCNC: 32.9 GM/DL — SIGNIFICANT CHANGE UP (ref 32–36)
MCHC RBC-ENTMCNC: 34.1 PG — HIGH (ref 27–34)
MCV RBC AUTO: 103.7 FL — HIGH (ref 80–100)
MGLUR1 AB IFA, CSF: NEGATIVE — SIGNIFICANT CHANGE UP
NIF IFA, CSF: NEGATIVE — SIGNIFICANT CHANGE UP
NMDA-R AB CBA, CSF: NEGATIVE — SIGNIFICANT CHANGE UP
NRBC # BLD: 5 /100 WBCS — HIGH (ref 0–0)
PCA-TR AB TITR CSF: NEGATIVE TITER — SIGNIFICANT CHANGE UP
PHOSPHATE SERPL-MCNC: 1.8 MG/DL — LOW (ref 2.5–4.5)
PLATELET # BLD AUTO: 145 K/UL — LOW (ref 150–400)
POTASSIUM SERPL-MCNC: 4.2 MMOL/L — SIGNIFICANT CHANGE UP (ref 3.5–5.3)
POTASSIUM SERPL-SCNC: 4.2 MMOL/L — SIGNIFICANT CHANGE UP (ref 3.5–5.3)
PURKINJE CELL CYTOPLASMIC AB TYPE 2: NEGATIVE TITER — SIGNIFICANT CHANGE UP
PURKINJE CELLS AB TITR CSF IF: NEGATIVE TITER — SIGNIFICANT CHANGE UP
RBC # BLD: 3.75 M/UL — LOW (ref 4.2–5.8)
RBC # FLD: 15.1 % — HIGH (ref 10.3–14.5)
REFLEX ADDED: SIGNIFICANT CHANGE UP
SARS-COV-2 RNA SPEC QL NAA+PROBE: SIGNIFICANT CHANGE UP
SODIUM SERPL-SCNC: 135 MMOL/L — SIGNIFICANT CHANGE UP (ref 135–145)
WBC # BLD: 8.66 K/UL — SIGNIFICANT CHANGE UP (ref 3.8–10.5)
WBC # FLD AUTO: 8.66 K/UL — SIGNIFICANT CHANGE UP (ref 3.8–10.5)

## 2021-09-27 PROCEDURE — 73070 X-RAY EXAM OF ELBOW: CPT | Mod: 26,RT

## 2021-09-27 PROCEDURE — 99232 SBSQ HOSP IP/OBS MODERATE 35: CPT

## 2021-09-27 PROCEDURE — 99233 SBSQ HOSP IP/OBS HIGH 50: CPT

## 2021-09-27 RX ORDER — PANTOPRAZOLE SODIUM 20 MG/1
40 TABLET, DELAYED RELEASE ORAL
Refills: 0 | Status: DISCONTINUED | OUTPATIENT
Start: 2021-09-27 | End: 2021-10-01

## 2021-09-27 RX ORDER — AMLODIPINE BESYLATE 2.5 MG/1
5 TABLET ORAL DAILY
Refills: 0 | Status: DISCONTINUED | OUTPATIENT
Start: 2021-09-27 | End: 2021-10-01

## 2021-09-27 RX ADMIN — Medication 1 MILLIGRAM(S): at 12:01

## 2021-09-27 RX ADMIN — ENOXAPARIN SODIUM 40 MILLIGRAM(S): 100 INJECTION SUBCUTANEOUS at 12:06

## 2021-09-27 RX ADMIN — Medication 81 MILLIGRAM(S): at 12:06

## 2021-09-27 RX ADMIN — Medication 1 APPLICATION(S): at 06:13

## 2021-09-27 RX ADMIN — OLANZAPINE 10 MILLIGRAM(S): 15 TABLET, FILM COATED ORAL at 21:46

## 2021-09-27 RX ADMIN — Medication 1 APPLICATION(S): at 17:18

## 2021-09-27 RX ADMIN — PANTOPRAZOLE SODIUM 40 MILLIGRAM(S): 20 TABLET, DELAYED RELEASE ORAL at 12:00

## 2021-09-27 RX ADMIN — Medication 250 MILLIGRAM(S): at 17:17

## 2021-09-27 RX ADMIN — Medication 250 MILLIGRAM(S): at 05:38

## 2021-09-27 RX ADMIN — Medication 41.67 MILLIMOLE(S): at 12:08

## 2021-09-27 RX ADMIN — SODIUM CHLORIDE 50 MILLILITER(S): 9 INJECTION INTRAMUSCULAR; INTRAVENOUS; SUBCUTANEOUS at 21:46

## 2021-09-27 RX ADMIN — Medication 60 MILLIGRAM(S): at 05:39

## 2021-09-27 RX ADMIN — AMLODIPINE BESYLATE 5 MILLIGRAM(S): 2.5 TABLET ORAL at 12:08

## 2021-09-27 RX ADMIN — ATORVASTATIN CALCIUM 80 MILLIGRAM(S): 80 TABLET, FILM COATED ORAL at 21:46

## 2021-09-27 RX ADMIN — Medication 10 MILLIGRAM(S): at 21:47

## 2021-09-27 NOTE — DISCHARGE NOTE PROVIDER - DETAILS OF MALNUTRITION DIAGNOSIS/DIAGNOSES
This patient has been assessed with a concern for Malnutrition and was treated during this hospitalization for the following Nutrition diagnosis/diagnoses:     -  09/23/2021: Severe protein-calorie malnutrition   -  09/23/2021: Underweight (BMI < 19)

## 2021-09-27 NOTE — PROGRESS NOTE ADULT - ASSESSMENT
67 yo Rt handed male PMH CLL on Venetoclax, MDS, melanoma, paroxysmal AFib (not on AC) presenting as transfer from [place] for event capture. Patient was initially at  [facility] on 9/14 with confusion, aphasia and R sided weakness. MRI brain negative for acute pathology. EEG noted to show discharges from Left hemisphere with RUE spasticity There is concern for subclinical seizures thus transferred to [facility] for event capture. Cardiology consulted.  Heme/onc to follow this AM and will followup on recommendations on heme/onc medications.  Patient was noted to have episodes agitation with episodes of somnolence with out medication needed. Patient had LP this afternoon and will follow up on CSF results. Cardiology states to hold off on AC as there is no definitive CVA noted. Continue with ASA and statin. Improved s/p 1 dose of IVIG. Still with some mild cognitive delay and now with visual hallucinations. Poss related to ativan, will dc. Wife notes that patient gets IVIG treatment every month as part of his CLL treatment.     Impression: RUE spasticity and aphasia concerning for seizures, EEG negative for seizures. Patient acutely psychotic with left hemispheric slowing concerning for AIE in the setting of CLL.      PLAN:   AMS/AIE  - 9/26: Started prednisone taper 60qd x 1 week, then 40mg qd x 1 week, then 20mg qd x 1 week, [id number] qd x1week  - Remains off vEEG  - Holding AEDs  - Discontinued IVIG, received 3 days  - s/p Solmedrol 1000 mg IV for a total of 5 days (9/21-9/25)  - Seroquel discontinued due to prolonged QTc 467.  - Zyprexa 10 mg at bedtime  - Continuing Zyprexa 2.5 mg IM PRN for breakthrough agitation. 2nd line treatment can give Haldol 1 mg IV q6hrs.   - Continue melatonin 10 mg QHS  - LP results- Glucose 73, Protein 55, WBC 3, Gram stain no growth to date, HSV neg, Cryptococcal neg; infectious w/u thus far negative; AIE panel pending f/u results  - Ophtho consulted for visual hallucinations - no evidence of retinal detachment  - Rescue: 1st-Ativan 1mg, 2nd-Vimpat 100mg IV if GTC refractory to ativan    Right elbow bursitis & component of cellulitis  - Start Vancomycin 1g bid   - ID consulted - Dr. Mayfield recs appreciated  - Monitor for si/sx of infection  - Monitor leukocytosis - likely steroid-induced at this time    CLL  - Heme/onc to continue to follow and recommendations appreciated.   - Will discuss with Heme/Onc regarding ongoing CLL treatment- no inpatient treatment at this time.  - Flow cytometry CSF results: lymphocyte immunophenotypic findings show no diagnostic abnormalities  - Follows with Dr. Godinez and  Dr. Valenzuela outpatient.     PAfib/HTN  - Cardiology recommendations appreciated: choosing to defer AC at this time; can use PRN Hydralazine IV is needed for HTN  - Continue tele monitoring  - Continue Aspirin 81mg  - 12 lead EKG    Elevated BUN/Hypermagnesemia  - BUN elevation most likely due to steroids  - Gentle hydration with NS @50ml/hr  - Mg level 2.8 today, asymptomatic, not on mg supplements; continue to monitor for symptoms of elevated mg and mg levels  - Consider Nephrology consultation if no improvements   67 yo Rt handed male PMH CLL on Venetoclax, MDS, melanoma, paroxysmal AFib (not on AC) presenting as transfer from [place] for event capture. Patient was initially at  [facility] on 9/14 with confusion, aphasia and R sided weakness. MRI brain negative for acute pathology. EEG noted to show discharges from Left hemisphere with RUE spasticity There is concern for subclinical seizures thus transferred to [facility] for event capture. Cardiology consulted.  Heme/onc to follow this AM and will followup on recommendations on heme/onc medications.  Patient was noted to have episodes agitation with episodes of somnolence with out medication needed. Patient had LP this afternoon and will follow up on CSF results. Cardiology states to hold off on AC as there is no definitive CVA noted. Continue with ASA and statin. Improved s/p 1 dose of IVIG. Still with some mild cognitive delay and now with visual hallucinations. Poss related to ativan, will dc. Wife notes that patient gets IVIG treatment every month as part of his CLL treatment.     Impression: RUE spasticity and aphasia concerning for seizures, EEG negative for seizures. Patient acutely psychotic with left hemispheric slowing concerning for AIE in the setting of CLL.      PLAN:   AMS/AIE  - 9/26: Started prednisone taper 60qd x 1 week, then 40mg qd x 1 week, then 20mg qd x 1 week, [id number] qd x1week  - Remains off vEEG  - Holding AEDs  - Discontinued IVIG, received 3 days  - s/p Solmedrol 1000 mg IV for a total of 5 days (9/21-9/25)  - Seroquel discontinued due to prolonged QTc 467.  - Zyprexa 10 mg at bedtime  - Continuing Zyprexa 2.5 mg IM PRN for breakthrough agitation. 2nd line treatment can give Haldol 1 mg IV q6hrs.   - Continue melatonin 10 mg QHS  - LP results- Glucose 73, Protein 55, WBC 3, Gram stain no growth to date, HSV neg, Cryptococcal neg; infectious w/u thus far negative; AIE panel pending f/u results  - Ophtho consulted for visual hallucinations - no evidence of retinal detachment  - Rescue: 1st-Ativan 1mg, 2nd-Vimpat 100mg IV if GTC refractory to ativan    Right elbow bursitis & component of cellulitis  - Start Vancomycin 1g bid   - ID consulted - Dr. Mayfield recs appreciated  - Monitor for si/sx of infection  - Monitor leukocytosis - likely steroid-induced at this time    CLL  - Heme/onc to continue to follow and recommendations appreciated.   - Will discuss with Heme/Onc regarding ongoing CLL treatment- no inpatient treatment at this time.  - Flow cytometry CSF results: lymphocyte immunophenotypic findings show no diagnostic abnormalities  - Follows with Dr. Godinez and  Dr. Valenzuela outpatient.     PAfib/HTN  - Cardiology recommendations appreciated: choosing to defer AC at this time; start Norvasc 5 mg daily if SBP remains >140  - Norvasc 5 mg daily started; SBP >140  - Continue tele monitoring  - Continue Aspirin 81mg  - 12 lead EKG    Elevated BUN/Hypermagnesemia  - BUN elevation most likely due to steroids  - Gentle hydration with NS @50ml/hr  - Mg level 2.8 today, asymptomatic, not on mg supplements; continue to monitor for symptoms of elevated mg and mg levels  - Consider Nephrology consultation if no improvements   65 yo Rt handed male PMH CLL on Venetoclax, MDS, melanoma, paroxysmal AFib (not on AC) presenting as transfer from [place] for event capture. Patient was initially at  [facility] on 9/14 with confusion, aphasia and R sided weakness. MRI brain negative for acute pathology. EEG noted to show discharges from Left hemisphere with RUE spasticity There is concern for subclinical seizures thus transferred to [facility] for event capture. Cardiology consulted.  Heme/onc to follow this AM and will followup on recommendations on heme/onc medications.  Patient was noted to have episodes agitation with episodes of somnolence with out medication needed. Patient had LP this afternoon and will follow up on CSF results. Cardiology states to hold off on AC as there is no definitive CVA noted. Continue with ASA and statin. Improved s/p 1 dose of IVIG. Still with some mild cognitive delay and now with visual hallucinations. Poss related to ativan, will dc. Wife notes that patient gets IVIG treatment every month as part of his CLL treatment.     Impression: RUE spasticity and aphasia concerning for seizures, EEG negative for seizures. Patient acutely psychotic with left hemispheric slowing concerning for AIE in the setting of CLL.      PLAN:   AMS/AIE  - 9/26: Started prednisone taper 60qd x 1 week, then 40mg qd x 1 week, then 20mg qd x 1 week, [id number] qd x1week  - Remains off vEEG  - Holding AEDs  - Discontinued IVIG, received 3 days  - s/p Solmedrol 1000 mg IV for a total of 5 days (9/21-9/25)  - Seroquel discontinued due to prolonged QTc 467.  - Zyprexa 10 mg at bedtime  - Continuing Zyprexa 2.5 mg IM PRN for breakthrough agitation. 2nd line treatment can give Haldol 1 mg IV q6hrs.   - Continue melatonin 10 mg QHS  - LP results- Glucose 73, Protein 55, WBC 3, Gram stain no growth to date, HSV neg, Cryptococcal neg; infectious w/u thus far negative; AIE panel pending f/u results  - Ophtho consulted for visual hallucinations - no evidence of retinal detachment  - Rescue: 1st-Ativan 1mg, 2nd-Vimpat 100mg IV if GTC refractory to ativan    Right elbow bursitis/component of cellulitis/R. forearm abscess  - Continuing Vancomycin 1g bid   - ID consulted - Dr. Mayfield recs appreciated  - Vanc trough prior to 4th dose  - Consult ortho for evaluation of elbow for possible drainage and cultures of fluid.   - Monitor for si/sx of infection: currently afebrile  - Monitor leukocytosis - likely steroid-induced at this time: WBC today 8.66    CLL  - Heme/onc to continue to follow and recommendations appreciated.   - Will discuss with Heme/Onc regarding ongoing CLL treatment- no inpatient treatment at this time.  - Flow cytometry CSF results: lymphocyte immunophenotypic findings show no diagnostic abnormalities  - Follows with Dr. Godinez and  Dr. Valenzuela outpatient.     PAfib/HTN  - Cardiology recommendations appreciated: choosing to defer AC at this time; start Norvasc 5 mg daily if SBP remains >140  - Norvasc 5 mg daily started; SBP >140  - Continue tele monitoring  - Continue Aspirin 81mg  - 12 lead EKG    Elevated BUN/Hypermagnesemia/low phos  - BUN elevation most likely due to steroids  - Gentle hydration with NS @50ml/hr  - Mg level 2.8 today, asymptomatic, not on mg supplements; continue to monitor for symptoms of elevated mg and mg levels  - Phos 1.8, patient on diet and eating; replaced with 15mmol of sodium phos.  - Consider Nephrology consultation if no improvements    Dietary  - Speech and Swallow: advanced diet to soft texture w/thin liquids. Maintain upright position when eating and continue aspiration precautions.     Patient neurologically stable, consulting medicine for medicine transfer for ongoing bursitis/cellulitis management.  DVT ppx: Lovenox subq   Dispo: PT/OT/PMR recommends: [place]   65 yo Rt handed male PMH CLL on Venetoclax, MDS, melanoma, paroxysmal AFib (not on AC) presenting as transfer from [place] for event capture. Patient was initially at  [facility] on 9/14 with confusion, aphasia and R sided weakness. MRI brain negative for acute pathology. EEG noted to show discharges from Left hemisphere with RUE spasticity There is concern for subclinical seizures thus transferred to [facility] for event capture. Cardiology consulted.  Heme/onc to follow this AM and will followup on recommendations on heme/onc medications.  Patient was noted to have episodes agitation with episodes of somnolence with out medication needed. Patient had LP this afternoon and will follow up on CSF results. Cardiology states to hold off on AC as there is no definitive CVA noted. Continue with ASA and statin. Improved s/p 1 dose of IVIG. Still with some mild cognitive delay and now with visual hallucinations. Poss related to ativan, will dc. Wife notes that patient gets IVIG treatment every month as part of his CLL treatment.     Impression: RUE spasticity and aphasia concerning for seizures, EEG negative for seizures. Patient acutely psychotic with left hemispheric slowing concerning for AIE in the setting of CLL.      PLAN:   AMS/AIE  - 9/26: Started prednisone taper 60qd x 1 week, then 40mg qd x 1 week, then 20mg qd x 1 week, [id number] qd x1week  - Remains off vEEG  - Holding AEDs  - Discontinued IVIG, received 3 days  - s/p Solmedrol 1000 mg IV for a total of 5 days (9/21-9/25)  - Seroquel discontinued due to prolonged QTc 467.  - Zyprexa 10 mg at bedtime  - Continuing Zyprexa 2.5 mg IM PRN for breakthrough agitation. 2nd line treatment can give Haldol 1 mg IV q6hrs.   - Continue melatonin 10 mg QHS  - LP results- Glucose 73, Protein 55, WBC 3, Gram stain no growth to date, HSV neg, Cryptococcal neg; infectious w/u thus far negative; AIE panel pending f/u results  - Ophtho consulted for visual hallucinations - no evidence of retinal detachment  - Rescue: 1st-Ativan 1mg, 2nd-Vimpat 100mg IV if GTC refractory to ativan    Right elbow bursitis/component of cellulitis/R. forearm abscess  - Continuing Vancomycin 1g bid   - ID consulted - Dr. Mayfield recs appreciated  - Vanc trough prior to 4th dose  - Consult ortho for evaluation of elbow for possible drainage: recommend X-ray of elbow and ESR, CRP and will come evaluate the patient.  - Warm compresses to Right forearm abscess.    - Monitor for si/sx of infection: currently afebrile  - Monitor leukocytosis - likely steroid-induced at this time: WBC today 8.66    CLL  - Heme/onc to continue to follow and recommendations appreciated.   - Will discuss with Heme/Onc regarding ongoing CLL treatment- no inpatient treatment at this time.  - Flow cytometry CSF results: lymphocyte immunophenotypic findings show no diagnostic abnormalities  - Follows with Dr. Godinez and  Dr. Valenzuela outpatient.     PAfib/HTN  - Cardiology recommendations appreciated: choosing to defer AC at this time; start Norvasc 5 mg daily if SBP remains >140  - Norvasc 5 mg daily started; SBP >140  - Continue tele monitoring  - Continue Aspirin 81mg  - 12 lead EKG    Elevated BUN/Hypermagnesemia/low phos  - BUN elevation most likely due to steroids  - Gentle hydration with NS @50ml/hr  - Mg level 2.3 today, asymptomatic, not on mg supplements; continue to monitor for symptoms of elevated mg and mg levels  - Phos 1.8, patient on diet and eating; replaced with 15mmol of sodium phos.  - Consider Nephrology consultation if no improvements    Dietary  - Speech and Swallow: advanced diet to soft texture w/thin liquids. Maintain upright position when eating and continue aspiration precautions.     Patient neurologically stable, consulting medicine for medicine transfer for ongoing bursitis/cellulitis management.  DVT ppx: Lovenox subq   Dispo: PT/OT/PMR recommends: [place]

## 2021-09-27 NOTE — PROGRESS NOTE ADULT - SUBJECTIVE AND OBJECTIVE BOX
CC: f/u for  right elbow septic bursitis, cellulitis  Patient reports he feels ok    REVIEW OF SYSTEMS:  All other review of systems negative (Comprehensive ROS)    Antimicrobials Day #  :2  vancomycin  IVPB 1000 milliGRAM(s) IV Intermittent two times a day    Other Medications Reviewed    T(F): 98.9 (09-27-21 @ 08:15), Max: 99 (09-27-21 @ 05:02)  HR: 74 (09-27-21 @ 08:15)  BP: 152/74 (09-27-21 @ 08:15)  RR: 18 (09-27-21 @ 08:15)  SpO2: 97% (09-27-21 @ 08:15)  Wt(kg): --    PHYSICAL EXAM:  General: alert, no acute distress  Eyes:  anicteric, no conjunctival injection, no discharge  Oropharynx: no lesions or injection 	  Neck: supple, without adenopathy  Lungs: clear to auscultation  Heart: regular rate and rhythm; no murmur, rubs or gallops  Abdomen: soft, nondistended, nontender, without mass or organomegaly  Skin: no lesions  Extremities: no clubbing, cyanosis, or edema. right elbow with redness, scabs and tiny pustules, some fluid in olecranon bursa, small firm nodule on forearm  Neurologic: alert, oriented, moves all extremities    LAB RESULTS:                        12.8   8.66  )-----------( 145      ( 27 Sep 2021 08:47 )             38.9     09-27    135  |  100  |  20  ----------------------------<  139<H>  4.2   |  22  |  0.86    Ca    8.3<L>      27 Sep 2021 08:47  Phos  1.8     09-27  Mg     2.3     09-27          MICROBIOLOGY:  RECENT CULTURES:      RADIOLOGY REVIEWED:    < from: CT Head No Cont (09.15.21 @ 19:57) >    EXAM:  CT BRAIN                            PROCEDURE DATE:  09/15/2021          INTERPRETATION:  CT HEAD WITHOUT CONTRAST    INDICATION: 66 years old. Male. s/p tpa s/p tpa. CVA    COMPARISON: 9/15/2021.    TECHNIQUE: Noncontrast axial CT head was obtained from the skull base to vertex.    FINDINGS:  No acute intracranial hemorrhage, mass effect or midline shift.  The ventricles and cortical sulci are within normal limits for age.  Scattered hypodensities in the periventricular white matter arenonspecific, but likely sequela of small vessel ischemic disease.    Near complete opacification of the left frontal sinus. Moderate mucosal thickening of the bilateral ethmoid air cells and mild mucosal thickening of the bilateral maxillary sinuses.The mastoid air cells are well aerated. The native ocular lenses are surgically absent. Status post bilateral scleral banding.  No displaced calvarial fracture.    IMPRESSION:  No acute intracranial hemorrhage or mass effect.    --- End of Report ---      < end of copied text >  < from: CT Angio Head w/ IV Cont (09.14.21 @ 17:51) >    EXAM:  CT PERFUSION W MAPS IC                          EXAM:  CT ANGIO BRAIN (W)AW IC                          EXAM:  CT BRAIN STROKE PROTOCOL                          EXAM:  CT ANGIO NECK (W)AW IC                            PROCEDURE DATE:  09/14/2021          INTERPRETATION:  CT OF THE HEAD WITHOUT CONTRAST, CT ANGIOGRAPHY OF HEAD AND NECK AND CT PERFUSION WITH INTRAVENOUS CONTRAST.    CLINICAL INDICATION: Code stroke.    TECHNIQUE: Initially volumetric CT acquisition was performed through the brain and reviewed using brain and bone window technique. Sagittal and coronal reconstructed images were obtained. Dose optimization techniques were utilized including kVp/mA modulation along with iterative reconstructions.  Subsequently volumetric acquisition was performed from the thoracic inlet to the vertex.    RAPID artificial intelligence was used for preliminary evaluation of intracranial hemorrhage.    A total of 50 cc of Omnipaque 350 was injected intravenously without complications.  Dose optimization techniques were utilized including kVp/mA modulation along with iterative reconstructions.  MIP image analysis was performed on a separate workstation.  CT brain perfusion was performed after intravenous injection 50 mL of intravenouscontrast Omnipaque 350. Dose optimization techniques were utilized including kVp/mA modulation along with iterative reconstructions. 3D image analysis was performed on a dedicated workstation by a neuroradiologist.      COMPARISON: CT brain and CT angiogram head and neck, 5/8/2021.    FINDINGS:    Head CT:    The ventricular and sulcal size and configuration is age appropriate. There are scattered white matter hypodensities which are nonspecific but most commonly represent chronic microvascular ischemic changes.  There is no acute loss of gray-white differentiation.    There is no evidence of mass effect, midline shift, acute intracranial hemorrhage, or extra-axial collections.    There is bilateral scleral banding. There is scattered opacification bilaterally with complexes, mild mucosal thickening of the left maxillary sinus and opacification of the left frontal sinus which is increased since prior.    CT angiography:    NECK: The left vertebral artery emanates directly from the aortic arch, an anatomic variation. Origin of supraaortic arteries are patent. The common carotid arteries are normal in course and caliber. There are atherosclerotic plaques at common carotid bifurcations and carotid bulbs without evidence of significant segmental stenosis.    The right internal carotid artery is normal in caliber. There is 0 % stenosis using NASCET criteria (normal right ICA caliber is 4.6 mm).    The left internal carotid artery is normal in caliber. There is 0 % stenosis using NASCETcriteria (normal left ICA caliber is 4.4 mm).    The left vertebral artery is hypoplastic compared to the right. Bilateral vertebral arteries are normal in course and contour, without evidence of dissection or occlusion.    Degenerative changes of the bony cervical spine are noted.    BRAIN:    The petrocavernous and supraclinoid ICA segments are normal in caliber.  The visualized MCA and MILLY branches are normal without evidence of stenosis or aneurysm. The ACOM is present. Bilateral posterior communicating arteries are present.  The vertebral arteries, visualized cerebellar arteries, basilar and bilateral posterior cerebral arteries are patent without evidence of stenoses or aneurysm.    Other findings: There is a stable hypodense nodule in the right thyroid lobe.    CT Perfusion:      Findings:    The regional cerebral blood flow (CBF), cerebral blood volume (CBV),for the MILLY, MCA, and PCA territories are within normal limits. There is increased time to maximum in the right temporal andbilateral occipital lobes which may be artifactual in nature.      IMPRESSION:    CT brain: There is no acute lobar infarction, intracranial hemorrhage, mass lesion, mass effect or herniation. Further correlation with MRI of the brain is suggested for more detailed evaluation if there are no contraindications.    CTA brain: There is no large vessel occlusion or aneurysm involving major proximal intracranial arteries.    CTA NECK: There is no stenosis involving major neck arteries.    CT perfusion: Multiple areas of increased time to maximum as described may be artifactual however, attention on follow-up MRI is suggested.        NASCET CAROTID STENOSIS CRITERIA (distal normal appearing ICA as denominator for measurement): 0%-none, 1-49%-mild, 50-70%-moderate, 70-89%-severe, 90-99%-critical.      Notification to clinician of alert:    Provider   Dr. AL QUINTERO  was notified about the final results at 9/14/2021 1752 PM via telephone by neuroradiologist SOURAV Josue. Readback confirmationwas obtained.      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   Plan:  continue iv vancomycin  orthopedic evaluation to see if can drain bursa  warm soaks to the forearm abscess  not ready for discharge or po antibiotics yet

## 2021-09-27 NOTE — PROGRESS NOTE ADULT - SUBJECTIVE AND OBJECTIVE BOX
THE PATIENT WAS SEEN AND EXAMINED BY ME WITH THE HOUSESTAFF DURING MORNING ROUNDS.   HPI:  MRN-[id number]    HPI:  Patient is a 67 yo Rt handed male PMH CLL on Venetoclax, MDS, melanoma, paroxysmal AFib (not on AC) presenting as transfer from [place] for event capture. Patient was initially at  [facility] on 9/14 with confusion, aphasia and R sided weakness. Patient was stroke code and was given s/p tPA. On admission, patient had CTH, CTA, CT perfusion during code stroke protocol with no acute findings. MRI Brain was  performed at  9/16 with no ischemic findings. EEG performed 9/15 showed diffuse L hemispheric slowing. Pt remained aphasic with AMS. +RUE spasticity was noted on exam. Patient was thus transferred to [facility] for EMU admission to evaluate for subclinical seizures.  Of note, patient does not have hx of seizures.         (16 Sep 2021 16:41)      ROS: Otherwise negative.     SUBJECTIVE: No events overnight.  No new neurologic complaints.      acetaminophen   Tablet .. 650 milliGRAM(s) Oral every 6 hours PRN  aspirin  chewable 81 milliGRAM(s) Oral daily  atorvastatin 80 milliGRAM(s) Oral at bedtime  BACItracin   Ointment 1 Application(s) Topical two times a day  enoxaparin Injectable 40 milliGRAM(s) SubCutaneous daily  folic acid 1 milliGRAM(s) Oral daily  lacosamide IVPB 100 milliGRAM(s) IV Intermittent once PRN  LORazepam   Injectable 1 milliGRAM(s) IV Push once PRN  melatonin 10 milliGRAM(s) Oral at bedtime  OLANZapine 10 milliGRAM(s) Oral at bedtime  OLANZapine Injectable 2.5 milliGRAM(s) IntraMuscular every 4 hours PRN  pantoprazole  Injectable 40 milliGRAM(s) IV Push daily  predniSONE   Tablet 60 milliGRAM(s) Oral daily  sodium chloride 0.9%. 1000 milliLiter(s) IV Continuous <Continuous>  vancomycin  IVPB 1000 milliGRAM(s) IV Intermittent two times a day      Physical Exam: Neurological Exam:  	Mental Status: Orientated to self, date and place.  Attention intact.  No dysarthria, aphasia or neglect.  	Cranial Nerves: PERRL, EOMI, no nystagmus or diplopia. No facial asymmetry.  Hearing intact to finger rub bilaterally.  Tongue, uvula and palate midline.  Sternocleidomastoid and Trapezius intact bilaterally  	Motor: moving all 4 extremities equally w 5/5 strength  	Tone: normal.                  	Pronator drift: none                 	Dysmetria: None to finger-nose-finger  	No truncal ataxia.    	Tremor: No resting, postural or action tremor.  No myoclonus.  	Sensation: intact to light touch    LABS:   09-26    141  |  107  |  30<H>  ----------------------------<  114<H>  4.0   |  25  |  0.71    Ca    7.9<L>      26 Sep 2021 06:11  Phos  1.8     09-26  Mg     2.5     09-26         COVID-19 PCR: NotDetec (24 Sep 2021 10:53)      IMAGING:     MRI  CTH  EEG     THE PATIENT WAS SEEN AND EXAMINED BY ME WITH THE HOUSESTAFF DURING MORNING ROUNDS.   HPI:  [id number]    HPI:  Patient is a 65 yo Rt handed male PMH CLL on Venetoclax, MDS, melanoma, paroxysmal AFib (not on AC) presenting as transfer from [place] for event capture. Patient was initially at  [facility] on 9/14 with confusion, aphasia and R sided weakness. Patient was stroke code and was given s/p tPA. On admission, patient had CTH, CTA, CT perfusion during code stroke protocol with no acute findings. MRI Brain was  performed at  9/16 with no ischemic findings. EEG performed 9/15 showed diffuse L hemispheric slowing. Pt remained aphasic with AMS. +RUE spasticity was noted on exam. Patient was thus transferred to [facility] for EMU admission to evaluate for subclinical seizures.  Of note, patient does not have hx of seizures.      ROS: Otherwise negative.     SUBJECTIVE: No events overnight.  No new neurologic complaints.      acetaminophen   Tablet .. 650 milliGRAM(s) Oral every 6 hours PRN  aspirin  chewable 81 milliGRAM(s) Oral daily  atorvastatin 80 milliGRAM(s) Oral at bedtime  BACItracin   Ointment 1 Application(s) Topical two times a day  enoxaparin Injectable 40 milliGRAM(s) SubCutaneous daily  folic acid 1 milliGRAM(s) Oral daily  lacosamide IVPB 100 milliGRAM(s) IV Intermittent once PRN  LORazepam   Injectable 1 milliGRAM(s) IV Push once PRN  melatonin 10 milliGRAM(s) Oral at bedtime  OLANZapine 10 milliGRAM(s) Oral at bedtime  OLANZapine Injectable 2.5 milliGRAM(s) IntraMuscular every 4 hours PRN  pantoprazole  Injectable 40 milliGRAM(s) IV Push daily  predniSONE   Tablet 60 milliGRAM(s) Oral daily  sodium chloride 0.9%. 1000 milliLiter(s) IV Continuous <Continuous>  vancomycin  IVPB 1000 milliGRAM(s) IV Intermittent two times a day      Physical Exam: Neurological Exam:  	Mental Status: Orientated to self, date and place.  Attention intact.  No dysarthria, aphasia or neglect.  	Cranial Nerves: PERRL, EOMI, no nystagmus or diplopia. No facial asymmetry.  Hearing intact to finger rub bilaterally.  Tongue, uvula and palate midline.  Sternocleidomastoid and Trapezius intact bilaterally  	Motor: moving all 4 extremities equally w 5/5 strength  	Tone: normal.                  	Pronator drift: none                 	Dysmetria: None to finger-nose-finger  	No truncal ataxia.    	Tremor: No resting, postural or action tremor.  No myoclonus.  	Sensation: intact to light touch    LABS:   09-26    141  |  107  |  30<H>  ----------------------------<  114<H>  4.0   |  25  |  0.71    Ca    7.9<L>      26 Sep 2021 06:11  Phos  1.8     09-26  Mg     2.5     09-26         COVID-19 PCR: NotDetec (24 Sep 2021 10:53)      IMAGING:     MRI  CTH  EEG     THE PATIENT WAS SEEN AND EXAMINED BY ME WITH THE HOUSESTAFF DURING MORNING ROUNDS.   HPI:  [id number]    HPI:  Patient is a 67 yo Rt handed male PMH CLL on Venetoclax, MDS, melanoma, paroxysmal AFib (not on AC) presenting as transfer from [place] for event capture. Patient was initially at  [facility] on 9/14 with confusion, aphasia and R sided weakness. Patient was stroke code and was given s/p tPA. On admission, patient had CTH, CTA, CT perfusion during code stroke protocol with no acute findings. MRI Brain was  performed at  9/16 with no ischemic findings. EEG performed 9/15 showed diffuse L hemispheric slowing. Pt remained aphasic with AMS. +RUE spasticity was noted on exam. Patient was thus transferred to [facility] for EMU admission to evaluate for subclinical seizures.  Of note, patient does not have hx of seizures.      ROS: Otherwise negative.     SUBJECTIVE: No events overnight.  No new neurologic complaints.      acetaminophen   Tablet .. 650 milliGRAM(s) Oral every 6 hours PRN  aspirin  chewable 81 milliGRAM(s) Oral daily  atorvastatin 80 milliGRAM(s) Oral at bedtime  BACItracin   Ointment 1 Application(s) Topical two times a day  enoxaparin Injectable 40 milliGRAM(s) SubCutaneous daily  folic acid 1 milliGRAM(s) Oral daily  lacosamide IVPB 100 milliGRAM(s) IV Intermittent once PRN  LORazepam   Injectable 1 milliGRAM(s) IV Push once PRN  melatonin 10 milliGRAM(s) Oral at bedtime  OLANZapine 10 milliGRAM(s) Oral at bedtime  OLANZapine Injectable 2.5 milliGRAM(s) IntraMuscular every 4 hours PRN  pantoprazole  Injectable 40 milliGRAM(s) IV Push daily  predniSONE   Tablet 60 milliGRAM(s) Oral daily  sodium chloride 0.9%. 1000 milliLiter(s) IV Continuous <Continuous>  vancomycin  IVPB 1000 milliGRAM(s) IV Intermittent two times a day      NEUROLOGICAL EXAM:  MS: AAOX3, fluent, attends b/l; recent and remote memory intact; normal attention, language and fund of knowledge.   CN: EOMI, V1-3 intact, no facial asymmetry,  Motor: Strength: grossly 5/5 4x.   Sensation: grossly intact to LT 4x.   Gait: Deferred    LABS:   09-26    141  |  107  |  30<H>  ----------------------------<  114<H>  4.0   |  25  |  0.71    Ca    7.9<L>      26 Sep 2021 06:11  Phos  1.8     09-26  Mg     2.5     09-26         COVID-19 PCR: Dar (24 Sep 2021 10:53)

## 2021-09-27 NOTE — DISCHARGE NOTE PROVIDER - NSDCMRMEDTOKEN_GEN_ALL_CORE_FT
atorvastatin 80 mg oral tablet: 1 tab(s) orally once a day (at bedtime)  enoxaparin: 40 unit(s) subcutaneous once a day  folic acid 1 mg oral tablet: 1 tab(s) orally once a day   acetaminophen 325 mg oral tablet: 2 tab(s) orally every 6 hours, As needed, Mild Pain (1 - 3), Moderate Pain (4 - 6), Severe Pain (7 - 10)  amLODIPine 5 mg oral tablet: 1 tab(s) orally once a day  aspirin 81 mg oral tablet, chewable: 1 tab(s) orally once a day  atorvastatin 80 mg oral tablet: 1 tab(s) orally once a day (at bedtime)  bacitracin 500 units/g topical ointment: 1 application topically 2 times a day  cephalexin 500 mg oral capsule: 1 cap(s) orally 4 times a day, till 10/6/21  enoxaparin: 40 unit(s) subcutaneous once a day  folic acid 1 mg oral tablet: 1 tab(s) orally once a day  melatonin 10 mg oral tablet: 1 tab(s) orally once a day (at bedtime)  minocycline 100 mg oral capsule: 1 cap(s) orally 2 times a day, till 10/6/21  OLANZapine 10 mg oral tablet: 1 tab(s) orally once a day (at bedtime)  pantoprazole 40 mg oral delayed release tablet: 1 tab(s) orally once a day (before a meal)  predniSONE 10 mg oral tablet: 6 tab(s) orally once a day till 10/2 then 4 tabs x 7days then 2 tabs x7days then 1 tab x 7 days

## 2021-09-27 NOTE — PROGRESS NOTE ADULT - ASSESSMENT
65 y/o male with PMHx CLL followed by heme/onc, anemia, melanoma, osteopenia admitted for right facial droop, right hemiparesis and expressive aphasia.    Admitted to the ICU at [place] yesterday and is S/P TPA. He has now been transferred for evaluation of his seizures and evaluation of seizures    CVA? / AMS  - Patient with new right sided UE weakness, facial droop and expressive aphasia at   - s/p tpa without improvement in symptoms  - 9/15 CT head without acute ICH  - 9/16 MRI neg for infarct  - neurology follow up.  s/p video eeg  - cont asa and statin    PAF  - Patient seen on a prior admission for syncope 7/2020 and found to have brief Afib  - Tele during this admission SR   - echocardiogram 9/15 unremarkable  - If no definitive CVA would support remaining off AC for now.    - Continue to monitor  - continue aspirin    BP  - BP ranging consistently in HTN territory  - if remains >140 can start on norvasc 5mg Qday    CLL  - follow up heme/onc    - Monitor and replete lytes, keep K>4, Mg>2.  - All other medical needs as per Neuro team  - Other cardiovascular workup will depend on clinical course.  - Will continue to follow.  DC planning per primary team.

## 2021-09-27 NOTE — CHART NOTE - NSCHARTNOTEFT_GEN_A_CORE
Patient transferred to medicine service under Dr. Barnes for ongoing IV antibiotic management for Right elbow bursitis/cellulitis and Right forearm abscess. Patient neurologically stable for transfer. Sign out provided to JOY Martino.

## 2021-09-27 NOTE — CONSULT NOTE ADULT - PROVIDER SPECIALTY LIST ADULT
Heme/Onc
Infectious Disease
Ophthalmology
Orthopedics
Rehab Medicine
Internal Medicine
Cardiology
Neurology

## 2021-09-27 NOTE — CONSULT NOTE ADULT - ASSESSMENT
65 yo Rt handed male PMH CLL on Venetoclax, MDS, melanoma, paroxysmal AFib (not on AC) presenting as transfer from [place] for event capture. Patient was initially at  [facility] on 9/14 with confusion, aphasia and R sided weakness. MRI brain negative for acute pathology. EEG noted to show discharges from Left hemisphere with RUE spasticity There is concern for subclinical seizures thus transferred to [facility] for event capture. Cardiology consulted.  Heme/onc to follow this AM and will followup on recommendations on heme/onc medications.  Patient was noted to have episodes agitation with episodes of somnolence with out medication needed. Patient had LP this afternoon and will follow up on CSF results. Cardiology states to hold off on AC as there is no definitive CVA noted. Continue with ASA and statin. Improved s/p 1 dose of IVIG. Still with some mild cognitive delay and now with visual hallucinations. Poss related to ativan, will dc. Wife notes that patient gets IVIG treatment every month as part of his CLL treatment.  67 yo Rt handed male PMH CLL on Venetoclax, MDS, melanoma, paroxysmal AFib (not on AC) presenting as transfer from [place] for event capture. Patient was initially at  [facility] on 9/14 with confusion, aphasia and R sided weakness. MRI brain negative for acute pathology. EEG noted to show discharges from Left hemisphere with RUE spasticity There is concern for subclinical seizures thus transferred to [facility] for event capture. Cardiology consulted.  Patient was noted to have episodes agitation with episodes of somnolence with out medication needed. Patient had LP. Cardiology states to hold off on AC as there is no definitive CVA noted. Continue with ASA and statin. Improved s/p 1 dose of IVIG. Still with some mild cognitive delay and now with visual hallucinations. Poss related to ativan, will dc. Wife notes that patient gets IVIG treatment every month as part of his CLL treatment.     # Subclinical seizures   care as per neurology team   MRI noted   LP doen   clear from neurology for discharge     # R elbow abscess  ID eval appreciated  cont antibitoics per ID recs  ortho eval for possible I&D  pain control     # A fib  not on AC   card follow up  rate control     # CLL  outpaitent follow up with oncology       DVT and gi PPX

## 2021-09-27 NOTE — DISCHARGE NOTE PROVIDER - CARE PROVIDER_API CALL
Gopi Jc)  Cardiovascular Disease; Internal Medicine  [address], [place] [id number]  Phone: [phone]  Fax: [phone]  Follow Up Time: Routine    Jeronimo Godinez  HEMATOLOGY  [address], [address]  [place] [address]  Phone: [phone]  Fax: [phone]  Follow Up Time: 2 weeks

## 2021-09-27 NOTE — CONSULT NOTE ADULT - CONSULT REASON
visual hallucinations
Right elbow bursitis
r/o elbow SA/bursitis
Evaluate Rehabilitation Needs
Hx CLL
cva
medical management
Stroke

## 2021-09-27 NOTE — DISCHARGE NOTE PROVIDER - NSDCCPCAREPLAN_GEN_ALL_CORE_FT
PRINCIPAL DISCHARGE DIAGNOSIS  Diagnosis: Altered mental state  Assessment and Plan of Treatment: 9/26: Started prednisone taper 60qd x 1 week, then 40mg qd x 1 week, then 20mg qd x 1 week, [id number] qd x1week  - Remains off vEEG  - Holding AEDs  - Discontinued IVIG, received 3 days  - s/p Solmedrol 1000 mg IV for a total of 5 days (9/21-9/25)  - Seroquel discontinued due to prolonged QTc 467.  - Zyprexa 10 mg at bedtime  - Continuing Zyprexa 2.5 mg IM PRN for breakthrough agitation. 2nd line treatment can give Haldol 1 mg IV q6hrs.   - Continue melatonin 10 mg QHS  - LP results- Glucose 73, Protein 55, WBC 3, Gram stain no growth to date, HSV neg, Cryptococcal neg; infectious w/u thus far negative; AIE panel pending f/u results  - Ophtho consulted for visual hallucinations - no evidence of retinal detachment  - Rescue: 1st-Ativan 1mg, 2nd-Vimpat 100mg IV if GTC refractory to ativan        SECONDARY DISCHARGE DIAGNOSES  Diagnosis: HTN (hypertension)  Assessment and Plan of Treatment: Low salt diet  Activity as tolerated.  Take all medication as prescribed.  Follow up with your medical doctor for routine blood pressure monitoring at your next visit.  Notify your doctor if you have any of the following symptoms:   Dizziness, Lightheadedness, Blurry vision, Headache, Chest pain, Shortness of breath    Diagnosis: PAF (paroxysmal atrial fibrillation)  Assessment and Plan of Treatment: Atrial fibrillation is the most common heart rhythm problem.  The condition puts you at risk for has stroke and heart attack  It helps if you control your blood pressure, not drink more than 1-2 alcohol drinks per day, cut down on caffeine, getting treatment for over active thyroid gland, and get regular exercise  Call your doctor if you feel your heart racing or beating unusually, chest tightness or pain, lightheaded, faint, shortness of breath especially with exercise  It is important to take your heart medication as prescribed  You may be on anticoagulation which is very important to take as directed - you may need blood work to monitor drug levels    Diagnosis: CLL (chronic lymphoid leukemia) with failed remission  Assessment and Plan of Treatment: Follow up with Hema/Oncology in 2 weeks

## 2021-09-27 NOTE — CONSULT NOTE ADULT - CONSULT REQUESTED DATE/TIME
17-Sep-2021 11:34
26-Sep-2021 13:23
27-Sep-2021 14:40
21-Sep-2021 09:58
27-Sep-2021 13:34
17-Sep-2021 09:10
17-Sep-2021 08:00
19-Sep-2021 14:42

## 2021-09-27 NOTE — DISCHARGE NOTE PROVIDER - PROVIDER TOKENS
PROVIDER:[TOKEN:[[id number]],FOLLOWUP:[Routine]],PROVIDER:[TOKEN:[[id number]],FOLLOWUP:[2 weeks]]

## 2021-09-27 NOTE — CONSULT NOTE ADULT - SUBJECTIVE AND OBJECTIVE BOX
66yMale admitted over 1 week ago with subclinical seizures presents today with c/o R elbow pain for 3 days duration. patient admits to mild elbow pain with erythema over the olecranon for a few days duration. Patient denies pain with ROM of the elbow. Denies radiation of pain, numbness, tingling, or burning in the RUE. Denies known trauma. Denies fever/chills.     PMH:  Osteoporosis    Osteopenia    CLL (Chronic Lymphocytic Leukemia)    CLL (Chronic Lymphocytic Leukemia)    Avascular Necrosis of Femur Head    TMJ Syndrome    Herniated Cervical Disc    Bulging Disc    Melanoma    Deviated nasal septum    Nasal congestion    Rosacea    Deviated nasal septum    Other specified disorders of nose and nasal sinuses    Anemia      PSH:  S/P Splenectomy    Avascular Necrosis of Femur Head    Avascular Necrosis of Femur Head    Abnormal Jaw Closure    Status Post Eye Surgery X 3    S/P Tonsillectomy    Bilateral cataracts    Meds: See med rec    T(C): 37.1 (09-27-21 @ 11:30)  HR: 68 (09-27-21 @ 11:30)  BP: 145/78 (09-27-21 @ 11:30)  RR: 18 (09-27-21 @ 11:30)  SpO2: 97% (09-27-21 @ 11:30)  Wt(kg): --    RUE:  Skin intact, - soft tissue swelling, - effusion, - ecchymosis, + erythema, mild warmth of elbow, + mild TTP over elbow in the area of the erythema   AROM of 0-120 without significant discomfort  Good ROM of wrist, fingers, and shoulder without pain.   +radial Pulse .    SILT. Motor intact.   Soft compartments.  Erythema marked out previously     Contralateral upper extremity / bilateral lower extremities:   No bony TTP, Good ROM w/o pain. Able to SLR on R/L. Exam Unremarkable      Imaging:  XR of affected elbow pending      A/P: 66yMale w/ R elbow erythema likely cellulitis    - FU XR R elbow, ESR/CRP  - Given patient exam concern for a septic joint/bursitis is low. Ortho to continue to monitor patient on abx  - Pain control  - DVT ppx per primary team  - WBAT/PT/OT  - Antibiotics per ID  - Ortho to follow  - Discussed with attending who is aware and agrees with the plan

## 2021-09-27 NOTE — PROGRESS NOTE ADULT - SUBJECTIVE AND OBJECTIVE BOX
[facility] - Sveta Romano, Kyara, Ebony, Jennifer, Hosea Harden  Office Number:  [phone]    Patient resting comfortably in bed in NAD.  Laying flat with no respiratory distress.  No complaints of chest pain, dyspnea, palpitations, PND, or orthopnea.    F/U for:  AF     MEDICATIONS  (STANDING):  aspirin  chewable 81 milliGRAM(s) Oral daily  atorvastatin 80 milliGRAM(s) Oral at bedtime  BACItracin   Ointment 1 Application(s) Topical two times a day  enoxaparin Injectable 40 milliGRAM(s) SubCutaneous daily  folic acid 1 milliGRAM(s) Oral daily  melatonin 10 milliGRAM(s) Oral at bedtime  OLANZapine 10 milliGRAM(s) Oral at bedtime  pantoprazole  Injectable 40 milliGRAM(s) IV Push daily  predniSONE   Tablet 60 milliGRAM(s) Oral daily  sodium chloride 0.9%. 1000 milliLiter(s) (50 mL/Hr) IV Continuous <Continuous>  vancomycin  IVPB 1000 milliGRAM(s) IV Intermittent two times a day    MEDICATIONS  (PRN):  acetaminophen   Tablet .. 650 milliGRAM(s) Oral every 6 hours PRN Mild Pain (1 - 3), Moderate Pain (4 - 6), Severe Pain (7 - 10)  lacosamide IVPB 100 milliGRAM(s) IV Intermittent once PRN Seizure  LORazepam   Injectable 1 milliGRAM(s) IV Push once PRN Seizure  OLANZapine Injectable 2.5 milliGRAM(s) IntraMuscular every 4 hours PRN Agitation      Allergies    No Known Allergies    Intolerances        Vital Signs Last 24 Hrs  T(C): 37.2 (27 Sep 2021 05:02), Max: 37.2 (26 Sep 2021 15:03)  T(F): 99 (27 Sep 2021 05:02), Max: 99 (27 Sep 2021 05:02)  HR: 78 (27 Sep 2021 05:02) (62 - 78)  BP: 159/75 (27 Sep 2021 05:02) (136/74 - 172/77)  BP(mean): --  RR: 18 (27 Sep 2021 05:02) (18 - 19)  SpO2: 95% (27 Sep 2021 05:02) (95% - 98%)    I&O's Summary    26 Sep 2021 07:01  -  27 Sep 2021 07:00  --------------------------------------------------------  IN: 1550 mL / OUT: 0 mL / NET: 1550 mL        ON EXAM:    Constitutional: NAD, awake and alert, well-developed  HEENT: Moist Mucous Membranes, Anicteric  Pulmonary: Non-labored, breath sounds are clear bilaterally, No wheezing, rales or rhonchi  Cardiovascular: Regular, S1 and S2, No murmurs, rubs, gallops or clicks  Gastrointestinal: Bowel Sounds present, soft, nontender.   Lymph: No peripheral edema. No lymphadenopathy.  Skin: No visible rashes or ulcers.  Psych:  Mood & affect appropriate for situation  LABS: All Labs Reviewed:                        11.4 13.18 )-----------( 187      ( 25 Sep 2021 06:55 )             36.2     26 Sep 2021 06:11    141    |  107    |  30     ----------------------------<  114    4.0     |  25     |  0.71   25 Sep 2021 06:55    146    |  110    |  38     ----------------------------<  130    4.4     |  25     |  0.83   24 Sep 2021 09:28    145    |  106    |  43     ----------------------------<  153    4.4     |  26     |  0.84     Ca    7.9        26 Sep 2021 06:11  Ca    8.5        25 Sep 2021 06:55  Ca    8.6        24 Sep 2021 09:28  Phos  1.8       26 Sep 2021 06:11  Mg     2.5       26 Sep 2021 06:11  Mg     2.8       25 Sep 2021 06:55  Mg     3.1       24 Sep 2021 09:28    TPro  6.3    /  Alb  3.4    /  TBili  1.4    /  DBili  x      /  AST  17     /  ALT  25     /  AlkPhos  55     25 Sep 2021 06:55          Blood Culture:

## 2021-09-27 NOTE — CONSULT NOTE ADULT - SUBJECTIVE AND OBJECTIVE BOX
Patient is a 66y male with o Rt handed male PMH CLL on Venetoclax, MDS, melanoma, paroxysmal AFib (not on AC) presenting as transfer from [place] for event capture. Patient was initially at  [facility] on 9/14 with confusion, aphasia and R sided weakness. Patient was stroke code and was given s/p tPA. On admission, patient had CTH, CTA, CT perfusion during code stroke protocol with no acute findings. MRI Brain was  performed at  9/16 with no ischemic findings. EEG performed 9/15 showed diffuse L hemispheric slowing. Pt remained aphasic with AMS. +RUE spasticity was noted on exam. Patient was thus transferred to [facility] for EMU admission to evaluate for subclinical seizures. As per notes in the chart patient does not have hx of seizures. Patient had LP done in this admission  and will follow up on CSF results. Cardiology states to hold off on AC as there is no definitive CVA noted. Continue with ASA and statin. Improved s/p 1 dose of IVIG. Still with some mild cognitive delay and now with visual hallucinations. The patient has LP done on 9/17 , CSF culture is no growth, CSF PCR and HSV negative, [place] negative.  The patient was also noted complaining right elbow pain, he is not sure how long his elbow has hurting and swelling. He reports that he has experienced bursitis in the passed and it resolved. He does re call if he saw orthopedics the last time. He current has an ice pack on his elbow wrapped with a dressing. Denies history of gout or pseudogout.      REVIEW OF SYSTEMS:    CONSTITUTIONAL: No weakness, fevers or chills  EYES/ENT: No visual changes;  No vertigo or throat pain   NECK: No pain or stiffness  RESPIRATORY: No cough, wheezing, hemoptysis; No shortness of breath  CARDIOVASCULAR: No chest pain or palpitations  GASTROINTESTINAL: No abdominal or epigastric pain. No nausea, vomiting, or hematemesis; No diarrhea or constipation. No melena or hematochezia.  GENITOURINARY: No dysuria, frequency or hematuria  NEUROLOGICAL: No numbness or weakness  SKIN: No itching, burning, rashes, or lesions   All other review of systems is negative unless indicated above.    PAST MEDICAL & SURGICAL HISTORY:  Osteopenia    CLL (Chronic Lymphocytic Leukemia)  SINCE 1988    Avascular Necrosis of Femur Head  B/L    TMJ Syndrome  limited jaw opening due to TMJ    Herniated Cervical Disc    Bulging Disc  LUMBAR    Melanoma    Deviated nasal septum    Nasal congestion    Rosacea    Deviated nasal septum    Other specified disorders of nose and nasal sinuses    Anemia    S/P Splenectomy  1993    Avascular Necrosis of Femur Head  RIGHT - CORE DECOMPRESSION- 1993    Avascular Necrosis of Femur Head  LEFT - CORE DECOMPRESSION - 1993    Abnormal Jaw Closure  LEFT JAW SURGERY - 1988    Status Post Eye Surgery X 3  3/2011 right eye scleral buckle; left eye in 2013    S/P Tonsillectomy  1960    Bilateral cataracts  removed in 2011    MEDICATIONS  (STANDING):  amLODIPine   Tablet 5 milliGRAM(s) Oral daily  aspirin  chewable 81 milliGRAM(s) Oral daily  atorvastatin 80 milliGRAM(s) Oral at bedtime  BACItracin   Ointment 1 Application(s) Topical two times a day  enoxaparin Injectable 40 milliGRAM(s) SubCutaneous daily  folic acid 1 milliGRAM(s) Oral daily  melatonin 10 milliGRAM(s) Oral at bedtime  OLANZapine 10 milliGRAM(s) Oral at bedtime  pantoprazole    Tablet 40 milliGRAM(s) Oral before breakfast  predniSONE   Tablet 60 milliGRAM(s) Oral daily  sodium chloride 0.9%. 1000 milliLiter(s) (50 mL/Hr) IV Continuous <Continuous>  sodium phosphate IVPB 15 milliMole(s) IV Intermittent once  vancomycin  IVPB 1000 milliGRAM(s) IV Intermittent two times a day    MEDICATIONS  (PRN):  acetaminophen   Tablet .. 650 milliGRAM(s) Oral every 6 hours PRN Mild Pain (1 - 3), Moderate Pain (4 - 6), Severe Pain (7 - 10)  lacosamide IVPB 100 milliGRAM(s) IV Intermittent once PRN Seizure  LORazepam   Injectable 1 milliGRAM(s) IV Push once PRN Seizure  OLANZapine Injectable 2.5 milliGRAM(s) IntraMuscular every 4 hours PRN Agitation      Home Medications:  atorvastatin 80 mg oral tablet: 1 tab(s) orally once a day (at bedtime) (16 Sep 2021 11:42)  enoxaparin: 40 unit(s) subcutaneous once a day (16 Sep 2021 11:42)  folic acid 1 mg oral tablet: 1 tab(s) orally once a day (14 Sep 2021 22:59)      Allergies    No Known Allergies    Intolerances    Vital Signs Last 24 Hrs  T(C): 37.2 (27 Sep 2021 08:15), Max: 37.2 (26 Sep 2021 15:03)  T(F): 98.9 (27 Sep 2021 08:15), Max: 99 (27 Sep 2021 05:02)  HR: 74 (27 Sep 2021 08:15) (62 - 78)  BP: 152/74 (27 Sep 2021 08:15) (150/85 - 172/77)  BP(mean): --  RR: 18 (27 Sep 2021 08:15) (18 - 19)  SpO2: 97% (27 Sep 2021 08:15) (95% - 98%) Patient is a 66y male with o Rt handed male PMH CLL on Venetoclax, MDS, melanoma, paroxysmal AFib (not on AC) presenting as transfer from [place] for event capture. Patient was initially at  [facility] on 9/14 with confusion, aphasia and R sided weakness. Patient was stroke code and was given s/p tPA. On admission, patient had CTH, CTA, CT perfusion during code stroke protocol with no acute findings. MRI Brain was  performed at  9/16 with no ischemic findings. EEG performed 9/15 showed diffuse L hemispheric slowing. Pt remained aphasic with AMS. +RUE spasticity was noted on exam. Patient was thus transferred to [facility] for EMU admission to evaluate for subclinical seizures. As per notes in the chart patient does not have hx of seizures. Patient had LP done in this admission  and will follow up on CSF results. Cardiology states to hold off on AC as there is no definitive CVA noted. Continue with ASA and statin. Improved s/p 1 dose of IVIG. Still with some mild cognitive delay and now with visual hallucinations. The patient has LP done on 9/17   The patient was also noted complaining right elbow pain, he is not sure how long his elbow has hurting and swelling. He reports that he has experienced bursitis in the passed and it resolved. He does re call if he saw orthopedics the last time. He current has an ice pack on his elbow wrapped with a dressing. Denies history of gout or pseudogout.      REVIEW OF SYSTEMS:    CONSTITUTIONAL: No weakness, fevers or chills  EYES/ENT: No visual changes;  No vertigo or throat pain   NECK: No pain or stiffness  RESPIRATORY: No cough, wheezing, hemoptysis; No shortness of breath  CARDIOVASCULAR: No chest pain or palpitations  GASTROINTESTINAL: No abdominal or epigastric pain. No nausea, vomiting, or hematemesis; No diarrhea or constipation. No melena or hematochezia.  GENITOURINARY: No dysuria, frequency or hematuria  NEUROLOGICAL: No numbness or weakness  SKIN: No itching, burning, rashes, or lesions   All other review of systems is negative unless indicated above.    PAST MEDICAL & SURGICAL HISTORY:  Osteopenia    CLL (Chronic Lymphocytic Leukemia)  SINCE 1988    Avascular Necrosis of Femur Head  B/L    TMJ Syndrome  limited jaw opening due to TMJ    Herniated Cervical Disc    Bulging Disc  LUMBAR    Melanoma    Deviated nasal septum    Nasal congestion    Rosacea    Deviated nasal septum    Other specified disorders of nose and nasal sinuses    Anemia    S/P Splenectomy  1993    Avascular Necrosis of Femur Head  RIGHT - CORE DECOMPRESSION- 1993    Avascular Necrosis of Femur Head  LEFT - CORE DECOMPRESSION - 1993    Abnormal Jaw Closure  LEFT JAW SURGERY - 1988    Status Post Eye Surgery X 3  3/2011 right eye scleral buckle; left eye in 2013    S/P Tonsillectomy  1960    Bilateral cataracts  removed in 2011    MEDICATIONS  (STANDING):  amLODIPine   Tablet 5 milliGRAM(s) Oral daily  aspirin  chewable 81 milliGRAM(s) Oral daily  atorvastatin 80 milliGRAM(s) Oral at bedtime  BACItracin   Ointment 1 Application(s) Topical two times a day  enoxaparin Injectable 40 milliGRAM(s) SubCutaneous daily  folic acid 1 milliGRAM(s) Oral daily  melatonin 10 milliGRAM(s) Oral at bedtime  OLANZapine 10 milliGRAM(s) Oral at bedtime  pantoprazole    Tablet 40 milliGRAM(s) Oral before breakfast  predniSONE   Tablet 60 milliGRAM(s) Oral daily  sodium chloride 0.9%. 1000 milliLiter(s) (50 mL/Hr) IV Continuous <Continuous>  sodium phosphate IVPB 15 milliMole(s) IV Intermittent once  vancomycin  IVPB 1000 milliGRAM(s) IV Intermittent two times a day    MEDICATIONS  (PRN):  acetaminophen   Tablet .. 650 milliGRAM(s) Oral every 6 hours PRN Mild Pain (1 - 3), Moderate Pain (4 - 6), Severe Pain (7 - 10)  lacosamide IVPB 100 milliGRAM(s) IV Intermittent once PRN Seizure  LORazepam   Injectable 1 milliGRAM(s) IV Push once PRN Seizure  OLANZapine Injectable 2.5 milliGRAM(s) IntraMuscular every 4 hours PRN Agitation      Home Medications:  atorvastatin 80 mg oral tablet: 1 tab(s) orally once a day (at bedtime) (16 Sep 2021 11:42)  enoxaparin: 40 unit(s) subcutaneous once a day (16 Sep 2021 11:42)  folic acid 1 mg oral tablet: 1 tab(s) orally once a day (14 Sep 2021 22:59)      Allergies    No Known Allergies    Intolerances    Vital Signs Last 24 Hrs  T(C): 37.2 (27 Sep 2021 08:15), Max: 37.2 (26 Sep 2021 15:03)  T(F): 98.9 (27 Sep 2021 08:15), Max: 99 (27 Sep 2021 05:02)  HR: 74 (27 Sep 2021 08:15) (62 - 78)  BP: 152/74 (27 Sep 2021 08:15) (150/85 - 172/77)  BP(mean): --  RR: 18 (27 Sep 2021 08:15) (18 - 19)  SpO2: 97% (27 Sep 2021 08:15) (95% - 98%)    Appearance: Normal	  HEENT:   Normal oral mucosa, PERRL, EOMI	  Lymphatic: No lymphadenopathy , no edema  Cardiovascular: Normal S1 S2, No JVD, No murmurs , Peripheral pulses palpable 2+ bilaterally  Respiratory: Lungs clear to auscultation, normal effort 	  Gastrointestinal:  Soft, Non-tender, + BS	  Skin: No rashes, No ecchymoses, No cyanosis, warm to touch  Musculoskeletal: R arm dressing   Psychiatry:  Mood & affect appropriate  Ext: No edema

## 2021-09-27 NOTE — DISCHARGE NOTE PROVIDER - HOSPITAL COURSE
Tx from [place]  EMU to evaluate for subclinical seizures    Patient is a 65 yo Rt handed male PMH CLL on Venetoclax, MDS, melanoma, paroxysmal AFib (not on AC) presenting as transfer from [place] for event capture. Patient was initially at  [facility] on 9/14 with confusion, aphasia and R sided weakness. Patient was stroke code and was given s/p tPA. On admission, patient had CTH, CTA, CT perfusion during code stroke protocol with no acute findings. MRI Brain was  performed at  9/16 with no ischemic findings. EEG performed 9/15 showed diffuse L hemispheric slowing. Pt remained aphasic with AMS. +RUE spasticity was noted on exam. Patient was thus transferred to [facility] for EMU admission to evaluate for subclinical seizures.  Of note, patient does not have hx of seizures. No stroke.  9/27 transferred to medicine  Dx  - Auto Immune encephalitis- IVIG/Steriod taper  - A Fib- no ac per cardio  -CLL-heme following  -right elbow possible septic bursitis- Vanco IV  -HTN- Norvasc started  -Hypophosp- supplemented   Tx from [place]  EMU to evaluate for subclinical seizures    Patient is a 67 yo Rt handed male PMH CLL on Venetoclax, MDS, melanoma, paroxysmal AFib (not on AC) presenting as transfer from [place] for event capture. Patient was initially at  [facility] on 9/14 with confusion, aphasia and R sided weakness. Patient was stroke code and was given s/p tPA. On admission, patient had CTH, CTA, CT perfusion during code stroke protocol with no acute findings. MRI Brain was  performed at  9/16 with no ischemic findings. EEG performed 9/15 showed diffuse L hemispheric slowing. Pt remained aphasic with AMS. +RUE spasticity was noted on exam. Patient was thus transferred to [facility] for EMU admission to evaluate for subclinical seizures.  Of note, patient does not have hx of seizures. No stroke.  9/27 transferred to medicine  Dx  - Auto Immune encephalitis- IVIG/Steriod taper  - A Fib- no ac per cardio  -CLL-heme following s/p ivig   -right elbow possible septic bursitis- po abx   -Hypophosp- supplemented

## 2021-09-28 LAB
ALBUMIN SERPL ELPH-MCNC: 3.2 G/DL — LOW (ref 3.3–5)
ALP SERPL-CCNC: 68 U/L — SIGNIFICANT CHANGE UP (ref 40–120)
ALT FLD-CCNC: 24 U/L — SIGNIFICANT CHANGE UP (ref 10–45)
ANION GAP SERPL CALC-SCNC: 12 MMOL/L — SIGNIFICANT CHANGE UP (ref 5–17)
AST SERPL-CCNC: 16 U/L — SIGNIFICANT CHANGE UP (ref 10–40)
BASOPHILS # BLD AUTO: 0 K/UL — SIGNIFICANT CHANGE UP (ref 0–0.2)
BASOPHILS NFR BLD AUTO: 0 % — SIGNIFICANT CHANGE UP (ref 0–2)
BILIRUB SERPL-MCNC: 0.7 MG/DL — SIGNIFICANT CHANGE UP (ref 0.2–1.2)
BUN SERPL-MCNC: 16 MG/DL — SIGNIFICANT CHANGE UP (ref 7–23)
CALCIUM SERPL-MCNC: 8.6 MG/DL — SIGNIFICANT CHANGE UP (ref 8.4–10.5)
CHLORIDE SERPL-SCNC: 101 MMOL/L — SIGNIFICANT CHANGE UP (ref 96–108)
CO2 SERPL-SCNC: 22 MMOL/L — SIGNIFICANT CHANGE UP (ref 22–31)
CREAT SERPL-MCNC: 0.69 MG/DL — SIGNIFICANT CHANGE UP (ref 0.5–1.3)
EOSINOPHIL # BLD AUTO: 0 K/UL — SIGNIFICANT CHANGE UP (ref 0–0.5)
EOSINOPHIL NFR BLD AUTO: 0 % — SIGNIFICANT CHANGE UP (ref 0–6)
ERYTHROCYTE [SEDIMENTATION RATE] IN BLOOD: 73 MM/HR — HIGH (ref 0–20)
GLUCOSE SERPL-MCNC: 165 MG/DL — HIGH (ref 70–99)
HCT VFR BLD CALC: 36.8 % — LOW (ref 39–50)
HGB BLD-MCNC: 12.5 G/DL — LOW (ref 13–17)
HYPOSEGMENTATION: PRESENT — SIGNIFICANT CHANGE UP
LYMPHOCYTES # BLD AUTO: 0 % — LOW (ref 13–44)
LYMPHOCYTES # BLD AUTO: 0 K/UL — LOW (ref 1–3.3)
MAGNESIUM SERPL-MCNC: 2.2 MG/DL — SIGNIFICANT CHANGE UP (ref 1.6–2.6)
MANUAL SMEAR VERIFICATION: SIGNIFICANT CHANGE UP
MCHC RBC-ENTMCNC: 34 GM/DL — SIGNIFICANT CHANGE UP (ref 32–36)
MCHC RBC-ENTMCNC: 34.4 PG — HIGH (ref 27–34)
MCV RBC AUTO: 101.4 FL — HIGH (ref 80–100)
METAMYELOCYTES # FLD: 4 % — HIGH (ref 0–0)
MONOCYTES # BLD AUTO: 0.82 K/UL — SIGNIFICANT CHANGE UP (ref 0–0.9)
MONOCYTES NFR BLD AUTO: 8 % — SIGNIFICANT CHANGE UP (ref 2–14)
MYELOCYTES NFR BLD: 9 % — HIGH (ref 0–0)
NEUTROPHILS # BLD AUTO: 8.06 K/UL — HIGH (ref 1.8–7.4)
NEUTROPHILS NFR BLD AUTO: 74 % — SIGNIFICANT CHANGE UP (ref 43–77)
NEUTS BAND # BLD: 5 % — SIGNIFICANT CHANGE UP (ref 0–8)
NRBC # BLD: 6 /100 — HIGH (ref 0–0)
PHOSPHATE SERPL-MCNC: 2.5 MG/DL — SIGNIFICANT CHANGE UP (ref 2.5–4.5)
PLAT MORPH BLD: NORMAL — SIGNIFICANT CHANGE UP
PLATELET # BLD AUTO: 132 K/UL — LOW (ref 150–400)
POTASSIUM SERPL-MCNC: 4.2 MMOL/L — SIGNIFICANT CHANGE UP (ref 3.5–5.3)
POTASSIUM SERPL-SCNC: 4.2 MMOL/L — SIGNIFICANT CHANGE UP (ref 3.5–5.3)
PROT SERPL-MCNC: 6.6 G/DL — SIGNIFICANT CHANGE UP (ref 6–8.3)
RBC # BLD: 3.63 M/UL — LOW (ref 4.2–5.8)
RBC # FLD: 15.1 % — HIGH (ref 10.3–14.5)
RBC BLD AUTO: SIGNIFICANT CHANGE UP
SODIUM SERPL-SCNC: 135 MMOL/L — SIGNIFICANT CHANGE UP (ref 135–145)
VANCOMYCIN TROUGH SERPL-MCNC: 14.2 UG/ML — SIGNIFICANT CHANGE UP (ref 10–20)
WBC # BLD: 10.2 K/UL — SIGNIFICANT CHANGE UP (ref 3.8–10.5)
WBC # FLD AUTO: 10.2 K/UL — SIGNIFICANT CHANGE UP (ref 3.8–10.5)

## 2021-09-28 PROCEDURE — 99232 SBSQ HOSP IP/OBS MODERATE 35: CPT

## 2021-09-28 PROCEDURE — 99233 SBSQ HOSP IP/OBS HIGH 50: CPT

## 2021-09-28 RX ADMIN — AMLODIPINE BESYLATE 5 MILLIGRAM(S): 2.5 TABLET ORAL at 05:02

## 2021-09-28 RX ADMIN — Medication 1 MILLIGRAM(S): at 11:13

## 2021-09-28 RX ADMIN — Medication 10 MILLIGRAM(S): at 21:08

## 2021-09-28 RX ADMIN — Medication 250 MILLIGRAM(S): at 16:59

## 2021-09-28 RX ADMIN — Medication 1 APPLICATION(S): at 05:02

## 2021-09-28 RX ADMIN — PANTOPRAZOLE SODIUM 40 MILLIGRAM(S): 20 TABLET, DELAYED RELEASE ORAL at 05:02

## 2021-09-28 RX ADMIN — ATORVASTATIN CALCIUM 80 MILLIGRAM(S): 80 TABLET, FILM COATED ORAL at 21:08

## 2021-09-28 RX ADMIN — Medication 81 MILLIGRAM(S): at 11:13

## 2021-09-28 RX ADMIN — ENOXAPARIN SODIUM 40 MILLIGRAM(S): 100 INJECTION SUBCUTANEOUS at 11:14

## 2021-09-28 RX ADMIN — Medication 1 APPLICATION(S): at 16:54

## 2021-09-28 RX ADMIN — OLANZAPINE 10 MILLIGRAM(S): 15 TABLET, FILM COATED ORAL at 21:08

## 2021-09-28 RX ADMIN — Medication 60 MILLIGRAM(S): at 05:02

## 2021-09-28 RX ADMIN — Medication 250 MILLIGRAM(S): at 05:03

## 2021-09-28 NOTE — PROGRESS NOTE ADULT - SUBJECTIVE AND OBJECTIVE BOX
Name of Patient : SARAHI BARNHART  MRN: [id number]  Date of visit: 09-28-21 @ 15:59      Subjective: Patient seen and examined. No new events except as noted.   doing okay   no collection , no fluid on tap per ortho  improved swelling however  and erythema     REVIEW OF SYSTEMS:    CONSTITUTIONAL: No weakness, fevers or chills  EYES/ENT: No visual changes;  No vertigo or throat pain   NECK: No pain or stiffness  RESPIRATORY: No cough, wheezing, hemoptysis; No shortness of breath  CARDIOVASCULAR: No chest pain or palpitations  GASTROINTESTINAL: No abdominal or epigastric pain. No nausea, vomiting, or hematemesis; No diarrhea or constipation. No melena or hematochezia.  GENITOURINARY: No dysuria, frequency or hematuria  NEUROLOGICAL: No numbness or weakness  SKIN: No itching, burning, rashes, or lesions   All other review of systems is negative unless indicated above.    MEDICATIONS:  MEDICATIONS  (STANDING):  amLODIPine   Tablet 5 milliGRAM(s) Oral daily  aspirin  chewable 81 milliGRAM(s) Oral daily  atorvastatin 80 milliGRAM(s) Oral at bedtime  BACItracin   Ointment 1 Application(s) Topical two times a day  enoxaparin Injectable 40 milliGRAM(s) SubCutaneous daily  folic acid 1 milliGRAM(s) Oral daily  melatonin 10 milliGRAM(s) Oral at bedtime  OLANZapine 10 milliGRAM(s) Oral at bedtime  pantoprazole    Tablet 40 milliGRAM(s) Oral before breakfast  predniSONE   Tablet 60 milliGRAM(s) Oral daily  sodium chloride 0.9%. 1000 milliLiter(s) (50 mL/Hr) IV Continuous <Continuous>  vancomycin  IVPB 1000 milliGRAM(s) IV Intermittent two times a day      PHYSICAL EXAM:  T(C): 37.1 (09-28-21 @ 11:27), Max: 37.1 (09-28-21 @ 11:27)  HR: 68 (09-28-21 @ 11:27) (61 - 68)  BP: 137/76 (09-28-21 @ 11:27) (124/75 - 149/76)  RR: 18 (09-28-21 @ 11:27) (18 - 18)  SpO2: 98% (09-28-21 @ 11:27) (95% - 98%)  Wt(kg): --  I&O's Summary    27 Sep 2021 07:01  -  28 Sep 2021 07:00  --------------------------------------------------------  IN: 120 mL / OUT: 500 mL / NET: -380 mL    28 Sep 2021 07:01  -  28 Sep 2021 15:59  --------------------------------------------------------  IN: 1260 mL / OUT: 0 mL / NET: 1260 mL          Appearance: Normal	  HEENT:  PERRLA   Lymphatic: No lymphadenopathy   Cardiovascular: Normal S1 S2, no JVD  Respiratory: normal effort , clear  Gastrointestinal:  Soft, Non-tender  Skin: No rashes,  warm to touch  Psychiatry:  Mood & affect appropriate  Musculuskeletal: No edema, R elbo swelling       All labs, Imaging and EKGs personally reviewed     09-27-21 @ 07:01  -  09-28-21 @ 07:00  --------------------------------------------------------  IN: 120 mL / OUT: 500 mL / NET: -380 mL    09-28-21 @ 07:01 - 09-28-21 @ 15:59  --------------------------------------------------------  IN: 1260 mL / OUT: 0 mL / NET: 1260 mL                          12.5   10.20 )-----------( 132      ( 28 Sep 2021 10:20 )             36.8               09-28    135  |  101  |  16  ----------------------------<  165<H>  4.2   |  22  |  0.69    Ca    8.6      28 Sep 2021 10:20  Phos  2.5     09-28  Mg     2.2     09-28    TPro  6.6  /  Alb  3.2<L>  /  TBili  0.7  /  DBili  x   /  AST  16  /  ALT  24  /  AlkPhos  68  09-28

## 2021-09-28 NOTE — PROGRESS NOTE ADULT - ASSESSMENT
66y male with o Rt handed male PMH CLL on Venetoclax, MDS, melanoma, paroxysmal AFib (not on AC) presenting as transfer from [place] for event capture. Patient was initially at  [facility] (formerly [facility] on 9/14 with confusion, aphasia and R sided weakness. Patient was stroke code and was given s/p tPA. On admission, patient had CTH, CTA, CT perfusion during code stroke protocol with no acute findings. MRI Brain was  performed at  9/16 with no ischemic findings. EEG performed 9/15 showed diffuse L hemispheric slowing. Pt remained aphasic with AMS. +RUE spasticity was noted on exam. Patient was thus transferred to [facility] for EMU admission to evaluate for subclinical seizures. As per notes in the chart patient does not have hx of seizures. Patient had LP done in this admission  and will follow up on CSF results. Cardiology states to hold off on AC as there is no definitive CVA noted. Continue with ASA and statin. Improved s/p 1 dose of IVIG. Still with some mild cognitive delay and now with visual hallucinations. The patient has LP done on 9/17 , CSF culture is no growth, CSF PCR and HSV negative, [place] negative.  The patient was started on vancomycin on 9/26 for rt elbow cellulitis.  X Rays as above, negative.  No palpable fluid collection.  Probable gram positive coccal infection, suspect staph/strep  steroids may be masking inflammatory signs  Plan   Continue vanco  Will consider stepdown to oral therapy in next 1-2 days, perhaps minocycline/cephalexin

## 2021-09-28 NOTE — PROGRESS NOTE ADULT - ASSESSMENT
65 y/o male with PMHx CLL followed by heme/onc, anemia, melanoma, osteopenia admitted for right facial droop, right hemiparesis and expressive aphasia.    Admitted to the ICU at [facility] yesterday and is S/P TPA. He has now been transferred for evaluation of his seizures and evaluation of seizures    CVA? / AMS  - Patient with new right sided UE weakness, facial droop and expressive aphasia at   - s/p tpa without improvement in symptoms  - 9/15 CT head without acute ICH  - 9/16 MRI neg for infarct  - neurology follow up.  s/p video eeg  -mental status greatly improved overall  - cont asa and statin    PAF  - Patient seen on a prior admission for syncope 7/2020 and found to have brief Afib  - Tele during this admission SR   - echocardiogram 9/15 unremarkable  - If no definitive CVA would support remaining off AC for now.    - Continue to monitor  - continue aspirin    BP  - BP often but not always in hypertensive range  - cont amlodipine and can increase if needed    CLL  - follow up heme/onc    - Monitor and replete lytes, keep K>4, Mg>2.  - All other medical needs as per Neuro team  - Other cardiovascular workup will depend on clinical course.  - Will continue to follow.  DC planning per primary team.

## 2021-09-28 NOTE — CHART NOTE - NSCHARTNOTEFT_GEN_A_CORE
67 y/o male with PMHx CLL (dx in 1/1991, on tx w/ venetoclax) followed by heme/onc, anemia, hx of melanoma, osteopenia admitted for right facial droop, right hemiparesis and expressive aphasia.  Admitted to the ICU at [facility] prior to admission, s/p TPA, transferred to [facility] for evaluation of seizures. Hematology following given hx of CLL.    #CLL  #AMS, aphasia  -CBC w/ diff shows stable disease  -imaging reviewed; treated initially as code stroke at [facility] on day of presentation, but MRI negative for CVA or structural lesion; performed without contrast, so unable to r/o leptomeningeal disease, but suspicion is low.  -patient's aphasia resolved over weekend after admission here, received IVIG x2 days for suspected autoimmune encephalitis but stopped by primary team, switched over to high doses steroids (plan for 5 days methylprednisolone 1000 mg QD).   -do not suspect patient's symptoms due to CLL treatment (currently on venetoclax x10 months; previously finished treatment with obinutuzumab). CLL increases risk of infection (has hx of recurrent sinus infections, on IVIG) so infectious etiology higher on ddx; CSF infectious PCR and gram stain negative, making it less likely. CESAR virus PCR pending to r/o PML, though suspicion is low based on MRI findings.  -Patient had recently gotten 3rd dose of COVID-19 vaccine, ?immune sequelae from vaccine? Less likely at this time, diagnosis of exclusion  -fibrinogen, ferritin, triglycerides all mildly elevated; soluble IL-2r normal, no suspicion for HLH at this time  -EEG abnormal w/ L hemisphere slowing; acute care per neurology team  -speech is markedly improved, now answering questions, oriented to self and time, appears calm and non-agitated; suspect improved 2/2 antipsychotics +/- steroids; will defer steroid management to neurology team  -autoimmune encephalitis panel pending (1-2 weeks processing time)  -would continue to hold venetoclax for now  -Flow cytometry CSF confirms no involvement by CLL, CESAR Virus PCR negative.  -Defer steroid management to neurology team/primary team  -Pt to follow up with Dr. Jeronimo Godinez at [facility] upon discharge.    Hematology will be available if you have any questions.      Pepper Bob MD  PGY 6, Oncology/Hematology fellow  (P) [phone]  After 5pm, please contact on-call team.

## 2021-09-28 NOTE — CHART NOTE - NSCHARTNOTEFT_GEN_A_CORE
Nutrition Follow Up Note  Patient seen for: malnutrition follow up.    Chart reviewed, events noted. Pt is a 65 y/o Rt handed M with PMHx of  CLL on Venetoclax, MDS, melanoma, paroxysmal AFib (not on AC) presenting as transfer from [place] for event capture.     Source: [x] Patient       [x] EMR        [] RN        [] Family at bedside       [] Other:    -If unable to interview patient: [] Trach/Vent/BiPAP  [] Disoriented/confused/inappropriate to interview    Diet Order:   Diet, Soft (09-26-21)    - Is current order appropriate/adequate? [x] Yes  []  No:     - PO intake :   [x] >75%  Adequate    [] 50-75%  Fair       [] <50%  Poor    - Nutrition-related concerns:    - Unable to interview pt on initial assessment (9/23) 2/2 mental status. Mentation improved, pt A&Ox4. PTA/onsent of AMS pt reports good appetite and PO intake. No noted/reported micronutrient supplementation PTA. NKFA. Pt endorses weight loss 2/2 chemotherapy. States for years he was 162lb, then weight dropped into the 150s. Dosing wt this admission 129.1 - no additional wts available. Pt OOB to chair, unable to obtain wt at this time.    - Pt reports good intake since diet upgrade. Pt previous on pureed diet, disliked texture, advanced s/p SLP evaluation 9/26. Since then, pt reports eating >75% of meal trays.    - Pt amenable to oral nutrition supplements to increase protein/calorie intake.    GI: No reported N/V.  Last BM 9/27.   Bowel Regimen? [] Yes   [x] No    Weights:   no updated weights available at this time, will continue to monitor weights for changes if any     Nutritionally Pertinent MEDICATIONS  (STANDING):  amLODIPine   Tablet  atorvastatin  folic acid  pantoprazole    Tablet  predniSONE   Tablet  sodium chloride 0.9%.  vancomycin  IVPB    Pertinent Labs: 09-28 @ 10:20: Na 135, BUN 16, Cr 0.69, <H>, K+ 4.2, Phos 2.5, Mg 2.2, Alk Phos 68, ALT/SGPT 24, AST/SGOT 16, HbA1c --    A1C with Estimated Average Glucose Result: 5.5 % (09-15-21 @ 09:16)    Finger Sticks: n/a    Skin per nursing documentation:   Edema:     Estimated Needs:   [x] no change since previous assessment  [] recalculated:     Previous Nutrition Diagnosis: Severe Malnutrition, Underweight  Nutrition Diagnosis is: [x] ongoing  [] resolved [] not applicable     New Nutrition Diagnosis: [x] Not applicable    Nutrition Care Plan:  [x] In Progress  [] Achieved  [] Not applicable    Nutrition Interventions:     Education Provided:       [x] Yes: Provided recommendations to optimize PO and protein intake, recommended small frequent meals by ordering nutrient-dense snacks and leaving non-perishable food away from tray for later consumption during the day or between meals, to start with protein, and sips of supplement throughout the day; reviewed foods with protein and menu order procedures in hospital.     Recommendations:      1. Continue soft diet.  2. Suggest Ensure Enlive 2x daily (700 александр and 40 gm protein) to promote PO intake.  3. Continue folic acid unless otherwise contraindicated.   4. Provide encouragement with PO intake, menu selections, and assistance with meals as needed.     Monitoring and Evaluation:   Continue to monitor nutritional intake, tolerance to diet prescription, weights, labs, skin integrity    RD remains available upon request and will follow up per protocol    Alee Hui MS, RD, CDN Pager# [phone]

## 2021-09-28 NOTE — PROGRESS NOTE ADULT - SUBJECTIVE AND OBJECTIVE BOX
[facility]    Sveta Romano, Kyara, Ebony, Jennifer, Dereck, Hosea      [phone]    CHIEF COMPLAINT: Patient is a 66y old  Male who presents with a chief complaint of altered mental status (27 Sep 2021 11:24)      Follow Up: tia/cva    Interim history: The patient reports no new symptoms.  Denies chest discomfort and shortness of breath.  No abdominal pain.  No new neurologic symptoms.      MEDICATIONS  (STANDING):  amLODIPine   Tablet 5 milliGRAM(s) Oral daily  aspirin  chewable 81 milliGRAM(s) Oral daily  atorvastatin 80 milliGRAM(s) Oral at bedtime  BACItracin   Ointment 1 Application(s) Topical two times a day  enoxaparin Injectable 40 milliGRAM(s) SubCutaneous daily  folic acid 1 milliGRAM(s) Oral daily  melatonin 10 milliGRAM(s) Oral at bedtime  OLANZapine 10 milliGRAM(s) Oral at bedtime  pantoprazole    Tablet 40 milliGRAM(s) Oral before breakfast  predniSONE   Tablet 60 milliGRAM(s) Oral daily  sodium chloride 0.9%. 1000 milliLiter(s) (50 mL/Hr) IV Continuous <Continuous>  vancomycin  IVPB 1000 milliGRAM(s) IV Intermittent two times a day    MEDICATIONS  (PRN):  acetaminophen   Tablet .. 650 milliGRAM(s) Oral every 6 hours PRN Mild Pain (1 - 3), Moderate Pain (4 - 6), Severe Pain (7 - 10)  lacosamide IVPB 100 milliGRAM(s) IV Intermittent once PRN Seizure  LORazepam   Injectable 1 milliGRAM(s) IV Push once PRN Seizure  OLANZapine Injectable 2.5 milliGRAM(s) IntraMuscular every 4 hours PRN Agitation      REVIEW OF SYSTEMS:  eye, ent, GI, , allergic, dermatologic, musculoskeletal and neurologic are negative except as described above    Vital Signs Last 24 Hrs  T(C): 36.8 (28 Sep 2021 07:27), Max: 37.1 (27 Sep 2021 11:30)  T(F): 98.3 (28 Sep 2021 07:27), Max: 98.7 (27 Sep 2021 11:30)  HR: 68 (28 Sep 2021 07:27) (61 - 69)  BP: 149/76 (28 Sep 2021 07:27) (124/75 - 154/77)  BP(mean): --  RR: 18 (28 Sep 2021 07:27) (18 - 18)  SpO2: 98% (28 Sep 2021 07:27) (95% - 98%)    I&O's Summary    27 Sep 2021 07:01  -  28 Sep 2021 07:00  --------------------------------------------------------  IN: 120 mL / OUT: 500 mL / NET: -380 mL        Telemetry past 24h:    PHYSICAL EXAM:    Constitutional: well-nourished, well-developed, NAD   HEENT:  MMM, sclerae anicteric, conjunctivae clear, no oral cyanosis.  Pulmonary: Non-labored, breath sounds are clear bilaterally, No wheezing, rales or rhonchi  Cardiovascular: Regular, S1 and S2.  No murmur.  No rubs, gallops or clicks  Gastrointestinal: Bowel Sounds present, soft, nontender.   Lymph: No peripheral edema.   Neurological: Alert, no focal deficits  Skin: No rashes.  Psych:  Mood & affect appropriate    LABS: All Labs Reviewed:                        12.8   8.66  )-----------( 145      ( 27 Sep 2021 08:47 )             38.9     27 Sep 2021 08:47    135    |  100    |  20     ----------------------------<  139    4.2     |  22     |  0.86   26 Sep 2021 06:11    141    |  107    |  30     ----------------------------<  114    4.0     |  25     |  0.71     Ca    8.3        27 Sep 2021 08:47  Ca    7.9        26 Sep 2021 06:11  Phos  1.8       27 Sep 2021 08:47  Phos  1.8       26 Sep 2021 06:11  Mg     2.3       27 Sep 2021 08:47  Mg     2.5       26 Sep 2021 06:11            Blood Culture:         RADIOLOGY:    EKG:    Echo:

## 2021-09-28 NOTE — PROGRESS NOTE ADULT - SUBJECTIVE AND OBJECTIVE BOX
CC: f/u for rt olecranon soft tissue infection    Patient reports: improvement in rt elbow swelling    REVIEW OF SYSTEMS:  All other review of systems negative (Comprehensive ROS)    Antimicrobials Day #  :day 3  vancomycin  IVPB 1000 milliGRAM(s) IV Intermittent two times a day    Other Medications Reviewed  MEDICATIONS  (STANDING):  amLODIPine   Tablet 5 milliGRAM(s) Oral daily  aspirin  chewable 81 milliGRAM(s) Oral daily  atorvastatin 80 milliGRAM(s) Oral at bedtime  BACItracin   Ointment 1 Application(s) Topical two times a day  enoxaparin Injectable 40 milliGRAM(s) SubCutaneous daily  folic acid 1 milliGRAM(s) Oral daily  melatonin 10 milliGRAM(s) Oral at bedtime  OLANZapine 10 milliGRAM(s) Oral at bedtime  pantoprazole    Tablet 40 milliGRAM(s) Oral before breakfast  predniSONE   Tablet 60 milliGRAM(s) Oral daily  sodium chloride 0.9%. 1000 milliLiter(s) (50 mL/Hr) IV Continuous <Continuous>  vancomycin  IVPB 1000 milliGRAM(s) IV Intermittent two times a day      T(F): 98.8 (09-28-21 @ 11:27), Max: 98.8 (09-28-21 @ 11:27)  HR: 68 (09-28-21 @ 11:27)  BP: 137/76 (09-28-21 @ 11:27)  RR: 18 (09-28-21 @ 11:27)  SpO2: 98% (09-28-21 @ 11:27)  Wt(kg): --    PHYSICAL EXAM:  General: alert, no acute distress, cooperative  Eyes:  anicteric, no conjunctival injection, no discharge  Oropharynx: no lesions or injection 	  Neck: supple, without adenopathy  Lungs: clear to auscultation  Heart: regular rate and rhythm; no murmur, rubs or gallops  Abdomen: soft, nondistended, nontender, without mass or organomegaly  Skin: rt elbow with scabs and mild induration and soft tissue swelling  Extremities: no clubbing, cyanosis, or edema  Neurologic: alert, oriented, moves all extremities    LAB RESULTS:                        12.5   10.20 )-----------( 132      ( 28 Sep 2021 10:20 )             36.8     09-28    135  |  101  |  16  ----------------------------<  165<H>  4.2   |  22  |  0.69    Ca    8.6      28 Sep 2021 10:20  Phos  2.5     09-28  Mg     2.2     09-28    TPro  6.6  /  Alb  3.2<L>  /  TBili  0.7  /  DBili  x   /  AST  16  /  ALT  24  /  AlkPhos  68  09-28    LIVER FUNCTIONS - ( 28 Sep 2021 10:20 )  Alb: 3.2 g/dL / Pro: 6.6 g/dL / ALK PHOS: 68 U/L / ALT: 24 U/L / AST: 16 U/L / GGT: x             MICROBIOLOGY:  RECENT CULTURES:      RADIOLOGY REVIEWED:  < from: Xray Elbow AP + Lateral, Right (09.27.21 @ 17:54) >  INTERPRETATION:  CLINICAL INDICATION: right elbow pain and swelling    EXAM:  Frontal and lateral right elbow from 9/27/2021 at 1754. No similar prior studies available for comparison.    IMPRESSION:  No tracking soft tissue gas collections, radiopaque foreign bodies, or gross radiographic evidence for osteomyelitis.    No fractures, dislocations, or evidence for joint effusion.    Preserved joint spaces, smooth articular margins, and no intra-articular loose bodies. No joint margin erosions.    Thick protuberant olecranon enthesophyte. Small enthesophyte along peripheral medial epicondylar margin.    No destructive osseous lesions or periosteal reaction.    --- End of Report ---    < end of copied text >

## 2021-09-28 NOTE — PROGRESS NOTE ADULT - ASSESSMENT
67 yo Rt handed male PMH CLL on Venetoclax, MDS, melanoma, paroxysmal AFib (not on AC) presenting as transfer from [place] for event capture. Patient was initially at  [facility] on 9/14 with confusion, aphasia and R sided weakness. MRI brain negative for acute pathology. EEG noted to show discharges from Left hemisphere with RUE spasticity There is concern for subclinical seizures thus transferred to [facility] for event capture. Cardiology consulted.  Patient was noted to have episodes agitation with episodes of somnolence with out medication needed. Patient had LP. Cardiology states to hold off on AC as there is no definitive CVA noted. Continue with ASA and statin. Improved s/p 1 dose of IVIG. Still with some mild cognitive delay and now with visual hallucinations. Poss related to ativan, will dc. Wife notes that patient gets IVIG treatment every month as part of his CLL treatment.     # Subclinical seizures   care as per neurology team   MRI noted   LP Done  clear from neurology for discharge     # R elbow abscess  ID eval appreciated  cont antibitoics per ID recs, currently on vanco, will switch to oral per ID recs in 1-2 days   ortho eval appreciated, dry tap   pain control     # A fib  not on AC   card follow up  rate control     # CLL  outpaitent follow up with oncology       DVT and gi PPX

## 2021-09-29 LAB
ALBUMIN SERPL ELPH-MCNC: 2.8 G/DL — LOW (ref 3.3–5)
ALP SERPL-CCNC: 73 U/L — SIGNIFICANT CHANGE UP (ref 40–120)
ALT FLD-CCNC: 28 U/L — SIGNIFICANT CHANGE UP (ref 10–45)
ANION GAP SERPL CALC-SCNC: 13 MMOL/L — SIGNIFICANT CHANGE UP (ref 5–17)
AST SERPL-CCNC: 18 U/L — SIGNIFICANT CHANGE UP (ref 10–40)
BILIRUB SERPL-MCNC: 0.6 MG/DL — SIGNIFICANT CHANGE UP (ref 0.2–1.2)
BUN SERPL-MCNC: 20 MG/DL — SIGNIFICANT CHANGE UP (ref 7–23)
CALCIUM SERPL-MCNC: 8.5 MG/DL — SIGNIFICANT CHANGE UP (ref 8.4–10.5)
CHLORIDE SERPL-SCNC: 101 MMOL/L — SIGNIFICANT CHANGE UP (ref 96–108)
CO2 SERPL-SCNC: 23 MMOL/L — SIGNIFICANT CHANGE UP (ref 22–31)
CREAT SERPL-MCNC: 0.81 MG/DL — SIGNIFICANT CHANGE UP (ref 0.5–1.3)
GLUCOSE SERPL-MCNC: 144 MG/DL — HIGH (ref 70–99)
HCT VFR BLD CALC: 36.7 % — LOW (ref 39–50)
HGB BLD-MCNC: 12.1 G/DL — LOW (ref 13–17)
MCHC RBC-ENTMCNC: 33 GM/DL — SIGNIFICANT CHANGE UP (ref 32–36)
MCHC RBC-ENTMCNC: 33.7 PG — SIGNIFICANT CHANGE UP (ref 27–34)
MCV RBC AUTO: 102.2 FL — HIGH (ref 80–100)
NRBC # BLD: 2 /100 WBCS — HIGH (ref 0–0)
PLATELET # BLD AUTO: 128 K/UL — LOW (ref 150–400)
POTASSIUM SERPL-MCNC: 3.6 MMOL/L — SIGNIFICANT CHANGE UP (ref 3.5–5.3)
POTASSIUM SERPL-SCNC: 3.6 MMOL/L — SIGNIFICANT CHANGE UP (ref 3.5–5.3)
PROT SERPL-MCNC: 6.3 G/DL — SIGNIFICANT CHANGE UP (ref 6–8.3)
RBC # BLD: 3.59 M/UL — LOW (ref 4.2–5.8)
RBC # FLD: 15 % — HIGH (ref 10.3–14.5)
SODIUM SERPL-SCNC: 137 MMOL/L — SIGNIFICANT CHANGE UP (ref 135–145)
WBC # BLD: 9.3 K/UL — SIGNIFICANT CHANGE UP (ref 3.8–10.5)
WBC # FLD AUTO: 9.3 K/UL — SIGNIFICANT CHANGE UP (ref 3.8–10.5)

## 2021-09-29 PROCEDURE — 99233 SBSQ HOSP IP/OBS HIGH 50: CPT

## 2021-09-29 PROCEDURE — 99232 SBSQ HOSP IP/OBS MODERATE 35: CPT

## 2021-09-29 RX ADMIN — Medication 10 MILLIGRAM(S): at 22:04

## 2021-09-29 RX ADMIN — PANTOPRAZOLE SODIUM 40 MILLIGRAM(S): 20 TABLET, DELAYED RELEASE ORAL at 05:37

## 2021-09-29 RX ADMIN — Medication 250 MILLIGRAM(S): at 17:20

## 2021-09-29 RX ADMIN — Medication 1 APPLICATION(S): at 05:38

## 2021-09-29 RX ADMIN — AMLODIPINE BESYLATE 5 MILLIGRAM(S): 2.5 TABLET ORAL at 05:38

## 2021-09-29 RX ADMIN — Medication 250 MILLIGRAM(S): at 05:37

## 2021-09-29 RX ADMIN — Medication 81 MILLIGRAM(S): at 10:50

## 2021-09-29 RX ADMIN — OLANZAPINE 10 MILLIGRAM(S): 15 TABLET, FILM COATED ORAL at 22:03

## 2021-09-29 RX ADMIN — Medication 1 APPLICATION(S): at 17:19

## 2021-09-29 RX ADMIN — Medication 60 MILLIGRAM(S): at 05:38

## 2021-09-29 RX ADMIN — Medication 1 MILLIGRAM(S): at 10:50

## 2021-09-29 RX ADMIN — ATORVASTATIN CALCIUM 80 MILLIGRAM(S): 80 TABLET, FILM COATED ORAL at 22:04

## 2021-09-29 RX ADMIN — ENOXAPARIN SODIUM 40 MILLIGRAM(S): 100 INJECTION SUBCUTANEOUS at 10:50

## 2021-09-29 NOTE — PROGRESS NOTE ADULT - SUBJECTIVE AND OBJECTIVE BOX
Name of Patient : SARAHI BARNHART  MRN: [id number]  Date of visit: 09-29-21 @ 16:27      Subjective: Patient seen and examined. No new events except as noted.   Lmn gokay   R elbow improved swelling       REVIEW OF SYSTEMS:    CONSTITUTIONAL: No weakness, fevers or chills  EYES/ENT: No visual changes;  No vertigo or throat pain   NECK: No pain or stiffness  RESPIRATORY: No cough, wheezing, hemoptysis; No shortness of breath  CARDIOVASCULAR: No chest pain or palpitations  GASTROINTESTINAL: No abdominal or epigastric pain. No nausea, vomiting, or hematemesis; No diarrhea or constipation. No melena or hematochezia.  GENITOURINARY: No dysuria, frequency or hematuria  NEUROLOGICAL: No numbness or weakness  SKIN: No itching, burning, rashes, or lesions   All other review of systems is negative unless indicated above.    MEDICATIONS:  MEDICATIONS  (STANDING):  amLODIPine   Tablet 5 milliGRAM(s) Oral daily  aspirin  chewable 81 milliGRAM(s) Oral daily  atorvastatin 80 milliGRAM(s) Oral at bedtime  BACItracin   Ointment 1 Application(s) Topical two times a day  enoxaparin Injectable 40 milliGRAM(s) SubCutaneous daily  folic acid 1 milliGRAM(s) Oral daily  melatonin 10 milliGRAM(s) Oral at bedtime  OLANZapine 10 milliGRAM(s) Oral at bedtime  pantoprazole    Tablet 40 milliGRAM(s) Oral before breakfast  predniSONE   Tablet 60 milliGRAM(s) Oral daily  sodium chloride 0.9%. 1000 milliLiter(s) (50 mL/Hr) IV Continuous <Continuous>  vancomycin  IVPB 1000 milliGRAM(s) IV Intermittent two times a day      PHYSICAL EXAM:  T(C): 36.7 (09-29-21 @ 15:48), Max: 37.1 (09-29-21 @ 12:25)  HR: 75 (09-29-21 @ 15:48) (67 - 82)  BP: 110/65 (09-29-21 @ 15:48) (110/65 - 148/75)  RR: 18 (09-29-21 @ 15:48) (18 - 18)  SpO2: 96% (09-29-21 @ 15:48) (95% - 98%)  Wt(kg): --  I&O's Summary    28 Sep 2021 07:01  -  29 Sep 2021 07:00  --------------------------------------------------------  IN: 1500 mL / OUT: 0 mL / NET: 1500 mL    29 Sep 2021 07:01  -  29 Sep 2021 16:27  --------------------------------------------------------  IN: 720 mL / OUT: 0 mL / NET: 720 mL          Appearance: Normal	  HEENT:  PERRLA   Lymphatic: No lymphadenopathy   Cardiovascular: Normal S1 S2, no JVD  Respiratory: normal effort , clear  Gastrointestinal:  Soft, Non-tender  Skin: No rashes,  warm to touch  Psychiatry:  Mood & affect appropriate  Musculuskeletal: improved R elbow swelling       All labs, Imaging and EKGs personally reviewed       09-28-21 @ 07:01  -  09-29-21 @ 07:00  --------------------------------------------------------  IN: 1500 mL / OUT: 0 mL / NET: 1500 mL    09-29-21 @ 07:01  -  09-29-21 @ 16:27  --------------------------------------------------------  IN: 720 mL / OUT: 0 mL / NET: 720 mL                          12.1   9.30  )-----------( 128      ( 29 Sep 2021 07:03 )             36.7               09-29    137  |  101  |  20  ----------------------------<  144<H>  3.6   |  23  |  0.81    Ca    8.5      29 Sep 2021 07:03  Phos  2.5     09-28  Mg     2.2     09-28    TPro  6.3  /  Alb  2.8<L>  /  TBili  0.6  /  DBili  x   /  AST  18  /  ALT  28  /  AlkPhos  73  09-29

## 2021-09-29 NOTE — PROGRESS NOTE ADULT - ASSESSMENT
65 yo Rt handed male PMH CLL on Venetoclax, MDS, melanoma, paroxysmal AFib (not on AC) presenting as transfer from [place] for event capture. Patient was initially at  [facility] on 9/14 with confusion, aphasia and R sided weakness. MRI brain negative for acute pathology. EEG noted to show discharges from Left hemisphere with RUE spasticity There is concern for subclinical seizures thus transferred to [facility] for event capture. Cardiology consulted.  Patient was noted to have episodes agitation with episodes of somnolence with out medication needed. Patient had LP. Cardiology states to hold off on AC as there is no definitive CVA noted. Continue with ASA and statin. Improved s/p 1 dose of IVIG. Still with some mild cognitive delay and now with visual hallucinations. Poss related to ativan, will dc. Wife notes that patient gets IVIG treatment every month as part of his CLL treatment.     # Subclinical seizures   care as per neurology team   MRI noted   LP Done  clear from neurology for discharge   follo wup with neuro in 1 month after discharge     # R elbow abscess  ID eval appreciated  cont antibitoics per ID recs, currently on vanco, will switch to oral per ID recs in 1-2 days   ortho eval appreciated, dry tap   pain control     # A fib  not on AC   card follow up  rate control     # CLL  outpaitent follow up with oncology       DVT and gi PPX

## 2021-09-29 NOTE — PROGRESS NOTE ADULT - SUBJECTIVE AND OBJECTIVE BOX
CC: f/u for right elbow cellulitis    Patient reports  he is feeling well  REVIEW OF SYSTEMS:  All other review of systems negative (Comprehensive ROS)    Antimicrobials Day #  :4  vancomycin  IVPB 1000 milliGRAM(s) IV Intermittent two times a day    Other Medications Reviewed    T(F): 98.1 (09-29-21 @ 15:48), Max: 98.7 (09-29-21 @ 12:25)  HR: 75 (09-29-21 @ 15:48)  BP: 110/65 (09-29-21 @ 15:48)  RR: 18 (09-29-21 @ 15:48)  SpO2: 96% (09-29-21 @ 15:48)  Wt(kg): --    PHYSICAL EXAM:  General: alert, no acute distress  Eyes:  anicteric, no conjunctival injection, no discharge  Oropharynx: no lesions or injection 	  Neck: supple, without adenopathy  Lungs: clear to auscultation  Heart: regular rate and rhythm; no murmur, rubs or gallops  Abdomen: soft, nondistended, nontender, without mass or organomegaly  Skin: no lesions  Extremities: no clubbing, cyanosis, or edema. right elbow redness and swelling much improved, small abscess of the forearm no tenderness  Neurologic: alert, oriented, moves all extremities    LAB RESULTS:                        12.1   9.30  )-----------( 128      ( 29 Sep 2021 07:03 )             36.7     09-29    137  |  101  |  20  ----------------------------<  144<H>  3.6   |  23  |  0.81    Ca    8.5      29 Sep 2021 07:03  Phos  2.5     09-28  Mg     2.2     09-28    TPro  6.3  /  Alb  2.8<L>  /  TBili  0.6  /  DBili  x   /  AST  18  /  ALT  28  /  AlkPhos  73  09-29    LIVER FUNCTIONS - ( 29 Sep 2021 07:03 )  Alb: 2.8 g/dL / Pro: 6.3 g/dL / ALK PHOS: 73 U/L / ALT: 28 U/L / AST: 18 U/L / GGT: x             MICROBIOLOGY:  RECENT CULTURES:      RADIOLOGY REVIEWED:      < from: Xray Elbow AP + Lateral, Right (09.27.21 @ 17:54) >      PROCEDURE DATE:  09/27/2021            INTERPRETATION:  CLINICAL INDICATION: right elbow pain and swelling    EXAM:  Frontal and lateral right elbow from 9/27/2021 at 1754. No similar prior studies available for comparison.    IMPRESSION:  No tracking soft tissue gas collections, radiopaque foreign bodies, or gross radiographic evidence for osteomyelitis.    No fractures, dislocations, or evidence for joint effusion.    Preserved joint spaces, smooth articular margins, and no intra-articular loose bodies. No joint margin erosions.    Thick protuberant olecranon enthesophyte. Small enthesophyte along peripheral medial epicondylar margin.    No destructive osseous lesions or periosteal reaction.    --- End of Report ---      < end of copied text >          Assessment:  Patient with CLL/MDS  admitted with aphasia and hemiparesis, s/p tpa, no stroke found, diagnosed with autoimmune/paraneoplastic encephalitis, s/p IV ig and steroids. Developed excoriations on the right elbow with superimposed cellulitis and a tiny abscess on the forearm that is improved on vancomycin.   Plan:  continue vanco  can change to po keflex and doxycycline in am if continues to do well  warm compresses to the forearm abscess

## 2021-09-29 NOTE — PROGRESS NOTE ADULT - SUBJECTIVE AND OBJECTIVE BOX
[facility] - Sveta Romano, Kyara, Ebony, Jennifer, Hosea Harden  Office Number:  [phone]    Patient resting comfortably in bed in NAD.  Laying flat with no respiratory distress.  No complaints of chest pain, dyspnea, palpitations, PND, or orthopnea.  feeling much better    ROS: negative unless otherwise mentioned.    Telemetry:  off    MEDICATIONS  (STANDING):  amLODIPine   Tablet 5 milliGRAM(s) Oral daily  aspirin  chewable 81 milliGRAM(s) Oral daily  atorvastatin 80 milliGRAM(s) Oral at bedtime  BACItracin   Ointment 1 Application(s) Topical two times a day  enoxaparin Injectable 40 milliGRAM(s) SubCutaneous daily  folic acid 1 milliGRAM(s) Oral daily  melatonin 10 milliGRAM(s) Oral at bedtime  OLANZapine 10 milliGRAM(s) Oral at bedtime  pantoprazole    Tablet 40 milliGRAM(s) Oral before breakfast  predniSONE   Tablet 60 milliGRAM(s) Oral daily  sodium chloride 0.9%. 1000 milliLiter(s) (50 mL/Hr) IV Continuous <Continuous>  vancomycin  IVPB 1000 milliGRAM(s) IV Intermittent two times a day    MEDICATIONS  (PRN):  acetaminophen   Tablet .. 650 milliGRAM(s) Oral every 6 hours PRN Mild Pain (1 - 3), Moderate Pain (4 - 6), Severe Pain (7 - 10)  lacosamide IVPB 100 milliGRAM(s) IV Intermittent once PRN Seizure  LORazepam   Injectable 1 milliGRAM(s) IV Push once PRN Seizure  OLANZapine Injectable 2.5 milliGRAM(s) IntraMuscular every 4 hours PRN Agitation      Allergies    No Known Allergies    Intolerances        Vital Signs Last 24 Hrs  T(C): 36.6 (29 Sep 2021 08:54), Max: 37.1 (28 Sep 2021 11:27)  T(F): 97.8 (29 Sep 2021 08:54), Max: 98.8 (28 Sep 2021 11:27)  HR: 81 (29 Sep 2021 08:54) (67 - 81)  BP: 120/69 (29 Sep 2021 08:54) (120/69 - 148/75)  BP(mean): --  RR: 18 (29 Sep 2021 08:54) (18 - 18)  SpO2: 98% (29 Sep 2021 08:54) (97% - 98%)    I&O's Summary    28 Sep 2021 07:01  -  29 Sep 2021 07:00  --------------------------------------------------------  IN: 1500 mL / OUT: 0 mL / NET: 1500 mL        ON EXAM:    Constitutional: well-nourished, well-developed, NAD   HEENT:  MMM, sclerae anicteric, conjunctivae clear, no oral cyanosis.  Pulmonary: Non-labored, breath sounds are clear bilaterally, No wheezing, rales or rhonchi  Cardiovascular: Regular, S1 and S2.  No murmur.  No rubs, gallops or clicks  Gastrointestinal: Bowel Sounds present, soft, nontender.   Lymph: No peripheral edema.   Neurological: Alert, no focal deficits  Skin: No rashes.  Psych:  Mood & affect appropriate    LABS: All Labs Reviewed:                        12.1   9.30  )-----------( 128      ( 29 Sep 2021 07:03 )             36.7                         12.5   10.20 )-----------( 132      ( 28 Sep 2021 10:20 )             36.8                         12.8   8.66  )-----------( 145      ( 27 Sep 2021 08:47 )             38.9     29 Sep 2021 07:03    137    |  101    |  20     ----------------------------<  144    3.6     |  23     |  0.81   28 Sep 2021 10:20    135    |  101    |  16     ----------------------------<  165    4.2     |  22     |  0.69   27 Sep 2021 08:47    135    |  100    |  20     ----------------------------<  139    4.2     |  22     |  0.86     Ca    8.5        29 Sep 2021 07:03  Ca    8.6        28 Sep 2021 10:20  Ca    8.3        27 Sep 2021 08:47  Phos  2.5       28 Sep 2021 10:20  Phos  1.8       27 Sep 2021 08:47  Mg     2.2       28 Sep 2021 10:20  Mg     2.3       27 Sep 2021 08:47    TPro  6.3    /  Alb  2.8    /  TBili  0.6    /  DBili  x      /  AST  18     /  ALT  28     /  AlkPhos  73     29 Sep 2021 07:03  TPro  6.6    /  Alb  3.2    /  TBili  0.7    /  DBili  x      /  AST  16     /  ALT  24     /  AlkPhos  68     28 Sep 2021 10:20          Blood Culture:

## 2021-09-29 NOTE — PROGRESS NOTE ADULT - ASSESSMENT
67 y/o male with PMHx CLL followed by heme/onc, anemia, melanoma, osteopenia admitted for right facial droop, right hemiparesis and expressive aphasia.    Admitted to the ICU at [place] yesterday and is S/P TPA. He has now been transferred for evaluation of his seizures and evaluation of seizures    CVA? / AMS  - Patient with new right sided UE weakness, facial droop and expressive aphasia at   - s/p tpa without improvement in symptoms  - 9/15 CT head without acute ICH  - 9/16 MRI neg for infarct  - neurology follow up.  s/p video eeg. has been diagnosed with resolving encephalitis.  - mental status greatly improved overall  - cont asa and statin    PAF  - Patient seen on a prior admission for syncope 7/2020 and found to have brief Afib  - Tele during this admission SR   - echocardiogram 9/15 unremarkable  - If no definitive CVA would support remaining off AC for now.    - Continue to monitor  - continue aspirin    BP  - BP often but not always in hypertensive range  - cont amlodipine and can increase if needed    CLL  - follow up heme/onc    - Monitor and replete lytes, keep K>4, Mg>2.  - All other medical needs as per Neuro team  - Other cardiovascular workup will depend on clinical course.  - Will continue to follow.

## 2021-09-30 LAB
SARS-COV-2 RNA SPEC QL NAA+PROBE: SIGNIFICANT CHANGE UP
VANCOMYCIN TROUGH SERPL-MCNC: 10.2 UG/ML — SIGNIFICANT CHANGE UP (ref 10–20)

## 2021-09-30 PROCEDURE — 99232 SBSQ HOSP IP/OBS MODERATE 35: CPT

## 2021-09-30 PROCEDURE — 99233 SBSQ HOSP IP/OBS HIGH 50: CPT

## 2021-09-30 RX ORDER — PANTOPRAZOLE SODIUM 20 MG/1
1 TABLET, DELAYED RELEASE ORAL
Qty: 0 | Refills: 0 | DISCHARGE
Start: 2021-09-30

## 2021-09-30 RX ORDER — LANOLIN ALCOHOL/MO/W.PET/CERES
6 CREAM (GRAM) TOPICAL AT BEDTIME
Refills: 0 | Status: DISCONTINUED | OUTPATIENT
Start: 2021-10-01 | End: 2021-10-08

## 2021-09-30 RX ORDER — SENNA PLUS 8.6 MG/1
2 TABLET ORAL AT BEDTIME
Refills: 0 | Status: DISCONTINUED | OUTPATIENT
Start: 2021-10-01 | End: 2021-10-08

## 2021-09-30 RX ORDER — ASPIRIN/CALCIUM CARB/MAGNESIUM 324 MG
81 TABLET ORAL DAILY
Refills: 0 | Status: DISCONTINUED | OUTPATIENT
Start: 2021-10-01 | End: 2021-10-08

## 2021-09-30 RX ORDER — CEPHALEXIN 500 MG
1 CAPSULE ORAL
Qty: 0 | Refills: 0 | DISCHARGE
Start: 2021-09-30

## 2021-09-30 RX ORDER — FOLIC ACID 0.8 MG
1 TABLET ORAL DAILY
Refills: 0 | Status: DISCONTINUED | OUTPATIENT
Start: 2021-10-02 | End: 2021-10-08

## 2021-09-30 RX ORDER — AMLODIPINE BESYLATE 2.5 MG/1
5 TABLET ORAL DAILY
Refills: 0 | Status: DISCONTINUED | OUTPATIENT
Start: 2021-10-02 | End: 2021-10-08

## 2021-09-30 RX ORDER — ASPIRIN/CALCIUM CARB/MAGNESIUM 324 MG
1 TABLET ORAL
Qty: 0 | Refills: 0 | DISCHARGE
Start: 2021-09-30

## 2021-09-30 RX ORDER — MINOCYCLINE HYDROCHLORIDE 45 MG/1
100 TABLET, EXTENDED RELEASE ORAL
Refills: 0 | Status: COMPLETED | OUTPATIENT
Start: 2021-10-01 | End: 2021-10-08

## 2021-09-30 RX ORDER — OLANZAPINE 15 MG/1
2.5 TABLET, FILM COATED ORAL EVERY 4 HOURS
Refills: 0 | Status: DISCONTINUED | OUTPATIENT
Start: 2021-10-01 | End: 2021-10-08

## 2021-09-30 RX ORDER — PANTOPRAZOLE SODIUM 20 MG/1
40 TABLET, DELAYED RELEASE ORAL
Refills: 0 | Status: DISCONTINUED | OUTPATIENT
Start: 2021-10-01 | End: 2021-10-08

## 2021-09-30 RX ORDER — CEPHALEXIN 500 MG
500 CAPSULE ORAL
Refills: 0 | Status: DISCONTINUED | OUTPATIENT
Start: 2021-09-30 | End: 2021-10-01

## 2021-09-30 RX ORDER — OLANZAPINE 15 MG/1
1 TABLET, FILM COATED ORAL
Qty: 0 | Refills: 0 | DISCHARGE
Start: 2021-09-30

## 2021-09-30 RX ORDER — ACETAMINOPHEN 500 MG
2 TABLET ORAL
Qty: 0 | Refills: 0 | DISCHARGE
Start: 2021-09-30

## 2021-09-30 RX ORDER — OLANZAPINE 15 MG/1
10 TABLET, FILM COATED ORAL AT BEDTIME
Refills: 0 | Status: DISCONTINUED | OUTPATIENT
Start: 2021-10-01 | End: 2021-10-08

## 2021-09-30 RX ORDER — MINOCYCLINE HYDROCHLORIDE 45 MG/1
1 TABLET, EXTENDED RELEASE ORAL
Qty: 0 | Refills: 0 | DISCHARGE
Start: 2021-09-30

## 2021-09-30 RX ORDER — AMLODIPINE BESYLATE 2.5 MG/1
1 TABLET ORAL
Qty: 0 | Refills: 0 | DISCHARGE
Start: 2021-09-30

## 2021-09-30 RX ORDER — MINOCYCLINE HYDROCHLORIDE 45 MG/1
100 TABLET, EXTENDED RELEASE ORAL
Refills: 0 | Status: DISCONTINUED | OUTPATIENT
Start: 2021-09-30 | End: 2021-10-01

## 2021-09-30 RX ORDER — CEPHALEXIN 500 MG
500 CAPSULE ORAL
Refills: 0 | Status: COMPLETED | OUTPATIENT
Start: 2021-10-01 | End: 2021-10-08

## 2021-09-30 RX ORDER — BACITRACIN ZINC 500 UNIT/G
1 OINTMENT IN PACKET (EA) TOPICAL
Refills: 0 | Status: DISCONTINUED | OUTPATIENT
Start: 2021-10-01 | End: 2021-10-08

## 2021-09-30 RX ORDER — LANOLIN ALCOHOL/MO/W.PET/CERES
1 CREAM (GRAM) TOPICAL
Qty: 0 | Refills: 0 | DISCHARGE
Start: 2021-09-30

## 2021-09-30 RX ORDER — BACITRACIN ZINC 500 UNIT/G
1 OINTMENT IN PACKET (EA) TOPICAL
Qty: 0 | Refills: 0 | DISCHARGE
Start: 2021-09-30

## 2021-09-30 RX ORDER — ATORVASTATIN CALCIUM 80 MG/1
80 TABLET, FILM COATED ORAL AT BEDTIME
Refills: 0 | Status: DISCONTINUED | OUTPATIENT
Start: 2021-10-01 | End: 2021-10-08

## 2021-09-30 RX ORDER — ACETAMINOPHEN 500 MG
650 TABLET ORAL EVERY 6 HOURS
Refills: 0 | Status: DISCONTINUED | OUTPATIENT
Start: 2021-10-01 | End: 2021-10-08

## 2021-09-30 RX ORDER — ENOXAPARIN SODIUM 100 MG/ML
40 INJECTION SUBCUTANEOUS DAILY
Refills: 0 | Status: DISCONTINUED | OUTPATIENT
Start: 2021-10-02 | End: 2021-10-08

## 2021-09-30 RX ADMIN — Medication 1 MILLIGRAM(S): at 12:10

## 2021-09-30 RX ADMIN — MINOCYCLINE HYDROCHLORIDE 100 MILLIGRAM(S): 45 TABLET, EXTENDED RELEASE ORAL at 17:03

## 2021-09-30 RX ADMIN — PANTOPRAZOLE SODIUM 40 MILLIGRAM(S): 20 TABLET, DELAYED RELEASE ORAL at 05:07

## 2021-09-30 RX ADMIN — AMLODIPINE BESYLATE 5 MILLIGRAM(S): 2.5 TABLET ORAL at 05:07

## 2021-09-30 RX ADMIN — OLANZAPINE 10 MILLIGRAM(S): 15 TABLET, FILM COATED ORAL at 23:03

## 2021-09-30 RX ADMIN — Medication 1 APPLICATION(S): at 05:07

## 2021-09-30 RX ADMIN — ATORVASTATIN CALCIUM 80 MILLIGRAM(S): 80 TABLET, FILM COATED ORAL at 23:03

## 2021-09-30 RX ADMIN — Medication 60 MILLIGRAM(S): at 05:06

## 2021-09-30 RX ADMIN — Medication 1 APPLICATION(S): at 17:05

## 2021-09-30 RX ADMIN — Medication 500 MILLIGRAM(S): at 23:05

## 2021-09-30 RX ADMIN — Medication 81 MILLIGRAM(S): at 12:09

## 2021-09-30 RX ADMIN — Medication 10 MILLIGRAM(S): at 23:03

## 2021-09-30 RX ADMIN — Medication 500 MILLIGRAM(S): at 17:03

## 2021-09-30 RX ADMIN — Medication 250 MILLIGRAM(S): at 05:07

## 2021-09-30 RX ADMIN — ENOXAPARIN SODIUM 40 MILLIGRAM(S): 100 INJECTION SUBCUTANEOUS at 12:09

## 2021-09-30 NOTE — H&P ADULT - HISTORY OF PRESENT ILLNESS
Patient is a 65 yo Rt handed male with PMH of  CLL on Venetoclax, MDS, melanoma, paroxysmal AFib (not on AC) presenting as transfer from [place] for event capture. Patient was initially at [facility] on 9/14 with confusion, aphasia and R sided weakness. Patient was stroke code and was given s/p tPA. On admission, patient had CTH, CTA, CT perfusion during code stroke protocol with no acute findings. MRI Brain was  performed at  9/16 with no ischemic findings. EEG performed 9/15 showed diffuse L hemispheric slowing. Pt remained aphasic with AMS. +RUE spasticity was noted on exam.     Patient was transferred to [facility] on 9/16 for Epilepsy Monitoring Unit admission to evaluate for subclinical seizures. Patient was seen by neurologist and cardiologist. Patient was noted to have episodes agitation with episodes of somnolence with out medication needed. AC was held per cardio since there was no definitive CVA noted but advised to c/w ASA and statin. LP was performed on 9/17. EEG overnight (9/16-9/17) did not show seizures however did show L sided slowing concerning for a possible structural etiology.  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 He was seen by SS for evaluation and Dysphagia 2 with honey thick liquid was recommended. He was also started on ABT for right elbow cellulitis/ septic bursitis. LP results- Glucose 73, Protein 55, WBC 3, Gram stain no growth to date, HSV neg, Cryptococcal neg; infectious w/u thus far negative; AIE panel pending f/u results. Patient also experience visual hallucination of which opthalmology wad consulted  - no evidence of retinal detachment.    Patient was evaluated by PM&R and therapy for functional deficits, gait/ADL impairments and acute rehabilitation was recommended. Patient was medically optimized for discharge to [facility] IRU on 9/30.       Patient is a 67 yo Rt handed male with PMH of  CLL on Venetoclax, MDS, melanoma, paroxysmal AFib (not on AC) presented as transfer from [place] on 9/16 for event capture. Patient was  initially at [facility] on 9/14 with confusion, aphasia and R sided weakness. Patient was stroke code and was given s/p tPA. On admission, patient had CTH, CTA, CT perfusion during code stroke protocol with no acute findings. MRI Brain was  performed at  9/16 with no ischemic findings. EEG performed 9/15 showed diffuse L hemispheric slowing. Pt remained aphasic with AMS. +RUE spasticity was noted on exam.     Patient was transferred to [facility] on 9/16 for Epilepsy Monitoring Unit admission to evaluate for subclinical seizures. Patient was seen by neurologist and cardiologist. Patient was noted to have episodes agitation with episodes of somnolence with out medication needed. AC was held per cardio since there was no definitive CVA noted but advised to c/w ASA and statin. LP was performed on 9/17. EEG overnight (9/16-9/17) did not show seizures however did show L sided slowing concerning for a possible structural etiology.  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 He was seen by SS for evaluation and Dysphagia 2 with honey thick liquid was recommended. He was also started on ABT for right elbow cellulitis/ septic bursitis. LP results- Glucose 73, Protein 55, WBC 3, Gram stain no growth to date, HSV neg, Cryptococcal neg; infectious w/u thus far negative; AIE panel pending f/u results. Patient also experience visual hallucination of which opthalmology wad consulted  - no evidence of retinal detachment.    Patient was evaluated by PM&R and therapy for functional deficits, gait/ADL impairments and acute rehabilitation was recommended. Patient was medically optimized for discharge to [facility] IRU on 10/1.

## 2021-09-30 NOTE — H&P ADULT - ASSESSMENT
ASSESSMENT/PLAN  This is a 67 y/o male with PMHx CLL followed by heme/onc, anemia, melanoma, osteopenia admitted for right facial droop, right hemiparesis and expressive aphasia. Initially admitted at [facility] and s/p TPA, now transferred to [facility] on 9/16 for evaluation of his seizures. Patient now with gait Instability, ADL impairments and Functional impairments.    # CVA  - Patient with new right sided UE weakness, facial droop and expressive aphasia  - s/p tpa without improvement in symptoms  - 9/15 CT head without acute ICH  - 9/16 MRI neg for infarct  - Start Comprehensive Rehab Program: PT/OT/ST  - PT: Focused on improving strength, endurance, coordination, balance, functional mobility, and transfers  - OT: Focused on improving strength, fine motor skills, coordination, posture and ADLs.    - ST: to diagnose and treat deficits in swallowing, cognition and communication.   - c/w ASA and STATIN    #PAF  - Per cardiology; he was seen on a prior admission for syncope 7/2020 and found to have brief Afib  - [facility] admission Tele=  SR   - echocardiogram 9/15 unremarkable  - since no definitive CVA, no AC    #paraneoplastic encephalitis  - s/p IVIG x 3 dayd  - s/p Solumedrol 1000 mg IV for a total of 5 days (9/21-9/25)  - c/w steroid taper. 9/26: Started prednisone taper 60qd x 1 week, then 40mg qd x 1 week, then 20mg qd x 1 week, [id number] qd x1week    #CLL  - IVIG monthly  - OP Heme/onc f/u    #Right elbow cellulitits  - Minocycline 100mg BID until 10/6  - Keflex 500mg QID until 10/6  - Bacitracin oint    - warm compress PRN    #HTN  - Amlodipine 5mg daily    #HLD  - Lipitor 80mg daily    #Pain management  - Tylenol PRN    #DVT ppx  - Lovenox, SCD, TEDs    #GI ppx  - Protonix 40mg daily    #Bowel Regimen  - Senna, miralax PRN    #Bladder management  - BS on admission, and q 8 hours (SC if > 400)  - Monitor UO    #FEN   - Diet: Soft  - Supplement: Folic acid    #Precaution  - Fall, Aspiration, Seizure    #Skin:  - No active issues at this time  - Pressure injury/Skin: Turn Q2hrs while in bed, OOB to Chair, PT/OT     #Dysphagia    - SLP: evaluation and treatment    #Mood/Cognition:  - Zyprexa 10mg daily. Seroquel discontinued due to prolonged QTc 467 prior admission  - Neuropsychology consult PRN    #Sleep:   - Melatonin 10mg daily  - Maintain quiet hours and low stim environment.  - Melatonin PRN to maximize participation in therapy during the day.   - Monitor sleep logs    Outpatient Follow-up (Specialty/Name of physician):    Gopi Jc)  Cardiovascular Disease; Internal Medicine  [address], [place] [id number]  Phone: [phone]  Fax: [phone]    Jeronimo Godinez  HEMATOLOGY  [address] [facility], [address]  [place] [address]  Phone: [phone]  Fax: [phone]  Follow Up Time: 2 weeks      MEDICAL PROGNOSIS: GOOD            REHAB POTENTIAL: GOOD             ESTIMATED DISPOSITION: HOME WITH HOME CARE            ELOS: 10-14 Days   EXPECTED THERAPY:     P.T. 1hr/day       O.T. 1hr/day      S.L.P. 1hr/day     P&O Unnecessary     EXP FREQUENCY: 5 days per 7 day period     PRESCREEN COMPARISON:   I have reviewed the prescreen information and I have found no relevant changes between the preadmission screening and my post admission evaluation     RATIONALE FOR INPATIENT ADMISSION - Patient demonstrates the following: (check all that apply)  [X] Medically appropriate for rehabilitation admission  [X] Has attainable rehab goals with an appropriate initial discharge plan  [X] Has rehabilitation potential (expected to make a significant improvement within a reasonable period of time)   [X] Requires close medical management by a rehab physician, rehab nursing care, Hospitalist and comprehensive interdisciplinary team (including PT, OT, & or SLP, Prosthetics and Orthotics)   ASSESSMENT/PLAN  This is a 67 y/o male with PMHx CLL followed by heme/onc, anemia, melanoma, osteopenia admitted for right facial droop, right hemiparesis and expressive aphasia. Initially admitted at [facility] and s/p TPA, now transferred to [facility] on 9/16 for evaluation of his seizures. Patient now with gait Instability, ADL impairments and Functional impairments.    # CVA  - Patient with new right sided UE weakness, facial droop and expressive aphasia  - s/p tpa without improvement in symptoms  - 9/15 CT head without acute ICH  - 9/16 MRI neg for infarct  - Start Comprehensive Rehab Program: PT/OT/ST  - PT: Focused on improving strength, endurance, coordination, balance, functional mobility, and transfers  - OT: Focused on improving strength, fine motor skills, coordination, posture and ADLs.    - ST: to diagnose and treat deficits in swallowing, cognition and communication.   - c/w ASA and STATIN    #PAF  - Per cardiology; he was seen on a prior admission for syncope 7/2020 and found to have brief Afib  - [facility] admission Tele=  SR   - echocardiogram 9/15 unremarkable  - since no definitive CVA, no AC    #paraneoplastic encephalitis  - s/p IVIG x 3 days  - s/p Solumedrol 1000 mg IV for a total of 5 days (9/21-9/25)  - c/w steroid taper. 9/26: Started prednisone taper 60qd x 1 week, then 40mg qd x 1 week, then 20mg qd x 1 week, [id number] qd x1week    #CLL  - IVIG monthly  - OP Heme/onc f/u    #Right elbow cellulitits  - Minocycline 100mg BID until 10/6  - Keflex 500mg QID until 10/6  - Bacitracin oint    - warm compress PRN    #HTN  - Amlodipine 5mg daily    #HLD  - Lipitor 80mg daily    #Pain management  - Tylenol PRN    #DVT ppx  - Lovenox, SCD, TEDs    #GI ppx  - Protonix 40mg daily    #Bowel Regimen  - Senna, miralax PRN    #Bladder management  - BS on admission, and q 8 hours (SC if > 400)  - Monitor UO    #FEN   - Diet: Soft  - Supplement: Folic acid    #Precaution  - Fall, Aspiration, Seizure    #Skin:  - No active issues at this time  - Pressure injury/Skin: Turn Q2hrs while in bed, OOB to Chair, PT/OT     #Dysphagia    - SLP: evaluation and treatment    #Mood/Cognition:  - Zyprexa 10mg daily. Seroquel discontinued due to prolonged QTc 467 prior admission  - Neuropsychology consult PRN    #Sleep:   - Melatonin 10mg daily  - Maintain quiet hours and low stim environment.  - Melatonin PRN to maximize participation in therapy during the day.   - Monitor sleep logs    Outpatient Follow-up (Specialty/Name of physician):    Gopi Jc)  Cardiovascular Disease; Internal Medicine  [address], [place] [id number]  Phone: [phone]  Fax: [phone]    Jeronimo Godinez  HEMATOLOGY  [address], [address]  [place] [address]  Phone: [phone]  Fax: [phone]  Follow Up Time: 2 weeks    MEDICAL PROGNOSIS: GOOD            REHAB POTENTIAL: GOOD             ESTIMATED DISPOSITION: HOME WITH HOME CARE            ELOS: 10-14 Days   EXPECTED THERAPY:     P.T. 1hr/day       O.T. 1hr/day      S.L.P. 1hr/day     P&O Unnecessary     EXP FREQUENCY: 5 days per 7 day period     PRESCREEN COMPARISON:   I have reviewed the prescreen information and I have found no relevant changes between the preadmission screening and my post admission evaluation     RATIONALE FOR INPATIENT ADMISSION - Patient demonstrates the following: (check all that apply)  [X] Medically appropriate for rehabilitation admission  [X] Has attainable rehab goals with an appropriate initial discharge plan  [X] Has rehabilitation potential (expected to make a significant improvement within a reasonable period of time)   [X] Requires close medical management by a rehab physician, rehab nursing care, Hospitalist and comprehensive interdisciplinary team (including PT, OT, & or SLP, Prosthetics and Orthotics)

## 2021-09-30 NOTE — PROGRESS NOTE ADULT - SUBJECTIVE AND OBJECTIVE BOX
CC: f/u for right elbow cellultiis    Patient reports  feels well today  REVIEW OF SYSTEMS:  All other review of systems negative (Comprehensive ROS)    Antimicrobials Day #  :5/10  cephalexin 500 milliGRAM(s) Oral four times a day  minocycline 100 milliGRAM(s) Oral two times a day    Other Medications Reviewed    T(F): 98.1 (09-30-21 @ 15:48), Max: 98.6 (09-29-21 @ 20:11)  HR: 76 (09-30-21 @ 15:48)  BP: 112/69 (09-30-21 @ 15:48)  RR: 18 (09-30-21 @ 15:48)  SpO2: 98% (09-30-21 @ 15:48)  Wt(kg): --    PHYSICAL EXAM:  General: alert, no acute distress  Eyes:  anicteric, no conjunctival injection, no discharge  Oropharynx: no lesions or injection 	  Neck: supple, without adenopathy  Lungs: clear to auscultation  Heart: regular rate and rhythm; no murmur, rubs or gallops  Abdomen: soft, nondistended, nontender, without mass or organomegaly  Skin: no lesions  Extremities: no clubbing, cyanosis, or edema, right elbow excoriations, rednes much improved, small nodule on forearm  Neurologic: alert, oriented, moves all extremities    LAB RESULTS:                        12.1   9.30  )-----------( 128      ( 29 Sep 2021 07:03 )             36.7     09-29    137  |  101  |  20  ----------------------------<  144<H>  3.6   |  23  |  0.81    Ca    8.5      29 Sep 2021 07:03    TPro  6.3  /  Alb  2.8<L>  /  TBili  0.6  /  DBili  x   /  AST  18  /  ALT  28  /  AlkPhos  73  09-29    LIVER FUNCTIONS - ( 29 Sep 2021 07:03 )  Alb: 2.8 g/dL / Pro: 6.3 g/dL / ALK PHOS: 73 U/L / ALT: 28 U/L / AST: 18 U/L / GGT: x             MICROBIOLOGY:  RECENT CULTURES:      RADIOLOGY REVIEWED:  < from: Xray Elbow AP + Lateral, Right (09.27.21 @ 17:54) >      PROCEDURE DATE:  09/27/2021            INTERPRETATION:  CLINICAL INDICATION: right elbow pain and swelling    EXAM:  Frontal and lateral right elbow from 9/27/2021 at 1754. No similar prior studies available for comparison.    IMPRESSION:  No tracking soft tissue gas collections, radiopaque foreign bodies, or gross radiographic evidence for osteomyelitis.    No fractures, dislocations, or evidence for joint effusion.    Preserved joint spaces, smooth articular margins, and no intra-articular loose bodies. No joint margin erosions.    Thick protuberant olecranon enthesophyte. Small enthesophyte along peripheral medial epicondylar margin.    No destructive osseous lesions or periosteal reaction.    --- End of Report ---      < end of copied text >              Assessment:  Patient with CLL and mds on venetoclax admitted with stroke symptoms but no stroke found. 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 He had excoriations of the right elbow with cellulitis that has responded well to antibiotics. He has a small area of the forearm that may be a tiny abscess or hematoma. It is doing ok on warm soaks and antibiotics.   Plan:  change to po minocycline and keflex for 5 more days  warm compresses to the forearm nodule

## 2021-09-30 NOTE — PROGRESS NOTE ADULT - ASSESSMENT
65 y/o male with PMHx CLL followed by heme/onc, anemia, melanoma, osteopenia admitted for right facial droop, right hemiparesis and expressive aphasia.    Admitted to the ICU at [place] yesterday and is S/P TPA. He has now been transferred for evaluation of his seizures and evaluation of seizures    CVA? / AMS  - Patient with new right sided UE weakness, facial droop and expressive aphasia at   - s/p tpa without improvement in symptoms  - 9/15 CT head without acute ICH  - 9/16 MRI neg for infarct  - neurology follow up.  s/p video eeg. has been diagnosed with resolving encephalitis.  - mental status greatly improved overall  - cont asa and statin    PAF  - Patient seen on a prior admission for syncope 7/2020 and found to have brief Afib  - Tele during this admission SR   - echocardiogram 9/15 unremarkable  - If no definitive CVA would support remaining off AC for now.    - continue aspirin    BP  - BP often but not always in hypertensive range  - cont amlodipine and can increase if needed    CLL  - follow up heme/onc    - Monitor and replete lytes, keep K>4, Mg>2.  - All other medical needs as per Neuro team  - Other cardiovascular workup will depend on clinical course.  - Will continue to follow.

## 2021-09-30 NOTE — PROGRESS NOTE ADULT - ASSESSMENT
65 yo Rt handed male PMH CLL on Venetoclax, MDS, melanoma, paroxysmal AFib (not on AC) presenting as transfer from [place] for event capture. Patient was initially at  [facility] on 9/14 with confusion, aphasia and R sided weakness. MRI brain negative for acute pathology. EEG noted to show discharges from Left hemisphere with RUE spasticity There is concern for subclinical seizures thus transferred to [facility] for event capture. Cardiology consulted.  Patient was noted to have episodes agitation with episodes of somnolence with out medication needed. Patient had LP. Cardiology states to hold off on AC as there is no definitive CVA noted. Continue with ASA and statin. Improved s/p 1 dose of IVIG. Still with some mild cognitive delay and now with visual hallucinations. Poss related to ativan, will dc. Wife notes that patient gets IVIG treatment every month as part of his CLL treatment.     # Subclinical seizures   care as per neurology team   MRI noted   LP Done  clear from neurology for discharge   follo wup with neuro in 1 month after discharge     # R elbow abscess  ID eval appreciated  cont antibiotics per ID recs, currently on vanco, switch to oral doxy and keflex  D/C planing   ortho eval appreciated, dry tap   pain control     # A fib  not on AC   card follow up  rate control     # CLL  outpaitent follow up with oncology       DVT and gi PPX

## 2021-09-30 NOTE — PROGRESS NOTE ADULT - SUBJECTIVE AND OBJECTIVE BOX
Name of Patient : SARAHI BARNHART  MRN: [id number]      Subjective: Patient seen and examined. No new events except as noted.   doing okay     REVIEW OF SYSTEMS:    CONSTITUTIONAL: No weakness, fevers or chills  EYES/ENT: No visual changes;  No vertigo or throat pain   NECK: No pain or stiffness  RESPIRATORY: No cough, wheezing, hemoptysis; No shortness of breath  CARDIOVASCULAR: No chest pain or palpitations  GASTROINTESTINAL: No abdominal or epigastric pain. No nausea, vomiting, or hematemesis; No diarrhea or constipation. No melena or hematochezia.  GENITOURINARY: No dysuria, frequency or hematuria  NEUROLOGICAL: No numbness or weakness  SKIN: No itching, burning, rashes, or lesions   All other review of systems is negative unless indicated above.    MEDICATIONS:  MEDICATIONS  (STANDING):  amLODIPine   Tablet 5 milliGRAM(s) Oral daily  aspirin  chewable 81 milliGRAM(s) Oral daily  atorvastatin 80 milliGRAM(s) Oral at bedtime  BACItracin   Ointment 1 Application(s) Topical two times a day  cephalexin 500 milliGRAM(s) Oral four times a day  enoxaparin Injectable 40 milliGRAM(s) SubCutaneous daily  folic acid 1 milliGRAM(s) Oral daily  melatonin 10 milliGRAM(s) Oral at bedtime  minocycline 100 milliGRAM(s) Oral two times a day  OLANZapine 10 milliGRAM(s) Oral at bedtime  pantoprazole    Tablet 40 milliGRAM(s) Oral before breakfast  predniSONE   Tablet 60 milliGRAM(s) Oral daily      PHYSICAL EXAM:  T(C): 36.8 (09-30-21 @ 20:10), Max: 36.8 (09-30-21 @ 20:10)  HR: 78 (09-30-21 @ 20:10) (59 - 79)  BP: 135/81 (09-30-21 @ 20:10) (112/69 - 143/77)  RR: 18 (09-30-21 @ 20:10) (18 - 18)  SpO2: 98% (09-30-21 @ 20:10) (95% - 98%)  Wt(kg): --  I&O's Summary    29 Sep 2021 07:01  -  30 Sep 2021 07:00  --------------------------------------------------------  IN: 720 mL / OUT: 0 mL / NET: 720 mL          Appearance: Normal	  HEENT:  PERRLA   Lymphatic: No lymphadenopathy   Cardiovascular: Normal S1 S2, no JVD  Respiratory: normal effort , clear  Gastrointestinal:  Soft, Non-tender  Skin: No rashes,  warm to touch  Psychiatry:  Mood & affect appropriate  Musculuskeletal: No edema      All labs, Imaging and EKGs personally reviewed       09-29-21 @ 07:01  -  09-30-21 @ 07:00  --------------------------------------------------------  IN: 720 mL / OUT: 0 mL / NET: 720 mL                            12.1   9.30  )-----------( 128      ( 29 Sep 2021 07:03 )             36.7               09-29    137  |  101  |  20  ----------------------------<  144<H>  3.6   |  23  |  0.81    Ca    8.5      29 Sep 2021 07:03    TPro  6.3  /  Alb  2.8<L>  /  TBili  0.6  /  DBili  x   /  AST  18  /  ALT  28  /  AlkPhos  73  09-29

## 2021-09-30 NOTE — CHART NOTE - NSCHARTNOTEFT_GEN_A_CORE
Spoke with Dr. Alex Blake (patient will not require Chemo or IVIG while at rehab - patient to follow up with Hema/oncology in 3 weeks to determine next treatment plan. )     Patient did not require Radiation therapy while at [facility] and will not require it at rehab - outpatient follow up

## 2021-09-30 NOTE — PROGRESS NOTE ADULT - SUBJECTIVE AND OBJECTIVE BOX
[facility] - Sveta Romano, Kyara, Ebony, Jennifer, Hosea Harden  Office Number:  [phone]    Patient resting comfortably in bed in NAD.  Laying flat with no respiratory distress.  No complaints of chest pain, dyspnea, palpitations, PND, or orthopnea.    ROS: negative unless otherwise mentioned.    Telemetry:  off    MEDICATIONS  (STANDING):  amLODIPine   Tablet 5 milliGRAM(s) Oral daily  aspirin  chewable 81 milliGRAM(s) Oral daily  atorvastatin 80 milliGRAM(s) Oral at bedtime  BACItracin   Ointment 1 Application(s) Topical two times a day  enoxaparin Injectable 40 milliGRAM(s) SubCutaneous daily  folic acid 1 milliGRAM(s) Oral daily  melatonin 10 milliGRAM(s) Oral at bedtime  OLANZapine 10 milliGRAM(s) Oral at bedtime  pantoprazole    Tablet 40 milliGRAM(s) Oral before breakfast  predniSONE   Tablet 60 milliGRAM(s) Oral daily  vancomycin  IVPB 1000 milliGRAM(s) IV Intermittent two times a day    MEDICATIONS  (PRN):  acetaminophen   Tablet .. 650 milliGRAM(s) Oral every 6 hours PRN Mild Pain (1 - 3), Moderate Pain (4 - 6), Severe Pain (7 - 10)  lacosamide IVPB 100 milliGRAM(s) IV Intermittent once PRN Seizure  LORazepam   Injectable 1 milliGRAM(s) IV Push once PRN Seizure  OLANZapine Injectable 2.5 milliGRAM(s) IntraMuscular every 4 hours PRN Agitation      Allergies    No Known Allergies    Intolerances        Vital Signs Last 24 Hrs  T(C): 36.6 (30 Sep 2021 08:33), Max: 37.1 (29 Sep 2021 12:25)  T(F): 97.8 (30 Sep 2021 08:33), Max: 98.7 (29 Sep 2021 12:25)  HR: 78 (30 Sep 2021 08:33) (59 - 82)  BP: 143/75 (30 Sep 2021 08:33) (110/65 - 143/75)  BP(mean): --  RR: 18 (30 Sep 2021 08:33) (18 - 18)  SpO2: 97% (30 Sep 2021 08:33) (95% - 98%)    I&O's Summary    29 Sep 2021 07:01  -  30 Sep 2021 07:00  --------------------------------------------------------  IN: 720 mL / OUT: 0 mL / NET: 720 mL        ON EXAM:  Constitutional: well-nourished, well-developed, NAD   HEENT:  MMM, sclerae anicteric, conjunctivae clear, no oral cyanosis.  Pulmonary: Non-labored, breath sounds are clear bilaterally, No wheezing, rales or rhonchi  Cardiovascular: Regular, S1 and S2.  No murmur.  No rubs, gallops or clicks  Gastrointestinal: Bowel Sounds present, soft, nontender.   Lymph: No peripheral edema.   Neurological: Alert, no focal deficits  Skin: No rashes.  Psych:  Mood & affect appropriate    LABS: All Labs Reviewed:                        12.1   9.30  )-----------( 128      ( 29 Sep 2021 07:03 )             36.7                         12.5   10.20 )-----------( 132      ( 28 Sep 2021 10:20 )             36.8     29 Sep 2021 07:03    137    |  101    |  20     ----------------------------<  144    3.6     |  23     |  0.81   28 Sep 2021 10:20    135    |  101    |  16     ----------------------------<  165    4.2     |  22     |  0.69     Ca    8.5        29 Sep 2021 07:03  Ca    8.6        28 Sep 2021 10:20  Phos  2.5       28 Sep 2021 10:20  Mg     2.2       28 Sep 2021 10:20    TPro  6.3    /  Alb  2.8    /  TBili  0.6    /  DBili  x      /  AST  18     /  ALT  28     /  AlkPhos  73     29 Sep 2021 07:03  TPro  6.6    /  Alb  3.2    /  TBili  0.7    /  DBili  x      /  AST  16     /  ALT  24     /  AlkPhos  68     28 Sep 2021 10:20          Blood Culture:

## 2021-10-01 ENCOUNTER — INPATIENT (INPATIENT)
Facility: HOSPITAL | Age: 66
LOS: 6 days | Discharge: HOME HEALTH | DRG: 949 | End: 2021-10-08
Attending: PSYCHIATRY & NEUROLOGY | Admitting: PSYCHIATRY & NEUROLOGY
Payer: MEDICARE

## 2021-10-01 ENCOUNTER — TRANSCRIPTION ENCOUNTER (OUTPATIENT)
Age: 66
End: 2021-10-01

## 2021-10-01 VITALS
OXYGEN SATURATION: 97 % | TEMPERATURE: 98 F | HEART RATE: 81 BPM | WEIGHT: 133.6 LBS | RESPIRATION RATE: 14 BRPM | HEIGHT: 70 IN | SYSTOLIC BLOOD PRESSURE: 126 MMHG | DIASTOLIC BLOOD PRESSURE: 72 MMHG

## 2021-10-01 VITALS
SYSTOLIC BLOOD PRESSURE: 139 MMHG | TEMPERATURE: 98 F | RESPIRATION RATE: 18 BRPM | HEART RATE: 82 BPM | DIASTOLIC BLOOD PRESSURE: 81 MMHG | OXYGEN SATURATION: 97 %

## 2021-10-01 DIAGNOSIS — H26.9 UNSPECIFIED CATARACT: Chronic | ICD-10-CM

## 2021-10-01 DIAGNOSIS — G04.81 OTHER ENCEPHALITIS AND ENCEPHALOMYELITIS: ICD-10-CM

## 2021-10-01 LAB
ALBUMIN SERPL ELPH-MCNC: 2.5 G/DL — LOW (ref 3.3–5)
ALP SERPL-CCNC: 100 U/L — SIGNIFICANT CHANGE UP (ref 40–120)
ALT FLD-CCNC: 61 U/L — HIGH (ref 10–45)
ANION GAP SERPL CALC-SCNC: 11 MMOL/L — SIGNIFICANT CHANGE UP (ref 5–17)
ANISOCYTOSIS BLD QL: SLIGHT — SIGNIFICANT CHANGE UP
AST SERPL-CCNC: 38 U/L — SIGNIFICANT CHANGE UP (ref 10–40)
BASOPHILS # BLD AUTO: 0 K/UL — SIGNIFICANT CHANGE UP (ref 0–0.2)
BASOPHILS NFR BLD AUTO: 0 % — SIGNIFICANT CHANGE UP (ref 0–2)
BILIRUB SERPL-MCNC: 0.5 MG/DL — SIGNIFICANT CHANGE UP (ref 0.2–1.2)
BUN SERPL-MCNC: 30 MG/DL — HIGH (ref 7–23)
CALCIUM SERPL-MCNC: 8.5 MG/DL — SIGNIFICANT CHANGE UP (ref 8.4–10.5)
CHLORIDE SERPL-SCNC: 102 MMOL/L — SIGNIFICANT CHANGE UP (ref 96–108)
CO2 SERPL-SCNC: 28 MMOL/L — SIGNIFICANT CHANGE UP (ref 22–31)
CREAT SERPL-MCNC: 0.99 MG/DL — SIGNIFICANT CHANGE UP (ref 0.5–1.3)
EOSINOPHIL # BLD AUTO: 0 K/UL — SIGNIFICANT CHANGE UP (ref 0–0.5)
EOSINOPHIL NFR BLD AUTO: 0 % — SIGNIFICANT CHANGE UP (ref 0–6)
GLUCOSE SERPL-MCNC: 151 MG/DL — HIGH (ref 70–99)
HCT VFR BLD CALC: 34 % — LOW (ref 39–50)
HGB BLD-MCNC: 11.3 G/DL — LOW (ref 13–17)
LYMPHOCYTES # BLD AUTO: 0.51 K/UL — LOW (ref 1–3.3)
LYMPHOCYTES # BLD AUTO: 3 % — LOW (ref 13–44)
MACROCYTES BLD QL: SLIGHT — SIGNIFICANT CHANGE UP
MANUAL SMEAR VERIFICATION: YES — SIGNIFICANT CHANGE UP
MCHC RBC-ENTMCNC: 33.2 GM/DL — SIGNIFICANT CHANGE UP (ref 32–36)
MCHC RBC-ENTMCNC: 34.5 PG — HIGH (ref 27–34)
MCV RBC AUTO: 103.7 FL — HIGH (ref 80–100)
METAMYELOCYTES # FLD: 1 % — HIGH (ref 0–0)
MONOCYTES # BLD AUTO: 0.85 K/UL — SIGNIFICANT CHANGE UP (ref 0–0.9)
MONOCYTES NFR BLD AUTO: 5 % — SIGNIFICANT CHANGE UP (ref 2–14)
NEUTROPHILS # BLD AUTO: 15.5 K/UL — HIGH (ref 1.8–7.4)
NEUTROPHILS NFR BLD AUTO: 88 % — HIGH (ref 43–77)
NEUTS BAND # BLD: 3 % — SIGNIFICANT CHANGE UP (ref 0–8)
NRBC # BLD: 2 /100 — HIGH (ref 0–0)
PLAT MORPH BLD: NORMAL — SIGNIFICANT CHANGE UP
PLATELET # BLD AUTO: 159 K/UL — SIGNIFICANT CHANGE UP (ref 150–400)
POIKILOCYTOSIS BLD QL AUTO: SLIGHT — SIGNIFICANT CHANGE UP
POTASSIUM SERPL-MCNC: 4.5 MMOL/L — SIGNIFICANT CHANGE UP (ref 3.5–5.3)
POTASSIUM SERPL-SCNC: 4.5 MMOL/L — SIGNIFICANT CHANGE UP (ref 3.5–5.3)
PROT SERPL-MCNC: 6.3 G/DL — SIGNIFICANT CHANGE UP (ref 6–8.3)
RBC # BLD: 3.28 M/UL — LOW (ref 4.2–5.8)
RBC # FLD: 15.5 % — HIGH (ref 10.3–14.5)
RBC BLD AUTO: ABNORMAL
SODIUM SERPL-SCNC: 141 MMOL/L — SIGNIFICANT CHANGE UP (ref 135–145)
WBC # BLD: 17.03 K/UL — HIGH (ref 3.8–10.5)
WBC # FLD AUTO: 17.03 K/UL — HIGH (ref 3.8–10.5)

## 2021-10-01 PROCEDURE — 99223 1ST HOSP IP/OBS HIGH 75: CPT

## 2021-10-01 PROCEDURE — 99232 SBSQ HOSP IP/OBS MODERATE 35: CPT

## 2021-10-01 RX ORDER — INFLUENZA VIRUS VACCINE 15; 15; 15; 15 UG/.5ML; UG/.5ML; UG/.5ML; UG/.5ML
0.5 SUSPENSION INTRAMUSCULAR ONCE
Refills: 0 | Status: COMPLETED | OUTPATIENT
Start: 2021-10-01 | End: 2021-10-08

## 2021-10-01 RX ADMIN — ENOXAPARIN SODIUM 40 MILLIGRAM(S): 100 INJECTION SUBCUTANEOUS at 11:35

## 2021-10-01 RX ADMIN — Medication 1 APPLICATION(S): at 05:12

## 2021-10-01 RX ADMIN — OLANZAPINE 10 MILLIGRAM(S): 15 TABLET, FILM COATED ORAL at 21:31

## 2021-10-01 RX ADMIN — MINOCYCLINE HYDROCHLORIDE 100 MILLIGRAM(S): 45 TABLET, EXTENDED RELEASE ORAL at 05:11

## 2021-10-01 RX ADMIN — MINOCYCLINE HYDROCHLORIDE 100 MILLIGRAM(S): 45 TABLET, EXTENDED RELEASE ORAL at 17:08

## 2021-10-01 RX ADMIN — Medication 500 MILLIGRAM(S): at 05:11

## 2021-10-01 RX ADMIN — Medication 500 MILLIGRAM(S): at 17:07

## 2021-10-01 RX ADMIN — ATORVASTATIN CALCIUM 80 MILLIGRAM(S): 80 TABLET, FILM COATED ORAL at 21:31

## 2021-10-01 RX ADMIN — Medication 6 MILLIGRAM(S): at 21:31

## 2021-10-01 RX ADMIN — AMLODIPINE BESYLATE 5 MILLIGRAM(S): 2.5 TABLET ORAL at 05:12

## 2021-10-01 RX ADMIN — Medication 1 APPLICATION(S): at 17:07

## 2021-10-01 RX ADMIN — Medication 81 MILLIGRAM(S): at 11:34

## 2021-10-01 RX ADMIN — PANTOPRAZOLE SODIUM 40 MILLIGRAM(S): 20 TABLET, DELAYED RELEASE ORAL at 05:12

## 2021-10-01 RX ADMIN — Medication 60 MILLIGRAM(S): at 05:11

## 2021-10-01 RX ADMIN — Medication 1 MILLIGRAM(S): at 11:34

## 2021-10-01 RX ADMIN — Medication 500 MILLIGRAM(S): at 11:34

## 2021-10-01 NOTE — PROGRESS NOTE ADULT - PROVIDER SPECIALTY LIST ADULT
Cardiology
Heme/Onc
Infectious Disease
Internal Medicine
Neurology
Neurology
Cardiology
Infectious Disease
Infectious Disease
Neurology
Cardiology
Internal Medicine
Neurology
Cardiology
Internal Medicine
Internal Medicine
Neurology

## 2021-10-01 NOTE — H&P ADULT - ASSESSMENT
ASSESSMENT/PLAN  This is a 65 y/o male with PMHx CLL followed by heme/onc, anemia, melanoma, osteopenia admitted for right facial droop, right hemiparesis and expressive aphasia. Initially admitted at [facility] and s/p TPA, now transferred to [facility] on 9/16 for evaluation of his seizures. Patient now with gait Instability, ADL impairments and Functional impairments.    # CVA  - Patient with new right sided UE weakness, facial droop and expressive aphasia  - s/p tpa without improvement in symptoms  - 9/15 CT head without acute ICH  - 9/16 MRI neg for infarct  - Start Comprehensive Rehab Program: PT/OT/ST  - PT: Focused on improving strength, endurance, coordination, balance, functional mobility, and transfers  - OT: Focused on improving strength, fine motor skills, coordination, posture and ADLs.    - ST: to diagnose and treat deficits in swallowing, cognition and communication.   - c/w ASA and STATIN    #PAF  - Per cardiology; he was seen on a prior admission for syncope 7/2020 and found to have brief Afib  - [facility] admission Tele=  SR   - echocardiogram 9/15 unremarkable  - since no definitive CVA, no AC    #paraneoplastic encephalitis  - s/p IVIG x 3 days  - s/p Solumedrol 1000 mg IV for a total of 5 days (9/21-9/25)  - c/w steroid taper. 9/26: Started prednisone taper 60qd x 1 week, then 40mg qd x 1 week, then 20mg qd x 1 week, [id number] qd x1week    #CLL  - IVIG monthly  - OP Heme/onc f/u    #Right elbow cellulitits  - Minocycline 100mg BID until 10/6  - Keflex 500mg QID until 10/6  - Bacitracin oint    - warm compress PRN    #HTN  - Amlodipine 5mg daily    #HLD  - Lipitor 80mg daily    #Pain management  - Tylenol PRN    #DVT ppx  - Lovenox, SCD, TEDs    #GI ppx  - Protonix 40mg daily    #Bowel Regimen  - Senna, miralax PRN    #Bladder management  - BS on admission, and q 8 hours (SC if > 400)  - Monitor UO    #FEN   - Diet: Soft  - Supplement: Folic acid    #Precaution  - Fall, Aspiration, Seizure    #Skin:  - No active issues at this time  - Pressure injury/Skin: Turn Q2hrs while in bed, OOB to Chair, PT/OT     #Dysphagia    - SLP: evaluation and treatment    #Mood/Cognition:  - Zyprexa 10mg daily. Seroquel discontinued due to prolonged QTc 467 prior admission  - Neuropsychology consult PRN    #Sleep:   - Melatonin 10mg daily  - Maintain quiet hours and low stim environment.  - Melatonin PRN to maximize participation in therapy during the day.   - Monitor sleep logs    Outpatient Follow-up (Specialty/Name of physician):    Gopi Jc)  Cardiovascular Disease; Internal Medicine  [address], [place] [id number]  Phone: [phone]  Fax: [phone]    Jeronimo Godinez  HEMATOLOGY  [address] [facility], [address]  [place] [address]  Phone: [phone]  Fax: [phone]  Follow Up Time: 2 weeks        Outpatient Follow-up (Specialty/Name of physician):        MEDICAL PROGNOSIS: GOOD            REHAB POTENTIAL: GOOD             ESTIMATED DISPOSITION: HOME WITH HOME CARE            ELOS: 10-14 Days   EXPECTED THERAPY:     P.T. 1hr/day       O.T. 1hr/day      S.L.P. 1hr/day     P&O Unnecessary     EXP FREQUENCY: 5 days per 7 day period     PRESCREEN COMPARISON:   I have reviewed the prescreen information and I have found no relevant changes between the preadmission screening and my post admission evaluation     RATIONALE FOR INPATIENT ADMISSION - Patient demonstrates the following: (check all that apply)  [X] Medically appropriate for rehabilitation admission  [X] Has attainable rehab goals with an appropriate initial discharge plan  [X] Has rehabilitation potential (expected to make a significant improvement within a reasonable period of time)   [X] Requires close medical management by a rehab physician, rehab nursing care, Hospitalist and comprehensive interdisciplinary team (including PT, OT, & or SLP, Prosthetics and Orthotics)   ASSESSMENT/PLAN  This is a 65 y/o male with PMHx CLL followed by heme/onc, anemia, melanoma, osteopenia admitted for right facial droop, right hemiparesis and expressive aphasia. Initially admitted at [facility] and s/p TPA, now transferred to [facility] on 9/16 for evaluation of his seizures. Patient now with gait Instability, ADL impairments and Functional impairments.    # Autoimmune/paraneoplastic encephalitis  - Patient with new right sided UE weakness - no stroke on CT or MRI  - s/p tpa without improvement in symptoms  - 9/15 CT head without acute ICH  - 9/16 MRI neg for infarct  - Start Comprehensive Rehab Program: PT/OT/ST  - PT: Focused on improving strength, endurance, coordination, balance, functional mobility, and transfers  - OT: Focused on improving strength, fine motor skills, coordination, posture and ADLs.    - ST: to diagnose and treat deficits in swallowing, cognition and communication.   - Continue ASA 81mg  #paraneoplastic encephalitis  - s/p IVIG x 3 days at [facility]  - s/p Solumedrol 1000 mg IV for a total of 5 days (9/21-9/25)  - c/w steroid taper. 9/26: Started prednisone taper 60qd x 1 week, then 40mg qd x 1 week, then 20mg qd x 1 week, [id number] qd x1week    #PAF  - Per cardiology; he was seen on a prior admission for syncope 7/2020 and found to have brief Afib  - [facility] admission Tele=  SR   - echocardiogram 9/15 unremarkable  - since no definitive CVA, no AC    #CLL  - IVIG monthly  - OP Heme/onc f/u    #Right elbow cellulitis  - Minocycline 100mg BID until 10/6  - Keflex 500mg QID until 10/6  - Bacitracin oint    - warm compress PRN    #HTN  - Amlodipine 5mg daily    #HLD  - Lipitor 80mg daily    #Pain management  - Tylenol PRN    #DVT ppx  - Lovenox, SCD, TEDs    #GI ppx  - Protonix 40mg daily    #Bowel Regimen  - Senna, miralax PRN    #Bladder management  - BS on admission, and q 8 hours (SC if > 400)  - Monitor UO    #FEN   - Diet: Soft  - Supplement: Folic acid    #Precaution  - Fall, Aspiration, Seizure    #Skin:  - No active issues at this time  - Pressure injury/Skin: Turn Q2hrs while in bed, OOB to Chair, PT/OT     #Dysphagia    - SLP: evaluation and treatment    #Mood/Cognition:  - Zyprexa 10mg daily. Seroquel discontinued due to prolonged QTc 467 prior admission  - Neuropsychology consult PRN    #Sleep:   - Melatonin 10mg daily  - Maintain quiet hours and low stim environment.  - Melatonin PRN to maximize participation in therapy during the day.   - Monitor sleep logs    Outpatient Follow-up (Specialty/Name of physician):    Gopi Jc)  Cardiovascular Disease; Internal Medicine  [address], [place] [id number]  Phone: [phone]  Fax: [phone]    Jeronimo Godinez  HEMATOLOGY  [address] [facility], [address]  [place] [address]  Phone: [phone]  Fax: [phone]  Follow Up Time: 2 weeks        Outpatient Follow-up (Specialty/Name of physician):        MEDICAL PROGNOSIS: GOOD            REHAB POTENTIAL: GOOD             ESTIMATED DISPOSITION: HOME WITH HOME CARE            ELOS: 10-14 Days   EXPECTED THERAPY:     P.T. 1hr/day       O.T. 1hr/day      S.L.P. 1hr/day     P&O Unnecessary     EXP FREQUENCY: 5 days per 7 day period     PRESCREEN COMPARISON:   I have reviewed the prescreen information and I have found no relevant changes between the preadmission screening and my post admission evaluation     RATIONALE FOR INPATIENT ADMISSION - Patient demonstrates the following: (check all that apply)  [X] Medically appropriate for rehabilitation admission  [X] Has attainable rehab goals with an appropriate initial discharge plan  [X] Has rehabilitation potential (expected to make a significant improvement within a reasonable period of time)   [X] Requires close medical management by a rehab physician, rehab nursing care, Hospitalist and comprehensive interdisciplinary team (including PT, OT, & or SLP, Prosthetics and Orthotics)   ASSESSMENT/PLAN  This is a 65 y/o male with PMHx CLL followed by heme/onc, anemia, melanoma, osteopenia admitted for right facial droop, right hemiparesis and expressive aphasia. Initially admitted at [facility] and s/p TPA, now transferred to [facility] on 9/16 for evaluation of his seizures. Patient now with gait Instability, ADL impairments and Functional impairments.    # Autoimmune/paraneoplastic encephalitis  - Patient with new right sided UE weakness - no stroke on CT or MRI  - s/p tpa without improvement in symptoms  - 9/15 CT head without acute ICH  - 9/16 MRI neg for infarct  - Start Comprehensive Rehab Program: PT/OT/ST  - PT: Focused on improving strength, endurance, coordination, balance, functional mobility, and transfers  - OT: Focused on improving strength, fine motor skills, coordination, posture and ADLs.    - ST: to diagnose and treat deficits in swallowing, cognition and communication.   - Continue ASA 81mg  #paraneoplastic encephalitis  - s/p IVIG x 3 days at [facility]  - s/p Solumedrol 1000 mg IV for a total of 5 days (9/21-9/25)  - c/w steroid taper. 9/26: Started prednisone taper 60qd x 1 week, then 40mg qd x 1 week, then 20mg qd x 1 week, [id number] qd x1week    #PAF  - Per cardiology; he was seen on a prior admission for syncope 7/2020 and found to have brief Afib  - [facility] admission Tele=  SR   - echocardiogram 9/15 unremarkable  - since no definitive CVA, no AC    #CLL  - IVIG monthly  - OP Heme/onc f/u    #Right elbow cellulitis -healting  - Minocycline 100mg BID until 10/6  - Keflex 500mg QID until 10/6  - Bacitracin oint    - warm compress PRN    #HTN  - Amlodipine 5mg daily    #HLD  - Lipitor 80mg daily    #Pain management  - Tylenol PRN    #DVT ppx  - Lovenox  -SCD, TEDs    #GI ppx  - Protonix 40mg daily    #Bowel Regimen  - Senna  - miralax PRN    #Bladder management  - BS on admission, and q 8 hours (SC if > 400)  - Monitor UO    #FEN   - Diet: Soft  - Supplement: Folic acid    #Precaution  - Fall, Aspiration, Seizure    #Skin:  - No active issues at this time  - Pressure injury/Skin: Turn Q2hrs while in bed, OOB to Chair, PT/OT     #Dysphagia    - SLP: evaluation and treatment    #Mood/Cognition:  - Zyprexa 10mg daily. Seroquel discontinued due to prolonged QTc 467 prior admission  - Neuropsychology consult PRN    #Sleep:   - Melatonin 6mg daily  - Maintain quiet hours and low stim environment.  - Melatonin PRN to maximize participation in therapy during the day.   - Monitor sleep logs    Outpatient Follow-up (Specialty/Name of physician):    Gopi Jc)  Cardiovascular Disease; Internal Medicine  [address], [place] [id number]  Phone: [phone]  Fax: [phone]    Jeronimo Godinez  HEMATOLOGY  [address] [facility], [address] [place] [address]  Phone: [phone]  Fax: [phone]  Follow Up Time: 2 weeks        Outpatient Follow-up (Specialty/Name of physician):        MEDICAL PROGNOSIS: GOOD            REHAB POTENTIAL: GOOD             ESTIMATED DISPOSITION: HOME WITH HOME CARE            ELOS: 10-14 Days   EXPECTED THERAPY:     P.T. 1hr/day       O.T. 1hr/day      S.L.P. 1hr/day     P&O Unnecessary     EXP FREQUENCY: 5 days per 7 day period     PRESCREEN COMPARISON:   I have reviewed the prescreen information and I have found no relevant changes between the preadmission screening and my post admission evaluation     RATIONALE FOR INPATIENT ADMISSION - Patient demonstrates the following: (check all that apply)  [X] Medically appropriate for rehabilitation admission  [X] Has attainable rehab goals with an appropriate initial discharge plan  [X] Has rehabilitation potential (expected to make a significant improvement within a reasonable period of time)   [X] Requires close medical management by a rehab physician, rehab nursing care, Hospitalist and comprehensive interdisciplinary team (including PT, OT, & or SLP, Prosthetics and Orthotics)

## 2021-10-01 NOTE — H&P ADULT - ATTENDING COMMENTS
Patient and wife interviewed at bedside, patient examined, medical records reviewed.  Admit to [facility]U as above

## 2021-10-01 NOTE — PROGRESS NOTE ADULT - ATTENDING COMMENTS
67 y/o male with PMHx CLL (dx in 1/1991, on tx w/ venetoclax) followed by heme/onc, anemia, hx of melanoma, osteopenia admitted for right facial droop, right hemiparesis and expressive aphasia.  Admitted to the ICU at [place] prior to admission, s/p TPA, transferred to [facility] for evaluation of seizures. Hematology following given hx of CLL.    #CLL  #AMS  -CBC w/ diff stable.    - LP done including flow cytometry was negative for malignant cells. No evidence of leptomeningeal disease.     Patient is currently on a trial of steroids. Methylprednisolone 1000 mg x 5 days. Patient's mental status has improved, he was evaluated by psych and started on Zyprexa 10 mg daily. Patient is able to answer questions and follow commands.     Follow up autoimmune encephalitis panel.
sudden onset AMS 5 days prior, now w vivid hallucinations and agitation ( from R hemiparesis and aphasia)  EEG w moderate to severe L hemispheric slowing  CSF w no evidence of infection/ cultured negative  heme onc agree w Solumedrol for 3 days( may have an initial worsening symptoms)  wife informed but she is in acute distress as well w difficulty adjusting  psych consult for further rec  for now continue Zyprexa 2.5 BID with additional 2.5 for severe agitation  Seroquel on hold due to prolong QTc.  will hold haldol for now
65 y/o male with PMHx CLL (dx in 1/1991, on tx w/ venetoclax) followed by heme/onc, anemia, hx of melanoma, osteopenia admitted for right facial droop, right hemiparesis and expressive aphasia.  Admitted to the ICU at [place] prior to admission, s/p TPA, transferred to [facility] for evaluation of seizures. Hematology following given hx of CLL.    #CLL  #AMS  -CBC w/ diff stable.    - LP done including flow cytometry was negative for malignant cells. No evidence of leptomeningeal disease. Recommended an MRI of brain and spine with contrast to r/o leptomeningeal disease.   Patient is currently on a trial of steroids. Methylprednisolone 1000 mg x 5 days. Patient is confused, hallucinating, wife at the bedside.   Will continue to follow.
steroid taper  MS baseline  off 1:1
EEG w severe R hemispheric slowing  no seizures  plan:  Lp for autoimmune workup  hematology consult    vascular consult ( unlikely vascular)  no AEDs for now  additional therapy and probable transfer to medicine service in the near future  continue VEEG for now
stable, will be transferred to medicine.
stable
67 y/o male with PMHx CLL (dx in 1/1991, on tx w/ venetoclax) followed by heme/onc, anemia, hx of melanoma, osteopenia admitted for right facial droop, right hemiparesis and expressive aphasia.  Admitted to the ICU at [place] prior to admission, s/p TPA, transferred to [facility] for evaluation of seizures. Hematology following given hx of CLL.  #AMS, aphasia, clinically mild improvement, less confused, able to answer questions and follow commands.     -Agreed with  steroid therapy in setting of suspected autoimmune encephalitis; no evidence of systemic CLL progression or transformation to more aggressive lymphoma. Awaiting CSF flow cytometry and cytology. Recommended a repeat MRI of the brain on 9/23/21 ( one week after recent LP) to r/o leptomeningeal disease.
improved MS, baseline  will start prednisone taper 60-40-20-10 7 days each.  very likely etiology of his condition was sec to hyperimmune reaction covid exposure/vaccine
mild improvement in his MS  will continue steroids for 5 days and will continue a long steroid taper.  continue Zyprexa 10 mg at bedtime
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
stable, will be transferred to medicine.

## 2021-10-01 NOTE — PROGRESS NOTE ADULT - ASSESSMENT
67 yo Rt handed male PMH CLL on Venetoclax, MDS, melanoma, paroxysmal AFib (not on AC) presenting as transfer from [place] for event capture. Patient was initially at  [facility] on 9/14 with confusion, aphasia and R sided weakness. MRI brain negative for acute pathology. EEG noted to show discharges from Left hemisphere with RUE spasticity There is concern for subclinical seizures thus transferred to [facility] for event capture. Cardiology consulted.  Heme/onc to follow this AM and will followup on recommendations on heme/onc medications.  Patient was noted to have episodes agitation with episodes of somnolence with out medication needed. Patient had LP this afternoon and will follow up on CSF results. Cardiology states to hold off on AC as there is no definitive CVA noted. Continue with ASA and statin. Improved s/p 1 dose of IVIG. Still with some mild cognitive delay and now with visual hallucinations. Poss related to ativan, will dc. Wife notes that patient gets IVIG treatment every month as part of his CLL treatment.     Impression: RUE spasticity and aphasia concerning for seizures, EEG negative for seizures. Patient acutely psychotic with left hemispheric slowing concerning for AIE in the setting of CLL.      PLAN:   AMS/AIE  - 9/26: Started prednisone taper 60qd x 1 week, then 40mg qd x 1 week, then 20mg qd x 1 week, [id number] qd x1week  - Remains off vEEG  - Holding AEDs  - Discontinued IVIG, received 3 days  - s/p Solmedrol 1000 mg IV for a total of 5 days (9/21-9/25)  - Seroquel discontinued due to prolonged QTc 467.  - Zyprexa 10 mg at bedtime  - Continuing Zyprexa 2.5 mg IM PRN for breakthrough agitation. 2nd line treatment can give Haldol 1 mg IV q6hrs.   - Continue melatonin 10 mg QHS  - LP results- Glucose 73, Protein 55, WBC 3, Gram stain no growth to date, HSV neg, Cryptococcal neg; infectious w/u thus far negative; AIE panel pending f/u results  - Ophtho consulted for visual hallucinations - no evidence of retinal detachment  - Rescue: 1st-Ativan 1mg, 2nd-Vimpat 100mg IV if GTC refractory to ativan    Right elbow bursitis/component of cellulitis/R. forearm abscess  - Continuing Vancomycin 1g bid   - ID consulted - Dr. Mayfield recs appreciated  - Vanc trough prior to 4th dose  - Consult ortho for evaluation of elbow for possible drainage: recommend X-ray of elbow and ESR, CRP and will come evaluate the patient.  - Warm compresses to Right forearm abscess.    - Monitor for si/sx of infection: currently afebrile  - Monitor leukocytosis - likely steroid-induced at this time: WBC today 8.66    CLL  - Heme/onc to continue to follow and recommendations appreciated.   - Will discuss with Heme/Onc regarding ongoing CLL treatment- no inpatient treatment at this time.  - Flow cytometry CSF results: lymphocyte immunophenotypic findings show no diagnostic abnormalities  - Follows with Dr. Godinez and  Dr. Valenzuela outpatient.     PAfib/HTN  - Cardiology recommendations appreciated: choosing to defer AC at this time; start Norvasc 5 mg daily if SBP remains >140  - Norvasc 5 mg daily started; SBP >140  - Continue tele monitoring  - Continue Aspirin 81mg  - 12 lead EKG    Elevated BUN/Hypermagnesemia/low phos  - BUN elevation most likely due to steroids  - Gentle hydration with NS @50ml/hr  - Mg level 2.3 today, asymptomatic, not on mg supplements; continue to monitor for symptoms of elevated mg and mg levels  - Phos 1.8, patient on diet and eating; replaced with 15mmol of sodium phos.  - Consider Nephrology consultation if no improvements    Dietary  - Speech and Swallow: advanced diet to soft texture w/thin liquids. Maintain upright position when eating and continue aspiration precautions.       DVT ppx: Lovenox subq   Dispo: PT/OT/PMR recommends: AR    d/c planning

## 2021-10-01 NOTE — PROGRESS NOTE ADULT - SUBJECTIVE AND OBJECTIVE BOX
CC: f/u for cellulitis elbow    Patient reports  feels great going to rehab  REVIEW OF SYSTEMS:  All other review of systems negative (Comprehensive ROS)    Antimicrobials Day #  :6/10  cephalexin 500 milliGRAM(s) Oral four times a day  minocycline 100 milliGRAM(s) Oral two times a day    Other Medications Reviewed    T(F): 98 (10-01-21 @ 13:40), Max: 98.2 (09-30-21 @ 20:10)  HR: 81 (10-01-21 @ 13:40)  BP: 126/72 (10-01-21 @ 13:40)  RR: 14 (10-01-21 @ 13:40)  SpO2: 97% (10-01-21 @ 13:40)  Wt(kg): --    PHYSICAL EXAM:  General: alert, no acute distress  Eyes:  anicteric, no conjunctival injection, no discharge  Oropharynx: no lesions or injection 	  Neck: supple, without adenopathy  Lungs: clear to auscultation  Heart: regular rate and rhythm; no murmur, rubs or gallops  Abdomen: soft, nondistended, nontender, without mass or organomegaly  Skin: no lesions  Extremities: no clubbing, cyanosis, or edema. redness of right elbow is near gone  Neurologic: alert, oriented, moves all extremities    LAB RESULTS:                        11.3   17.03 )-----------( 159      ( 01 Oct 2021 14:30 )             34.0     10-01    141  |  102  |  30<H>  ----------------------------<  151<H>  4.5   |  28  |  0.99    Ca    8.5      01 Oct 2021 14:30    TPro  6.3  /  Alb  2.5<L>  /  TBili  0.5  /  DBili  x   /  AST  38  /  ALT  61<H>  /  AlkPhos  100  10-01    LIVER FUNCTIONS - ( 01 Oct 2021 14:30 )  Alb: 2.5 g/dL / Pro: 6.3 g/dL / ALK PHOS: 100 U/L / ALT: 61 U/L / AST: 38 U/L / GGT: x             MICROBIOLOGY:  RECENT CULTURES:      RADIOLOGY REVIEWED:              Assessment:  patient admitted for neurologic dysfunction, 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 Developed cellulitis right elbow that is controlled  Plan:  4 more days of keflex and minocycline

## 2021-10-01 NOTE — PROGRESS NOTE ADULT - NUTRITIONAL ASSESSMENT
This patient has been assessed with a concern for Malnutrition and has been determined to have a diagnosis/diagnoses of Severe protein-calorie malnutrition and Underweight (BMI < 19).    This patient is being managed with:   Diet Dysphagia 1 Pureed-Honey Consistency Fluid-  Supplement Feeding Modality:  Oral  Ensure Enlive Servings Per Day:  2       Frequency:  Daily  Entered: Sep 23 2021  4:41PM    
This patient has been assessed with a concern for Malnutrition and has been determined to have a diagnosis/diagnoses of Severe protein-calorie malnutrition and Underweight (BMI < 19).    This patient is being managed with:   Diet Soft-  Entered: Sep 26 2021  4:16PM    
This patient has been assessed with a concern for Malnutrition and has been determined to have a diagnosis/diagnoses of Severe protein-calorie malnutrition and Underweight (BMI < 19).    This patient is being managed with:   Diet Soft-  Supplement Feeding Modality:  Oral  Ensure Enlive Cans or Servings Per Day:  2       Frequency:  Daily  Entered: Sep 28 2021  5:24PM    
This patient has been assessed with a concern for Malnutrition and has been determined to have a diagnosis/diagnoses of Severe protein-calorie malnutrition and Underweight (BMI < 19).    This patient is being managed with:   Diet Soft-  Entered: Sep 26 2021  4:16PM    
This patient has been assessed with a concern for Malnutrition and has been determined to have a diagnosis/diagnoses of Severe protein-calorie malnutrition and Underweight (BMI < 19).    This patient is being managed with:   Diet Soft-  Supplement Feeding Modality:  Oral  Ensure Enlive Cans or Servings Per Day:  2       Frequency:  Daily  Entered: Sep 28 2021  5:24PM    
This patient has been assessed with a concern for Malnutrition and has been determined to have a diagnosis/diagnoses of Severe protein-calorie malnutrition and Underweight (BMI < 19).    This patient is being managed with:   Diet Soft-  Supplement Feeding Modality:  Oral  Ensure Enlive Cans or Servings Per Day:  2       Frequency:  Daily  Entered: Sep 28 2021  5:24PM    
This patient has been assessed with a concern for Malnutrition and has been determined to have a diagnosis/diagnoses of Severe protein-calorie malnutrition and Underweight (BMI < 19).    This patient is being managed with:   Diet Dysphagia 1 Pureed-Honey Consistency Fluid-  Supplement Feeding Modality:  Oral  Ensure Enlive Servings Per Day:  2       Frequency:  Daily  Entered: Sep 23 2021  4:41PM    
This patient has been assessed with a concern for Malnutrition and has been determined to have a diagnosis/diagnoses of Severe protein-calorie malnutrition and Underweight (BMI < 19).    This patient is being managed with:   Diet Soft-  Entered: Sep 26 2021  4:16PM

## 2021-10-01 NOTE — DISCHARGE NOTE NURSING/CASE MANAGEMENT/SOCIAL WORK - PATIENT PORTAL LINK FT
You can access the FollowMyHealth Patient Portal offered by [facility] by registering at the following website: [email or web address]. By joining [facility]’s FollowMyHealth portal, you will also be able to view your health information using other applications (apps) compatible with our system.

## 2021-10-01 NOTE — PROGRESS NOTE ADULT - SUBJECTIVE AND OBJECTIVE BOX
Neurology Progress note     HPI:  Patient is a 67 yo Rt handed male PMH CLL on Venetoclax, MDS, melanoma, paroxysmal AFib (not on AC) presenting as transfer from [place] for event capture. Patient was initially at  [facility] on 9/14 with confusion, aphasia and R sided weakness. Patient was stroke code and was given s/p tPA. On admission, patient had CTH, CTA, CT perfusion during code stroke protocol with no acute findings. MRI Brain was  performed at  9/16 with no ischemic findings. EEG performed 9/15 showed diffuse L hemispheric slowing. Pt remained aphasic with AMS. +RUE spasticity was noted on exam. Patient was thus transferred to [facility] for EMU admission to evaluate for subclinical seizures.  Of note, patient does not have hx of seizures.      ROS: Otherwise negative.     SUBJECTIVE: No events overnight.  No new neurologic complaints.      Medications:   MEDICATIONS  (STANDING):  amLODIPine   Tablet 5 milliGRAM(s) Oral daily  aspirin  chewable 81 milliGRAM(s) Oral daily  atorvastatin 80 milliGRAM(s) Oral at bedtime  BACItracin   Ointment 1 Application(s) Topical two times a day  cephalexin 500 milliGRAM(s) Oral four times a day  enoxaparin Injectable 40 milliGRAM(s) SubCutaneous daily  folic acid 1 milliGRAM(s) Oral daily  melatonin 10 milliGRAM(s) Oral at bedtime  minocycline 100 milliGRAM(s) Oral two times a day  OLANZapine 10 milliGRAM(s) Oral at bedtime  pantoprazole    Tablet 40 milliGRAM(s) Oral before breakfast  predniSONE   Tablet 60 milliGRAM(s) Oral daily    MEDICATIONS  (PRN):  acetaminophen   Tablet .. 650 milliGRAM(s) Oral every 6 hours PRN Mild Pain (1 - 3), Moderate Pain (4 - 6), Severe Pain (7 - 10)  OLANZapine Injectable 2.5 milliGRAM(s) IntraMuscular every 4 hours PRN Agitation      Vital Signs Last 24 Hrs  T(C): 36.6 (10-01-21 @ 07:18), Max: 36.8 (09-30-21 @ 20:10)  T(F): 97.9 (10-01-21 @ 07:18), Max: 98.2 (09-30-21 @ 20:10)  HR: 66 (10-01-21 @ 07:18) (62 - 78)  BP: 142/84 (10-01-21 @ 07:18) (112/69 - 147/79)  BP(mean): --  RR: 18 (10-01-21 @ 07:18) (18 - 18)  SpO2: 96% (10-01-21 @ 07:18) (96% - 99%)            NEUROLOGICAL EXAM:  MS: AAOX3, fluent, attends b/l; recent and remote memory intact; normal attention, language and fund of knowledge.   CN: EOMI, V1-3 intact, no facial asymmetry,  Motor: Strength: grossly 5/5 4x.   Sensation: grossly intact to LT 4x.   Gait: Deferred    LABS:  NO NEW LABS    < from: MR Head No Cont (09.16.21 @ 10:28) >    EXAM:  MR BRAIN                            PROCEDURE DATE:  09/16/2021          INTERPRETATION:  Clinical indication: Status post TPA    MRI of the brain was performed using sagittal T1, axial T1 and T2 T2 FLAIR diffusion and gradient echo sequence.    This exam is compared with prior noncontrast head CT performed on September 15, 2021.    The lateral ventricles have a normal configuration    There is no evidence acute hemorrhage mass mass effect identified.    Evaluation of the diffusion weighted sequence demonstrates no abnormal areas of restricted diffusion to suggest acute infarct.    Partial empty is sella seen.    The large vessels demonstrate normal flow voids    Bilateral maxillary, ethmoid and frontal sinus mucosal thickening is seen.    Postop changes compatible with bilateral calcific surgery and scleral banding is seen.    IMPRESSION: No acute territorial infarcts seen.    --- End of Report ---            MITCHELL BOYLE MD; Attending Radiologist  This document has been electronically signed. Sep 16 2021 10:42AM    < end of copied text >

## 2021-10-01 NOTE — H&P ADULT - HISTORY OF PRESENT ILLNESS
Patient is a 67 yo Rt handed male with PMH of  CLL on Venetoclax, MDS, melanoma, paroxysmal AFib (not on AC) presented as transfer from [place] on 9/16 for event capture. Patient was  initially at [facility] on 9/14 with confusion, aphasia and R sided weakness. Patient was stroke code and was given s/p tPA. On admission, patient had CTH, CTA, CT perfusion during code stroke protocol with no acute findings. MRI Brain was  performed at  9/16 with no ischemic findings. EEG performed 9/15 showed diffuse L hemispheric slowing. Pt remained aphasic with AMS. +RUE spasticity was noted on exam.     Patient was transferred to [facility] on 9/16 for Epilepsy Monitoring Unit admission to evaluate for subclinical seizures. Patient was seen by neurologist and cardiologist. Patient was noted to have episodes agitation with episodes of somnolence with out medication needed. AC was held per cardio since there was no definitive CVA noted but advised to c/w ASA and statin. LP was performed on 9/17. EEG overnight (9/16-9/17) did not show seizures however did show L sided slowing concerning for a possible structural etiology.  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 He was seen by SS for evaluation and Dysphagia 2 with honey thick liquid was recommended. He was also started on ABT for right elbow cellulitis/ septic bursitis. LP results- Glucose 73, Protein 55, WBC 3, Gram stain no growth to date, HSV neg, Cryptococcal neg; infectious w/u thus far negative; AIE panel pending f/u results. Patient also experience visual hallucination of which opthalmology wad consulted  - no evidence of retinal detachment.    Patient was evaluated by PM&R and therapy for functional deficits, gait/ADL impairments and acute rehabilitation was recommended. Patient was medically optimized for discharge to [facility] IRU on 10/1.

## 2021-10-01 NOTE — PROGRESS NOTE ADULT - ASSESSMENT
65 y/o male with PMHx CLL followed by heme/onc, anemia, melanoma, osteopenia admitted for right facial droop, right hemiparesis and expressive aphasia.    Admitted to the ICU at [place] yesterday and is S/P TPA. He has now been transferred for evaluation of his seizures and evaluation of seizures    CVA? / AMS  - Patient with new right sided UE weakness, facial droop and expressive aphasia at   - s/p tpa without improvement in symptoms  - 9/15 CT head without acute ICH  - 9/16 MRI neg for infarct  - neurology follow up.  s/p video eeg. has been diagnosed with resolving encephalitis.  - mental status greatly improved overall  - cont asa and statin    PAF  - Patient seen on a prior admission for syncope 7/2020 and found to have brief Afib  - Tele during this admission SR   - echocardiogram 9/15 unremarkable  - If no definitive CVA would support remaining off AC for now.    - continue aspirin    BP  - BP better  - cont amlodipine and can increase if needed    CLL  - follow up heme/onc    - Monitor and replete lytes, keep K>4, Mg>2.  - All other medical needs as per Neuro team  - Other cardiovascular workup will depend on clinical course.  - Will continue to follow.

## 2021-10-01 NOTE — H&P ADULT - NSICDXFAMILYHX_GEN_ALL_CORE_FT
FAMILY HISTORY:  Family history of malignant melanoma, age 89  Family history of multiple myeloma  FH: blood dyscrasia, MGUS  FH: hypertension, mother  FH: renal failure, mother    
FAMILY HISTORY:  Family history of malignant melanoma, age 89  Family history of multiple myeloma  FH: blood dyscrasia, MGUS  FH: hypertension, mother  FH: renal failure, mother

## 2021-10-01 NOTE — PROGRESS NOTE ADULT - SUBJECTIVE AND OBJECTIVE BOX
Name of Patient : SARAHI BARNHART  MRN: [id number]  Date of visit: 10-01-21     Subjective: Patient seen and examined. No new events except as noted.   Doing okay   D/C planing     REVIEW OF SYSTEMS:    CONSTITUTIONAL: No weakness, fevers or chills  EYES/ENT: No visual changes;  No vertigo or throat pain   NECK: No pain or stiffness  RESPIRATORY: No cough, wheezing, hemoptysis; No shortness of breath  CARDIOVASCULAR: No chest pain or palpitations  GASTROINTESTINAL: No abdominal or epigastric pain. No nausea, vomiting, or hematemesis; No diarrhea or constipation. No melena or hematochezia.  GENITOURINARY: No dysuria, frequency or hematuria  NEUROLOGICAL: No numbness or weakness  SKIN: No itching, burning, rashes, or lesions   All other review of systems is negative unless indicated above.    MEDICATIONS:  MEDICATIONS  (STANDING):  amLODIPine   Tablet 5 milliGRAM(s) Oral daily  aspirin  chewable 81 milliGRAM(s) Oral daily  atorvastatin 80 milliGRAM(s) Oral at bedtime  BACItracin   Ointment 1 Application(s) Topical two times a day  cephalexin 500 milliGRAM(s) Oral four times a day  enoxaparin Injectable 40 milliGRAM(s) SubCutaneous daily  folic acid 1 milliGRAM(s) Oral daily  melatonin 10 milliGRAM(s) Oral at bedtime  minocycline 100 milliGRAM(s) Oral two times a day  OLANZapine 10 milliGRAM(s) Oral at bedtime  pantoprazole    Tablet 40 milliGRAM(s) Oral before breakfast  predniSONE   Tablet 60 milliGRAM(s) Oral daily      PHYSICAL EXAM:  T(C): 36.7 (10-01-21 @ 13:40), Max: 36.8 (09-30-21 @ 20:10)  HR: 81 (10-01-21 @ 13:40) (62 - 82)  BP: 126/72 (10-01-21 @ 13:40) (126/72 - 147/79)  RR: 14 (10-01-21 @ 13:40) (14 - 18)  SpO2: 97% (10-01-21 @ 13:40) (96% - 99%)  Wt(kg): --  I&O's Summary    Height (cm): 177.8 (10-01 @ 13:40)  Weight (kg): 60.6 (10-01 @ 13:40)  BMI (kg/m2): 19.2 (10-01 @ 13:40)  BSA (m2): 1.76 (10-01 @ 13:40)    Appearance: Normal	  HEENT:  PERRLA   Lymphatic: No lymphadenopathy   Cardiovascular: Normal S1 S2, no JVD  Respiratory: normal effort , clear  Gastrointestinal:  Soft, Non-tender  Skin: No rashes,  warm to touch  Psychiatry:  Mood & affect appropriate  Musculuskeletal: No edema      All labs, Imaging and EKGs personally reviewed                             11.3   17.03 )-----------( 159      ( 01 Oct 2021 14:30 )             34.0               10-01    141  |  102  |  30<H>  ----------------------------<  151<H>  4.5   |  28  |  0.99    Ca    8.5      01 Oct 2021 14:30    TPro  6.3  /  Alb  2.5<L>  /  TBili  0.5  /  DBili  x   /  AST  38  /  ALT  61<H>  /  AlkPhos  100  10-01

## 2021-10-01 NOTE — H&P ADULT - NSHPSOCIALHISTORY_GEN_ALL_CORE
Smoking - Denied  EtOH - Denied   Drugs - Denied     Patient lives in pvt house with family [address]  PTA: Independent in ADLs and ambulation     CURRENT FUNCTIONAL STATUS 9/28  Bed Mobility:  CG  Transfers: Min A - CG, 1 person  Gait: 40ft RW, 1 person
Smoking - Denied  EtOH - Denied   Drugs - Denied     Patient lives in pvt house with family [address]  PTA: Independent in ADLs and ambulation     CURRENT FUNCTIONAL STATUS 9/28  Bed Mobility:  CG  Transfers: Min A - CG, 1 person  Gait: 40ft RW, 1 person

## 2021-10-01 NOTE — H&P ADULT - NSHPPHYSICALEXAM_GEN_ALL_CORE
PHYSICAL EXAMINATION   VItals: T(C): 36.7 (10-01-21 @ 13:40), Max: 36.8 (09-30-21 @ 20:10)  HR: 81 (10-01-21 @ 13:40) (62 - 82)  BP: 126/72 (10-01-21 @ 13:40) (112/69 - 147/79)  RR: 14 (10-01-21 @ 13:40) (14 - 18)  SpO2: 97% (10-01-21 @ 13:40) (96% - 99%)    General: NAD, Resting Comfortable,                                  HEENT: NC/AT, EOM I, PERRLA, Normal Conjunctivae  Cardio: RRR, Normal S1-S2, No M/G/R                              Pulm: No Respiratory Distress,  Lungs CTAB                        Abdomen: ND/NT, Soft, BS+                                                MSK: No joint swelling, Full ROM                                         Ext: No C/C/E, Pulses 2+ throughout, No calf tenderness    Skin:  all skin intact                                                                 Wounds: none  Decubitus Ulcers: None Present     Neurological Examination    Cognitive: AAO x 3                                                                         Attention: Intact   Judgment: Good evidence of judgement                               Memory: Recall 3 objects immediate and 3 min later      Mood/Affect: wnl                                                                           Communication:  Fluent,  No dysarthria   Swallow: intact  CN II - XII  intact  Coordination: FTN/HTS slow, dysmetria noted bilaterally  Sensory: Intact to light touch, PP and Vibration                                                                                             Tone: normal Throughout   Balance: impaired    Motor    LEFT    UE: SF [5/5], EF [5/5], EE [5/5], WE [5/5],  [wnl]  RIGHT UE: SF [4/5], EF [4/5], EE [4/5], WE [4/5],  [wnl]  LEFT    LE:  HF [5/5], KE [5/5], DF [5/5], EHL [5/5],  PF [5/5]  RIGHT LE:  HF [5/5], KE [5/5], DF [5/5], EHL [5/5],  PF [5/5]      Reflex:  2 + thoroughout, Bradshaw/Babinski negative PHYSICAL EXAMINATION   VItals: T(C): 36.7 (10-01-21 @ 13:40), Max: 36.8 (09-30-21 @ 20:10)  HR: 81 (10-01-21 @ 13:40) (62 - 82)  BP: 126/72 (10-01-21 @ 13:40) (112/69 - 147/79)  RR: 14 (10-01-21 @ 13:40) (14 - 18)  SpO2: 97% (10-01-21 @ 13:40) (96% - 99%)    General: NAD, Resting Comfortable,                                  HEENT: NC/AT, EOM I, PERRLA, Normal Conjunctivae  Cardio: RRR, Normal S1-S2, No M/G/R                              Pulm: No Respiratory Distress,  Lungs CTAB                        Abdomen: ND/NT, Soft, BS+                                                MSK: No joint swelling, Full ROM                                         Ext: No C/C/E, Pulses 2+ throughout, No calf tenderness    Skin: with healing right elbow cellulitis and noted healing scabs                                                             Wounds: none  Decubitus Ulcers: None Present     Neurological Examination    Cognitive: AAO x 3                                                                         Attention: Intact   Judgment: Good evidence of judgement                               Memory: Recall 3 objects immediate and 3 min later      Mood/Affect: wnl                                                                           Communication:  Fluent,  No dysarthria   Swallow: intact  CN II - XII  intact  Coordination: FTN/HTS slow, dysmetria noted bilaterally  Sensory: Intact to light touch, PP and Vibration                                                                                             Tone: normal Throughout   Balance: impaired    Motor    LEFT    UE: SF [5/5], EF [5/5], EE [5/5], WE [5/5],  [wnl]  RIGHT UE: SF [4/5], EF [4/5], EE [4/5], WE [4/5],  [wnl]  LEFT    LE:  HF [5/5], KE [5/5], DF [5/5], EHL [5/5],  PF [5/5]  RIGHT LE:  HF [5/5], KE [5/5], DF [5/5], EHL [5/5],  PF [5/5]      Reflex:  2 + thoroughout, Bradshaw/Babinski negative PHYSICAL EXAMINATION   VItals: T(C): 36.7 (10-01-21 @ 13:40), Max: 36.8 (09-30-21 @ 20:10)  HR: 81 (10-01-21 @ 13:40) (62 - 82)  BP: 126/72 (10-01-21 @ 13:40) (112/69 - 147/79)  RR: 14 (10-01-21 @ 13:40) (14 - 18)  SpO2: 97% (10-01-21 @ 13:40) (96% - 99%)    General: NAD, Resting Comfortable,                                  HEENT: NC/AT, EOM I, PERRLA, Normal Conjunctivae  Cardio: RRR, Normal S1-S2, No M/G/R                              Pulm: No Respiratory Distress,  Lungs CTAB                        Abdomen: ND/NT, Soft, BS+                                                MSK: No joint swelling, Full ROM                                         Ext: No C/C/E, Pulses 2+ throughout, No calf tenderness    Skin: with healing right elbow cellulitis and noted healing scabs                                                             Wounds: none  Decubitus Ulcers: None Present     Neurological Examination    Cognitive: AAO x 3                                                                         Attention: Intact   Judgment: Good evidence of judgement                               Memory: Recall 3 objects immediate and 3 min later      Mood/Affect: wnl                                                                           Communication:  Fluent,  No dysarthria   Swallow: intact  CN II - XII  intact  Coordination: FTN/HTS slow, dysmetria noted bilaterally  Sensory: Intact to light touch, PP and Vibration                                                                                             Tone: normal Throughout   Balance: impaired    Motor    LEFT    UE: SF [5/5], EF [5/5], EE [5/5], WE [5/5],  [wnl]  RIGHT UE: SF [4/5], EF [4/5], EE [4/5], WE [4/5],  [wnl]  LEFT    LE:  HF [5/5], KE [5/5], DF [5/5], EHL [5/5],  PF [5/5]  RIGHT LE:  HF [5/5], KE [5/5], DF [5/5], EHL [5/5],  PF [5/5]      Reflex:  2 + throughout, Bradshaw/Babinski negative

## 2021-10-01 NOTE — H&P ADULT - NSICDXPASTSURGICALHX_GEN_ALL_CORE_FT
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

## 2021-10-01 NOTE — H&P ADULT - NSHPLABSRESULTS_GEN_ALL_CORE
12.1   9.30  )-----------( 128      ( 29 Sep 2021 07:03 )             36.7   09-29    137  |  101  |  20  ----------------------------<  144<H>  3.6   |  23  |  0.81    Ca    8.5      29 Sep 2021 07:03    TPro  6.3  /  Alb  2.8<L>  /  TBili  0.6  /  DBili  x   /  AST  18  /  ALT  28  /  AlkPhos  73  09-29      TTE Echo Complete w/o Contrast w/ Doppler (09.15.21 @ 09:10)     IMPRESSION:  Technically difficult and limited study, patient unable to cooperate  Normal left ventricular internal dimensions and systolic function, estimated LVEF of 60%.  Grossly normal RV size and systolic function.  Aortic valve is not well-visualized  Trace physiologic MR and TR.  No significant pericardial effusion.    MR Head No Cont (09.16.21 @ 10:28)     IMPRESSION: No acute territorial infarcts seen.      CT Head No Cont (09.15.21 @ 10:27)     IMPRESSION:  No hydrocephalus, acute intracranial hemorrhage, mass effect, or brain edema.      CT Angio Head w/ IV Cont (09.14.21 @ 17:51)     IMPRESSION:  CT brain: There is no acute lobar infarction, intracranial hemorrhage, mass lesion, mass effect or herniation. Further correlation with MRI of the brain is suggested for more detailed evaluation if there are no contraindications.    CTA brain: There is no large vessel occlusion or aneurysm involving major proximal intracranial arteries.    CTA NECK: There is no stenosis involving major neck arteries.    CT perfusion: Multiple areas of increased time to maximum as described may be artifactual however, attention on follow-up MRI is suggested.
9.30  )-----------( 128      ( 29 Sep 2021 07:03 )             36.7   09-29    137  |  101  |  20  ----------------------------<  144<H>  3.6   |  23  |  0.81    Ca    8.5      29 Sep 2021 07:03    TPro  6.3  /  Alb  2.8<L>  /  TBili  0.6  /  DBili  x   /  AST  18  /  ALT  28  /  AlkPhos  73  09-29      TTE Echo Complete w/o Contrast w/ Doppler (09.15.21 @ 09:10)     IMPRESSION:  Technically difficult and limited study, patient unable to cooperate  Normal left ventricular internal dimensions and systolic function, estimated LVEF of 60%.  Grossly normal RV size and systolic function.  Aortic valve is not well-visualized  Trace physiologic MR and TR.  No significant pericardial effusion.    MR Head No Cont (09.16.21 @ 10:28)     IMPRESSION: No acute territorial infarcts seen.      CT Head No Cont (09.15.21 @ 10:27)     IMPRESSION:  No hydrocephalus, acute intracranial hemorrhage, mass effect, or brain edema.      CT Angio Head w/ IV Cont (09.14.21 @ 17:51)     IMPRESSION:  CT brain: There is no acute lobar infarction, intracranial hemorrhage, mass lesion, mass effect or herniation. Further correlation with MRI of the brain is suggested for more detailed evaluation if there are no contraindications.    CTA brain: There is no large vessel occlusion or aneurysm involving major proximal intracranial arteries.    CTA NECK: There is no stenosis involving major neck arteries.    CT perfusion: Multiple areas of increased time to maximum as described may be artifactual however, attention on follow-up MRI is suggested.

## 2021-10-01 NOTE — PROGRESS NOTE ADULT - ATTENDING SUPERVISION STATEMENT
Pt eating crackers and drinking juice  
ACP/Resident
Fellow
ACP/Resident

## 2021-10-01 NOTE — H&P ADULT - NSICDXPASTMEDICALHX_GEN_ALL_CORE_FT
PAST MEDICAL HISTORY:  Anemia     Avascular Necrosis of Femur Head B/L    Bulging Disc LUMBAR    CLL (Chronic Lymphocytic Leukemia) SINCE 1988    Deviated nasal septum     Deviated nasal septum     Herniated Cervical Disc     Melanoma     Nasal congestion     Osteopenia     Other specified disorders of nose and nasal sinuses     Rosacea     TMJ Syndrome limited jaw opening due to TMJ    
PAST MEDICAL HISTORY:  Anemia     Avascular Necrosis of Femur Head B/L    Bulging Disc LUMBAR    CLL (Chronic Lymphocytic Leukemia) SINCE 1988    Deviated nasal septum     Deviated nasal septum     Herniated Cervical Disc     Melanoma     Nasal congestion     Osteopenia     Other specified disorders of nose and nasal sinuses     Rosacea     TMJ Syndrome limited jaw opening due to TMJ

## 2021-10-02 DIAGNOSIS — L03.113 CELLULITIS OF RIGHT UPPER LIMB: ICD-10-CM

## 2021-10-02 DIAGNOSIS — I48.0 PAROXYSMAL ATRIAL FIBRILLATION: ICD-10-CM

## 2021-10-02 DIAGNOSIS — G04.81 OTHER ENCEPHALITIS AND ENCEPHALOMYELITIS: ICD-10-CM

## 2021-10-02 DIAGNOSIS — I10 ESSENTIAL (PRIMARY) HYPERTENSION: ICD-10-CM

## 2021-10-02 DIAGNOSIS — C91.10 CHRONIC LYMPHOCYTIC LEUKEMIA OF B-CELL TYPE NOT HAVING ACHIEVED REMISSION: ICD-10-CM

## 2021-10-02 DIAGNOSIS — F39 UNSPECIFIED MOOD [AFFECTIVE] DISORDER: ICD-10-CM

## 2021-10-02 DIAGNOSIS — E78.5 HYPERLIPIDEMIA, UNSPECIFIED: ICD-10-CM

## 2021-10-02 DIAGNOSIS — R53.1 WEAKNESS: ICD-10-CM

## 2021-10-02 LAB
ALBUMIN SERPL ELPH-MCNC: 2.6 G/DL — LOW (ref 3.3–5)
ALP SERPL-CCNC: 105 U/L — SIGNIFICANT CHANGE UP (ref 40–120)
ALT FLD-CCNC: 59 U/L — HIGH (ref 10–45)
ANION GAP SERPL CALC-SCNC: 7 MMOL/L — SIGNIFICANT CHANGE UP (ref 5–17)
AST SERPL-CCNC: 31 U/L — SIGNIFICANT CHANGE UP (ref 10–40)
BASOPHILS # BLD AUTO: 0.07 K/UL — SIGNIFICANT CHANGE UP (ref 0–0.2)
BASOPHILS NFR BLD AUTO: 0.4 % — SIGNIFICANT CHANGE UP (ref 0–2)
BILIRUB SERPL-MCNC: 0.7 MG/DL — SIGNIFICANT CHANGE UP (ref 0.2–1.2)
BUN SERPL-MCNC: 27 MG/DL — HIGH (ref 7–23)
CALCIUM SERPL-MCNC: 8.9 MG/DL — SIGNIFICANT CHANGE UP (ref 8.4–10.5)
CHLORIDE SERPL-SCNC: 104 MMOL/L — SIGNIFICANT CHANGE UP (ref 96–108)
CO2 SERPL-SCNC: 32 MMOL/L — HIGH (ref 22–31)
CREAT SERPL-MCNC: 0.94 MG/DL — SIGNIFICANT CHANGE UP (ref 0.5–1.3)
EOSINOPHIL # BLD AUTO: 0.01 K/UL — SIGNIFICANT CHANGE UP (ref 0–0.5)
EOSINOPHIL NFR BLD AUTO: 0.1 % — SIGNIFICANT CHANGE UP (ref 0–6)
GLUCOSE SERPL-MCNC: 90 MG/DL — SIGNIFICANT CHANGE UP (ref 70–99)
HCT VFR BLD CALC: 36.5 % — LOW (ref 39–50)
HGB BLD-MCNC: 12 G/DL — LOW (ref 13–17)
IMM GRANULOCYTES NFR BLD AUTO: 12.8 % — HIGH (ref 0–1.5)
LYMPHOCYTES # BLD AUTO: 0.6 K/UL — LOW (ref 1–3.3)
LYMPHOCYTES # BLD AUTO: 3.4 % — LOW (ref 13–44)
MCHC RBC-ENTMCNC: 32.9 GM/DL — SIGNIFICANT CHANGE UP (ref 32–36)
MCHC RBC-ENTMCNC: 34.4 PG — HIGH (ref 27–34)
MCV RBC AUTO: 104.6 FL — HIGH (ref 80–100)
MONOCYTES # BLD AUTO: 2.18 K/UL — HIGH (ref 0–0.9)
MONOCYTES NFR BLD AUTO: 12.5 % — SIGNIFICANT CHANGE UP (ref 2–14)
NEUTROPHILS # BLD AUTO: 12.4 K/UL — HIGH (ref 1.8–7.4)
NEUTROPHILS NFR BLD AUTO: 70.8 % — SIGNIFICANT CHANGE UP (ref 43–77)
NRBC # BLD: 2 /100 WBCS — HIGH (ref 0–0)
PLATELET # BLD AUTO: 172 K/UL — SIGNIFICANT CHANGE UP (ref 150–400)
POTASSIUM SERPL-MCNC: 4 MMOL/L — SIGNIFICANT CHANGE UP (ref 3.5–5.3)
POTASSIUM SERPL-SCNC: 4 MMOL/L — SIGNIFICANT CHANGE UP (ref 3.5–5.3)
PROT SERPL-MCNC: 6.5 G/DL — SIGNIFICANT CHANGE UP (ref 6–8.3)
RBC # BLD: 3.49 M/UL — LOW (ref 4.2–5.8)
RBC # FLD: 15.5 % — HIGH (ref 10.3–14.5)
SARS-COV-2 RNA SPEC QL NAA+PROBE: SIGNIFICANT CHANGE UP
SODIUM SERPL-SCNC: 143 MMOL/L — SIGNIFICANT CHANGE UP (ref 135–145)
WBC # BLD: 17.5 K/UL — HIGH (ref 3.8–10.5)
WBC # FLD AUTO: 17.5 K/UL — HIGH (ref 3.8–10.5)

## 2021-10-02 PROCEDURE — 99232 SBSQ HOSP IP/OBS MODERATE 35: CPT

## 2021-10-02 PROCEDURE — 99223 1ST HOSP IP/OBS HIGH 75: CPT

## 2021-10-02 PROCEDURE — 93010 ELECTROCARDIOGRAM REPORT: CPT

## 2021-10-02 RX ADMIN — Medication 81 MILLIGRAM(S): at 13:29

## 2021-10-02 RX ADMIN — Medication 500 MILLIGRAM(S): at 13:29

## 2021-10-02 RX ADMIN — ATORVASTATIN CALCIUM 80 MILLIGRAM(S): 80 TABLET, FILM COATED ORAL at 21:12

## 2021-10-02 RX ADMIN — Medication 60 MILLIGRAM(S): at 06:09

## 2021-10-02 RX ADMIN — PANTOPRAZOLE SODIUM 40 MILLIGRAM(S): 20 TABLET, DELAYED RELEASE ORAL at 06:09

## 2021-10-02 RX ADMIN — Medication 500 MILLIGRAM(S): at 06:09

## 2021-10-02 RX ADMIN — MINOCYCLINE HYDROCHLORIDE 100 MILLIGRAM(S): 45 TABLET, EXTENDED RELEASE ORAL at 06:09

## 2021-10-02 RX ADMIN — Medication 1 MILLIGRAM(S): at 13:29

## 2021-10-02 RX ADMIN — Medication 500 MILLIGRAM(S): at 18:27

## 2021-10-02 RX ADMIN — AMLODIPINE BESYLATE 5 MILLIGRAM(S): 2.5 TABLET ORAL at 06:41

## 2021-10-02 RX ADMIN — Medication 1 APPLICATION(S): at 06:10

## 2021-10-02 RX ADMIN — OLANZAPINE 10 MILLIGRAM(S): 15 TABLET, FILM COATED ORAL at 21:12

## 2021-10-02 RX ADMIN — ENOXAPARIN SODIUM 40 MILLIGRAM(S): 100 INJECTION SUBCUTANEOUS at 13:30

## 2021-10-02 RX ADMIN — Medication 6 MILLIGRAM(S): at 21:12

## 2021-10-02 RX ADMIN — MINOCYCLINE HYDROCHLORIDE 100 MILLIGRAM(S): 45 TABLET, EXTENDED RELEASE ORAL at 18:27

## 2021-10-02 RX ADMIN — Medication 1 APPLICATION(S): at 18:27

## 2021-10-02 RX ADMIN — Medication 500 MILLIGRAM(S): at 00:26

## 2021-10-02 NOTE — DIETITIAN INITIAL EVALUATION ADULT. - OTHER INFO
H&P: 67 y/o male with PMHx CLL followed by heme/onc, anemia, melanoma, osteopenia admitted for right facial droop, right hemiparesis and expressive aphasia. Initially admitted at [facility] and s/p [facility], now transferred to [facility] on 9/16 for evaluation of his seizures.  Pt reports good appetite. PO intake %. Pt feed self, reports no chewing/swallowing difficulties at the moment. NKFA, lactose intolerant. Pt denies N/V/C/D. Last BM: 10/02. UBW: 160 Lbs 3 weeks ago, as per RD on 9/15 158 Lbs, current weight 133.6 Lbs. No edema. Skin WDL except bruised, cyst, scratches. Recommended pt to increase protein intake either through foods or supplements. Explained high bioavailability protein foods. Pt agreeable w/ recommendations.

## 2021-10-02 NOTE — DIETITIAN NUTRITION RISK NOTIFICATION - TREATMENT: THE FOLLOWING DIET HAS BEEN RECOMMENDED
Diet, Soft:   Supplement Feeding Modality:  Oral  Ensure Enlive Cans or Servings Per Day:  2       Frequency:  Daily (09-30-21 @ 16:46) [Active]

## 2021-10-02 NOTE — CONSULT NOTE ADULT - SUBJECTIVE AND OBJECTIVE BOX
Patient is a 67 y/o Male who presents with a chief complaint of Encephalitis (01 Oct 2021 13:32)    HPI:  Patient is a 67 y/o Rt handed male with PMH of CLL on Venetoclax, MDS, melanoma, paroxysmal AFib (not on AC) presented as transfer from [place] on 9/16 for event capture. Patient was initially at [facility] on 9/14 with confusion, aphasia and R sided weakness. Stroke code was called, pt given tPA. On admission, patient had CTH, CTA, CT perfusion during code stroke protocol with no acute findings. MRI Brain performed at  9/16 with no ischemic findings. EEG performed 9/15 showed diffuse L hemispheric slowing. Pt remained aphasic with AMS. +RUE spasticity was noted on exam.     Patient was transferred to [facility] on 9/16 for Epilepsy Monitoring Unit admission to evaluate for subclinical seizures. Patient was evaluated by neurology and cardiology. Patient was noted to have episodes agitation with episodes of somnolence with out medication needed. AC was held per cardio since there was no definitive CVA noted; cardio recommended to c/w ASA and statin. LP performed 9/17. EEG overnight (9/16-9/17) did not show seizures however did show L sided slowing concerning for a possible structural etiology. 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 He was seen by SS for evaluation and Dysphagia 2 with honey thick liquid was recommended. He was also started on antibiotics for right elbow cellulitis/septic bursitis. LP results- Glucose 73, Protein 55, WBC 3, Gram stain no growth to date, HSV neg, Cryptococcal neg; infectious w/u thus far negative; AIE panel pending f/u results. Patient also experience visual hallucination of which opthalmology wad consulted  - no evidence of retinal detachment.    Patient was evaluated by PM&R and therapy for functional deficits, gait/ADL impairments and acute rehabilitation was recommended. Patient was medically optimized for discharge to [facility] for inpatient rehab on 10/1.  (01 Oct 2021 13:32)    Patient seen and examined at bedside, stable, NAD.     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  Herniated Cervical Disc  Bulging Disc LUMBAR  Melanoma  Deviated nasal septum  Nasal congestion  Rosacea  Deviated nasal septum  Other specified disorders of nose and nasal sinuses  Anemia S/P Splenectomy 1993  Avascular Necrosis of Femur Head  RIGHT - CORE DECOMPRESSION- 1993  Avascular Necrosis of Femur Head LEFT  Abnormal Jaw Closure, LEFT JAW SURGERY - 1988  Status Post Eye Surgery X3  3/2011 right eye scleral buckle; left eye in 2013  S/P Tonsillectomy 1960  Bilateral cataracts removed in 2011    SOCIAL HISTORY: Denies tobacco, EtOH, or illicit drug use  FAMILY HISTORY: Previously independent in ADLs, IADLs, and ambulation    ALLERGIES:  No Known Allergies    MEDICATIONS  (STANDING):  amLODIPine   Tablet 5 milliGRAM(s) Oral daily  aspirin  chewable 81 milliGRAM(s) Oral daily  atorvastatin 80 milliGRAM(s) Oral at bedtime  BACItracin   Ointment 1 Application(s) Topical two times a day  cephalexin 500 milliGRAM(s) Oral four times a day  enoxaparin Injectable 40 milliGRAM(s) SubCutaneous daily  folic acid 1 milliGRAM(s) Oral daily  influenza   Vaccine 0.5 milliLiter(s) IntraMuscular once  melatonin 6 milliGRAM(s) Oral at bedtime  minocycline 100 milliGRAM(s) Oral two times a day  OLANZapine 10 milliGRAM(s) Oral at bedtime  pantoprazole    Tablet 40 milliGRAM(s) Oral before breakfast  predniSONE   Tablet 60 milliGRAM(s) Oral daily    MEDICATIONS  (PRN):  acetaminophen   Tablet .. 650 milliGRAM(s) Oral every 6 hours PRN Mild Pain (1 - 3), Moderate Pain (4 - 6), Severe Pain (7 - 10)  OLANZapine Injectable 2.5 milliGRAM(s) IntraMuscular every 4 hours PRN Agitation  senna 2 Tablet(s) Oral at bedtime PRN Constipation    Review of Systems: Refer to HPI for pertinent positives and negatives. All other ROS reviewed and negative except as otherwise stated above.    Vital Signs Last 24 Hrs  T(F): 98.3 (01 Oct 2021 19:52), Max: 98.3 (01 Oct 2021 19:52)  HR: 65 (02 Oct 2021 06:07) (65 - 92)  BP: 136/80 (02 Oct 2021 06:07) (126/72 - 161/76)  RR: 15 (01 Oct 2021 19:52) (14 - 18)  SpO2: 96% (01 Oct 2021 19:52) (96% - 97%)  I&O's Summary    PHYSICAL EXAM:  GENERAL: NAD, well-groomed, well-developed  HEAD:  Atraumatic, Normocephalic  EYES: EOMI, PERRL, conjunctiva and sclera clear  ENMT: Moist mucous membranes, Good dentition  NECK: Supple, No JVD  CHEST/LUNG: Clear to auscultation bilaterally, non-labored breathing, good air entry  HEART: RRR; S1/S2, No murmur  ABDOMEN: Soft, Nontender, Nondistended; Bowel sounds present  VASCULAR: Normal pulses, Normal capillary refill  EXTREMITIES: No cyanosis, No edema  LYMPH: No lymphadenopathy noted  SKIN: Warm, Intact  PSYCH: Normal mood and affect  NERVOUS SYSTEM:  A/O x3, Good concentration; CN 2-12 intact, No focal deficits, moves all extremities    LABS:                        12.0   17.50 )-----------( 172      ( 02 Oct 2021 06:00 )             36.5     10-02    143  |  104  |  27  ----------------------------<  90  4.0   |  32  |  0.94    Ca    8.9      02 Oct 2021 06:00    TPro  6.5  /  Alb  2.6  /  TBili  0.7  /  DBili  x   /  AST  31  /  ALT  59  /  AlkPhos  105  10-02    eGFR if Non African American: 84 mL/min/1.73M2 (10-02-21 @ 06:00)  eGFR if African American: 98 mL/min/1.73M2 (10-02-21 @ 06:00)    COVID-19 PCR: NotDetec (10-01-21 @ 20:05)  COVID-19 PCR: NotDetec (09-30-21 @ 13:54)  COVID-19 PCR: NotDetec (09-27-21 @ 11:06)  COVID-19 PCR: NotDetec (09-24-21 @ 10:53)  COVID-19 PCR: NotDetec (09-14-21 @ 18:47)    RADIOLOGY & ADDITIONAL TESTS: reviewed    Care Discussed with Consultants/Other Providers: yes - rehab Patient is a 67 y/o Male who presents with a chief complaint of Encephalitis (01 Oct 2021 13:32)    HPI:  Patient is a 67 y/o Rt handed male with PMH of CLL on Venetoclax, MDS, melanoma, paroxysmal AFib (not on AC) presented as transfer from [place] on  for event capture. Patient was initially at [facility] on  with confusion, aphasia and R sided weakness. Stroke code was called, pt given tPA. On admission, patient had CTH, CTA, CT perfusion during code stroke protocol with no acute findings. MRI Brain performed at   with no ischemic findings. EEG performed 9/15 showed diffuse L hemispheric slowing. Pt remained aphasic with AMS. +RUE spasticity was noted on exam.     Patient was transferred to [facility] on  for Epilepsy Monitoring Unit admission to evaluate for subclinical seizures. Patient was evaluated by neurology and cardiology. Patient was noted to have episodes agitation with episodes of somnolence with out medication needed. AC was held per cardio since there was no definitive CVA noted; cardio recommended to c/w ASA and statin. LP performed . EEG overnight (-) did not show seizures however did show L sided slowing concerning for a possible structural etiology. He was started on IVIG and steroids on  for autoimmune encephalitis. He was seen by SS for evaluation and Dysphagia 2 with honey thick liquid was recommended. He was also started on antibiotics for right elbow cellulitis/septic bursitis. LP results- Glucose 73, Protein 55, WBC 3, Gram stain no growth to date, HSV neg, Cryptococcal neg; infectious w/u thus far negative; AIE panel pending f/u results. Patient also experience visual hallucination of which opthalmology wad consulted  - no evidence of retinal detachment.    Patient was evaluated by PM&R and therapy for functional deficits, gait/ADL impairments and acute rehabilitation was recommended. Patient was medically optimized for discharge to [facility] for inpatient rehab on 10/1.  (01 Oct 2021 13:32)    Patient seen and examined at bedside, stable, NAD. denies headache, fever, chills, cp, sob, n/v, abd pain.    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 Previously independent in ADLs, IADLs, and ambulation. Retired - previously  - worked in medical devices    FAMILY HISTORY: mother -  age 91 - COVID. father -  age 77 - multiple myeloma      ALLERGIES:  No Known Allergies    MEDICATIONS  (STANDING):  amLODIPine   Tablet 5 milliGRAM(s) Oral daily  aspirin  chewable 81 milliGRAM(s) Oral daily  atorvastatin 80 milliGRAM(s) Oral at bedtime  BACItracin   Ointment 1 Application(s) Topical two times a day  cephalexin 500 milliGRAM(s) Oral four times a day  enoxaparin Injectable 40 milliGRAM(s) SubCutaneous daily  folic acid 1 milliGRAM(s) Oral daily  influenza   Vaccine 0.5 milliLiter(s) IntraMuscular once  melatonin 6 milliGRAM(s) Oral at bedtime  minocycline 100 milliGRAM(s) Oral two times a day  OLANZapine 10 milliGRAM(s) Oral at bedtime  pantoprazole    Tablet 40 milliGRAM(s) Oral before breakfast  predniSONE   Tablet 60 milliGRAM(s) Oral daily    MEDICATIONS  (PRN):  acetaminophen   Tablet .. 650 milliGRAM(s) Oral every 6 hours PRN Mild Pain (1 - 3), Moderate Pain (4 - 6), Severe Pain (7 - 10)  OLANZapine Injectable 2.5 milliGRAM(s) IntraMuscular every 4 hours PRN Agitation  senna 2 Tablet(s) Oral at bedtime PRN Constipation    Review of Systems: Refer to HPI for pertinent positives and negatives. All other ROS reviewed and negative except as otherwise stated above.    Vital Signs Last 24 Hrs  T(F): 98.3 (01 Oct 2021 19:52), Max: 98.3 (01 Oct 2021 19:52)  HR: 65 (02 Oct 2021 06:07) (65 - 92)  BP: 136/80 (02 Oct 2021 06:07) (126/72 - 161/76)  RR: 15 (01 Oct 2021 19:52) (14 - 18)  SpO2: 96% (01 Oct 2021 19:52) (96% - 97%)  I&O's Summary    PHYSICAL EXAM:  GENERAL: NAD, well-groomed, well-developed  HEAD:  Atraumatic, Normocephalic  EYES: EOMI, PERRL, conjunctiva and sclera clear  ENMT: Moist mucous membranes, Good dentition  NECK: Supple, No JVD  CHEST/LUNG: Clear to auscultation bilaterally, non-labored breathing, good air entry  HEART: RRR; S1/S2  ABDOMEN: Soft, Nontender, Nondistended; Bowel sounds present  VASCULAR: Normal pulses, Normal capillary refill  EXTREMITIES: No cyanosis, No edema  LYMPH: No lymphadenopathy noted  SKIN: Warm, Intact  PSYCH: Normal mood and affect  NERVOUS SYSTEM:  A/O x3, Good concentration; moves all extremities    LABS:                        12.0   17.50 )-----------( 172      ( 02 Oct 2021 06:00 )             36.5     10-02    143  |  104  |  27  ----------------------------<  90  4.0   |  32  |  0.94    Ca    8.9      02 Oct 2021 06:00    TPro  6.5  /  Alb  2.6  /  TBili  0.7  /  DBili  x   /  AST  31  /  ALT  59  /  AlkPhos  105  10-02    eGFR if Non African American: 84 mL/min/1.73M2 (10-02-21 @ 06:00)  eGFR if African American: 98 mL/min/1.73M2 (10-02-21 @ 06:00)    COVID-19 PCR: NotDetec (10-01-21 @ 20:05)  COVID-19 PCR: NotDetec (21 @ 13:54)  COVID-19 PCR: NotDetec (21 @ 11:06)  COVID-19 PCR: NotDetec (21 @ 10:53)  COVID-19 PCR: NotDetec (21 @ 18:47)    RADIOLOGY & ADDITIONAL TESTS: reviewed  < from: Xray Elbow AP + Lateral, Right (21 @ 17:54) >    IMPRESSION:  No tracking soft tissue gas collections, radiopaque foreign bodies, or gross radiographic evidence for osteomyelitis.  No fractures, dislocations, or evidence for joint effusion.  Preserved joint spaces, smooth articular margins, and no intra-articular loose bodies. No joint margin erosions.  Thick protuberant olecranon enthesophyte. Small enthesophyte along peripheral medial epicondylar margin.  No destructive osseous lesions or periosteal reaction.  --- End of Report ---    < end of copied text >  < from: MR Head No Cont (21 @ 10:28) >  MRI of the brain was performed using sagittal T1, axial T1 and T2 T2 FLAIR diffusion and gradient echo sequence.  This exam is compared with prior noncontrast head CT performed on September 15, 2021.  The lateral ventricles have a normal configuration  There is no evidence acute hemorrhage mass mass effect identified.  Evaluation of the diffusion weighted sequence demonstrates no abnormal areas of restricted diffusion to suggest acute infarct.  Partial empty is sella seen.  The large vessels demonstrate normal flow voids  Bilateral maxillary, ethmoid and frontal sinus mucosal thickening is seen.  Postop changes compatible with bilateral calcific surgery and scleral banding is seen.  IMPRESSION: No acute territorial infarcts seen.  --- End of Report ---  < end of copied text >    Care Discussed with Consultants/Other Providers: yes - rehab

## 2021-10-02 NOTE — DIETITIAN INITIAL EVALUATION ADULT. - PERTINENT LABORATORY DATA
Date: 02 Oct 2021 06:00  Hemoglobin 12.0   Hematocrit 36.5   .6 H  WBC 17.5 H    Date: 10-02  Sodium 143  Potassium 4.0  Glucose Serum 90  BUN 27<H>  Creatinine 0.94  Alb 2.6 L  9/05/21: HgA1C 5.5     ACCUCHECK

## 2021-10-02 NOTE — PROVIDER CONTACT NOTE (OTHER) - ASSESSMENT
Pt denies pain. Intact cyst like swelling slightly smaller than half a ping pong ball on left upper arm

## 2021-10-02 NOTE — DIETITIAN INITIAL EVALUATION ADULT. - PERTINENT MEDS FT
MEDICATIONS  (STANDING):  amLODIPine   Tablet 5 milliGRAM(s) Oral daily  aspirin  chewable 81 milliGRAM(s) Oral daily  atorvastatin 80 milliGRAM(s) Oral at bedtime  BACItracin   Ointment 1 Application(s) Topical two times a day  cephalexin 500 milliGRAM(s) Oral four times a day  enoxaparin Injectable 40 milliGRAM(s) SubCutaneous daily  folic acid 1 milliGRAM(s) Oral daily  influenza   Vaccine 0.5 milliLiter(s) IntraMuscular once  melatonin 6 milliGRAM(s) Oral at bedtime  minocycline 100 milliGRAM(s) Oral two times a day  OLANZapine 10 milliGRAM(s) Oral at bedtime  pantoprazole    Tablet 40 milliGRAM(s) Oral before breakfast  predniSONE   Tablet 60 milliGRAM(s) Oral daily    MEDICATIONS  (PRN):  acetaminophen   Tablet .. 650 milliGRAM(s) Oral every 6 hours PRN Mild Pain (1 - 3), Moderate Pain (4 - 6), Severe Pain (7 - 10)  OLANZapine Injectable 2.5 milliGRAM(s) IntraMuscular every 4 hours PRN Agitation  senna 2 Tablet(s) Oral at bedtime PRN Constipation

## 2021-10-02 NOTE — DIETITIAN INITIAL EVALUATION ADULT. - ADD RECOMMEND
Suggest Iron sulfate supplement. Honor pt's food preferences as feasible with prescribed diet. Obtain and record PO intake and weights daily

## 2021-10-02 NOTE — CONSULT NOTE ADULT - ASSESSMENT
65 y/o M with PMHx CLL followed by heme/onc, anemia, melanoma, osteopenia admitted for right facial droop, right hemiparesis and expressive aphasia. Initially admitted at [facility] and s/p TPA, now transferred to [facility] on 9/16 for evaluation of his seizures. Patient now with gait Instability, ADL impairments and Functional impairments.    #Autoimmune/paraneoplastic encephalitis  -Right sided UE weakness - no stroke on CT or MRI  -s/p tpa without improvement in symptoms  -9/15 CT head without acute ICH  -9/16 MRI neg for infarct  -Start comprehensive rehab program - PT/OT/SLP per rehab team  -Pain management, bowel regimen per rehab  -Continue ASA 81mg    #Paraneoplastic encephalitis  -s/p IVIG x 3 days at [facility]  -s/p Solumedrol 1000 mg IV for a total of 5 days (9/21-9/25)  -c/w steroid taper. 9/26: Started prednisone taper 60qd x 1 week, then 40mg qd x 1 week, then 20mg qd x 1 week, [id number] qd x1week    #PAF  -Per cardiology; prior admission for syncope 7/2020 found to have brief Afib  -Echo 9/15 unremarkable  -No AC recommended per cardio due to no definitive CVA; pt remains in NSR    #CLL  -IVIG monthly  -Outpatient follow up with heme/onc    #Right elbow cellulitis -healting  - Minocycline 100mg BID until 10/6  - Keflex 500mg QID until 10/6  - Bacitracin oint    - warm compress PRN    #HTN  - Amlodipine 5mg daily    #HLD  - Lipitor 80mg daily    #Pain management  - Tylenol PRN    #DVT ppx  - Lovenox  -SCD, TEDs    #GI ppx  - Protonix 40mg daily    #Bowel Regimen  - Senna  - miralax PRN    #Bladder management  - BS on admission, and q 8 hours (SC if > 400)  - Monitor UO    #FEN   - Diet: Soft  - Supplement: Folic acid    #Precaution  - Fall, Aspiration, Seizure    #Skin:  - No active issues at this time  - Pressure injury/Skin: Turn Q2hrs while in bed, OOB to Chair, PT/OT     #Dysphagia    - SLP: evaluation and treatment    #Mood/Cognition:  - Zyprexa 10mg daily. Seroquel discontinued due to prolonged QTc 467 prior admission  - Neuropsychology consult PRN    #Sleep:   - Melatonin 6mg daily  - Maintain quiet hours and low stim environment.  - Melatonin PRN to maximize participation in therapy during the day.   - Monitor sleep logs   67 y/o M with PMHx CLL followed by heme/onc, anemia, melanoma, osteopenia admitted for right facial droop, right hemiparesis and expressive aphasia. Initially admitted at [facility] and s/p TPA, now transferred to [facility] on 9/16 for evaluation of his seizures. Patient now with gait Instability, ADL impairments and Functional impairments.    #Autoimmune/paraneoplastic encephalitis  -Right sided UE weakness - no stroke on CT or MRI  -s/p tpa without improvement in symptoms  -9/15 CT head without acute ICH  -9/16 MRI neg for infarct  -Start comprehensive rehab program - PT/OT/SLP per rehab team  -Pain management, bowel regimen per rehab  -Continue ASA 81mg, folic acid, lipitor 80mg qhs    #Paraneoplastic encephalitis  -s/p IVIG x 3 days at [facility]  -s/p Solumedrol 1000 mg IV for a total of 5 days (9/21-9/25)  -c/w steroid taper. 9/26: Started prednisone taper 60qd x 1 week, then 40mg qd x 1 week, then 20mg qd x 1 week, [id number] qd x1week    #PAF  -Per cardiology; prior admission for syncope 7/2020 found to have brief Afib  -Echo 9/15 unremarkable  -No AC recommended per cardio due to no definitive CVA; pt remains in NSR    #CLL  -IVIG monthly  -Outpatient follow up with heme/onc    #Right elbow cellulitis - improving   -Minocycline 100mg BID until 10/6  -Keflex 500mg QID until 10/6  -Bacitracin oint    -Warm compress PRN    #HTN  -Amlodipine 5mg daily    #HLD  -Lipitor 80mg daily    #Mood/Cognition  -Zyprexa 10mg daily. Seroquel discontinued due to prolonged QTc 467 prior admission (qtc 418 on repeat EKG 10/2)  -Neuropsychology consult PRN  -Maintain off ativan due to worsening hallucinations     #DVT ppx - Lovenox  #GI ppx - Protonix

## 2021-10-02 NOTE — DIETITIAN INITIAL EVALUATION ADULT. - ORAL INTAKE PTA/DIET HISTORY
Prior to hospitalization pt reports consuming a regular diet. At breakfast usually eats oatmeal or cherrios w/milk. At lunch a sandwich. At dinner white meats w/ vegetables and pasta or rice or potatoes, sometimes add salads. Pt was trying to stay away from red meats.

## 2021-10-03 PROCEDURE — 99232 SBSQ HOSP IP/OBS MODERATE 35: CPT

## 2021-10-03 RX ADMIN — Medication 500 MILLIGRAM(S): at 00:28

## 2021-10-03 RX ADMIN — PANTOPRAZOLE SODIUM 40 MILLIGRAM(S): 20 TABLET, DELAYED RELEASE ORAL at 06:04

## 2021-10-03 RX ADMIN — ATORVASTATIN CALCIUM 80 MILLIGRAM(S): 80 TABLET, FILM COATED ORAL at 21:08

## 2021-10-03 RX ADMIN — Medication 1 APPLICATION(S): at 06:05

## 2021-10-03 RX ADMIN — Medication 40 MILLIGRAM(S): at 06:03

## 2021-10-03 RX ADMIN — MINOCYCLINE HYDROCHLORIDE 100 MILLIGRAM(S): 45 TABLET, EXTENDED RELEASE ORAL at 06:03

## 2021-10-03 RX ADMIN — ENOXAPARIN SODIUM 40 MILLIGRAM(S): 100 INJECTION SUBCUTANEOUS at 12:22

## 2021-10-03 RX ADMIN — Medication 81 MILLIGRAM(S): at 12:21

## 2021-10-03 RX ADMIN — AMLODIPINE BESYLATE 5 MILLIGRAM(S): 2.5 TABLET ORAL at 06:04

## 2021-10-03 RX ADMIN — Medication 500 MILLIGRAM(S): at 23:38

## 2021-10-03 RX ADMIN — Medication 500 MILLIGRAM(S): at 12:21

## 2021-10-03 RX ADMIN — MINOCYCLINE HYDROCHLORIDE 100 MILLIGRAM(S): 45 TABLET, EXTENDED RELEASE ORAL at 17:40

## 2021-10-03 RX ADMIN — OLANZAPINE 10 MILLIGRAM(S): 15 TABLET, FILM COATED ORAL at 21:08

## 2021-10-03 RX ADMIN — Medication 500 MILLIGRAM(S): at 17:39

## 2021-10-03 RX ADMIN — Medication 1 MILLIGRAM(S): at 12:21

## 2021-10-03 RX ADMIN — Medication 500 MILLIGRAM(S): at 06:04

## 2021-10-03 RX ADMIN — Medication 6 MILLIGRAM(S): at 21:08

## 2021-10-03 RX ADMIN — Medication 1 APPLICATION(S): at 17:41

## 2021-10-04 LAB
ALBUMIN SERPL ELPH-MCNC: 2.6 G/DL — LOW (ref 3.3–5)
ALP SERPL-CCNC: 107 U/L — SIGNIFICANT CHANGE UP (ref 40–120)
ALT FLD-CCNC: 79 U/L — HIGH (ref 10–45)
ANION GAP SERPL CALC-SCNC: 3 MMOL/L — LOW (ref 5–17)
AST SERPL-CCNC: 39 U/L — SIGNIFICANT CHANGE UP (ref 10–40)
BILIRUB SERPL-MCNC: 0.8 MG/DL — SIGNIFICANT CHANGE UP (ref 0.2–1.2)
BUN SERPL-MCNC: 22 MG/DL — SIGNIFICANT CHANGE UP (ref 7–23)
CALCIUM SERPL-MCNC: 8.4 MG/DL — SIGNIFICANT CHANGE UP (ref 8.4–10.5)
CHLORIDE SERPL-SCNC: 105 MMOL/L — SIGNIFICANT CHANGE UP (ref 96–108)
CO2 SERPL-SCNC: 34 MMOL/L — HIGH (ref 22–31)
CREAT SERPL-MCNC: 1.19 MG/DL — SIGNIFICANT CHANGE UP (ref 0.5–1.3)
GLUCOSE SERPL-MCNC: 96 MG/DL — SIGNIFICANT CHANGE UP (ref 70–99)
HCT VFR BLD CALC: 33.7 % — LOW (ref 39–50)
HGB BLD-MCNC: 11.2 G/DL — LOW (ref 13–17)
MCHC RBC-ENTMCNC: 33.2 GM/DL — SIGNIFICANT CHANGE UP (ref 32–36)
MCHC RBC-ENTMCNC: 33.8 PG — SIGNIFICANT CHANGE UP (ref 27–34)
MCV RBC AUTO: 101.8 FL — HIGH (ref 80–100)
NRBC # BLD: 4 /100 WBCS — HIGH (ref 0–0)
PLATELET # BLD AUTO: 212 K/UL — SIGNIFICANT CHANGE UP (ref 150–400)
POTASSIUM SERPL-MCNC: 4.3 MMOL/L — SIGNIFICANT CHANGE UP (ref 3.5–5.3)
POTASSIUM SERPL-SCNC: 4.3 MMOL/L — SIGNIFICANT CHANGE UP (ref 3.5–5.3)
PROT SERPL-MCNC: 6.1 G/DL — SIGNIFICANT CHANGE UP (ref 6–8.3)
RBC # BLD: 3.31 M/UL — LOW (ref 4.2–5.8)
RBC # FLD: 15.4 % — HIGH (ref 10.3–14.5)
SODIUM SERPL-SCNC: 142 MMOL/L — SIGNIFICANT CHANGE UP (ref 135–145)
WBC # BLD: 16.67 K/UL — HIGH (ref 3.8–10.5)
WBC # FLD AUTO: 16.67 K/UL — HIGH (ref 3.8–10.5)

## 2021-10-04 PROCEDURE — 99232 SBSQ HOSP IP/OBS MODERATE 35: CPT

## 2021-10-04 PROCEDURE — 93971 EXTREMITY STUDY: CPT | Mod: 26,LT

## 2021-10-04 RX ORDER — LORATADINE 10 MG/1
10 TABLET ORAL DAILY
Refills: 0 | Status: DISCONTINUED | OUTPATIENT
Start: 2021-10-04 | End: 2021-10-04

## 2021-10-04 RX ORDER — FLUTICASONE PROPIONATE 50 MCG
1 SPRAY, SUSPENSION NASAL
Refills: 0 | Status: DISCONTINUED | OUTPATIENT
Start: 2021-10-04 | End: 2021-10-08

## 2021-10-04 RX ORDER — LORATADINE 10 MG/1
10 TABLET ORAL DAILY
Refills: 0 | Status: DISCONTINUED | OUTPATIENT
Start: 2021-10-04 | End: 2021-10-08

## 2021-10-04 RX ADMIN — Medication 500 MILLIGRAM(S): at 23:04

## 2021-10-04 RX ADMIN — Medication 600 MILLIGRAM(S): at 23:31

## 2021-10-04 RX ADMIN — Medication 81 MILLIGRAM(S): at 11:55

## 2021-10-04 RX ADMIN — Medication 1 SPRAY(S): at 10:36

## 2021-10-04 RX ADMIN — Medication 6 MILLIGRAM(S): at 21:08

## 2021-10-04 RX ADMIN — Medication 500 MILLIGRAM(S): at 12:28

## 2021-10-04 RX ADMIN — Medication 1 APPLICATION(S): at 06:28

## 2021-10-04 RX ADMIN — Medication 500 MILLIGRAM(S): at 06:28

## 2021-10-04 RX ADMIN — AMLODIPINE BESYLATE 5 MILLIGRAM(S): 2.5 TABLET ORAL at 06:28

## 2021-10-04 RX ADMIN — PANTOPRAZOLE SODIUM 40 MILLIGRAM(S): 20 TABLET, DELAYED RELEASE ORAL at 06:28

## 2021-10-04 RX ADMIN — MINOCYCLINE HYDROCHLORIDE 100 MILLIGRAM(S): 45 TABLET, EXTENDED RELEASE ORAL at 17:04

## 2021-10-04 RX ADMIN — LORATADINE 10 MILLIGRAM(S): 10 TABLET ORAL at 11:55

## 2021-10-04 RX ADMIN — ATORVASTATIN CALCIUM 80 MILLIGRAM(S): 80 TABLET, FILM COATED ORAL at 21:08

## 2021-10-04 RX ADMIN — Medication 1 MILLIGRAM(S): at 11:55

## 2021-10-04 RX ADMIN — ENOXAPARIN SODIUM 40 MILLIGRAM(S): 100 INJECTION SUBCUTANEOUS at 11:56

## 2021-10-04 RX ADMIN — MINOCYCLINE HYDROCHLORIDE 100 MILLIGRAM(S): 45 TABLET, EXTENDED RELEASE ORAL at 06:28

## 2021-10-04 RX ADMIN — Medication 40 MILLIGRAM(S): at 06:28

## 2021-10-04 RX ADMIN — OLANZAPINE 10 MILLIGRAM(S): 15 TABLET, FILM COATED ORAL at 21:08

## 2021-10-04 RX ADMIN — Medication 500 MILLIGRAM(S): at 17:04

## 2021-10-04 RX ADMIN — Medication 1 APPLICATION(S): at 17:05

## 2021-10-05 LAB
GRAM STN FLD: SIGNIFICANT CHANGE UP
SPECIMEN SOURCE: SIGNIFICANT CHANGE UP

## 2021-10-05 PROCEDURE — 99233 SBSQ HOSP IP/OBS HIGH 50: CPT

## 2021-10-05 PROCEDURE — 99232 SBSQ HOSP IP/OBS MODERATE 35: CPT

## 2021-10-05 PROCEDURE — 99232 SBSQ HOSP IP/OBS MODERATE 35: CPT | Mod: 25

## 2021-10-05 PROCEDURE — 10060 I&D ABSCESS SIMPLE/SINGLE: CPT

## 2021-10-05 RX ORDER — LIDOCAINE HCL 20 MG/ML
20 VIAL (ML) INJECTION ONCE
Refills: 0 | Status: COMPLETED | OUTPATIENT
Start: 2021-10-05 | End: 2021-10-05

## 2021-10-05 RX ORDER — GUAIFENESIN/DEXTROMETHORPHAN 600MG-30MG
10 TABLET, EXTENDED RELEASE 12 HR ORAL EVERY 6 HOURS
Refills: 0 | Status: DISCONTINUED | OUTPATIENT
Start: 2021-10-05 | End: 2021-10-06

## 2021-10-05 RX ADMIN — Medication 1 SPRAY(S): at 17:18

## 2021-10-05 RX ADMIN — Medication 40 MILLIGRAM(S): at 06:19

## 2021-10-05 RX ADMIN — MINOCYCLINE HYDROCHLORIDE 100 MILLIGRAM(S): 45 TABLET, EXTENDED RELEASE ORAL at 17:17

## 2021-10-05 RX ADMIN — AMLODIPINE BESYLATE 5 MILLIGRAM(S): 2.5 TABLET ORAL at 06:19

## 2021-10-05 RX ADMIN — Medication 10 MILLILITER(S): at 15:37

## 2021-10-05 RX ADMIN — LORATADINE 10 MILLIGRAM(S): 10 TABLET ORAL at 12:16

## 2021-10-05 RX ADMIN — OLANZAPINE 10 MILLIGRAM(S): 15 TABLET, FILM COATED ORAL at 21:45

## 2021-10-05 RX ADMIN — ENOXAPARIN SODIUM 40 MILLIGRAM(S): 100 INJECTION SUBCUTANEOUS at 12:16

## 2021-10-05 RX ADMIN — Medication 6 MILLIGRAM(S): at 21:45

## 2021-10-05 RX ADMIN — ATORVASTATIN CALCIUM 80 MILLIGRAM(S): 80 TABLET, FILM COATED ORAL at 21:45

## 2021-10-05 RX ADMIN — Medication 1 APPLICATION(S): at 06:21

## 2021-10-05 RX ADMIN — Medication 10 MILLILITER(S): at 23:45

## 2021-10-05 RX ADMIN — Medication 600 MILLIGRAM(S): at 06:19

## 2021-10-05 RX ADMIN — Medication 1 SPRAY(S): at 06:20

## 2021-10-05 RX ADMIN — Medication 500 MILLIGRAM(S): at 17:17

## 2021-10-05 RX ADMIN — Medication 500 MILLIGRAM(S): at 23:45

## 2021-10-05 RX ADMIN — Medication 1 MILLIGRAM(S): at 12:16

## 2021-10-05 RX ADMIN — PANTOPRAZOLE SODIUM 40 MILLIGRAM(S): 20 TABLET, DELAYED RELEASE ORAL at 06:19

## 2021-10-05 RX ADMIN — Medication 20 MILLILITER(S): at 15:15

## 2021-10-05 RX ADMIN — Medication 500 MILLIGRAM(S): at 12:15

## 2021-10-05 RX ADMIN — Medication 500 MILLIGRAM(S): at 06:19

## 2021-10-05 RX ADMIN — MINOCYCLINE HYDROCHLORIDE 100 MILLIGRAM(S): 45 TABLET, EXTENDED RELEASE ORAL at 06:19

## 2021-10-05 RX ADMIN — Medication 81 MILLIGRAM(S): at 12:15

## 2021-10-05 NOTE — PROCEDURE NOTE - ADDITIONAL PROCEDURE DETAILS
Aspiration confirmed purulent content. Wrapped in Ace bandage to help with hemostasis. Cavity bluntly cleared in superomedial and inferomedial directions.

## 2021-10-05 NOTE — PROCEDURE NOTE - NSICDXPROCEDURE_GEN_ALL_CORE_FT
PROCEDURES:  Incision and drainage of abscess of left upper extremity 05-Oct-2021 18:05:01  Jakob Ni

## 2021-10-05 NOTE — CONSULT NOTE ADULT - ASSESSMENT
Impression/Plan:  SARAHI BARNHART is a 65yo man with .     --------------------------------------------------------    HPI:  SARAHI BARNHART is a 65yo man for whom a surgical consult for requested for evaluation of a left arm mass. He was originally admitted to [facility] for right-sided weakness, aphasia, and confusion. His workup was suggestive of autoimmune encephalitis and he was treated with steroids and IVIG. He was transferred to [facility] on 10/1. During his stay, left arm swelling and a mass in his left arm antecubital fossa was noted.     Medical Hx:  Osteoporosis  CLL (Chronic Lymphocytic Leukemia)  Avascular Necrosis of Femur Head  TMJ Syndrome  Herniated Cervical Disc  Melanoma  Deviated nasal septum  Rosacea  pAFIB not on AC    Surgical Hx:  Splenectomy  Abnormal Jaw Closure  Status Post Eye Surgery X 3  Tonsillectomy  Bilateral cataracts    Medications:  acetaminophen 325 mg oral tablet: 2 tab(s) orally every 6 hours, As needed, Mild Pain (1 - 3), Moderate Pain (4 - 6), Severe Pain (7 - 10)  amLODIPine 5 mg oral tablet: 1 tab(s) orally once a day  aspirin 81 mg oral tablet, chewable: 1 tab(s) orally once a day  atorvastatin 80 mg oral tablet: 1 tab(s) orally once a day (at bedtime)  bacitracin 500 units/g topical ointment: 1 application topically 2 times a day  cephalexin 500 mg oral capsule: 1 cap(s) orally 4 times a day, till 10/6/21  enoxaparin: 40 unit(s) subcutaneous once a day  folic acid 1 mg oral tablet: 1 tab(s) orally once a day  melatonin 10 mg oral tablet: 1 tab(s) orally once a day (at bedtime)  minocycline 100 mg oral capsule: 1 cap(s) orally 2 times a day, till 10/6/21  OLANZapine 10 mg oral tablet: 1 tab(s) orally once a day (at bedtime)  pantoprazole 40 mg oral delayed release tablet: 1 tab(s) orally once a day (before a meal)  predniSONE 10 mg oral tablet: 6 tab(s) orally once a day till 10/2 then 4 tabs x 7days then 2 tabs x7days then 1 tab x 7 days     Allergies:  No Known Allergies    Social Hx:  No tobacco use, excessive ETOH use, or illicit drug use.     Family Hx:  Family history of multiple myeloma, malignant melanoma, blood dyscrasia, hypertension, renal failure    ROS:  A 14-point review of systems was unremarkable except as listed in the HPI.    Objective:   T(C): 36.6 (10-05-21 @ 08:25), Max: 36.7 (10-04-21 @ 19:56)  HR: 86 (10-05-21 @ 08:25) (66 - 86)  BP: 128/78 (10-05-21 @ 08:25) (128/78 - 153/83)  RR: 16 (10-05-21 @ 08:25) (16 - 16)  SpO2: 98% (10-05-21 @ 08:25) (95% - 98%)    Physical Exam  Constitutional: No distress  Eyes: EOMI; PERRL; no drainage or redness  ENMT: No oral lesions; no gross abnormalities  Neck: No bruits; no thyromegaly or nodules  Breasts: Not examined  Back: No deformity or limitation of movement  Respiratory: Breath Sounds equal & clear to percussion & auscultation, no accessory muscle use  Cardiovascular: Regular rate & rhythm, normal S1, S2; no murmurs, gallops or rubs; no S3, S4  Gastrointestinal: Soft, non-tender, no hepatosplenomegaly, normal bowel sounds  Genitourinary: Not examined  Rectal: Not examined  Extremities: No cyanosis, clubbing or edema  Vascular: Equal and normal pulses (carotid, femoral, dorsalis pedis)  Neurological: Alert & oriented; no sensory, motor or coordination deficits, normal reflexes  Skin: No lesions; no rash  Lymph Nodes: No lymphadedenopathy  Musculoskeletal: No joint pain, swelling or deformity; no limitation of movement  Psychiatric: Affect and characteristics of appearance, verbalizations, behaviors are appropriate    Laboratory Data    Imaging Data  UPPER EXTREMITY DUPLEX 10/5 IMPRESSION:  No evidence of left upper extremity deep venous thrombosis. There is a 3.6 x 2.3 x 0.9 cm nonvascular hypoechoic region superior to the antecubital fossa, medial aspect, with associated skin thickening, possible complicated sebaceous cyst/epidermal inclusion cyst. Localized infection cannot be excluded. Clinical correlation is suggested.       Impression/Plan:  SARAHI BARNHART is a 65yo man with a left upper extremity infected sebaceous cyst.  - Incised and drained at bedside. Packing inserted. Pressure dressing applied.   - Will aim to remove packing tomorrow.   - Continue Keflex. F/u cultures - if sensitive, can stop Keflex after total of 4 days from today from the abscess perspective. (Continue for cellulitis of R elbow as indicated)     --------------------------------------------------------    HPI:  SARAHI BARNHART is a 65yo man for whom a surgical consult for requested for evaluation of a left arm mass. He was originally admitted to [facility] for right-sided weakness, aphasia, and confusion. His workup was suggestive of autoimmune encephalitis and he was treated with steroids and IVIG. He was transferred to [facility] on 10/1. During his stay, left arm swelling and a mass in his left arm antecubital fossa was noted which has gradually increased in size. There is no discharge or cellulitic changes to the wound. No fevers or chills. No other major symptoms at this time. Has been able to transfer to bed using his own power.     Medical Hx:  Osteoporosis  CLL (Chronic Lymphocytic Leukemia)  Avascular Necrosis of Femur Head  TMJ Syndrome  Herniated Cervical Disc  Melanoma  Deviated nasal septum  Rosacea  pAFIB not on AC    Surgical Hx:  Splenectomy  Abnormal Jaw Closure  Status Post Eye Surgery X 3  Tonsillectomy  Bilateral cataracts    Medications:  acetaminophen 325 mg oral tablet: 2 tab(s) orally every 6 hours, As needed, Mild Pain (1 - 3), Moderate Pain (4 - 6), Severe Pain (7 - 10)  amLODIPine 5 mg oral tablet: 1 tab(s) orally once a day  aspirin 81 mg oral tablet, chewable: 1 tab(s) orally once a day  atorvastatin 80 mg oral tablet: 1 tab(s) orally once a day (at bedtime)  bacitracin 500 units/g topical ointment: 1 application topically 2 times a day  cephalexin 500 mg oral capsule: 1 cap(s) orally 4 times a day, till 10/6/21  enoxaparin: 40 unit(s) subcutaneous once a day  folic acid 1 mg oral tablet: 1 tab(s) orally once a day  melatonin 10 mg oral tablet: 1 tab(s) orally once a day (at bedtime)  minocycline 100 mg oral capsule: 1 cap(s) orally 2 times a day, till 10/6/21  OLANZapine 10 mg oral tablet: 1 tab(s) orally once a day (at bedtime)  pantoprazole 40 mg oral delayed release tablet: 1 tab(s) orally once a day (before a meal)  predniSONE 10 mg oral tablet: 6 tab(s) orally once a day till 10/2 then 4 tabs x 7days then 2 tabs x7days then 1 tab x 7 days     Allergies:  No Known Allergies    Social Hx:  No tobacco use, excessive ETOH use, or illicit drug use.     Family Hx:  Family history of multiple myeloma, malignant melanoma, blood dyscrasia, hypertension, renal failure    ROS:  A 14-point review of systems was unremarkable except as listed in the HPI.    Objective:   T(C): 36.6 (10-05-21 @ 08:25), Max: 36.7 (10-04-21 @ 19:56)  HR: 86 (10-05-21 @ 08:25) (66 - 86)  BP: 128/78 (10-05-21 @ 08:25) (128/78 - 153/83)  RR: 16 (10-05-21 @ 08:25) (16 - 16)  SpO2: 98% (10-05-21 @ 08:25) (95% - 98%)    Physical Exam  Constitutional: No distress  Eyes: EOMI; PERRL; no drainage or redness  ENMT: No oral lesions; no gross abnormalities  Neck: No bruits; no thyromegaly or nodules  Breasts: Not examined  Back: No deformity or limitation of movement  Respiratory: Breath Sounds equal & clear to percussion & auscultation, no accessory muscle use  Cardiovascular: Regular rate & rhythm, normal S1, S2; no murmurs, gallops or rubs; no S3, S4  Gastrointestinal: Soft, non-tender, no hepatosplenomegaly, normal bowel sounds  Genitourinary: Not examined  Rectal: Not examined  Extremities: Rash along elbows. On upper LUW, fluctuance approx. 4cm area of fluctuance with a punctum along the lateral portion. No significant ecchymosis or erythema.   Vascular: Equal and normal pulses (carotid, femoral, dorsalis pedis)  Neurological: Alert & oriented; no sensory, motor or coordination deficits, normal reflexes  Skin: Healing areas of ecchymosis along elbows.   Lymph Nodes: No lymphadedenopathy  Musculoskeletal: No joint pain, swelling or deformity; no limitation of movement  Psychiatric: Affect and characteristics of appearance, verbalizations, behaviors are appropriate    Laboratory Data  NA    Imaging Data  UPPER EXTREMITY DUPLEX 10/5 IMPRESSION:  No evidence of left upper extremity deep venous thrombosis. There is a 3.6 x 2.3 x 0.9 cm nonvascular hypoechoic region superior to the antecubital fossa, medial aspect, with associated skin thickening, possible complicated sebaceous cyst/epidermal inclusion cyst. Localized infection cannot be excluded. Clinical correlation is suggested.

## 2021-10-06 ENCOUNTER — TRANSCRIPTION ENCOUNTER (OUTPATIENT)
Age: 66
End: 2021-10-06

## 2021-10-06 PROCEDURE — 74230 X-RAY XM SWLNG FUNCJ C+: CPT | Mod: 26

## 2021-10-06 PROCEDURE — 99232 SBSQ HOSP IP/OBS MODERATE 35: CPT

## 2021-10-06 PROCEDURE — 99233 SBSQ HOSP IP/OBS HIGH 50: CPT

## 2021-10-06 RX ADMIN — Medication 500 MILLIGRAM(S): at 11:30

## 2021-10-06 RX ADMIN — Medication 500 MILLIGRAM(S): at 17:04

## 2021-10-06 RX ADMIN — Medication 100 MILLIGRAM(S): at 21:47

## 2021-10-06 RX ADMIN — Medication 1 APPLICATION(S): at 06:07

## 2021-10-06 RX ADMIN — OLANZAPINE 10 MILLIGRAM(S): 15 TABLET, FILM COATED ORAL at 21:47

## 2021-10-06 RX ADMIN — LORATADINE 10 MILLIGRAM(S): 10 TABLET ORAL at 11:31

## 2021-10-06 RX ADMIN — Medication 81 MILLIGRAM(S): at 11:30

## 2021-10-06 RX ADMIN — ENOXAPARIN SODIUM 40 MILLIGRAM(S): 100 INJECTION SUBCUTANEOUS at 11:31

## 2021-10-06 RX ADMIN — PANTOPRAZOLE SODIUM 40 MILLIGRAM(S): 20 TABLET, DELAYED RELEASE ORAL at 06:03

## 2021-10-06 RX ADMIN — AMLODIPINE BESYLATE 5 MILLIGRAM(S): 2.5 TABLET ORAL at 06:03

## 2021-10-06 RX ADMIN — Medication 500 MILLIGRAM(S): at 23:34

## 2021-10-06 RX ADMIN — Medication 1 APPLICATION(S): at 17:04

## 2021-10-06 RX ADMIN — Medication 1 SPRAY(S): at 06:03

## 2021-10-06 RX ADMIN — Medication 100 MILLIGRAM(S): at 11:33

## 2021-10-06 RX ADMIN — Medication 1 MILLIGRAM(S): at 11:31

## 2021-10-06 RX ADMIN — Medication 1 SPRAY(S): at 17:04

## 2021-10-06 RX ADMIN — Medication 40 MILLIGRAM(S): at 06:03

## 2021-10-06 RX ADMIN — ATORVASTATIN CALCIUM 80 MILLIGRAM(S): 80 TABLET, FILM COATED ORAL at 21:47

## 2021-10-06 RX ADMIN — MINOCYCLINE HYDROCHLORIDE 100 MILLIGRAM(S): 45 TABLET, EXTENDED RELEASE ORAL at 17:04

## 2021-10-06 RX ADMIN — MINOCYCLINE HYDROCHLORIDE 100 MILLIGRAM(S): 45 TABLET, EXTENDED RELEASE ORAL at 06:03

## 2021-10-06 RX ADMIN — Medication 6 MILLIGRAM(S): at 21:47

## 2021-10-06 RX ADMIN — Medication 500 MILLIGRAM(S): at 06:03

## 2021-10-06 NOTE — DISCHARGE NOTE NURSING/CASE MANAGEMENT/SOCIAL WORK - NSDCFUADDAPPT_GEN_ALL_CORE_FT
Outpatient therapy list provided to pt spouse.  [facility] list provided to pt spouse.   Outpatient therapy list provided to pt spouse.  [facility] list provided to pt spouse.

## 2021-10-06 NOTE — DISCHARGE NOTE NURSING/CASE MANAGEMENT/SOCIAL WORK - PATIENT PORTAL LINK FT
You can access the FollowMyHealth Patient Portal offered by [facility] by registering at the following website: [email or web address]. By joining [employer]’s FollowMyHealth portal, you will also be able to view your health information using other applications (apps) compatible with our system.

## 2021-10-07 ENCOUNTER — TRANSCRIPTION ENCOUNTER (OUTPATIENT)
Age: 66
End: 2021-10-07

## 2021-10-07 LAB
-  AMPICILLIN/SULBACTAM: SIGNIFICANT CHANGE UP
-  CEFAZOLIN: SIGNIFICANT CHANGE UP
-  CLINDAMYCIN: SIGNIFICANT CHANGE UP
-  DAPTOMYCIN: SIGNIFICANT CHANGE UP
-  ERYTHROMYCIN: SIGNIFICANT CHANGE UP
-  GENTAMICIN: SIGNIFICANT CHANGE UP
-  LINEZOLID: SIGNIFICANT CHANGE UP
-  OXACILLIN: SIGNIFICANT CHANGE UP
-  PENICILLIN: SIGNIFICANT CHANGE UP
-  RIFAMPIN: SIGNIFICANT CHANGE UP
-  TETRACYCLINE: SIGNIFICANT CHANGE UP
-  TRIMETHOPRIM/SULFAMETHOXAZOLE: SIGNIFICANT CHANGE UP
-  VANCOMYCIN: SIGNIFICANT CHANGE UP
ALBUMIN SERPL ELPH-MCNC: 3.5 G/DL — SIGNIFICANT CHANGE UP (ref 3.3–5)
ALP SERPL-CCNC: 106 U/L — SIGNIFICANT CHANGE UP (ref 40–120)
ALT FLD-CCNC: 78 U/L — HIGH (ref 10–45)
ANION GAP SERPL CALC-SCNC: 7 MMOL/L — SIGNIFICANT CHANGE UP (ref 5–17)
AST SERPL-CCNC: 26 U/L — SIGNIFICANT CHANGE UP (ref 10–40)
BILIRUB SERPL-MCNC: 1.5 MG/DL — HIGH (ref 0.2–1.2)
BUN SERPL-MCNC: 23 MG/DL — SIGNIFICANT CHANGE UP (ref 7–23)
CALCIUM SERPL-MCNC: 9.6 MG/DL — SIGNIFICANT CHANGE UP (ref 8.4–10.5)
CHLORIDE SERPL-SCNC: 100 MMOL/L — SIGNIFICANT CHANGE UP (ref 96–108)
CO2 SERPL-SCNC: 35 MMOL/L — HIGH (ref 22–31)
CREAT SERPL-MCNC: 1.14 MG/DL — SIGNIFICANT CHANGE UP (ref 0.5–1.3)
GLUCOSE SERPL-MCNC: 106 MG/DL — HIGH (ref 70–99)
HCT VFR BLD CALC: 38.4 % — LOW (ref 39–50)
HGB BLD-MCNC: 12.6 G/DL — LOW (ref 13–17)
MCHC RBC-ENTMCNC: 32.8 GM/DL — SIGNIFICANT CHANGE UP (ref 32–36)
MCHC RBC-ENTMCNC: 34.1 PG — HIGH (ref 27–34)
MCV RBC AUTO: 104.1 FL — HIGH (ref 80–100)
METHOD TYPE: SIGNIFICANT CHANGE UP
NRBC # BLD: 4 /100 WBCS — HIGH (ref 0–0)
PLATELET # BLD AUTO: 237 K/UL — SIGNIFICANT CHANGE UP (ref 150–400)
POTASSIUM SERPL-MCNC: 4.1 MMOL/L — SIGNIFICANT CHANGE UP (ref 3.5–5.3)
POTASSIUM SERPL-SCNC: 4.1 MMOL/L — SIGNIFICANT CHANGE UP (ref 3.5–5.3)
PROT SERPL-MCNC: 7.3 G/DL — SIGNIFICANT CHANGE UP (ref 6–8.3)
RBC # BLD: 3.69 M/UL — LOW (ref 4.2–5.8)
RBC # FLD: 16.1 % — HIGH (ref 10.3–14.5)
SARS-COV-2 RNA SPEC QL NAA+PROBE: SIGNIFICANT CHANGE UP
SODIUM SERPL-SCNC: 142 MMOL/L — SIGNIFICANT CHANGE UP (ref 135–145)
WBC # BLD: 15.39 K/UL — HIGH (ref 3.8–10.5)
WBC # FLD AUTO: 15.39 K/UL — HIGH (ref 3.8–10.5)

## 2021-10-07 PROCEDURE — 99232 SBSQ HOSP IP/OBS MODERATE 35: CPT

## 2021-10-07 RX ORDER — OLANZAPINE 15 MG/1
1 TABLET, FILM COATED ORAL
Qty: 30 | Refills: 0
Start: 2021-10-07 | End: 2021-11-05

## 2021-10-07 RX ORDER — LANOLIN ALCOHOL/MO/W.PET/CERES
2 CREAM (GRAM) TOPICAL
Qty: 0 | Refills: 0 | DISCHARGE
Start: 2021-10-07

## 2021-10-07 RX ORDER — AMLODIPINE BESYLATE 2.5 MG/1
1 TABLET ORAL
Qty: 0 | Refills: 0 | DISCHARGE
Start: 2021-10-07

## 2021-10-07 RX ORDER — ATORVASTATIN CALCIUM 80 MG/1
1 TABLET, FILM COATED ORAL
Qty: 0 | Refills: 0 | DISCHARGE
Start: 2021-10-07

## 2021-10-07 RX ORDER — ASPIRIN/CALCIUM CARB/MAGNESIUM 324 MG
1 TABLET ORAL
Qty: 0 | Refills: 0 | DISCHARGE
Start: 2021-10-07

## 2021-10-07 RX ORDER — PANTOPRAZOLE SODIUM 20 MG/1
1 TABLET, DELAYED RELEASE ORAL
Qty: 30 | Refills: 0
Start: 2021-10-07 | End: 2021-11-05

## 2021-10-07 RX ORDER — SENNA PLUS 8.6 MG/1
2 TABLET ORAL
Qty: 0 | Refills: 0 | DISCHARGE
Start: 2021-10-07

## 2021-10-07 RX ORDER — CEPHALEXIN 500 MG
500 CAPSULE ORAL
Refills: 0 | Status: DISCONTINUED | OUTPATIENT
Start: 2021-10-08 | End: 2021-10-08

## 2021-10-07 RX ORDER — BACITRACIN ZINC 500 UNIT/G
1 OINTMENT IN PACKET (EA) TOPICAL
Qty: 0 | Refills: 0 | DISCHARGE
Start: 2021-10-07

## 2021-10-07 RX ORDER — LORATADINE 10 MG/1
1 TABLET ORAL
Qty: 30 | Refills: 0
Start: 2021-10-07 | End: 2021-11-05

## 2021-10-07 RX ORDER — CEPHALEXIN 500 MG
1 CAPSULE ORAL
Qty: 12 | Refills: 0
Start: 2021-10-07 | End: 2021-10-09

## 2021-10-07 RX ORDER — FLUTICASONE PROPIONATE 50 MCG
1 SPRAY, SUSPENSION NASAL
Qty: 0 | Refills: 0 | DISCHARGE
Start: 2021-10-07

## 2021-10-07 RX ORDER — FOLIC ACID 0.8 MG
1 TABLET ORAL
Qty: 30 | Refills: 0
Start: 2021-10-07 | End: 2021-11-05

## 2021-10-07 RX ADMIN — Medication 1 SPRAY(S): at 06:13

## 2021-10-07 RX ADMIN — Medication 1 APPLICATION(S): at 17:40

## 2021-10-07 RX ADMIN — AMLODIPINE BESYLATE 5 MILLIGRAM(S): 2.5 TABLET ORAL at 06:13

## 2021-10-07 RX ADMIN — Medication 500 MILLIGRAM(S): at 06:13

## 2021-10-07 RX ADMIN — Medication 500 MILLIGRAM(S): at 11:50

## 2021-10-07 RX ADMIN — ENOXAPARIN SODIUM 40 MILLIGRAM(S): 100 INJECTION SUBCUTANEOUS at 11:50

## 2021-10-07 RX ADMIN — Medication 100 MILLIGRAM(S): at 06:13

## 2021-10-07 RX ADMIN — MINOCYCLINE HYDROCHLORIDE 100 MILLIGRAM(S): 45 TABLET, EXTENDED RELEASE ORAL at 06:12

## 2021-10-07 RX ADMIN — Medication 1 SPRAY(S): at 17:39

## 2021-10-07 RX ADMIN — Medication 81 MILLIGRAM(S): at 11:50

## 2021-10-07 RX ADMIN — LORATADINE 10 MILLIGRAM(S): 10 TABLET ORAL at 11:50

## 2021-10-07 RX ADMIN — Medication 1 APPLICATION(S): at 06:15

## 2021-10-07 RX ADMIN — OLANZAPINE 10 MILLIGRAM(S): 15 TABLET, FILM COATED ORAL at 21:37

## 2021-10-07 RX ADMIN — Medication 500 MILLIGRAM(S): at 17:39

## 2021-10-07 RX ADMIN — PANTOPRAZOLE SODIUM 40 MILLIGRAM(S): 20 TABLET, DELAYED RELEASE ORAL at 06:13

## 2021-10-07 RX ADMIN — Medication 100 MILLIGRAM(S): at 14:24

## 2021-10-07 RX ADMIN — Medication 40 MILLIGRAM(S): at 06:13

## 2021-10-07 RX ADMIN — ATORVASTATIN CALCIUM 80 MILLIGRAM(S): 80 TABLET, FILM COATED ORAL at 21:37

## 2021-10-07 RX ADMIN — Medication 1 MILLIGRAM(S): at 11:50

## 2021-10-07 RX ADMIN — Medication 6 MILLIGRAM(S): at 21:37

## 2021-10-07 RX ADMIN — Medication 100 MILLIGRAM(S): at 21:37

## 2021-10-07 RX ADMIN — Medication 500 MILLIGRAM(S): at 23:32

## 2021-10-07 RX ADMIN — MINOCYCLINE HYDROCHLORIDE 100 MILLIGRAM(S): 45 TABLET, EXTENDED RELEASE ORAL at 17:39

## 2021-10-07 NOTE — CHART NOTE - NSCHARTNOTEFT_GEN_A_CORE
Nutrition Follow Up Note  Hospital Course (Per Electronic Medical Record): 65 y/o male with PMHx CLL followed by heme/onc, anemia, melanoma, osteopenia admitted for right facial droop, right hemiparesis and expressive aphasia. Initially admitted at [facility] and s/p TPA, now transferred to [facility] on 9/16 for evaluation of his seizures. Patient now with gait Instability, ADL impairments and Functional impairments.  Source: Medical Record [X] Patient [X] Family [ ]         Diet: Regular, Ensure Enlive BID  Pt tolerating diet with good PO intake, pt reports he's received double portions for the last few meals which he is happy about. Pt reports good acceptance of Ensure Enlive supplement. Plan is for discharge tomorrow. Discussed strategies to promote weight gain @ home as pt reports weight loss over the last year. Encouraged small, frequent meals of high calorie, nutrient dense foods (nuts/nut butter, Ensure / Muscle Milk /protein drinks if needed). Pt reports avoiding high salt foods and has been trying to eat more plant-based at home. Denies nausea, vomiting, constipation, diarrhea.     Enteral/Parenteral Nutrition: N/A    Current Weight: 138.8 lbs (10/6)    Pertinent Medications: MEDICATIONS  (STANDING):  amLODIPine   Tablet 5 milliGRAM(s) Oral daily  aspirin  chewable 81 milliGRAM(s) Oral daily  atorvastatin 80 milliGRAM(s) Oral at bedtime  BACItracin   Ointment 1 Application(s) Topical two times a day  benzonatate 100 milliGRAM(s) Oral three times a day  cephalexin 500 milliGRAM(s) Oral four times a day  enoxaparin Injectable 40 milliGRAM(s) SubCutaneous daily  fluticasone propionate 50 MICROgram(s)/spray Nasal Spray 1 Spray(s) Both Nostrils two times a day  folic acid 1 milliGRAM(s) Oral daily  influenza   Vaccine 0.5 milliLiter(s) IntraMuscular once  loratadine 10 milliGRAM(s) Oral daily  melatonin 6 milliGRAM(s) Oral at bedtime  minocycline 100 milliGRAM(s) Oral two times a day  OLANZapine 10 milliGRAM(s) Oral at bedtime  pantoprazole    Tablet 40 milliGRAM(s) Oral before breakfast  predniSONE   Tablet 40 milliGRAM(s) Oral daily    MEDICATIONS  (PRN):  acetaminophen   Tablet .. 650 milliGRAM(s) Oral every 6 hours PRN Mild Pain (1 - 3), Moderate Pain (4 - 6), Severe Pain (7 - 10)  OLANZapine Injectable 2.5 milliGRAM(s) IntraMuscular every 4 hours PRN Agitation  senna 2 Tablet(s) Oral at bedtime PRN Constipation      Pertinent Labs:  10-07 Na142 mmol/L Glu 106 mg/dL<H> K+ 4.1 mmol/L Cr  1.14 mg/dL BUN 23 mg/dL 10-07 Alb 3.5 g/dL 09-19 Chol --    LDL --    HDL --    Trig 160 mg/dL<H>        Skin: LUE cyst s/p I&D - dsg c/d/i, scabs per nursing flow sheets    Edema: No edema per nursing flow sheets     Last BM: on 10/6 per nursing flow sheets    Estimated Needs:   [X] No Change since Previous Assessment  [ ] Recalculated:     Previous Nutrition Diagnosis:   Severe malnutrition    Nutrition Diagnosis is [X] Ongoing    [ ] Resolved   [ ] Not Applicable      New Nutrition Diagnosis: [X] Not Applicable  [ ] Inadequate Protein Energy Intake   [ ] Inadequate Oral Intake   [ ] Excessive Energy Intake   [ ] Increased Nutrient Needs   [ ] Obesity   [ ] Altered GI Function   [ ] Unintended Weight Loss   [ ] Food & Nutrition Related Knowledge Deficit  [ ] Limited Adherence to nutrition related recommendations   [ ] Malnutrition      Interventions:   1. Recommend continuing with current plan of care  2. Encourage PO intake  3. Provided with verbal nutrition education on high calorie/high protein, frequent small meals     Monitoring & Evaluation:   [X] Weights   [X] PO Intake   [X] Follow Up (Per Protocol)  [X] Tolerance to Diet Prescription   [X] Other: Labs    RD Remains Available.  Rut Salcedo RD

## 2021-10-07 NOTE — DISCHARGE NOTE PROVIDER - NSDCCPCAREPLAN_GEN_ALL_CORE_FT
PRINCIPAL DISCHARGE DIAGNOSIS  Diagnosis: Autoimmune encephalitis  Assessment and Plan of Treatment:       SECONDARY DISCHARGE DIAGNOSES  Diagnosis: CLL (chronic lymphocytic leukemia)  Assessment and Plan of Treatment:     Diagnosis: Hypertension  Assessment and Plan of Treatment:     Diagnosis: Cellulitis of right elbow  Assessment and Plan of Treatment:     Diagnosis: Hyperlipidemia  Assessment and Plan of Treatment:     Diagnosis: Paroxysmal atrial fibrillation  Assessment and Plan of Treatment:     Diagnosis: Dermoid cyst of arm, left  Assessment and Plan of Treatment:      PRINCIPAL DISCHARGE DIAGNOSIS  Diagnosis: Autoimmune encephalitis  Assessment and Plan of Treatment: You had a CT of your head on which did not show an acute bleed  You had an  MRI which was negative for any infarct  - Continue ASA 81mg  - Follow up with your oncologist for further management and need for IVIG.   Continue Prednisone 40mg once a day until 10/9   on 10/10 start 20mg once a day for 7 days  on 10/17 start 10mg once a day for 7 days  on 10/24 stop taking prednisone.   (you were given extra tablets until you can follow up with your oncologist for further management )         SECONDARY DISCHARGE DIAGNOSES  Diagnosis: CLL (chronic lymphocytic leukemia)  Assessment and Plan of Treatment: Continue follow up with your oncologist for management of your CLL    Diagnosis: Hypertension  Assessment and Plan of Treatment: Continue norvasc 5mg daily  Follow up with your PMD for further management    Diagnosis: Cellulitis of right elbow  Assessment and Plan of Treatment: You have finished your course of antibiotics for treatment of your cellulitis.  Continue bacitracin twice a day to the right elbow   Please follow up with your PMD for further management within 1 week    Diagnosis: Hyperlipidemia  Assessment and Plan of Treatment: Continue Lipitor 80mg daily    Diagnosis: Paroxysmal atrial fibrillation  Assessment and Plan of Treatment: Follow up with your cardiologist for further management.    Diagnosis: Dermoid cyst of arm, left  Assessment and Plan of Treatment: You had a left arm duplex which was negative for DVT but shows a cyst  -I&D performed on 10/5 with packing and dressing in place  - Continue Keflex 500 mg every 6 hours until 10/10  - Follow up with surgeon for in 2 weeks for further management   Change dressing daily and keep covered with gauze.     PRINCIPAL DISCHARGE DIAGNOSIS  Diagnosis: Autoimmune encephalitis  Assessment and Plan of Treatment: You had a CT of your head on which did not show an acute bleed  You had an  MRI which was negative for any infarct  - Continue ASA 81mg  - Follow up with your oncologist for further management and need for IVIG.   Continue Prednisone 40mg once a day until 10/9   on 10/10 start 20mg once a day for 7 days  on 10/17 start 10mg once a day for 7 days  on 10/24 stop taking prednisone.   (you were given extra tablets until you can follow up with your oncologist for further management )         SECONDARY DISCHARGE DIAGNOSES  Diagnosis: CLL (chronic lymphocytic leukemia)  Assessment and Plan of Treatment: Continue follow up with your oncologist for management of your CLL    Diagnosis: Hypertension  Assessment and Plan of Treatment: Continue norvasc 5mg daily  Follow up with your PMD for further management    Diagnosis: Cellulitis of right elbow  Assessment and Plan of Treatment: You have finished your course of antibiotics for treatment of your cellulitis.  Continue bacitracin twice a day to the right elbow   Please follow up with your PMD for further management within 1 week    Diagnosis: Hyperlipidemia  Assessment and Plan of Treatment: Continue Lipitor 80mg daily    Diagnosis: Paroxysmal atrial fibrillation  Assessment and Plan of Treatment: Follow up with your cardiologist for further management.    Diagnosis: Dermoid cyst of arm, left  Assessment and Plan of Treatment: You had a left arm duplex which was negative for DVT but shows a cyst  I&D performed on 10/5 with packing and dressing in place  Change dressing daily and keep covered with gauze.  - Follow up with surgeon via phone in 1 week.  -Stop taking Keflex - order was sent into your pharmacy but do not pick this up.  - Start taking Bactrim DS 1 tab twice a day for 7 days - you may start this tonight 10/8

## 2021-10-07 NOTE — DISCHARGE NOTE PROVIDER - CARE PROVIDER_API CALL
Gopi Jc)  Cardiovascular Disease; Internal Medicine  [address], [place] [id number]  Phone: [phone]  Fax: [phone]  Follow Up Time: Routine    Jeronimo Godinez  HEMATOLOGY  [address], [address]  [place] [address]  Phone: [phone]  Fax: [phone]  Follow Up Time: 2 weeks    Jakob Ni)  Surgery  [address], [address]  [place], [place] [id number]  Phone: [phone]  Fax: [phone]  Follow Up Time: 2 weeks

## 2021-10-07 NOTE — DISCHARGE NOTE PROVIDER - NSDCMRMEDTOKEN_GEN_ALL_CORE_FT
amLODIPine 5 mg oral tablet: 1 tab(s) orally once a day  aspirin 81 mg oral tablet, chewable: 1 tab(s) orally once a day  atorvastatin 80 mg oral tablet: 1 tab(s) orally once a day (at bedtime)  bacitracin 500 units/g topical ointment: 1 application topically 2 times a day  benzonatate 100 mg oral capsule: 1 cap(s) orally 3 times a day  cephalexin 500 mg oral capsule: 1 cap(s) orally 4 times a day, till 10/10/21  fluticasone 50 mcg/inh nasal spray: 1 spray(s) nasal 2 times a day  folic acid 1 mg oral tablet: 1 tab(s) orally once a day  loratadine 10 mg oral tablet: 1 tab(s) orally once a day  melatonin 3 mg oral tablet: 2 tab(s) orally once a day (at bedtime)  OLANZapine 10 mg oral tablet: 1 tab(s) orally once a day (at bedtime)  pantoprazole 40 mg oral delayed release tablet: 1 tab(s) orally once a day (before a meal)  predniSONE 10 mg oral tablet: 1 tab(s) orally once a day after 20mg dose has finished   predniSONE 20 mg oral tablet: 2 tab(s) orally once a day for 4 more days   predniSONE 20 mg oral tablet: 1 tab(s) orally once a day for 7 days to start when 40mg dose ends   senna oral tablet: 2 tab(s) orally once a day (at bedtime), As needed, Constipation  therapy: Phsyical therapy for balance and unsteady gait TIW for 8 weeks    DX: Encephalitis   Therapy: Occupational therapy for generalized weakness and assistance with ADLs TIW for 8 weeks    DX: Encephalitis   Therapy: Speech and swallow therapy for dysarthria, hyophonia, and dysphagia TIW for 8 weeks    DX: Encephalitis    amLODIPine 5 mg oral tablet: 1 tab(s) orally once a day  aspirin 81 mg oral tablet, chewable: 1 tab(s) orally once a day  atorvastatin 80 mg oral tablet: 1 tab(s) orally once a day (at bedtime)  bacitracin 500 units/g topical ointment: 1 application topically 2 times a day  Bactrim  mg-160 mg oral tablet: 1 tab(s) orally every 12 hours   benzonatate 100 mg oral capsule: 1 cap(s) orally 3 times a day  cephalexin 500 mg oral capsule: 1 cap(s) orally 4 times a day, till 10/10/21  fluticasone 50 mcg/inh nasal spray: 1 spray(s) nasal 2 times a day  folic acid 1 mg oral tablet: 1 tab(s) orally once a day  loratadine 10 mg oral tablet: 1 tab(s) orally once a day  melatonin 3 mg oral tablet: 2 tab(s) orally once a day (at bedtime)  OLANZapine 10 mg oral tablet: 1 tab(s) orally once a day (at bedtime)  pantoprazole 40 mg oral delayed release tablet: 1 tab(s) orally once a day (before a meal)  predniSONE 10 mg oral tablet: 1 tab(s) orally once a day after 20mg dose has finished   predniSONE 20 mg oral tablet: 2 tab(s) orally once a day for 4 more days   predniSONE 20 mg oral tablet: 1 tab(s) orally once a day for 7 days to start when 40mg dose ends   senna oral tablet: 2 tab(s) orally once a day (at bedtime), As needed, Constipation  therapy: Phsyical therapy for balance and unsteady gait TIW for 8 weeks    DX: Encephalitis   Therapy: Occupational therapy for generalized weakness and assistance with ADLs TIW for 8 weeks    DX: Encephalitis   Therapy: Speech and swallow therapy for dysarthria, hyophonia, and dysphagia TIW for 8 weeks    DX: Encephalitis

## 2021-10-07 NOTE — DISCHARGE NOTE PROVIDER - PROVIDER TOKENS
PROVIDER:[TOKEN:[[id number]],FOLLOWUP:[Routine]],PROVIDER:[TOKEN:[[id number]],FOLLOWUP:[2 weeks]],PROVIDER:[TOKEN:[[id number]],FOLLOWUP:[2 weeks]]

## 2021-10-07 NOTE — DISCHARGE NOTE PROVIDER - DETAILS OF MALNUTRITION DIAGNOSIS/DIAGNOSES
This patient has been assessed with a concern for Malnutrition and was treated during this hospitalization for the following Nutrition diagnosis/diagnoses:     -  10/02/2021: Severe protein-calorie malnutrition

## 2021-10-08 VITALS
OXYGEN SATURATION: 95 % | TEMPERATURE: 98 F | SYSTOLIC BLOOD PRESSURE: 112 MMHG | HEART RATE: 74 BPM | DIASTOLIC BLOOD PRESSURE: 87 MMHG | RESPIRATION RATE: 16 BRPM

## 2021-10-08 PROCEDURE — 99232 SBSQ HOSP IP/OBS MODERATE 35: CPT

## 2021-10-08 PROCEDURE — 99238 HOSP IP/OBS DSCHRG MGMT 30/<: CPT

## 2021-10-08 RX ORDER — AZTREONAM 2 G
1 VIAL (EA) INJECTION
Qty: 14 | Refills: 0
Start: 2021-10-08 | End: 2021-10-14

## 2021-10-08 RX ADMIN — Medication 81 MILLIGRAM(S): at 11:37

## 2021-10-08 RX ADMIN — Medication 100 MILLIGRAM(S): at 06:20

## 2021-10-08 RX ADMIN — Medication 40 MILLIGRAM(S): at 06:21

## 2021-10-08 RX ADMIN — Medication 1 SPRAY(S): at 06:21

## 2021-10-08 RX ADMIN — Medication 1 MILLIGRAM(S): at 11:38

## 2021-10-08 RX ADMIN — AMLODIPINE BESYLATE 5 MILLIGRAM(S): 2.5 TABLET ORAL at 06:21

## 2021-10-08 RX ADMIN — LORATADINE 10 MILLIGRAM(S): 10 TABLET ORAL at 11:39

## 2021-10-08 RX ADMIN — MINOCYCLINE HYDROCHLORIDE 100 MILLIGRAM(S): 45 TABLET, EXTENDED RELEASE ORAL at 06:20

## 2021-10-08 RX ADMIN — Medication 1 APPLICATION(S): at 06:22

## 2021-10-08 RX ADMIN — INFLUENZA VIRUS VACCINE 0.5 MILLILITER(S): 15; 15; 15; 15 SUSPENSION INTRAMUSCULAR at 11:39

## 2021-10-08 RX ADMIN — PANTOPRAZOLE SODIUM 40 MILLIGRAM(S): 20 TABLET, DELAYED RELEASE ORAL at 06:21

## 2021-10-08 RX ADMIN — Medication 500 MILLIGRAM(S): at 06:20

## 2021-10-08 NOTE — PROGRESS NOTE ADULT - ATTENDING COMMENTS
Left UE abscess was drained by surgery - continue PO ABX  Prednisone pater  Spoke with wife in detail, comprehensive update given   > 35 min spent
No acute events overnight  Stable neurologically, making functional gains    Multidisciplinary team meeting today:  patient's functional goals and needs, functional and clinical  progress were discussed, barriers to discharge were identified. Anticipate discharge home with home care, 24/7 supervision for safety.     EDOD 10/08/21      Spoke with wife, detailed update given, discharge plan confirmed, all questions answered to her satisfaction.     >35 min spent
Patient medically and neurologically stable. Making progress towards rehab goals.   Continue rehab program. Labs reviewed,  Left UE US ordered,
Pt. seen with NP.  Agree with documentation above as per NP with amendments made as appropriate. Patient medically stable. Discharge today
Patient medically and neurologically stable.   Anticipate discharge home in the morning.

## 2021-10-08 NOTE — PROGRESS NOTE ADULT - SUBJECTIVE AND OBJECTIVE BOX
CHIEF COMPLAINT: Denies chest pain, fever, chills, nausea, vomiting. No complaints of left arm pain at incision site. He is doing well with his new regular diet and has no new complaints.     Patient is a 66y old  Male who presents with a chief complaint of Encephalitis (03 Oct 2021 15:56)    HPI:  Patient is a 67 yo Rt handed male with PMH of  CLL on Venetoclax, MDS, melanoma, paroxysmal AFib (not on AC) presented as transfer from [place] on 9/16 for event capture. Patient was  initially at [facility] on 9/14 with confusion, aphasia and R sided weakness. Patient was stroke code and was given s/p tPA. On admission, patient had CTH, CTA, CT perfusion during code stroke protocol with no acute findings. MRI Brain was  performed at  9/16 with no ischemic findings. EEG performed 9/15 showed diffuse L hemispheric slowing. Pt remained aphasic with AMS. +RUE spasticity was noted on exam.     Patient was transferred to [facility] on 9/16 for Epilepsy Monitoring Unit admission to evaluate for subclinical seizures. Patient was seen by neurologist and cardiologist. Patient was noted to have episodes agitation with episodes of somnolence with out medication needed. AC was held per cardio since there was no definitive CVA noted but advised to c/w ASA and statin. LP was performed on 9/17. EEG overnight (9/16-9/17) did not show seizures however did show L sided slowing concerning for a possible structural etiology.  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 He was seen by SS for evaluation and Dysphagia 2 with honey thick liquid was recommended. He was also started on ABT for right elbow cellulitis/ septic bursitis. LP results- Glucose 73, Protein 55, WBC 3, Gram stain no growth to date, HSV neg, Cryptococcal neg; infectious w/u thus far negative; AIE panel pending f/u results. Patient also experience visual hallucination of which opthalmology wad consulted  - no evidence of retinal detachment.    Patient was evaluated by PM&R and therapy for functional deficits, gait/ADL impairments and acute rehabilitation was recommended. Patient was medically optimized for discharge to [facility] IRU on 10/1.     (01 Oct 2021 13:32)      PAST MEDICAL & SURGICAL HISTORY:  Osteopenia    CLL (Chronic Lymphocytic Leukemia)  SINCE 1988    Avascular Necrosis of Femur Head  B/L    TMJ Syndrome  limited jaw opening due to TMJ    Herniated Cervical Disc    Bulging Disc  LUMBAR    Melanoma    Deviated nasal septum    Nasal congestion    Rosacea    Deviated nasal septum    Other specified disorders of nose and nasal sinuses    Anemia    S/P Splenectomy  1993    Avascular Necrosis of Femur Head  RIGHT - CORE DECOMPRESSION- 1993    Avascular Necrosis of Femur Head  LEFT - CORE DECOMPRESSION - 1993    Abnormal Jaw Closure  LEFT JAW SURGERY - 1988    Status Post Eye Surgery X 3  3/2011 right eye scleral buckle; left eye in 2013    S/P Tonsillectomy  1960    Bilateral cataracts  removed in 2011        Allergies    No Known Allergies    Intolerances        PHYSICAL EXAM    Vital Signs Last 24 Hrs  T(C): 36.4 (07 Oct 2021 08:36), Max: 36.5 (06 Oct 2021 19:29)  T(F): 97.6 (07 Oct 2021 08:36), Max: 97.7 (06 Oct 2021 19:29)  HR: 97 (07 Oct 2021 08:36) (71 - 97)  BP: 126/79 (07 Oct 2021 08:36) (126/79 - 156/81)  BP(mean): --  RR: 14 (07 Oct 2021 08:36) (14 - 15)  SpO2: 97% (07 Oct 2021 08:36) (97% - 98%)    PHYSICAL EXAM  Constitutional - NAD, Comfortable  HEENT - NCAT, EOMI  Neck - Supple, No limited ROM  Chest - CTA bilaterally  Cardiovascular - RRR, S1S2  Abdomen -BS+, Soft, NTND  Extremities - No C/C/E, No calf tenderness, left upper extremity with clean/dry/intact dressing   Neurologic Exam - no new focal deficits, walks with steady gait            RECENT LABS:        LABS:                          12.6   15.39 )-----------( 237      ( 07 Oct 2021 06:10 )             38.4     10-07    142  |  100  |  23  ----------------------------<  106<H>  4.1   |  35<H>  |  1.14    Ca    9.6      07 Oct 2021 06:10    TPro  7.3  /  Alb  3.5  /  TBili  1.5<H>  /  DBili  x   /  AST  26  /  ALT  78<H>  /  AlkPhos  106  10-07    LIVER FUNCTIONS - ( 07 Oct 2021 06:10 )  Alb: 3.5 g/dL / Pro: 7.3 g/dL / ALK PHOS: 106 U/L / ALT: 78 U/L / AST: 26 U/L / GGT: x                       MEDICATIONS  (STANDING):  amLODIPine   Tablet 5 milliGRAM(s) Oral daily  aspirin  chewable 81 milliGRAM(s) Oral daily  atorvastatin 80 milliGRAM(s) Oral at bedtime  BACItracin   Ointment 1 Application(s) Topical two times a day  cephalexin 500 milliGRAM(s) Oral four times a day  enoxaparin Injectable 40 milliGRAM(s) SubCutaneous daily  fluticasone propionate 50 MICROgram(s)/spray Nasal Spray 1 Spray(s) Both Nostrils two times a day  folic acid 1 milliGRAM(s) Oral daily  guaiFENesin  milliGRAM(s) Oral every 12 hours  influenza   Vaccine 0.5 milliLiter(s) IntraMuscular once  loratadine 10 milliGRAM(s) Oral daily  melatonin 6 milliGRAM(s) Oral at bedtime  minocycline 100 milliGRAM(s) Oral two times a day  OLANZapine 10 milliGRAM(s) Oral at bedtime  pantoprazole    Tablet 40 milliGRAM(s) Oral before breakfast  predniSONE   Tablet 40 milliGRAM(s) Oral daily    MEDICATIONS  (PRN):  acetaminophen   Tablet .. 650 milliGRAM(s) Oral every 6 hours PRN Mild Pain (1 - 3), Moderate Pain (4 - 6), Severe Pain (7 - 10)  OLANZapine Injectable 2.5 milliGRAM(s) IntraMuscular every 4 hours PRN Agitation  senna 2 Tablet(s) Oral at bedtime PRN Constipation    
Patient is a 66y old  Male who presents with a chief complaint of Encephalitis (06 Oct 2021 16:44)      Patient seen and examined at bedside.  Denies chest pain, dyspnea, abd pain.    ALLERGIES:  No Known Allergies    MEDICATIONS  (STANDING):  amLODIPine   Tablet 5 milliGRAM(s) Oral daily  aspirin  chewable 81 milliGRAM(s) Oral daily  atorvastatin 80 milliGRAM(s) Oral at bedtime  BACItracin   Ointment 1 Application(s) Topical two times a day  benzonatate 100 milliGRAM(s) Oral three times a day  cephalexin 500 milliGRAM(s) Oral four times a day  enoxaparin Injectable 40 milliGRAM(s) SubCutaneous daily  fluticasone propionate 50 MICROgram(s)/spray Nasal Spray 1 Spray(s) Both Nostrils two times a day  folic acid 1 milliGRAM(s) Oral daily  influenza   Vaccine 0.5 milliLiter(s) IntraMuscular once  loratadine 10 milliGRAM(s) Oral daily  melatonin 6 milliGRAM(s) Oral at bedtime  minocycline 100 milliGRAM(s) Oral two times a day  OLANZapine 10 milliGRAM(s) Oral at bedtime  pantoprazole    Tablet 40 milliGRAM(s) Oral before breakfast  predniSONE   Tablet 40 milliGRAM(s) Oral daily    MEDICATIONS  (PRN):  acetaminophen   Tablet .. 650 milliGRAM(s) Oral every 6 hours PRN Mild Pain (1 - 3), Moderate Pain (4 - 6), Severe Pain (7 - 10)  OLANZapine Injectable 2.5 milliGRAM(s) IntraMuscular every 4 hours PRN Agitation  senna 2 Tablet(s) Oral at bedtime PRN Constipation    Vital Signs Last 24 Hrs  T(F): 97.6 (07 Oct 2021 08:36), Max: 97.7 (06 Oct 2021 19:29)  HR: 97 (07 Oct 2021 08:36) (71 - 97)  BP: 126/79 (07 Oct 2021 08:36) (126/79 - 156/81)  RR: 14 (07 Oct 2021 08:36) (14 - 15)  SpO2: 97% (07 Oct 2021 08:36) (97% - 98%)  I&O's Summary      PHYSICAL EXAM:  General: NAD, A/O x 3  ENT: MMM  Neck: Supple, No JVD  Lungs: Clear to auscultation bilaterally  Cardio: RRR, S1/S2, No murmurs  Abdomen: Soft, Nontender, Nondistended; Bowel sounds present  Extremities: No calf tenderness, No pitting edema    LABS:                        12.6   15.39 )-----------( 237      ( 07 Oct 2021 06:10 )             38.4       10-07    142  |  100  |  23  ----------------------------<  106  4.1   |  35  |  1.14    Ca    9.6      07 Oct 2021 06:10    TPro  7.3  /  Alb  3.5  /  TBili  1.5  /  DBili  x   /  AST  26  /  ALT  78  /  AlkPhos  106  10-07     eGFR if Non African American: 67 mL/min/1.73M2 (10-07-21 @ 06:10)  eGFR if African American: 78 mL/min/1.73M2 (10-07-21 @ 06:10)             09-19 Chol -- LDL -- HDL -- Trig 160 mg/dL                      Culture - Body Fluid with Gram Stain (collected 05 Oct 2021 15:25)  Source: .Body Fluid Interstitial Fluid  Gram Stain (05 Oct 2021 23:39):    No polymorphonuclear leukocytes seen per low power field    No organisms seen per oil power field  Preliminary Report (06 Oct 2021 17:48):    Few Staphylococcus aureus      COVID-19 PCR: NotDetec (10-01-21 @ 20:05)  COVID-19 PCR: NotDetec (09-30-21 @ 13:54)  COVID-19 PCR: NotDetec (09-27-21 @ 11:06)  COVID-19 PCR: NotDetec (09-24-21 @ 10:53)  COVID-19 PCR: NotDetec (09-14-21 @ 18:47)      RADIOLOGY & ADDITIONAL TESTS:    Care Discussed with Consultants/Other Providers:   
CHIEF COMPLAINT: Denies chest pain, fever, chills, nausea, vomiting. He had no complaints overnight and is stable and ready for discharge.     Patient is a 66y old  Male who presents with a chief complaint of Encephalitis (03 Oct 2021 15:56)    HPI:  Patient is a 65 yo Rt handed male with PMH of  CLL on Venetoclax, MDS, melanoma, paroxysmal AFib (not on AC) presented as transfer from [place] on 9/16 for event capture. Patient was  initially at [facility] on 9/14 with confusion, aphasia and R sided weakness. Patient was stroke code and was given s/p tPA. On admission, patient had CTH, CTA, CT perfusion during code stroke protocol with no acute findings. MRI Brain was  performed at  9/16 with no ischemic findings. EEG performed 9/15 showed diffuse L hemispheric slowing. Pt remained aphasic with AMS. +RUE spasticity was noted on exam.     Patient was transferred to [facility] on 9/16 for Epilepsy Monitoring Unit admission to evaluate for subclinical seizures. Patient was seen by neurologist and cardiologist. Patient was noted to have episodes agitation with episodes of somnolence with out medication needed. AC was held per cardio since there was no definitive CVA noted but advised to c/w ASA and statin. LP was performed on 9/17. EEG overnight (9/16-9/17) did not show seizures however did show L sided slowing concerning for a possible structural etiology.  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 He was seen by SS for evaluation and Dysphagia 2 with honey thick liquid was recommended. He was also started on ABT for right elbow cellulitis/ septic bursitis. LP results- Glucose 73, Protein 55, WBC 3, Gram stain no growth to date, HSV neg, Cryptococcal neg; infectious w/u thus far negative; AIE panel pending f/u results. Patient also experience visual hallucination of which opthalmology wad consulted  - no evidence of retinal detachment.    Patient was evaluated by PM&R and therapy for functional deficits, gait/ADL impairments and acute rehabilitation was recommended. Patient was medically optimized for discharge to [facility] IRU on 10/1.     (01 Oct 2021 13:32)      PAST MEDICAL & SURGICAL HISTORY:  Osteopenia    CLL (Chronic Lymphocytic Leukemia)  SINCE 1988    Avascular Necrosis of Femur Head  B/L    TMJ Syndrome  limited jaw opening due to TMJ    Herniated Cervical Disc    Bulging Disc  LUMBAR    Melanoma    Deviated nasal septum    Nasal congestion    Rosacea    Deviated nasal septum    Other specified disorders of nose and nasal sinuses    Anemia    S/P Splenectomy  1993    Avascular Necrosis of Femur Head  RIGHT - CORE DECOMPRESSION- 1993    Avascular Necrosis of Femur Head  LEFT - CORE DECOMPRESSION - 1993    Abnormal Jaw Closure  LEFT JAW SURGERY - 1988    Status Post Eye Surgery X 3  3/2011 right eye scleral buckle; left eye in 2013    S/P Tonsillectomy  1960    Bilateral cataracts  removed in 2011        Allergies    No Known Allergies    Intolerances        PHYSICAL EXAM    Vital Signs Last 24 Hrs  T(C): 36.4 (08 Oct 2021 08:45), Max: 36.8 (07 Oct 2021 20:27)  T(F): 97.5 (08 Oct 2021 08:45), Max: 98.2 (07 Oct 2021 20:27)  HR: 74 (08 Oct 2021 08:45) (70 - 94)  BP: 112/87 (08 Oct 2021 08:45) (112/87 - 130/81)  BP(mean): --  RR: 16 (08 Oct 2021 08:45) (15 - 16)  SpO2: 95% (08 Oct 2021 08:45) (95% - 95%)    PHYSICAL EXAM  Constitutional - NAD, Comfortable  HEENT - NCAT, EOMI  Neck - Supple, No limited ROM  Chest - CTA bilaterally  Cardiovascular - RRR, S1S2  Abdomen -BS+, Soft, NTND  Extremities - No C/C/E, No calf tenderness, left upper extremity with clean/dry/intact dressing   Neurologic Exam - no new focal deficits, walks with steady gait            RECENT LABS:        LABS:                          12.6   15.39 )-----------( 237      ( 07 Oct 2021 06:10 )             38.4     10-07    142  |  100  |  23  ----------------------------<  106<H>  4.1   |  35<H>  |  1.14    Ca    9.6      07 Oct 2021 06:10    TPro  7.3  /  Alb  3.5  /  TBili  1.5<H>  /  DBili  x   /  AST  26  /  ALT  78<H>  /  AlkPhos  106  10-07    LIVER FUNCTIONS - ( 07 Oct 2021 06:10 )  Alb: 3.5 g/dL / Pro: 7.3 g/dL / ALK PHOS: 106 U/L / ALT: 78 U/L / AST: 26 U/L / GGT: x                       MEDICATIONS  (STANDING):  amLODIPine   Tablet 5 milliGRAM(s) Oral daily  aspirin  chewable 81 milliGRAM(s) Oral daily  atorvastatin 80 milliGRAM(s) Oral at bedtime  BACItracin   Ointment 1 Application(s) Topical two times a day  cephalexin 500 milliGRAM(s) Oral four times a day  enoxaparin Injectable 40 milliGRAM(s) SubCutaneous daily  fluticasone propionate 50 MICROgram(s)/spray Nasal Spray 1 Spray(s) Both Nostrils two times a day  folic acid 1 milliGRAM(s) Oral daily  guaiFENesin  milliGRAM(s) Oral every 12 hours  influenza   Vaccine 0.5 milliLiter(s) IntraMuscular once  loratadine 10 milliGRAM(s) Oral daily  melatonin 6 milliGRAM(s) Oral at bedtime  minocycline 100 milliGRAM(s) Oral two times a day  OLANZapine 10 milliGRAM(s) Oral at bedtime  pantoprazole    Tablet 40 milliGRAM(s) Oral before breakfast  predniSONE   Tablet 40 milliGRAM(s) Oral daily    MEDICATIONS  (PRN):  acetaminophen   Tablet .. 650 milliGRAM(s) Oral every 6 hours PRN Mild Pain (1 - 3), Moderate Pain (4 - 6), Severe Pain (7 - 10)  OLANZapine Injectable 2.5 milliGRAM(s) IntraMuscular every 4 hours PRN Agitation  senna 2 Tablet(s) Oral at bedtime PRN Constipation    
CHIEF COMPLAINT: Denies chest pain, fever, chills, nausea, vomiting. He is s/p left upper extremity cyst I and D. Denies pain to site, no oozing or bleeding noted. He continues to have mild and intermittent cough which has been somewhat improved with Robitussin.     Patient is a 66y old  Male who presents with a chief complaint of Encephalitis (03 Oct 2021 15:56)    HPI:  Patient is a 65 yo Rt handed male with PMH of  CLL on Venetoclax, MDS, melanoma, paroxysmal AFib (not on AC) presented as transfer from [place] on 9/16 for event capture. Patient was  initially at [facility] on 9/14 with confusion, aphasia and R sided weakness. Patient was stroke code and was given s/p tPA. On admission, patient had CTH, CTA, CT perfusion during code stroke protocol with no acute findings. MRI Brain was  performed at  9/16 with no ischemic findings. EEG performed 9/15 showed diffuse L hemispheric slowing. Pt remained aphasic with AMS. +RUE spasticity was noted on exam.     Patient was transferred to [facility] on 9/16 for Epilepsy Monitoring Unit admission to evaluate for subclinical seizures. Patient was seen by neurologist and cardiologist. Patient was noted to have episodes agitation with episodes of somnolence with out medication needed. AC was held per cardio since there was no definitive CVA noted but advised to c/w ASA and statin. LP was performed on 9/17. EEG overnight (9/16-9/17) did not show seizures however did show L sided slowing concerning for a possible structural etiology.  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 He was seen by SS for evaluation and Dysphagia 2 with honey thick liquid was recommended. He was also started on ABT for right elbow cellulitis/ septic bursitis. LP results- Glucose 73, Protein 55, WBC 3, Gram stain no growth to date, HSV neg, Cryptococcal neg; infectious w/u thus far negative; AIE panel pending f/u results. Patient also experience visual hallucination of which opthalmology wad consulted  - no evidence of retinal detachment.    Patient was evaluated by PM&R and therapy for functional deficits, gait/ADL impairments and acute rehabilitation was recommended. Patient was medically optimized for discharge to [facility] IRU on 10/1.     (01 Oct 2021 13:32)      PAST MEDICAL & SURGICAL HISTORY:  Osteopenia    CLL (Chronic Lymphocytic Leukemia)  SINCE 1988    Avascular Necrosis of Femur Head  B/L    TMJ Syndrome  limited jaw opening due to TMJ    Herniated Cervical Disc    Bulging Disc  LUMBAR    Melanoma    Deviated nasal septum    Nasal congestion    Rosacea    Deviated nasal septum    Other specified disorders of nose and nasal sinuses    Anemia    S/P Splenectomy  1993    Avascular Necrosis of Femur Head  RIGHT - CORE DECOMPRESSION- 1993    Avascular Necrosis of Femur Head  LEFT - CORE DECOMPRESSION - 1993    Abnormal Jaw Closure  LEFT JAW SURGERY - 1988    Status Post Eye Surgery X 3  3/2011 right eye scleral buckle; left eye in 2013    S/P Tonsillectomy  1960    Bilateral cataracts  removed in 2011        Allergies    No Known Allergies    Intolerances        SUBJECTIVE/OVERNIGHT EVENTS:    PHYSICAL EXAM    Vital Signs Last 24 Hrs  T(C): 36.7 (06 Oct 2021 07:22), Max: 36.7 (06 Oct 2021 07:22)  T(F): 98 (06 Oct 2021 07:22), Max: 98 (06 Oct 2021 07:22)  HR: 84 (06 Oct 2021 07:22) (75 - 84)  BP: 122/74 (06 Oct 2021 07:22) (122/74 - 155/77)  BP(mean): --  RR: 14 (06 Oct 2021 07:22) (14 - 15)  SpO2: 97% (06 Oct 2021 07:22) (95% - 97%)    PHYSICAL EXAM  Constitutional - NAD, Comfortable  HEENT - NCAT, EOMI  Neck - Supple, No limited ROM  Chest - CTA bilaterally  Cardiovascular - RRR, S1S2  Abdomen -BS+, Soft, NTND  Extremities - No C/C/E, No calf tenderness, left upper extremity with clean/dry/intact dressing   Neurologic Exam - no new focal deficits, walks with steady gait            RECENT LABS:                          11.2   16.67 )-----------( 212      ( 04 Oct 2021 05:30 )             33.7     10-04    142  |  105  |  22  ----------------------------<  96  4.3   |  34<H>  |  1.19    Ca    8.4      04 Oct 2021 05:30    TPro  6.1  /  Alb  2.6<L>  /  TBili  0.8  /  DBili  x   /  AST  39  /  ALT  79<H>  /  AlkPhos  107  10-04    LIVER FUNCTIONS - ( 04 Oct 2021 05:30 )  Alb: 2.6 g/dL / Pro: 6.1 g/dL / ALK PHOS: 107 U/L / ALT: 79 U/L / AST: 39 U/L / GGT: x                   MEDICATIONS  (STANDING):  amLODIPine   Tablet 5 milliGRAM(s) Oral daily  aspirin  chewable 81 milliGRAM(s) Oral daily  atorvastatin 80 milliGRAM(s) Oral at bedtime  BACItracin   Ointment 1 Application(s) Topical two times a day  cephalexin 500 milliGRAM(s) Oral four times a day  enoxaparin Injectable 40 milliGRAM(s) SubCutaneous daily  fluticasone propionate 50 MICROgram(s)/spray Nasal Spray 1 Spray(s) Both Nostrils two times a day  folic acid 1 milliGRAM(s) Oral daily  guaiFENesin  milliGRAM(s) Oral every 12 hours  influenza   Vaccine 0.5 milliLiter(s) IntraMuscular once  loratadine 10 milliGRAM(s) Oral daily  melatonin 6 milliGRAM(s) Oral at bedtime  minocycline 100 milliGRAM(s) Oral two times a day  OLANZapine 10 milliGRAM(s) Oral at bedtime  pantoprazole    Tablet 40 milliGRAM(s) Oral before breakfast  predniSONE   Tablet 40 milliGRAM(s) Oral daily    MEDICATIONS  (PRN):  acetaminophen   Tablet .. 650 milliGRAM(s) Oral every 6 hours PRN Mild Pain (1 - 3), Moderate Pain (4 - 6), Severe Pain (7 - 10)  OLANZapine Injectable 2.5 milliGRAM(s) IntraMuscular every 4 hours PRN Agitation  senna 2 Tablet(s) Oral at bedtime PRN Constipation    
CHIEF COMPLAINT: Denies chest pain, fever, chills, nausea, vomiting. He remains with left upper extremity cyst with no erythema in the center. Denies pain in the area. He has been doing well in therapy and has been making progress.     Patient is a 66y old  Male who presents with a chief complaint of Encephalitis (03 Oct 2021 15:56)    HPI:  Patient is a 67 yo Rt handed male with PMH of  CLL on Venetoclax, MDS, melanoma, paroxysmal AFib (not on AC) presented as transfer from [place] on 9/16 for event capture. Patient was  initially at [facility] on 9/14 with confusion, aphasia and R sided weakness. Patient was stroke code and was given s/p tPA. On admission, patient had CTH, CTA, CT perfusion during code stroke protocol with no acute findings. MRI Brain was  performed at  9/16 with no ischemic findings. EEG performed 9/15 showed diffuse L hemispheric slowing. Pt remained aphasic with AMS. +RUE spasticity was noted on exam.     Patient was transferred to [facility] on 9/16 for Epilepsy Monitoring Unit admission to evaluate for subclinical seizures. Patient was seen by neurologist and cardiologist. Patient was noted to have episodes agitation with episodes of somnolence with out medication needed. AC was held per cardio since there was no definitive CVA noted but advised to c/w ASA and statin. LP was performed on 9/17. EEG overnight (9/16-9/17) did not show seizures however did show L sided slowing concerning for a possible structural etiology.  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 He was seen by SS for evaluation and Dysphagia 2 with honey thick liquid was recommended. He was also started on ABT for right elbow cellulitis/ septic bursitis. LP results- Glucose 73, Protein 55, WBC 3, Gram stain no growth to date, HSV neg, Cryptococcal neg; infectious w/u thus far negative; AIE panel pending f/u results. Patient also experience visual hallucination of which opthalmology wad consulted  - no evidence of retinal detachment.    Patient was evaluated by PM&R and therapy for functional deficits, gait/ADL impairments and acute rehabilitation was recommended. Patient was medically optimized for discharge to [facility] IRU on 10/1.     (01 Oct 2021 13:32)      PAST MEDICAL & SURGICAL HISTORY:  Osteopenia    CLL (Chronic Lymphocytic Leukemia)  SINCE 1988    Avascular Necrosis of Femur Head  B/L    TMJ Syndrome  limited jaw opening due to TMJ    Herniated Cervical Disc    Bulging Disc  LUMBAR    Melanoma    Deviated nasal septum    Nasal congestion    Rosacea    Deviated nasal septum    Other specified disorders of nose and nasal sinuses    Anemia    S/P Splenectomy  1993    Avascular Necrosis of Femur Head  RIGHT - CORE DECOMPRESSION- 1993    Avascular Necrosis of Femur Head  LEFT - CORE DECOMPRESSION - 1993    Abnormal Jaw Closure  LEFT JAW SURGERY - 1988    Status Post Eye Surgery X 3  3/2011 right eye scleral buckle; left eye in 2013    S/P Tonsillectomy  1960    Bilateral cataracts  removed in 2011        Allergies    No Known Allergies    Intolerances        SUBJECTIVE/OVERNIGHT EVENTS:    PHYSICAL EXAM    Vital Signs Last 24 Hrs  T(C): 36.6 (05 Oct 2021 08:25), Max: 36.7 (04 Oct 2021 19:56)  T(F): 97.9 (05 Oct 2021 08:25), Max: 98 (04 Oct 2021 19:56)  HR: 86 (05 Oct 2021 08:25) (66 - 86)  BP: 128/78 (05 Oct 2021 08:25) (128/78 - 153/83)  BP(mean): --  RR: 16 (05 Oct 2021 08:25) (16 - 16)  SpO2: 98% (05 Oct 2021 08:25) (95% - 98%)    PHYSICAL EXAM  Constitutional - NAD, Comfortable  HEENT - NCAT, EOMI  Neck - Supple, No limited ROM  Chest - CTA bilaterally  Cardiovascular - RRR, S1S2  Abdomen -BS+, Soft, NTND  Extremities - No C/C/E, No calf tenderness, left upper swollen area above antecubital area with small centralized erythema noted.   Neurologic Exam - no new focal deficits, walks with steady gait            RECENT LABS:                          11.2   16.67 )-----------( 212      ( 04 Oct 2021 05:30 )             33.7     10-04    142  |  105  |  22  ----------------------------<  96  4.3   |  34<H>  |  1.19    Ca    8.4      04 Oct 2021 05:30    TPro  6.1  /  Alb  2.6<L>  /  TBili  0.8  /  DBili  x   /  AST  39  /  ALT  79<H>  /  AlkPhos  107  10-04    LIVER FUNCTIONS - ( 04 Oct 2021 05:30 )  Alb: 2.6 g/dL / Pro: 6.1 g/dL / ALK PHOS: 107 U/L / ALT: 79 U/L / AST: 39 U/L / GGT: x                   MEDICATIONS  (STANDING):  amLODIPine   Tablet 5 milliGRAM(s) Oral daily  aspirin  chewable 81 milliGRAM(s) Oral daily  atorvastatin 80 milliGRAM(s) Oral at bedtime  BACItracin   Ointment 1 Application(s) Topical two times a day  cephalexin 500 milliGRAM(s) Oral four times a day  enoxaparin Injectable 40 milliGRAM(s) SubCutaneous daily  fluticasone propionate 50 MICROgram(s)/spray Nasal Spray 1 Spray(s) Both Nostrils two times a day  folic acid 1 milliGRAM(s) Oral daily  guaiFENesin  milliGRAM(s) Oral every 12 hours  influenza   Vaccine 0.5 milliLiter(s) IntraMuscular once  loratadine 10 milliGRAM(s) Oral daily  melatonin 6 milliGRAM(s) Oral at bedtime  minocycline 100 milliGRAM(s) Oral two times a day  OLANZapine 10 milliGRAM(s) Oral at bedtime  pantoprazole    Tablet 40 milliGRAM(s) Oral before breakfast  predniSONE   Tablet 40 milliGRAM(s) Oral daily    MEDICATIONS  (PRN):  acetaminophen   Tablet .. 650 milliGRAM(s) Oral every 6 hours PRN Mild Pain (1 - 3), Moderate Pain (4 - 6), Severe Pain (7 - 10)  OLANZapine Injectable 2.5 milliGRAM(s) IntraMuscular every 4 hours PRN Agitation  senna 2 Tablet(s) Oral at bedtime PRN Constipation    
CHIEF COMPLAINT: Denies chest pain, fever, chills, nausea, vomiting. He states he slept well. He is complaining of exacerbation in his seasonal allergies and states he takes allegra and flonase at home. No SOB or wheezing. He also has new finding of swelling of left upper extremity with no pain at sight.     Patient is a 66y old  Male who presents with a chief complaint of Encephalitis (03 Oct 2021 15:56)    HPI:  Patient is a 67 yo Rt handed male with PMH of  CLL on Venetoclax, MDS, melanoma, paroxysmal AFib (not on AC) presented as transfer from [place] on 9/16 for event capture. Patient was  initially at [facility] on 9/14 with confusion, aphasia and R sided weakness. Patient was stroke code and was given s/p tPA. On admission, patient had CTH, CTA, CT perfusion during code stroke protocol with no acute findings. MRI Brain was  performed at  9/16 with no ischemic findings. EEG performed 9/15 showed diffuse L hemispheric slowing. Pt remained aphasic with AMS. +RUE spasticity was noted on exam.     Patient was transferred to [facility] on 9/16 for Epilepsy Monitoring Unit admission to evaluate for subclinical seizures. Patient was seen by neurologist and cardiologist. Patient was noted to have episodes agitation with episodes of somnolence with out medication needed. AC was held per cardio since there was no definitive CVA noted but advised to c/w ASA and statin. LP was performed on 9/17. EEG overnight (9/16-9/17) did not show seizures however did show L sided slowing concerning for a possible structural etiology.  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 He was seen by SS for evaluation and Dysphagia 2 with honey thick liquid was recommended. He was also started on ABT for right elbow cellulitis/ septic bursitis. LP results- Glucose 73, Protein 55, WBC 3, Gram stain no growth to date, HSV neg, Cryptococcal neg; infectious w/u thus far negative; AIE panel pending f/u results. Patient also experience visual hallucination of which opthalmology wad consulted  - no evidence of retinal detachment.    Patient was evaluated by PM&R and therapy for functional deficits, gait/ADL impairments and acute rehabilitation was recommended. Patient was medically optimized for discharge to [facility] IRU on 10/1.     (01 Oct 2021 13:32)      PAST MEDICAL & SURGICAL HISTORY:  Osteopenia    CLL (Chronic Lymphocytic Leukemia)  SINCE 1988    Avascular Necrosis of Femur Head  B/L    TMJ Syndrome  limited jaw opening due to TMJ    Herniated Cervical Disc    Bulging Disc  LUMBAR    Melanoma    Deviated nasal septum    Nasal congestion    Rosacea    Deviated nasal septum    Other specified disorders of nose and nasal sinuses    Anemia    S/P Splenectomy  1993    Avascular Necrosis of Femur Head  RIGHT - CORE DECOMPRESSION- 1993    Avascular Necrosis of Femur Head  LEFT - CORE DECOMPRESSION - 1993    Abnormal Jaw Closure  LEFT JAW SURGERY - 1988    Status Post Eye Surgery X 3  3/2011 right eye scleral buckle; left eye in 2013    S/P Tonsillectomy  1960    Bilateral cataracts  removed in 2011        Allergies    No Known Allergies    Intolerances        SUBJECTIVE/OVERNIGHT EVENTS:    PHYSICAL EXAM    Vital Signs Last 24 Hrs  T(C): 36.6 (04 Oct 2021 08:17), Max: 36.6 (03 Oct 2021 20:20)  T(F): 97.8 (04 Oct 2021 08:17), Max: 97.8 (03 Oct 2021 20:20)  HR: 92 (04 Oct 2021 08:17) (69 - 92)  BP: 132/78 (04 Oct 2021 08:17) (132/78 - 143/77)  BP(mean): --  RR: 16 (04 Oct 2021 08:17) (16 - 16)  SpO2: 97% (04 Oct 2021 08:17) (96% - 97%)    PHYSICAL EXAM  Constitutional - NAD, Comfortable  HEENT - NCAT, EOMI  Neck - Supple, No limited ROM  Chest - CTA bilaterally  Cardiovascular - RRR, S1S2  Abdomen -BS+, Soft, NTND  Extremities - No C/C/E, No calf tenderness, left upper swollen area above anticubital area with no erythema noted.   Neurologic Exam - no new focal deficits, walks with steady gait            RECENT LABS:                          11.2   16.67 )-----------( 212      ( 04 Oct 2021 05:30 )             33.7     10-04    142  |  105  |  22  ----------------------------<  96  4.3   |  34<H>  |  1.19    Ca    8.4      04 Oct 2021 05:30    TPro  6.1  /  Alb  2.6<L>  /  TBili  0.8  /  DBili  x   /  AST  39  /  ALT  79<H>  /  AlkPhos  107  10-04    LIVER FUNCTIONS - ( 04 Oct 2021 05:30 )  Alb: 2.6 g/dL / Pro: 6.1 g/dL / ALK PHOS: 107 U/L / ALT: 79 U/L / AST: 39 U/L / GGT: x                       MEDICATIONS  (STANDING):  amLODIPine   Tablet 5 milliGRAM(s) Oral daily  aspirin  chewable 81 milliGRAM(s) Oral daily  atorvastatin 80 milliGRAM(s) Oral at bedtime  BACItracin   Ointment 1 Application(s) Topical two times a day  cephalexin 500 milliGRAM(s) Oral four times a day  enoxaparin Injectable 40 milliGRAM(s) SubCutaneous daily  fluticasone propionate 50 MICROgram(s)/spray Nasal Spray 1 Spray(s) Both Nostrils two times a day  folic acid 1 milliGRAM(s) Oral daily  influenza   Vaccine 0.5 milliLiter(s) IntraMuscular once  loratadine 10 milliGRAM(s) Oral daily  melatonin 6 milliGRAM(s) Oral at bedtime  minocycline 100 milliGRAM(s) Oral two times a day  OLANZapine 10 milliGRAM(s) Oral at bedtime  pantoprazole    Tablet 40 milliGRAM(s) Oral before breakfast  predniSONE   Tablet 40 milliGRAM(s) Oral daily    MEDICATIONS  (PRN):  acetaminophen   Tablet .. 650 milliGRAM(s) Oral every 6 hours PRN Mild Pain (1 - 3), Moderate Pain (4 - 6), Severe Pain (7 - 10)  OLANZapine Injectable 2.5 milliGRAM(s) IntraMuscular every 4 hours PRN Agitation  senna 2 Tablet(s) Oral at bedtime PRN Constipation  
CHIEF COMPLAINT: slept well, no new complaints, participating in therapy       HISTORY OF PRESENT ILLNESS    Patient is a 67 yo Rt handed male with PMH of  CLL on Venetoclax, MDS, melanoma, paroxysmal AFib (not on AC) presented as transfer from [place] on 9/16 for event capture. Patient was  initially at [facility] on 9/14 with confusion, aphasia and R sided weakness. Patient was stroke code and was given s/p tPA. On admission, patient had CTH, CTA, CT perfusion during code stroke protocol with no acute findings. MRI Brain was  performed at  9/16 with no ischemic findings. EEG performed 9/15 showed diffuse L hemispheric slowing. Pt remained aphasic with AMS. +RUE spasticity was noted on exam.     Patient was transferred to [facility] on 9/16 for Epilepsy Monitoring Unit admission to evaluate for subclinical seizures. Patient was seen by neurologist and cardiologist. Patient was noted to have episodes agitation with episodes of somnolence with out medication needed. AC was held per cardio since there was no definitive CVA noted but advised to c/w ASA and statin. LP was performed on 9/17. EEG overnight (9/16-9/17) did not show seizures however did show L sided slowing concerning for a possible structural etiology.  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 He was seen by SS for evaluation and Dysphagia 2 with honey thick liquid was recommended. He was also started on ABT for right elbow cellulitis/ septic bursitis. LP results- Glucose 73, Protein 55, WBC 3, Gram stain no growth to date, HSV neg, Cryptococcal neg; infectious w/u thus far negative; AIE panel pending f/u results. Patient also experience visual hallucination of which opthalmology wad consulted  - no evidence of retinal detachment.    Patient was evaluated by PM&R and therapy for functional deficits, gait/ADL impairments and acute rehabilitation was recommended. Patient was medically optimized for discharge to [facility] IRU on 10/1.     (01 Oct 2021 13:32)        PAST MEDICAL & SURGICAL HISTORY:  Osteopenia    CLL (Chronic Lymphocytic Leukemia)  SINCE 1988    Avascular Necrosis of Femur Head  B/L    TMJ Syndrome  limited jaw opening due to TMJ    Herniated Cervical Disc    Bulging Disc  LUMBAR    Melanoma    Deviated nasal septum    Nasal congestion    Rosacea    Deviated nasal septum    Other specified disorders of nose and nasal sinuses    Anemia    S/P Splenectomy  1993    Avascular Necrosis of Femur Head  RIGHT - CORE DECOMPRESSION- 1993    Avascular Necrosis of Femur Head  LEFT - CORE DECOMPRESSION - 1993    Abnormal Jaw Closure  LEFT JAW SURGERY - 1988    Status Post Eye Surgery X 3  3/2011 right eye scleral buckle; left eye in 2013    S/P Tonsillectomy  1960    Bilateral cataracts  removed in 2011        VITALS  Vital Signs Last 24 Hrs  T(C): 36.7 (02 Oct 2021 08:03), Max: 36.8 (01 Oct 2021 19:52)  T(F): 98 (02 Oct 2021 08:03), Max: 98.3 (01 Oct 2021 19:52)  HR: 70 (02 Oct 2021 08:03) (65 - 92)  BP: 135/73 (02 Oct 2021 08:03) (126/72 - 161/76)  BP(mean): --  RR: 15 (02 Oct 2021 08:03) (14 - 15)  SpO2: 98% (02 Oct 2021 08:03) (96% - 98%)      PHYSICAL EXAM  Constitutional - NAD, Comfortable  HEENT - NCAT, EOMI  Neck - Supple, No limited ROM  Chest - CTA bilaterally  Cardiovascular - RRR, S1S2  Abdomen - oft, NTND  Extremities - No calf tenderness   Neurologic Exam - no new focal deficits                   RECENT LABS                        12.0   17.50 )-----------( 172      ( 02 Oct 2021 06:00 )             36.5     10-02    143  |  104  |  27<H>  ----------------------------<  90  4.0   |  32<H>  |  0.94    Ca    8.9      02 Oct 2021 06:00    TPro  6.5  /  Alb  2.6<L>  /  TBili  0.7  /  DBili  x   /  AST  31  /  ALT  59<H>  /  AlkPhos  105  10-02      LIVER FUNCTIONS - ( 02 Oct 2021 06:00 )  Alb: 2.6 g/dL / Pro: 6.5 g/dL / ALK PHOS: 105 U/L / ALT: 59 U/L / AST: 31 U/L / GGT: x                   CURRENT MEDICATIONS  MEDICATIONS  (STANDING):  amLODIPine   Tablet 5 milliGRAM(s) Oral daily  aspirin  chewable 81 milliGRAM(s) Oral daily  atorvastatin 80 milliGRAM(s) Oral at bedtime  BACItracin   Ointment 1 Application(s) Topical two times a day  cephalexin 500 milliGRAM(s) Oral four times a day  enoxaparin Injectable 40 milliGRAM(s) SubCutaneous daily  folic acid 1 milliGRAM(s) Oral daily  influenza   Vaccine 0.5 milliLiter(s) IntraMuscular once  melatonin 6 milliGRAM(s) Oral at bedtime  minocycline 100 milliGRAM(s) Oral two times a day  OLANZapine 10 milliGRAM(s) Oral at bedtime  pantoprazole    Tablet 40 milliGRAM(s) Oral before breakfast  predniSONE   Tablet 60 milliGRAM(s) Oral daily    MEDICATIONS  (PRN):  acetaminophen   Tablet .. 650 milliGRAM(s) Oral every 6 hours PRN Mild Pain (1 - 3), Moderate Pain (4 - 6), Severe Pain (7 - 10)  OLANZapine Injectable 2.5 milliGRAM(s) IntraMuscular every 4 hours PRN Agitation  senna 2 Tablet(s) Oral at bedtime PRN Constipation    
Chief complaint: no acute events overnight     Patient is a 66y old  Male who presents with a chief complaint of Encephalitis (02 Oct 2021 13:28)        HEALTH ISSUES - PROBLEM Dx:  Autoimmune encephalitis    Right sided weakness    Paroxysmal atrial fibrillation    CLL (chronic lymphocytic leukemia)    Cellulitis of right elbow    Hypertension    Hyperlipidemia    Mood disorder      PAST MEDICAL & SURGICAL HISTORY:  Osteopenia    CLL (Chronic Lymphocytic Leukemia)  SINCE 1988    Avascular Necrosis of Femur Head  B/L    TMJ Syndrome  limited jaw opening due to TMJ    Herniated Cervical Disc    Bulging Disc  LUMBAR    Melanoma    Deviated nasal septum    Nasal congestion    Rosacea    Deviated nasal septum    Other specified disorders of nose and nasal sinuses    Anemia    S/P Splenectomy  1993    Avascular Necrosis of Femur Head  RIGHT - CORE DECOMPRESSION- 1993    Avascular Necrosis of Femur Head  LEFT - CORE DECOMPRESSION - 1993    Abnormal Jaw Closure  LEFT JAW SURGERY - 1988    Status Post Eye Surgery X 3  3/2011 right eye scleral buckle; left eye in 2013    S/P Tonsillectomy  1960    Bilateral cataracts  removed in 2011      VITALS  Vital Signs Last 24 Hrs  T(C): 36.7 (03 Oct 2021 08:15), Max: 36.7 (02 Oct 2021 19:50)  T(F): 98 (03 Oct 2021 08:15), Max: 98 (02 Oct 2021 19:50)  HR: 67 (03 Oct 2021 08:15) (66 - 80)  BP: 148/81 (03 Oct 2021 08:15) (120/65 - 148/81)  BP(mean): --  RR: 16 (03 Oct 2021 08:15) (15 - 16)  SpO2: 97% (03 Oct 2021 08:15) (96% - 97%)      PHYSICAL EXAM  Constitutional - NAD, Comfortable  HEENT - NCAT, EOMI  Neck - Supple, No limited ROM  Chest - CTA bilaterally  Cardiovascular - RRR, S1S2  Abdomen -  Soft, NTND  Extremities -  No calf tenderness   Neurologic Exam -                    Cognitive - Awake, Alert     No new focal deficits                    RECENT LABS                        12.0   17.50 )-----------( 172      ( 02 Oct 2021 06:00 )             36.5     10-02    143  |  104  |  27<H>  ----------------------------<  90  4.0   |  32<H>  |  0.94    Ca    8.9      02 Oct 2021 06:00    TPro  6.5  /  Alb  2.6<L>  /  TBili  0.7  /  DBili  x   /  AST  31  /  ALT  59<H>  /  AlkPhos  105  10-02            RADIOLOGY/OTHER RESULTS      CURRENT MEDICATIONS    MEDICATIONS  (STANDING):  amLODIPine   Tablet 5 milliGRAM(s) Oral daily  aspirin  chewable 81 milliGRAM(s) Oral daily  atorvastatin 80 milliGRAM(s) Oral at bedtime  BACItracin   Ointment 1 Application(s) Topical two times a day  cephalexin 500 milliGRAM(s) Oral four times a day  enoxaparin Injectable 40 milliGRAM(s) SubCutaneous daily  folic acid 1 milliGRAM(s) Oral daily  influenza   Vaccine 0.5 milliLiter(s) IntraMuscular once  melatonin 6 milliGRAM(s) Oral at bedtime  minocycline 100 milliGRAM(s) Oral two times a day  OLANZapine 10 milliGRAM(s) Oral at bedtime  pantoprazole    Tablet 40 milliGRAM(s) Oral before breakfast  predniSONE   Tablet 40 milliGRAM(s) Oral daily    MEDICATIONS  (PRN):  acetaminophen   Tablet .. 650 milliGRAM(s) Oral every 6 hours PRN Mild Pain (1 - 3), Moderate Pain (4 - 6), Severe Pain (7 - 10)  OLANZapine Injectable 2.5 milliGRAM(s) IntraMuscular every 4 hours PRN Agitation  senna 2 Tablet(s) Oral at bedtime PRN Constipation    ASSESSMENT & PLAN          GI/Bowel Management - Dulcolax PRN, Fleet PRN   Management - Toilet Q2  Skin - Turn Q2  Pain - Tylenol PRN  DVT PPX - Lovenox      Continue comprehensive acute rehab program consisting of 3hrs/day of OT/PT and SLP.
Patient is a 66y old  Male who presents with a chief complaint of Encephalitis (04 Oct 2021 12:06)      Patient seen and examined at bedside.  Denies chest pain, dyspnea, abd pain.  No acute distress.  Has improvement in congestion compared to yesterday.    ALLERGIES:  No Known Allergies    MEDICATIONS  (STANDING):  amLODIPine   Tablet 5 milliGRAM(s) Oral daily  aspirin  chewable 81 milliGRAM(s) Oral daily  atorvastatin 80 milliGRAM(s) Oral at bedtime  BACItracin   Ointment 1 Application(s) Topical two times a day  cephalexin 500 milliGRAM(s) Oral four times a day  enoxaparin Injectable 40 milliGRAM(s) SubCutaneous daily  fluticasone propionate 50 MICROgram(s)/spray Nasal Spray 1 Spray(s) Both Nostrils two times a day  folic acid 1 milliGRAM(s) Oral daily  guaiFENesin  milliGRAM(s) Oral every 12 hours  influenza   Vaccine 0.5 milliLiter(s) IntraMuscular once  loratadine 10 milliGRAM(s) Oral daily  melatonin 6 milliGRAM(s) Oral at bedtime  minocycline 100 milliGRAM(s) Oral two times a day  OLANZapine 10 milliGRAM(s) Oral at bedtime  pantoprazole    Tablet 40 milliGRAM(s) Oral before breakfast  predniSONE   Tablet 40 milliGRAM(s) Oral daily    MEDICATIONS  (PRN):  acetaminophen   Tablet .. 650 milliGRAM(s) Oral every 6 hours PRN Mild Pain (1 - 3), Moderate Pain (4 - 6), Severe Pain (7 - 10)  OLANZapine Injectable 2.5 milliGRAM(s) IntraMuscular every 4 hours PRN Agitation  senna 2 Tablet(s) Oral at bedtime PRN Constipation    Vital Signs Last 24 Hrs  T(F): 97.9 (05 Oct 2021 08:25), Max: 98 (04 Oct 2021 19:56)  HR: 86 (05 Oct 2021 08:25) (66 - 86)  BP: 128/78 (05 Oct 2021 08:25) (128/78 - 153/83)  RR: 16 (05 Oct 2021 08:25) (16 - 16)  SpO2: 98% (05 Oct 2021 08:25) (95% - 98%)  I&O's Summary    04 Oct 2021 07:01  -  05 Oct 2021 07:00  --------------------------------------------------------  IN: 720 mL / OUT: 0 mL / NET: 720 mL      BMI (kg/m2): 19.2 (10-01-21 @ 13:40)  PHYSICAL EXAM:  General: NAD, A/O x 3  ENT: MMM  Neck: Supple, No JVD  Lungs: Clear to auscultation bilaterally  Cardio: RRR, S1/S2, No murmurs  Abdomen: Soft, Nontender, Nondistended; Bowel sounds present  Extremities: No calf tenderness, No pitting edema    LABS:                        11.2   16.67 )-----------( 212      ( 04 Oct 2021 05:30 )             33.7       10-04    142  |  105  |  22  ----------------------------<  96  4.3   |  34  |  1.19    Ca    8.4      04 Oct 2021 05:30    TPro  6.1  /  Alb  2.6  /  TBili  0.8  /  DBili  x   /  AST  39  /  ALT  79  /  AlkPhos  107  10-04     eGFR if Non African American: 63 mL/min/1.73M2 (10-04-21 @ 05:30)  eGFR if : 74 mL/min/1.73M2 (10-04-21 @ 05:30)             09-19 Chol -- LDL -- HDL -- Trig 160 mg/dL                      COVID-19 PCR: NotDetec (10-01-21 @ 20:05)  COVID-19 PCR: NotDetec (09-30-21 @ 13:54)  COVID-19 PCR: NotDetec (09-27-21 @ 11:06)  COVID-19 PCR: NotDetec (09-24-21 @ 10:53)  COVID-19 PCR: NotDetec (09-14-21 @ 18:47)      RADIOLOGY & ADDITIONAL TESTS:    Care Discussed with Consultants/Other Providers:   
Patient is a 66y old  Male who presents with a chief complaint of Encephalitis (06 Oct 2021 14:21)      Patient seen and examined at bedside.  Denies chest pain, dyspnea, abd pain.  No acute distress.    ALLERGIES:  No Known Allergies    MEDICATIONS  (STANDING):  amLODIPine   Tablet 5 milliGRAM(s) Oral daily  aspirin  chewable 81 milliGRAM(s) Oral daily  atorvastatin 80 milliGRAM(s) Oral at bedtime  BACItracin   Ointment 1 Application(s) Topical two times a day  benzonatate 100 milliGRAM(s) Oral three times a day  cephalexin 500 milliGRAM(s) Oral four times a day  enoxaparin Injectable 40 milliGRAM(s) SubCutaneous daily  fluticasone propionate 50 MICROgram(s)/spray Nasal Spray 1 Spray(s) Both Nostrils two times a day  folic acid 1 milliGRAM(s) Oral daily  influenza   Vaccine 0.5 milliLiter(s) IntraMuscular once  loratadine 10 milliGRAM(s) Oral daily  melatonin 6 milliGRAM(s) Oral at bedtime  minocycline 100 milliGRAM(s) Oral two times a day  OLANZapine 10 milliGRAM(s) Oral at bedtime  pantoprazole    Tablet 40 milliGRAM(s) Oral before breakfast  predniSONE   Tablet 40 milliGRAM(s) Oral daily    MEDICATIONS  (PRN):  acetaminophen   Tablet .. 650 milliGRAM(s) Oral every 6 hours PRN Mild Pain (1 - 3), Moderate Pain (4 - 6), Severe Pain (7 - 10)  OLANZapine Injectable 2.5 milliGRAM(s) IntraMuscular every 4 hours PRN Agitation  senna 2 Tablet(s) Oral at bedtime PRN Constipation    Vital Signs Last 24 Hrs  T(F): 98 (06 Oct 2021 07:22), Max: 98 (06 Oct 2021 07:22)  HR: 84 (06 Oct 2021 07:22) (75 - 84)  BP: 122/74 (06 Oct 2021 07:22) (122/74 - 155/77)  RR: 14 (06 Oct 2021 07:22) (14 - 15)  SpO2: 97% (06 Oct 2021 07:22) (95% - 97%)  I&O's Summary      PHYSICAL EXAM:  General: NAD, A/O x 3  ENT: MMM  Neck: Supple, No JVD  Lungs: Clear to auscultation bilaterally  Cardio: RRR, S1/S2, No murmurs  Abdomen: Soft, Nontender, Nondistended; Bowel sounds present  Extremities: No calf tenderness, No pitting edema    LABS:                        11.2   16.67 )-----------( 212      ( 04 Oct 2021 05:30 )             33.7       10-04    142  |  105  |  22  ----------------------------<  96  4.3   |  34  |  1.19    Ca    8.4      04 Oct 2021 05:30    TPro  6.1  /  Alb  2.6  /  TBili  0.8  /  DBili  x   /  AST  39  /  ALT  79  /  AlkPhos  107  10-04     eGFR if Non African American: 63 mL/min/1.73M2 (10-04-21 @ 05:30)  eGFR if : 74 mL/min/1.73M2 (10-04-21 @ 05:30)             09-19 Chol -- LDL -- HDL -- Trig 160 mg/dL                      Culture - Body Fluid with Gram Stain (collected 05 Oct 2021 15:25)  Source: .Body Fluid Interstitial Fluid  Gram Stain (05 Oct 2021 23:39):    No polymorphonuclear leukocytes seen per low power field    No organisms seen per oil power field      COVID-19 PCR: NotDetec (10-01-21 @ 20:05)  COVID-19 PCR: NotDetec (09-30-21 @ 13:54)  COVID-19 PCR: NotDetec (09-27-21 @ 11:06)  COVID-19 PCR: NotDetec (09-24-21 @ 10:53)  COVID-19 PCR: NotDetec (09-14-21 @ 18:47)      RADIOLOGY & ADDITIONAL TESTS:    Care Discussed with Consultants/Other Providers:   
Patient is a 66y old  Male who presents with a chief complaint of Encephalitis (08 Oct 2021 11:32)      Patient seen and examined at bedside.  Denies chest pain, dyspnea, abd pain.    ALLERGIES:  No Known Allergies    MEDICATIONS  (STANDING):  amLODIPine   Tablet 5 milliGRAM(s) Oral daily  aspirin  chewable 81 milliGRAM(s) Oral daily  atorvastatin 80 milliGRAM(s) Oral at bedtime  BACItracin   Ointment 1 Application(s) Topical two times a day  benzonatate 100 milliGRAM(s) Oral three times a day  cephalexin 500 milliGRAM(s) Oral four times a day  cephalexin 500 milliGRAM(s) Oral four times a day  enoxaparin Injectable 40 milliGRAM(s) SubCutaneous daily  fluticasone propionate 50 MICROgram(s)/spray Nasal Spray 1 Spray(s) Both Nostrils two times a day  folic acid 1 milliGRAM(s) Oral daily  influenza   Vaccine 0.5 milliLiter(s) IntraMuscular once  loratadine 10 milliGRAM(s) Oral daily  melatonin 6 milliGRAM(s) Oral at bedtime  OLANZapine 10 milliGRAM(s) Oral at bedtime  pantoprazole    Tablet 40 milliGRAM(s) Oral before breakfast  predniSONE   Tablet 40 milliGRAM(s) Oral daily    MEDICATIONS  (PRN):  acetaminophen   Tablet .. 650 milliGRAM(s) Oral every 6 hours PRN Mild Pain (1 - 3), Moderate Pain (4 - 6), Severe Pain (7 - 10)  OLANZapine Injectable 2.5 milliGRAM(s) IntraMuscular every 4 hours PRN Agitation  senna 2 Tablet(s) Oral at bedtime PRN Constipation    Vital Signs Last 24 Hrs  T(F): 97.5 (08 Oct 2021 08:45), Max: 98.2 (07 Oct 2021 20:27)  HR: 74 (08 Oct 2021 08:45) (70 - 94)  BP: 112/87 (08 Oct 2021 08:45) (112/87 - 130/81)  RR: 16 (08 Oct 2021 08:45) (15 - 16)  SpO2: 95% (08 Oct 2021 08:45) (95% - 95%)  I&O's Summary      PHYSICAL EXAM:  General: NAD, A/O x 3  ENT: MMM  Neck: Supple, No JVD  Lungs: Clear to auscultation bilaterally  Cardio: RRR, S1/S2, No murmurs  Abdomen: Soft, Nontender, Nondistended; Bowel sounds present  Extremities: No calf tenderness, No pitting edema    LABS:                        12.6   15.39 )-----------( 237      ( 07 Oct 2021 06:10 )             38.4       10-07    142  |  100  |  23  ----------------------------<  106  4.1   |  35  |  1.14    Ca    9.6      07 Oct 2021 06:10    TPro  7.3  /  Alb  3.5  /  TBili  1.5  /  DBili  x   /  AST  26  /  ALT  78  /  AlkPhos  106  10-07     eGFR if Non African American: 67 mL/min/1.73M2 (10-07-21 @ 06:10)  eGFR if African American: 78 mL/min/1.73M2 (10-07-21 @ 06:10)             09-19 Chol -- LDL -- HDL -- Trig 160 mg/dL                      Culture - Body Fluid with Gram Stain (collected 05 Oct 2021 15:25)  Source: .Body Fluid Interstitial Fluid  Gram Stain (05 Oct 2021 23:39):    No polymorphonuclear leukocytes seen per low power field    No organisms seen per oil power field  Preliminary Report (07 Oct 2021 17:01):    Few Methicillin Resistant Staphylococcus aureus  Organism: Methicillin resistant Staphylococcus aureus (07 Oct 2021 17:00)  Organism: Methicillin resistant Staphylococcus aureus (07 Oct 2021 17:00)      -  Ampicillin/Sulbactam: R <=8/4      -  Cefazolin: R 8      -  Clindamycin: R <=0.25 This isolate is presumed to be clindamycin resistant based on detection of inducible resistance. Clindamycin may still be effective in some patients.      -  Daptomycin: S 0.5      -  Erythromycin: R >4      -  Gentamicin: S <=1 Should not be used as monotherapy      -  Linezolid: S 2      -  Oxacillin: R >2      -  Penicillin: R >8      -  RIF- Rifampin: S <=1 Should not be used as monotherapy      -  Tetra/Doxy: S <=1      -  Trimethoprim/Sulfamethoxazole: S <=0.5/9.5      -  Vancomycin: S 1      Method Type: KONG      COVID-19 PCR: NotDetec (10-07-21 @ 05:00)  COVID-19 PCR: NotDetec (10-01-21 @ 20:05)  COVID-19 PCR: NotDetec (09-30-21 @ 13:54)  COVID-19 PCR: NotDetec (09-27-21 @ 11:06)  COVID-19 PCR: NotDetec (09-24-21 @ 10:53)      RADIOLOGY & ADDITIONAL TESTS:    Care Discussed with Consultants/Other Providers:

## 2021-10-08 NOTE — PROGRESS NOTE ADULT - REASON FOR ADMISSION
Encephalitis

## 2021-10-08 NOTE — PROGRESS NOTE ADULT - NUTRITIONAL ASSESSMENT
This patient has been assessed with a concern for Malnutrition and has been determined to have a diagnosis/diagnoses of Severe protein-calorie malnutrition.    This patient is being managed with:   Diet Soft-  Supplement Feeding Modality:  Oral  Ensure Enlive Cans or Servings Per Day:  2       Frequency:  Daily  Entered: Oct  1 2021  1:39PM    
This patient has been assessed with a concern for Malnutrition and has been determined to have a diagnosis/diagnoses of Severe protein-calorie malnutrition.    This patient is being managed with:   Diet Regular-  Supplement Feeding Modality:  Oral  Ensure Enlive Cans or Servings Per Day:  2       Frequency:  Daily  Entered: Oct  7 2021  8:50AM    
This patient has been assessed with a concern for Malnutrition and has been determined to have a diagnosis/diagnoses of Severe protein-calorie malnutrition.    This patient is being managed with:   Diet Regular-  Supplement Feeding Modality:  Oral  Ensure Enlive Cans or Servings Per Day:  2       Frequency:  Daily  Entered: Oct  7 2021  8:50AM    
This patient has been assessed with a concern for Malnutrition and has been determined to have a diagnosis/diagnoses of Severe protein-calorie malnutrition.    This patient is being managed with:   Diet Soft-  Supplement Feeding Modality:  Oral  Ensure Enlive Cans or Servings Per Day:  2       Frequency:  Daily  Entered: Oct  1 2021  1:39PM    

## 2021-10-08 NOTE — PROGRESS NOTE ADULT - ASSESSMENT
This is a 65 y/o male with PMHx CLL followed by heme/onc, anemia, melanoma, osteopenia admitted for right facial droop, right hemiparesis and expressive aphasia. Initially admitted at [facility] and s/p TPA, now transferred to [facility] on 9/16 for evaluation of his seizures. Patient now with gait Instability, ADL impairments and Functional impairments.    # Autoimmune/paraneoplastic encephalitis  - Patient with new right sided UE weakness - no stroke on CT or MRI  - s/p tpa without improvement in symptoms  - 9/15 CT head without acute ICH  - 9/16 MRI neg for infarct  - Start Comprehensive Rehab Program: PT/OT/ST  - PT: Focused on improving strength, endurance, coordination, balance, functional mobility, and transfers  - OT: Focused on improving strength, fine motor skills, coordination, posture and ADLs.    - ST: to diagnose and treat deficits in swallowing, cognition and communication.   - Continue ASA 81mg  #paraneoplastic encephalitis  - s/p IVIG x 3 days at [facility]  - s/p Solumedrol 1000 mg IV for a total of 5 days (9/21-9/25)  - c/w steroid taper. 9/26: Started prednisone taper 60qd x 1 week, then 40mg qd x 1 week, then 20mg qd x 1 week, [id number] qd x1week    #PAF  - Per cardiology; he was seen on a prior admission for syncope 7/2020 and found to have brief Afib  - [facility] admission Tele=  SR   - echocardiogram 9/15 unremarkable  - since no definitive CVA, no AC    #CLL  - IVIG monthly  - OP Heme/onc f/u    #Right elbow cellulitis -healting  - Minocycline 100mg BID until 10/6  - Keflex 500mg QID until 10/6  - Bacitracin oint    - warm compress PRN    #left arm swelling  - Pending LUE duplex  - Hospitalist following  -Warm compresses applied to area     #HTN  - Amlodipine 5mg daily    #HLD  - Lipitor 80mg daily    #seasonal allergies  - Start flonase BID  -Start claritin 10mg daily  -Home med: Allegra    #Pain management  - Tylenol PRN    #DVT ppx  - Lovenox  -SCD, TEDs    #GI ppx  - Protonix 40mg daily    #Bowel Regimen  - Senna  - miralax PRN    #Bladder management  - BS on admission, and q 8 hours (SC if > 400)  - Monitor UO    #FEN   - Diet: Soft  - Supplement: Folic acid    #Precaution  - Fall, Aspiration, Seizure    #Skin:  - No active issues at this time  - Pressure injury/Skin: Turn Q2hrs while in bed, OOB to Chair, PT/OT     #Dysphagia    - SLP: evaluation and treatment    #Mood/Cognition:  - Zyprexa 10mg daily. Seroquel discontinued due to prolonged QTc 467 prior admission  - Neuropsychology consult PRN    #Sleep:   - Melatonin 6mg daily  - Maintain quiet hours and low stim environment.  - Melatonin PRN to maximize participation in therapy during the day.   - Monitor sleep logs    Outpatient Follow-up (Specialty/Name of physician):    Gopi Jc)  Cardiovascular Disease; Internal Medicine  [address] [place], [place] [id number]  Phone: [phone]  Fax: [phone]    Jeronimo Godinez  HEMATOLOGY  [address] [facility], [address]  [place] [address]  Phone: [phone]  Fax: [phone]  Follow Up Time: 2 weeks    
  This is a 65 y/o male with PMHx CLL followed by heme/onc, anemia, melanoma, osteopenia admitted for right facial droop, right hemiparesis and expressive aphasia. Initially admitted at [facility] and s/p TPA, now transferred to [facility] on 9/16 for evaluation of his seizures. Patient now with gait Instability, ADL impairments and Functional impairments.    # Autoimmune/paraneoplastic encephalitis  - Patient with new right sided UE weakness - no stroke on CT or MRI  - s/p tpa without improvement in symptoms  - 9/15 CT head without acute ICH  - 9/16 MRI neg for infarct  - Continue Comprehensive Rehab Program: PT/OT/ST  - PT: Focused on improving strength, endurance, coordination, balance, functional mobility, and transfers  - OT: Focused on improving strength, fine motor skills, coordination, posture and ADLs.    - ST: to diagnose and treat deficits in swallowing, cognition and communication.   - Continue ASA 81mg  #paraneoplastic encephalitis  - s/p IVIG x 3 days at [facility]  - s/p Solumedrol 1000 mg IV for a total of 5 days (9/21-9/25)  - c/w steroid taper. 9/26: Started prednisone taper 60qd x 1 week, then 40mg qd x 1 week, then 20mg qd x 1 week, [id number] qd x1week    #PAF  - Per cardiology; he was seen on a prior admission for syncope 7/2020 and found to have brief Afib  - [facility] admission Tele=  SR   - echocardiogram 9/15 unremarkable  - since no definitive CVA, no AC    #CLL  - IVIG monthly as per oncology   - OP Heme/onc f/u    #Right elbow cellulitis -healing  - Minocycline 100mg BID until 10/6  - Keflex 500mg QID until 10/6  - Bacitracin oint    - warm compress PRN    #left arm swelling  - LUE duplex negative for DVT but shows a cyst  - Hospitalist following  -Warm compresses applied to area   -Appreciate surgery consult  -I&D performed on 10/5 with packing and dressing in place  -Culture of pus removed from cyst is pending    #HTN  - Amlodipine 5mg daily    #HLD  - Lipitor 80mg daily    #seasonal allergies  - Continue flonase BID  -Continue claritin 10mg daily  -Home med: Allegra  -Stop Mucinex   - Start tessalon perles 100mg every 8 hours for cough (10/6)    #Pain management  - Tylenol PRN    #DVT ppx  - Lovenox  -SCD, TEDs    #GI ppx  - Protonix 40mg daily    #Bowel Regimen  - Senna  - miralax PRN    #Bladder management  - BS on admission, and q 8 hours (SC if > 400)  - Monitor UO    #FEN   - Diet: Soft  - Supplement: Folic acid    #Precaution  - Fall, Aspiration, Seizure    #Skin:  - No active issues at this time  - Pressure injury/Skin: Turn Q2hrs while in bed, OOB to Chair, PT/OT     #Dysphagia    - SLP: evaluation and treatment    #Mood/Cognition:  - Zyprexa 10mg daily. Seroquel discontinued due to prolonged QTc 467 prior admission  - Neuropsychology consult PRN    #Sleep:   - Melatonin 6mg daily  - Maintain quiet hours and low stim environment.  - Melatonin PRN to maximize participation in therapy during the day.   - Monitor sleep logs    Outpatient Follow-up (Specialty/Name of physician):    Gopi Jc)  Cardiovascular Disease; Internal Medicine  [address] [place], [place] [id number]  Phone: [phone]  Fax: [phone]    Jeronimo Godinez  HEMATOLOGY  [address] [facility], [address]  [place] [address]  Phone: [phone]  Fax: [phone]  Follow Up Time: 2 weeks    TEAM MEETING 10/6/21  SW:  Lives with wife in private home  OT: Mod I for eating/grooming/UBD/LBD, SV for transfers, toileting, and bathing   PT: Mod I for transfers, walks 150' with SV, 16 steps unassisted   SLP: MBS completed and needs stratagies for thins, now on soft/bite sized foot, has delays in cognition  barriers: Delayed cognition, conversation, decrease in comprehension   EDOD: Home 10/8
  This is a 67 y/o male with PMHx CLL followed by heme/onc, anemia, melanoma, osteopenia admitted for right facial droop, right hemiparesis and expressive aphasia. Initially admitted at [facility] and s/p TPA, now transferred to [facility] on 9/16 for evaluation of his seizures. Patient now with gait Instability, ADL impairments and Functional impairments.    # Autoimmune/paraneoplastic encephalitis  - Patient with new right sided UE weakness - no stroke on CT or MRI  - s/p tpa without improvement in symptoms  - 9/15 CT head without acute ICH  - 9/16 MRI neg for infarct  - Start Comprehensive Rehab Program: PT/OT/ST  - PT: Focused on improving strength, endurance, coordination, balance, functional mobility, and transfers  - OT: Focused on improving strength, fine motor skills, coordination, posture and ADLs.    - ST: to diagnose and treat deficits in swallowing, cognition and communication.   - Continue ASA 81mg  #paraneoplastic encephalitis  - s/p IVIG x 3 days at [facility]  - s/p Solumedrol 1000 mg IV for a total of 5 days (9/21-9/25)  - c/w steroid taper. 9/26: Started prednisone taper 60qd x 1 week, then 40mg qd x 1 week, then 20mg qd x 1 week, [id number] qd x1week    #PAF  - Per cardiology; he was seen on a prior admission for syncope 7/2020 and found to have brief Afib  - [facility] admission Tele=  SR   - echocardiogram 9/15 unremarkable  - since no definitive CVA, no AC    #CLL  - IVIG monthly  - OP Heme/onc f/u    #Right elbow cellulitis -healting  - Minocycline 100mg BID until 10/6  - Keflex 500mg QID until 10/6  - Bacitracin oint    - warm compress PRN    #left arm swelling  - LUE duplex negative for DVT but shows a cyst  - Hospitalist following  -Warm compresses applied to area   - Surgery consult pending   #HTN  - Amlodipine 5mg daily    #HLD  - Lipitor 80mg daily    #seasonal allergies  - Start flonase BID  -Start claritin 10mg daily  -Home med: Allegra  -Stop Mucinex and switch to Robitussin     #Pain management  - Tylenol PRN    #DVT ppx  - Lovenox  -SCD, TEDs    #GI ppx  - Protonix 40mg daily    #Bowel Regimen  - Senna  - miralax PRN    #Bladder management  - BS on admission, and q 8 hours (SC if > 400)  - Monitor UO    #FEN   - Diet: Soft  - Supplement: Folic acid    #Precaution  - Fall, Aspiration, Seizure    #Skin:  - No active issues at this time  - Pressure injury/Skin: Turn Q2hrs while in bed, OOB to Chair, PT/OT     #Dysphagia    - SLP: evaluation and treatment    #Mood/Cognition:  - Zyprexa 10mg daily. Seroquel discontinued due to prolonged QTc 467 prior admission  - Neuropsychology consult PRN    #Sleep:   - Melatonin 6mg daily  - Maintain quiet hours and low stim environment.  - Melatonin PRN to maximize participation in therapy during the day.   - Monitor sleep logs    Outpatient Follow-up (Specialty/Name of physician):    Gopi Jc)  Cardiovascular Disease; Internal Medicine  [address] [place], [place] [id number]  Phone: [phone]  Fax: [phone]    Jeronimo Godinez  HEMATOLOGY  [address] [facility], [address]  [place] [address]  Phone: [phone]  Fax: [phone]  Follow Up Time: 2 weeks    
67 y/o M with PMHx CLL followed by heme/onc, anemia, melanoma, osteopenia admitted for right facial droop, right hemiparesis and expressive aphasia. Initially admitted at [facility] and s/p TPA, now transferred to [facility] on 9/16 for evaluation of his seizures. Patient now with gait Instability, ADL impairments and Functional impairments.    #Autoimmune/paraneoplastic encephalitis  -Right sided UE weakness - no stroke on CT or MRI  -s/p tpa without improvement in symptoms  -9/15 CT head without acute ICH  -9/16 MRI neg for infarct  -Start comprehensive rehab program - PT/OT/SLP per rehab team  -Pain management, bowel regimen per rehab  -Continue ASA 81mg, folic acid, lipitor 80mg qhs    #Paraneoplastic encephalitis  -s/p IVIG x 3 days at [facility]  -s/p Solumedrol 1000 mg IV for a total of 5 days (9/21-9/25)  -c/w steroid taper. 9/26: Started prednisone taper 60qd x 1 week, then 40mg qd x 1 week, then 20mg qd x 1 week, [id number] qd x1week    #PAF  -Per cardiology; prior admission for syncope 7/2020 found to have brief Afib  -Echo 9/15 unremarkable  -No AC recommended per cardio due to no definitive CVA; pt remains in NSR    #CLL  -IVIG monthly  -Outpatient follow up with heme/onc    #Right elbow cellulitis - improving   -Minocycline 100mg BID until 10/6  -Keflex 500mg QID until 10/6  -Bacitracin oint  -Warm compress PRN    #LUE lesion  - cyst vs less likely abscess  - US shows: No evidence of LUE DVT. There is a 3.6 x 2.3 x 0.9 cm nonvascular hypoechoic region superior to the antecubital fossa, medial aspect, with associated skin thickening, possible complicated sebaceous cyst/epidermal inclusion cyst. Localized infection cannot be excluded  - I&D per surgery  - will follow up culture and sensitivities  - on keflex at this time  - surgical recs appreciated    #HTN  -Amlodipine 5mg daily    #HLD  -Lipitor 80mg daily    #Mood/Cognition  -Zyprexa 10mg daily. Seroquel discontinued due to prolonged QTc 467 prior admission (qtc 418 on repeat EKG 10/2)  -Neuropsychology consult PRN  -Maintain off ativan due to worsening hallucinations     #General allergies  - loratadine    #DVT ppx - Lovenox  #GI ppx - Protonix
67 y/o M with PMHx CLL followed by heme/onc, anemia, melanoma, osteopenia admitted for right facial droop, right hemiparesis and expressive aphasia. Initially admitted at [facility] and s/p TPA, now transferred to [facility] on 9/16 for evaluation of his seizures. Patient now with gait Instability, ADL impairments and Functional impairments.    #Autoimmune/paraneoplastic encephalitis  -Right sided UE weakness - no stroke on CT or MRI  -s/p tpa without improvement in symptoms  -9/15 CT head without acute ICH  -9/16 MRI neg for infarct  -Start comprehensive rehab program - PT/OT/SLP per rehab team  -Pain management, bowel regimen per rehab  -Continue ASA 81mg, folic acid, lipitor 80mg qhs    #Paraneoplastic encephalitis  -s/p IVIG x 3 days at [facility]  -s/p Solumedrol 1000 mg IV for a total of 5 days (9/21-9/25)  -c/w steroid taper. 9/26: Started prednisone taper 60qd x 1 week, then 40mg qd x 1 week, then 20mg qd x 1 week, [id number] qd x1week    #PAF  -Per cardiology; prior admission for syncope 7/2020 found to have brief Afib  -Echo 9/15 unremarkable  -No AC recommended per cardio due to no definitive CVA; pt remains in NSR    #CLL  -IVIG monthly  -Outpatient follow up with heme/onc    #Right elbow cellulitis - improving   -Minocycline 100mg BID until 10/6  -Keflex 500mg QID until 10/6  -Bacitracin oint    -Warm compress PRN    #LUE lesion  - cyst vs less likely abscess  - US shows: No evidence of LUE DVT. There is a 3.6 x 2.3 x 0.9 cm nonvascular hypoechoic region superior to the antecubital fossa, medial aspect, with associated skin thickening, possible complicated sebaceous cyst/epidermal inclusion cyst. Localized infection cannot be excluded  - warm compresses  - consider gen surg consult    #HTN  -Amlodipine 5mg daily    #HLD  -Lipitor 80mg daily    #Mood/Cognition  -Zyprexa 10mg daily. Seroquel discontinued due to prolonged QTc 467 prior admission (qtc 418 on repeat EKG 10/2)  -Neuropsychology consult PRN  -Maintain off ativan due to worsening hallucinations     #General allergies  - loratadine    #DVT ppx - Lovenox  #GI ppx - Protonix
67 y/o M with PMHx CLL followed by heme/onc, anemia, melanoma, osteopenia admitted for right facial droop, right hemiparesis and expressive aphasia. Initially admitted at [facility] and s/p TPA, now transferred to [facility] on 9/16 for evaluation of his seizures. Patient now with gait Instability, ADL impairments and Functional impairments.    #Autoimmune/paraneoplastic encephalitis  -Right sided UE weakness - no stroke on CT or MRI  -s/p tpa without improvement in symptoms  -9/15 CT head without acute ICH  -9/16 MRI neg for infarct  -Start comprehensive rehab program - PT/OT/SLP per rehab team  -Pain management, bowel regimen per rehab  -Continue ASA 81mg, folic acid, lipitor 80mg qhs    #Paraneoplastic encephalitis  -s/p IVIG x 3 days at [facility]  -s/p Solumedrol 1000 mg IV for a total of 5 days (9/21-9/25)  -c/w steroid taper. 9/26: Started prednisone taper 60qd x 1 week, then 40mg qd x 1 week, then 20mg qd x 1 week, [id number] qd x1week    #PAF  -Per cardiology; prior admission for syncope 7/2020 found to have brief Afib  -Echo 9/15 unremarkable  -No AC recommended per cardio due to no definitive CVA; pt remains in NSR    #CLL  -IVIG monthly  -Outpatient follow up with heme/onc    #Right elbow cellulitis - improving   -Minocycline 100mg BID until 10/6  -Keflex 500mg QID until 10/6  -Bacitracin oint  -Warm compress PRN    #LUE lesion  - cyst vs less likely abscess  - US shows: No evidence of LUE DVT. There is a 3.6 x 2.3 x 0.9 cm nonvascular hypoechoic region superior to the antecubital fossa, medial aspect, with associated skin thickening, possible complicated sebaceous cyst/epidermal inclusion cyst. Localized infection cannot be excluded  - I&D per surgery  - will follow up culture and sensitivities  - on keflex 500 4x per day at this time (last day to be 10/8)  - surgical recs appreciated    #HTN  -Amlodipine 5mg daily    #HLD  -Lipitor 80mg daily    #Mood/Cognition  -Zyprexa 10mg daily. Seroquel discontinued due to prolonged QTc 467 prior admission (qtc 418 on repeat EKG 10/2)  -Neuropsychology consult PRN  -Maintain off ativan due to worsening hallucinations     #General allergies  - loratadine    #DVT ppx - Lovenox  #GI ppx - Protonix
ASSESSMENT/PLAN  This is a 67 y/o male with PMHx CLL followed by heme/onc, anemia, melanoma, osteopenia admitted for right facial droop, right hemiparesis and expressive aphasia. Initially admitted at [facility] and s/p TPA, now transferred to [facility] on 9/16 for evaluation of his seizures. Patient now with gait Instability, ADL impairments and Functional impairments.    # Autoimmune/paraneoplastic encephalitis  - Patient with new right sided UE weakness - no stroke on CT or MRI  - s/p tpa without improvement in symptoms  - 9/15 CT head without acute ICH  - 9/16 MRI neg for infarct  - Start Comprehensive Rehab Program: PT/OT/ST  - PT: Focused on improving strength, endurance, coordination, balance, functional mobility, and transfers  - OT: Focused on improving strength, fine motor skills, coordination, posture and ADLs.    - ST: to diagnose and treat deficits in swallowing, cognition and communication.   - Continue ASA 81mg  #paraneoplastic encephalitis  - s/p IVIG x 3 days at [facility]  - s/p Solumedrol 1000 mg IV for a total of 5 days (9/21-9/25)  - c/w steroid taper. 9/26: Started prednisone taper 60qd x 1 week, then 40mg qd x 1 week, then 20mg qd x 1 week, [id number] qd x1week    #PAF  - Per cardiology; he was seen on a prior admission for syncope 7/2020 and found to have brief Afib  - [facility] admission Tele=  SR   - echocardiogram 9/15 unremarkable  - since no definitive CVA, no AC    #CLL  - IVIG monthly  - OP Heme/onc f/u    #Right elbow cellulitis - improving   - Minocycline 100mg BID until 10/6  - Keflex 500mg QID until 10/6  - Bacitracin oint    - warm compress PRN    #HTN  - Amlodipine 5mg daily    #HLD  - Lipitor 80mg daily    #Pain management  - Tylenol PRN    #DVT ppx  - Lovenox  -SCD, TEDs    #GI ppx  - Protonix 40mg daily    #Bowel Regimen  - Senna  - miralax PRN    #Bladder management  - BS on admission, and q 8 hours (SC if > 400)  - Monitor UO    #FEN   - Diet: Soft  - Supplement: Folic acid    #Precaution  - Fall, Aspiration, Seizure    #Skin:  - No active issues at this time  - Pressure injury/Skin: Turn Q2hrs while in bed, OOB to Chair, PT/OT     #Dysphagia    - SLP: evaluation and treatment    #Mood/Cognition:  - Zyprexa 10mg daily. Seroquel discontinued due to prolonged QTc 467 prior admission  - Neuropsychology consult PRN    #Sleep:   - Melatonin 6mg daily  - Maintain quiet hours and low stim environment.  - Melatonin PRN to maximize participation in therapy during the day.   - Monitor sleep logs    Outpatient Follow-up (Specialty/Name of physician):    Gopi Jc)  Cardiovascular Disease; Internal Medicine  [address] [place] [id number]  Phone: [phone]  Fax: [phone]    Jeronimo Godinez  HEMATOLOGY  [address] [facility], [address] [place] [address]  Phone: [phone]  Fax: [phone]  Follow Up Time: 2 weeks    
Impression/Plan:  SARAHI BARNHART is a 65yo man with a left upper extremity abscses s/p I+D on 10/5.    LEFT UPPER EXTREMITY ABSCESS WITH MRSA  - Daily gauze dressing changes with light ACE bandage pressure until scab forms, at which point can be left open to air.   - Shower over incision and pat dry.   - Bactrim for 7 days.   - Will call patient in 1 week to check status. May follow-up physically in office PRN.     --------------------------------------------------------    Subjective:  Feels well. No more oozing from incision.     Objective:  T(C): 36.7 (10-06-21 @ 07:22), Max: 36.7 (10-06-21 @ 07:22)  HR: 84 (10-06-21 @ 07:22) (75 - 84)  BP: 122/74 (10-06-21 @ 07:22) (122/74 - 155/77)  RR: 14 (10-06-21 @ 07:22) (14 - 15)  SpO2: 97% (10-06-21 @ 07:22) (95% - 97%)    Physical Exam  No acute distress.   No labored breathing.   Left upper extremity wrapped with Kerlix and gauze. Incision hemostatic. Mild erythema of overlying skin. Packing removed. No purulent discharge.     Laboratory Data  Wound culture 10/5:   MRSA resistant to first-gen cephalosporin    Imaging Data  UPPER EXTREMITY DUPLEX 10/5 IMPRESSION:  No evidence of left upper extremity deep venous thrombosis. There is a 3.6 x 2.3 x 0.9 cm nonvascular hypoechoic region superior to the antecubital fossa, medial aspect, with associated skin thickening, possible complicated sebaceous cyst/epidermal inclusion cyst. Localized infection cannot be excluded. Clinical correlation is suggested.      
67 y/o M with PMHx CLL followed by heme/onc, anemia, melanoma, osteopenia admitted for right facial droop, right hemiparesis and expressive aphasia. Initially admitted at [facility] and s/p TPA, now transferred to [facility] on 9/16 for evaluation of his seizures. Patient now with gait Instability, ADL impairments and Functional impairments.    #Autoimmune/paraneoplastic encephalitis  -Right sided UE weakness - no stroke on CT or MRI  -s/p tpa without improvement in symptoms  -9/15 CT head without acute ICH  -9/16 MRI neg for infarct  -Start comprehensive rehab program - PT/OT/SLP per rehab team  -Pain management, bowel regimen per rehab  -Continue ASA 81mg, folic acid, lipitor 80mg qhs    #Paraneoplastic encephalitis  -s/p IVIG x 3 days at [facility]  -s/p Solumedrol 1000 mg IV for a total of 5 days (9/21-9/25)  -c/w steroid taper. 9/26: Started prednisone taper 60qd x 1 week, then 40mg qd x 1 week, then 20mg qd x 1 week, [id number] qd x1week    #PAF  -Per cardiology; prior admission for syncope 7/2020 found to have brief Afib  -Echo 9/15 unremarkable  -No AC recommended per cardio due to no definitive CVA; pt remains in NSR    #CLL  -IVIG monthly  -Outpatient follow up with heme/onc    #Right elbow cellulitis - improving   -Minocycline 100mg BID until 10/6  -Keflex 500mg QID until 10/6  -Bacitracin oint  -Warm compress PRN    #LUE lesion  - cyst vs less likely abscess  - US shows: No evidence of LUE DVT. There is a 3.6 x 2.3 x 0.9 cm nonvascular hypoechoic region superior to the antecubital fossa, medial aspect, with associated skin thickening, possible complicated sebaceous cyst/epidermal inclusion cyst. Localized infection cannot be excluded  - I&D per surgery  - body fluid culture shows MRSA  - will be discharged on bactrim    #HTN  -Amlodipine 5mg daily    #HLD  -Lipitor 80mg daily    #Mood/Cognition  -Zyprexa 10mg daily. Seroquel discontinued due to prolonged QTc 467 prior admission (qtc 418 on repeat EKG 10/2)  -Neuropsychology consult PRN  -Maintain off ativan due to worsening hallucinations     #General allergies  - loratadine    #DVT ppx - Lovenox  #GI ppx - Protonix
Impression/Plan:  SARAHI BARNHART is a 65yo man with a left upper extremity infected sebaceous cyst s/p I+D on 10/5.  - Daily gauze dressing changes with light ACE bandage pressure.  - Continue Keflex. F/u cultures - if sensitive, can stop Keflex after total of 4 days from today from the abscess perspective. (Continue for cellulitis of R elbow as indicated)     --------------------------------------------------------    Subjective:  Feels well. No more oozing from incision.     Objective:  T(C): 36.7 (10-06-21 @ 07:22), Max: 36.7 (10-06-21 @ 07:22)  HR: 84 (10-06-21 @ 07:22) (75 - 84)  BP: 122/74 (10-06-21 @ 07:22) (122/74 - 155/77)  RR: 14 (10-06-21 @ 07:22) (14 - 15)  SpO2: 97% (10-06-21 @ 07:22) (95% - 97%)    Physical Exam  No acute distress.   No labored breathing.   Left upper extremity wrapped with Kerlix and gauze. Incision hemostatic. Mild erythema of overlying skin. Packing removed. No purulent discharge.     Laboratory Data  Wound culture 10/5:   NGTD    Imaging Data  UPPER EXTREMITY DUPLEX 10/5 IMPRESSION:  No evidence of left upper extremity deep venous thrombosis. There is a 3.6 x 2.3 x 0.9 cm nonvascular hypoechoic region superior to the antecubital fossa, medial aspect, with associated skin thickening, possible complicated sebaceous cyst/epidermal inclusion cyst. Localized infection cannot be excluded. Clinical correlation is suggested.      
  This is a 65 y/o male with PMHx CLL followed by heme/onc, anemia, melanoma, osteopenia admitted for right facial droop, right hemiparesis and expressive aphasia. Initially admitted at [facility] and s/p TPA, now transferred to [facility] on 9/16 for evaluation of his seizures. Patient now with gait Instability, ADL impairments and Functional impairments.    # Autoimmune/paraneoplastic encephalitis  - Patient with new right sided UE weakness - no stroke on CT or MRI  - s/p tpa without improvement in symptoms  - 9/15 CT head without acute ICH  - 9/16 MRI neg for infarct  - Continue Comprehensive Rehab Program: PT/OT/ST  - PT: Focused on improving strength, endurance, coordination, balance, functional mobility, and transfers  - OT: Focused on improving strength, fine motor skills, coordination, posture and ADLs.    - ST: to diagnose and treat deficits in swallowing, cognition and communication.   - Continue ASA 81mg    #paraneoplastic encephalitis  - s/p IVIG x 3 days at [facility]  - s/p Solumedrol 1000 mg IV for a total of 5 days (9/21-9/25)  - c/w steroid taper. 9/26: Started prednisone taper 60qd x 1 week, then 40mg qd x 1 week, then 20mg qd x 1 week, [id number] qd x1week    #PAF  - Per cardiology; he was seen on a prior admission for syncope 7/2020 and found to have brief Afib  - [facility] admission Tele=  SR   - echocardiogram 9/15 unremarkable  - since no definitive CVA, no AC    #CLL  - IVIG monthly as per oncology   - OP Heme/onc f/u    #Right elbow cellulitis -healing  - Minocycline 100mg BID until 10/6  - Keflex 500mg QID until 10/10   - Bacitracin oint    - warm compress PRN    #left arm swelling  - LUE duplex negative for DVT but shows a cyst  - Hospitalist following  -Warm compresses applied to area   -Appreciate surgery consult  -I&D performed on 10/5 with packing and dressing in place  -Culture of pus removed from cyst is pending  - Continue Keflex until 10/10    #HTN  - Amlodipine 5mg daily    #HLD  - Lipitor 80mg daily    #seasonal allergies  - Continue flonase BID  -Continue claritin 10mg daily  -Home med: Allegra  -Stop Mucinex   - Start tessalon perles 100mg every 8 hours for cough (10/6)    #Pain management  - Tylenol PRN    #DVT ppx  - Lovenox  -SCD, TEDs    #GI ppx  - Protonix 40mg daily    #Bowel Regimen  - Senna  - miralax PRN    #Bladder management  - BS on admission, and q 8 hours (SC if > 400)  - Monitor UO    #FEN   - Diet: Soft  - Supplement: Folic acid    #Precaution  - Fall, Aspiration, Seizure    #Skin:  - No active issues at this time  - Pressure injury/Skin: Turn Q2hrs while in bed, OOB to Chair, PT/OT     #Dysphagia    - SLP: evaluation and treatment  - MBS done 10/6 - passed for regular with thin diet     #Mood/Cognition:  - Zyprexa 10mg daily. Seroquel discontinued due to prolonged QTc 467 prior admission  - Neuropsychology consult PRN    #Sleep:   - Melatonin 6mg daily  - Maintain quiet hours and low stim environment.  - Melatonin PRN to maximize participation in therapy during the day.   - Monitor sleep logs    Outpatient Follow-up (Specialty/Name of physician):    Gopi Jc)  Cardiovascular Disease; Internal Medicine  [address], [place] [id number]  Phone: [phone]  Fax: [phone]    Jeronimo Godinez  HEMATOLOGY  [address], [address]  [place] [address]  Phone: [phone]  Fax: [phone]  Follow Up Time: 2 weeks    TEAM MEETING 10/6/21  SW:  Lives with wife in private home  OT: Mod I for eating/grooming/UBD/LBD, SV for transfers, toileting, and bathing   PT: Mod I for transfers, walks 150' with SV, 16 steps unassisted   SLP: MBS completed and needs stratagies for thins, now on soft/bite sized foot, has delays in cognition  barriers: Delayed cognition, conversation, decrease in comprehension   EDOD: Home 10/8
  This is a 65 y/o male with PMHx CLL followed by heme/onc, anemia, melanoma, osteopenia admitted for right facial droop, right hemiparesis and expressive aphasia. Initially admitted at [facility] and s/p TPA, now transferred to [facility] on 9/16 for evaluation of his seizures. Patient now with gait Instability, ADL impairments and Functional impairments.    # Autoimmune/paraneoplastic encephalitis  - Patient with new right sided UE weakness - no stroke on CT or MRI  - s/p tpa without improvement in symptoms  - 9/15 CT head without acute ICH  - 9/16 MRI neg for infarct  - Continue ASA 81mg    #paraneoplastic encephalitis  - s/p IVIG x 3 days at [facility]  - s/p Solumedrol 1000 mg IV for a total of 5 days (9/21-9/25)  - c/w steroid taper. continue 40mgQD until Sunday, then 20mg qd x 1 week, [id number] qd x1week    #PAF  - Per cardiology; he was seen on a prior admission for syncope 7/2020 and found to have brief Afib  - [facility] admission Tele=  SR   - echocardiogram 9/15 unremarkable  - since no definitive CVA, no AC    #CLL  - IVIG monthly as per oncology   - OP Heme/onc f/u    #Right elbow cellulitis -healing  - Minocycline 100mg BID until 10/6  - Keflex 500mg QID until 10/6   - Bacitracin oint    - warm compress PRN    #left arm swelling  - LUE duplex negative for DVT but shows a cyst  - Hospitalist following  -Warm compresses applied to area   -Appreciate surgery consult  -I&D performed on 10/5 with packing and dressing in place  -Culture of pus removed from cyst is pending  - Keflex discontinued due to MRSA growth from wound.   -Start Bactrim DS BID for 7 days    #HTN  - Amlodipine 5mg daily    #HLD  - Lipitor 80mg daily    #seasonal allergies  - Continue flonase BID  -Continue claritin 10mg daily  -Home med: Allegra  -Stop Mucinex   - Continue tessalon perles 100mg every 8 hours for cough (10/6)    #Pain management  - Tylenol PRN    #DVT ppx  - Lovenox  -SCD, TEDs    #GI ppx  - Protonix 40mg daily    #Bowel Regimen  - Senna  - miralax PRN    #FEN   - Diet: Soft  - Supplement: Folic acid    #Precaution  - Fall, Aspiration, Seizure      #Dysphagia    - MBS done 10/6 - passed for regular with thin diet     #Mood/Cognition:  - Zyprexa 10mg daily. Seroquel discontinued due to prolonged QTc 467 prior admission  - Neuropsychology consult PRN    #Sleep:   - Melatonin 6mg daily    Outpatient Follow-up (Specialty/Name of physician):    Gopi Jc)  Cardiovascular Disease; Internal Medicine  [address] [place], [place] [id number]  Phone: [phone]  Fax: [phone]    Jeronimo Godinez  HEMATOLOGY  [address], [address]  [place] [address]  Phone: [phone]  Fax: [phone]  Follow Up Time: 2 weeks    TEAM MEETING 10/6/21  SW:  Lives with wife in private home  OT: Mod I for eating/grooming/UBD/LBD, SV for transfers, toileting, and bathing   PT: Mod I for transfers, walks 150' with SV, 16 steps unassisted   SLP: MBS completed and needs stratagies for thins, now on soft/bite sized foot, has delays in cognition  barriers: Delayed cognition, conversation, decrease in comprehension   EDOD: Home 10/8      Stable and ready for discharge today.

## 2021-10-10 LAB
CULTURE RESULTS: SIGNIFICANT CHANGE UP
ORGANISM # SPEC MICROSCOPIC CNT: SIGNIFICANT CHANGE UP
ORGANISM # SPEC MICROSCOPIC CNT: SIGNIFICANT CHANGE UP
SPECIMEN SOURCE: SIGNIFICANT CHANGE UP

## 2021-10-16 LAB
CULTURE RESULTS: SIGNIFICANT CHANGE UP
SPECIMEN SOURCE: SIGNIFICANT CHANGE UP

## 2021-10-26 PROCEDURE — 85652 RBC SED RATE AUTOMATED: CPT

## 2021-10-26 PROCEDURE — 87799 DETECT AGENT NOS DNA QUANT: CPT

## 2021-10-26 PROCEDURE — 80307 DRUG TEST PRSMV CHEM ANLYZR: CPT

## 2021-10-26 PROCEDURE — 88184 FLOWCYTOMETRY/ TC 1 MARKER: CPT

## 2021-10-26 PROCEDURE — 86140 C-REACTIVE PROTEIN: CPT

## 2021-10-26 PROCEDURE — 85027 COMPLETE CBC AUTOMATED: CPT

## 2021-10-26 PROCEDURE — 83735 ASSAY OF MAGNESIUM: CPT

## 2021-10-26 PROCEDURE — 86788 WEST NILE VIRUS AB IGM: CPT

## 2021-10-26 PROCEDURE — 87476 LYME DIS DNA AMP PROBE: CPT

## 2021-10-26 PROCEDURE — 86769 SARS-COV-2 COVID-19 ANTIBODY: CPT

## 2021-10-26 PROCEDURE — 82728 ASSAY OF FERRITIN: CPT

## 2021-10-26 PROCEDURE — 85025 COMPLETE CBC W/AUTO DIFF WBC: CPT

## 2021-10-26 PROCEDURE — 95715 VEEG EA 12-26HR INTMT MNTR: CPT

## 2021-10-26 PROCEDURE — U0003: CPT

## 2021-10-26 PROCEDURE — 83615 LACTATE (LD) (LDH) ENZYME: CPT

## 2021-10-26 PROCEDURE — 84478 ASSAY OF TRIGLYCERIDES: CPT

## 2021-10-26 PROCEDURE — 85730 THROMBOPLASTIN TIME PARTIAL: CPT

## 2021-10-26 PROCEDURE — 82607 VITAMIN B-12: CPT

## 2021-10-26 PROCEDURE — 82962 GLUCOSE BLOOD TEST: CPT

## 2021-10-26 PROCEDURE — 93005 ELECTROCARDIOGRAM TRACING: CPT

## 2021-10-26 PROCEDURE — 92523 SPEECH SOUND LANG COMPREHEN: CPT

## 2021-10-26 PROCEDURE — 86341 ISLET CELL ANTIBODY: CPT

## 2021-10-26 PROCEDURE — 86255 FLUORESCENT ANTIBODY SCREEN: CPT

## 2021-10-26 PROCEDURE — 87116 MYCOBACTERIA CULTURE: CPT

## 2021-10-26 PROCEDURE — 83520 IMMUNOASSAY QUANT NOS NONAB: CPT

## 2021-10-26 PROCEDURE — 97535 SELF CARE MNGMENT TRAINING: CPT

## 2021-10-26 PROCEDURE — 86403 PARTICLE AGGLUT ANTBDY SCRN: CPT

## 2021-10-26 PROCEDURE — 83519 RIA NONANTIBODY: CPT

## 2021-10-26 PROCEDURE — 80048 BASIC METABOLIC PNL TOTAL CA: CPT

## 2021-10-26 PROCEDURE — 97530 THERAPEUTIC ACTIVITIES: CPT

## 2021-10-26 PROCEDURE — 87070 CULTURE OTHR SPECIMN AEROBIC: CPT

## 2021-10-26 PROCEDURE — U0005: CPT

## 2021-10-26 PROCEDURE — 85384 FIBRINOGEN ACTIVITY: CPT

## 2021-10-26 PROCEDURE — 95700 EEG CONT REC W/VID EEG TECH: CPT

## 2021-10-26 PROCEDURE — 87205 SMEAR GRAM STAIN: CPT

## 2021-10-26 PROCEDURE — 87529 HSV DNA AMP PROBE: CPT

## 2021-10-26 PROCEDURE — 73070 X-RAY EXAM OF ELBOW: CPT

## 2021-10-26 PROCEDURE — 92610 EVALUATE SWALLOWING FUNCTION: CPT

## 2021-10-26 PROCEDURE — 97166 OT EVAL MOD COMPLEX 45 MIN: CPT

## 2021-10-26 PROCEDURE — 84100 ASSAY OF PHOSPHORUS: CPT

## 2021-10-26 PROCEDURE — 89051 BODY FLUID CELL COUNT: CPT

## 2021-10-26 PROCEDURE — 80053 COMPREHEN METABOLIC PANEL: CPT

## 2021-10-26 PROCEDURE — 92526 ORAL FUNCTION THERAPY: CPT

## 2021-10-26 PROCEDURE — 84157 ASSAY OF PROTEIN OTHER: CPT

## 2021-10-26 PROCEDURE — 86789 WEST NILE VIRUS ANTIBODY: CPT

## 2021-10-26 PROCEDURE — 82945 GLUCOSE OTHER FLUID: CPT

## 2021-10-26 PROCEDURE — 97110 THERAPEUTIC EXERCISES: CPT

## 2021-10-26 PROCEDURE — 86592 SYPHILIS TEST NON-TREP QUAL: CPT

## 2021-10-26 PROCEDURE — 80202 ASSAY OF VANCOMYCIN: CPT

## 2021-10-26 PROCEDURE — 81001 URINALYSIS AUTO W/SCOPE: CPT

## 2021-10-26 PROCEDURE — 87102 FUNGUS ISOLATION CULTURE: CPT

## 2021-10-26 PROCEDURE — 82746 ASSAY OF FOLIC ACID SERUM: CPT

## 2021-10-26 PROCEDURE — 83529 ASAY OF INTERLEUKIN-6 (IL-6): CPT

## 2021-10-26 PROCEDURE — 97162 PT EVAL MOD COMPLEX 30 MIN: CPT

## 2021-10-26 PROCEDURE — 36415 COLL VENOUS BLD VENIPUNCTURE: CPT

## 2021-10-26 PROCEDURE — 97116 GAIT TRAINING THERAPY: CPT

## 2021-10-26 PROCEDURE — 88185 FLOWCYTOMETRY/TC ADD-ON: CPT

## 2021-10-26 PROCEDURE — 87483 CNS DNA AMP PROBE TYPE 12-25: CPT

## 2021-10-26 PROCEDURE — 85610 PROTHROMBIN TIME: CPT

## 2021-11-03 ENCOUNTER — APPOINTMENT (OUTPATIENT)
Dept: NEUROLOGY | Facility: CLINIC | Age: 66
End: 2021-11-03
Payer: MEDICARE

## 2021-11-03 VITALS
BODY MASS INDEX: 21.48 KG/M2 | DIASTOLIC BLOOD PRESSURE: 73 MMHG | SYSTOLIC BLOOD PRESSURE: 133 MMHG | WEIGHT: 145 LBS | HEIGHT: 69 IN | HEART RATE: 70 BPM

## 2021-11-03 PROCEDURE — 99215 OFFICE O/P EST HI 40 MIN: CPT

## 2021-11-06 LAB
CULTURE RESULTS: SIGNIFICANT CHANGE UP
SPECIMEN SOURCE: SIGNIFICANT CHANGE UP

## 2021-11-07 NOTE — ASSESSMENT
[FreeTextEntry1] : SARAHI BARNHART is a 66 years old with multiple medical comorbidities and with recent EMU admission for  an autoimmune syndrome of unclear etiology ( possibly a combination of chronic immunosuppression /covid exposure and covid vaccine).\par He responded very well to steroids and IVIG.\par \par Plan:\par 1. Continue his current medication as per heme onc\par will follow prn w neurology, only if another event like the  previous one will happen\par no restriction for driving.\par \par

## 2021-11-07 NOTE — HISTORY OF PRESENT ILLNESS
[FreeTextEntry1] : *** 11/03/2021 ***\par \par SARAHI BARNHART is here for follow up.\par he continue to do well. his history is as below:\par \par \par \par HPI:\par Patient is a 65 yo Rt handed male with PMH of CLL on Venetoclax, MDS,\par melanoma, paroxysmal AFib (not on AC) presented as transfer from [facility] on 9/16 for event capture. Patient was initially at [facility] on\par 9/14 with confusion, aphasia and R sided weakness. Patient was stroke code and\par was given s/p tPA. On admission, patient had CTH, CTA, CT perfusion during code\[facility] stroke protocol with no acute findings. MRI Brain was performed at  9/16\par with no ischemic findings. EEG performed 9/15 showed diffuse L hemispheric\par slowing. Pt remained aphasic with AMS. +RUE spasticity was noted on exam.\par \par Patient was transferred to [facility] on 9/16 for Epilepsy Monitoring Unit admission\par to evaluate for subclinical seizures. Patient was seen by neurologist and\par cardiologist. Patient was noted to have episodes agitation with episodes of\par somnolence with out medication needed. AC was held per cardio since there was\par no definitive CVA noted but advised to c/w ASA and statin. LP was performed on\par 9/17. EEG overnight (9/16-9/17) did not show seizures however did show L sided\par slowing concerning for a possible structural etiology. He was started on IVIG\par and steroids on 9/17 for autoimmune encephalitis. He was seen by SS for\par evaluation and Dysphagia 2 with honey thick liquid was recommended. He was also\par started on ABT for right elbow cellulitis/ septic bursitis. LP results- Glucose\par 73, Protein 55, WBC 3, Gram stain no growth to date, HSV neg, Cryptococcal neg;\par infectious w/u thus far negative; AIE panel pending f/u results. Patient also\par experience visual hallucination of which opthalmology wad consulted - no\par evidence of retinal detachment.\par \par Patient was evaluated by PM&R and therapy for functional deficits, gait/ADL\par impairments and acute rehabilitation was recommended. Patient was medically\par optimized for discharge to [facility] IRU on 10/1.\par \par  (01 Oct 2021 13:32)\par \par \par Rehab course significant for left arm infected cyst which was drained by\par surgery and treated with antibiotics pending culture results.\par \par All other medical co-morbidites were stable.\par \par Pt tolerated course of inpatient pt/ot/slp rehab with significant functional\par improvements and met rehab goals prior to discharge.\par \par Pt was medically cleared on 10/8/21 for discharge to Home

## 2021-11-21 ENCOUNTER — INPATIENT (INPATIENT)
Facility: HOSPITAL | Age: 66
LOS: 9 days | Discharge: NURSING, REHAB OR LONG-TERM CARE | DRG: 478 | End: 2021-12-01
Attending: FAMILY MEDICINE | Admitting: STUDENT IN AN ORGANIZED HEALTH CARE EDUCATION/TRAINING PROGRAM
Payer: MEDICARE

## 2021-11-21 VITALS
WEIGHT: 139.99 LBS | SYSTOLIC BLOOD PRESSURE: 118 MMHG | DIASTOLIC BLOOD PRESSURE: 76 MMHG | HEART RATE: 108 BPM | HEIGHT: 70 IN | OXYGEN SATURATION: 99 % | TEMPERATURE: 102 F | RESPIRATION RATE: 18 BRPM

## 2021-11-21 DIAGNOSIS — H26.9 UNSPECIFIED CATARACT: Chronic | ICD-10-CM

## 2021-11-21 LAB
ALBUMIN SERPL ELPH-MCNC: 2.7 G/DL — LOW (ref 3.3–5)
ALP SERPL-CCNC: 104 U/L — SIGNIFICANT CHANGE UP (ref 40–120)
ALT FLD-CCNC: 16 U/L — SIGNIFICANT CHANGE UP (ref 12–78)
ANION GAP SERPL CALC-SCNC: 7 MMOL/L — SIGNIFICANT CHANGE UP (ref 5–17)
ANISOCYTOSIS BLD QL: SLIGHT — SIGNIFICANT CHANGE UP
APPEARANCE UR: CLEAR — SIGNIFICANT CHANGE UP
APTT BLD: 33.8 SEC — SIGNIFICANT CHANGE UP (ref 27.5–35.5)
AST SERPL-CCNC: 10 U/L — LOW (ref 15–37)
BACTERIA # UR AUTO: ABNORMAL
BASOPHILS # BLD AUTO: 0 K/UL — SIGNIFICANT CHANGE UP (ref 0–0.2)
BASOPHILS NFR BLD AUTO: 0 % — SIGNIFICANT CHANGE UP (ref 0–2)
BILIRUB SERPL-MCNC: 0.8 MG/DL — SIGNIFICANT CHANGE UP (ref 0.2–1.2)
BILIRUB UR-MCNC: NEGATIVE — SIGNIFICANT CHANGE UP
BUN SERPL-MCNC: 21 MG/DL — SIGNIFICANT CHANGE UP (ref 7–23)
CALCIUM SERPL-MCNC: 9.4 MG/DL — SIGNIFICANT CHANGE UP (ref 8.5–10.1)
CHLORIDE SERPL-SCNC: 105 MMOL/L — SIGNIFICANT CHANGE UP (ref 96–108)
CO2 SERPL-SCNC: 27 MMOL/L — SIGNIFICANT CHANGE UP (ref 22–31)
COLOR SPEC: YELLOW — SIGNIFICANT CHANGE UP
COMMENT - URINE: SIGNIFICANT CHANGE UP
CREAT SERPL-MCNC: 1.1 MG/DL — SIGNIFICANT CHANGE UP (ref 0.5–1.3)
DIFF PNL FLD: ABNORMAL
EOSINOPHIL # BLD AUTO: 0 K/UL — SIGNIFICANT CHANGE UP (ref 0–0.5)
EOSINOPHIL NFR BLD AUTO: 0 % — SIGNIFICANT CHANGE UP (ref 0–6)
EPI CELLS # UR: SIGNIFICANT CHANGE UP
GLUCOSE SERPL-MCNC: 120 MG/DL — HIGH (ref 70–99)
GLUCOSE UR QL: NEGATIVE — SIGNIFICANT CHANGE UP
HCT VFR BLD CALC: 29 % — LOW (ref 39–50)
HGB BLD-MCNC: 9.3 G/DL — LOW (ref 13–17)
INR BLD: 1.31 RATIO — HIGH (ref 0.88–1.16)
KETONES UR-MCNC: NEGATIVE — SIGNIFICANT CHANGE UP
LEUKOCYTE ESTERASE UR-ACNC: NEGATIVE — SIGNIFICANT CHANGE UP
LYMPHOCYTES # BLD AUTO: 0.48 K/UL — LOW (ref 1–3.3)
LYMPHOCYTES # BLD AUTO: 4 % — LOW (ref 13–44)
MACROCYTES BLD QL: SLIGHT — SIGNIFICANT CHANGE UP
MANUAL SMEAR VERIFICATION: SIGNIFICANT CHANGE UP
MCHC RBC-ENTMCNC: 32.1 GM/DL — SIGNIFICANT CHANGE UP (ref 32–36)
MCHC RBC-ENTMCNC: 33.2 PG — SIGNIFICANT CHANGE UP (ref 27–34)
MCV RBC AUTO: 103.6 FL — HIGH (ref 80–100)
METAMYELOCYTES # FLD: 1 % — HIGH (ref 0–0)
MONOCYTES # BLD AUTO: 2.85 K/UL — HIGH (ref 0–0.9)
MONOCYTES NFR BLD AUTO: 24 % — HIGH (ref 2–14)
NECROBIOTIC CELLS: SLIGHT — SIGNIFICANT CHANGE UP
NEUTROPHILS # BLD AUTO: 8.32 K/UL — HIGH (ref 1.8–7.4)
NEUTROPHILS NFR BLD AUTO: 64 % — SIGNIFICANT CHANGE UP (ref 43–77)
NEUTS BAND # BLD: 6 % — SIGNIFICANT CHANGE UP (ref 0–8)
NITRITE UR-MCNC: NEGATIVE — SIGNIFICANT CHANGE UP
NRBC # BLD: 0 — SIGNIFICANT CHANGE UP
NRBC # BLD: SIGNIFICANT CHANGE UP /100 WBCS (ref 0–0)
PH UR: 8 — SIGNIFICANT CHANGE UP (ref 5–8)
PLAT MORPH BLD: NORMAL — SIGNIFICANT CHANGE UP
PLATELET # BLD AUTO: 304 K/UL — SIGNIFICANT CHANGE UP (ref 150–400)
POTASSIUM SERPL-MCNC: 4 MMOL/L — SIGNIFICANT CHANGE UP (ref 3.5–5.3)
POTASSIUM SERPL-SCNC: 4 MMOL/L — SIGNIFICANT CHANGE UP (ref 3.5–5.3)
PROT SERPL-MCNC: 7.3 G/DL — SIGNIFICANT CHANGE UP (ref 6–8.3)
PROT UR-MCNC: 30 MG/DL
PROTHROM AB SERPL-ACNC: 15.1 SEC — HIGH (ref 10.6–13.6)
RAPID RVP RESULT: SIGNIFICANT CHANGE UP
RBC # BLD: 2.8 M/UL — LOW (ref 4.2–5.8)
RBC # FLD: 16 % — HIGH (ref 10.3–14.5)
RBC BLD AUTO: SIGNIFICANT CHANGE UP
RBC CASTS # UR COMP ASSIST: ABNORMAL /HPF (ref 0–4)
SARS-COV-2 RNA SPEC QL NAA+PROBE: SIGNIFICANT CHANGE UP
SODIUM SERPL-SCNC: 139 MMOL/L — SIGNIFICANT CHANGE UP (ref 135–145)
SP GR SPEC: 1.01 — SIGNIFICANT CHANGE UP (ref 1.01–1.02)
UROBILINOGEN FLD QL: NEGATIVE — SIGNIFICANT CHANGE UP
VARIANT LYMPHS # BLD: 1 % — SIGNIFICANT CHANGE UP (ref 0–6)
WBC # BLD: 11.89 K/UL — HIGH (ref 3.8–10.5)
WBC # FLD AUTO: 11.89 K/UL — HIGH (ref 3.8–10.5)
WBC UR QL: NEGATIVE — SIGNIFICANT CHANGE UP

## 2021-11-21 PROCEDURE — 99285 EMERGENCY DEPT VISIT HI MDM: CPT | Mod: CS

## 2021-11-21 PROCEDURE — 71045 X-RAY EXAM CHEST 1 VIEW: CPT | Mod: 26

## 2021-11-21 PROCEDURE — 93010 ELECTROCARDIOGRAM REPORT: CPT

## 2021-11-21 RX ORDER — PIPERACILLIN AND TAZOBACTAM 4; .5 G/20ML; G/20ML
3.38 INJECTION, POWDER, LYOPHILIZED, FOR SOLUTION INTRAVENOUS ONCE
Refills: 0 | Status: COMPLETED | OUTPATIENT
Start: 2021-11-21 | End: 2021-11-21

## 2021-11-21 RX ORDER — ACETAMINOPHEN 500 MG
650 TABLET ORAL ONCE
Refills: 0 | Status: COMPLETED | OUTPATIENT
Start: 2021-11-21 | End: 2021-11-21

## 2021-11-21 RX ORDER — SODIUM CHLORIDE 9 MG/ML
1000 INJECTION INTRAMUSCULAR; INTRAVENOUS; SUBCUTANEOUS ONCE
Refills: 0 | Status: COMPLETED | OUTPATIENT
Start: 2021-11-21 | End: 2021-11-21

## 2021-11-21 RX ORDER — VANCOMYCIN HCL 1 G
1000 VIAL (EA) INTRAVENOUS ONCE
Refills: 0 | Status: COMPLETED | OUTPATIENT
Start: 2021-11-21 | End: 2021-11-21

## 2021-11-21 RX ADMIN — Medication 650 MILLIGRAM(S): at 20:50

## 2021-11-21 RX ADMIN — SODIUM CHLORIDE 1000 MILLILITER(S): 9 INJECTION INTRAMUSCULAR; INTRAVENOUS; SUBCUTANEOUS at 21:08

## 2021-11-21 RX ADMIN — SODIUM CHLORIDE 1000 MILLILITER(S): 9 INJECTION INTRAMUSCULAR; INTRAVENOUS; SUBCUTANEOUS at 23:30

## 2021-11-21 RX ADMIN — PIPERACILLIN AND TAZOBACTAM 200 GRAM(S): 4; .5 INJECTION, POWDER, LYOPHILIZED, FOR SOLUTION INTRAVENOUS at 22:47

## 2021-11-21 NOTE — ED ADULT NURSE NOTE - NSICDXPASTMEDICALHX_GEN_ALL_CORE_FT
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

## 2021-11-21 NOTE — ED PROVIDER NOTE - PHYSICAL EXAMINATION
Gen: Alert, NAD  Head/eyes: NC/AT, PERRL  ENT: airway patent  Neck: supple, no tenderness/meningismus/JVD, Trachea midline  Pulm/lung: Bilateral clear BS, normal resp effort, no wheeze/stridor/retractions  CV/heart: RRR, no M/R/G, +2 dist pulses (radial, pedal DP/PT, popliteal)  GI/Abd: soft, NT/ND, +BS, no guarding/rebound tenderness  Musculoskeletal: no edema/erythema/cyanosis, FROM in all extremities, no C/T/L spine ttp  Skin: no rash, no vesicles, no petechaie, no ecchymosis, no swelling, left foot, no erythema, no warmth, mild swelling  Neuro: AAOx3, CN 2-12 intact, normal sensation, 5/5 motor strength in all extremities

## 2021-11-21 NOTE — ED PROVIDER NOTE - NS ED ROS FT
Constitutional: + Fever, + Chills, - Anorexia, - Fatigue, - Night sweats  Eyes: - Discharge, - Irritation, - Redness, - Visual changes, - Light sensitivity, - Pain  EARS: - Ear Pain, - Tinnitus, - Decreased hearing  NOSE: - Congestion, - Bloody nose  MOUTH/THROAT: - Vocal Changes, - Drooling, - Sore throat  NECK: - Lumps, - Stiffness, - Pain  CV: - Palpitations, - Chest Pain, - Edema, - Syncope  RESP:  - Cough, - Shortness of Breath, - Dyspnea on Exertion, - Trouble speaking, - Pleuritic pain - Wheezing  GI: - Diarrhea, - Constipation, - Bloody stools, - Nausea, - Vomiting, - Abdominal Pain  : - Dysuria, -Frequency, - Hematuria, - Hesitancy, - Incontinence, - Saddle Anesthesia, - Abnormal discharge  MSK: - Myalgias, - Arthralgias, - Weakness, - Deformities, - Injuries  SKIN: - Color change, - Rash, - Swelling, - Ecchymosis, - Abrasion, - laceration  NEURO: - Change in behavior, - Dec. Alertness, - Headache, - Dizziness, - Change in speech, - Weakness, - Seizure-like activity, - Difficulty ambulating

## 2021-11-21 NOTE — ED PROVIDER NOTE - OBJECTIVE STATEMENT
67 yo male PMH of  CLL on Venetoclax, MDS, melanoma, paroxysmal AFib (not on AC), left foot fx x 3 weeks BIB wife c/o fever/rigors today, tmax 103.5 here in ED, admits to some urinary frequency.  As per wife pt is slow to answer some questions.  No n/v/d.  No abdominal pain.  no cp, no sob.  Triple vaccinated for covid.  No URI symptoms    pmd bendit

## 2021-11-22 DIAGNOSIS — R50.9 FEVER, UNSPECIFIED: ICD-10-CM

## 2021-11-22 DIAGNOSIS — E78.5 HYPERLIPIDEMIA, UNSPECIFIED: ICD-10-CM

## 2021-11-22 DIAGNOSIS — D64.9 ANEMIA, UNSPECIFIED: ICD-10-CM

## 2021-11-22 DIAGNOSIS — C91.10 CHRONIC LYMPHOCYTIC LEUKEMIA OF B-CELL TYPE NOT HAVING ACHIEVED REMISSION: ICD-10-CM

## 2021-11-22 DIAGNOSIS — I10 ESSENTIAL (PRIMARY) HYPERTENSION: ICD-10-CM

## 2021-11-22 DIAGNOSIS — I48.91 UNSPECIFIED ATRIAL FIBRILLATION: ICD-10-CM

## 2021-11-22 DIAGNOSIS — Z87.81 PERSONAL HISTORY OF (HEALED) TRAUMATIC FRACTURE: ICD-10-CM

## 2021-11-22 DIAGNOSIS — Z29.9 ENCOUNTER FOR PROPHYLACTIC MEASURES, UNSPECIFIED: ICD-10-CM

## 2021-11-22 LAB
CULTURE RESULTS: NO GROWTH — SIGNIFICANT CHANGE UP
D DIMER BLD IA.RAPID-MCNC: 574 NG/ML DDU — HIGH
FERRITIN SERPL-MCNC: 781 NG/ML — HIGH (ref 30–400)
HCT VFR BLD CALC: 27.3 % — LOW (ref 39–50)
HGB BLD-MCNC: 8.7 G/DL — LOW (ref 13–17)
IRON SATN MFR SERPL: 14 UG/DL — LOW (ref 45–165)
IRON SATN MFR SERPL: 8 % — LOW (ref 16–55)
LACTATE SERPL-SCNC: 0.7 MMOL/L — SIGNIFICANT CHANGE UP (ref 0.7–2)
MCHC RBC-ENTMCNC: 31.9 GM/DL — LOW (ref 32–36)
MCHC RBC-ENTMCNC: 33.5 PG — SIGNIFICANT CHANGE UP (ref 27–34)
MCV RBC AUTO: 105 FL — HIGH (ref 80–100)
NRBC # BLD: 0 /100 WBCS — SIGNIFICANT CHANGE UP (ref 0–0)
PLATELET # BLD AUTO: 285 K/UL — SIGNIFICANT CHANGE UP (ref 150–400)
RBC # BLD: 2.6 M/UL — LOW (ref 4.2–5.8)
RBC # FLD: 16.4 % — HIGH (ref 10.3–14.5)
SARS-COV-2 RNA SPEC QL NAA+PROBE: SIGNIFICANT CHANGE UP
SPECIMEN SOURCE: SIGNIFICANT CHANGE UP
TIBC SERPL-MCNC: 180 UG/DL — LOW (ref 220–430)
UIBC SERPL-MCNC: 165 UG/DL — SIGNIFICANT CHANGE UP (ref 110–370)
WBC # BLD: 13.22 K/UL — HIGH (ref 3.8–10.5)
WBC # FLD AUTO: 13.22 K/UL — HIGH (ref 3.8–10.5)

## 2021-11-22 PROCEDURE — 99222 1ST HOSP IP/OBS MODERATE 55: CPT | Mod: GC

## 2021-11-22 PROCEDURE — 73620 X-RAY EXAM OF FOOT: CPT | Mod: 26,LT

## 2021-11-22 PROCEDURE — 93970 EXTREMITY STUDY: CPT | Mod: 26

## 2021-11-22 RX ORDER — ONDANSETRON 8 MG/1
4 TABLET, FILM COATED ORAL EVERY 8 HOURS
Refills: 0 | Status: DISCONTINUED | OUTPATIENT
Start: 2021-11-22 | End: 2021-11-22

## 2021-11-22 RX ORDER — LACTOBACILLUS ACIDOPHILUS 100MM CELL
1 CAPSULE ORAL THREE TIMES A DAY
Refills: 0 | Status: DISCONTINUED | OUTPATIENT
Start: 2021-11-22 | End: 2021-11-26

## 2021-11-22 RX ORDER — ENOXAPARIN SODIUM 100 MG/ML
40 INJECTION SUBCUTANEOUS DAILY
Refills: 0 | Status: DISCONTINUED | OUTPATIENT
Start: 2021-11-22 | End: 2021-11-25

## 2021-11-22 RX ORDER — IBUPROFEN 200 MG
600 TABLET ORAL ONCE
Refills: 0 | Status: COMPLETED | OUTPATIENT
Start: 2021-11-22 | End: 2021-11-22

## 2021-11-22 RX ORDER — PIPERACILLIN AND TAZOBACTAM 4; .5 G/20ML; G/20ML
3.38 INJECTION, POWDER, LYOPHILIZED, FOR SOLUTION INTRAVENOUS EVERY 8 HOURS
Refills: 0 | Status: DISCONTINUED | OUTPATIENT
Start: 2021-11-22 | End: 2021-11-26

## 2021-11-22 RX ORDER — LANOLIN ALCOHOL/MO/W.PET/CERES
3 CREAM (GRAM) TOPICAL AT BEDTIME
Refills: 0 | Status: DISCONTINUED | OUTPATIENT
Start: 2021-11-22 | End: 2021-11-26

## 2021-11-22 RX ORDER — IBUPROFEN 200 MG
600 TABLET ORAL EVERY 6 HOURS
Refills: 0 | Status: DISCONTINUED | OUTPATIENT
Start: 2021-11-22 | End: 2021-11-26

## 2021-11-22 RX ORDER — SODIUM CHLORIDE 9 MG/ML
1000 INJECTION INTRAMUSCULAR; INTRAVENOUS; SUBCUTANEOUS ONCE
Refills: 0 | Status: COMPLETED | OUTPATIENT
Start: 2021-11-22 | End: 2021-11-22

## 2021-11-22 RX ORDER — ASPIRIN/CALCIUM CARB/MAGNESIUM 324 MG
81 TABLET ORAL DAILY
Refills: 0 | Status: DISCONTINUED | OUTPATIENT
Start: 2021-11-22 | End: 2021-11-25

## 2021-11-22 RX ORDER — FOLIC ACID 0.8 MG
1 TABLET ORAL DAILY
Refills: 0 | Status: DISCONTINUED | OUTPATIENT
Start: 2021-11-22 | End: 2021-11-26

## 2021-11-22 RX ORDER — ATORVASTATIN CALCIUM 80 MG/1
80 TABLET, FILM COATED ORAL AT BEDTIME
Refills: 0 | Status: DISCONTINUED | OUTPATIENT
Start: 2021-11-22 | End: 2021-11-26

## 2021-11-22 RX ORDER — FAMOTIDINE 10 MG/ML
20 INJECTION INTRAVENOUS DAILY
Refills: 0 | Status: DISCONTINUED | OUTPATIENT
Start: 2021-11-22 | End: 2021-11-26

## 2021-11-22 RX ORDER — ACETAMINOPHEN 500 MG
650 TABLET ORAL EVERY 6 HOURS
Refills: 0 | Status: DISCONTINUED | OUTPATIENT
Start: 2021-11-22 | End: 2021-11-26

## 2021-11-22 RX ORDER — IBUPROFEN 200 MG
400 TABLET ORAL EVERY 6 HOURS
Refills: 0 | Status: DISCONTINUED | OUTPATIENT
Start: 2021-11-22 | End: 2021-11-26

## 2021-11-22 RX ADMIN — Medication 1 TABLET(S): at 21:40

## 2021-11-22 RX ADMIN — Medication 1 TABLET(S): at 14:14

## 2021-11-22 RX ADMIN — PIPERACILLIN AND TAZOBACTAM 3.38 GRAM(S): 4; .5 INJECTION, POWDER, LYOPHILIZED, FOR SOLUTION INTRAVENOUS at 00:52

## 2021-11-22 RX ADMIN — Medication 650 MILLIGRAM(S): at 05:31

## 2021-11-22 RX ADMIN — Medication 1 MILLIGRAM(S): at 11:32

## 2021-11-22 RX ADMIN — Medication 600 MILLIGRAM(S): at 02:41

## 2021-11-22 RX ADMIN — Medication 81 MILLIGRAM(S): at 11:32

## 2021-11-22 RX ADMIN — FAMOTIDINE 20 MILLIGRAM(S): 10 INJECTION INTRAVENOUS at 18:12

## 2021-11-22 RX ADMIN — Medication 600 MILLIGRAM(S): at 01:13

## 2021-11-22 RX ADMIN — ATORVASTATIN CALCIUM 80 MILLIGRAM(S): 80 TABLET, FILM COATED ORAL at 21:40

## 2021-11-22 RX ADMIN — PIPERACILLIN AND TAZOBACTAM 25 GRAM(S): 4; .5 INJECTION, POWDER, LYOPHILIZED, FOR SOLUTION INTRAVENOUS at 22:52

## 2021-11-22 RX ADMIN — Medication 3 MILLIGRAM(S): at 21:40

## 2021-11-22 RX ADMIN — PIPERACILLIN AND TAZOBACTAM 25 GRAM(S): 4; .5 INJECTION, POWDER, LYOPHILIZED, FOR SOLUTION INTRAVENOUS at 14:23

## 2021-11-22 RX ADMIN — SODIUM CHLORIDE 1000 MILLILITER(S): 9 INJECTION INTRAMUSCULAR; INTRAVENOUS; SUBCUTANEOUS at 02:41

## 2021-11-22 RX ADMIN — Medication 650 MILLIGRAM(S): at 18:14

## 2021-11-22 RX ADMIN — PIPERACILLIN AND TAZOBACTAM 25 GRAM(S): 4; .5 INJECTION, POWDER, LYOPHILIZED, FOR SOLUTION INTRAVENOUS at 08:25

## 2021-11-22 RX ADMIN — Medication 650 MILLIGRAM(S): at 14:19

## 2021-11-22 RX ADMIN — Medication 1000 MILLIGRAM(S): at 03:54

## 2021-11-22 RX ADMIN — SODIUM CHLORIDE 1000 MILLILITER(S): 9 INJECTION INTRAMUSCULAR; INTRAVENOUS; SUBCUTANEOUS at 01:14

## 2021-11-22 RX ADMIN — ENOXAPARIN SODIUM 40 MILLIGRAM(S): 100 INJECTION SUBCUTANEOUS at 11:33

## 2021-11-22 RX ADMIN — Medication 250 MILLIGRAM(S): at 00:53

## 2021-11-22 NOTE — PROGRESS NOTE ADULT - ASSESSMENT
67 y/o M with PMHx CLL followed by heme/onc, anemia, melanoma, osteopenia admitted for right facial droop, right hemiparesis and expressive aphasia. Initially admitted at [facility] and s/p TPA, now transferred to [facility] on 9/16 for evaluation of his seizures. Patient now with gait Instability, ADL impairments and Functional impairments.    #Autoimmune/paraneoplastic encephalitis  -Right sided UE weakness - no stroke on CT or MRI  -s/p tpa without improvement in symptoms  -9/15 CT head without acute ICH  -9/16 MRI neg for infarct  -Start comprehensive rehab program - PT/OT/SLP per rehab team  -Pain management, bowel regimen per rehab  -Continue ASA 81mg, folic acid, lipitor 80mg qhs    #Paraneoplastic encephalitis  -s/p IVIG x 3 days at [facility]  -s/p Solumedrol 1000 mg IV for a total of 5 days (9/21-9/25)  -c/w steroid taper. 9/26: Started prednisone taper 60qd x 1 week, then 40mg qd x 1 week, then 20mg qd x 1 week, [id number] qd x1week    #PAF  -Per cardiology; prior admission for syncope 7/2020 found to have brief Afib  -Echo 9/15 unremarkable  -No AC recommended per cardio due to no definitive CVA; pt remains in NSR    #CLL  -IVIG monthly  -Outpatient follow up with heme/onc    #Right elbow cellulitis - improving   -Minocycline 100mg BID until 10/6  -Keflex 500mg QID until 10/6  -Bacitracin oint  -Warm compress PRN    #LUE lesion  - cyst vs less likely abscess  - US shows: No evidence of LUE DVT. There is a 3.6 x 2.3 x 0.9 cm nonvascular hypoechoic region superior to the antecubital fossa, medial aspect, with associated skin thickening, possible complicated sebaceous cyst/epidermal inclusion cyst. Localized infection cannot be excluded  - I&D per surgery  - will follow up culture and sensitivities  - on keflex 500 4x per day at this time (last day to be 10/8)  - surgical recs appreciated    #HTN  -Amlodipine 5mg daily    #HLD  -Lipitor 80mg daily    #Mood/Cognition  -Zyprexa 10mg daily. Seroquel discontinued due to prolonged QTc 467 prior admission (qtc 418 on repeat EKG 10/2)  -Neuropsychology consult PRN  -Maintain off ativan due to worsening hallucinations     #General allergies  - loratadine    #DVT ppx - Lovenox  #GI ppx - Protonix 65yo M w/ PMHx of CLL on Venetoclax, MDS, Melanoma, Paroxysmal AFib (not on AC), HTN, HLD, came in for weakness x 3 weeks that has worsened 2/2 to L foot fracture that occurred 3 weeks ago and admitted for Acute Febrile illness for further work up.

## 2021-11-22 NOTE — H&P ADULT - PROBLEM SELECTOR PLAN 5
Patient has a history of CLL on Venetoclax. Patient is immunocompromised.  - Continue to monitor daily CBCs   - Continue home medications: Patient has a history of HTN. BP well controlled. Currently not on any medications   - Continue routine hemodynamic monitoring

## 2021-11-22 NOTE — H&P ADULT - NSHPREVIEWOFSYSTEMS_GEN_ALL_CORE
CONSTITUTIONAL: denies fever, chills, fatigue, weakness  HEENT: denies blurred vision, sore throat  SKIN: denies new lesions, rash  CARDIOVASCULAR: denies chest pain, chest pressure, palpitations  RESPIRATORY: denies shortness of breath, sputum production  GASTROINTESTINAL: denies nausea, vomiting, diarrhea, abdominal pain  GENITOURINARY: denies dysuria, discharge  NEUROLOGICAL: denies numbness, headache, focal weakness  MUSCULOSKELETAL: denies new joint pain, muscle aches  HEMATOLOGIC: denies gross bleeding, bruising  LYMPHATICS: denies enlarged lymph nodes, extremity swelling  PSYCHIATRIC: denies recent changes in anxiety, depression  ENDOCRINOLOGIC: denies sweating, cold or heat intolerance CONSTITUTIONAL: denies fever, chills, fatigue, weakness  HEENT: denies blurred vision, sore throat  SKIN: denies new lesions, rash  CARDIOVASCULAR: denies chest pain, chest pressure, palpitations  RESPIRATORY: denies shortness of breath, sputum production  GASTROINTESTINAL: denies nausea, vomiting, diarrhea, abdominal pain  GENITOURINARY: denies dysuria, discharge  NEUROLOGICAL: denies numbness, headache, focal weakness  MUSCULOSKELETAL: admits to L foot pain   HEMATOLOGIC: denies gross bleeding, bruising  LYMPHATICS: denies enlarged lymph nodes, extremity swelling  PSYCHIATRIC: denies recent changes in anxiety, depression  ENDOCRINOLOGIC: denies sweating, cold or heat intolerance

## 2021-11-22 NOTE — H&P ADULT - PROBLEM SELECTOR PLAN 3
Patient has a history of Atrial Fibrillation not on AC. EKG shows. Currently rate and rhythm controlled.   - Keep on TELE monitor   - Continue home medications: Asprin 81mg QD Patient has a history of Anemia. Patient of Dr. Fry. Per chart review baseline Hgb appears to be between 11-12. Hgb on admission is 9.3. MCV above 100.   - F/U Iron Studies   - Continue to monitor daily CBCs   - Continue to take home medications: Folic Acid.   - Hem/Onc (Dr. Fry) consulted

## 2021-11-22 NOTE — H&P ADULT - PROBLEM SELECTOR PLAN 6
Patient has a history of HLD.   - Continue home medications: Patient has a history of CLL on Venetoclax. Patient follows with Dr. Fry. Patient is immunocompromised.  - Continue to monitor daily CBCs  - Hem/Onc (Dr. Fry) consulted

## 2021-11-22 NOTE — H&P ADULT - PROBLEM SELECTOR PLAN 4
Patient has a history of HTN. BP well controlled.   - Continue home medications:   - Continue routine hemodynamic monitoring Patient has a history of Atrial Fibrillation not on AC. EKG shows NSR. Currently rate and rhythm controlled.   - Keep on TELE monitor   - Continue home medications: Asprin 81mg QD Patient has a history of Atrial Fibrillation not on AC. EKG shows NSR. Currently rate and rhythm controlled.   - remote tele   - Continue home medications: Asprin 81mg QD

## 2021-11-22 NOTE — CONSULT NOTE ADULT - ASSESSMENT
65 yo male with history of CLL and MDS overlap syndrome, most recently treated with Gazyva X6 months/Venetoclax which was stopped in October 2021 2 months short of 1 year full therapy when he was admitted for autoimmune encephalitis, which was treated with IVIG and steroids; unfortunately he suffered left foot fracture likely from prolonged steroid use    - follow-up urine and blood cultures  - continue antibiotics as per primary team  - macrocytic anemia likely due to underlying MDS; will check iron studies to evaluate iron stores prior to resuming epogen  - patient due for IVIG on 11/29/21 in office; if he is still inpatient, would consider infusion of prophylactic IVIG at that time  - case discussed with Dr. Wood (hospitalist)

## 2021-11-22 NOTE — H&P ADULT - PROBLEM SELECTOR PLAN 1
Patient came in with ---. Mildly elevated WBC 11.89 and Temp of 103.5. Chest XRay looks clear. RVP and Covid negative. UA positive for proteins, small blood and 3-5 RBC.   - C/W Zosyn   - F/U BC x 2 and UC   - F/U XRay of Left Foot   - Infectious Disease Consulted (Dr. Coker)   - Consult Wound Care Patient came in with weakness that worsened 2/2 to L foot fracture. Mildly elevated WBC 11.89 and Temp of 103.5. On PE L , warm and swollen. Chest XRay looks clear. RVP and Covid negative. UA positive for proteins, small blood and 3-5 RBC.   - C/W Zosyn   - F/U BC x 2 and UC   - F/U XRay of Left Foot   - Tylenol PRN for fever   - Infectious Disease Consulted (Dr. Coker) Patient came in with weakness that worsened 2/2 to L foot fracture. Mildly elevated WBC 11.89 and Temp of 103.5. On PE L , warm and swollen. Chest XRay looks clear. RVP and Covid negative. UA positive for proteins, small blood and 3-5 RBC.   - h/o CLL   - C/W Zosyn   - F/U BC x 2 and UC   - F/U XRay of Left Foot   - Tylenol PRN for fever   - Infectious Disease Consulted (Dr. Coker)  - heme/onc Dr. Fry consulted

## 2021-11-22 NOTE — CONSULT NOTE ADULT - SUBJECTIVE AND OBJECTIVE BOX
66M hx CLL on Venetoclax, MDS, melanoma who presented to the hospital for left foot pain this AM. Patient notes that he began to have excruciating left foot pain about four weeks ago. He denies any trauma or events inciting the pain and notes that he just woke up with the pain. HE also endorses redness, warmth, swelling in the area of the dorsum as well. He follows with Dr. Anguiano of podiatry who recommended an MRI of the left foot. That MRI demonstrated marrow edema in the medial cuneiform potentially suggestive of a stress reaction, subcutaneous edema, but no obvious fractures. His ankle was ace wrapped and placed in a CAM boot and made WBAT; however, his pain has not improved. Today in the hospital, he notes that he is still having pain localized to the dorsum of the left foot. It is somewhat helped by medication. Patient denies any numbness, tingling, weakness, or any other orthopaedic complaint.     Exam:   T(C): 39.4 (11-22-21 @ 14:25), Max: 39.7 (11-21-21 @ 20:51)  T(F): 103 (11-22-21 @ 14:25), Max: 103.5 (11-21-21 @ 20:51)  HR: 87 (11-22-21 @ 12:45) (68 - 108)  BP: 117/71 (11-22-21 @ 12:45) (101/56 - 127/67)  RR: 17 (11-22-21 @ 12:45) (16 - 18)  SpO2: 96% (11-22-21 @ 12:45) (93% - 99%)    Gen: Well appearing, no acute distress   LLE:  2+ pitting edema of the left foot. Erythema, edema over the dorsum of the left foot. Skin intact. No visualized deformity.   TTP over the dorsum of the foot. Pain with AROM/PROM, with decreased ROM of the toes and ankle.   SILT L2-S1  Q/H/GSC/TA/EHL/FHL intact.   DP pulse palpable.  No calf tenderness bilaterally.  Compartments soft and compressible.     Laboratory Results:   CBC, 11-22-21 @ 10:49    WBC: 13.22  Hgb: 8.7  Hct: 27.3  Plt: 285      Chemistry, 11-21-21 @ 20:59    Na: 139     AST: 10  K: 4.0       ALT: 16  Cl: 105      TProt: 7.3  CO2: 27     Alb: 2.7  BUN: 21     TBili: 0.8  Cr: 1.10      AP: 104  Glu: 120       Mg: --  P: --    eGFR: 81  eGFR, AA: 70    Imaging: XR of the left foot reviewed demonstrating no obvious fracture, final read pending. MR reviewed, demonstrating marrow edema of the cuneiform, a partial synovial cyst at the dorsolateral aspect of the sinus tarsi, and other nonspecific findings.

## 2021-11-22 NOTE — PROGRESS NOTE ADULT - PROBLEM SELECTOR PLAN 1
Patient came in with weakness that worsened 2/2 to L foot fracture. Mildly elevated WBC 11.89 and Temp of 103.5. On PE L , warm and swollen. Chest XRay looks clear. RVP and Covid negative. UA positive for proteins, small blood and 3-5 RBC.   - h/o CLL   - C/W Zosyn   - F/U BC x 2 and UC   - F/U XRay of Left Foot   - Tylenol PRN for fever   - Infectious Disease Consulted (Dr. Coker)  - heme/onc Dr. Fry consulted

## 2021-11-22 NOTE — H&P ADULT - HISTORY OF PRESENT ILLNESS
65yo M w/ PMHx of CLL on Venetoclax, MDS, Melanoma, Paroxysmal AFib (not on AC), HTN, HLD, came in for ----       Of note, ADLs and immunizations     In ED   Vitals: T: 103.5, HR: 99, BP: 127/67, RR: 16, O2: 99%   Labs: WBC: 11.89, H/H 9.3/29.0, MCV: 103.6, Albumin: 2.7, UA has proteins, blood and occasional bacteria   Chest XRAY: Appears clear (wet read)   EKG:   Medications: Zosyn x IVPB, Vancomycine x IVPB, NS Bolus x 2, Motrin 600 x1 and Tylenol 650 x 1   65yo M w/ PMHx of CLL on Venetoclax, MDS, Melanoma, Paroxysmal AFib (not on AC), HTN, HLD, came in for weakness for the last 3 weeks that worsened today. Patient has had multiple hospitalization in the past. 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 Patient was discharged with home PT and steroids. Patient completed home PT but 3 weeks ago when he was walking he fractured a bone in his L foot and is using a wrap and boot for stabilization and pain is relived with Iboprofen. Ever since the fracture patient walks minimally and states he is "bed bound" for the most part. Patient admits to pain in his L foot. Also admits to SOB from the pain. He was found to be febrile with a temp of 103. On physical exam, L foot was swollen however patient says it has improved from when it started. Denies CP, N/V/D and change in urinary or bowel habits.     Of note, patient completed home PT but after had  L foot fracture patient has been "bed bound" and needs assistance from wife for daily activities. IUTD.      In ED   Vitals: T: 103.5, HR: 99, BP: 127/67, RR: 16, O2: 99%   Labs: WBC: 11.89, H/H 9.3/29.0, MCV: 103.6, Albumin: 2.7, UA has proteins, blood and occasional bacteria   Chest XRAY: Appears clear (wet read)   EKG: NSR   Medications: Zosyn x IVPB, Vancomycine x IVPB, NS Bolus x 2, Motrin 600 x1 and Tylenol 650 x 1   65yo M w/ PMHx of CLL on Venetoclax, MDS, Melanoma, Paroxysmal AFib (not on AC), HTN, HLD, came in for weakness for the last 3 weeks that worsened today. Patient has had multiple hospitalization in the past. 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 Patient was discharged with home PT and steroids. Patient completed home PT but 3 weeks ago when he was walking he fractured a bone in his L foot and is using a wrap and boot for stabilization and pain is relived with Iboprofen. Ever since the fracture patient walks minimally and states he is "bed bound" for the most part. Patient admits to pain in his L foot. Also admits to SOB from the pain. He was found to be febrile with a temp of 103. On physical exam, L foot was swollen however patient says it has improved from when it started. Denies CP, N/V/D and change in urinary or bowel habits.     Of note, pt with recent hospitalization for what was suspected to be a cva, he was later diagnosed with autoimmune encephalitis. please see d/c summary for details of that recent hospitalization.    In ED   Vitals: T: 103.5, HR: 99, BP: 127/67, RR: 16, O2: 99%   Labs: WBC: 11.89, H/H 9.3/29.0, MCV: 103.6, Albumin: 2.7, UA has proteins, blood and occasional bacteria   Chest XRAY: Appears clear (wet read)   EKG: NSR   Medications: Zosyn x IVPB, Vancomycine x IVPB, NS Bolus x 2, Motrin 600 x1 and Tylenol 650 x 1

## 2021-11-22 NOTE — H&P ADULT - PROBLEM SELECTOR PLAN 2
Patient has a history of Anemia. Per chart review baseline Hgb appears to be between 11-12. Hgb on admission is 9.3. MCV above 100.   - F/U Iron Studies   - Continue to monitor daily CBCs   - Continue to take home medications: Patient came in with weakness that worsened 2/2 to L foot fracture. Patient admits to being bed bound, Mildly elevated WBC 11.89 and Temp of 103.5. On PE L , warm and swollen.   - C/W Zosyn   - F/U XRay of Left Foot   - F/U D-Dimer   - F/U Doppler of LE B/L   - Ibuprofen PRN for pain   - PT and OT consulted   - Infectious Disease Consulted (Dr. Coker) Patient came in with weakness that worsened 2/2 to L foot fracture. Patient admits to being bed bound, Mildly elevated WBC 11.89 and Temp of 103.5. On PE L , warm and swollen.   - F/U XRay of Left Foot   - F/U D-Dimer   - F/U Doppler of LE B/L   - Ibuprofen PRN for pain   - PT and OT consulted   - Infectious Disease Consulted (Dr. Coker) Patient came in with weakness that worsened 2/2 to L foot fracture. Patient admits to being bed bound, Mildly elevated WBC 11.89 and Temp of 103.5. On PE L , warm and swollen.   - F/U XRay of Left Foot   - F/U D-Dimer   - F/U Doppler of LE B/L   - Ibuprofen PRN for pain   - Infectious Disease Consulted (Dr. Coker) Patient came in with weakness that worsened 2/2 to L foot fracture. Patient admits to being bed bound, Mildly elevated WBC 11.89 and Temp of 103.5. On PE L , warm and swollen.   - F/U XRay of Left Foot   - F/U D-Dimer   - F/U Doppler of LE B/L   - F/U PT and OT eval   - Ibuprofen PRN for pain   - Infectious Disease Consulted (Dr. Coker)

## 2021-11-22 NOTE — PROGRESS NOTE ADULT - SUBJECTIVE AND OBJECTIVE BOX
Patient is a 66y old  Male who presents with a chief complaint of  fever    INTERVAL /OVERNIGHT EVENTS:    MEDICATIONS  (STANDING):  aspirin  chewable 81 milliGRAM(s) Oral daily  atorvastatin 80 milliGRAM(s) Oral at bedtime  enoxaparin Injectable 40 milliGRAM(s) SubCutaneous daily  famotidine    Tablet 20 milliGRAM(s) Oral daily  folic acid 1 milliGRAM(s) Oral daily  lactobacillus acidophilus 1 Tablet(s) Oral three times a day  piperacillin/tazobactam IVPB.. 3.375 Gram(s) IV Intermittent every 8 hours    MEDICATIONS  (PRN):  acetaminophen     Tablet .. 650 milliGRAM(s) Oral every 6 hours PRN Temp greater or equal to 38C (100.4F)  aluminum hydroxide/magnesium hydroxide/simethicone Suspension 30 milliLiter(s) Oral every 4 hours PRN Dyspepsia  ibuprofen  Tablet. 400 milliGRAM(s) Oral every 6 hours PRN Mild Pain (1 - 3)  ibuprofen  Tablet. 600 milliGRAM(s) Oral every 6 hours PRN Moderate Pain (4 - 6)  melatonin 3 milliGRAM(s) Oral at bedtime PRN Insomnia      Allergies    No Known Allergies    Intolerances        REVIEW OF SYSTEMS:  CONSTITUTIONAL: No fever, weight loss, or fatigue  EYES: No eye pain, visual disturbances, or discharge  ENMT:  No difficulty hearing, tinnitus, vertigo; No sinus or throat pain  NECK: No pain or stiffness  RESPIRATORY: No cough, wheezing, chills or hemoptysis; No shortness of breath  CARDIOVASCULAR: No chest pain, palpitations, dizziness, or leg swelling  GASTROINTESTINAL: No abdominal or epigastric pain. No nausea, vomiting, or hematemesis; No diarrhea or constipation. No melena or hematochezia.  GENITOURINARY: No dysuria, frequency, hematuria, or incontinence  NEUROLOGICAL: No headaches, memory loss, loss of strength, numbness, or tremors  SKIN: No itching, burning, rashes, or lesions   LYMPH NODES: No enlarged glands  ENDOCRINE: No heat or cold intolerance; No hair loss; No polydipsia or polyuria  MUSCULOSKELETAL: No joint pain or swelling; No muscle, back, or extremity pain  PSYCHIATRIC: No depression, anxiety, mood swings, or difficulty sleeping  HEME/LYMPH: No easy bruising, or bleeding gums  ALLERGY AND IMMUNOLOGIC: No hives or eczema    Vital Signs Last 24 Hrs  T(C): 39.4 (2021 14:25), Max: 39.7 (2021 20:51)  T(F): 103 (2021 14:25), Max: 103.5 (2021 20:51)  HR: 87 (2021 12:45) (68 - 108)  BP: 117/71 (2021 12:45) (101/56 - 127/67)  BP(mean): 79 (2021 03:53) (71 - 79)  RR: 17 (2021 12:45) (16 - 18)  SpO2: 96% (2021 12:45) (93% - 99%)    PHYSICAL EXAM:  GENERAL: NAD, well-groomed, well-developed  HEAD:  Atraumatic, Normocephalic  EYES: EOMI, PERRLA, conjunctiva and sclera clear  ENMT: No tonsillar erythema, exudates, or enlargement; Moist mucous membranes, Good dentition, No lesions  NECK: Supple, No JVD, Normal thyroid  NERVOUS SYSTEM:  Alert & Oriented X3, Good concentration; Motor Strength 5/5 B/L upper and lower extremities; DTRs 2+ intact and symmetric  CHEST/LUNG: Clear to auscultation bilaterally; No rales, rhonchi, wheezing, or rubs  HEART: Regular rate and rhythm; No murmurs, rubs, or gallops  ABDOMEN: Soft, Nontender, Nondistended; Bowel sounds present  EXTREMITIES:  2+ Peripheral Pulses, No clubbing, cyanosis, or edema  LYMPH: No lymphadenopathy noted  SKIN: No rashes or lesions    LABS:                        8.7    13.22 )-----------( 285      ( 2021 10:49 )             27.3     2021 20:59    139    |  105    |  21     ----------------------------<  120    4.0     |  27     |  1.10     Ca    9.4        2021 20:59    TPro  7.3    /  Alb  2.7    /  TBili  0.8    /  DBili  x      /  AST  10     /  ALT  16     /  AlkPhos  104    2021 20:59    PT/INR - ( 2021 20:59 )   PT: 15.1 sec;   INR: 1.31 ratio         PTT - ( 2021 20:59 )  PTT:33.8 sec  Urinalysis Basic - ( 2021 21:43 )    Color: Yellow / Appearance: Clear / S.010 / pH: x  Gluc: x / Ketone: Negative  / Bili: Negative / Urobili: Negative   Blood: x / Protein: 30 mg/dL / Nitrite: Negative   Leuk Esterase: Negative / RBC: 3-5 /HPF / WBC Negative   Sq Epi: x / Non Sq Epi: Occasional / Bacteria: Occasional      CAPILLARY BLOOD GLUCOSE      POCT Blood Glucose.: 104 mg/dL (2021 20:02)      RADIOLOGY & ADDITIONAL TESTS:    Notes Reviewed:  [x ] YES  [ ] NO    Care Discussed with Consultants/Other Providers [x ] YES  [ ] NO

## 2021-11-22 NOTE — H&P ADULT - NSHPPHYSICALEXAM_GEN_ALL_CORE
VITALS:   T(C): 38.1 (11-22-21 @ 00:56), Max: 39.7 (11-21-21 @ 20:51)  HR: 80 (11-22-21 @ 00:56) (80 - 108)  BP: 101/56 (11-22-21 @ 00:56) (101/56 - 127/67)  RR: 16 (11-22-21 @ 00:56) (16 - 18)  SpO2: 97% (11-22-21 @ 00:56) (96% - 99%)    GENERAL: NAD, lying in bed comfortably  HEAD:  Atraumatic, Normocephalic  EYES: EOMI, PERRLA, conjunctiva and sclera clear  ENT: Moist mucous membranes  NECK: Supple, No JVD  CHEST/LUNG: Clear to auscultation bilaterally; No rales, rhonchi, wheezing, or rubs. Unlabored respirations  HEART: Regular rate and rhythm; No murmurs, rubs, or gallops  ABDOMEN: BSx4; Soft, nontender, nondistended  EXTREMITIES:  2+ Peripheral Pulses, brisk capillary refill. No clubbing, cyanosis, or edema  NERVOUS SYSTEM:  A&Ox3, no focal deficits   SKIN: No rashes or lesions VITALS:   T(C): 38.1 (11-22-21 @ 00:56), Max: 39.7 (11-21-21 @ 20:51)  HR: 80 (11-22-21 @ 00:56) (80 - 108)  BP: 101/56 (11-22-21 @ 00:56) (101/56 - 127/67)  RR: 16 (11-22-21 @ 00:56) (16 - 18)  SpO2: 97% (11-22-21 @ 00:56) (96% - 99%)    GENERAL: Frail man NAD, lying in bed comfortably  HEAD:  Atraumatic, Normocephalic  EYES: EOMI, PERRLA, conjunctiva and sclera clear  NECK: Supple, No JVD  CHEST/LUNG: Clear to auscultation bilaterally; No rales, rhonchi, wheezing, or rubs. Unlabored respirations  HEART: Regular rate and rhythm; No murmurs, rubs, or gallops  ABDOMEN: BSx4; Soft, nontender, nondistended, Scar from splenectomy   EXTREMITIES:  L foot swollen, warm and tender to touch   NERVOUS SYSTEM:  A&Ox3, no focal deficits VITALS:   T(C): 38.1 (11-22-21 @ 00:56), Max: 39.7 (11-21-21 @ 20:51)  HR: 80 (11-22-21 @ 00:56) (80 - 108)  BP: 101/56 (11-22-21 @ 00:56) (101/56 - 127/67)  RR: 16 (11-22-21 @ 00:56) (16 - 18)  SpO2: 97% (11-22-21 @ 00:56) (96% - 99%)    GENERAL: Frail man NAD, lying in bed comfortably  HEAD:  Atraumatic, Normocephalic  EYES: EOMI, PERRLA, conjunctiva and sclera clear  NECK: Supple, No JVD  CHEST/LUNG: Clear to auscultation bilaterally; No rales, rhonchi, wheezing, or rubs. Unlabored respirations  HEART: Regular rate and rhythm; No murmurs, rubs, or gallops  ABDOMEN: BSx4; Soft, nontender, nondistended, Scar from splenectomy   EXTREMITIES:  L foot swollen, warm, erythematous and tender to touch   NERVOUS SYSTEM:  A&Ox3, no focal deficits

## 2021-11-22 NOTE — H&P ADULT - ASSESSMENT
67yo M w/ PMHx of CLL on Venetoclax, MDS, Melanoma, Paroxysmal AFib (not on AC), HTN, HLD, came in for ---- Acute Febrile Illness.    65yo M w/ PMHx of CLL on Venetoclax, MDS, Melanoma, Paroxysmal AFib (not on AC), HTN, HLD, came in for weakness x 3 weeks that has worsened 2/2 to L foot fracture that occurred 3 weeks ago and admitted for Acute Febrile illness for further work up.

## 2021-11-22 NOTE — CONSULT NOTE ADULT - ASSESSMENT
66M with L foot pain.     Plan:   Medical management appreciated.   WBAT in ACE wrap and cam boot/PT/OT  Pain management PRN  DVT prophylaxis: per primary team  Given that patient follows with Dr. Anguiano and podiatry, would recommend podiatry consult.   No acute orthopaedic surgical intervention indicated at this time. This patient is orthopaedically stable for discharge.   Patient to follow up with Dr. Anguiano as an outpatient for further evaluation and management.   All of the patient's questions and concerns were answered and addressed.   Discussed with Dr. Hayden, who agrees with the plan.

## 2021-11-22 NOTE — CONSULT NOTE ADULT - SUBJECTIVE AND OBJECTIVE BOX
Patient is a 66y old  Male who presents with a chief complaint of     HPI:  67yo M w/ PMHx of CLL/MDS overlap syndrome followed by Dr. Godinez with the help of Dr. Theresa Haji; in 2013 started on treatment with fludarabine intermittently; more recently in 2020 was on inbrutinib single agent without response, in 11/2020 started on Gazyva and ibrutinib stopped and changed to Venetoclax. He completed 6 months of Gazyva and continued on Venetoclax with excellent response but unfortunately in Sept 2021 was admitted with altered mental status and initially thought to have stroke but ultimately diagnosed with Autoimmune encephalitis. He was treated with IVIG and then started on oral steroids with prednisone and tapered off by 10/26/21. Unfortunately he was noted to have fracture of the left foot which initially required boot for stabilization. He reported that ever since the fracture patient walks minimally and states he is "bed bound" for the most part. Patient admits to pain in his L foot. On physical exam, L foot was swollen however patient says it has improved from when it started.  In the ER he was noted to be febrile with temp 103.    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  Herniated Cervical Disc  Bulging Disc LUMBAR  Melanoma  Deviated nasal septum  Nasal congestion  Rosacea  Deviated nasal septum  Other specified disorders of nose and nasal sinuses  Encephalitis  S/P Splenectomy 1993  Avascular Necrosis of Femur Head  RIGHT - CORE DECOMPRESSION- 1993  Avascular Necrosis of Femur Head  LEFT - CORE DECOMPRESSION - 1993  Abnormal Jaw Closure  LEFT JAW SURGERY - 1988  Status Post Eye Surgery X 3  3/2011 right eye scleral buckle; left eye in 2013  S/P Tonsillectomy  1960  Bilateral cataracts  removed in 2011    SOCIAL HISTORY:  lives at home with wife  denied tobacco or ETOH use    FAMILY HISTORY:  Family history of multiple myeloma    Family history of malignant melanoma  age 89    FH: blood dyscrasia  MGUS    FH: hypertension  mother    FH: renal failure  mother        MEDICATIONS  (STANDING):  aspirin  chewable 81 milliGRAM(s) Oral daily  atorvastatin 80 milliGRAM(s) Oral at bedtime  enoxaparin Injectable 40 milliGRAM(s) SubCutaneous daily  folic acid 1 milliGRAM(s) Oral daily  piperacillin/tazobactam IVPB.. 3.375 Gram(s) IV Intermittent every 8 hours    MEDICATIONS  (PRN):  acetaminophen     Tablet .. 650 milliGRAM(s) Oral every 6 hours PRN Temp greater or equal to 38C (100.4F)  aluminum hydroxide/magnesium hydroxide/simethicone Suspension 30 milliLiter(s) Oral every 4 hours PRN Dyspepsia  ibuprofen  Tablet. 400 milliGRAM(s) Oral every 6 hours PRN Mild Pain (1 - 3)  ibuprofen  Tablet. 600 milliGRAM(s) Oral every 6 hours PRN Moderate Pain (4 - 6)  melatonin 3 milliGRAM(s) Oral at bedtime PRN Insomnia      Allergies    No Known Allergies    Vital Signs Last 24 Hrs  T(C): 37.2 (22 Nov 2021 12:45), Max: 39.7 (21 Nov 2021 20:51)  T(F): 99 (22 Nov 2021 12:45), Max: 103.5 (21 Nov 2021 20:51)  HR: 87 (22 Nov 2021 12:45) (68 - 108)  BP: 117/71 (22 Nov 2021 12:45) (101/56 - 127/67)  BP(mean): 79 (22 Nov 2021 03:53) (71 - 79)  RR: 17 (22 Nov 2021 12:45) (16 - 18)  SpO2: 96% (22 Nov 2021 12:45) (93% - 99%)    PHYSICAL EXAM  General: adult in NAD  HEENT: clear oropharynx, anicteric sclera, pink conjunctivae  Neck: supple  CV: normal S1S2 with no murmur rubs or gallops  Lungs: clear to auscultation, no wheezes, no rhales  Abdomen: soft non-tender non-distended, no hepato/splenomegaly  Ext: + bandage on the left foot   Skin: no rashes and no petichiae  Neuro: alert and oriented X3 no focal deficits      LABS:    CBC Full  -  ( 22 Nov 2021 10:49 )  WBC Count : 13.22 K/uL  RBC Count : 2.60 M/uL  Hemoglobin : 8.7 g/dL  Hematocrit : 27.3 %  Platelet Count - Automated : 285 K/uL  Mean Cell Volume : 105.0 fl  Mean Cell Hemoglobin : 33.5 pg  Mean Cell Hemoglobin Concentration : 31.9 gm/dL    WBC Count: 13.22 K/uL (11-22 @ 10:49)  WBC Count: 11.89 K/uL (11-21 @ 20:59)    Hemoglobin: 8.7 g/dL (11-22 @ 10:49)  Hemoglobin: 9.3 g/dL (11-21 @ 20:59)    Platelet Count - Automated: 285 K/uL (11-22 @ 10:49)  Platelet Count - Automated: 304 K/uL (11-21 @ 20:59)      11-21    139  |  105  |  21  ----------------------------<  120<H>  4.0   |  27  |  1.10    Ca    9.4      21 Nov 2021 20:59    TPro  7.3  /  Alb  2.7<L>  /  TBili  0.8  /  DBili  x   /  AST  10<L>  /  ALT  16  /  AlkPhos  104  11-21    PT/INR - ( 21 Nov 2021 20:59 )   PT: 15.1 sec;   INR: 1.31 ratio    PTT - ( 21 Nov 2021 20:59 )  PTT:33.8 sec

## 2021-11-22 NOTE — CONSULT NOTE ADULT - ASSESSMENT
Patient is a 66 year old male with PMH of CLL on Venetoclax, MDS, Melanoma, Paroxysmal AFib (not on AC), HTN, HLD, came in for increased left foot pain and found with fever in ER.  Febrile in .5, pt asymptomatic. Afebrile now.  Leukocytosis overall dec since Oct 2021, also with h/o CLL  L foot fracture with swelling, pain with very slight erythema, not c/w cellulitis   B/l DVT study negative   CXR reviewed - clear, no infiltrate/consolidation, no resp sx.   RVP/COVID negative   UA negative, no urinary sx.     Recommendations:  Follow up L foot xray report  Follow blood cultures  Continue empiric pip-tazo, if blood cultures neg at least 48h, will plan to d/c  Monitor temps/cbc

## 2021-11-22 NOTE — CONSULT NOTE ADULT - SUBJECTIVE AND OBJECTIVE BOX
[facility], Division of Infectious Diseases  ROS Kuhn, KRYSTINA Echeverria  [phone]  ([phone] - weekdays after 5pm and weekends)    SARAHI BARNHART  66y, Male  [id number]    HPI:  Patient is a 66 year old male with PMH of CLL on Venetoclax, MDS, Melanoma, Paroxysmal AFib (not on AC), HTN, HLD, came in for increased left foot pain. Patient states he felt some weakness with difficulty ambulating due to increased pain and came to the hospital. Patient has had multiple hospitalization in the past. 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 Patient was discharged with home PT and steroids. Patient completed rehab PT for 8 days and then was discharged home, he had 3 sessions of home Pt. He reports fracturing left foot at home, had an xray and went to podiatrist who initially wrapped his foot and placed in boot for stabilization and pain was relived with Ibuprofen. He reports initially he was able to walk with the boot. States he had no trauma or injuries. He followed up with his podiatrist after a week given no improvement, he then wrapped foot with ACE bandage due to swelling and since then he has not been able to walk much and then started needing assistance from wife. He reports overall swelling has improved, he noted some mild erythema on dorsum of foot, states not worsened. He denies having any fevers or chills at home and denies feeling any in the ER, had temp of 103.5F. He denies cough, chest pain, dyspnea, abdominal pain, nausea, vomiting, diarrhea, dysuria or any other complaints. Of note, pt with recent hospitalization for what was suspected to be a cva, he was later diagnosed with autoimmune encephalitis, s/p steroids and PT. Patient this morning reports had some increased swelling as ACE bandage was not wrapped tight enough, was rewrapped and reports improvement.   ROS: 14 point review of systems completed, pertinent positives and negatives as per HPI.    Allergies: No Known Allergies  PMH -- Osteoporosis  Osteopenia  CLL (Chronic Lymphocytic Leukemia)  Avascular Necrosis of Femur Head  TMJ Syndrome  Herniated Cervical Disc  Bulging Disc  Melanoma  Deviated nasal septum  Nasal congestion  Rosacea  Deviated nasal septum  Other specified disorders of nose and nasal sinuses  Anemia  Encephalitis    PSH -- S/P Splenectomy  Avascular Necrosis of Femur Head  Avascular Necrosis of Femur Head  Abnormal Jaw Closure  Status Post Eye Surgery X 3  S/P Tonsillectomy  Bilateral cataracts    FH -- Family history of multiple myeloma  Family history of malignant melanoma  FH: blood dyscrasia  FH: hypertension  FH: renal failure    Social History -- denies tobacco, alcohol or illicit drug use    Physical Exam--  Vital Signs Last 24 Hrs  T(F): 98.9 (2021 09:00), Max: 103.5 (2021 20:51)  HR: 79 (2021 09:00) (68 - 108)  BP: 113/65 (2021 09:00) (101/56 - 127/67)  RR: 17 (2021 09:00) (16 - 18)  SpO2: 93% (2021 09:00) (93% - 99%)  General: no acute distress  HEENT: NC/AT, EOMI, anicteric, conjunctiva pink and moist, neck supple  Lungs: Clear bilaterally without rales, wheezing or rhonchi  Heart: Regular rate and rhythm. No murmur, rub or gallop.  Abdomen: Soft. Nondistended. Nontender. BS present.  Neuro: AAOx3, no obvious focal deficits   Extremities: L foot edema and TTP. No cyanosis or clubbing.  Skin: Small area of slight erythema on L dorsal foot with no induration/fluctuance, no open wounds/lesions  Psychiatric: Appropriate affect and mood for situation.   Lines: PIV    Laboratory & Imaging Data--  CBC:                       8.7    13.22 )-----------( 285      ( 2021 10:49 )             27.3     WBC Count: 13.22 K/uL (21 @ 10:49)  WBC Count: 11.89 K/uL (21 @ 20:59)    CMP:     139  |  105  |  21  ----------------------------<  120<H>  4.0   |  27  |  1.10    Ca    9.4      2021 20:59    TPro  7.3  /  Alb  2.7<L>  /  TBili  0.8  /  DBili  x   /  AST  10<L>  /  ALT  16  /  AlkPhos  104      LIVER FUNCTIONS - ( 2021 20:59 )  Alb: 2.7 g/dL / Pro: 7.3 g/dL / ALK PHOS: 104 U/L / ALT: 16 U/L / AST: 10 U/L / GGT: x           Urinalysis Basic - ( 2021 21:43 )  Color: Yellow / Appearance: Clear / S.010 / pH: x  Gluc: x / Ketone: Negative  / Bili: Negative / Urobili: Negative   Blood: x / Protein: 30 mg/dL / Nitrite: Negative   Leuk Esterase: Negative / RBC: 3-5 /HPF / WBC Negative   Sq Epi: x / Non Sq Epi: Occasional / Bacteria: Occasional    Microbiology:    - RVP/COVID - negative    - COVID- PCR - negative    Radiology--  US Duplex Venous Lower Ext Complete, Bilateral (21 @ 10:09) >IMPRESSION: No evidenceof deep venous thrombosis in either lower extremity.    Active Medications--  acetaminophen     Tablet .. 650 milliGRAM(s) Oral every 6 hours PRN  aluminum hydroxide/magnesium hydroxide/simethicone Suspension 30 milliLiter(s) Oral every 4 hours PRN  aspirin  chewable 81 milliGRAM(s) Oral daily  atorvastatin 80 milliGRAM(s) Oral at bedtime  enoxaparin Injectable 40 milliGRAM(s) SubCutaneous daily  folic acid 1 milliGRAM(s) Oral daily  ibuprofen  Tablet. 400 milliGRAM(s) Oral every 6 hours PRN  ibuprofen  Tablet. 600 milliGRAM(s) Oral every 6 hours PRN  melatonin 3 milliGRAM(s) Oral at bedtime PRN  piperacillin/tazobactam IVPB.. 3.375 Gram(s) IV Intermittent every 8 hours    Antimicrobials:   piperacillin/tazobactam IVPB.. 3.375 Gram(s) IV Intermittent every 8 hours

## 2021-11-23 ENCOUNTER — TRANSCRIPTION ENCOUNTER (OUTPATIENT)
Age: 66
End: 2021-11-23

## 2021-11-23 DIAGNOSIS — R60.0 LOCALIZED EDEMA: ICD-10-CM

## 2021-11-23 LAB
ALBUMIN SERPL ELPH-MCNC: 2.2 G/DL — LOW (ref 3.3–5)
ALP SERPL-CCNC: 91 U/L — SIGNIFICANT CHANGE UP (ref 40–120)
ALT FLD-CCNC: 16 U/L — SIGNIFICANT CHANGE UP (ref 12–78)
ANION GAP SERPL CALC-SCNC: 6 MMOL/L — SIGNIFICANT CHANGE UP (ref 5–17)
AST SERPL-CCNC: 8 U/L — LOW (ref 15–37)
BASOPHILS # BLD AUTO: 0.05 K/UL — SIGNIFICANT CHANGE UP (ref 0–0.2)
BASOPHILS NFR BLD AUTO: 0.5 % — SIGNIFICANT CHANGE UP (ref 0–2)
BILIRUB SERPL-MCNC: 0.7 MG/DL — SIGNIFICANT CHANGE UP (ref 0.2–1.2)
BUN SERPL-MCNC: 14 MG/DL — SIGNIFICANT CHANGE UP (ref 7–23)
CALCIUM SERPL-MCNC: 9.3 MG/DL — SIGNIFICANT CHANGE UP (ref 8.5–10.1)
CHLORIDE SERPL-SCNC: 104 MMOL/L — SIGNIFICANT CHANGE UP (ref 96–108)
CO2 SERPL-SCNC: 28 MMOL/L — SIGNIFICANT CHANGE UP (ref 22–31)
COVID-19 NUCLEOCAPSID GAM AB INTERP: NEGATIVE — SIGNIFICANT CHANGE UP
COVID-19 NUCLEOCAPSID TOTAL GAM ANTIBODY RESULT: 0.9 INDEX — SIGNIFICANT CHANGE UP
COVID-19 SPIKE DOMAIN AB INTERP: POSITIVE
COVID-19 SPIKE DOMAIN ANTIBODY RESULT: 3.46 U/ML — HIGH
CREAT SERPL-MCNC: 0.93 MG/DL — SIGNIFICANT CHANGE UP (ref 0.5–1.3)
EOSINOPHIL # BLD AUTO: 0.07 K/UL — SIGNIFICANT CHANGE UP (ref 0–0.5)
EOSINOPHIL NFR BLD AUTO: 0.7 % — SIGNIFICANT CHANGE UP (ref 0–6)
GLUCOSE SERPL-MCNC: 99 MG/DL — SIGNIFICANT CHANGE UP (ref 70–99)
HCT VFR BLD CALC: 25.2 % — LOW (ref 39–50)
HCT VFR BLD CALC: 28.5 % — LOW (ref 39–50)
HGB BLD-MCNC: 8.4 G/DL — LOW (ref 13–17)
HGB BLD-MCNC: 9.4 G/DL — LOW (ref 13–17)
IMM GRANULOCYTES NFR BLD AUTO: 7.9 % — HIGH (ref 0–1.5)
LYMPHOCYTES # BLD AUTO: 0.49 K/UL — LOW (ref 1–3.3)
LYMPHOCYTES # BLD AUTO: 4.7 % — LOW (ref 13–44)
MCHC RBC-ENTMCNC: 33.3 GM/DL — SIGNIFICANT CHANGE UP (ref 32–36)
MCHC RBC-ENTMCNC: 34.6 PG — HIGH (ref 27–34)
MCV RBC AUTO: 103.7 FL — HIGH (ref 80–100)
MONOCYTES # BLD AUTO: 2.92 K/UL — HIGH (ref 0–0.9)
MONOCYTES NFR BLD AUTO: 27.8 % — HIGH (ref 2–14)
NEUTROPHILS # BLD AUTO: 6.14 K/UL — SIGNIFICANT CHANGE UP (ref 1.8–7.4)
NEUTROPHILS NFR BLD AUTO: 58.4 % — SIGNIFICANT CHANGE UP (ref 43–77)
NRBC # BLD: 0 /100 WBCS — SIGNIFICANT CHANGE UP (ref 0–0)
PLATELET # BLD AUTO: 271 K/UL — SIGNIFICANT CHANGE UP (ref 150–400)
POTASSIUM SERPL-MCNC: 3.6 MMOL/L — SIGNIFICANT CHANGE UP (ref 3.5–5.3)
POTASSIUM SERPL-SCNC: 3.6 MMOL/L — SIGNIFICANT CHANGE UP (ref 3.5–5.3)
PROT SERPL-MCNC: 6.4 G/DL — SIGNIFICANT CHANGE UP (ref 6–8.3)
RBC # BLD: 2.43 M/UL — LOW (ref 4.2–5.8)
RBC # FLD: 16.4 % — HIGH (ref 10.3–14.5)
SARS-COV-2 IGG+IGM SERPL QL IA: 0.9 INDEX — SIGNIFICANT CHANGE UP
SARS-COV-2 IGG+IGM SERPL QL IA: 3.46 U/ML — HIGH
SARS-COV-2 IGG+IGM SERPL QL IA: NEGATIVE — SIGNIFICANT CHANGE UP
SARS-COV-2 IGG+IGM SERPL QL IA: POSITIVE
SODIUM SERPL-SCNC: 138 MMOL/L — SIGNIFICANT CHANGE UP (ref 135–145)
WBC # BLD: 10.5 K/UL — SIGNIFICANT CHANGE UP (ref 3.8–10.5)
WBC # FLD AUTO: 10.5 K/UL — SIGNIFICANT CHANGE UP (ref 3.8–10.5)

## 2021-11-23 PROCEDURE — 74176 CT ABD & PELVIS W/O CONTRAST: CPT | Mod: 26

## 2021-11-23 PROCEDURE — 99221 1ST HOSP IP/OBS SF/LOW 40: CPT

## 2021-11-23 PROCEDURE — 73720 MRI LWR EXTREMITY W/O&W/DYE: CPT | Mod: 26,LT

## 2021-11-23 PROCEDURE — 71250 CT THORAX DX C-: CPT | Mod: 26

## 2021-11-23 PROCEDURE — 99497 ADVNCD CARE PLAN 30 MIN: CPT

## 2021-11-23 RX ORDER — LANOLIN ALCOHOL/MO/W.PET/CERES
1 CREAM (GRAM) TOPICAL
Qty: 0 | Refills: 0 | DISCHARGE
Start: 2021-11-23

## 2021-11-23 RX ORDER — FAMOTIDINE 10 MG/ML
1 INJECTION INTRAVENOUS
Qty: 0 | Refills: 0 | DISCHARGE
Start: 2021-11-23

## 2021-11-23 RX ORDER — ACETAMINOPHEN 500 MG
2 TABLET ORAL
Qty: 0 | Refills: 0 | DISCHARGE
Start: 2021-11-23

## 2021-11-23 RX ADMIN — ATORVASTATIN CALCIUM 80 MILLIGRAM(S): 80 TABLET, FILM COATED ORAL at 21:20

## 2021-11-23 RX ADMIN — Medication 3 MILLIGRAM(S): at 21:26

## 2021-11-23 RX ADMIN — Medication 1 TABLET(S): at 14:10

## 2021-11-23 RX ADMIN — PIPERACILLIN AND TAZOBACTAM 25 GRAM(S): 4; .5 INJECTION, POWDER, LYOPHILIZED, FOR SOLUTION INTRAVENOUS at 14:09

## 2021-11-23 RX ADMIN — PIPERACILLIN AND TAZOBACTAM 25 GRAM(S): 4; .5 INJECTION, POWDER, LYOPHILIZED, FOR SOLUTION INTRAVENOUS at 06:29

## 2021-11-23 RX ADMIN — Medication 1 TABLET(S): at 05:38

## 2021-11-23 RX ADMIN — Medication 81 MILLIGRAM(S): at 11:19

## 2021-11-23 RX ADMIN — PIPERACILLIN AND TAZOBACTAM 25 GRAM(S): 4; .5 INJECTION, POWDER, LYOPHILIZED, FOR SOLUTION INTRAVENOUS at 21:20

## 2021-11-23 RX ADMIN — FAMOTIDINE 20 MILLIGRAM(S): 10 INJECTION INTRAVENOUS at 11:19

## 2021-11-23 RX ADMIN — Medication 1 TABLET(S): at 21:20

## 2021-11-23 RX ADMIN — Medication 1 MILLIGRAM(S): at 11:19

## 2021-11-23 RX ADMIN — ENOXAPARIN SODIUM 40 MILLIGRAM(S): 100 INJECTION SUBCUTANEOUS at 11:18

## 2021-11-23 NOTE — DISCHARGE NOTE PROVIDER - PROVIDER TOKENS
PROVIDER:[TOKEN:[[id number]]],PROVIDER:[TOKEN:[[id number]]] PROVIDER:[TOKEN:[[id number]]],PROVIDER:[TOKEN:[[id number]]],PROVIDER:[TOKEN:[[id number]],FOLLOWUP:[2 weeks]]

## 2021-11-23 NOTE — PROGRESS NOTE ADULT - SUBJECTIVE AND OBJECTIVE BOX
Patient is a 66y old  Male who presents with a chief complaint of fever (2021 11:34)      INTERVAL /OVERNIGHT EVENTS: still with foot pain    MEDICATIONS  (STANDING):  aspirin  chewable 81 milliGRAM(s) Oral daily  atorvastatin 80 milliGRAM(s) Oral at bedtime  enoxaparin Injectable 40 milliGRAM(s) SubCutaneous daily  famotidine    Tablet 20 milliGRAM(s) Oral daily  folic acid 1 milliGRAM(s) Oral daily  lactobacillus acidophilus 1 Tablet(s) Oral three times a day  piperacillin/tazobactam IVPB.. 3.375 Gram(s) IV Intermittent every 8 hours    MEDICATIONS  (PRN):  acetaminophen     Tablet .. 650 milliGRAM(s) Oral every 6 hours PRN Temp greater or equal to 38C (100.4F)  aluminum hydroxide/magnesium hydroxide/simethicone Suspension 30 milliLiter(s) Oral every 4 hours PRN Dyspepsia  ibuprofen  Tablet. 400 milliGRAM(s) Oral every 6 hours PRN Mild Pain (1 - 3)  ibuprofen  Tablet. 600 milliGRAM(s) Oral every 6 hours PRN Moderate Pain (4 - 6)  melatonin 3 milliGRAM(s) Oral at bedtime PRN Insomnia      Allergies    No Known Allergies    Intolerances        REVIEW OF SYSTEMS:  CONSTITUTIONAL: No fever, weight loss, or fatigue  EYES: No eye pain, visual disturbances, or discharge  ENMT:  No difficulty hearing, tinnitus, vertigo; No sinus or throat pain  NECK: No pain or stiffness  RESPIRATORY: No cough, wheezing, chills or hemoptysis; No shortness of breath  CARDIOVASCULAR: No chest pain, palpitations, dizziness, or leg swelling  GASTROINTESTINAL: No abdominal or epigastric pain. No nausea, vomiting, or hematemesis; No diarrhea or constipation. No melena or hematochezia.  GENITOURINARY: No dysuria, frequency, hematuria, or incontinence  NEUROLOGICAL: No headaches, memory loss, loss of strength, numbness, or tremors  SKIN: No itching, burning, rashes, or lesions   LYMPH NODES: No enlarged glands  ENDOCRINE: No heat or cold intolerance; No hair loss; No polydipsia or polyuria  MUSCULOSKELETAL: + joint pain or swelling; No muscle, back, or extremity pain  PSYCHIATRIC: No depression, anxiety, mood swings, or difficulty sleeping  HEME/LYMPH: No easy bruising, or bleeding gums  ALLERGY AND IMMUNOLOGIC: No hives or eczema    Vital Signs Last 24 Hrs  T(C): 36.1 (2021 12:27), Max: 37.2 (2021 05:41)  T(F): 97 (2021 12:27), Max: 98.9 (2021 05:41)  HR: 74 (2021 12:27) (74 - 81)  BP: 111/67 (2021 12:27) (101/60 - 111/67)  BP(mean): --  RR: 16 (2021 12:27) (16 - 17)  SpO2: 97% (2021 12:27) (95% - 97%)    PHYSICAL EXAM:  GENERAL: NAD, well-groomed, well-developed  HEAD:  Atraumatic, Normocephalic  EYES: EOMI, PERRLA, conjunctiva and sclera clear  ENMT: No tonsillar erythema, exudates, or enlargement; Moist mucous membranes, Good dentition, No lesions  NECK: Supple, No JVD, Normal thyroid  NERVOUS SYSTEM:  Alert & Oriented X3, Good concentration; Motor Strength 5/5 B/L upper and lower extremities; DTRs 2+ intact and symmetric  CHEST/LUNG: Clear to auscultation bilaterally; No rales, rhonchi, wheezing, or rubs  HEART: Regular rate and rhythm; No murmurs, rubs, or gallops  ABDOMEN: Soft, Nontender, Nondistended; Bowel sounds present  EXTREMITIES:  2+ Peripheral Pulses, No clubbing, cyanosis, or edema  LYMPH: No lymphadenopathy noted  SKIN: No rashes or lesions    LABS:                        9.4    x     )-----------( x        ( 2021 12:06 )             28.5     2021 07:32    138    |  104    |  14     ----------------------------<  99     3.6     |  28     |  0.93     Ca    9.3        2021 07:32    TPro  6.4    /  Alb  2.2    /  TBili  0.7    /  DBili  x      /  AST  8      /  ALT  16     /  AlkPhos  91     2021 07:32    PT/INR - ( 2021 20:59 )   PT: 15.1 sec;   INR: 1.31 ratio         PTT - ( 2021 20:59 )  PTT:33.8 sec  Urinalysis Basic - ( 2021 21:43 )    Color: Yellow / Appearance: Clear / S.010 / pH: x  Gluc: x / Ketone: Negative  / Bili: Negative / Urobili: Negative   Blood: x / Protein: 30 mg/dL / Nitrite: Negative   Leuk Esterase: Negative / RBC: 3-5 /HPF / WBC Negative   Sq Epi: x / Non Sq Epi: Occasional / Bacteria: Occasional      CAPILLARY BLOOD GLUCOSE          RADIOLOGY & ADDITIONAL TESTS:    Notes Reviewed:  [x ] YES  [ ] NO    Care Discussed with Consultants/Other Providers [x ] YES  [ ] NO

## 2021-11-23 NOTE — OCCUPATIONAL THERAPY INITIAL EVALUATION ADULT - GENERAL OBSERVATIONS, REHAB EVAL
Patient received supine in bed with +IV left UE and +ACE wrap left foot. Patient in no apparent distress and was cooperative with evaluation. PT donned Cam boot Left LE.

## 2021-11-23 NOTE — PROGRESS NOTE ADULT - SUBJECTIVE AND OBJECTIVE BOX
[facility], Division of Infectious Diseases  ROS Kuhn Y. Patel, S. Shah  [phone]  ([phone] - weekdays after 5pm and weekends)    Name: SARAHI BARNHART  Age/Gender: 66y Male  MRN: [id number]    Interval History:  Patient seen this morning, sitting in chair.  Feels better, L foot in ACE and boot, resting foot on stool.  Denies feeling fever or chills yesterday, no new complaints.  Notes reviewed. No concerning overnight events  Afebrile since last night.     Allergies: No Known Allergies    Objective:  Vitals:   T(F): 97 (21 @ 12:27), Max: 103 (21 @ 14:25)  HR: 74 (21 @ 12:27) (74 - 81)  BP: 111/67 (21 @ 12:27) (101/60 - 111/67)  RR: 16 (21 @ 12:27) (16 - 17)  SpO2: 97% (21 @ 12:27) (95% - 97%)  Physical Examination:  General: no acute distress, nontoxic appearing   HEENT: NC/AT, anicteric, neck supple  Respiratory: no acc muscle use, breathing comfortably  Cardiovascular: S1 and S2 present  Gastrointestinal: normal appearing, nondistended  Extremities: L foot in ace bandage and boot  Skin: no visible rash    Laboratory Studies:  CBC:                       9.4    x     )-----------( x        ( 2021 12:06 )             28.5     WBC Trend:  10.50 21 @ 07:32  13.22 21 @ 10:49  11.89 21 @ 20:59    CMP:     138  |  104  |  14  ----------------------------<  99  3.6   |  28  |  0.93    Ca    9.3      2021 07:32    TPro  6.4  /  Alb  2.2<L>  /  TBili  0.7  /  DBili  x   /  AST  8<L>  /  ALT  16  /  AlkPhos  91      LIVER FUNCTIONS - ( 2021 07:32 )  Alb: 2.2 g/dL / Pro: 6.4 g/dL / ALK PHOS: 91 U/L / ALT: 16 U/L / AST: 8 U/L / GGT: x           Urinalysis Basic - ( 2021 21:43 )  Color: Yellow / Appearance: Clear / S.010 / pH: x  Gluc: x / Ketone: Negative  / Bili: Negative / Urobili: Negative   Blood: x / Protein: 30 mg/dL / Nitrite: Negative   Leuk Esterase: Negative / RBC: 3-5 /HPF / WBC Negative   Sq Epi: x / Non Sq Epi: Occasional / Bacteria: Occasional    Microbiology: reviewed   Culture - Urine (collected 21 @ 04:22)  Source: Clean Catch Clean Catch (Midstream)  Final Report (21 @ 23:09):    No growth    Culture - Blood (collected 21 @ 04:20)  Source: .Blood Blood-Peripheral  Preliminary Report (21 @ 05:01):    No growth to date.    Culture - Blood (collected 21 @ 04:20)  Source: .Blood Blood-Peripheral  Preliminary Report (21 @ 05:01):    No growth to date.    Radiology: reviewed   CT Chest No Cont (21 @ 08:35) >IMPRESSION: 1. Clear lungs. 2. No evidence of intra-abdominal abscess. 3. 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    Medications:  acetaminophen     Tablet .. 650 milliGRAM(s) Oral every 6 hours PRN  aluminum hydroxide/magnesium hydroxide/simethicone Suspension 30 milliLiter(s) Oral every 4 hours PRN  aspirin  chewable 81 milliGRAM(s) Oral daily  atorvastatin 80 milliGRAM(s) Oral at bedtime  enoxaparin Injectable 40 milliGRAM(s) SubCutaneous daily  famotidine    Tablet 20 milliGRAM(s) Oral daily  folic acid 1 milliGRAM(s) Oral daily  ibuprofen  Tablet. 400 milliGRAM(s) Oral every 6 hours PRN  ibuprofen  Tablet. 600 milliGRAM(s) Oral every 6 hours PRN  lactobacillus acidophilus 1 Tablet(s) Oral three times a day  melatonin 3 milliGRAM(s) Oral at bedtime PRN  piperacillin/tazobactam IVPB.. 3.375 Gram(s) IV Intermittent every 8 hours    Antimicrobials:  piperacillin/tazobactam IVPB.. 3.375 Gram(s) IV Intermittent every 8 hours

## 2021-11-23 NOTE — CONSULT NOTE ADULT - SUBJECTIVE AND OBJECTIVE BOX
HPI: 67 y/o M pt with PMH of CLL on Venetoclax, MDS, and melanoma presented to the hospital for overall weakness 2/2 left foot pain over the weekend. Pt with recent hospitalization for what was suspected to be a cva, he was later diagnosed with autoimmune encephalitis. Pt notes that he began to have excruciating left foot pain about four weeks ago. He denies any trauma or inciting events. His outpt Podiatrist Dr. Anguiano did x-rays got an MRI, which did not show any obvious fractures but there were marrow edema within the midfoot indicating a possible stress reaction/fracture. Pt was place in an ACE wrap and CAM boot. He was instructed to rest his foot. Pt states that the pain was so bad over the weekend, that he felt an overall weakness and couldn't walk. He was fully ambulatory prior to this. Admits to a tingling sensation in the foot from time to time and states that pain is about a 6/10 at baseline on the top of his left foot. Pt denies nausea, vomiting, fever, chills, shortness of breath, chest pain and calf pain.    PAST MEDICAL & SURGICAL HISTORY:  Osteopenia    CLL (Chronic Lymphocytic Leukemia)  SINCE 1988    Avascular Necrosis of Femur Head  B/L    TMJ Syndrome  limited jaw opening due to TMJ    Herniated Cervical Disc    Bulging Disc  LUMBAR    Melanoma    Deviated nasal septum    Nasal congestion    Rosacea    Deviated nasal septum    Other specified disorders of nose and nasal sinuses    Anemia    Encephalitis    S/P Splenectomy  1993    Avascular Necrosis of Femur Head  RIGHT - CORE DECOMPRESSION- 1993    Avascular Necrosis of Femur Head  LEFT - CORE DECOMPRESSION - 1993    Abnormal Jaw Closure  LEFT JAW SURGERY - 1988    Status Post Eye Surgery X 3  3/2011 right eye scleral buckle; left eye in 2013    S/P Tonsillectomy  1960    Bilateral cataracts  removed in 2011        Allergies    No Known Allergies    Intolerances    MEDICATIONS  (STANDING):  aspirin  chewable 81 milliGRAM(s) Oral daily  atorvastatin 80 milliGRAM(s) Oral at bedtime  enoxaparin Injectable 40 milliGRAM(s) SubCutaneous daily  famotidine    Tablet 20 milliGRAM(s) Oral daily  folic acid 1 milliGRAM(s) Oral daily  lactobacillus acidophilus 1 Tablet(s) Oral three times a day  piperacillin/tazobactam IVPB.. 3.375 Gram(s) IV Intermittent every 8 hours    MEDICATIONS  (PRN):  acetaminophen     Tablet .. 650 milliGRAM(s) Oral every 6 hours PRN Temp greater or equal to 38C (100.4F)  aluminum hydroxide/magnesium hydroxide/simethicone Suspension 30 milliLiter(s) Oral every 4 hours PRN Dyspepsia  ibuprofen  Tablet. 400 milliGRAM(s) Oral every 6 hours PRN Mild Pain (1 - 3)  ibuprofen  Tablet. 600 milliGRAM(s) Oral every 6 hours PRN Moderate Pain (4 - 6)  melatonin 3 milliGRAM(s) Oral at bedtime PRN Insomnia      Social History:  Lives with wife for assists, Denies tobacco, alcohol or drug use. (22 Nov 2021 01:11)      FAMILY HISTORY:  Family history of multiple myeloma    Family history of malignant melanoma  age 89    FH: blood dyscrasia  MGUS    FH: hypertension  mother    FH: renal failure  mother    Vital Signs Last 24 Hrs  T(C): 36.1 (23 Nov 2021 12:27), Max: 39.4 (22 Nov 2021 14:25)  T(F): 97 (23 Nov 2021 12:27), Max: 103 (22 Nov 2021 14:25)  HR: 74 (23 Nov 2021 12:27) (74 - 87)  BP: 111/67 (23 Nov 2021 12:27) (101/60 - 117/71)  BP(mean): --  RR: 16 (23 Nov 2021 12:27) (16 - 17)  SpO2: 97% (23 Nov 2021 12:27) (95% - 97%)    PHYSICAL EXAM:  Vascular: DP & PT palpable bilaterally, Capillary refill 3 seconds, Digital hair present bilaterally. +2 pitting edema of the left foot and ankle joint. Left midfoot is warmer to touch than the remainder of the foot, otherwise skin temp wnl b/l.  Neurological: Light touch sensation intact bilaterally.  Musculoskeletal: Pain on palpation at the left dorsal midfoot, specifically the proximal 3rd and 4th metatarsal shafts. Pain with AROM/PROM of the left ankle and subtalar joint, with decreased ROM of the toes and ankle.  Dermatological: No open wounds or lesions. Left dorsal midfoot erythema.

## 2021-11-23 NOTE — PHYSICAL THERAPY INITIAL EVALUATION ADULT - GENERAL OBSERVATIONS, REHAB EVAL
Received supine in bed with + heplock IV, (L) foot in ACE wrap, RA. Patient in no apparent distress.

## 2021-11-23 NOTE — DISCHARGE NOTE PROVIDER - NSDCMRMEDTOKEN_GEN_ALL_CORE_FT
acetaminophen 325 mg oral tablet: 2 tab(s) orally every 6 hours, As needed, Temp greater or equal to 38C (100.4F)  aluminum hydroxide-magnesium hydroxide 200 mg-200 mg/5 mL oral suspension: 30 milliliter(s) orally every 4 hours, As needed, Dyspepsia  aspirin 81 mg oral tablet, chewable: 1 tab(s) orally once a day  atorvastatin 80 mg oral tablet: 1 tab(s) orally once a day (at bedtime)  famotidine 20 mg oral tablet: 1 tab(s) orally once a day  folic acid 1 mg oral tablet: 1 tab(s) orally once a day  melatonin 3 mg oral tablet: 1 tab(s) orally once a day (at bedtime), As needed, Insomnia  PT and OT: 3 to 5 times a week  Rolling walker:    acetaminophen 325 mg oral tablet: 2 tab(s) orally every 6 hours, As needed, Temp greater or equal to 38C (100.4F)  aluminum hydroxide-magnesium hydroxide 200 mg-200 mg/5 mL oral suspension: 30 milliliter(s) orally every 4 hours, As needed, Dyspepsia  aspirin 81 mg oral tablet, chewable: 1 tab(s) orally once a day  atorvastatin 80 mg oral tablet: 1 tab(s) orally once a day (at bedtime)  enoxaparin: 40 milligram(s) subcutaneously once a day  famotidine 20 mg oral tablet: 1 tab(s) orally once a day  folic acid 1 mg oral tablet: 1 tab(s) orally once a day  melatonin 3 mg oral tablet: 1 tab(s) orally once a day (at bedtime), As needed, Insomnia  PT and OT: 3 to 5 times a week  Rolling walker:   vancomycin 1 g intravenous injection: 1 gram(s) intravenous every 12 hours   acetaminophen 325 mg oral tablet: 2 tab(s) orally every 6 hours, As needed, Temp greater or equal to 38C (100.4F)  aluminum hydroxide-magnesium hydroxide 200 mg-200 mg/5 mL oral suspension: 30 milliliter(s) orally every 4 hours, As needed, Dyspepsia  aspirin 81 mg oral tablet, chewable: 1 tab(s) orally once a day  atorvastatin 80 mg oral tablet: 1 tab(s) orally once a day (at bedtime)  enoxaparin: 40 milligram(s) subcutaneously once a day  famotidine 20 mg oral tablet: 1 tab(s) orally once a day  folic acid 1 mg oral tablet: 1 tab(s) orally once a day  melatonin 3 mg oral tablet: 1 tab(s) orally once a day (at bedtime), As needed, Insomnia  PT and OT: 3 to 5 times a week  Rolling walker:   vancomycin 1 g intravenous injection: 1 gram(s) intravenous every 12 hours, last day 1/11/21

## 2021-11-23 NOTE — DISCHARGE NOTE PROVIDER - CARE PROVIDER_API CALL
Tamir John (MD)  Internal Medicine  [address] [place] [id number]  Phone: [phone]  Fax: [phone]  Follow Up Time:     Donny Coker; PhD)  Infectious Disease; Internal Medicine  [address] [place] [address]  Phone: [phone]  Fax: [phone]  Follow Up Time:    Tamir John (MD)  Internal Medicine  [address] [place] [address]  Phone: [phone]  Fax: [phone]  Follow Up Time:     Donny Coker; PhD)  Infectious Disease; Internal Medicine  [address] [place] [address]  Phone: [phone]  Fax: [phone]  Follow Up Time:     Dash Ruby (DP)  [facility]  [address] [place] [address]  Phone: [phone]  Fax: [phone]  Follow Up Time: 2 weeks

## 2021-11-23 NOTE — OCCUPATIONAL THERAPY INITIAL EVALUATION ADULT - ADDITIONAL COMMENTS
Pt reports that he lives in a 2 level house with 4 steps to enter with handrail, bedroom and bathroom main level. Pt has a bathtub with 2 grab bars. Pt owns a shower chair with no back and a quad cane. Pt reports that he was ambulating using Cam boot left LE and quad cane. Spouse will be available to assist pt at home as per patient report. Patient performed functional ambulation in hospital room and in hallway using rolling walker with contact guard.

## 2021-11-23 NOTE — PHYSICAL THERAPY INITIAL EVALUATION ADULT - PERTINENT HX OF CURRENT PROBLEM, REHAB EVAL
65yo M w/ PMHx of CLL on Venetoclax, MDS, Melanoma, Paroxysmal AFib (not on AC), HTN, HLD, came in for weakness x 3 weeks that has worsened 2/2 to L foot fracture that occurred 3 weeks ago and admitted for Acute Febrile illness for further work up.

## 2021-11-23 NOTE — DISCHARGE NOTE PROVIDER - HOSPITAL COURSE
admitted for acute febrile illness  ABX per ID  etiology unclear  left ankle fracture ruled out  pain control  PT for ambulation  possible OM on MRI  DC after ID and ortho clearance admitted for acute febrile illness  ABX per ID  etiology unclear  left ankle fracture ruled out  pain control  PT for ambulation  possible OM on MRI  DC after ID and ortho clearance  bone aspirate suggests osteomyelitis- iv vanco per ID admitted for acute febrile illness  ABX per ID  etiology unclear  left ankle fracture ruled out  pain control  PT for ambulation  possible OM on MRI  DC after ID and ortho clearance  bone aspirate suggests osteomyelitis- iv vanco per ID  last day iv vanco 1/11/21  weekly cbc, cmp, esr, vanco trough

## 2021-11-23 NOTE — PHYSICAL THERAPY INITIAL EVALUATION ADULT - ADDITIONAL COMMENTS
Patient reports that he lives with his wife in a house with 4 steps to enter, bed/bath on main level. Was ambulating with a quad cane PTA (minimal ambulation, bed to toilet only), and assist from wife.

## 2021-11-23 NOTE — CONSULT NOTE ADULT - PROBLEM SELECTOR RECOMMENDATION 9
Pt seen and evaluated- plan discussed with Dr. Anguiano.  Applied ACE compression to the left foot.  Weight bearing in the CAM boot as tolerated.  Left foot XR results reviewed: Negative radiographs, in particular no fracture.  f/u left midfoot MRI to r/o stress fractures or changes since previous imaging from Dr. Anguiano's office.  Pt will be followed by Podiatry while in house. Pt seen and evaluated with Dr. Ruby.  Applied ACE compression to the left foot.  Non-weight bearing in the CAM boot.  Left foot XR results reviewed: Negative radiographs, in particular no fracture.  Left foot MRI results reviewed- discussed the option of an OR bone biopsy and surrounding soft tissue pathology with the pt. He will discuss with his wife tomorrow.  Differential considerations include Charcot arthropathy (low likelihood), nondisplaced fractures/stress fractures, bone infection or cancer.  Pt will be followed by Podiatry while in house. Pt seen and evaluated with Dr. Ruby.  Applied ACE compression to the left foot.  Non-weight bearing in the CAM boot.  Left foot XR results reviewed: Negative radiographs, in particular no fracture.  Left foot MRI results reviewed- discussed the option of an OR bone biopsy and surrounding soft tissue pathology with the pt. He will discuss with his wife tomorrow.  Differential considerations include Charcot arthropathy, nondisplaced fractures/stress fractures, bone infection, or cancer.  Pt will be followed by Podiatry while in house.

## 2021-11-23 NOTE — DISCHARGE NOTE PROVIDER - NSDCFUSCHEDAPPT_GEN_ALL_CORE_FT
SARAHI BARNHART ; 11/30/2021 ; NPP HemOnc [facility]  SARAHI BARNHART ; 01/10/2022 ; NPP Intmed [facility] SARAHI BARNHART ; 01/10/2022 ; NPP Intmed [facility]

## 2021-11-23 NOTE — DISCHARGE NOTE PROVIDER - CARE PROVIDERS DIRECT ADDRESSES
[email or web address]

## 2021-11-23 NOTE — OCCUPATIONAL THERAPY INITIAL EVALUATION ADULT - PERTINENT HX OF CURRENT PROBLEM, REHAB EVAL
65yo M w/ PMH CLL on Venetoclax, MDS, Melanoma, Paroxysmal AFib, HTN, HLD, came in for weakness x 3 weeks that has worsened 2/2 to L foot fracture that occurred 3 weeks ago. Pt admitted 11/22/21 with fever. As per EMR, Imaging: XR of the left foot reviewed demonstrating no obvious fracture, final read pending. MR reviewed, demonstrating marrow edema of the cuneiform, a partial synovial cyst at the dorsolateral aspect of the sinus tarsi, and other nonspecific findings.

## 2021-11-23 NOTE — DISCHARGE NOTE PROVIDER - NSDCCPCAREPLAN_GEN_ALL_CORE_FT
PRINCIPAL DISCHARGE DIAGNOSIS  Diagnosis: Fever  Assessment and Plan of Treatment: follow up with ID MD Dr. mcdonald       PRINCIPAL DISCHARGE DIAGNOSIS  Diagnosis: Fever  Assessment and Plan of Treatment: follow up with ID MD Dr. tamara hussein/bone aspirate consistent with osteomyelitis- iv vanco, last day 1/11.  please obtain weekly cbc, cmp, esr, vanco trough per dr Coker

## 2021-11-23 NOTE — PROGRESS NOTE ADULT - ASSESSMENT
Patient is a 66 year old male with PMH of CLL on Venetoclax, MDS, Melanoma, Paroxysmal AFib (not on AC), HTN, HLD, came in for increased left foot pain and found with fever in ER.  Febrile in .5, pt asymptomatic. Febrile to 103F yesterday afternoon, did not feel - no further fevers noted   Leukocytosis overall dec since Oct 2021, also with h/o CLL - wbc decreased  L foot fracture with swelling, pain with very slight erythema, not c/w cellulitis.   B/l DVT study negative   CT C/A/P with no infectious source.   RVP/COVID negative UA negative, no urinary sx.   No new complaints, no focal findings on exam    Recommendations:  Blood cultures NGTD x2  Discontinue pip-tazo if bcx neg at 48h  Podiatry and Ortho following  Monitor temps/cbc

## 2021-11-23 NOTE — GOALS OF CARE CONVERSATION - ADVANCED CARE PLANNING - CONVERSATION DETAILS
Writer met with pt at bedside. Reviewed patient's medical and social history as well as events leading to patient's hospitalization. Writer discussed patient's current diagnosis (fever,avascular necrosis,femurhead,CLL,encephalitis,melanoma),  medical condition and management,  prognosis, and life expectancy. Inquired about patient's wishes regarding extent of medical care to be provided including escalation of medical care into the ICU and use of vasopressor support. In addition, the writer inquired about thoughts regarding cardiopulmnary resuscitation, artificial nutrition and hydration including use of feeding tubes and IVF, antibiotics, and further investigative studies such as blood draws and radiology. Pt. showed good insight into medical condition. All questions answered. Writer recommended he discuss with his wife his current wishes.  Psychosocial support provided.

## 2021-11-24 LAB
ALBUMIN SERPL ELPH-MCNC: 2.3 G/DL — LOW (ref 3.3–5)
ALP SERPL-CCNC: 108 U/L — SIGNIFICANT CHANGE UP (ref 40–120)
ALT FLD-CCNC: 22 U/L — SIGNIFICANT CHANGE UP (ref 12–78)
ANION GAP SERPL CALC-SCNC: 7 MMOL/L — SIGNIFICANT CHANGE UP (ref 5–17)
AST SERPL-CCNC: 12 U/L — LOW (ref 15–37)
BILIRUB SERPL-MCNC: 0.8 MG/DL — SIGNIFICANT CHANGE UP (ref 0.2–1.2)
BUN SERPL-MCNC: 12 MG/DL — SIGNIFICANT CHANGE UP (ref 7–23)
CALCIUM SERPL-MCNC: 8.9 MG/DL — SIGNIFICANT CHANGE UP (ref 8.5–10.1)
CHLORIDE SERPL-SCNC: 104 MMOL/L — SIGNIFICANT CHANGE UP (ref 96–108)
CO2 SERPL-SCNC: 29 MMOL/L — SIGNIFICANT CHANGE UP (ref 22–31)
CREAT SERPL-MCNC: 1 MG/DL — SIGNIFICANT CHANGE UP (ref 0.5–1.3)
GLUCOSE SERPL-MCNC: 119 MG/DL — HIGH (ref 70–99)
HCT VFR BLD CALC: 27 % — LOW (ref 39–50)
HGB BLD-MCNC: 8.9 G/DL — LOW (ref 13–17)
MCHC RBC-ENTMCNC: 33 GM/DL — SIGNIFICANT CHANGE UP (ref 32–36)
MCHC RBC-ENTMCNC: 34.2 PG — HIGH (ref 27–34)
MCV RBC AUTO: 103.8 FL — HIGH (ref 80–100)
NRBC # BLD: 0 /100 WBCS — SIGNIFICANT CHANGE UP (ref 0–0)
PLATELET # BLD AUTO: 299 K/UL — SIGNIFICANT CHANGE UP (ref 150–400)
POTASSIUM SERPL-MCNC: 3.3 MMOL/L — LOW (ref 3.5–5.3)
POTASSIUM SERPL-SCNC: 3.3 MMOL/L — LOW (ref 3.5–5.3)
PROT SERPL-MCNC: 7.2 G/DL — SIGNIFICANT CHANGE UP (ref 6–8.3)
RBC # BLD: 2.6 M/UL — LOW (ref 4.2–5.8)
RBC # FLD: 16.5 % — HIGH (ref 10.3–14.5)
SODIUM SERPL-SCNC: 140 MMOL/L — SIGNIFICANT CHANGE UP (ref 135–145)
WBC # BLD: 9.07 K/UL — SIGNIFICANT CHANGE UP (ref 3.8–10.5)
WBC # FLD AUTO: 9.07 K/UL — SIGNIFICANT CHANGE UP (ref 3.8–10.5)

## 2021-11-24 PROCEDURE — 76376 3D RENDER W/INTRP POSTPROCES: CPT | Mod: 26

## 2021-11-24 PROCEDURE — 73700 CT LOWER EXTREMITY W/O DYE: CPT | Mod: 26,LT

## 2021-11-24 PROCEDURE — 99231 SBSQ HOSP IP/OBS SF/LOW 25: CPT

## 2021-11-24 RX ORDER — POTASSIUM CHLORIDE 20 MEQ
20 PACKET (EA) ORAL
Refills: 0 | Status: COMPLETED | OUTPATIENT
Start: 2021-11-24 | End: 2021-11-24

## 2021-11-24 RX ADMIN — PIPERACILLIN AND TAZOBACTAM 25 GRAM(S): 4; .5 INJECTION, POWDER, LYOPHILIZED, FOR SOLUTION INTRAVENOUS at 15:31

## 2021-11-24 RX ADMIN — Medication 1 TABLET(S): at 13:53

## 2021-11-24 RX ADMIN — Medication 20 MILLIEQUIVALENT(S): at 13:53

## 2021-11-24 RX ADMIN — Medication 600 MILLIGRAM(S): at 22:04

## 2021-11-24 RX ADMIN — Medication 1 TABLET(S): at 21:03

## 2021-11-24 RX ADMIN — Medication 81 MILLIGRAM(S): at 11:26

## 2021-11-24 RX ADMIN — PIPERACILLIN AND TAZOBACTAM 25 GRAM(S): 4; .5 INJECTION, POWDER, LYOPHILIZED, FOR SOLUTION INTRAVENOUS at 05:47

## 2021-11-24 RX ADMIN — ENOXAPARIN SODIUM 40 MILLIGRAM(S): 100 INJECTION SUBCUTANEOUS at 11:26

## 2021-11-24 RX ADMIN — Medication 1 MILLIGRAM(S): at 11:26

## 2021-11-24 RX ADMIN — PIPERACILLIN AND TAZOBACTAM 25 GRAM(S): 4; .5 INJECTION, POWDER, LYOPHILIZED, FOR SOLUTION INTRAVENOUS at 21:03

## 2021-11-24 RX ADMIN — ATORVASTATIN CALCIUM 80 MILLIGRAM(S): 80 TABLET, FILM COATED ORAL at 21:03

## 2021-11-24 RX ADMIN — Medication 20 MILLIEQUIVALENT(S): at 17:11

## 2021-11-24 RX ADMIN — FAMOTIDINE 20 MILLIGRAM(S): 10 INJECTION INTRAVENOUS at 11:26

## 2021-11-24 RX ADMIN — Medication 3 MILLIGRAM(S): at 21:03

## 2021-11-24 RX ADMIN — Medication 1 TABLET(S): at 05:47

## 2021-11-24 RX ADMIN — Medication 600 MILLIGRAM(S): at 21:04

## 2021-11-24 RX ADMIN — Medication 20 MILLIEQUIVALENT(S): at 21:26

## 2021-11-24 NOTE — PROGRESS NOTE ADULT - ASSESSMENT
67 yo male with history of CLL and MDS overlap syndrome, most recently treated with Gazyva X6 months/Venetoclax which was stopped in October 2021 2 months short of 1 year full therapy when he was admitted for autoimmune encephalitis, which was treated with IVIG and steroids; unfortunately he suffered left foot fracture likely from prolonged steroid use    - follow-up urine and blood cultures  - continue antibiotics as per primary team  - macrocytic anemia likely due to underlying MDS; will check iron studies to evaluate iron stores prior to resuming epogen  - patient due for IVIG on 11/29/21 in office; if he is still inpatient, would consider infusion of prophylactic IVIG at that time  - case discussed with Dr. Wood (hospitalist). 67 yo male with history of CLL and MDS overlap syndrome, most recently treated with Gazyva X6 months/Venetoclax which was stopped in October 2021 2 months short of 1 year full therapy when he was admitted for autoimmune encephalitis, which was treated with IVIG and steroids; unfortunately he suffered left foot fracture likely from prolonged steroid use    Blood cxx NGTD  Bone bx planned by podiatry to eval for osteo, cultures    RECOMMNED  - continue antibiotics as per medicine and infectious diseases  - macrocytic anemia likely due to underlying MDS; will check iron studies to evaluate iron stores prior to resuming epogen  - patient due for IVIG on 11/29/21 in office; if he is still inpatient, would consider infusion of prophylactic IVIG at that time  - case discussed with Dr. Harden (ID).

## 2021-11-24 NOTE — PROGRESS NOTE ADULT - PROBLEM SELECTOR PLAN 1
Patient came in with weakness that worsened 2/2 to L foot fracture. Mildly elevated WBC 11.89 and Temp of 103.5. On PE L , warm and swollen. Chest XRay looks clear. RVP and Covid negative. UA positive for proteins, small blood and 3-5 RBC.   - h/o CLL   - C/W Zosyn   - F/U BC x 2 and UC   - F/U XRay of Left Foot   - Tylenol PRN for fever   - Infectious Disease Consulted (Dr. Coker)  -

## 2021-11-24 NOTE — PROGRESS NOTE ADULT - PROBLEM SELECTOR PLAN 1
Pt seen and evaluated with Dr. Ruby.  Applied ACE compression to the left foot.  Non-weight bearing in the CAM boot.  Left foot XR results reviewed: Negative radiographs, in particular no fracture.  Left foot MRI results reviewed- discussed the option of an OR bone biopsy and surrounding soft tissue pathology with the pt.  Differential considerations include Charcot arthropathy (low likelihood), nondisplaced fractures/stress fractures, bone infection or cancer.  Risks and benefits of the proposed procedure discussed with the pt and his wife.  Pt will be followed by Podiatry while in house. Pt seen and evaluated with Dr. Ruby.  Applied ACE compression to the left foot.  Non-weight bearing in the CAM boot.  Left foot XR results reviewed: Negative radiographs, in particular no fracture.  Left foot MRI results reviewed- discussed the option of an OR bone biopsy and surrounding soft tissue pathology with the pt.  Differential considerations include Charcot arthropathy (low likelihood), nondisplaced fractures/stress fractures, bone infection or cancer.  Given the patient's severity of symptoms, will plan for surgical intervention at this time. Discussed risks, benefits, and potential complications with patient and wife regarding surgical intervention. All questions answered to satisfaction at this time.  Pt will be followed by Podiatry while in house.

## 2021-11-24 NOTE — PROGRESS NOTE ADULT - SUBJECTIVE AND OBJECTIVE BOX
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]    TMJ Syndrome [524.60]    Herniated Cervical Disc [722.0]    Bulging Disc [722.2]    Melanoma [C43.9]    Deviated nasal septum [J34.2]    Nasal congestion [R09.81]    Rosacea [L71.9]    Deviated nasal septum [J34.2]    Other specified disorders of nose and nasal sinuses [J34.89]    Anemia [D64.9]    Encephalitis [G04.90]    S/P Splenectomy [V15.29]    Avascular Necrosis of Femur Head [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 Patient has had multiple hospitalization in the past. 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 Patient was discharged with home PT and steroids. Patient completed home PT but 3 weeks ago when he was walking he fractured a bone in his L foot and is using a wrap and boot for stabilization and pain is relived with Iboprofen. Ever since the fracture patient walks minimally and states he is "bed bound" for the most part. Patient admits to pain in his L foot. Also admits to SOB from the pain. He was found to be febrile with a temp of 103. On physical exam, L foot was swollen however patient says it has improved from when it started. Denies CP, N/V/D and change in urinary or bowel habits.     Of note, pt with recent hospitalization for what was suspected to be a cva, he was later diagnosed with autoimmune encephalitis. please see d/c summary for details of that recent hospitalization.    In ED   Vitals: T: 103.5, HR: 99, BP: 127/67, RR: 16, O2: 99%   Labs: WBC: 11.89, H/H 9.3/29.0, MCV: 103.6, Albumin: 2.7, UA has proteins, blood and occasional bacteria   Chest XRAY: Appears clear (wet read)   EKG: NSR   Medications: Zosyn x IVPB, Vancomycine x IVPB, NS Bolus x 2, Motrin 600 x1 and Tylenol 650 x 1   (2021 01:11)        MEDICATIONS  (STANDING):  aspirin  chewable 81 milliGRAM(s) Oral daily  atorvastatin 80 milliGRAM(s) Oral at bedtime  enoxaparin Injectable 40 milliGRAM(s) SubCutaneous daily  famotidine    Tablet 20 milliGRAM(s) Oral daily  folic acid 1 milliGRAM(s) Oral daily  lactobacillus acidophilus 1 Tablet(s) Oral three times a day  piperacillin/tazobactam IVPB.. 3.375 Gram(s) IV Intermittent every 8 hours    MEDICATIONS  (PRN):  acetaminophen     Tablet .. 650 milliGRAM(s) Oral every 6 hours PRN Temp greater or equal to 38C (100.4F)  aluminum hydroxide/magnesium hydroxide/simethicone Suspension 30 milliLiter(s) Oral every 4 hours PRN Dyspepsia  ibuprofen  Tablet. 400 milliGRAM(s) Oral every 6 hours PRN Mild Pain (1 - 3)  ibuprofen  Tablet. 600 milliGRAM(s) Oral every 6 hours PRN Moderate Pain (4 - 6)  melatonin 3 milliGRAM(s) Oral at bedtime PRN Insomnia    Vital Signs Last 24 Hrs  T(C): 36.7 (2021 05:23), Max: 36.7 (2021 20:54)  T(F): 98 (2021 05:23), Max: 98 (2021 20:54)  HR: 77 (2021 05:23) (74 - 85)  BP: 134/75 (2021 05:23) (111/67 - 134/75)  BP(mean): --  RR: 16 (2021 05:23) (16 - 16)  SpO2: 98% (2021 05:23) (96% - 98%)  Daily     Daily Weight in k.6 (2021 05:23)  Last Menstrual Period          [PHYSICAL EXAM]  General: adult in NAD,  WN,  WD.  HEENT: clear oropharynx, anicteric sclera, pink conjunctivae.  Neck: supple, no masses.  CV: normal S1S2, no murmur, no rubs, no gallops.  Lungs: clear to auscultation, no wheezes, no rales, no rhonchi.  Abdomen: soft, non-tender, non-distended, no hepatosplenomegaly, normal BS, no guarding.  Ext: no clubbing, no cyanosis, no edema. L foot in soft boot  Skin: no rashes,  no petechiae, no venous stasis changes.  Neuro: alert and oriented X 3 , no focal motor deficits.  LN: no SC MICHAEL.      [LABS:]                        8.9    9.07  )-----------( 299      ( 2021 09:31 )             27.0     11-24    140  |  104  |  12  ----------------------------<  119<H>  3.3<L>   |  29  |  1.00    Ca    8.9      2021 09:31    TPro  7.2  /  Alb  2.3<L>  /  TBili  0.8  /  DBili  x   /  AST  12<L>  /  ALT  22  /  AlkPhos  108  11-24          Ferritin, Serum: 781 ng/mL (21 @ 09:13)  Iron - Total Binding Capacity.: 180 ug/dL (21 @ 09:08)        Culture - Urine (collected 2021 04:22)  Source: Clean Catch Clean Catch (Midstream)  Final Report (2021 23:09):    No growth    Culture - Blood (collected 2021 04:20)  Source: .Blood Blood-Peripheral  Preliminary Report (2021 05:01):    No growth to date.    Culture - Blood (collected 2021 04:20)  Source: .Blood Blood-Peripheral  Preliminary Report (2021 05:01):    No growth to date.      SARS-CoV-2: NotDetec (2021 21:19)  COVID-19 PCR: NotDetec (2021 20:59)  COVID-19 PCR: NotDetec (07 Oct 2021 05:00)  COVID-19 PCR: NotDetec (01 Oct 2021 20:05)  COVID-19 PCR: NotDetec (30 Sep 2021 13:54)  COVID-19 PCR: NotDetec (27 Sep 2021 11:06)  COVID-19 PCR: NotDetec (24 Sep 2021 10:53)  COVID-19 PCR: NotDetec (14 Sep 2021 18:47)        [RADIOLOGY STUDIES:]

## 2021-11-24 NOTE — PROGRESS NOTE ADULT - ASSESSMENT
67yo M w/ PMHx of CLL on Venetoclax, MDS, Melanoma, Paroxysmal AFib (not on AC), HTN, HLD, came in for weakness x 3 weeks that has worsened 2/2 to L foot fracture that occurred 3 weeks ago and admitted for Acute Febrile illness for further work up.

## 2021-11-24 NOTE — PROGRESS NOTE ADULT - SUBJECTIVE AND OBJECTIVE BOX
Patient is a 66y old  Male who presents with a chief complaint of infection (24 Nov 2021 11:27)      INTERVAL /OVERNIGHT EVENTS: still with foot pain    MEDICATIONS  (STANDING):  aspirin  chewable 81 milliGRAM(s) Oral daily  atorvastatin 80 milliGRAM(s) Oral at bedtime  enoxaparin Injectable 40 milliGRAM(s) SubCutaneous daily  famotidine    Tablet 20 milliGRAM(s) Oral daily  folic acid 1 milliGRAM(s) Oral daily  lactobacillus acidophilus 1 Tablet(s) Oral three times a day  piperacillin/tazobactam IVPB.. 3.375 Gram(s) IV Intermittent every 8 hours  potassium chloride    Tablet ER 20 milliEquivalent(s) Oral every 2 hours    MEDICATIONS  (PRN):  acetaminophen     Tablet .. 650 milliGRAM(s) Oral every 6 hours PRN Temp greater or equal to 38C (100.4F)  aluminum hydroxide/magnesium hydroxide/simethicone Suspension 30 milliLiter(s) Oral every 4 hours PRN Dyspepsia  ibuprofen  Tablet. 400 milliGRAM(s) Oral every 6 hours PRN Mild Pain (1 - 3)  ibuprofen  Tablet. 600 milliGRAM(s) Oral every 6 hours PRN Moderate Pain (4 - 6)  melatonin 3 milliGRAM(s) Oral at bedtime PRN Insomnia      Allergies    No Known Allergies    Intolerances        REVIEW OF SYSTEMS:  CONSTITUTIONAL: No fever, weight loss, or fatigue  EYES: No eye pain, visual disturbances, or discharge  ENMT:  No difficulty hearing, tinnitus, vertigo; No sinus or throat pain  NECK: No pain or stiffness  RESPIRATORY: No cough, wheezing, chills or hemoptysis; No shortness of breath  CARDIOVASCULAR: No chest pain, palpitations, dizziness, or leg swelling  GASTROINTESTINAL: No abdominal or epigastric pain. No nausea, vomiting, or hematemesis; No diarrhea or constipation. No melena or hematochezia.  GENITOURINARY: No dysuria, frequency, hematuria, or incontinence  NEUROLOGICAL: No headaches, memory loss, loss of strength, numbness, or tremors  SKIN: No itching, burning, rashes, or lesions   LYMPH NODES: No enlarged glands  ENDOCRINE: No heat or cold intolerance; No hair loss; No polydipsia or polyuria  MUSCULOSKELETAL: + joint pain or swelling; No muscle, back, or extremity pain  PSYCHIATRIC: No depression, anxiety, mood swings, or difficulty sleeping  HEME/LYMPH: No easy bruising, or bleeding gums  ALLERGY AND IMMUNOLOGIC: No hives or eczema    Vital Signs Last 24 Hrs  T(C): 36.7 (24 Nov 2021 05:23), Max: 36.7 (23 Nov 2021 20:54)  T(F): 98 (24 Nov 2021 05:23), Max: 98 (23 Nov 2021 20:54)  HR: 77 (24 Nov 2021 05:23) (77 - 85)  BP: 134/75 (24 Nov 2021 05:23) (123/63 - 134/75)  BP(mean): --  RR: 16 (24 Nov 2021 05:23) (16 - 16)  SpO2: 98% (24 Nov 2021 05:23) (96% - 98%)    PHYSICAL EXAM:  GENERAL: NAD, well-groomed, well-developed  HEAD:  Atraumatic, Normocephalic  EYES: EOMI, PERRLA, conjunctiva and sclera clear  ENMT: No tonsillar erythema, exudates, or enlargement; Moist mucous membranes, Good dentition, No lesions  NECK: Supple, No JVD, Normal thyroid  NERVOUS SYSTEM:  Alert & Oriented X3, Good concentration; Motor Strength 5/5 B/L upper and lower extremities; DTRs 2+ intact and symmetric  CHEST/LUNG: Clear to auscultation bilaterally; No rales, rhonchi, wheezing, or rubs  HEART: Regular rate and rhythm; No murmurs, rubs, or gallops  ABDOMEN: Soft, Nontender, Nondistended; Bowel sounds present  EXTREMITIES:  2+ Peripheral Pulses, No clubbing, cyanosis, or edema  LYMPH: No lymphadenopathy noted  SKIN: No rashes or lesions    LABS:                        8.9    9.07  )-----------( 299      ( 24 Nov 2021 09:31 )             27.0     24 Nov 2021 09:31    140    |  104    |  12     ----------------------------<  119    3.3     |  29     |  1.00     Ca    8.9        24 Nov 2021 09:31    TPro  7.2    /  Alb  2.3    /  TBili  0.8    /  DBili  x      /  AST  12     /  ALT  22     /  AlkPhos  108    24 Nov 2021 09:31        CAPILLARY BLOOD GLUCOSE          RADIOLOGY & ADDITIONAL TESTS:    Notes Reviewed:  [x ] YES  [ ] NO    Care Discussed with Consultants/Other Providers [x ] YES  [ ] NO

## 2021-11-24 NOTE — PROGRESS NOTE ADULT - SUBJECTIVE AND OBJECTIVE BOX
ams metabolic depside of fevers appears back to baseline   infect work up as needed  neurologic wise stable  spoke to spouse

## 2021-11-24 NOTE — PROGRESS NOTE ADULT - SUBJECTIVE AND OBJECTIVE BOX
HPI: 67 y/o M pt with PMH of CLL on Venetoclax, MDS, and melanoma presented to the hospital for overall weakness 2/2 left foot pain over the weekend. Seen bedside with his wife today. Pt with recent hospitalization for what was suspected to be a cva, he was later diagnosed with autoimmune encephalitis. Pt notes that he began to have excruciating left foot pain about four weeks ago. He denies any trauma or inciting events. His outpt Podiatrist Dr. Anguiano did x-rays got an MRI, which did not show any obvious fractures but there were marrow edema within the midfoot indicating a possible stress reaction/fracture. Pt was place in an ACE wrap and CAM boot. He was instructed to rest his foot. Pt states that the pain was so bad over the weekend, that he felt an overall weakness and couldn't walk. He was fully ambulatory prior to this. Admits to a tingling sensation in the foot from time to time and states that pain is about a 6/10 at baseline on the top of his left foot. Pt denies nausea, vomiting, fever, chills, shortness of breath, chest pain and calf pain.    PAST MEDICAL & SURGICAL HISTORY:  Osteopenia    CLL (Chronic Lymphocytic Leukemia)  SINCE 1988    Avascular Necrosis of Femur Head  B/L    TMJ Syndrome  limited jaw opening due to TMJ    Herniated Cervical Disc    Bulging Disc  LUMBAR    Melanoma    Deviated nasal septum    Nasal congestion    Rosacea    Deviated nasal septum    Other specified disorders of nose and nasal sinuses    Anemia    Encephalitis    S/P Splenectomy  1993    Avascular Necrosis of Femur Head  RIGHT - CORE DECOMPRESSION- 1993    Avascular Necrosis of Femur Head  LEFT - CORE DECOMPRESSION - 1993    Abnormal Jaw Closure  LEFT JAW SURGERY - 1988    Status Post Eye Surgery X 3  3/2011 right eye scleral buckle; left eye in 2013    S/P Tonsillectomy  1960    Bilateral cataracts  removed in 2011        Allergies    No Known Allergies    Intolerances    MEDICATIONS  (STANDING):  aspirin  chewable 81 milliGRAM(s) Oral daily  atorvastatin 80 milliGRAM(s) Oral at bedtime  enoxaparin Injectable 40 milliGRAM(s) SubCutaneous daily  famotidine    Tablet 20 milliGRAM(s) Oral daily  folic acid 1 milliGRAM(s) Oral daily  lactobacillus acidophilus 1 Tablet(s) Oral three times a day  piperacillin/tazobactam IVPB.. 3.375 Gram(s) IV Intermittent every 8 hours  potassium chloride    Tablet ER 20 milliEquivalent(s) Oral every 2 hours    MEDICATIONS  (PRN):  acetaminophen     Tablet .. 650 milliGRAM(s) Oral every 6 hours PRN Temp greater or equal to 38C (100.4F)  aluminum hydroxide/magnesium hydroxide/simethicone Suspension 30 milliLiter(s) Oral every 4 hours PRN Dyspepsia  ibuprofen  Tablet. 400 milliGRAM(s) Oral every 6 hours PRN Mild Pain (1 - 3)  ibuprofen  Tablet. 600 milliGRAM(s) Oral every 6 hours PRN Moderate Pain (4 - 6)  melatonin 3 milliGRAM(s) Oral at bedtime PRN Insomnia    Social History:  Lives with wife for assists, Denies tobacco, alcohol or drug use. (22 Nov 2021 01:11)      FAMILY HISTORY:  Family history of multiple myeloma    Family history of malignant melanoma  age 89    FH: blood dyscrasia  MGUS    FH: hypertension  mother    FH: renal failure  mother    Vital Signs Last 24 Hrs  T(C): 36.5 (24 Nov 2021 13:08), Max: 36.7 (23 Nov 2021 20:54)  T(F): 97.7 (24 Nov 2021 13:08), Max: 98 (23 Nov 2021 20:54)  HR: 76 (24 Nov 2021 13:08) (76 - 85)  BP: 106/55 (24 Nov 2021 13:08) (106/55 - 134/75)  BP(mean): --  RR: 17 (24 Nov 2021 13:08) (16 - 17)  SpO2: 95% (24 Nov 2021 13:08) (95% - 98%)    PHYSICAL EXAM:  Vascular: DP & PT palpable bilaterally, Capillary refill 3 seconds, Digital hair present bilaterally. +2 pitting edema of the left foot and ankle joint. Left midfoot is warmer to touch than the remainder of the foot, otherwise skin temp wnl b/l.  Neurological: Light touch sensation intact bilaterally.  Musculoskeletal: Pain on palpation at the left dorsal midfoot, specifically the proximal 3rd and 4th metatarsal shafts. Pain with AROM/PROM of the left ankle and subtalar joint, with decreased ROM of the toes and ankle.  Dermatological: No open wounds or lesions. Left dorsal midfoot erythema.

## 2021-11-24 NOTE — PROGRESS NOTE ADULT - ASSESSMENT
Plan for left foot bone and soft tissue biopsy scheduled for Fri 11/26/21 at 4:30PM with Dr. Ruby  -Requesting medical risk stratification and optimization  -Requesting cardiology risk stratification and optimization  -Please obtain pre-op labs (CBC, BMP, type & screen, coags on AM of surgery)  -Please keep pt NPO at midnight prior to surgery and start IV fluids  -Please hold anti-coags AM of surgery

## 2021-11-24 NOTE — PROGRESS NOTE ADULT - ASSESSMENT
Patient is a 66 year old male with PMH of CLL on Venetoclax, MDS, Melanoma, Paroxysmal AFib (not on AC), HTN, HLD, came in for increased left foot pain and found with fever in ER.  Febrile in .5, pt asymptomatic. Febrile to 103F 11/22 afternoon -- afebrile since   Leukocytosis overall dec since Oct 2021, also with h/o CLL - wbc normalized   L foot fracture with swelling, pain with very slight erythema, clinically not c/w cellulitis.   MRI L foot reviewed as above, soft tissue swelling and enhancement, rim enhancing fluid collection dorsal to middle cuneiform extending over base of 2nd metatarsal and small collection subjacent to middle cuneiform ?hematoma vs abscess. Multiple signal abnormalities as above - ddx charcot arthropathy, nondisplaced/stress fx, r/o infection, malignancy    Recommendations:  Blood cultures NGTD x2  Podiatry following, planning for bone biopsy/culture   - d/w patient and wife, they agree for above, d/w podiatry   Continue on pip-tazo  Podiatry and Ortho following  Monitor temps/cbc Patient is a 66 year old male with PMH of CLL on Venetoclax, MDS, Melanoma, Paroxysmal AFib (not on AC), HTN, HLD, came in for increased left foot pain and found with fever in ER.  Febrile in .5 and then fever 103F 11/22 afternoon -- afebrile since   Leukocytosis, h/o CLL - wbc normalized   L foot fracture with swelling, pain with very slight erythema, clinically not c/w cellulitis.   MRI L foot reviewed as above, soft tissue swelling and enhancement, rim enhancing fluid collection dorsal to middle cuneiform extending over base of 2nd metatarsal and small collection subjacent to middle cuneiform ?hematoma vs abscess. Multiple signal abnormalities as above - ddx charcot arthropathy, nondisplaced/stress fx, r/o infection, malignancy    Recommendations:  Blood cultures NGTD x2  Podiatry following, planning for bone biopsy and surrounding soft tissue pathology, send cultures   - would aspirate/drain collections seen on MRI if possible- send for cultures   - d/w above with patient and wife, they agree, d/w podiatry resident  Continue on pip-tazo  Podiatry and Ortho following  Monitor temps/cbc    Dr. Coker will be covering 11/25-11/28.

## 2021-11-24 NOTE — PROGRESS NOTE ADULT - SUBJECTIVE AND OBJECTIVE BOX
[facility], Division of Infectious Diseases  ROS [address] Patel, [address]  [phone]  ([phone] - weekdays after 5pm and weekends)    Name: SARAHI BARNHART  Age/Gender: 66y Male  MRN: [id number]    Interval History:  Patient seen this morning.   States he feels L foot pain is less today.   Denies fever, chills, or any new complaints.  Notes reviewed.No concerning overnight events  Afebrile. D/w wife over the phone.     Allergies: No Known Allergies    Objective:  Vitals:   T(F): 98 (11-24-21 @ 05:23), Max: 98 (11-23-21 @ 20:54)  HR: 77 (11-24-21 @ 05:23) (74 - 85)  BP: 134/75 (11-24-21 @ 05:23) (111/67 - 134/75)  RR: 16 (11-24-21 @ 05:23) (16 - 16)  SpO2: 98% (11-24-21 @ 05:23) (96% - 98%)  Physical Examination:  General: no acute distress  HEENT: NC/AT, anicteric, neck supple  Respiratory: no acc muscle use, breathing comfortably  Cardiovascular: S1 and S2 present  Gastrointestinal: normal appearing, nondistended  Extremities: L foot in boot, no cyanosis  Skin: no visible rash    Laboratory Studies:  CBC:                       8.9    9.07  )-----------( 299      ( 24 Nov 2021 09:31 )             27.0     WBC Trend:  9.07 11-24-21 @ 09:31  10.50 11-23-21 @ 07:32  13.22 11-22-21 @ 10:49  11.89 11-21-21 @ 20:59    CMP: 11-24    140  |  104  |  12  ----------------------------<  119<H>  3.3<L>   |  29  |  1.00    Ca    8.9      24 Nov 2021 09:31    TPro  7.2  /  Alb  2.3<L>  /  TBili  0.8  /  DBili  x   /  AST  12<L>  /  ALT  22  /  AlkPhos  108  11-24    LIVER FUNCTIONS - ( 24 Nov 2021 09:31 )  Alb: 2.3 g/dL / Pro: 7.2 g/dL / ALK PHOS: 108 U/L / ALT: 22 U/L / AST: 12 U/L / GGT: x           Microbiology: reviewed   Culture - Urine (collected 11-22-21 @ 04:22)  Source: Clean Catch Clean Catch (Midstream)  Final Report (11-22-21 @ 23:09):    No growth    Culture - Blood (collected 11-22-21 @ 04:20)  Source: .Blood Blood-Peripheral  Preliminary Report (11-23-21 @ 05:01):    No growth to date.    Culture - Blood (collected 11-22-21 @ 04:20)  Source: .Blood Blood-Peripheral  Preliminary Report (11-23-21 @ 05:01):    No growth to date.    Radiology: reviewed   MR Foot w/wo IV Cont, Left (11.23.21 @ 13:44) >IMPRESSION: 1.  Soft tissue swelling and enhancement, nonspecific finding that can be seen with cellulitis in the proper clinical setting. 2.  Focal fluid noted dorsal and plantar to the middle cuneiform. Possibly or small hematomas. Superimposed infection can have a similar appearance. 3.  Multiple areas of signal abnormality throughout the midfoot including the medial, middle, and lateral cuneiforms and the base of the second and third metatarsals. Differential considerations include charcot arthropathy, nondisplaced fractures and/or infection. Correlate clinically. CT can be performed for better assessment of the bony mineralization and fracture.    Medications:  acetaminophen     Tablet .. 650 milliGRAM(s) Oral every 6 hours PRN  aluminum hydroxide/magnesium hydroxide/simethicone Suspension 30 milliLiter(s) Oral every 4 hours PRN  aspirin  chewable 81 milliGRAM(s) Oral daily  atorvastatin 80 milliGRAM(s) Oral at bedtime  enoxaparin Injectable 40 milliGRAM(s) SubCutaneous daily  famotidine    Tablet 20 milliGRAM(s) Oral daily  folic acid 1 milliGRAM(s) Oral daily  ibuprofen  Tablet. 400 milliGRAM(s) Oral every 6 hours PRN  ibuprofen  Tablet. 600 milliGRAM(s) Oral every 6 hours PRN  lactobacillus acidophilus 1 Tablet(s) Oral three times a day  melatonin 3 milliGRAM(s) Oral at bedtime PRN  piperacillin/tazobactam IVPB.. 3.375 Gram(s) IV Intermittent every 8 hours    Antimicrobials:  piperacillin/tazobactam IVPB.. 3.375 Gram(s) IV Intermittent every 8 hours

## 2021-11-25 ENCOUNTER — TRANSCRIPTION ENCOUNTER (OUTPATIENT)
Age: 66
End: 2021-11-25

## 2021-11-25 LAB
ANION GAP SERPL CALC-SCNC: 3 MMOL/L — LOW (ref 5–17)
BUN SERPL-MCNC: 16 MG/DL — SIGNIFICANT CHANGE UP (ref 7–23)
CALCIUM SERPL-MCNC: 9.3 MG/DL — SIGNIFICANT CHANGE UP (ref 8.5–10.1)
CHLORIDE SERPL-SCNC: 109 MMOL/L — HIGH (ref 96–108)
CO2 SERPL-SCNC: 30 MMOL/L — SIGNIFICANT CHANGE UP (ref 22–31)
CREAT SERPL-MCNC: 0.9 MG/DL — SIGNIFICANT CHANGE UP (ref 0.5–1.3)
GLUCOSE SERPL-MCNC: 106 MG/DL — HIGH (ref 70–99)
HCT VFR BLD CALC: 27.5 % — LOW (ref 39–50)
HGB BLD-MCNC: 8.6 G/DL — LOW (ref 13–17)
MCHC RBC-ENTMCNC: 31.3 GM/DL — LOW (ref 32–36)
MCHC RBC-ENTMCNC: 33.1 PG — SIGNIFICANT CHANGE UP (ref 27–34)
MCV RBC AUTO: 105.8 FL — HIGH (ref 80–100)
NRBC # BLD: 0 /100 WBCS — SIGNIFICANT CHANGE UP (ref 0–0)
PLATELET # BLD AUTO: 327 K/UL — SIGNIFICANT CHANGE UP (ref 150–400)
POTASSIUM SERPL-MCNC: 4.3 MMOL/L — SIGNIFICANT CHANGE UP (ref 3.5–5.3)
POTASSIUM SERPL-SCNC: 4.3 MMOL/L — SIGNIFICANT CHANGE UP (ref 3.5–5.3)
RBC # BLD: 2.6 M/UL — LOW (ref 4.2–5.8)
RBC # FLD: 16.5 % — HIGH (ref 10.3–14.5)
SARS-COV-2 RNA SPEC QL NAA+PROBE: SIGNIFICANT CHANGE UP
SODIUM SERPL-SCNC: 142 MMOL/L — SIGNIFICANT CHANGE UP (ref 135–145)
WBC # BLD: 8.75 K/UL — SIGNIFICANT CHANGE UP (ref 3.8–10.5)
WBC # FLD AUTO: 8.75 K/UL — SIGNIFICANT CHANGE UP (ref 3.8–10.5)

## 2021-11-25 RX ORDER — SODIUM CHLORIDE 9 MG/ML
1000 INJECTION INTRAMUSCULAR; INTRAVENOUS; SUBCUTANEOUS
Refills: 0 | Status: DISCONTINUED | OUTPATIENT
Start: 2021-11-26 | End: 2021-11-26

## 2021-11-25 RX ADMIN — PIPERACILLIN AND TAZOBACTAM 25 GRAM(S): 4; .5 INJECTION, POWDER, LYOPHILIZED, FOR SOLUTION INTRAVENOUS at 06:07

## 2021-11-25 RX ADMIN — PIPERACILLIN AND TAZOBACTAM 25 GRAM(S): 4; .5 INJECTION, POWDER, LYOPHILIZED, FOR SOLUTION INTRAVENOUS at 22:00

## 2021-11-25 RX ADMIN — Medication 1 TABLET(S): at 06:07

## 2021-11-25 RX ADMIN — Medication 1 TABLET(S): at 12:04

## 2021-11-25 RX ADMIN — Medication 81 MILLIGRAM(S): at 12:04

## 2021-11-25 RX ADMIN — ENOXAPARIN SODIUM 40 MILLIGRAM(S): 100 INJECTION SUBCUTANEOUS at 12:04

## 2021-11-25 RX ADMIN — Medication 3 MILLIGRAM(S): at 21:59

## 2021-11-25 RX ADMIN — FAMOTIDINE 20 MILLIGRAM(S): 10 INJECTION INTRAVENOUS at 12:04

## 2021-11-25 RX ADMIN — Medication 600 MILLIGRAM(S): at 21:59

## 2021-11-25 RX ADMIN — ATORVASTATIN CALCIUM 80 MILLIGRAM(S): 80 TABLET, FILM COATED ORAL at 21:59

## 2021-11-25 RX ADMIN — Medication 1 MILLIGRAM(S): at 12:04

## 2021-11-25 RX ADMIN — Medication 600 MILLIGRAM(S): at 20:29

## 2021-11-25 RX ADMIN — PIPERACILLIN AND TAZOBACTAM 25 GRAM(S): 4; .5 INJECTION, POWDER, LYOPHILIZED, FOR SOLUTION INTRAVENOUS at 15:03

## 2021-11-25 RX ADMIN — Medication 1 TABLET(S): at 21:59

## 2021-11-25 NOTE — PROGRESS NOTE ADULT - SUBJECTIVE AND OBJECTIVE BOX
[facility] DIVISION of INFECTIOUS DISEASE  Donny Coker MD PhD, Beverly Avitia MD, Cydney Pruitt MD, Liana Harden MD, Mp Huddleston MD  and providing coverage with Roberta Tam MD and Corin Espinal MD  Providing Infectious Disease Consultations at [facility], [facility]'s    Office# [phone] to schedule follow up appointments  Answering Service for urgent calls or New Consults [phone]  Cell# to text for urgent issues Donny Coker [phone]     infectious diseases progress note:    SARAHI BARNHART is a 66y y. o. Male patient    No concerning overnight events    Allergies    No Known Allergies    Intolerances        ANTIBIOTICS/RELEVANT:  antimicrobials  piperacillin/tazobactam IVPB.. 3.375 Gram(s) IV Intermittent every 8 hours    immunologic:    OTHER:  acetaminophen     Tablet .. 650 milliGRAM(s) Oral every 6 hours PRN  aluminum hydroxide/magnesium hydroxide/simethicone Suspension 30 milliLiter(s) Oral every 4 hours PRN  aspirin  chewable 81 milliGRAM(s) Oral daily  atorvastatin 80 milliGRAM(s) Oral at bedtime  enoxaparin Injectable 40 milliGRAM(s) SubCutaneous daily  famotidine    Tablet 20 milliGRAM(s) Oral daily  folic acid 1 milliGRAM(s) Oral daily  ibuprofen  Tablet. 400 milliGRAM(s) Oral every 6 hours PRN  ibuprofen  Tablet. 600 milliGRAM(s) Oral every 6 hours PRN  lactobacillus acidophilus 1 Tablet(s) Oral three times a day  melatonin 3 milliGRAM(s) Oral at bedtime PRN      Objective:  Vital Signs Last 24 Hrs  T(C): 36.7 (25 Nov 2021 05:10), Max: 36.7 (25 Nov 2021 05:10)  T(F): 98 (25 Nov 2021 05:10), Max: 98 (25 Nov 2021 05:10)  HR: 67 (25 Nov 2021 05:10) (67 - 76)  BP: 126/70 (25 Nov 2021 05:10) (106/55 - 126/70)  BP(mean): --  RR: 17 (25 Nov 2021 05:10) (17 - 17)  SpO2: 97% (25 Nov 2021 05:10) (95% - 97%)    T(C): 36.7 (11-25-21 @ 05:10), Max: 36.7 (11-23-21 @ 20:54)  T(C): 36.7 (11-25-21 @ 05:10), Max: 39.4 (11-22-21 @ 14:25)  T(C): 36.7 (11-25-21 @ 05:10), Max: 39.7 (11-21-21 @ 20:51)    PHYSICAL EXAM:  HEENT: NC atraumatic  Neck: supple  Respiratory: no accessory muscle use, breathing comfortably  Cardiovascular: distant  Gastrointestinal: normal appearing, nondistended  Extremities: no clubbing, no cyanosis, foot is wrapped        LABS:                          8.6    8.75  )-----------( 327      ( 25 Nov 2021 07:45 )             27.5       8.75 11-25 @ 07:45  9.07 11-24 @ 09:31  10.50 11-23 @ 07:32  13.22 11-22 @ 10:49  11.89 11-21 @ 20:59      11-25    142  |  109<H>  |  16  ----------------------------<  106<H>  4.3   |  30  |  0.90    Ca    9.3      25 Nov 2021 07:45    TPro  7.2  /  Alb  2.3<L>  /  TBili  0.8  /  DBili  x   /  AST  12<L>  /  ALT  22  /  AlkPhos  108  11-24      Creatinine, Serum: 0.90 mg/dL (11-25-21 @ 07:45)  Creatinine, Serum: 1.00 mg/dL (11-24-21 @ 09:31)  Creatinine, Serum: 0.93 mg/dL (11-23-21 @ 07:32)  Creatinine, Serum: 1.10 mg/dL (11-21-21 @ 20:59)                INFLAMMATORY MARKERS  Auto Neutrophil #: 6.14 K/uL (11-23-21 @ 07:32)  Auto Lymphocyte #: 0.49 K/uL (11-23-21 @ 07:32)  Auto Neutrophil #: 8.32 K/uL (11-21-21 @ 20:59)  Auto Lymphocyte #: 0.48 K/uL (11-21-21 @ 20:59)    Lactate, Blood: 0.7 mmol/L (11-21-21 @ 23:46)    Auto Eosinophil #: 0.07 K/uL (11-23-21 @ 07:32)  Auto Eosinophil #: 0.00 K/uL (11-21-21 @ 20:59)                  Ferritin, Serum: 781 ng/mL (11-22-21 @ 09:13)        Activated Partial Thromboplastin Time: 33.8 sec (11-21-21 @ 20:59)  INR: 1.31 ratio (11-21-21 @ 20:59)    D-Dimer Assay, Quantitative: 574 ng/mL DDU (11-22-21 @ 03:43)        MICROBIOLOGY:      RADIOLOGY & ADDITIONAL STUDIES:  < from: CT 3D Reconstruct w/o Workstation (11.24.21 @ 08:33) >    EXAM:  CT 3D RECONSTRUCT [facility]                          EXAM:  CT FOOT ONLY LT                            PROCEDURE DATE:  11/24/2021          INTERPRETATION:  CT FOOT LEFT, CT 3D RECONSTRUCTION WO WORKSTATION dated 11/24/2021 8:33 AM    INDICATION: Foot pain and swelling.    COMPARISON: MRI the foot dated 11/23/2021    TECHNIQUE: CT imaging of the left foot and ankle was performed. The data was reformatted in the axial, coronal, and sagittal planes. Additionally, 3-D reformatted imagingwas created.    FINDINGS: Motion degrades portion of the study.    OSSEOUS STRUCTURES: There is hypodensity erosion involving the medial cuneiform and the base of the second metatarsal. Additional small erosions are seen along the lateral margin of the medial cuneiform and the medial margin of the lateral cuneiform. The erosion in the medial cuneiform measures approximately 1 x 0.7 cm. There is mild erosion along the distal lateral navicula and a small portion of the superior cuboid. No aden dislocation is seen. The tibiotalar and posterior subtalar joints are preserved. The metatarsophalangeal joints are intact. Mild productive changes at the first metatarsophalangeal joint. Motion artifact degrades evaluation of the digits.  SYNOVIUM/ JOINT FLUID: Small to moderate fluid throughout the midfoot joints. This fluid appears most confluent along the superficial and plantar aspects of the midfoot focus at the middle cuneiform.  TENDONS: The tendons are intact  MUSCLES: No intramuscular hematoma.  NEUROVASCULAR STRUCTURES: Preserved  SUBCUTANEOUS SOFT TISSUES: There is soft tissue swelling about the midfoot. Small fluid collections dorsal and plantar to the middle cuneiform and base of the second metatarsal are better appreciated on the recent MRI.    3-D reformatted imaging confirms these findings.    IMPRESSION:    1.  Multiple erosions throughout the midfoot most significant at the middle cuneiform where large erosion affects the distal portion the cuneiform and the base of the second metatarsal. Findings are nonspecific and differential includes acute Charcot changes, osteomyelitis, and/or osteonecrosis. Given the multiplicity of involvement of the midfoot without skin wound, acute Charcot arthropathy is favored. Aspiration can be performed.  2.  Small fluid collections dorsal and plantar to the middle cuneiform and the base of the second metatarsal are better appreciated on recent MRI. Collections are nonspecific and may reflect hemorrhage and/or infection. If warranted, aspiration can be performed.

## 2021-11-25 NOTE — PROGRESS NOTE ADULT - SUBJECTIVE AND OBJECTIVE BOX
Patient is a 66y old  Male who presents with a chief complaint of fever (24 Nov 2021 13:06)  feels well  no left foot pain at rest      INTERVAL HPI/OVERNIGHT EVENTS:  T(C): 37 (11-25-21 @ 13:11), Max: 37 (11-25-21 @ 13:11)  HR: 77 (11-25-21 @ 13:11) (67 - 77)  BP: 121/74 (11-25-21 @ 13:11) (116/70 - 126/70)  RR: 17 (11-25-21 @ 13:11) (17 - 17)  SpO2: 97% (11-25-21 @ 13:11) (95% - 97%)  Wt(kg): --  I&O's Summary    24 Nov 2021 07:01  -  25 Nov 2021 07:00  --------------------------------------------------------  IN: 100 mL / OUT: 0 mL / NET: 100 mL        LABS:                        8.6    8.75  )-----------( 327      ( 25 Nov 2021 07:45 )             27.5     11-25    142  |  109<H>  |  16  ----------------------------<  106<H>  4.3   |  30  |  0.90    Ca    9.3      25 Nov 2021 07:45    TPro  7.2  /  Alb  2.3<L>  /  TBili  0.8  /  DBili  x   /  AST  12<L>  /  ALT  22  /  AlkPhos  108  11-24        CAPILLARY BLOOD GLUCOSE                MEDICATIONS  (STANDING):  atorvastatin 80 milliGRAM(s) Oral at bedtime  famotidine    Tablet 20 milliGRAM(s) Oral daily  folic acid 1 milliGRAM(s) Oral daily  lactobacillus acidophilus 1 Tablet(s) Oral three times a day  piperacillin/tazobactam IVPB.. 3.375 Gram(s) IV Intermittent every 8 hours    MEDICATIONS  (PRN):  acetaminophen     Tablet .. 650 milliGRAM(s) Oral every 6 hours PRN Temp greater or equal to 38C (100.4F)  aluminum hydroxide/magnesium hydroxide/simethicone Suspension 30 milliLiter(s) Oral every 4 hours PRN Dyspepsia  ibuprofen  Tablet. 400 milliGRAM(s) Oral every 6 hours PRN Mild Pain (1 - 3)  ibuprofen  Tablet. 600 milliGRAM(s) Oral every 6 hours PRN Moderate Pain (4 - 6)  melatonin 3 milliGRAM(s) Oral at bedtime PRN Insomnia      REVIEW OF SYSTEMS:  CONSTITUTIONAL: No fever, weight loss, or fatigue  EYES: No eye pain, visual disturbances, or discharge  ENMT:  No difficulty hearing, tinnitus, vertigo; No sinus or throat pain  NECK: No pain or stiffness  RESPIRATORY: No cough, wheezing, chills or hemoptysis; No shortness of breath  CARDIOVASCULAR: No chest pain, palpitations, dizziness, or leg swelling  GASTROINTESTINAL: No abdominal or epigastric pain. No nausea, vomiting, or hematemesis; No diarrhea or constipation. No melena or hematochezia.  GENITOURINARY: No dysuria, frequency, hematuria, or incontinence  NEUROLOGICAL: No headaches, memory loss, loss of strength, numbness, or tremors  SKIN: No itching, burning, rashes, or lesions   LYMPH NODES: No enlarged glands  ENDOCRINE: No heat or cold intolerance; No hair loss  MUSCULOSKELETAL: left foot pain  PSYCHIATRIC: No depression, anxiety, mood swings, or difficulty sleeping  HEME/LYMPH: No easy bruising, or bleeding gums  ALLERY AND IMMUNOLOGIC: No hives or eczema    RADIOLOGY & ADDITIONAL TESTS:    Imaging Personally Reviewed:  [x ] YES  [ ] NO    Consultant(s) Notes Reviewed:  [x YES  [ ] NO    PHYSICAL EXAM:  GENERAL: NAD, well-groomed, well-developed  HEAD:  Atraumatic, Normocephalic  EYES: EOMI, PERRLA, conjunctiva and sclera clear  ENMT: No tonsillar erythema, exudates, or enlargement; Moist mucous membranes, Good dentition, No lesions  NECK: Supple, No JVD, Normal thyroid  NERVOUS SYSTEM:  Alert & Oriented X3, Good concentration; Motor Strength 5/5 B/L upper and lower extremities; DTRs 2+ intact and symmetric  CHEST/LUNG: Clear to percussion bilaterally; No rales, rhonchi, wheezing, or rubs  HEART: Regular rate and rhythm; No murmurs, rubs, or gallops  ABDOMEN: Soft, Nontender, Nondistended; Bowel sounds present  EXTREMITIES:  2+ Peripheral Pulses, No clubbing, cyanosis, or edema, left foot dressing intact, no lymphangitic streaking  LYMPH: No lymphadenopathy noted  SKIN: No rashes or lesions    Care Discussed with Consultants/Other Providers [x ] YES  [ ] NO    advance care planning and advance directives discussed, including but not limited to long term care plans [x]YES   [ ]NO

## 2021-11-25 NOTE — PROGRESS NOTE ADULT - ASSESSMENT
Patient is a 66 year old male with PMH of CLL on Venetoclax, MDS, Melanoma, Paroxysmal AFib (not on AC), HTN, HLD, came in for increased left foot pain and found with fever in ER.  Febrile in .5 and then fever 103F 11/22 afternoon -- afebrile since   Leukocytosis, h/o CLL - wbc normalized   L foot fracture with swelling, pain with very slight erythema, clinically not c/w cellulitis.   MRI L foot reviewed as above, soft tissue swelling and enhancement, rim enhancing fluid collection dorsal to middle cuneiform extending over base of 2nd metatarsal and small collection subjacent to middle cuneiform ?hematoma vs abscess. Multiple signal abnormalities as above - ddx charcot arthropathy, nondisplaced/stress fx, r/o infection, malignancy    Recommendations:  Blood cultures NGTD x2  Podiatry following, planning for bone biopsy and surrounding soft tissue pathology  -plan for OR Friday  Continue on pip-tazo for now pending micro and other data to guide us  Podiatry and Ortho following  Monitor temps/cbc    Dr. Coker will be covering 11/25-11/28.

## 2021-11-25 NOTE — PROGRESS NOTE ADULT - ASSESSMENT
65 yo male with history of CLL and MDS overlap syndrome, most recently treated with Gazyva X6 months/Venetoclax which was stopped in October 2021 2 months short of 1 year full therapy when he was admitted for autoimmune encephalitis which was treated with IVIG and steroids; unfortunately he suffered left foot fracture likely from prolonged steroid use  Patient presented on 11/22/21 with pain in his foot and noted to have fever 103; afebrile since    - Blood cxx NGTD  - Bone bx planned by podiatry to eval for osteo later this week  - continue antibiotics as per medicine and infectious diseases  - macrocytic anemia likely due to underlying MDS; iron studies c/w inflammation; will monitor CBC  - patient due for IVIG on 11/29/21 in office; if he is still inpatient, would consider infusion of prophylactic IVIG at that time   - will follow

## 2021-11-25 NOTE — PROGRESS NOTE ADULT - SUBJECTIVE AND OBJECTIVE BOX
Neurology follow up note    SARAHI [id number]      Interval History:    Patient feels ok no new complaints.    Allergies    No Known Allergies    Intolerances        MEDICATIONS    acetaminophen     Tablet .. 650 milliGRAM(s) Oral every 6 hours PRN  aluminum hydroxide/magnesium hydroxide/simethicone Suspension 30 milliLiter(s) Oral every 4 hours PRN  aspirin  chewable 81 milliGRAM(s) Oral daily  atorvastatin 80 milliGRAM(s) Oral at bedtime  enoxaparin Injectable 40 milliGRAM(s) SubCutaneous daily  famotidine    Tablet 20 milliGRAM(s) Oral daily  folic acid 1 milliGRAM(s) Oral daily  ibuprofen  Tablet. 400 milliGRAM(s) Oral every 6 hours PRN  ibuprofen  Tablet. 600 milliGRAM(s) Oral every 6 hours PRN  lactobacillus acidophilus 1 Tablet(s) Oral three times a day  melatonin 3 milliGRAM(s) Oral at bedtime PRN  piperacillin/tazobactam IVPB.. 3.375 Gram(s) IV Intermittent every 8 hours              Vital Signs Last 24 Hrs  T(C): 36.7 (25 Nov 2021 05:10), Max: 36.7 (25 Nov 2021 05:10)  T(F): 98 (25 Nov 2021 05:10), Max: 98 (25 Nov 2021 05:10)  HR: 67 (25 Nov 2021 05:10) (67 - 76)  BP: 126/70 (25 Nov 2021 05:10) (106/55 - 126/70)  BP(mean): --  RR: 17 (25 Nov 2021 05:10) (17 - 17)  SpO2: 97% (25 Nov 2021 05:10) (95% - 97%)    REVIEW OF SYSTEMS:  Constitutional:  The patient denies fever, chills, or night sweats.  Head:  No headache.  Eyes:  No double vision or blurry vision.  Ears:  No ringing in the ears.  Neck:  No neck pain.  Respiratory:  No shortness of breath.  Cardiovascular:  No chest pain.  Abdomen:  No nausea, vomiting, or abdominal pain.  Extremities/Neurological:  No numbness or tingling.  Musculoskeletal:  Positive pain in his left foot.    PHYSICAL EXAMINATION:   HEENT:  Head:  Normocephalic, atraumatic.  Eyes:  No scleral icterus.  Ears:  Hearing bilaterally intact.  NECK:  Supple.  RESPIRATORY:  Good air entry bilaterally.  CARDIOVASCULAR:  S1 and S2 heard.  ABDOMEN:  Soft and nontender.  EXTREMITIES:  No clubbing or cyanosis was noted.      NEUROLOGIC:  The patient is awake and alert.  Location was hospital, year was 2021, month was November.  Five nickels equal a quarter.  Could not say dog backward is spelled as God.  Was able to name simple objects.  Recall was 1/3 without prompting, remained 1/3 with prompting, was able to name simple objects.  Follows complex commands.  Extraocular movements were intact.  Speech was fluent.  Smile was symmetric.  Motor:  Bilateral upper and lower were 4/5.  Sensory:  Bilateral upper and lower intact to light touch.              LABS:  CBC Full  -  ( 24 Nov 2021 09:31 )  WBC Count : 9.07 K/uL  RBC Count : 2.60 M/uL  Hemoglobin : 8.9 g/dL  Hematocrit : 27.0 %  Platelet Count - Automated : 299 K/uL  Mean Cell Volume : 103.8 fl  Mean Cell Hemoglobin : 34.2 pg  Mean Cell Hemoglobin Concentration : 33.0 gm/dL  Auto Neutrophil # : x  Auto Lymphocyte # : x  Auto Monocyte # : x  Auto Eosinophil # : x  Auto Basophil # : x  Auto Neutrophil % : x  Auto Lymphocyte % : x  Auto Monocyte % : x  Auto Eosinophil % : x  Auto Basophil % : x      11-24    140  |  104  |  12  ----------------------------<  119<H>  3.3<L>   |  29  |  1.00    Ca    8.9      24 Nov 2021 09:31    TPro  7.2  /  Alb  2.3<L>  /  TBili  0.8  /  DBili  x   /  AST  12<L>  /  ALT  22  /  AlkPhos  108  11-24    Hemoglobin A1C:     LIVER FUNCTIONS - ( 24 Nov 2021 09:31 )  Alb: 2.3 g/dL / Pro: 7.2 g/dL / ALK PHOS: 108 U/L / ALT: 22 U/L / AST: 12 U/L / GGT: x           Vitamin B12         RADIOLOGY    ANALYSIS AND PLAN:  This is a 66-year-old with an episode of altered mental status, history of autoimmune encephalitis.  For episode of altered mental status, generalized weakness, and tremors, as per my conversation with the spouse, this occurred when the patient was having fevers, suspect most likely metabolic.  Infection workup as needed.  For history of hypertension, continue the patient to monitor systolic blood pressure.  For history of hyperlipidemia, statin as needed.  I do not see any clear signs to suggest reactivation of the patient's autoimmune encephalitis at present.      Spoke with the spouse, Javier, at [phone], alternate number is [phone].    Greater than 40 minutes of time was spent with the patient, plan of care, reviewing data, speaking to the multidisciplinary healthcare team with greater than 50% of that time in counseling and care coordination.    Thank you for the courtesy of this consultation.   Neurology follow up note    SARAHI [id number]      Interval History:    Patient feels ok no new complaints.    Allergies    No Known Allergies    Intolerances        MEDICATIONS    acetaminophen     Tablet .. 650 milliGRAM(s) Oral every 6 hours PRN  aluminum hydroxide/magnesium hydroxide/simethicone Suspension 30 milliLiter(s) Oral every 4 hours PRN  aspirin  chewable 81 milliGRAM(s) Oral daily  atorvastatin 80 milliGRAM(s) Oral at bedtime  enoxaparin Injectable 40 milliGRAM(s) SubCutaneous daily  famotidine    Tablet 20 milliGRAM(s) Oral daily  folic acid 1 milliGRAM(s) Oral daily  ibuprofen  Tablet. 400 milliGRAM(s) Oral every 6 hours PRN  ibuprofen  Tablet. 600 milliGRAM(s) Oral every 6 hours PRN  lactobacillus acidophilus 1 Tablet(s) Oral three times a day  melatonin 3 milliGRAM(s) Oral at bedtime PRN  piperacillin/tazobactam IVPB.. 3.375 Gram(s) IV Intermittent every 8 hours              Vital Signs Last 24 Hrs  T(C): 36.7 (25 Nov 2021 05:10), Max: 36.7 (25 Nov 2021 05:10)  T(F): 98 (25 Nov 2021 05:10), Max: 98 (25 Nov 2021 05:10)  HR: 67 (25 Nov 2021 05:10) (67 - 76)  BP: 126/70 (25 Nov 2021 05:10) (106/55 - 126/70)  BP(mean): --  RR: 17 (25 Nov 2021 05:10) (17 - 17)  SpO2: 97% (25 Nov 2021 05:10) (95% - 97%)    REVIEW OF SYSTEMS:  Constitutional:  The patient denies fever, chills, or night sweats.  Head:  No headache.  Eyes:  No double vision or blurry vision.  Ears:  No ringing in the ears.  Neck:  No neck pain.  Respiratory:  No shortness of breath.  Cardiovascular:  No chest pain.  Abdomen:  No nausea, vomiting, or abdominal pain.  Extremities/Neurological:  No numbness or tingling.  Musculoskeletal:  Positive pain in his left foot.    PHYSICAL EXAMINATION:   HEENT:  Head:  Normocephalic, atraumatic.  Eyes:  No scleral icterus.  Ears:  Hearing bilaterally intact.  NECK:  Supple.  RESPIRATORY:  Good air entry bilaterally.  CARDIOVASCULAR:  S1 and S2 heard.  ABDOMEN:  Soft and nontender.  EXTREMITIES:  No clubbing or cyanosis was noted.      NEUROLOGIC:  The patient is awake and alert.  Location was hospital, year was 2021, month was November.  Five nickels equal a quarter.  Could not say dog backward is spelled as God.  Was able to name simple objects.  Recall was 1/3 without prompting, remained 1/3 with prompting, was able to name simple objects.  Follows complex commands.  Extraocular movements were intact.  Speech was fluent.  Smile was symmetric.  Motor:  Bilateral upper and lower were 4/5.  Sensory:  Bilateral upper and lower intact to light touch.              LABS:  CBC Full  -  ( 24 Nov 2021 09:31 )  WBC Count : 9.07 K/uL  RBC Count : 2.60 M/uL  Hemoglobin : 8.9 g/dL  Hematocrit : 27.0 %  Platelet Count - Automated : 299 K/uL  Mean Cell Volume : 103.8 fl  Mean Cell Hemoglobin : 34.2 pg  Mean Cell Hemoglobin Concentration : 33.0 gm/dL  Auto Neutrophil # : x  Auto Lymphocyte # : x  Auto Monocyte # : x  Auto Eosinophil # : x  Auto Basophil # : x  Auto Neutrophil % : x  Auto Lymphocyte % : x  Auto Monocyte % : x  Auto Eosinophil % : x  Auto Basophil % : x      11-24    140  |  104  |  12  ----------------------------<  119<H>  3.3<L>   |  29  |  1.00    Ca    8.9      24 Nov 2021 09:31    TPro  7.2  /  Alb  2.3<L>  /  TBili  0.8  /  DBili  x   /  AST  12<L>  /  ALT  22  /  AlkPhos  108  11-24    Hemoglobin A1C:     LIVER FUNCTIONS - ( 24 Nov 2021 09:31 )  Alb: 2.3 g/dL / Pro: 7.2 g/dL / ALK PHOS: 108 U/L / ALT: 22 U/L / AST: 12 U/L / GGT: x           Vitamin B12         RADIOLOGY    ANALYSIS AND PLAN:  This is a 66-year-old with an episode of altered mental status, history of autoimmune encephalitis.  For episode of altered mental status, generalized weakness, and tremors, as per my conversation with the spouse, this occurred when the patient was having fevers, suspect most likely metabolic.  Infection workup as needed.  For history of hypertension, continue the patient to monitor systolic blood pressure.  For history of hyperlipidemia, statin as needed.  for biopsy as needed   I do not see any clear signs to suggest reactivation of the patient's autoimmune encephalitis at present.    Spoke with the spouse, Javier, at [phone], alternate number is [phone] 11/25    Greater than 27 minutes of time was spent with the patient, plan of care, reviewing data, speaking to the multidisciplinary healthcare team with greater than 50% of that time in counseling and care coordination.    Thank you for the courtesy of this consultation.

## 2021-11-25 NOTE — PROGRESS NOTE ADULT - SUBJECTIVE AND OBJECTIVE BOX
Patient seen and examined;  Chart reviewed and events noted;   feels overall well; awaiting surgical intervention of the left foot      MEDICATIONS  (STANDING):  aspirin  chewable 81 milliGRAM(s) Oral daily  atorvastatin 80 milliGRAM(s) Oral at bedtime  enoxaparin Injectable 40 milliGRAM(s) SubCutaneous daily  famotidine    Tablet 20 milliGRAM(s) Oral daily  folic acid 1 milliGRAM(s) Oral daily  lactobacillus acidophilus 1 Tablet(s) Oral three times a day  piperacillin/tazobactam IVPB.. 3.375 Gram(s) IV Intermittent every 8 hours    MEDICATIONS  (PRN):  acetaminophen     Tablet .. 650 milliGRAM(s) Oral every 6 hours PRN Temp greater or equal to 38C (100.4F)  aluminum hydroxide/magnesium hydroxide/simethicone Suspension 30 milliLiter(s) Oral every 4 hours PRN Dyspepsia  ibuprofen  Tablet. 400 milliGRAM(s) Oral every 6 hours PRN Mild Pain (1 - 3)  ibuprofen  Tablet. 600 milliGRAM(s) Oral every 6 hours PRN Moderate Pain (4 - 6)  melatonin 3 milliGRAM(s) Oral at bedtime PRN Insomnia      Vital Signs Last 24 Hrs  T(C): 36.7 (25 Nov 2021 05:10), Max: 36.7 (25 Nov 2021 05:10)  T(F): 98 (25 Nov 2021 05:10), Max: 98 (25 Nov 2021 05:10)  HR: 67 (25 Nov 2021 05:10) (67 - 76)  BP: 126/70 (25 Nov 2021 05:10) (106/55 - 126/70)  RR: 17 (25 Nov 2021 05:10) (17 - 17)  SpO2: 97% (25 Nov 2021 05:10) (95% - 97%)    PHYSICAL EXAM  General: adult in NAD  HEENT: clear oropharynx, anicteric sclera, pink conjunctivae  Neck: supple  CV: normal S1S2 with no murmur rubs or gallops  Lungs: clear to auscultation, no wheezes, no rhales  Abdomen: soft non-tender non-distended, no hepato/splenomegaly  Ext: left foot dressing C/D/I  Skin: no rashes and no petichiae  Neuro: alert and oriented X3 no focal deficits      LABS:                        8.6    8.75  )-----------( 327      ( 25 Nov 2021 07:45 )             27.5     Hemoglobin: 8.6 g/dL (11-25 @ 07:45)  Hemoglobin: 8.9 g/dL (11-24 @ 09:31)  Hemoglobin: 9.4 g/dL (11-23 @ 12:06)  Hemoglobin: 8.4 g/dL (11-23 @ 07:32)  Hemoglobin: 8.7 g/dL (11-22 @ 10:49)    11-25    142  |  109<H>  |  16  ----------------------------<  106<H>  4.3   |  30  |  0.90    Ca    9.3      25 Nov 2021 07:45    TPro  7.2  /  Alb  2.3<L>  /  TBili  0.8  /  DBili  x   /  AST  12<L>  /  ALT  22  /  AlkPhos  108  11-24

## 2021-11-26 ENCOUNTER — RESULT REVIEW (OUTPATIENT)
Age: 66
End: 2021-11-26

## 2021-11-26 LAB
ALBUMIN SERPL ELPH-MCNC: 2.4 G/DL — LOW (ref 3.3–5)
ALP SERPL-CCNC: 102 U/L — SIGNIFICANT CHANGE UP (ref 40–120)
ALT FLD-CCNC: 20 U/L — SIGNIFICANT CHANGE UP (ref 12–78)
ANION GAP SERPL CALC-SCNC: 4 MMOL/L — LOW (ref 5–17)
AST SERPL-CCNC: 12 U/L — LOW (ref 15–37)
BILIRUB SERPL-MCNC: 0.5 MG/DL — SIGNIFICANT CHANGE UP (ref 0.2–1.2)
BUN SERPL-MCNC: 17 MG/DL — SIGNIFICANT CHANGE UP (ref 7–23)
CALCIUM SERPL-MCNC: 8.8 MG/DL — SIGNIFICANT CHANGE UP (ref 8.5–10.1)
CHLORIDE SERPL-SCNC: 109 MMOL/L — HIGH (ref 96–108)
CO2 SERPL-SCNC: 29 MMOL/L — SIGNIFICANT CHANGE UP (ref 22–31)
CREAT SERPL-MCNC: 1.1 MG/DL — SIGNIFICANT CHANGE UP (ref 0.5–1.3)
GLUCOSE SERPL-MCNC: 99 MG/DL — SIGNIFICANT CHANGE UP (ref 70–99)
HCT VFR BLD CALC: 28.2 % — LOW (ref 39–50)
HGB BLD-MCNC: 8.9 G/DL — LOW (ref 13–17)
MCHC RBC-ENTMCNC: 31.6 GM/DL — LOW (ref 32–36)
MCHC RBC-ENTMCNC: 33.3 PG — SIGNIFICANT CHANGE UP (ref 27–34)
MCV RBC AUTO: 105.6 FL — HIGH (ref 80–100)
NRBC # BLD: 2 /100 WBCS — HIGH (ref 0–0)
PLATELET # BLD AUTO: 317 K/UL — SIGNIFICANT CHANGE UP (ref 150–400)
POTASSIUM SERPL-MCNC: 4.1 MMOL/L — SIGNIFICANT CHANGE UP (ref 3.5–5.3)
POTASSIUM SERPL-SCNC: 4.1 MMOL/L — SIGNIFICANT CHANGE UP (ref 3.5–5.3)
PROT SERPL-MCNC: 6.9 G/DL — SIGNIFICANT CHANGE UP (ref 6–8.3)
RBC # BLD: 2.67 M/UL — LOW (ref 4.2–5.8)
RBC # FLD: 16.6 % — HIGH (ref 10.3–14.5)
SODIUM SERPL-SCNC: 142 MMOL/L — SIGNIFICANT CHANGE UP (ref 135–145)
WBC # BLD: 8.27 K/UL — SIGNIFICANT CHANGE UP (ref 3.8–10.5)
WBC # FLD AUTO: 8.27 K/UL — SIGNIFICANT CHANGE UP (ref 3.8–10.5)

## 2021-11-26 PROCEDURE — 88108 CYTOPATH CONCENTRATE TECH: CPT | Mod: 26

## 2021-11-26 PROCEDURE — 99222 1ST HOSP IP/OBS MODERATE 55: CPT

## 2021-11-26 PROCEDURE — 88305 TISSUE EXAM BY PATHOLOGIST: CPT | Mod: 26

## 2021-11-26 PROCEDURE — 88304 TISSUE EXAM BY PATHOLOGIST: CPT | Mod: 26

## 2021-11-26 PROCEDURE — 88311 DECALCIFY TISSUE: CPT | Mod: 26

## 2021-11-26 PROCEDURE — 20225 BONE BIOPSY TROCAR/NDL DEEP: CPT

## 2021-11-26 PROCEDURE — 73630 X-RAY EXAM OF FOOT: CPT | Mod: 26,LT

## 2021-11-26 RX ORDER — LANOLIN ALCOHOL/MO/W.PET/CERES
3 CREAM (GRAM) TOPICAL AT BEDTIME
Refills: 0 | Status: DISCONTINUED | OUTPATIENT
Start: 2021-11-26 | End: 2021-12-01

## 2021-11-26 RX ORDER — FAMOTIDINE 10 MG/ML
20 INJECTION INTRAVENOUS DAILY
Refills: 0 | Status: DISCONTINUED | OUTPATIENT
Start: 2021-11-26 | End: 2021-12-01

## 2021-11-26 RX ORDER — ONDANSETRON 8 MG/1
4 TABLET, FILM COATED ORAL ONCE
Refills: 0 | Status: DISCONTINUED | OUTPATIENT
Start: 2021-11-26 | End: 2021-11-27

## 2021-11-26 RX ORDER — PIPERACILLIN AND TAZOBACTAM 4; .5 G/20ML; G/20ML
3.38 INJECTION, POWDER, LYOPHILIZED, FOR SOLUTION INTRAVENOUS EVERY 8 HOURS
Refills: 0 | Status: DISCONTINUED | OUTPATIENT
Start: 2021-11-26 | End: 2021-11-27

## 2021-11-26 RX ORDER — IBUPROFEN 200 MG
600 TABLET ORAL EVERY 6 HOURS
Refills: 0 | Status: DISCONTINUED | OUTPATIENT
Start: 2021-11-26 | End: 2021-12-01

## 2021-11-26 RX ORDER — ASPIRIN/CALCIUM CARB/MAGNESIUM 324 MG
81 TABLET ORAL DAILY
Refills: 0 | Status: DISCONTINUED | OUTPATIENT
Start: 2021-11-27 | End: 2021-12-01

## 2021-11-26 RX ORDER — SODIUM CHLORIDE 9 MG/ML
1000 INJECTION, SOLUTION INTRAVENOUS
Refills: 0 | Status: DISCONTINUED | OUTPATIENT
Start: 2021-11-26 | End: 2021-11-27

## 2021-11-26 RX ORDER — ATORVASTATIN CALCIUM 80 MG/1
80 TABLET, FILM COATED ORAL AT BEDTIME
Refills: 0 | Status: DISCONTINUED | OUTPATIENT
Start: 2021-11-26 | End: 2021-12-01

## 2021-11-26 RX ORDER — FENTANYL CITRATE 50 UG/ML
25 INJECTION INTRAVENOUS
Refills: 0 | Status: DISCONTINUED | OUTPATIENT
Start: 2021-11-26 | End: 2021-11-27

## 2021-11-26 RX ORDER — LACTOBACILLUS ACIDOPHILUS 100MM CELL
1 CAPSULE ORAL THREE TIMES A DAY
Refills: 0 | Status: DISCONTINUED | OUTPATIENT
Start: 2021-11-26 | End: 2021-11-27

## 2021-11-26 RX ORDER — ACETAMINOPHEN 500 MG
650 TABLET ORAL EVERY 6 HOURS
Refills: 0 | Status: DISCONTINUED | OUTPATIENT
Start: 2021-11-26 | End: 2021-11-27

## 2021-11-26 RX ORDER — FOLIC ACID 0.8 MG
1 TABLET ORAL DAILY
Refills: 0 | Status: DISCONTINUED | OUTPATIENT
Start: 2021-11-26 | End: 2021-12-01

## 2021-11-26 RX ORDER — ENOXAPARIN SODIUM 100 MG/ML
40 INJECTION SUBCUTANEOUS DAILY
Refills: 0 | Status: DISCONTINUED | OUTPATIENT
Start: 2021-11-27 | End: 2021-12-01

## 2021-11-26 RX ORDER — IBUPROFEN 200 MG
400 TABLET ORAL EVERY 6 HOURS
Refills: 0 | Status: DISCONTINUED | OUTPATIENT
Start: 2021-11-26 | End: 2021-12-01

## 2021-11-26 RX ADMIN — Medication 1 TABLET(S): at 13:07

## 2021-11-26 RX ADMIN — PIPERACILLIN AND TAZOBACTAM 25 GRAM(S): 4; .5 INJECTION, POWDER, LYOPHILIZED, FOR SOLUTION INTRAVENOUS at 05:51

## 2021-11-26 RX ADMIN — Medication 1 TABLET(S): at 21:16

## 2021-11-26 RX ADMIN — Medication 1 TABLET(S): at 05:51

## 2021-11-26 RX ADMIN — Medication 1 MILLIGRAM(S): at 13:07

## 2021-11-26 RX ADMIN — SODIUM CHLORIDE 50 MILLILITER(S): 9 INJECTION, SOLUTION INTRAVENOUS at 21:17

## 2021-11-26 RX ADMIN — PIPERACILLIN AND TAZOBACTAM 25 GRAM(S): 4; .5 INJECTION, POWDER, LYOPHILIZED, FOR SOLUTION INTRAVENOUS at 14:32

## 2021-11-26 RX ADMIN — ATORVASTATIN CALCIUM 80 MILLIGRAM(S): 80 TABLET, FILM COATED ORAL at 21:16

## 2021-11-26 RX ADMIN — SODIUM CHLORIDE 50 MILLILITER(S): 9 INJECTION INTRAMUSCULAR; INTRAVENOUS; SUBCUTANEOUS at 00:38

## 2021-11-26 RX ADMIN — PIPERACILLIN AND TAZOBACTAM 25 GRAM(S): 4; .5 INJECTION, POWDER, LYOPHILIZED, FOR SOLUTION INTRAVENOUS at 21:17

## 2021-11-26 RX ADMIN — FAMOTIDINE 20 MILLIGRAM(S): 10 INJECTION INTRAVENOUS at 13:07

## 2021-11-26 RX ADMIN — SODIUM CHLORIDE 50 MILLILITER(S): 9 INJECTION, SOLUTION INTRAVENOUS at 19:35

## 2021-11-26 NOTE — CONSULT NOTE ADULT - ASSESSMENT
65 yo male with history of CLL and MDS overlap syndrome, most recently treated with Gazyva X6 months/Venetoclax which was stopped in October 2021 2 months short of 1 year full therapy when he was admitted for autoimmune encephalitis which was treated with IVIG and steroids; unfortunately he suffered left foot fracture likely from prolonged steroid use  Patient presented on 11/22/21 with pain in foot admitted for foot pain bone bx planned by podiatry to eval for osteo today. Cardiology consulted for clearance.     ECHO 9/2021: Technically difficult and limited study, patient unable to cooperate. Normal left ventricular internal dimensions and systolic function, estimated LVEF of 60%. Grossly normal RV size and systolic function. Aortic valve is not well-visualized. Trace physiologic MR and TR. No significant pericardial effusion.  EKG NSR    PAF  - Patient with episode of afib on 7/2020 admission  - SR on tele during previous admission in 9/2021   - EKG NSR   - Echocardiogram 9/2021 unremarkable with EF of 60%  - continue aspirin    Bone biopsy  -     - Monitor and replete lytes, keep K>4, Mg>2.  - All other medical needs as per Neuro team  - Other cardiovascular workup will depend on clinical course.  - Will continue to follow. ***Charting in progress***  65 yo male with history of CLL and MDS overlap syndrome, most recently treated with Gazyva X6 months/Venetoclax which was stopped in October 2021 2 months short of 1 year full therapy when he was admitted for autoimmune encephalitis which was treated with IVIG and steroids; unfortunately he suffered left foot fracture likely from prolonged steroid use  Patient presented on 11/22/21 with pain in foot admitted for foot pain bone bx planned by podiatry to eval for osteo today. Cardiology consulted for clearance.     ECHO 9/2021: Technically difficult and limited study, patient unable to cooperate. Normal left ventricular internal dimensions and systolic function, estimated LVEF of 60%. Grossly normal RV size and systolic function. Aortic valve is not well-visualized. Trace physiologic MR and TR. No significant pericardial effusion.  EKG NSR    PAF  - Patient with episode of afib on 7/2020 admission  - SR on tele during previous admission in 9/2021   - EKG NSR   - Echocardiogram 9/2021 unremarkable with EF of 60%  - continue aspirin    Bone biopsy  -     - Monitor and replete lytes, keep K>4, Mg>2.  - All other medical needs as per Neuro team  - Other cardiovascular workup will depend on clinical course.  - Will continue to follow. 65 yo male with history of CLL and MDS overlap syndrome, most recently treated with Gazyva X6 months/Venetoclax, autoimmune encephalitis and left foot fracture now with pain in foot admitted for foot pain bone bx planned by podiatry to eval for osteo today. Cardiology consulted for clearance.     PAF  - Patient with episode of afib on 7/2020 admission  - SR on tele during previous admission in 9/2021   - EKG NSR   - Echocardiogram 9/2021 unremarkable with EF of 60%  - continue aspirin    Bone biopsy  - Plan for bone biopsy today   - Pt has no modifiable active cardiac risk factors and in the setting of low risk procedure, patient is optimized as best as possible from cardiovascular standpoint to proceed with planned procedure with routine hemodynamic monitoring.    - Monitor and replete lytes, keep K>4, Mg>2.  - All other medical needs as per Neuro team  - Other cardiovascular workup will depend on clinical course.  - Will continue to follow. 67 yo male with history of CLL and MDS overlap syndrome, most recently treated with Gazyva X6 months/Venetoclax, autoimmune encephalitis and left foot fracture now with pain in foot admitted for foot pain bone bx planned by podiatry to eval for osteo today. Cardiology consulted for pre-op eval.     PAF  - Patient with episode of afib on 7/2020 admission  - SR on tele during previous admission in 9/2021   - EKG NSR with PACs  - Echocardiogram 9/2021 unremarkable with EF of 60%  - continue aspirin    Bone biopsy  - Plan for bone biopsy today   - Pt has no modifiable active cardiac risk factors and in the setting of low risk procedure, patient is optimized as best as possible from cardiovascular standpoint to proceed with planned procedure with routine hemodynamic monitoring.    - Monitor and replete lytes, keep K>4, Mg>2.  - All other medical needs as per Neuro team  - Other cardiovascular workup will depend on clinical course.  - Will continue to follow.

## 2021-11-26 NOTE — PROGRESS NOTE ADULT - SUBJECTIVE AND OBJECTIVE BOX
Neurology follow up note    SARAHI [id number]      Interval History:    Patient feels ok no new complaints.    Allergies    No Known Allergies    Intolerances        MEDICATIONS    acetaminophen     Tablet .. 650 milliGRAM(s) Oral every 6 hours PRN  aluminum hydroxide/magnesium hydroxide/simethicone Suspension 30 milliLiter(s) Oral every 4 hours PRN  atorvastatin 80 milliGRAM(s) Oral at bedtime  famotidine    Tablet 20 milliGRAM(s) Oral daily  folic acid 1 milliGRAM(s) Oral daily  ibuprofen  Tablet. 400 milliGRAM(s) Oral every 6 hours PRN  ibuprofen  Tablet. 600 milliGRAM(s) Oral every 6 hours PRN  lactobacillus acidophilus 1 Tablet(s) Oral three times a day  melatonin 3 milliGRAM(s) Oral at bedtime PRN  piperacillin/tazobactam IVPB.. 3.375 Gram(s) IV Intermittent every 8 hours  sodium chloride 0.9%. 1000 milliLiter(s) IV Continuous <Continuous>              Vital Signs Last 24 Hrs  T(C): 36.2 (26 Nov 2021 04:33), Max: 37 (25 Nov 2021 13:11)  T(F): 97.2 (26 Nov 2021 04:33), Max: 98.6 (25 Nov 2021 13:11)  HR: 68 (26 Nov 2021 04:33) (68 - 81)  BP: 119/77 (26 Nov 2021 04:33) (117/72 - 121/74)  BP(mean): --  RR: 17 (26 Nov 2021 04:33) (17 - 17)  SpO2: 95% (26 Nov 2021 04:33) (94% - 97%)      REVIEW OF SYSTEMS:  Constitutional:  The patient denies fever, chills, or night sweats.  Head:  No headache.  Eyes:  No double vision or blurry vision.  Ears:  No ringing in the ears.  Neck:  No neck pain.  Respiratory:  No shortness of breath.  Cardiovascular:  No chest pain.  Abdomen:  No nausea, vomiting, or abdominal pain.  Extremities/Neurological:  No numbness or tingling.  Musculoskeletal:  Positive pain in his left foot.    PHYSICAL EXAMINATION:   HEENT:  Head:  Normocephalic, atraumatic.  Eyes:  No scleral icterus.  Ears:  Hearing bilaterally intact.  NECK:  Supple.  RESPIRATORY:  Good air entry bilaterally.  CARDIOVASCULAR:  S1 and S2 heard.  ABDOMEN:  Soft and nontender.  EXTREMITIES:  No clubbing or cyanosis was noted.      NEUROLOGIC:  The patient is awake and alert.  Location was hospital, year was 2021, month was November.  Was able to name simple objects.  Recall was 1/3 without prompting, remained 1/3 with prompting, was able to name simple objects.  Follows complex commands.  Extraocular movements were intact.  Speech was fluent.  Smile was symmetric.  Motor:  Bilateral upper and lower were 4/5.  Sensory:  Bilateral upper and lower intact to light touch.                   LABS:  CBC Full  -  ( 26 Nov 2021 05:40 )  WBC Count : 8.27 K/uL  RBC Count : 2.67 M/uL  Hemoglobin : 8.9 g/dL  Hematocrit : 28.2 %  Platelet Count - Automated : 317 K/uL  Mean Cell Volume : 105.6 fl  Mean Cell Hemoglobin : 33.3 pg  Mean Cell Hemoglobin Concentration : 31.6 gm/dL  Auto Neutrophil # : x  Auto Lymphocyte # : x  Auto Monocyte # : x  Auto Eosinophil # : x  Auto Basophil # : x  Auto Neutrophil % : x  Auto Lymphocyte % : x  Auto Monocyte % : x  Auto Eosinophil % : x  Auto Basophil % : x      11-26    142  |  109<H>  |  17  ----------------------------<  99  4.1   |  29  |  1.10    Ca    8.8      26 Nov 2021 05:40    TPro  6.9  /  Alb  2.4<L>  /  TBili  0.5  /  DBili  x   /  AST  12<L>  /  ALT  20  /  AlkPhos  102  11-26    Hemoglobin A1C:     LIVER FUNCTIONS - ( 26 Nov 2021 05:40 )  Alb: 2.4 g/dL / Pro: 6.9 g/dL / ALK PHOS: 102 U/L / ALT: 20 U/L / AST: 12 U/L / GGT: x           Vitamin B12         RADIOLOGY      ANALYSIS AND PLAN:  This is a 66-year-old with an episode of altered mental status, history of autoimmune encephalitis.  For episode of altered mental status, generalized weakness, and tremors, as per my conversation with the spouse, this occurred when the patient was having fevers, suspect most likely metabolic.  Infection workup as needed.  For history of hypertension, continue the patient to monitor systolic blood pressure.  For history of hyperlipidemia, statin as needed.  for biopsy as needed   I do not see any clear signs to suggest reactivation of the patient's autoimmune encephalitis at present.    Spoke with the spouse, Javier, at [phone], alternate number is [phone] 11/25    Greater than 22 minutes of time was spent with the patient, plan of care, reviewing data, speaking to the multidisciplinary healthcare team with greater than 50% of that time in counseling and care coordination.    Thank you for the courtesy of this consultation.

## 2021-11-26 NOTE — PROGRESS NOTE ADULT - SUBJECTIVE AND OBJECTIVE BOX
[facility] DIVISION of INFECTIOUS DISEASE  Donny Coker MD PhD, Beverly Avitia MD, Cydney Pruitt MD, Liana Harden MD, Mp Huddleston MD  and providing coverage with Roberta Tam MD and Corin Espinal MD  Providing Infectious Disease Consultations at [facility]    Office# [phone] to schedule follow up appointments  Answering Service for urgent calls or New Consults [phone]  Cell# to text for urgent issues Donny Coker [phone]     infectious diseases progress note:    SARAHI BARNHART is a 66y y. o. Male patient    No concerning overnight events    Allergies    No Known Allergies    Intolerances        ANTIBIOTICS/RELEVANT:  antimicrobials  piperacillin/tazobactam IVPB.. 3.375 Gram(s) IV Intermittent every 8 hours    immunologic:    OTHER:  acetaminophen     Tablet .. 650 milliGRAM(s) Oral every 6 hours PRN  aluminum hydroxide/magnesium hydroxide/simethicone Suspension 30 milliLiter(s) Oral every 4 hours PRN  atorvastatin 80 milliGRAM(s) Oral at bedtime  famotidine    Tablet 20 milliGRAM(s) Oral daily  folic acid 1 milliGRAM(s) Oral daily  ibuprofen  Tablet. 400 milliGRAM(s) Oral every 6 hours PRN  ibuprofen  Tablet. 600 milliGRAM(s) Oral every 6 hours PRN  lactobacillus acidophilus 1 Tablet(s) Oral three times a day  melatonin 3 milliGRAM(s) Oral at bedtime PRN  sodium chloride 0.9%. 1000 milliLiter(s) IV Continuous <Continuous>      Objective:  Vital Signs Last 24 Hrs  T(C): 36.2 (26 Nov 2021 04:33), Max: 37 (25 Nov 2021 13:11)  T(F): 97.2 (26 Nov 2021 04:33), Max: 98.6 (25 Nov 2021 13:11)  HR: 68 (26 Nov 2021 04:33) (68 - 81)  BP: 119/77 (26 Nov 2021 04:33) (117/72 - 121/74)  BP(mean): --  RR: 17 (26 Nov 2021 04:33) (17 - 17)  SpO2: 95% (26 Nov 2021 04:33) (94% - 97%)    T(C): 36.2 (11-26-21 @ 04:33), Max: 37 (11-25-21 @ 13:11)  T(C): 36.2 (11-26-21 @ 04:33), Max: 37 (11-25-21 @ 13:11)  T(C): 36.2 (11-26-21 @ 04:33), Max: 39.4 (11-22-21 @ 14:25)    PHYSICAL EXAM:  HEENT: NC atraumatic  Neck: supple  Respiratory: no accessory muscle use, breathing comfortably  Cardiovascular: distant  Gastrointestinal: normal appearing, nondistended  Extremities: no clubbing, no cyanosis, foot wrapped    LABS:                          8.9    8.27  )-----------( 317      ( 26 Nov 2021 05:40 )             28.2       8.27 11-26 @ 05:40  8.75 11-25 @ 07:45  9.07 11-24 @ 09:31  10.50 11-23 @ 07:32  13.22 11-22 @ 10:49  11.89 11-21 @ 20:59      11-26    142  |  109<H>  |  17  ----------------------------<  99  4.1   |  29  |  1.10    Ca    8.8      26 Nov 2021 05:40    TPro  6.9  /  Alb  2.4<L>  /  TBili  0.5  /  DBili  x   /  AST  12<L>  /  ALT  20  /  AlkPhos  102  11-26      Creatinine, Serum: 1.10 mg/dL (11-26-21 @ 05:40)  Creatinine, Serum: 0.90 mg/dL (11-25-21 @ 07:45)  Creatinine, Serum: 1.00 mg/dL (11-24-21 @ 09:31)  Creatinine, Serum: 0.93 mg/dL (11-23-21 @ 07:32)  Creatinine, Serum: 1.10 mg/dL (11-21-21 @ 20:59)      INFLAMMATORY MARKERS  Auto Neutrophil #: 6.14 K/uL (11-23-21 @ 07:32)  Auto Lymphocyte #: 0.49 K/uL (11-23-21 @ 07:32)  Auto Neutrophil #: 8.32 K/uL (11-21-21 @ 20:59)  Auto Lymphocyte #: 0.48 K/uL (11-21-21 @ 20:59)    Lactate, Blood: 0.7 mmol/L (11-21-21 @ 23:46)    Auto Eosinophil #: 0.07 K/uL (11-23-21 @ 07:32)  Auto Eosinophil #: 0.00 K/uL (11-21-21 @ 20:59)    Ferritin, Serum: 781 ng/mL (11-22-21 @ 09:13)    Activated Partial Thromboplastin Time: 33.8 sec (11-21-21 @ 20:59)  INR: 1.31 ratio (11-21-21 @ 20:59)    D-Dimer Assay, Quantitative: 574 ng/mL DDU (11-22-21 @ 03:43)        MICROBIOLOGY:        RADIOLOGY & ADDITIONAL STUDIES:

## 2021-11-26 NOTE — PROGRESS NOTE ADULT - ASSESSMENT
Patient is a 66 year old male with PMH of CLL on Venetoclax, MDS, Melanoma, Paroxysmal AFib (not on AC), HTN, HLD, came in for increased left foot pain and found with fever in ER.  Febrile in .5 and then fever 103F 11/22 afternoon -- afebrile since   Leukocytosis, h/o CLL - wbc normalized   L foot fracture with swelling, pain with very slight erythema, clinically not c/w cellulitis.   MRI L foot reviewed as above, soft tissue swelling and enhancement, rim enhancing fluid collection dorsal to middle cuneiform extending over base of 2nd metatarsal and small collection subjacent to middle cuneiform ?hematoma vs abscess. Multiple signal abnormalities as above - ddx charcot arthropathy, nondisplaced/stress fx, r/o infection, malignancy    Recommendations:  Blood cultures NGTD x2  Podiatry following, planning for bone biopsy and surrounding soft tissue pathology  -plan for OR   Continue on pip-tazo for now pending micro and other data to guide us  Podiatry and Ortho following  Monitor temps/cbc     We will follow along in the care of this patient. Please call us at [phone] or text me directly on my cell# at [phone] with any concerns.

## 2021-11-26 NOTE — CONSULT NOTE ADULT - SUBJECTIVE AND OBJECTIVE BOX
[facility]         Sveta Romano, Kyara, Ebony, Jennifer, Dereck, Hosea        [phone] (office)    Reason for Consult: Clearance    Interval HPI: Patient seen and examined at bedside.     ECHO 9/2021: Technically difficult and limited study, patient unable to cooperate. Normal left ventricular internal dimensions and systolic function, estimated LVEF of 60%. Grossly normal RV size and systolic function. Aortic valve is not well-visualized. Trace physiologic MR and TR. No significant pericardial effusion.  EKG NSR    HPI:  65yo M w/ PMHx of CLL on Venetoclax, MDS, Melanoma, Paroxysmal AFib (not on AC), HTN, HLD, came in for weakness for the last 3 weeks that worsened today. Patient has had multiple hospitalization in the past. 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 Patient was discharged with home PT and steroids. Patient completed home PT but 3 weeks ago when he was walking he fractured a bone in his L foot and is using a wrap and boot for stabilization and pain is relived with Iboprofen. Ever since the fracture patient walks minimally and states he is "bed bound" for the most part. Patient admits to pain in his L foot. Also admits to SOB from the pain. He was found to be febrile with a temp of 103. On physical exam, L foot was swollen however patient says it has improved from when it started. Denies CP, N/V/D and change in urinary or bowel habits.     Of note, pt with recent hospitalization for what was suspected to be a cva, he was later diagnosed with autoimmune encephalitis. please see d/c summary for details of that recent hospitalization.    In ED   Vitals: T: 103.5, HR: 99, BP: 127/67, RR: 16, O2: 99%   Labs: WBC: 11.89, H/H 9.3/29.0, MCV: 103.6, Albumin: 2.7, UA has proteins, blood and occasional bacteria   Chest XRAY: Appears clear (wet read)   EKG: NSR   Medications: Zosyn x IVPB, Vancomycine x IVPB, NS Bolus x 2, Motrin 600 x1 and Tylenol 650 x 1   (22 Nov 2021 01:11)      PAST MEDICAL & SURGICAL HISTORY:  Osteopenia    CLL (Chronic Lymphocytic Leukemia)  SINCE 1988    Avascular Necrosis of Femur Head  B/L    TMJ Syndrome  limited jaw opening due to TMJ    Herniated Cervical Disc    Bulging Disc  LUMBAR    Melanoma    Deviated nasal septum    Nasal congestion    Rosacea    Deviated nasal septum    Other specified disorders of nose and nasal sinuses    Anemia    Encephalitis    S/P Splenectomy  1993    Avascular Necrosis of Femur Head  RIGHT - CORE DECOMPRESSION- 1993    Avascular Necrosis of Femur Head  LEFT - CORE DECOMPRESSION - 1993    Abnormal Jaw Closure  LEFT JAW SURGERY - 1988    Status Post Eye Surgery X 3  3/2011 right eye scleral buckle; left eye in 2013    S/P Tonsillectomy  1960    Bilateral cataracts  removed in 2011        SOCIAL HISTORY: No active tobacco, alcohol or illicit drug use    FAMILY HISTORY:  Family history of multiple myeloma    Family history of malignant melanoma  age 89    FH: blood dyscrasia  MGUS    FH: hypertension  mother    FH: renal failure  mother        Home Medications:  acetaminophen 325 mg oral tablet: 2 tab(s) orally every 6 hours, As needed, Temp greater or equal to 38C (100.4F) (23 Nov 2021 23:01)  aluminum hydroxide-magnesium hydroxide 200 mg-200 mg/5 mL oral suspension: 30 milliliter(s) orally every 4 hours, As needed, Dyspepsia (23 Nov 2021 23:01)  aspirin 81 mg oral tablet, chewable: 1 tab(s) orally once a day (22 Nov 2021 02:37)  atorvastatin 80 mg oral tablet: 1 tab(s) orally once a day (at bedtime) (22 Nov 2021 02:37)  famotidine 20 mg oral tablet: 1 tab(s) orally once a day (23 Nov 2021 23:01)  melatonin 3 mg oral tablet: 1 tab(s) orally once a day (at bedtime), As needed, Insomnia (23 Nov 2021 23:01)      MEDICATIONS  (STANDING):  atorvastatin 80 milliGRAM(s) Oral at bedtime  famotidine    Tablet 20 milliGRAM(s) Oral daily  folic acid 1 milliGRAM(s) Oral daily  lactobacillus acidophilus 1 Tablet(s) Oral three times a day  piperacillin/tazobactam IVPB.. 3.375 Gram(s) IV Intermittent every 8 hours  sodium chloride 0.9%. 1000 milliLiter(s) (50 mL/Hr) IV Continuous <Continuous>    MEDICATIONS  (PRN):  acetaminophen     Tablet .. 650 milliGRAM(s) Oral every 6 hours PRN Temp greater or equal to 38C (100.4F)  aluminum hydroxide/magnesium hydroxide/simethicone Suspension 30 milliLiter(s) Oral every 4 hours PRN Dyspepsia  ibuprofen  Tablet. 400 milliGRAM(s) Oral every 6 hours PRN Mild Pain (1 - 3)  ibuprofen  Tablet. 600 milliGRAM(s) Oral every 6 hours PRN Moderate Pain (4 - 6)  melatonin 3 milliGRAM(s) Oral at bedtime PRN Insomnia      Allergies    No Known Allergies    Intolerances        REVIEW OF SYSTEMS: Negative except as per HPI.    VITAL SIGNS:   Vital Signs Last 24 Hrs  T(C): 36.2 (26 Nov 2021 04:33), Max: 37 (25 Nov 2021 13:11)  T(F): 97.2 (26 Nov 2021 04:33), Max: 98.6 (25 Nov 2021 13:11)  HR: 68 (26 Nov 2021 04:33) (68 - 81)  BP: 119/77 (26 Nov 2021 04:33) (117/72 - 121/74)  BP(mean): --  RR: 17 (26 Nov 2021 04:33) (17 - 17)  SpO2: 95% (26 Nov 2021 04:33) (94% - 97%)    I&O's Summary    25 Nov 2021 07:01  -  26 Nov 2021 07:00  --------------------------------------------------------  IN: 675 mL / OUT: 0 mL / NET: 675 mL        PHYSICAL EXAM:  Constitutional: NAD, well-developed  HEENT NC/AT, moist mucous membranes  Pulmonary: Non-labored, breath sounds are clear bilaterally, no wheezing, rales or rhonchi  Cardiovascular: +S1, S2, RRR, no murmur  Gastrointestinal: Soft, nontender, nondistended, normoactive bowel sounds  Extremities: No peripheral edema   Neurological: Alert, strength and sensitivity are grossly intact  Skin: No obvious lesions/rashes  Psych: Mood & affect appropriate    LABS: All Labs Reviewed:                        8.9    8.27  )-----------( 317      ( 26 Nov 2021 05:40 )             28.2                         8.6    8.75  )-----------( 327      ( 25 Nov 2021 07:45 )             27.5                         8.9    9.07  )-----------( 299      ( 24 Nov 2021 09:31 )             27.0     26 Nov 2021 05:40    142    |  109    |  17     ----------------------------<  99     4.1     |  29     |  1.10   25 Nov 2021 07:45    142    |  109    |  16     ----------------------------<  106    4.3     |  30     |  0.90   24 Nov 2021 09:31    140    |  104    |  12     ----------------------------<  119    3.3     |  29     |  1.00     Ca    8.8        26 Nov 2021 05:40  Ca    9.3        25 Nov 2021 07:45  Ca    8.9        24 Nov 2021 09:31    TPro  6.9    /  Alb  2.4    /  TBili  0.5    /  DBili  x      /  AST  12     /  ALT  20     /  AlkPhos  102    26 Nov 2021 05:40  TPro  7.2    /  Alb  2.3    /  TBili  0.8    /  DBili  x      /  AST  12     /  ALT  22     /  AlkPhos  108    24 Nov 2021 09:31          Blood Culture: Organism --  Gram Stain Blood -- Gram Stain --  Specimen Source Clean Catch Clean Catch (Midstream)  Culture-Blood --    Organism --  Gram Stain Blood -- Gram Stain --  Specimen Source .Blood Blood-Peripheral  Culture-Blood --            EKG:    RADIOLOGY:    CXR:   **Charting in progress**  [facility]         Sveta Romano, Kyara, Ebony, Dereck Rodriguez Savella        [phone] (office)    Reason for Consult: Clearance    Interval HPI: Patient seen and examined at bedside.     ECHO 9/2021: Technically difficult and limited study, patient unable to cooperate. Normal left ventricular internal dimensions and systolic function, estimated LVEF of 60%. Grossly normal RV size and systolic function. Aortic valve is not well-visualized. Trace physiologic MR and TR. No significant pericardial effusion.  EKG NSR    HPI:  65yo M w/ PMHx of CLL on Venetoclax, MDS, Melanoma, Paroxysmal AFib (not on AC), HTN, HLD, came in for weakness for the last 3 weeks that worsened today. Patient has had multiple hospitalization in the past. 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 Patient was discharged with home PT and steroids. Patient completed home PT but 3 weeks ago when he was walking he fractured a bone in his L foot and is using a wrap and boot for stabilization and pain is relived with Iboprofen. Ever since the fracture patient walks minimally and states he is "bed bound" for the most part. Patient admits to pain in his L foot. Also admits to SOB from the pain. He was found to be febrile with a temp of 103. On physical exam, L foot was swollen however patient says it has improved from when it started. Denies CP, N/V/D and change in urinary or bowel habits.     Of note, pt with recent hospitalization for what was suspected to be a cva, he was later diagnosed with autoimmune encephalitis. please see d/c summary for details of that recent hospitalization.    In ED   Vitals: T: 103.5, HR: 99, BP: 127/67, RR: 16, O2: 99%   Labs: WBC: 11.89, H/H 9.3/29.0, MCV: 103.6, Albumin: 2.7, UA has proteins, blood and occasional bacteria   Chest XRAY: Appears clear (wet read)   EKG: NSR   Medications: Zosyn x IVPB, Vancomycine x IVPB, NS Bolus x 2, Motrin 600 x1 and Tylenol 650 x 1   (22 Nov 2021 01:11)      PAST MEDICAL & SURGICAL HISTORY:  Osteopenia    CLL (Chronic Lymphocytic Leukemia)  SINCE 1988    Avascular Necrosis of Femur Head  B/L    TMJ Syndrome  limited jaw opening due to TMJ    Herniated Cervical Disc    Bulging Disc  LUMBAR    Melanoma    Deviated nasal septum    Nasal congestion    Rosacea    Deviated nasal septum    Other specified disorders of nose and nasal sinuses    Anemia    Encephalitis    S/P Splenectomy  1993    Avascular Necrosis of Femur Head  RIGHT - CORE DECOMPRESSION- 1993    Avascular Necrosis of Femur Head  LEFT - CORE DECOMPRESSION - 1993    Abnormal Jaw Closure  LEFT JAW SURGERY - 1988    Status Post Eye Surgery X 3  3/2011 right eye scleral buckle; left eye in 2013    S/P Tonsillectomy  1960    Bilateral cataracts  removed in 2011        SOCIAL HISTORY: No active tobacco, alcohol or illicit drug use    FAMILY HISTORY:  Family history of multiple myeloma    Family history of malignant melanoma  age 89    FH: blood dyscrasia  MGUS    FH: hypertension  mother    FH: renal failure  mother        Home Medications:  acetaminophen 325 mg oral tablet: 2 tab(s) orally every 6 hours, As needed, Temp greater or equal to 38C (100.4F) (23 Nov 2021 23:01)  aluminum hydroxide-magnesium hydroxide 200 mg-200 mg/5 mL oral suspension: 30 milliliter(s) orally every 4 hours, As needed, Dyspepsia (23 Nov 2021 23:01)  aspirin 81 mg oral tablet, chewable: 1 tab(s) orally once a day (22 Nov 2021 02:37)  atorvastatin 80 mg oral tablet: 1 tab(s) orally once a day (at bedtime) (22 Nov 2021 02:37)  famotidine 20 mg oral tablet: 1 tab(s) orally once a day (23 Nov 2021 23:01)  melatonin 3 mg oral tablet: 1 tab(s) orally once a day (at bedtime), As needed, Insomnia (23 Nov 2021 23:01)      MEDICATIONS  (STANDING):  atorvastatin 80 milliGRAM(s) Oral at bedtime  famotidine    Tablet 20 milliGRAM(s) Oral daily  folic acid 1 milliGRAM(s) Oral daily  lactobacillus acidophilus 1 Tablet(s) Oral three times a day  piperacillin/tazobactam IVPB.. 3.375 Gram(s) IV Intermittent every 8 hours  sodium chloride 0.9%. 1000 milliLiter(s) (50 mL/Hr) IV Continuous <Continuous>    MEDICATIONS  (PRN):  acetaminophen     Tablet .. 650 milliGRAM(s) Oral every 6 hours PRN Temp greater or equal to 38C (100.4F)  aluminum hydroxide/magnesium hydroxide/simethicone Suspension 30 milliLiter(s) Oral every 4 hours PRN Dyspepsia  ibuprofen  Tablet. 400 milliGRAM(s) Oral every 6 hours PRN Mild Pain (1 - 3)  ibuprofen  Tablet. 600 milliGRAM(s) Oral every 6 hours PRN Moderate Pain (4 - 6)  melatonin 3 milliGRAM(s) Oral at bedtime PRN Insomnia      Allergies    No Known Allergies    Intolerances        REVIEW OF SYSTEMS: Negative except as per HPI.    VITAL SIGNS:   Vital Signs Last 24 Hrs  T(C): 36.2 (26 Nov 2021 04:33), Max: 37 (25 Nov 2021 13:11)  T(F): 97.2 (26 Nov 2021 04:33), Max: 98.6 (25 Nov 2021 13:11)  HR: 68 (26 Nov 2021 04:33) (68 - 81)  BP: 119/77 (26 Nov 2021 04:33) (117/72 - 121/74)  BP(mean): --  RR: 17 (26 Nov 2021 04:33) (17 - 17)  SpO2: 95% (26 Nov 2021 04:33) (94% - 97%)    I&O's Summary    25 Nov 2021 07:01  -  26 Nov 2021 07:00  --------------------------------------------------------  IN: 675 mL / OUT: 0 mL / NET: 675 mL        PHYSICAL EXAM:  Constitutional: NAD, well-developed  HEENT NC/AT, moist mucous membranes  Pulmonary: Non-labored, breath sounds are clear bilaterally, no wheezing, rales or rhonchi  Cardiovascular: +S1, S2, RRR, no murmur  Gastrointestinal: Soft, nontender, nondistended, normoactive bowel sounds  Extremities: No peripheral edema   Neurological: Alert, strength and sensitivity are grossly intact  Skin: No obvious lesions/rashes  Psych: Mood & affect appropriate    LABS: All Labs Reviewed:                        8.9    8.27  )-----------( 317      ( 26 Nov 2021 05:40 )             28.2                         8.6    8.75  )-----------( 327      ( 25 Nov 2021 07:45 )             27.5                         8.9    9.07  )-----------( 299      ( 24 Nov 2021 09:31 )             27.0     26 Nov 2021 05:40    142    |  109    |  17     ----------------------------<  99     4.1     |  29     |  1.10   25 Nov 2021 07:45    142    |  109    |  16     ----------------------------<  106    4.3     |  30     |  0.90   24 Nov 2021 09:31    140    |  104    |  12     ----------------------------<  119    3.3     |  29     |  1.00     Ca    8.8        26 Nov 2021 05:40  Ca    9.3        25 Nov 2021 07:45  Ca    8.9        24 Nov 2021 09:31    TPro  6.9    /  Alb  2.4    /  TBili  0.5    /  DBili  x      /  AST  12     /  ALT  20     /  AlkPhos  102    26 Nov 2021 05:40  TPro  7.2    /  Alb  2.3    /  TBili  0.8    /  DBili  x      /  AST  12     /  ALT  22     /  AlkPhos  108    24 Nov 2021 09:31          Blood Culture: Organism --  Gram Stain Blood -- Gram Stain --  Specimen Source Clean Catch Clean Catch (Midstream)  Culture-Blood --    Organism --  Gram Stain Blood -- Gram Stain --  Specimen Source .Blood Blood-Peripheral  Culture-Blood --            EKG:    RADIOLOGY:    CXR:   [facility]         Sveta Romano, Kyara, Ebony, Jennifer, Dereck, Hosea        [phone] (office)    Reason for Consult: Clearance    Interval HPI: Patient seen and examined at bedside. Pt states he feels well and is a bit nervous for his procedure. He states he had a remote hx of afib in 2020 after a syncopal episode. He states he has not had any afib since then and he follows with Dr. Juarez outpatient. He last saw him 2 months ago. He denies any CP, SOB, palpitations, LE swelling or PARKER.     ECHO 9/2021: Technically difficult and limited study, patient unable to cooperate. Normal left ventricular internal dimensions and systolic function, estimated LVEF of 60%. Grossly normal RV size and systolic function. Aortic valve is not well-visualized. Trace physiologic MR and TR. No significant pericardial effusion.  EKG NSR    HPI:  67yo M w/ PMHx of CLL on Venetoclax, MDS, Melanoma, Paroxysmal AFib (not on AC), HTN, HLD, came in for weakness for the last 3 weeks that worsened today. Patient has had multiple hospitalization in the past. 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 Patient was discharged with home PT and steroids. Patient completed home PT but 3 weeks ago when he was walking he fractured a bone in his L foot and is using a wrap and boot for stabilization and pain is relived with Iboprofen. Ever since the fracture patient walks minimally and states he is "bed bound" for the most part. Patient admits to pain in his L foot. Also admits to SOB from the pain. He was found to be febrile with a temp of 103. On physical exam, L foot was swollen however patient says it has improved from when it started. Denies CP, N/V/D and change in urinary or bowel habits.     Of note, pt with recent hospitalization for what was suspected to be a cva, he was later diagnosed with autoimmune encephalitis. please see d/c summary for details of that recent hospitalization.    In ED   Vitals: T: 103.5, HR: 99, BP: 127/67, RR: 16, O2: 99%   Labs: WBC: 11.89, H/H 9.3/29.0, MCV: 103.6, Albumin: 2.7, UA has proteins, blood and occasional bacteria   Chest XRAY: Appears clear (wet read)   EKG: NSR   Medications: Zosyn x IVPB, Vancomycin x IVPB, NS Bolus x 2, Motrin 600 x1 and Tylenol 650 x 1   (22 Nov 2021 01:11)      PAST MEDICAL & SURGICAL HISTORY:  Osteopenia    CLL (Chronic Lymphocytic Leukemia)  SINCE 1988    Avascular Necrosis of Femur Head  B/L    TMJ Syndrome  limited jaw opening due to TMJ    Herniated Cervical Disc    Bulging Disc  LUMBAR    Melanoma    Deviated nasal septum    Nasal congestion    Rosacea    Deviated nasal septum    Other specified disorders of nose and nasal sinuses    Anemia    Encephalitis    S/P Splenectomy  1993    Avascular Necrosis of Femur Head  RIGHT - CORE DECOMPRESSION- 1993    Avascular Necrosis of Femur Head  LEFT - CORE DECOMPRESSION - 1993    Abnormal Jaw Closure  LEFT JAW SURGERY - 1988    Status Post Eye Surgery X 3  3/2011 right eye scleral buckle; left eye in 2013    S/P Tonsillectomy  1960    Bilateral cataracts  removed in 2011        SOCIAL HISTORY: No active tobacco, alcohol or illicit drug use    FAMILY HISTORY:  Family history of multiple myeloma    Family history of malignant melanoma  age 89    FH: blood dyscrasia  MGUS    FH: hypertension  mother    FH: renal failure  mother        Home Medications:  acetaminophen 325 mg oral tablet: 2 tab(s) orally every 6 hours, As needed, Temp greater or equal to 38C (100.4F) (23 Nov 2021 23:01)  aluminum hydroxide-magnesium hydroxide 200 mg-200 mg/5 mL oral suspension: 30 milliliter(s) orally every 4 hours, As needed, Dyspepsia (23 Nov 2021 23:01)  aspirin 81 mg oral tablet, chewable: 1 tab(s) orally once a day (22 Nov 2021 02:37)  atorvastatin 80 mg oral tablet: 1 tab(s) orally once a day (at bedtime) (22 Nov 2021 02:37)  famotidine 20 mg oral tablet: 1 tab(s) orally once a day (23 Nov 2021 23:01)  melatonin 3 mg oral tablet: 1 tab(s) orally once a day (at bedtime), As needed, Insomnia (23 Nov 2021 23:01)      MEDICATIONS  (STANDING):  atorvastatin 80 milliGRAM(s) Oral at bedtime  famotidine    Tablet 20 milliGRAM(s) Oral daily  folic acid 1 milliGRAM(s) Oral daily  lactobacillus acidophilus 1 Tablet(s) Oral three times a day  piperacillin/tazobactam IVPB.. 3.375 Gram(s) IV Intermittent every 8 hours  sodium chloride 0.9%. 1000 milliLiter(s) (50 mL/Hr) IV Continuous <Continuous>    MEDICATIONS  (PRN):  acetaminophen     Tablet .. 650 milliGRAM(s) Oral every 6 hours PRN Temp greater or equal to 38C (100.4F)  aluminum hydroxide/magnesium hydroxide/simethicone Suspension 30 milliLiter(s) Oral every 4 hours PRN Dyspepsia  ibuprofen  Tablet. 400 milliGRAM(s) Oral every 6 hours PRN Mild Pain (1 - 3)  ibuprofen  Tablet. 600 milliGRAM(s) Oral every 6 hours PRN Moderate Pain (4 - 6)  melatonin 3 milliGRAM(s) Oral at bedtime PRN Insomnia      Allergies    No Known Allergies    Intolerances        REVIEW OF SYSTEMS: Negative except as per HPI.    VITAL SIGNS:   Vital Signs Last 24 Hrs  T(C): 36.2 (26 Nov 2021 04:33), Max: 37 (25 Nov 2021 13:11)  T(F): 97.2 (26 Nov 2021 04:33), Max: 98.6 (25 Nov 2021 13:11)  HR: 68 (26 Nov 2021 04:33) (68 - 81)  BP: 119/77 (26 Nov 2021 04:33) (117/72 - 121/74)  BP(mean): --  RR: 17 (26 Nov 2021 04:33) (17 - 17)  SpO2: 95% (26 Nov 2021 04:33) (94% - 97%)    I&O's Summary    25 Nov 2021 07:01  -  26 Nov 2021 07:00  --------------------------------------------------------  IN: 675 mL / OUT: 0 mL / NET: 675 mL        PHYSICAL EXAM:  Constitutional: NAD, well-developed  HEENT NC/AT, moist mucous membranes  Pulmonary: Non-labored, breath sounds are clear bilaterally, no wheezing, rales or rhonchi  Cardiovascular: +S1, S2, RRR, no murmur  Gastrointestinal: Soft, nontender, nondistended, normoactive bowel sounds  Extremities: No peripheral edema, LLE wrapped in ACE bandage.    Neurological: Alert, strength and sensitivity are grossly intact  Skin: No obvious lesions/rashes  Psych: Mood & affect appropriate    LABS: All Labs Reviewed:                        8.9    8.27  )-----------( 317      ( 26 Nov 2021 05:40 )             28.2                         8.6    8.75  )-----------( 327      ( 25 Nov 2021 07:45 )             27.5                         8.9    9.07  )-----------( 299      ( 24 Nov 2021 09:31 )             27.0     26 Nov 2021 05:40    142    |  109    |  17     ----------------------------<  99     4.1     |  29     |  1.10   25 Nov 2021 07:45    142    |  109    |  16     ----------------------------<  106    4.3     |  30     |  0.90   24 Nov 2021 09:31    140    |  104    |  12     ----------------------------<  119    3.3     |  29     |  1.00     Ca    8.8        26 Nov 2021 05:40  Ca    9.3        25 Nov 2021 07:45  Ca    8.9        24 Nov 2021 09:31    TPro  6.9    /  Alb  2.4    /  TBili  0.5    /  DBili  x      /  AST  12     /  ALT  20     /  AlkPhos  102    26 Nov 2021 05:40  TPro  7.2    /  Alb  2.3    /  TBili  0.8    /  DBili  x      /  AST  12     /  ALT  22     /  AlkPhos  108    24 Nov 2021 09:31          Blood Culture: Organism --  Gram Stain Blood -- Gram Stain --  Specimen Source Clean Catch Clean Catch (Midstream)  Culture-Blood --    Organism --  Gram Stain Blood -- Gram Stain --  Specimen Source .Blood Blood-Peripheral  Culture-Blood --            EKG: NSR     [facility]         Sveta Romano, Kyara, Ebony, Jennifer, Dereck, Hosea        [phone] (office)    Reason for Consult: Clearance    Interval HPI: Patient seen and examined at bedside. Pt states he feels well and is a bit nervous for his procedure. He states he had a remote hx of afib in 2020 after a syncopal episode. He states he has not had any afib since then and he follows with Dr. Juarez outpatient. He last saw him 2 months ago. He denies any CP, SOB, palpitations, LE swelling or PARKER.     ECHO 9/2021: Technically difficult and limited study, patient unable to cooperate. Normal left ventricular internal dimensions and systolic function, estimated LVEF of 60%. Grossly normal RV size and systolic function. Aortic valve is not well-visualized. Trace physiologic MR and TR. No significant pericardial effusion.  EKG NSR    HPI:  65yo M w/ PMHx of CLL on Venetoclax, MDS, Melanoma, Paroxysmal AFib (not on AC), HTN, HLD, came in for weakness for the last 3 weeks that worsened today. Patient has had multiple hospitalization in the past. 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 Patient was discharged with home PT and steroids. Patient completed home PT but 3 weeks ago when he was walking he fractured a bone in his L foot and is using a wrap and boot for stabilization and pain is relived with Iboprofen. Ever since the fracture patient walks minimally and states he is "bed bound" for the most part. Patient admits to pain in his L foot. Also admits to SOB from the pain. He was found to be febrile with a temp of 103. On physical exam, L foot was swollen however patient says it has improved from when it started. Denies CP, N/V/D and change in urinary or bowel habits.     Of note, pt with recent hospitalization for what was suspected to be a cva, he was later diagnosed with autoimmune encephalitis. please see d/c summary for details of that recent hospitalization.    In ED   Vitals: T: 103.5, HR: 99, BP: 127/67, RR: 16, O2: 99%   Labs: WBC: 11.89, H/H 9.3/29.0, MCV: 103.6, Albumin: 2.7, UA has proteins, blood and occasional bacteria   Chest XRAY: Appears clear (wet read)   EKG: NSR   Medications: Zosyn x IVPB, Vancomycin x IVPB, NS Bolus x 2, Motrin 600 x1 and Tylenol 650 x 1   (22 Nov 2021 01:11)      PAST MEDICAL & SURGICAL HISTORY:  Osteopenia    CLL (Chronic Lymphocytic Leukemia)  SINCE 1988    Avascular Necrosis of Femur Head  B/L    TMJ Syndrome  limited jaw opening due to TMJ    Herniated Cervical Disc    Bulging Disc  LUMBAR    Melanoma    Deviated nasal septum    Nasal congestion    Rosacea    Deviated nasal septum    Other specified disorders of nose and nasal sinuses    Anemia    Encephalitis    S/P Splenectomy  1993    Avascular Necrosis of Femur Head  RIGHT - CORE DECOMPRESSION- 1993    Avascular Necrosis of Femur Head  LEFT - CORE DECOMPRESSION - 1993    Abnormal Jaw Closure  LEFT JAW SURGERY - 1988    Status Post Eye Surgery X 3  3/2011 right eye scleral buckle; left eye in 2013    S/P Tonsillectomy  1960    Bilateral cataracts  removed in 2011        SOCIAL HISTORY: No active tobacco, alcohol or illicit drug use    FAMILY HISTORY:  Family history of multiple myeloma  Family history of malignant melanoma  age 89    FH: blood dyscrasia  MGUS    FH: hypertension  mother    FH: renal failure  mother        Home Medications:  acetaminophen 325 mg oral tablet: 2 tab(s) orally every 6 hours, As needed, Temp greater or equal to 38C (100.4F) (23 Nov 2021 23:01)  aluminum hydroxide-magnesium hydroxide 200 mg-200 mg/5 mL oral suspension: 30 milliliter(s) orally every 4 hours, As needed, Dyspepsia (23 Nov 2021 23:01)  aspirin 81 mg oral tablet, chewable: 1 tab(s) orally once a day (22 Nov 2021 02:37)  atorvastatin 80 mg oral tablet: 1 tab(s) orally once a day (at bedtime) (22 Nov 2021 02:37)  famotidine 20 mg oral tablet: 1 tab(s) orally once a day (23 Nov 2021 23:01)  melatonin 3 mg oral tablet: 1 tab(s) orally once a day (at bedtime), As needed, Insomnia (23 Nov 2021 23:01)      MEDICATIONS  (STANDING):  atorvastatin 80 milliGRAM(s) Oral at bedtime  famotidine    Tablet 20 milliGRAM(s) Oral daily  folic acid 1 milliGRAM(s) Oral daily  lactobacillus acidophilus 1 Tablet(s) Oral three times a day  piperacillin/tazobactam IVPB.. 3.375 Gram(s) IV Intermittent every 8 hours  sodium chloride 0.9%. 1000 milliLiter(s) (50 mL/Hr) IV Continuous <Continuous>    MEDICATIONS  (PRN):  acetaminophen     Tablet .. 650 milliGRAM(s) Oral every 6 hours PRN Temp greater or equal to 38C (100.4F)  aluminum hydroxide/magnesium hydroxide/simethicone Suspension 30 milliLiter(s) Oral every 4 hours PRN Dyspepsia  ibuprofen  Tablet. 400 milliGRAM(s) Oral every 6 hours PRN Mild Pain (1 - 3)  ibuprofen  Tablet. 600 milliGRAM(s) Oral every 6 hours PRN Moderate Pain (4 - 6)  melatonin 3 milliGRAM(s) Oral at bedtime PRN Insomnia      Allergies    No Known Allergies    Intolerances        REVIEW OF SYSTEMS: Negative except as per HPI.    VITAL SIGNS:   Vital Signs Last 24 Hrs  T(C): 36.2 (26 Nov 2021 04:33), Max: 37 (25 Nov 2021 13:11)  T(F): 97.2 (26 Nov 2021 04:33), Max: 98.6 (25 Nov 2021 13:11)  HR: 68 (26 Nov 2021 04:33) (68 - 81)  BP: 119/77 (26 Nov 2021 04:33) (117/72 - 121/74)  BP(mean): --  RR: 17 (26 Nov 2021 04:33) (17 - 17)  SpO2: 95% (26 Nov 2021 04:33) (94% - 97%)    I&O's Summary    25 Nov 2021 07:01  -  26 Nov 2021 07:00  --------------------------------------------------------  IN: 675 mL / OUT: 0 mL / NET: 675 mL        PHYSICAL EXAM:  Constitutional: NAD, well-developed  HEENT NC/AT, moist mucous membranes  Pulmonary: Non-labored, breath sounds are clear bilaterally, no wheezing, rales or rhonchi  Cardiovascular: +S1, S2, RRR, no murmur  Gastrointestinal: Soft, nontender, nondistended, normoactive bowel sounds  Extremities: No peripheral edema, LLE wrapped in ACE bandage.    Neurological: Alert, strength and sensitivity are grossly intact  Skin: No obvious lesions/rashes  Psych: Mood & affect appropriate    LABS: All Labs Reviewed:                        8.9    8.27  )-----------( 317      ( 26 Nov 2021 05:40 )             28.2                         8.6    8.75  )-----------( 327      ( 25 Nov 2021 07:45 )             27.5                         8.9    9.07  )-----------( 299      ( 24 Nov 2021 09:31 )             27.0     26 Nov 2021 05:40    142    |  109    |  17     ----------------------------<  99     4.1     |  29     |  1.10   25 Nov 2021 07:45    142    |  109    |  16     ----------------------------<  106    4.3     |  30     |  0.90   24 Nov 2021 09:31    140    |  104    |  12     ----------------------------<  119    3.3     |  29     |  1.00     Ca    8.8        26 Nov 2021 05:40  Ca    9.3        25 Nov 2021 07:45  Ca    8.9        24 Nov 2021 09:31    TPro  6.9    /  Alb  2.4    /  TBili  0.5    /  DBili  x      /  AST  12     /  ALT  20     /  AlkPhos  102    26 Nov 2021 05:40  TPro  7.2    /  Alb  2.3    /  TBili  0.8    /  DBili  x      /  AST  12     /  ALT  22     /  AlkPhos  108    24 Nov 2021 09:31          Blood Culture: Organism --  Gram Stain Blood -- Gram Stain --  Specimen Source Clean Catch Clean Catch (Midstream)  Culture-Blood --    Organism --  Gram Stain Blood -- Gram Stain --  Specimen Source .Blood Blood-Peripheral  Culture-Blood --            EKG: NSR     [facility]         Sveta Romano, Kyara, Ebony, eJnnifer, Dereck, Hosea        [phone] (office)    Reason for Consult: Clearance    Interval HPI: Patient seen and examined at bedside. Pt states he feels well and is a bit nervous for his procedure. He states he had a remote hx of afib in 2020 after a syncopal episode. He states he has not had any afib since then and he follows with Dr. Juarez outpatient. He last saw him 2 months ago. He denies any CP, SOB, palpitations, LE swelling or PARKER.     ECHO 9/2021: Technically difficult and limited study, patient unable to cooperate. Normal left ventricular internal dimensions and systolic function, estimated LVEF of 60%. Grossly normal RV size and systolic function. Aortic valve is not well-visualized. Trace physiologic MR and TR. No significant pericardial effusion.  EKG NSR    HPI:  65yo M w/ PMHx of CLL on Venetoclax, MDS, Melanoma, Paroxysmal AFib (not on AC), HTN, HLD, came in for weakness for the last 3 weeks that worsened today. Patient has had multiple hospitalization in the past. 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 Patient was discharged with home PT and steroids. Patient completed home PT but 3 weeks ago when he was walking he fractured a bone in his L foot and is using a wrap and boot for stabilization and pain is relived with Iboprofen. Ever since the fracture patient walks minimally and states he is "bed bound" for the most part. Patient admits to pain in his L foot. Also admits to SOB from the pain. He was found to be febrile with a temp of 103. On physical exam, L foot was swollen however patient says it has improved from when it started. Denies CP, N/V/D and change in urinary or bowel habits.     Of note, pt with recent hospitalization for what was suspected to be a cva, he was later diagnosed with autoimmune encephalitis. please see d/c summary for details of that recent hospitalization.    In ED   Vitals: T: 103.5, HR: 99, BP: 127/67, RR: 16, O2: 99%   Labs: WBC: 11.89, H/H 9.3/29.0, MCV: 103.6, Albumin: 2.7, UA has proteins, blood and occasional bacteria   Chest XRAY: Appears clear (wet read)   EKG: NSR   Medications: Zosyn x IVPB, Vancomycin x IVPB, NS Bolus x 2, Motrin 600 x1 and Tylenol 650 x 1   (22 Nov 2021 01:11)      PAST MEDICAL & SURGICAL HISTORY:  Osteopenia    CLL (Chronic Lymphocytic Leukemia)  SINCE 1988    Avascular Necrosis of Femur Head  B/L    TMJ Syndrome  limited jaw opening due to TMJ    Herniated Cervical Disc    Bulging Disc  LUMBAR    Melanoma    Deviated nasal septum    Nasal congestion    Rosacea    Deviated nasal septum    Other specified disorders of nose and nasal sinuses    Anemia    Encephalitis    S/P Splenectomy  1993    Avascular Necrosis of Femur Head  RIGHT - CORE DECOMPRESSION- 1993    Avascular Necrosis of Femur Head  LEFT - CORE DECOMPRESSION - 1993    Abnormal Jaw Closure  LEFT JAW SURGERY - 1988    Status Post Eye Surgery X 3  3/2011 right eye scleral buckle; left eye in 2013    S/P Tonsillectomy  1960    Bilateral cataracts  removed in 2011        SOCIAL HISTORY: No active tobacco, alcohol or illicit drug use    FAMILY HISTORY:  Family history of multiple myeloma  Family history of malignant melanoma  age 89    FH: blood dyscrasia  MGUS    FH: hypertension  mother    FH: renal failure  mother        Home Medications:  acetaminophen 325 mg oral tablet: 2 tab(s) orally every 6 hours, As needed, Temp greater or equal to 38C (100.4F) (23 Nov 2021 23:01)  aluminum hydroxide-magnesium hydroxide 200 mg-200 mg/5 mL oral suspension: 30 milliliter(s) orally every 4 hours, As needed, Dyspepsia (23 Nov 2021 23:01)  aspirin 81 mg oral tablet, chewable: 1 tab(s) orally once a day (22 Nov 2021 02:37)  atorvastatin 80 mg oral tablet: 1 tab(s) orally once a day (at bedtime) (22 Nov 2021 02:37)  famotidine 20 mg oral tablet: 1 tab(s) orally once a day (23 Nov 2021 23:01)  melatonin 3 mg oral tablet: 1 tab(s) orally once a day (at bedtime), As needed, Insomnia (23 Nov 2021 23:01)      MEDICATIONS  (STANDING):  atorvastatin 80 milliGRAM(s) Oral at bedtime  famotidine    Tablet 20 milliGRAM(s) Oral daily  folic acid 1 milliGRAM(s) Oral daily  lactobacillus acidophilus 1 Tablet(s) Oral three times a day  piperacillin/tazobactam IVPB.. 3.375 Gram(s) IV Intermittent every 8 hours  sodium chloride 0.9%. 1000 milliLiter(s) (50 mL/Hr) IV Continuous <Continuous>    MEDICATIONS  (PRN):  acetaminophen     Tablet .. 650 milliGRAM(s) Oral every 6 hours PRN Temp greater or equal to 38C (100.4F)  aluminum hydroxide/magnesium hydroxide/simethicone Suspension 30 milliLiter(s) Oral every 4 hours PRN Dyspepsia  ibuprofen  Tablet. 400 milliGRAM(s) Oral every 6 hours PRN Mild Pain (1 - 3)  ibuprofen  Tablet. 600 milliGRAM(s) Oral every 6 hours PRN Moderate Pain (4 - 6)  melatonin 3 milliGRAM(s) Oral at bedtime PRN Insomnia      Allergies    No Known Allergies    Intolerances        REVIEW OF SYSTEMS: Negative except as per HPI.    VITAL SIGNS:   Vital Signs Last 24 Hrs  T(C): 36.2 (26 Nov 2021 04:33), Max: 37 (25 Nov 2021 13:11)  T(F): 97.2 (26 Nov 2021 04:33), Max: 98.6 (25 Nov 2021 13:11)  HR: 68 (26 Nov 2021 04:33) (68 - 81)  BP: 119/77 (26 Nov 2021 04:33) (117/72 - 121/74)  BP(mean): --  RR: 17 (26 Nov 2021 04:33) (17 - 17)  SpO2: 95% (26 Nov 2021 04:33) (94% - 97%)    I&O's Summary    25 Nov 2021 07:01  -  26 Nov 2021 07:00  --------------------------------------------------------  IN: 675 mL / OUT: 0 mL / NET: 675 mL        PHYSICAL EXAM:  Constitutional: NAD, well-developed  HEENT NC/AT, moist mucous membranes  Pulmonary: Non-labored, breath sounds are clear bilaterally, no wheezing, rales or rhonchi  Cardiovascular: +S1, S2, RRR, no murmur  Gastrointestinal: Soft, nontender, nondistended, normoactive bowel sounds  Extremities: No peripheral edema, LLE wrapped in ACE bandage.    Neurological: Alert, strength and sensitivity are grossly intact  Skin: No obvious lesions/rashes  Psych: Mood & affect appropriate    LABS: All Labs Reviewed:                        8.9    8.27  )-----------( 317      ( 26 Nov 2021 05:40 )             28.2                         8.6    8.75  )-----------( 327      ( 25 Nov 2021 07:45 )             27.5                         8.9    9.07  )-----------( 299      ( 24 Nov 2021 09:31 )             27.0     26 Nov 2021 05:40    142    |  109    |  17     ----------------------------<  99     4.1     |  29     |  1.10   25 Nov 2021 07:45    142    |  109    |  16     ----------------------------<  106    4.3     |  30     |  0.90   24 Nov 2021 09:31    140    |  104    |  12     ----------------------------<  119    3.3     |  29     |  1.00     Ca    8.8        26 Nov 2021 05:40  Ca    9.3        25 Nov 2021 07:45  Ca    8.9        24 Nov 2021 09:31    TPro  6.9    /  Alb  2.4    /  TBili  0.5    /  DBili  x      /  AST  12     /  ALT  20     /  AlkPhos  102    26 Nov 2021 05:40  TPro  7.2    /  Alb  2.3    /  TBili  0.8    /  DBili  x      /  AST  12     /  ALT  22     /  AlkPhos  108    24 Nov 2021 09:31          Blood Culture: Organism --  Gram Stain Blood -- Gram Stain --  Specimen Source Clean Catch Clean Catch (Midstream)  Culture-Blood --    Organism --  Gram Stain Blood -- Gram Stain --  Specimen Source .Blood Blood-Peripheral  Culture-Blood --            EKG: NSR with PACs

## 2021-11-26 NOTE — PROGRESS NOTE ADULT - ASSESSMENT
67 yo male with history of CLL and MDS overlap syndrome, most recently treated with Gazyva X6 months/Venetoclax which was stopped in October 2021 2 months short of 1 year full therapy when he was admitted for autoimmune encephalitis which was treated with IVIG and steroids; unfortunately he suffered left foot fracture likely from prolonged steroid use  Patient presented on 11/22/21 with pain in his foot and noted to have fever 103; afebrile since    - Blood cxx NGTD  - Bone bx planned by podiatry to eval for osteo today at 4:30 pm  - continue antibiotics as per medicine and infectious diseases  - macrocytic anemia likely due to underlying MDS; iron studies c/w inflammation; will monitor CBC; no role for transfusion  - patient due for IVIG on 11/29/21 in office however given he continues to have pain in the left foot and planned for procedure today, will plan to infuse IVIG prophylactic dose (400mg/kg) on 11/27/21; discussed with patient, nursing and pharmacy  - will follow with you

## 2021-11-26 NOTE — PROGRESS NOTE ADULT - SUBJECTIVE AND OBJECTIVE BOX
Patient is a 66y old  Male who presents with a chief complaint of fever (24 Nov 2021 13:06)  seen in pacu, resting comfortably    INTERVAL HPI/OVERNIGHT EVENTS:  T(C): 36.6 (11-26-21 @ 20:53), Max: 36.8 (11-26-21 @ 13:40)  HR: 75 (11-26-21 @ 20:53) (66 - 77)  BP: 127/71 (11-26-21 @ 20:53) (111/57 - 137/69)  RR: 17 (11-26-21 @ 20:53) (11 - 17)  SpO2: 93% (11-26-21 @ 20:53) (93% - 98%)  Wt(kg): --  I&O's Summary    25 Nov 2021 07:01  -  26 Nov 2021 07:00  --------------------------------------------------------  IN: 675 mL / OUT: 0 mL / NET: 675 mL    26 Nov 2021 07:01  -  26 Nov 2021 21:02  --------------------------------------------------------  IN: 340 mL / OUT: 0 mL / NET: 340 mL        LABS:                        8.9    8.27  )-----------( 317      ( 26 Nov 2021 05:40 )             28.2     11-26    142  |  109<H>  |  17  ----------------------------<  99  4.1   |  29  |  1.10    Ca    8.8      26 Nov 2021 05:40    TPro  6.9  /  Alb  2.4<L>  /  TBili  0.5  /  DBili  x   /  AST  12<L>  /  ALT  20  /  AlkPhos  102  11-26        CAPILLARY BLOOD GLUCOSE                MEDICATIONS  (STANDING):  atorvastatin 80 milliGRAM(s) Oral at bedtime  famotidine    Tablet 20 milliGRAM(s) Oral daily  folic acid 1 milliGRAM(s) Oral daily  lactated ringers. 1000 milliLiter(s) (50 mL/Hr) IV Continuous <Continuous>  lactobacillus acidophilus 1 Tablet(s) Oral three times a day  piperacillin/tazobactam IVPB.. 3.375 Gram(s) IV Intermittent every 8 hours    MEDICATIONS  (PRN):  acetaminophen     Tablet .. 650 milliGRAM(s) Oral every 6 hours PRN Temp greater or equal to 38C (100.4F)  aluminum hydroxide/magnesium hydroxide/simethicone Suspension 30 milliLiter(s) Oral every 4 hours PRN Dyspepsia  fentaNYL    Injectable 25 MICROGram(s) IV Push every 15 minutes PRN Severe Pain (7 - 10)  ibuprofen  Tablet. 600 milliGRAM(s) Oral every 6 hours PRN Moderate Pain (4 - 6)  ibuprofen  Tablet. 400 milliGRAM(s) Oral every 6 hours PRN Mild Pain (1 - 3)  melatonin 3 milliGRAM(s) Oral at bedtime PRN Insomnia  ondansetron Injectable 4 milliGRAM(s) IV Push once PRN Nausea and/or Vomiting      REVIEW OF SYSTEMS:  CONSTITUTIONAL: No fever, weight loss, or fatigue  EYES: No eye pain, visual disturbances, or discharge  ENMT:  No difficulty hearing, tinnitus, vertigo; No sinus or throat pain  NECK: No pain or stiffness  RESPIRATORY: No cough, wheezing, chills or hemoptysis; No shortness of breath  CARDIOVASCULAR: No chest pain, palpitations, dizziness, or leg swelling  GASTROINTESTINAL: No abdominal or epigastric pain. No nausea, vomiting, or hematemesis; No diarrhea or constipation. No melena or hematochezia.  GENITOURINARY: No dysuria, frequency, hematuria, or incontinence  NEUROLOGICAL: No headaches, memory loss, loss of strength, numbness, or tremors  SKIN: No itching, burning, rashes, or lesions   LYMPH NODES: No enlarged glands  ENDOCRINE: No heat or cold intolerance; No hair loss  MUSCULOSKELETAL: No joint pain or swelling; No muscle, back, or extremity pain  PSYCHIATRIC: No depression, anxiety, mood swings, or difficulty sleeping  HEME/LYMPH: No easy bruising, or bleeding gums  ALLERY AND IMMUNOLOGIC: No hives or eczema    RADIOLOGY & ADDITIONAL TESTS:    Imaging Personally Reviewed:  [x ] YES  [ ] NO    Consultant(s) Notes Reviewed:  [ x] YES  [ ] NO    PHYSICAL EXAM:  GENERAL: NAD, well-groomed, well-developed  HEAD:  Atraumatic, Normocephalic  EYES: EOMI, PERRLA, conjunctiva and sclera clear  ENMT: No tonsillar erythema, exudates, or enlargement; Moist mucous membranes, Good dentition, No lesions  NECK: Supple, No JVD, Normal thyroid  NERVOUS SYSTEM:  Alert & Oriented X3, Good concentration; Motor Strength 5/5 B/L upper and lower extremities; DTRs 2+ intact and symmetric  CHEST/LUNG: Clear to percussion bilaterally; No rales, rhonchi, wheezing, or rubs  HEART: Regular rate and rhythm; No murmurs, rubs, or gallops  ABDOMEN: Soft, Nontender, Nondistended; Bowel sounds present  EXTREMITIES:  2+ Peripheral Pulses, No clubbing, cyanosis, or edema  LYMPH: No lymphadenopathy noted  SKIN: No rashes or lesions    Care Discussed with Consultants/Other Providers [x ] YES  [ ] NO

## 2021-11-26 NOTE — PROGRESS NOTE ADULT - SUBJECTIVE AND OBJECTIVE BOX
Patient seen and examined;  Chart reviewed and events noted;   Planned for podiatry procedure on left foot at 4:30 pm today      MEDICATIONS  (STANDING):  atorvastatin 80 milliGRAM(s) Oral at bedtime  famotidine    Tablet 20 milliGRAM(s) Oral daily  folic acid 1 milliGRAM(s) Oral daily  lactobacillus acidophilus 1 Tablet(s) Oral three times a day  piperacillin/tazobactam IVPB.. 3.375 Gram(s) IV Intermittent every 8 hours  sodium chloride 0.9%. 1000 milliLiter(s) (50 mL/Hr) IV Continuous <Continuous>    MEDICATIONS  (PRN):  acetaminophen     Tablet .. 650 milliGRAM(s) Oral every 6 hours PRN Temp greater or equal to 38C (100.4F)  aluminum hydroxide/magnesium hydroxide/simethicone Suspension 30 milliLiter(s) Oral every 4 hours PRN Dyspepsia  ibuprofen  Tablet. 400 milliGRAM(s) Oral every 6 hours PRN Mild Pain (1 - 3)  ibuprofen  Tablet. 600 milliGRAM(s) Oral every 6 hours PRN Moderate Pain (4 - 6)  melatonin 3 milliGRAM(s) Oral at bedtime PRN Insomnia      Vital Signs Last 24 Hrs  T(C): 36.2 (26 Nov 2021 04:33), Max: 37 (25 Nov 2021 13:11)  T(F): 97.2 (26 Nov 2021 04:33), Max: 98.6 (25 Nov 2021 13:11)  HR: 68 (26 Nov 2021 04:33) (68 - 81)  BP: 119/77 (26 Nov 2021 04:33) (117/72 - 121/74)  BP(mean): --  RR: 17 (26 Nov 2021 04:33) (17 - 17)  SpO2: 95% (26 Nov 2021 04:33) (94% - 97%)    PHYSICAL EXAM  General: adult in NAD  HEENT: clear oropharynx, anicteric sclera, pink conjunctivae  Neck: supple  CV: normal S1S2 with no murmur rubs or gallops  Lungs: clear to auscultation, no wheezes, no rhales  Abdomen: soft non-tender non-distended, no hepato/splenomegaly  Ext; Left foot with dressings intact  Skin: no rashes and no petichiae  Neuro: alert and oriented X3 no focal deficits      LABS:                        8.9    8.27  )-----------( 317      ( 26 Nov 2021 05:40 )             28.2     11-26    142  |  109<H>  |  17  ----------------------------<  99  4.1   |  29  |  1.10    Ca    8.8      26 Nov 2021 05:40    TPro  6.9  /  Alb  2.4<L>  /  TBili  0.5  /  DBili  x   /  AST  12<L>  /  ALT  20  /  AlkPhos  102  11-26

## 2021-11-26 NOTE — BRIEF OPERATIVE NOTE - NSICDXBRIEFPROCEDURE_GEN_ALL_CORE_FT
PROCEDURES:  Open biopsy, bone, foot 26-Nov-2021 19:14:28  Keiry Howard  Open drainage of left foot 26-Nov-2021 19:15:21  Keiry Howard

## 2021-11-26 NOTE — CONSULT NOTE ADULT - ATTENDING COMMENTS
Agree with above findings and plan
No signs of significant ischemia or volume overload. ekg without ischemic changed. Currently no active cardiac conditions. No signs of ischemia, ADHF, clinical exam not consistent with severe stenotic valvular disease, no unstable arrhythmias noted. Therefore able to proceed with this necessary low risk podiatric surgery without any further cardiac workup. Routine hemodynamic monitoring is suggested during the procedure..

## 2021-11-26 NOTE — BRIEF OPERATIVE NOTE - TYPE OF ANESTHESIA
MAC Staged Advancement Flap Text: The defect edges were debeveled with a #15 scalpel blade.  Given the location of the defect, shape of the defect and the proximity to free margins a staged advancement flap was deemed most appropriate.  Using a sterile surgical marker, an appropriate advancement flap was drawn incorporating the defect and placing the expected incisions within the relaxed skin tension lines where possible. The area thus outlined was incised deep to adipose tissue with a #15 scalpel blade.  The skin margins were undermined to an appropriate distance in all directions utilizing iris scissors.

## 2021-11-27 LAB
CULTURE RESULTS: SIGNIFICANT CHANGE UP
CULTURE RESULTS: SIGNIFICANT CHANGE UP
GRAM STN FLD: SIGNIFICANT CHANGE UP
SPECIMEN SOURCE: SIGNIFICANT CHANGE UP

## 2021-11-27 PROCEDURE — 99232 SBSQ HOSP IP/OBS MODERATE 35: CPT

## 2021-11-27 RX ORDER — ATORVASTATIN CALCIUM 80 MG/1
80 TABLET, FILM COATED ORAL AT BEDTIME
Refills: 0 | Status: DISCONTINUED | OUTPATIENT
Start: 2021-11-27 | End: 2021-11-27

## 2021-11-27 RX ORDER — FAMOTIDINE 10 MG/ML
20 INJECTION INTRAVENOUS DAILY
Refills: 0 | Status: DISCONTINUED | OUTPATIENT
Start: 2021-11-27 | End: 2021-11-27

## 2021-11-27 RX ORDER — IMMUNE GLOBULIN (HUMAN) 10 G/100ML
25 INJECTION INTRAVENOUS; SUBCUTANEOUS ONCE
Refills: 0 | Status: COMPLETED | OUTPATIENT
Start: 2021-11-27 | End: 2021-11-27

## 2021-11-27 RX ORDER — CEFTRIAXONE 500 MG/1
2000 INJECTION, POWDER, FOR SOLUTION INTRAMUSCULAR; INTRAVENOUS EVERY 24 HOURS
Refills: 0 | Status: DISCONTINUED | OUTPATIENT
Start: 2021-11-27 | End: 2021-11-29

## 2021-11-27 RX ORDER — PIPERACILLIN AND TAZOBACTAM 4; .5 G/20ML; G/20ML
3.38 INJECTION, POWDER, LYOPHILIZED, FOR SOLUTION INTRAVENOUS EVERY 8 HOURS
Refills: 0 | Status: DISCONTINUED | OUTPATIENT
Start: 2021-11-27 | End: 2021-11-27

## 2021-11-27 RX ORDER — IBUPROFEN 200 MG
400 TABLET ORAL EVERY 6 HOURS
Refills: 0 | Status: DISCONTINUED | OUTPATIENT
Start: 2021-11-27 | End: 2021-11-27

## 2021-11-27 RX ORDER — ACETAMINOPHEN 500 MG
650 TABLET ORAL EVERY 6 HOURS
Refills: 0 | Status: DISCONTINUED | OUTPATIENT
Start: 2021-11-27 | End: 2021-12-01

## 2021-11-27 RX ORDER — ACETAMINOPHEN 500 MG
650 TABLET ORAL EVERY 6 HOURS
Refills: 0 | Status: DISCONTINUED | OUTPATIENT
Start: 2021-11-27 | End: 2021-11-27

## 2021-11-27 RX ORDER — FOLIC ACID 0.8 MG
1 TABLET ORAL DAILY
Refills: 0 | Status: DISCONTINUED | OUTPATIENT
Start: 2021-11-27 | End: 2021-11-27

## 2021-11-27 RX ORDER — LACTOBACILLUS ACIDOPHILUS 100MM CELL
1 CAPSULE ORAL THREE TIMES A DAY
Refills: 0 | Status: DISCONTINUED | OUTPATIENT
Start: 2021-11-27 | End: 2021-11-27

## 2021-11-27 RX ORDER — DIPHENHYDRAMINE HCL 50 MG
50 CAPSULE ORAL ONCE
Refills: 0 | Status: COMPLETED | OUTPATIENT
Start: 2021-11-27 | End: 2021-11-27

## 2021-11-27 RX ORDER — IBUPROFEN 200 MG
600 TABLET ORAL EVERY 6 HOURS
Refills: 0 | Status: DISCONTINUED | OUTPATIENT
Start: 2021-11-27 | End: 2021-11-27

## 2021-11-27 RX ORDER — DEXAMETHASONE 0.5 MG/5ML
10 ELIXIR ORAL ONCE
Refills: 0 | Status: COMPLETED | OUTPATIENT
Start: 2021-11-27 | End: 2021-11-27

## 2021-11-27 RX ORDER — ACETAMINOPHEN 500 MG
650 TABLET ORAL ONCE
Refills: 0 | Status: COMPLETED | OUTPATIENT
Start: 2021-11-27 | End: 2021-11-27

## 2021-11-27 RX ADMIN — Medication 50 MILLIGRAM(S): at 10:02

## 2021-11-27 RX ADMIN — Medication 650 MILLIGRAM(S): at 10:02

## 2021-11-27 RX ADMIN — Medication 650 MILLIGRAM(S): at 10:14

## 2021-11-27 RX ADMIN — Medication 81 MILLIGRAM(S): at 12:49

## 2021-11-27 RX ADMIN — PIPERACILLIN AND TAZOBACTAM 25 GRAM(S): 4; .5 INJECTION, POWDER, LYOPHILIZED, FOR SOLUTION INTRAVENOUS at 05:39

## 2021-11-27 RX ADMIN — CEFTRIAXONE 100 MILLIGRAM(S): 500 INJECTION, POWDER, FOR SOLUTION INTRAMUSCULAR; INTRAVENOUS at 12:49

## 2021-11-27 RX ADMIN — FAMOTIDINE 20 MILLIGRAM(S): 10 INJECTION INTRAVENOUS at 12:50

## 2021-11-27 RX ADMIN — Medication 1 MILLIGRAM(S): at 12:49

## 2021-11-27 RX ADMIN — ENOXAPARIN SODIUM 40 MILLIGRAM(S): 100 INJECTION SUBCUTANEOUS at 12:49

## 2021-11-27 RX ADMIN — IMMUNE GLOBULIN (HUMAN) 41.67 GRAM(S): 10 INJECTION INTRAVENOUS; SUBCUTANEOUS at 10:52

## 2021-11-27 RX ADMIN — Medication 102 MILLIGRAM(S): at 10:03

## 2021-11-27 NOTE — PROGRESS NOTE ADULT - SUBJECTIVE AND OBJECTIVE BOX
[facility] -- Sveta Romano, Ebony Sparrow, Dereck Deleon Savella, [place]: Office # [phone]    Follow Up:  Pre and Postop Cardiac Optimization Hx of AFib HTn HLD    Subjective/Observations: Patient seen and examined. Patient awake, alert, resting comfortably in bed. No complaints of chest pain, dyspnea, palpitations or dizziness. Tolerating room air. Mild pain LE.    REVIEW OF SYSTEMS: All review of systems is negative for eye, ENT, GI, , allergic, dermatologic, musculoskeletal and neurologic except as described above    PAST MEDICAL & SURGICAL HISTORY:  Osteopenia    CLL (Chronic Lymphocytic Leukemia)  SINCE 1988    Avascular Necrosis of Femur Head  B/L    TMJ Syndrome  limited jaw opening due to TMJ    Herniated Cervical Disc    Bulging Disc  LUMBAR    Melanoma    Deviated nasal septum    Nasal congestion    Rosacea    Deviated nasal septum    Other specified disorders of nose and nasal sinuses    Anemia    Encephalitis    S/P Splenectomy  1993    Avascular Necrosis of Femur Head  RIGHT - CORE DECOMPRESSION- 1993    Avascular Necrosis of Femur Head  LEFT - CORE DECOMPRESSION - 1993    Abnormal Jaw Closure  LEFT JAW SURGERY - 1988    Status Post Eye Surgery X 3  3/2011 right eye scleral buckle; left eye in 2013    S/P Tonsillectomy  1960    Bilateral cataracts  removed in 2011        MEDICATIONS  (STANDING):  aspirin  chewable 81 milliGRAM(s) Oral daily  atorvastatin 80 milliGRAM(s) Oral at bedtime  cefTRIAXone   IVPB 2000 milliGRAM(s) IV Intermittent every 24 hours  enoxaparin Injectable 40 milliGRAM(s) SubCutaneous daily  famotidine    Tablet 20 milliGRAM(s) Oral daily  folic acid 1 milliGRAM(s) Oral daily    MEDICATIONS  (PRN):  acetaminophen     Tablet .. 650 milliGRAM(s) Oral every 6 hours PRN Temp greater or equal to 38C (100.4F)  aluminum hydroxide/magnesium hydroxide/simethicone Suspension 30 milliLiter(s) Oral every 4 hours PRN Dyspepsia  ibuprofen  Tablet. 600 milliGRAM(s) Oral every 6 hours PRN Moderate Pain (4 - 6)  ibuprofen  Tablet. 400 milliGRAM(s) Oral every 6 hours PRN Mild Pain (1 - 3)  melatonin 3 milliGRAM(s) Oral at bedtime PRN Insomnia    Allergies    No Known Allergies    Intolerances      Vital Signs Last 24 Hrs  T(C): 36.6 (27 Nov 2021 12:23), Max: 36.8 (26 Nov 2021 18:57)  T(F): 97.9 (27 Nov 2021 12:23), Max: 98.2 (26 Nov 2021 18:57)  HR: 80 (27 Nov 2021 12:23) (72 - 80)  BP: 122/69 (27 Nov 2021 12:23) (111/57 - 137/69)  BP(mean): --  RR: 17 (27 Nov 2021 12:23) (11 - 17)  SpO2: 96% (27 Nov 2021 12:23) (93% - 98%)  I&O's Summary    26 Nov 2021 07:01  -  27 Nov 2021 07:00  --------------------------------------------------------  IN: 340 mL / OUT: 1500 mL / NET: -1160 mL          TELE: Not on telemetry   PHYSICAL EXAM:  Appearance: NAD, no distress, alert, Well developed   HEENT: Moist Mucous Membranes, Anicteric  Cardiovascular: Regular rate and rhythm, Normal S1 S2, No JVD, No murmurs, No rubs, gallops or clicks  Respiratory: Non-labored, Clear to auscultation, No rales, No rhonchi, No wheezing.   Gastrointestinal:  Soft, Non-tender, + BS  Neurologic: Non-focal  Skin: + L LE ace wrap intact  Warm and dry, No visible rashes or ulcers, No ecchymosis, No cyanosis  Musculoskeletal: No clubbing, No cyanosis, No joint swelling/tenderness  Psychiatry: Mood & affect appropriate  Lymph: No peripheral edema.     LABS: All Labs Reviewed:                        8.9    8.27  )-----------( 317      ( 26 Nov 2021 05:40 )             28.2                         8.6    8.75  )-----------( 327      ( 25 Nov 2021 07:45 )             27.5     26 Nov 2021 05:40    142    |  109    |  17     ----------------------------<  99     4.1     |  29     |  1.10   25 Nov 2021 07:45    142    |  109    |  16     ----------------------------<  106    4.3     |  30     |  0.90     Ca    8.8        26 Nov 2021 05:40  Ca    9.3        25 Nov 2021 07:45    TPro  6.9    /  Alb  2.4    /  TBili  0.5    /  DBili  x      /  AST  12     /  ALT  20     /  AlkPhos  102    26 Nov 2021 05:40          D-Dimer Assay, Quantitative: 574 ng/mL DDU (11-22-21 @ 03:43)      12 Lead ECG:   Ventricular Rate 98 BPM    Atrial Rate 98 BPM    P-R Interval 148 ms    QRS Duration 100 ms    Q-T Interval 350 ms    QTC Calculation(Bazett) 446 ms    P Axis 52 degrees    R Axis 47 degrees    T Axis 60 degrees    Diagnosis Line Normal sinus rhythm  Normal ECG  Confirmed by EARL DELEON ([id number]) on 11/22/2021 12:52:57 PM (11-21-21 @ 20:10)    < from: TTE Echo Complete w/o Contrast w/ Doppler (09.15.21 @ 09:10) >   EXAM:  ECHO TTE WO CON COMP W DOPP    PROCEDURE DATE:  09/15/2021    INTERPRETATION:  INDICATION: CVA  Sonographer PH  Blood Pressure 140/60    Height 177 cm     Weight 71 kg       BSA 1.8 sq m  Dimensions:  LA unavailable       Normal Values: 2.0 - 4.0 cm  Ao unavailable        Normal Values: 2.0 - 3.8 cm  SEPTUM unavailable       Normal Values: 0.6 - 1.2 cm  PWT unavailable       Normal Values: 0.6 - 1.1 cm  LVIDd unavailable         Normal Values: 3.0 - 5.6 cm  LVIDs unavailable         Normal Values: 1.8 - 4.0 cm  OBSERVATIONS:  Technically difficult and limited study, patient unable to cooperate  Mitral Valve: normal, trace physiologic MR.  Aortic Valve/Aorta: Not well-visualized  Tricuspid Valve: normal with trace TR.  Pulmonic Valve: Not well-visualized  Left Atrium: normal  Right Atrium: Not well-visualized  Left Ventricle: normal LV size and systolic function, estimated LVEF of 60%.  Right Ventricle: Grossly normal size and systolic function.  Pericardium: no significant pericardial effusion.        IMPRESSION:  Technically difficult and limited study, patient unable to cooperate  Normal left ventricular internal dimensions and systolic function, estimated LVEF of 60%.  Grossly normal RV size and systolic function.  Aortic valve is not well-visualized  Trace physiologic MR and TR.  No significant pericardial effusion.    --- End of Report ---    MARTY IBRAHIM MD; Attending Cardiologist  < end of copied text >

## 2021-11-27 NOTE — PROGRESS NOTE ADULT - ASSESSMENT
65 yo male with history of CLL and MDS overlap syndrome, most recently treated with Gazyva X6 months/Venetoclax which was stopped in October 2021 2 months short of 1 year full therapy when he was admitted for autoimmune encephalitis which was treated with IVIG and steroids; unfortunately he suffered left foot fracture likely from prolonged steroid use  Patient presented on 11/22/21 with pain in his foot and noted to have fever 103; afebrile since    - Blood cxx NGTD  - On 11/26/21 underwent Bone bx by podiatry to eval for osteo  - continue antibiotics as per medicine and infectious diseases  - macrocytic anemia likely due to underlying MDS; iron studies c/w inflammation; will monitor CBC; no role for transfusion  - prophylactic dose IVIG (400mg/kg) today; will premed with tylenol/benadryl/decadron; patient was due for this in office on 11/29 but as he is likely not to be discharged by then, will administer today  - will follow with you

## 2021-11-27 NOTE — PROGRESS NOTE ADULT - ASSESSMENT
Patient is a 66 year old male with PMH of CLL on Venetoclax, MDS, Melanoma, Paroxysmal AFib (not on AC), HTN, HLD, came in for increased left foot pain and found with fever in ER.  Febrile in .5 and then fever 103F 11/22 afternoon -- afebrile since   Leukocytosis, h/o CLL - wbc normalized   L foot fracture with swelling, pain with very slight erythema, clinically not c/w cellulitis.   MRI L foot reviewed as above, soft tissue swelling and enhancement, rim enhancing fluid collection dorsal to middle cuneiform extending over base of 2nd metatarsal and small collection subjacent to middle cuneiform ?hematoma vs abscess. Multiple signal abnormalities as above - ddx charcot arthropathy, nondisplaced/stress fx, r/o infection, malignancy  Open biopsy, bone, foot 26-Nov-2021 19:14:28  Keiry Howard  Open drainage of left foot 26-Nov-2021 19:15:21  Keiry Howard.    Recommendations:  Blood cultures NGTD x2  No growth of MRSA or pseudomonas so will change to CEFTRIAXONE 11/27   will follow results of micro and other data to guide us  Podiatry and Ortho following  Monitor temps/cbc     We will follow along in the care of this patient. Please call us at [phone] or text me directly on my cell# at [phone] with any concerns.

## 2021-11-27 NOTE — PROGRESS NOTE ADULT - SUBJECTIVE AND OBJECTIVE BOX
[facility] DIVISION of INFECTIOUS DISEASE  Donny Coker MD PhD, Beverly Avitia MD, Cydney Pruitt MD, Liana Harden MD, Mp Huddleston MD  and providing coverage with Roberta Tam MD and Corin Espinal MD  Providing Infectious Disease Consultations at [facility], [facility]'s    Office# [phone] to schedule follow up appointments  Answering Service for urgent calls or New Consults [phone]  Cell# to text for urgent issues Donny Coker [phone]     infectious diseases progress note:    SARAHI BARNHART is a 66y y. o. Male patient    No concerning overnight events    Allergies    No Known Allergies    Intolerances        ANTIBIOTICS/RELEVANT:  antimicrobials  piperacillin/tazobactam IVPB.. 3.375 Gram(s) IV Intermittent every 8 hours    immunologic:    OTHER:  acetaminophen     Tablet .. 650 milliGRAM(s) Oral every 6 hours PRN  aluminum hydroxide/magnesium hydroxide/simethicone Suspension 30 milliLiter(s) Oral every 4 hours PRN  aspirin  chewable 81 milliGRAM(s) Oral daily  atorvastatin 80 milliGRAM(s) Oral at bedtime  enoxaparin Injectable 40 milliGRAM(s) SubCutaneous daily  famotidine    Tablet 20 milliGRAM(s) Oral daily  folic acid 1 milliGRAM(s) Oral daily  ibuprofen  Tablet. 600 milliGRAM(s) Oral every 6 hours PRN  ibuprofen  Tablet. 400 milliGRAM(s) Oral every 6 hours PRN  melatonin 3 milliGRAM(s) Oral at bedtime PRN      Objective:  Vital Signs Last 24 Hrs  T(C): 36.7 (27 Nov 2021 11:11), Max: 36.8 (26 Nov 2021 13:40)  T(F): 98 (27 Nov 2021 11:11), Max: 98.3 (26 Nov 2021 13:40)  HR: 77 (27 Nov 2021 11:11) (66 - 78)  BP: 118/67 (27 Nov 2021 11:11) (111/57 - 137/69)  BP(mean): --  RR: 17 (27 Nov 2021 11:11) (11 - 17)  SpO2: 98% (27 Nov 2021 11:11) (93% - 98%)    T(C): 36.7 (11-27-21 @ 11:11), Max: 37 (11-25-21 @ 13:11)  T(C): 36.7 (11-27-21 @ 11:11), Max: 37 (11-25-21 @ 13:11)  T(C): 36.7 (11-27-21 @ 11:11), Max: 37 (11-25-21 @ 13:11)    PHYSICAL EXAM:  HEENT: NC atraumatic  Neck: supple  Respiratory: no accessory muscle use, breathing comfortably  Cardiovascular: distant  Gastrointestinal: normal appearing, nondistended  Extremities: no clubbing, no cyanosis,        LABS:                          8.9    8.27  )-----------( 317      ( 26 Nov 2021 05:40 )             28.2       8.27 11-26 @ 05:40  8.75 11-25 @ 07:45  9.07 11-24 @ 09:31  10.50 11-23 @ 07:32  13.22 11-22 @ 10:49  11.89 11-21 @ 20:59      11-26    142  |  109<H>  |  17  ----------------------------<  99  4.1   |  29  |  1.10    Ca    8.8      26 Nov 2021 05:40    TPro  6.9  /  Alb  2.4<L>  /  TBili  0.5  /  DBili  x   /  AST  12<L>  /  ALT  20  /  AlkPhos  102  11-26      Creatinine, Serum: 1.10 mg/dL (11-26-21 @ 05:40)  Creatinine, Serum: 0.90 mg/dL (11-25-21 @ 07:45)  Creatinine, Serum: 1.00 mg/dL (11-24-21 @ 09:31)  Creatinine, Serum: 0.93 mg/dL (11-23-21 @ 07:32)  Creatinine, Serum: 1.10 mg/dL (11-21-21 @ 20:59)                INFLAMMATORY MARKERS  Auto Neutrophil #: 6.14 K/uL (11-23-21 @ 07:32)  Auto Lymphocyte #: 0.49 K/uL (11-23-21 @ 07:32)  Auto Neutrophil #: 8.32 K/uL (11-21-21 @ 20:59)  Auto Lymphocyte #: 0.48 K/uL (11-21-21 @ 20:59)    Lactate, Blood: 0.7 mmol/L (11-21-21 @ 23:46)    Auto Eosinophil #: 0.07 K/uL (11-23-21 @ 07:32)  Auto Eosinophil #: 0.00 K/uL (11-21-21 @ 20:59)                  Ferritin, Serum: 781 ng/mL (11-22-21 @ 09:13)        Activated Partial Thromboplastin Time: 33.8 sec (11-21-21 @ 20:59)  INR: 1.31 ratio (11-21-21 @ 20:59)    D-Dimer Assay, Quantitative: 574 ng/mL DDU (11-22-21 @ 03:43)        MICROBIOLOGY:              RADIOLOGY & ADDITIONAL STUDIES:

## 2021-11-27 NOTE — PROGRESS NOTE ADULT - SUBJECTIVE AND OBJECTIVE BOX
Neurology follow up note    SARAHI [id number]      Interval History:    Patient feels ok no new complaints.    Allergies    No Known Allergies    Intolerances        MEDICATIONS    acetaminophen     Tablet .. 650 milliGRAM(s) Oral every 6 hours PRN  aluminum hydroxide/magnesium hydroxide/simethicone Suspension 30 milliLiter(s) Oral every 4 hours PRN  aspirin  chewable 81 milliGRAM(s) Oral daily  atorvastatin 80 milliGRAM(s) Oral at bedtime  enoxaparin Injectable 40 milliGRAM(s) SubCutaneous daily  famotidine    Tablet 20 milliGRAM(s) Oral daily  folic acid 1 milliGRAM(s) Oral daily  ibuprofen  Tablet. 600 milliGRAM(s) Oral every 6 hours PRN  ibuprofen  Tablet. 400 milliGRAM(s) Oral every 6 hours PRN  melatonin 3 milliGRAM(s) Oral at bedtime PRN  piperacillin/tazobactam IVPB.. 3.375 Gram(s) IV Intermittent every 8 hours              Vital Signs Last 24 Hrs  T(C): 36.7 (27 Nov 2021 11:11), Max: 36.8 (26 Nov 2021 13:40)  T(F): 98 (27 Nov 2021 11:11), Max: 98.3 (26 Nov 2021 13:40)  HR: 77 (27 Nov 2021 11:11) (66 - 78)  BP: 118/67 (27 Nov 2021 11:11) (111/57 - 137/69)  BP(mean): --  RR: 17 (27 Nov 2021 11:11) (11 - 17)  SpO2: 98% (27 Nov 2021 11:11) (93% - 98%)    REVIEW OF SYSTEMS:  Constitutional:  The patient denies fever, chills, or night sweats.  Head:  No headache.  Eyes:  No double vision or blurry vision.  Ears:  No ringing in the ears.  Neck:  No neck pain.  Respiratory:  No shortness of breath.  Cardiovascular:  No chest pain.  Abdomen:  No nausea, vomiting, or abdominal pain.  Extremities/Neurological:  No numbness or tingling.  Musculoskeletal:  Positive pain in his left foot.    PHYSICAL EXAMINATION:   HEENT:  Head:  Normocephalic, atraumatic.  Eyes:  No scleral icterus.  Ears:  Hearing bilaterally intact.  NECK:  Supple.  RESPIRATORY:  Good air entry bilaterally.  CARDIOVASCULAR:  S1 and S2 heard.  ABDOMEN:  Soft and nontender.  EXTREMITIES:  No clubbing or cyanosis was noted.      NEUROLOGIC:  The patient is awake and alert.  Location was hospital, year was 2021, month was November.  Was able to name simple objects.  Recall was 1/3 without prompting, remained 1/3 with prompting, was able to name simple objects.  Follows complex commands.  Extraocular movements were intact.  Speech was fluent.  Smile was symmetric.  Motor:  Bilateral upper and lower were 4/5.  Sensory:  Bilateral upper and lower intact to light touch.                   LABS:  CBC Full  -  ( 26 Nov 2021 05:40 )  WBC Count : 8.27 K/uL  RBC Count : 2.67 M/uL  Hemoglobin : 8.9 g/dL  Hematocrit : 28.2 %  Platelet Count - Automated : 317 K/uL  Mean Cell Volume : 105.6 fl  Mean Cell Hemoglobin : 33.3 pg  Mean Cell Hemoglobin Concentration : 31.6 gm/dL  Auto Neutrophil # : x  Auto Lymphocyte # : x  Auto Monocyte # : x  Auto Eosinophil # : x  Auto Basophil # : x  Auto Neutrophil % : x  Auto Lymphocyte % : x  Auto Monocyte % : x  Auto Eosinophil % : x  Auto Basophil % : x      11-26    142  |  109<H>  |  17  ----------------------------<  99  4.1   |  29  |  1.10    Ca    8.8      26 Nov 2021 05:40    TPro  6.9  /  Alb  2.4<L>  /  TBili  0.5  /  DBili  x   /  AST  12<L>  /  ALT  20  /  AlkPhos  102  11-26    Hemoglobin A1C:     LIVER FUNCTIONS - ( 26 Nov 2021 05:40 )  Alb: 2.4 g/dL / Pro: 6.9 g/dL / ALK PHOS: 102 U/L / ALT: 20 U/L / AST: 12 U/L / GGT: x           Vitamin B12         RADIOLOGY      ANALYSIS AND PLAN:  This is a 66-year-old with an episode of altered mental status, history of autoimmune encephalitis.  For episode of altered mental status, generalized weakness, and tremors, as per my conversation with the spouse, this occurred when the patient was having fevers, suspect most likely metabolic.  Infection workup as needed.  For history of hypertension, continue the patient to monitor systolic blood pressure.  For history of hyperlipidemia, statin as needed.  S/P  biopsy   I do not see any clear signs to suggest reactivation of the patient's autoimmune encephalitis at present.    Spoke with the spouse, Javier, at [phone], alternate number is [phone] 11/25    Greater than 22 minutes of time was spent with the patient, plan of care, reviewing data, speaking to the multidisciplinary healthcare team with greater than 50% of that time in counseling and care coordination.    Thank you for the courtesy of this consultation.

## 2021-11-27 NOTE — PROGRESS NOTE ADULT - SUBJECTIVE AND OBJECTIVE BOX
HPI: 67 y/o M pt with PMH of CLL on Venetoclax, MDS, and melanoma seen bedside s/p left midfoot bone biopsy (DOS: 11/26/21). He states that he is not experience any pain to the foot at this time, but experiences throbbing from time to time. No other complaints at this time with the foot. He has been keeping it elevated with 1 pillow. Pt states that he has eaten since the surgery, has urinated and had a bowel movement. Pt denies nausea, vomiting, fever, chills, shortness of breath, chest pain and calf pain.    HPI on admission: Pt with recent hospitalization for what was suspected to be a cva, he was later diagnosed with autoimmune encephalitis. Pt notes that he began to have excruciating left foot pain about four weeks ago. He denies any trauma or inciting events. His outpt Podiatrist Dr. Anguiano did x-rays got an MRI, which did not show any obvious fractures but there were marrow edema within the midfoot indicating a possible stress reaction/fracture. Pt was place in an ACE wrap and CAM boot. He was instructed to rest his foot. Pt states that the pain was so bad over the weekend, that he felt an overall weakness and couldn't walk. He was fully ambulatory prior to this. Admits to a tingling sensation in the foot from time to time and states that pain is about a 6/10 at baseline on the top of his left foot.    PAST MEDICAL & SURGICAL HISTORY:  Osteopenia    CLL (Chronic Lymphocytic Leukemia)  SINCE 1988    Avascular Necrosis of Femur Head  B/L    TMJ Syndrome  limited jaw opening due to TMJ    Herniated Cervical Disc    Bulging Disc  LUMBAR    Melanoma    Deviated nasal septum    Nasal congestion    Rosacea    Deviated nasal septum    Other specified disorders of nose and nasal sinuses    Anemia    Encephalitis    S/P Splenectomy  1993    Avascular Necrosis of Femur Head  RIGHT - CORE DECOMPRESSION- 1993    Avascular Necrosis of Femur Head  LEFT - CORE DECOMPRESSION - 1993    Abnormal Jaw Closure  LEFT JAW SURGERY - 1988    Status Post Eye Surgery X 3  3/2011 right eye scleral buckle; left eye in 2013    S/P Tonsillectomy  1960    Bilateral cataracts  removed in 2011        Allergies    No Known Allergies    Intolerances    MEDICATIONS  (STANDING):  aspirin  chewable 81 milliGRAM(s) Oral daily  atorvastatin 80 milliGRAM(s) Oral at bedtime  cefTRIAXone   IVPB 2000 milliGRAM(s) IV Intermittent every 24 hours  enoxaparin Injectable 40 milliGRAM(s) SubCutaneous daily  famotidine    Tablet 20 milliGRAM(s) Oral daily  folic acid 1 milliGRAM(s) Oral daily    MEDICATIONS  (PRN):  acetaminophen     Tablet .. 650 milliGRAM(s) Oral every 6 hours PRN Temp greater or equal to 38C (100.4F)  aluminum hydroxide/magnesium hydroxide/simethicone Suspension 30 milliLiter(s) Oral every 4 hours PRN Dyspepsia  ibuprofen  Tablet. 600 milliGRAM(s) Oral every 6 hours PRN Moderate Pain (4 - 6)  ibuprofen  Tablet. 400 milliGRAM(s) Oral every 6 hours PRN Mild Pain (1 - 3)  melatonin 3 milliGRAM(s) Oral at bedtime PRN Insomnia    Social History:  Lives with wife for assists, Denies tobacco, alcohol or drug use. (22 Nov 2021 01:11)      FAMILY HISTORY:  Family history of multiple myeloma    Family history of malignant melanoma  age 89    FH: blood dyscrasia  MGUS    FH: hypertension  mother    FH: renal failure  mother    Vital Signs Last 24 Hrs  T(C): 36.6 (27 Nov 2021 12:23), Max: 36.8 (26 Nov 2021 18:57)  T(F): 97.9 (27 Nov 2021 12:23), Max: 98.2 (26 Nov 2021 18:57)  HR: 80 (27 Nov 2021 12:23) (72 - 80)  BP: 122/69 (27 Nov 2021 12:23) (111/57 - 137/69)  BP(mean): --  RR: 17 (27 Nov 2021 12:23) (11 - 17)  SpO2: 96% (27 Nov 2021 12:23) (93% - 98%)    PHYSICAL EXAM:  Vascular: DP & PT palpable bilaterally, Capillary refill 3 seconds, Digital hair present bilaterally. Non-pitting left foot edema. Left midfoot is warmer to touch than the remainder of the foot, otherwise skin temp wnl b/l.  Neurological: Light touch sensation intact bilaterally.  Musculoskeletal: Decreased pain on palpation at the left dorsal midfoot.  Dermatological: Surgical site at the left dorsal midfoot with sutures intact. Erythema at the left dorsal midfoot is improving. No other open wounds or lesions.

## 2021-11-27 NOTE — PROGRESS NOTE ADULT - SUBJECTIVE AND OBJECTIVE BOX
Patient seen and examined;  Chart reviewed and events noted;   had right foot biopsy/procedure; cultures pending; having some pain but overall feels well      MEDICATIONS  (STANDING):  aspirin  chewable 81 milliGRAM(s) Oral daily  atorvastatin 80 milliGRAM(s) Oral at bedtime  enoxaparin Injectable 40 milliGRAM(s) SubCutaneous daily  famotidine    Tablet 20 milliGRAM(s) Oral daily  folic acid 1 milliGRAM(s) Oral daily  immune   globulin 10% (GAMMAGARD) IVPB 25 Gram(s) IV Intermittent once  piperacillin/tazobactam IVPB.. 3.375 Gram(s) IV Intermittent every 8 hours    MEDICATIONS  (PRN):  acetaminophen     Tablet .. 650 milliGRAM(s) Oral every 6 hours PRN Temp greater or equal to 38C (100.4F)  aluminum hydroxide/magnesium hydroxide/simethicone Suspension 30 milliLiter(s) Oral every 4 hours PRN Dyspepsia  ibuprofen  Tablet. 600 milliGRAM(s) Oral every 6 hours PRN Moderate Pain (4 - 6)  ibuprofen  Tablet. 400 milliGRAM(s) Oral every 6 hours PRN Mild Pain (1 - 3)  melatonin 3 milliGRAM(s) Oral at bedtime PRN Insomnia      Vital Signs Last 24 Hrs  T(C): 36.5 (27 Nov 2021 05:06), Max: 36.8 (26 Nov 2021 13:40)  T(F): 97.7 (27 Nov 2021 05:06), Max: 98.3 (26 Nov 2021 13:40)  HR: 78 (27 Nov 2021 05:06) (66 - 78)  BP: 132/81 (27 Nov 2021 05:06) (111/57 - 137/69)  BP(mean): --  RR: 17 (27 Nov 2021 05:06) (11 - 17)  SpO2: 95% (27 Nov 2021 05:06) (93% - 98%)    PHYSICAL EXAM  General: adult in NAD  HEENT: clear oropharynx, anicteric sclera, pink conjunctivae  Neck: supple  CV: normal S1S2 with no murmur rubs or gallops  Lungs: clear to auscultation, no wheezes, no rhales  Abdomen: soft non-tender non-distended, no hepato/splenomegaly  Ext: left foot bandages C/D/I  Skin: no rashes and no petichiae  Neuro: alert and oriented X3 no focal deficits      LABS:                        8.9    8.27  )-----------( 317      ( 26 Nov 2021 05:40 )             28.2     Hemoglobin: 8.9 g/dL (11-26 @ 05:40)  Hemoglobin: 8.6 g/dL (11-25 @ 07:45)  Hemoglobin: 8.9 g/dL (11-24 @ 09:31)  Hemoglobin: 9.4 g/dL (11-23 @ 12:06)  Hemoglobin: 8.4 g/dL (11-23 @ 07:32)    11-26    142  |  109<H>  |  17  ----------------------------<  99  4.1   |  29  |  1.10    Ca    8.8      26 Nov 2021 05:40    TPro  6.9  /  Alb  2.4<L>  /  TBili  0.5  /  DBili  x   /  AST  12<L>  /  ALT  20  /  AlkPhos  102  11-26

## 2021-11-27 NOTE — PROGRESS NOTE ADULT - PROBLEM SELECTOR PLAN 1
Pt seen and evaluated.  s/p left midfoot bone biopsy POD #1  Assessed surgical site and applied xeroform, DSD and ACE wrap.  c/w elevation to the left lower extremity.  Pt is to continue non weight bearing to the left lower extremity.  f/u OR pathology results.  Pt will be followed by Podiatry while in house.

## 2021-11-27 NOTE — PROGRESS NOTE ADULT - SUBJECTIVE AND OBJECTIVE BOX
Patient is a 66y old  Male who presents with a chief complaint of fever (24 Nov 2021 13:06)  feels more alert  no pain presently      INTERVAL HPI/OVERNIGHT EVENTS:  T(C): 36.6 (11-27-21 @ 12:23), Max: 36.8 (11-26-21 @ 18:57)  HR: 80 (11-27-21 @ 12:23) (72 - 80)  BP: 122/69 (11-27-21 @ 12:23) (111/57 - 137/69)  RR: 17 (11-27-21 @ 12:23) (11 - 17)  SpO2: 96% (11-27-21 @ 12:23) (93% - 98%)  Wt(kg): --  I&O's Summary    26 Nov 2021 07:01  -  27 Nov 2021 07:00  --------------------------------------------------------  IN: 340 mL / OUT: 1500 mL / NET: -1160 mL        LABS:                        8.9    8.27  )-----------( 317      ( 26 Nov 2021 05:40 )             28.2     11-26    142  |  109<H>  |  17  ----------------------------<  99  4.1   |  29  |  1.10    Ca    8.8      26 Nov 2021 05:40    TPro  6.9  /  Alb  2.4<L>  /  TBili  0.5  /  DBili  x   /  AST  12<L>  /  ALT  20  /  AlkPhos  102  11-26        CAPILLARY BLOOD GLUCOSE                MEDICATIONS  (STANDING):  aspirin  chewable 81 milliGRAM(s) Oral daily  atorvastatin 80 milliGRAM(s) Oral at bedtime  cefTRIAXone   IVPB 2000 milliGRAM(s) IV Intermittent every 24 hours  enoxaparin Injectable 40 milliGRAM(s) SubCutaneous daily  famotidine    Tablet 20 milliGRAM(s) Oral daily  folic acid 1 milliGRAM(s) Oral daily    MEDICATIONS  (PRN):  acetaminophen     Tablet .. 650 milliGRAM(s) Oral every 6 hours PRN Temp greater or equal to 38C (100.4F)  aluminum hydroxide/magnesium hydroxide/simethicone Suspension 30 milliLiter(s) Oral every 4 hours PRN Dyspepsia  ibuprofen  Tablet. 600 milliGRAM(s) Oral every 6 hours PRN Moderate Pain (4 - 6)  ibuprofen  Tablet. 400 milliGRAM(s) Oral every 6 hours PRN Mild Pain (1 - 3)  melatonin 3 milliGRAM(s) Oral at bedtime PRN Insomnia      REVIEW OF SYSTEMS:  CONSTITUTIONAL: no further fever  EYES: No eye pain, visual disturbances, or discharge  ENMT:  No difficulty hearing, tinnitus, vertigo; No sinus or throat pain  NECK: No pain or stiffness  RESPIRATORY: No cough, wheezing, chills or hemoptysis; No shortness of breath  CARDIOVASCULAR: No chest pain, palpitations, dizziness, or leg swelling  GASTROINTESTINAL: No abdominal or epigastric pain. No nausea, vomiting, or hematemesis; No diarrhea or constipation. No melena or hematochezia.  GENITOURINARY: No dysuria, frequency, hematuria, or incontinence  NEUROLOGICAL: No headaches, memory loss, loss of strength, numbness, or tremors  SKIN: No itching, burning, rashes, or lesions   LYMPH NODES: No enlarged glands  ENDOCRINE: No heat or cold intolerance; No hair loss  MUSCULOSKELETAL: left foot pain  PSYCHIATRIC: No depression, anxiety, mood swings, or difficulty sleeping  HEME/LYMPH: No easy bruising, or bleeding gums  ALLERY AND IMMUNOLOGIC: No hives or eczema    RADIOLOGY & ADDITIONAL TESTS:    Imaging Personally Reviewed:  [x ] YES  [ ] NO    Consultant(s) Notes Reviewed:  [x ] YES  [ ] NO    PHYSICAL EXAM:  GENERAL: NAD, well-groomed, well-developed  HEAD:  Atraumatic, Normocephalic  EYES: EOMI, PERRLA, conjunctiva and sclera clear  ENMT: No tonsillar erythema, exudates, or enlargement; Moist mucous membranes, Good dentition, No lesions  NECK: Supple, No JVD, Normal thyroid  NERVOUS SYSTEM:  Alert & Oriented X3, Good concentration; Motor Strength 5/5 B/L upper and lower extremities; DTRs 2+ intact and symmetric  CHEST/LUNG: Clear to percussion bilaterally; No rales, rhonchi, wheezing, or rubs  HEART: Regular rate and rhythm; No murmurs, rubs, or gallops  ABDOMEN: Soft, Nontender, Nondistended; Bowel sounds present  EXTREMITIES:  2+ Peripheral Pulses, No clubbing, cyanosis, or edema  LYMPH: No lymphadenopathy noted  SKIN: No rashes or lesions    Care Discussed with Consultants/Other Providers [ x] YES  [ ] NO

## 2021-11-27 NOTE — PROGRESS NOTE ADULT - ASSESSMENT
65 yo male with history of CLL and MDS overlap syndrome, most recently treated with Gazyva X6 months/Venetoclax, autoimmune encephalitis and left foot fracture now with pain in foot admitted for foot pain bone bx planned by podiatry to eval for osteo today. Cardiology consulted for pre-op eval.     PAF/HLD  - Patient with episode of afib on 7/2020 admission  - SR on tele during previous admission in 9/2021   - EKG NSR with PACs  - No signs of ischemia or volume overload  - Echocardiogram 9/2021 unremarkable with EF of 60%  - BP and HR within normal limits  - continue aspirin  - Cont  Statin    Bone biopsy  - S/P Left LE bone biopsy, tolerated procedure well from CV standpoint  - wound care, pain management and abx per primary team/ID    - Monitor and replete lytes, keep K>4, Mg>2.  - All other medical needs as per primary team.  - Other cardiovascular workup will depend on clinical course.  - Will continue to follow.    Twila Alford, MS ANP, AGACNP  Nurse Practitioner- Cardiology   Spectra #[phone]

## 2021-11-27 NOTE — PROGRESS NOTE ADULT - SUBJECTIVE AND OBJECTIVE BOX
The patient was interviewed and evaluated.    66y Male    T(C): 36.5 (11-27-21 @ 05:06), Max: 36.8 (11-26-21 @ 13:40)  HR: 78 (11-27-21 @ 05:06) (66 - 78)  BP: 132/81 (11-27-21 @ 05:06) (111/57 - 137/69)  RR: 17 (11-27-21 @ 05:06) (11 - 17)  SpO2: 95% (11-27-21 @ 05:06) (93% - 98%)  Wt(kg): --    No Nausea/vomiting, recall, or headache.    No anesthesia related complaints or sequelae.

## 2021-11-28 PROCEDURE — 99232 SBSQ HOSP IP/OBS MODERATE 35: CPT

## 2021-11-28 RX ADMIN — ATORVASTATIN CALCIUM 80 MILLIGRAM(S): 80 TABLET, FILM COATED ORAL at 21:50

## 2021-11-28 RX ADMIN — Medication 1 MILLIGRAM(S): at 11:11

## 2021-11-28 RX ADMIN — Medication 3 MILLIGRAM(S): at 21:50

## 2021-11-28 RX ADMIN — ENOXAPARIN SODIUM 40 MILLIGRAM(S): 100 INJECTION SUBCUTANEOUS at 11:08

## 2021-11-28 RX ADMIN — Medication 600 MILLIGRAM(S): at 21:50

## 2021-11-28 RX ADMIN — CEFTRIAXONE 100 MILLIGRAM(S): 500 INJECTION, POWDER, FOR SOLUTION INTRAMUSCULAR; INTRAVENOUS at 11:07

## 2021-11-28 RX ADMIN — Medication 81 MILLIGRAM(S): at 11:11

## 2021-11-28 RX ADMIN — Medication 600 MILLIGRAM(S): at 23:00

## 2021-11-28 RX ADMIN — FAMOTIDINE 20 MILLIGRAM(S): 10 INJECTION INTRAVENOUS at 11:11

## 2021-11-28 NOTE — PROGRESS NOTE ADULT - SUBJECTIVE AND OBJECTIVE BOX
Neurology follow up note    SARAHI [id number]      Interval History:    Patient feels ok no new complaints.    Allergies    No Known Allergies    Intolerances        MEDICATIONS    acetaminophen     Tablet .. 650 milliGRAM(s) Oral every 6 hours PRN  aluminum hydroxide/magnesium hydroxide/simethicone Suspension 30 milliLiter(s) Oral every 4 hours PRN  aspirin  chewable 81 milliGRAM(s) Oral daily  atorvastatin 80 milliGRAM(s) Oral at bedtime  cefTRIAXone   IVPB 2000 milliGRAM(s) IV Intermittent every 24 hours  enoxaparin Injectable 40 milliGRAM(s) SubCutaneous daily  famotidine    Tablet 20 milliGRAM(s) Oral daily  folic acid 1 milliGRAM(s) Oral daily  ibuprofen  Tablet. 600 milliGRAM(s) Oral every 6 hours PRN  ibuprofen  Tablet. 400 milliGRAM(s) Oral every 6 hours PRN  melatonin 3 milliGRAM(s) Oral at bedtime PRN      REVIEW OF SYSTEMS:  Constitutional:  The patient denies fever, chills, or night sweats.  Head:  No headache.  Eyes:  No double vision or blurry vision.  Ears:  No ringing in the ears.  Neck:  No neck pain.  Respiratory:  No shortness of breath.  Cardiovascular:  No chest pain.  Abdomen:  No nausea, vomiting, or abdominal pain.  Extremities/Neurological:  No numbness or tingling.  Musculoskeletal:  Positive pain in his left foot.    PHYSICAL EXAMINATION:   HEENT:  Head:  Normocephalic, atraumatic.  Eyes:  No scleral icterus.  Ears:  Hearing bilaterally intact.  NECK:  Supple.  RESPIRATORY:  Good air entry bilaterally.  CARDIOVASCULAR:  S1 and S2 heard.  ABDOMEN:  Soft and nontender.  EXTREMITIES:  No clubbing or cyanosis was noted.      NEUROLOGIC:  The patient is awake and alert.  Location was hospital, year was 2021, month was November.  Was able to name simple objects.  Recall was 1/3 without prompting, remained 1/3 with prompting, was able to name simple objects.  Follows complex commands.  Extraocular movements were intact.  Speech was fluent.  Smile was symmetric.  Motor:  Bilateral upper and lower were 4/5.  Sensory:  Bilateral upper and lower intact to light touch.               Vital Signs Last 24 Hrs  T(C): 36.9 (28 Nov 2021 05:14), Max: 37.1 (27 Nov 2021 20:08)  T(F): 98.5 (28 Nov 2021 05:14), Max: 98.8 (27 Nov 2021 20:08)  HR: 78 (28 Nov 2021 05:14) (77 - 84)  BP: 135/79 (28 Nov 2021 05:14) (118/67 - 137/73)  BP(mean): --  RR: 16 (28 Nov 2021 05:14) (16 - 17)  SpO2: 94% (28 Nov 2021 05:14) (94% - 98%)                  LABS:            Hemoglobin A1C:       Vitamin B12         RADIOLOGY    ANALYSIS AND PLAN:  This is a 66-year-old with an episode of altered mental status, history of autoimmune encephalitis.  For episode of altered mental status, generalized weakness, and tremors, as per my conversation with the spouse, this occurred when the patient was having fevers, suspect most likely metabolic.  Infection workup as needed.  For history of hypertension, continue the patient to monitor systolic blood pressure.  For history of hyperlipidemia, statin as needed.  S/P  biopsy   I do not see any clear signs to suggest reactivation of the patient's autoimmune encephalitis at present.    Spoke with the spouse, Javier, at [phone], alternate number is [phone] 11/25    Greater than 22 minutes of time was spent with the patient, plan of care, reviewing data, speaking to the multidisciplinary healthcare team with greater than 50% of that time in counseling and care coordination.    Thank you for the courtesy of this consultation.

## 2021-11-28 NOTE — PROGRESS NOTE ADULT - SUBJECTIVE AND OBJECTIVE BOX
Patient is a 66y old  Male who presents with a chief complaint of fever (24 Nov 2021 13:06)  no complaints   resting comfortably      INTERVAL HPI/OVERNIGHT EVENTS:  T(C): 36.8 (11-28-21 @ 12:08), Max: 37.1 (11-27-21 @ 20:08)  HR: 72 (11-28-21 @ 12:08) (72 - 84)  BP: 109/67 (11-28-21 @ 12:08) (109/67 - 137/73)  RR: 16 (11-28-21 @ 12:08) (16 - 17)  SpO2: 94% (11-28-21 @ 12:08) (94% - 94%)  Wt(kg): --  I&O's Summary    27 Nov 2021 07:01  -  28 Nov 2021 07:00  --------------------------------------------------------  IN: 0 mL / OUT: 1050 mL / NET: -1050 mL    28 Nov 2021 07:01  -  28 Nov 2021 13:26  --------------------------------------------------------  IN: 50 mL / OUT: 0 mL / NET: 50 mL        LABS:              CAPILLARY BLOOD GLUCOSE                MEDICATIONS  (STANDING):  aspirin  chewable 81 milliGRAM(s) Oral daily  atorvastatin 80 milliGRAM(s) Oral at bedtime  cefTRIAXone   IVPB 2000 milliGRAM(s) IV Intermittent every 24 hours  enoxaparin Injectable 40 milliGRAM(s) SubCutaneous daily  famotidine    Tablet 20 milliGRAM(s) Oral daily  folic acid 1 milliGRAM(s) Oral daily    MEDICATIONS  (PRN):  acetaminophen     Tablet .. 650 milliGRAM(s) Oral every 6 hours PRN Temp greater or equal to 38C (100.4F)  aluminum hydroxide/magnesium hydroxide/simethicone Suspension 30 milliLiter(s) Oral every 4 hours PRN Dyspepsia  ibuprofen  Tablet. 600 milliGRAM(s) Oral every 6 hours PRN Moderate Pain (4 - 6)  ibuprofen  Tablet. 400 milliGRAM(s) Oral every 6 hours PRN Mild Pain (1 - 3)  melatonin 3 milliGRAM(s) Oral at bedtime PRN Insomnia      REVIEW OF SYSTEMS:  CONSTITUTIONAL: No fever, weight loss, or fatigue  EYES: No eye pain, visual disturbances, or discharge  ENMT:  No difficulty hearing, tinnitus, vertigo; No sinus or throat pain  NECK: No pain or stiffness  RESPIRATORY: No cough, wheezing, chills or hemoptysis; No shortness of breath  CARDIOVASCULAR: No chest pain, palpitations, dizziness, or leg swelling  GASTROINTESTINAL: No abdominal or epigastric pain. No nausea, vomiting, or hematemesis; No diarrhea or constipation. No melena or hematochezia.  GENITOURINARY: No dysuria, frequency, hematuria, or incontinence  NEUROLOGICAL: No headaches, memory loss, loss of strength, numbness, or tremors  SKIN: No itching, burning, rashes, or lesions   LYMPH NODES: No enlarged glands  ENDOCRINE: No heat or cold intolerance; No hair loss  MUSCULOSKELETAL: No joint pain or swelling; No muscle, back, or extremity pain  PSYCHIATRIC: No depression, anxiety, mood swings, or difficulty sleeping  HEME/LYMPH: No easy bruising, or bleeding gums  ALLERY AND IMMUNOLOGIC: No hives or eczema    RADIOLOGY & ADDITIONAL TESTS:    Imaging Personally Reviewed:  [ x] YES  [ ] NO    Consultant(s) Notes Reviewed:  [ x] YES  [ ] NO    PHYSICAL EXAM:  GENERAL: NAD, well-groomed, well-developed  HEAD:  Atraumatic, Normocephalic  EYES: EOMI, PERRLA, conjunctiva and sclera clear  ENMT: No tonsillar erythema, exudates, or enlargement; Moist mucous membranes, Good dentition, No lesions  NECK: Supple, No JVD, Normal thyroid  NERVOUS SYSTEM:  Alert & Oriented X3, Good concentration; Motor Strength 5/5 B/L upper and lower extremities; DTRs 2+ intact and symmetric  CHEST/LUNG: Clear to percussion bilaterally; No rales, rhonchi, wheezing, or rubs  HEART: Regular rate and rhythm; No murmurs, rubs, or gallops  ABDOMEN: Soft, Nontender, Nondistended; Bowel sounds present  EXTREMITIES:  2+ Peripheral Pulses, No clubbing, cyanosis, or edema, left foot dressing clean, dry, intact  LYMPH: No lymphadenopathy noted  SKIN: No rashes or lesions    Care Discussed with Consultants/Other Providers [x ] YES  [ ] NO

## 2021-11-28 NOTE — PROGRESS NOTE ADULT - ASSESSMENT
65 yo male with history of CLL and MDS overlap syndrome, most recently treated with Gazyva X6 months/Venetoclax which was stopped in October 2021 2 months short of 1 year full therapy when he was admitted for autoimmune encephalitis which was treated with IVIG and steroids; unfortunately he suffered left foot fracture likely from prolonged steroid use  Patient presented on 11/22/21 with pain in his foot and noted to have fever 103; afebrile since    - Blood cxx NGTD  - On 11/26/21 underwent Bone bx by podiatry to eval for osteo  - tissue culture negative; aspirate material from foot Staph Aureus  - continue antibiotics as per medicine and infectious diseases  - macrocytic anemia likely due to underlying MDS; iron studies c/w inflammation; will monitor CBC; no role for transfusion  - prophylactic dose IVIG (400mg/kg) administered on 11/27/21 without incident  - will follow with you

## 2021-11-28 NOTE — PROGRESS NOTE ADULT - SUBJECTIVE AND OBJECTIVE BOX
[facility] DIVISION of INFECTIOUS DISEASE  Donny Coker MD PhD, Beverly Avitia MD, Cydney Pruitt MD, Liana Harden MD, Mp Huddleston MD  and providing coverage with Roberta Tam MD and Corin Espinal MD  Providing Infectious Disease Consultations at [facility], [facility]'s    Office# [phone] to schedule follow up appointments  Answering Service for urgent calls or New Consults [phone]  Cell# to text for urgent issues Donny Coker [phone]     infectious diseases progress note:    SARAHI BARNHART is a 66y y. o. Male patient    No concerning overnight events    Allergies    No Known Allergies    Intolerances        ANTIBIOTICS/RELEVANT:  antimicrobials  cefTRIAXone   IVPB 2000 milliGRAM(s) IV Intermittent every 24 hours    immunologic:    OTHER:  acetaminophen     Tablet .. 650 milliGRAM(s) Oral every 6 hours PRN  aluminum hydroxide/magnesium hydroxide/simethicone Suspension 30 milliLiter(s) Oral every 4 hours PRN  aspirin  chewable 81 milliGRAM(s) Oral daily  atorvastatin 80 milliGRAM(s) Oral at bedtime  enoxaparin Injectable 40 milliGRAM(s) SubCutaneous daily  famotidine    Tablet 20 milliGRAM(s) Oral daily  folic acid 1 milliGRAM(s) Oral daily  ibuprofen  Tablet. 600 milliGRAM(s) Oral every 6 hours PRN  ibuprofen  Tablet. 400 milliGRAM(s) Oral every 6 hours PRN  melatonin 3 milliGRAM(s) Oral at bedtime PRN      Objective:  Vital Signs Last 24 Hrs  T(C): 36.9 (28 Nov 2021 05:14), Max: 37.1 (27 Nov 2021 20:08)  T(F): 98.5 (28 Nov 2021 05:14), Max: 98.8 (27 Nov 2021 20:08)  HR: 78 (28 Nov 2021 05:14) (78 - 84)  BP: 135/79 (28 Nov 2021 05:14) (122/69 - 137/73)  BP(mean): --  RR: 16 (28 Nov 2021 05:14) (16 - 17)  SpO2: 94% (28 Nov 2021 05:14) (94% - 96%)    T(C): 36.9 (11-28-21 @ 05:14), Max: 37.1 (11-27-21 @ 20:08)  T(C): 36.9 (11-28-21 @ 05:14), Max: 37.1 (11-27-21 @ 20:08)  T(C): 36.9 (11-28-21 @ 05:14), Max: 37.1 (11-27-21 @ 20:08)    PHYSICAL EXAM:  HEENT: NC atraumatic  Neck: supple  Respiratory: no accessory muscle use, breathing comfortably  Cardiovascular: distant  Gastrointestinal: normal appearing, nondistended  Extremities: no clubbing, no cyanosis, foot wrapped        LABS:        8.27 11-26 @ 05:40  8.75 11-25 @ 07:45  9.07 11-24 @ 09:31  10.50 11-23 @ 07:32  13.22 11-22 @ 10:49  11.89 11-21 @ 20:59              Creatinine, Serum: 1.10 mg/dL (11-26-21 @ 05:40)  Creatinine, Serum: 0.90 mg/dL (11-25-21 @ 07:45)  Creatinine, Serum: 1.00 mg/dL (11-24-21 @ 09:31)  Creatinine, Serum: 0.93 mg/dL (11-23-21 @ 07:32)  Creatinine, Serum: 1.10 mg/dL (11-21-21 @ 20:59)                INFLAMMATORY MARKERS  Auto Neutrophil #: 6.14 K/uL (11-23-21 @ 07:32)  Auto Lymphocyte #: 0.49 K/uL (11-23-21 @ 07:32)  Auto Neutrophil #: 8.32 K/uL (11-21-21 @ 20:59)  Auto Lymphocyte #: 0.48 K/uL (11-21-21 @ 20:59)    Lactate, Blood: 0.7 mmol/L (11-21-21 @ 23:46)    Auto Eosinophil #: 0.07 K/uL (11-23-21 @ 07:32)  Auto Eosinophil #: 0.00 K/uL (11-21-21 @ 20:59)                  Ferritin, Serum: 781 ng/mL (11-22-21 @ 09:13)        Activated Partial Thromboplastin Time: 33.8 sec (11-21-21 @ 20:59)  INR: 1.31 ratio (11-21-21 @ 20:59)    D-Dimer Assay, Quantitative: 574 ng/mL DDU (11-22-21 @ 03:43)        MICROBIOLOGY:    Culture - Aspirate with Gram Stain (11.27.21 @ 00:38)    Specimen Source: .Aspirate    Culture Results:   Moderate Staphylococcus aureus        RADIOLOGY & ADDITIONAL STUDIES:

## 2021-11-28 NOTE — PROGRESS NOTE ADULT - ASSESSMENT
Patient is a 66 year old male with PMH of CLL on Venetoclax, MDS, Melanoma, Paroxysmal AFib (not on AC), HTN, HLD, came in for increased left foot pain and found with fever in ER.  Febrile in .5 and then fever 103F 11/22 afternoon -- afebrile since   Leukocytosis, h/o CLL - wbc normalized   L foot fracture with swelling, pain with very slight erythema, clinically not c/w cellulitis.   MRI L foot reviewed as above, soft tissue swelling and enhancement, rim enhancing fluid collection dorsal to middle cuneiform extending over base of 2nd metatarsal and small collection subjacent to middle cuneiform ?hematoma vs abscess. Multiple signal abnormalities as above - ddx charcot arthropathy, nondisplaced/stress fx, r/o infection, malignancy  Open biopsy, bone, foot 26-Nov-2021 19:14:28  Keiry Howard  Open drainage of left foot 26-Nov-2021 19:15:21  Keiry Howard.    Recommendations:  Blood cultures NGTD x2  No growth of MRSA or pseudomonas changed CEFTRIAXONE 11/27 11/28-noted staph aureus in culture but no sens yet so continue current abx for now pending sensitivities and path    will follow results of micro and other data to guide us  Podiatry and Ortho following  Monitor temps/cbc     We will follow along in the care of this patient. Please call us at [phone] or text me directly on my cell# at [phone] with any concerns.

## 2021-11-28 NOTE — PROGRESS NOTE ADULT - SUBJECTIVE AND OBJECTIVE BOX
[facility] -- Sveta Romano, Ebony Sparrow, Dereck Deleon Savella, Goodger: Office # [phone]    Follow Up:  Pre and Postop Cardiac Optimization Afib HTN HLD    Subjective/Observations:   Patient seen and examined. Patient awake, alert, resting comfortably in bed. No complaints of chest pain, dyspnea, palpitations or dizziness. Tolerating room air. Mild pain LE.  REVIEW OF SYSTEMS: All review of systems is negative for eye, ENT, GI, , allergic, dermatologic, musculoskeletal and neurologic except as described above    PAST MEDICAL & SURGICAL HISTORY:  Osteopenia    CLL (Chronic Lymphocytic Leukemia)  SINCE 1988    Avascular Necrosis of Femur Head  B/L    TMJ Syndrome  limited jaw opening due to TMJ    Herniated Cervical Disc    Bulging Disc  LUMBAR    Melanoma    Deviated nasal septum    Nasal congestion    Rosacea    Deviated nasal septum    Other specified disorders of nose and nasal sinuses    Anemia    Encephalitis    S/P Splenectomy  1993    Avascular Necrosis of Femur Head  RIGHT - CORE DECOMPRESSION- 1993    Avascular Necrosis of Femur Head  LEFT - CORE DECOMPRESSION - 1993    Abnormal Jaw Closure  LEFT JAW SURGERY - 1988    Status Post Eye Surgery X 3  3/2011 right eye scleral buckle; left eye in 2013    S/P Tonsillectomy  1960    Bilateral cataracts  removed in 2011        MEDICATIONS  (STANDING):  aspirin  chewable 81 milliGRAM(s) Oral daily  atorvastatin 80 milliGRAM(s) Oral at bedtime  cefTRIAXone   IVPB 2000 milliGRAM(s) IV Intermittent every 24 hours  enoxaparin Injectable 40 milliGRAM(s) SubCutaneous daily  famotidine    Tablet 20 milliGRAM(s) Oral daily  folic acid 1 milliGRAM(s) Oral daily    MEDICATIONS  (PRN):  acetaminophen     Tablet .. 650 milliGRAM(s) Oral every 6 hours PRN Temp greater or equal to 38C (100.4F)  aluminum hydroxide/magnesium hydroxide/simethicone Suspension 30 milliLiter(s) Oral every 4 hours PRN Dyspepsia  ibuprofen  Tablet. 600 milliGRAM(s) Oral every 6 hours PRN Moderate Pain (4 - 6)  ibuprofen  Tablet. 400 milliGRAM(s) Oral every 6 hours PRN Mild Pain (1 - 3)  melatonin 3 milliGRAM(s) Oral at bedtime PRN Insomnia    Allergies    No Known Allergies    Intolerances      Vital Signs Last 24 Hrs  T(C): 36.8 (28 Nov 2021 12:08), Max: 37.1 (27 Nov 2021 20:08)  T(F): 98.3 (28 Nov 2021 12:08), Max: 98.8 (27 Nov 2021 20:08)  HR: 72 (28 Nov 2021 12:08) (72 - 84)  BP: 109/67 (28 Nov 2021 12:08) (109/67 - 137/73)  BP(mean): --  RR: 16 (28 Nov 2021 12:08) (16 - 17)  SpO2: 94% (28 Nov 2021 12:08) (94% - 94%)  I&O's Summary    27 Nov 2021 07:01  -  28 Nov 2021 07:00  --------------------------------------------------------  IN: 0 mL / OUT: 1050 mL / NET: -1050 mL    28 Nov 2021 07:01  -  28 Nov 2021 12:47  --------------------------------------------------------  IN: 50 mL / OUT: 0 mL / NET: 50 mL          TELE: Not on telemetry   PHYSICAL EXAM:  Appearance: NAD, no distress, alert, Well developed   HEENT: Moist Mucous Membranes, Anicteric  Cardiovascular: Regular rate and rhythm, Normal S1 S2, No JVD, No murmurs, No rubs, gallops or clicks  Respiratory: Non-labored, Clear to auscultation, No rales, No rhonchi, No wheezing.   Gastrointestinal:  Soft, Non-tender, + BS  Neurologic: Non-focal  Skin: + LLE acewrap  Warm and dry, No visible rashes or ulcers, No ecchymosis, No cyanosis  Musculoskeletal: No clubbing, No cyanosis, No joint swelling/tenderness  Psychiatry: Mood & affect appropriate  Lymph: No peripheral edema.     LABS: All Labs Reviewed:                        8.9    8.27  )-----------( 317      ( 26 Nov 2021 05:40 )             28.2     26 Nov 2021 05:40    142    |  109    |  17     ----------------------------<  99     4.1     |  29     |  1.10     Ca    8.8        26 Nov 2021 05:40    TPro  6.9    /  Alb  2.4    /  TBili  0.5    /  DBili  x      /  AST  12     /  ALT  20     /  AlkPhos  102    26 Nov 2021 05:40          D-Dimer Assay, Quantitative: 574 ng/mL DDU (11-22-21 @ 03:43)    12 Lead ECG:   Ventricular Rate 98 BPM  Atrial Rate 98 BPM  P-R Interval 148 ms  QRS Duration 100 ms  Q-T Interval 350 ms  QTC Calculation(Bazett) 446 ms  P Axis 52 degrees  R Axis 47 degrees  T Axis 60 degrees  Diagnosis Line Normal sinus rhythm  Normal ECG  Confirmed by EARL DELEON ([id number]) on 11/22/2021 12:52:57 PM (11-21-21 @ 20:10)    < from: TTE Echo Complete w/o Contrast w/ Doppler (09.15.21 @ 09:10) >   EXAM:  ECHO TTE WO CON COMP W DOPP    PROCEDURE DATE:  09/15/2021    INTERPRETATION:  INDICATION: CVA  Sonographer PH  Blood Pressure 140/60    Height 177 cm     Weight 71 kg       BSA 1.8 sq m  Dimensions:  LA unavailable       Normal Values: 2.0 - 4.0 cm  Ao unavailable        Normal Values: 2.0 - 3.8 cm  SEPTUM unavailable       Normal Values: 0.6 - 1.2 cm  PWT unavailable       Normal Values: 0.6 - 1.1 cm  LVIDd unavailable         Normal Values: 3.0 - 5.6 cm  LVIDs unavailable         Normal Values: 1.8 - 4.0 cm  OBSERVATIONS:  Technically difficult and limited study, patient unable to cooperate  Mitral Valve: normal, trace physiologic MR.  Aortic Valve/Aorta: Not well-visualized  Tricuspid Valve: normal with trace TR.  Pulmonic Valve: Not well-visualized  Left Atrium: normal  Right Atrium: Not well-visualized  Left Ventricle: normal LV size and systolic function, estimated LVEF of 60%.  Right Ventricle: Grossly normal size and systolic function.  Pericardium: no significant pericardial effusion.        IMPRESSION:  Technically difficult and limited study, patient unable to cooperate  Normal left ventricular internal dimensions and systolic function, estimated LVEF of 60%.  Grossly normal RV size and systolic function.  Aortic valve is not well-visualized  Trace physiologic MR and TR.  No significant pericardial effusion.    --- End of Report ---    MARTY IBRAHIM MD; Attending Cardiologist  < end of copied text >

## 2021-11-28 NOTE — PROGRESS NOTE ADULT - SUBJECTIVE AND OBJECTIVE BOX
Patient seen and examined;  Chart reviewed and events noted;   Feels about the same; frustrated at having to remain in the hospital      MEDICATIONS  (STANDING):  aspirin  chewable 81 milliGRAM(s) Oral daily  atorvastatin 80 milliGRAM(s) Oral at bedtime  cefTRIAXone   IVPB 2000 milliGRAM(s) IV Intermittent every 24 hours  enoxaparin Injectable 40 milliGRAM(s) SubCutaneous daily  famotidine    Tablet 20 milliGRAM(s) Oral daily  folic acid 1 milliGRAM(s) Oral daily    MEDICATIONS  (PRN):  acetaminophen     Tablet .. 650 milliGRAM(s) Oral every 6 hours PRN Temp greater or equal to 38C (100.4F)  aluminum hydroxide/magnesium hydroxide/simethicone Suspension 30 milliLiter(s) Oral every 4 hours PRN Dyspepsia  ibuprofen  Tablet. 600 milliGRAM(s) Oral every 6 hours PRN Moderate Pain (4 - 6)  ibuprofen  Tablet. 400 milliGRAM(s) Oral every 6 hours PRN Mild Pain (1 - 3)  melatonin 3 milliGRAM(s) Oral at bedtime PRN Insomnia      Vital Signs Last 24 Hrs  T(C): 36.9 (28 Nov 2021 05:14), Max: 37.1 (27 Nov 2021 20:08)  T(F): 98.5 (28 Nov 2021 05:14), Max: 98.8 (27 Nov 2021 20:08)  HR: 78 (28 Nov 2021 05:14) (77 - 84)  BP: 135/79 (28 Nov 2021 05:14) (118/67 - 137/73)  BP(mean): --  RR: 16 (28 Nov 2021 05:14) (16 - 17)  SpO2: 94% (28 Nov 2021 05:14) (94% - 98%)    PHYSICAL EXAM  General: adult in NAD  HEENT: clear oropharynx, anicteric sclera, pink conjunctivae  Neck: supple  CV: normal S1S2 with no murmur rubs or gallops  Lungs: clear to auscultation, no wheezes, no rhales  Abdomen: soft non-tender non-distended, no hepato/splenomegaly  Ext: left foot dressing C/D/I  Skin: no rashes and no petichiae  Neuro: alert and oriented X3 no focal deficits    no new labs today

## 2021-11-28 NOTE — PROGRESS NOTE ADULT - ASSESSMENT
67 yo male with history of CLL and MDS overlap syndrome, most recently treated with Gazyva X6 months/Venetoclax, autoimmune encephalitis and left foot fracture now with pain in foot admitted for foot pain bone bx planned by podiatry to eval for osteo today. Cardiology consulted for pre-op eval.     PAF/HLD  - Patient with episode of afib on 7/2020 admission  - SR on tele during previous admission in 9/2021   - EKG NSR with PACs  - No signs of ischemia or volume overload  - Echocardiogram 9/2021 unremarkable with EF of 60%  - BP and HR within normal limits  - continue aspirin  - Cont  Statin    Bone biopsy  - S/P Left LE bone biopsy, tolerated procedure well from CV standpoint  - wound care, pain management and abx per primary team/ID    - Monitor and replete lytes, keep K>4, Mg>2.  - All other medical needs as per primary team.  - Other cardiovascular workup will depend on clinical course.  - Will continue to follow.    Twila Alford, MS ANP, AGACNP  Nurse Practitioner- Cardiology   Spectra #[phone]

## 2021-11-29 LAB
-  AMPICILLIN/SULBACTAM: SIGNIFICANT CHANGE UP
-  CEFAZOLIN: SIGNIFICANT CHANGE UP
-  CLINDAMYCIN: SIGNIFICANT CHANGE UP
-  DAPTOMYCIN: SIGNIFICANT CHANGE UP
-  ERYTHROMYCIN: SIGNIFICANT CHANGE UP
-  GENTAMICIN: SIGNIFICANT CHANGE UP
-  LINEZOLID: SIGNIFICANT CHANGE UP
-  OXACILLIN: SIGNIFICANT CHANGE UP
-  PENICILLIN: SIGNIFICANT CHANGE UP
-  RIFAMPIN: SIGNIFICANT CHANGE UP
-  TETRACYCLINE: SIGNIFICANT CHANGE UP
-  TRIMETHOPRIM/SULFAMETHOXAZOLE: SIGNIFICANT CHANGE UP
-  VANCOMYCIN: SIGNIFICANT CHANGE UP
ANION GAP SERPL CALC-SCNC: 4 MMOL/L — LOW (ref 5–17)
BUN SERPL-MCNC: 17 MG/DL — SIGNIFICANT CHANGE UP (ref 7–23)
CALCIUM SERPL-MCNC: 9.5 MG/DL — SIGNIFICANT CHANGE UP (ref 8.5–10.1)
CHLORIDE SERPL-SCNC: 106 MMOL/L — SIGNIFICANT CHANGE UP (ref 96–108)
CO2 SERPL-SCNC: 31 MMOL/L — SIGNIFICANT CHANGE UP (ref 22–31)
CREAT SERPL-MCNC: 0.93 MG/DL — SIGNIFICANT CHANGE UP (ref 0.5–1.3)
GLUCOSE SERPL-MCNC: 94 MG/DL — SIGNIFICANT CHANGE UP (ref 70–99)
HCT VFR BLD CALC: 29.9 % — LOW (ref 39–50)
HGB BLD-MCNC: 9.3 G/DL — LOW (ref 13–17)
MCHC RBC-ENTMCNC: 31.1 GM/DL — LOW (ref 32–36)
MCHC RBC-ENTMCNC: 33.2 PG — SIGNIFICANT CHANGE UP (ref 27–34)
MCV RBC AUTO: 106.8 FL — HIGH (ref 80–100)
METHOD TYPE: SIGNIFICANT CHANGE UP
NRBC # BLD: 3 /100 WBCS — HIGH (ref 0–0)
PLATELET # BLD AUTO: 369 K/UL — SIGNIFICANT CHANGE UP (ref 150–400)
POTASSIUM SERPL-MCNC: 4 MMOL/L — SIGNIFICANT CHANGE UP (ref 3.5–5.3)
POTASSIUM SERPL-SCNC: 4 MMOL/L — SIGNIFICANT CHANGE UP (ref 3.5–5.3)
RBC # BLD: 2.8 M/UL — LOW (ref 4.2–5.8)
RBC # FLD: 16.7 % — HIGH (ref 10.3–14.5)
SODIUM SERPL-SCNC: 141 MMOL/L — SIGNIFICANT CHANGE UP (ref 135–145)
WBC # BLD: 5.61 K/UL — SIGNIFICANT CHANGE UP (ref 3.8–10.5)
WBC # FLD AUTO: 5.61 K/UL — SIGNIFICANT CHANGE UP (ref 3.8–10.5)

## 2021-11-29 PROCEDURE — 99231 SBSQ HOSP IP/OBS SF/LOW 25: CPT | Mod: GC

## 2021-11-29 PROCEDURE — 99232 SBSQ HOSP IP/OBS MODERATE 35: CPT

## 2021-11-29 RX ORDER — LINEZOLID 600 MG/300ML
600 INJECTION, SOLUTION INTRAVENOUS EVERY 12 HOURS
Refills: 0 | Status: DISCONTINUED | OUTPATIENT
Start: 2021-11-29 | End: 2021-12-01

## 2021-11-29 RX ADMIN — Medication 400 MILLIGRAM(S): at 22:20

## 2021-11-29 RX ADMIN — CEFTRIAXONE 100 MILLIGRAM(S): 500 INJECTION, POWDER, FOR SOLUTION INTRAMUSCULAR; INTRAVENOUS at 11:43

## 2021-11-29 RX ADMIN — LINEZOLID 600 MILLIGRAM(S): 600 INJECTION, SOLUTION INTRAVENOUS at 16:13

## 2021-11-29 RX ADMIN — FAMOTIDINE 20 MILLIGRAM(S): 10 INJECTION INTRAVENOUS at 11:15

## 2021-11-29 RX ADMIN — Medication 81 MILLIGRAM(S): at 11:15

## 2021-11-29 RX ADMIN — ATORVASTATIN CALCIUM 80 MILLIGRAM(S): 80 TABLET, FILM COATED ORAL at 21:17

## 2021-11-29 RX ADMIN — ENOXAPARIN SODIUM 40 MILLIGRAM(S): 100 INJECTION SUBCUTANEOUS at 11:16

## 2021-11-29 RX ADMIN — Medication 1 MILLIGRAM(S): at 11:15

## 2021-11-29 RX ADMIN — Medication 400 MILLIGRAM(S): at 21:19

## 2021-11-29 NOTE — PROGRESS NOTE ADULT - ASSESSMENT
67 yo male with history of CLL and MDS overlap syndrome, most recently treated with Gazyva X6 months/Venetoclax which was stopped in October 2021 2 months short of 1 year full therapy when he was admitted for autoimmune encephalitis which was treated with IVIG and steroids; unfortunately he suffered left foot fracture likely from prolonged steroid use  Patient presented on 11/22/21 with pain in his foot and noted to have fever 103; afebrile since    - Blood cxx NGTD  - On 11/26/21 underwent Bone bx by podiatry to eval for osteo  - tissue culture negative; aspirate material from foot Staph Aureus  - continue antibiotics as per medicine and infectious diseases  - macrocytic anemia likely due to underlying MDS; iron studies c/w inflammation; will monitor CBC; no role for transfusion  - prophylactic dose IVIG (400mg/kg) administered on 11/27/21 without incident  - will follow with you

## 2021-11-29 NOTE — PROGRESS NOTE ADULT - SUBJECTIVE AND OBJECTIVE BOX
[facility] -- Sveta Romano, Ebony Sparrow, Dereck Deleon Savella, Goodger: Office # [phone]    Follow Up:   Pre and Postop Cardiac Optimization Hx of AFib HTN HLD    Subjective/Observations: Patient seen and examined, awake, alert, resting comfortably in bed, no complaints of chest pain, dyspnea, palpitations or dizziness, orthopnea and PND. Tolerating room air. left foot dressing intact     REVIEW OF SYSTEMS: All review of systems is negative for eye, ENT, GI, , allergic, dermatologic, musculoskeletal and neurologic except as described above    PAST MEDICAL & SURGICAL HISTORY:  Osteopenia    CLL (Chronic Lymphocytic Leukemia)  SINCE 1988    Avascular Necrosis of Femur Head  B/L    TMJ Syndrome  limited jaw opening due to TMJ    Herniated Cervical Disc    Bulging Disc  LUMBAR    Melanoma    Deviated nasal septum    Nasal congestion    Rosacea    Deviated nasal septum    Other specified disorders of nose and nasal sinuses    Anemia    Encephalitis    S/P Splenectomy  1993    Avascular Necrosis of Femur Head  RIGHT - CORE DECOMPRESSION- 1993    Avascular Necrosis of Femur Head  LEFT - CORE DECOMPRESSION - 1993    Abnormal Jaw Closure  LEFT JAW SURGERY - 1988    Status Post Eye Surgery X 3  3/2011 right eye scleral buckle; left eye in 2013    S/P Tonsillectomy  1960    Bilateral cataracts  removed in 2011        MEDICATIONS  (STANDING):  aspirin  chewable 81 milliGRAM(s) Oral daily  atorvastatin 80 milliGRAM(s) Oral at bedtime  enoxaparin Injectable 40 milliGRAM(s) SubCutaneous daily  famotidine    Tablet 20 milliGRAM(s) Oral daily  folic acid 1 milliGRAM(s) Oral daily  linezolid    Tablet 600 milliGRAM(s) Oral every 12 hours    MEDICATIONS  (PRN):  acetaminophen     Tablet .. 650 milliGRAM(s) Oral every 6 hours PRN Temp greater or equal to 38C (100.4F)  aluminum hydroxide/magnesium hydroxide/simethicone Suspension 30 milliLiter(s) Oral every 4 hours PRN Dyspepsia  ibuprofen  Tablet. 600 milliGRAM(s) Oral every 6 hours PRN Moderate Pain (4 - 6)  ibuprofen  Tablet. 400 milliGRAM(s) Oral every 6 hours PRN Mild Pain (1 - 3)  melatonin 3 milliGRAM(s) Oral at bedtime PRN Insomnia    Allergies    No Known Allergies    Intolerances      Vital Signs Last 24 Hrs  T(C): 36.6 (29 Nov 2021 08:40), Max: 37 (28 Nov 2021 19:55)  T(F): 97.8 (29 Nov 2021 08:40), Max: 98.6 (28 Nov 2021 19:55)  HR: 64 (29 Nov 2021 08:40) (64 - 74)  BP: 125/79 (29 Nov 2021 08:40) (125/79 - 143/74)  RR: 16 (29 Nov 2021 08:40) (16 - 17)  SpO2: 99% (29 Nov 2021 08:40) (95% - 99%)  I&O's Summary    28 Nov 2021 07:01  -  29 Nov 2021 07:00  --------------------------------------------------------  IN: 50 mL / OUT: 0 mL / NET: 50 mL          TELE: Not on telemetry   PHYSICAL EXAM:  Appearance: NAD, no distress, alert,  HEENT: Moist Mucous Membranes, Anicteric  Cardiovascular: Regular rate and rhythm, Normal S1 S2, No JVD, No murmurs, No rubs, gallops or clicks  Respiratory: Non-labored, Clear to auscultation, No rales, No rhonchi, No wheezing.   Gastrointestinal:  Soft, Non-tender, + BS  Neurologic: Non-focal  Skin: Warm and dry, No visible rashes or ulcers, No ecchymosis, No cyanosis  Musculoskeletal: No clubbing, No cyanosis, No joint swelling/tenderness  Psychiatry: Mood & affect appropriate  Lymph: No peripheral edema.     LABS: All Labs Reviewed:                        9.3    5.61  )-----------( 369      ( 29 Nov 2021 08:09 )             29.9     29 Nov 2021 08:09    141    |  106    |  17     ----------------------------<  94     4.0     |  31     |  0.93     Ca    9.5        29 Nov 2021 08:09      12 Lead ECG:   Ventricular Rate 98 BPM    Atrial Rate 98 BPM    P-R Interval 148 ms    QRS Duration 100 ms    Q-T Interval 350 ms    QTC Calculation(Bazett) 446 ms    P Axis 52 degrees    R Axis 47 degrees    T Axis 60 degrees    Diagnosis Line Normal sinus rhythm  Normal ECG  Confirmed by EARL DELEON ([id number]) on 11/22/2021 12:52:57 PM (11-21-21 @ 20:10)    < from: Xray Chest 1 View AP/PA (11.21.21 @ 21:24) >  EXAM:  XR CHEST AP OR PA 1V                            PROCEDURE DATE:  11/21/2021          INTERPRETATION:  Chest one view    HISTORY: Fever    COMPARISON STUDY: 9/14/2021    Frontal view of the chest shows the heart to be normal in size. The lungs are clear and there is no evidence of pneumothorax nor pleural effusion.    IMPRESSION:  No active pulmonary disease.    Thank you for the courtesy of this referral.    --- End of Report ---            MILES BECK MD; Attending Interventional Radiologist  This document has been electronically signed. Nov 23 2021  8:39AM    < end of copied text >  < from: TTE Echo Complete w/o Contrast w/ Doppler (09.15.21 @ 09:10) >     EXAM:  ECHO TTE WO CON COMP W DOPP         PROCEDURE DATE:  09/15/2021        INTERPRETATION:  INDICATION: CVA  Sonographer PH    Blood Pressure 140/60    Height 177 cm     Weight 71 kg       BSA 1.8 sq m    Dimensions:  LA unavailable       Normal Values: 2.0 - 4.0 cm  Ao unavailable        Normal Values: 2.0 - 3.8 cm  SEPTUM unavailable       Normal Values: 0.6 - 1.2 cm  PWT unavailable       Normal Values: 0.6 - 1.1 cm  LVIDd unavailable         Normal Values: 3.0 - 5.6 cm  LVIDs unavailable         Normal Values: 1.8 - 4.0 cm      OBSERVATIONS:  Technically difficult and limited study, patient unable to cooperate  Mitral Valve: normal, trace physiologic MR.  Aortic Valve/Aorta: Not well-visualized  Tricuspid Valve: normal with trace TR.  Pulmonic Valve: Not well-visualized  Left Atrium: normal  Right Atrium: Not well-visualized  Left Ventricle: normal LV size and systolic function, estimated LVEF of 60%.  Right Ventricle: Grossly normal size and systolic function.  Pericardium: no significant pericardial effusion.        IMPRESSION:  Technically difficult and limited study, patient unable to cooperate  Normal left ventricular internal dimensions and systolic function, estimated LVEF of 60%.  Grossly normal RV size and systolic function.  Aortic valve is not well-visualized  Trace physiologic MR and TR.  No significant pericardial effusion.    --- End of Report ---              MARTY IBRAHIM MD; Attending Cardiologist  This document has been electronically signed. Sep 16 2021 12:54PM    < end of copied text >

## 2021-11-29 NOTE — PROGRESS NOTE ADULT - SUBJECTIVE AND OBJECTIVE BOX
Neurology follow up note    SARAHI [id number]      Interval History:    Patient feels ok no new complaints does not want to move foot     Allergies    No Known Allergies    Intolerances        MEDICATIONS    acetaminophen     Tablet .. 650 milliGRAM(s) Oral every 6 hours PRN  aluminum hydroxide/magnesium hydroxide/simethicone Suspension 30 milliLiter(s) Oral every 4 hours PRN  aspirin  chewable 81 milliGRAM(s) Oral daily  atorvastatin 80 milliGRAM(s) Oral at bedtime  cefTRIAXone   IVPB 2000 milliGRAM(s) IV Intermittent every 24 hours  enoxaparin Injectable 40 milliGRAM(s) SubCutaneous daily  famotidine    Tablet 20 milliGRAM(s) Oral daily  folic acid 1 milliGRAM(s) Oral daily  ibuprofen  Tablet. 600 milliGRAM(s) Oral every 6 hours PRN  ibuprofen  Tablet. 400 milliGRAM(s) Oral every 6 hours PRN  melatonin 3 milliGRAM(s) Oral at bedtime PRN              Vital Signs Last 24 Hrs  T(C): 36.6 (29 Nov 2021 08:40), Max: 37 (28 Nov 2021 19:55)  T(F): 97.8 (29 Nov 2021 08:40), Max: 98.6 (28 Nov 2021 19:55)  HR: 64 (29 Nov 2021 08:40) (64 - 74)  BP: 125/79 (29 Nov 2021 08:40) (109/67 - 143/74)  BP(mean): --  RR: 16 (29 Nov 2021 08:40) (16 - 17)  SpO2: 99% (29 Nov 2021 08:40) (94% - 99%)      REVIEW OF SYSTEMS:  Constitutional:  The patient denies fever, chills, or night sweats.  Head:  No headache.  Eyes:  No double vision or blurry vision.  Ears:  No ringing in the ears.  Neck:  No neck pain.  Respiratory:  No shortness of breath.  Cardiovascular:  No chest pain.  Abdomen:  No nausea, vomiting, or abdominal pain.  Extremities/Neurological:  No numbness or tingling.  Musculoskeletal:  Positive pain in his left foot.    PHYSICAL EXAMINATION:   HEENT:  Head:  Normocephalic, atraumatic.  Eyes:  No scleral icterus.  Ears:  Hearing bilaterally intact.  NECK:  Supple.  RESPIRATORY:  Good air entry bilaterally.  CARDIOVASCULAR:  S1 and S2 heard.  ABDOMEN:  Soft and nontender.  EXTREMITIES:  No clubbing or cyanosis was noted.      NEUROLOGIC:  The patient is awake and alert.  [facility] [place], year was 2021, month was November.  Was able to name simple objects.  Recall was 1/3 without prompting, remained 1/3 with prompting, was able to name simple objects.  Follows complex commands.  Extraocular movements were intact.  Speech was fluent.  Smile was symmetric.  Motor:  Bilateral upper and lower were 4/5.  Sensory:  Bilateral upper and lower intact to light touch.                 LABS:  CBC Full  -  ( 29 Nov 2021 08:09 )  WBC Count : 5.61 K/uL  RBC Count : 2.80 M/uL  Hemoglobin : 9.3 g/dL  Hematocrit : 29.9 %  Platelet Count - Automated : 369 K/uL  Mean Cell Volume : 106.8 fl  Mean Cell Hemoglobin : 33.2 pg  Mean Cell Hemoglobin Concentration : 31.1 gm/dL  Auto Neutrophil # : x  Auto Lymphocyte # : x  Auto Monocyte # : x  Auto Eosinophil # : x  Auto Basophil # : x  Auto Neutrophil % : x  Auto Lymphocyte % : x  Auto Monocyte % : x  Auto Eosinophil % : x  Auto Basophil % : x      11-29    141  |  106  |  17  ----------------------------<  94  4.0   |  31  |  0.93    Ca    9.5      29 Nov 2021 08:09      Hemoglobin A1C:       Vitamin B12         RADIOLOGY    ANALYSIS AND PLAN:  This is a 66-year-old with an episode of altered mental status, history of autoimmune encephalitis.  For episode of altered mental status, generalized weakness, and tremors, as per my conversation with the spouse, this occurred when the patient was having fevers, suspect most likely metabolic.  Infection workup as needed.  For history of hypertension, continue the patient to monitor systolic blood pressure.  For history of hyperlipidemia, statin as needed.  S/P  biopsy   patient refuses to move foot   I do not see any clear signs to suggest reactivation of the patient's autoimmune encephalitis at present.    Spoke with the spouse, Javier, at [phone], alternate number is [phone] 11/25    Greater than 20 minutes of time was spent with the patient, plan of care, reviewing data, speaking to the multidisciplinary healthcare team with greater than 50% of that time in counseling and care coordination.    Thank you for the courtesy of this consultation.

## 2021-11-29 NOTE — PROGRESS NOTE ADULT - PROBLEM SELECTOR PLAN 1
Pt seen and evaluated.  s/p left midfoot bone biopsy POD #3  Assessed surgical site and applied DSD with ACE wrap.  c/w elevation to the left lower extremity.  Pt is to continue non weight bearing to the left lower extremity with CAM boot.  f/u OR pathology results.  Pt will be followed by Podiatry while in house.

## 2021-11-29 NOTE — PROGRESS NOTE ADULT - SUBJECTIVE AND OBJECTIVE BOX
[facility] DIVISION of INFECTIOUS DISEASE  Donny Coker MD PhD, Beverly Avitia MD, Cydney Pruitt MD, Liana Harden MD, Mp Huddleston MD  and providing coverage with Roberta Tam MD and Corin Espinal MD  Providing Infectious Disease Consultations at [facility], [facility]'s    Office# [phone] to schedule follow up appointments  Answering Service for urgent calls or New Consults [phone]  Cell# to text for urgent issues Donny Coker [phone]     infectious diseases progress note:    SARAHI BARNHART is a 66y y. o. Male patient    No concerning overnight events    Allergies    No Known Allergies    Intolerances        ANTIBIOTICS/RELEVANT:  antimicrobials  cefTRIAXone   IVPB 2000 milliGRAM(s) IV Intermittent every 24 hours    immunologic:    OTHER:  acetaminophen     Tablet .. 650 milliGRAM(s) Oral every 6 hours PRN  aluminum hydroxide/magnesium hydroxide/simethicone Suspension 30 milliLiter(s) Oral every 4 hours PRN  aspirin  chewable 81 milliGRAM(s) Oral daily  atorvastatin 80 milliGRAM(s) Oral at bedtime  enoxaparin Injectable 40 milliGRAM(s) SubCutaneous daily  famotidine    Tablet 20 milliGRAM(s) Oral daily  folic acid 1 milliGRAM(s) Oral daily  ibuprofen  Tablet. 600 milliGRAM(s) Oral every 6 hours PRN  ibuprofen  Tablet. 400 milliGRAM(s) Oral every 6 hours PRN  melatonin 3 milliGRAM(s) Oral at bedtime PRN      Objective:  Vital Signs Last 24 Hrs  T(C): 36.6 (29 Nov 2021 08:40), Max: 37 (28 Nov 2021 19:55)  T(F): 97.8 (29 Nov 2021 08:40), Max: 98.6 (28 Nov 2021 19:55)  HR: 64 (29 Nov 2021 08:40) (64 - 74)  BP: 125/79 (29 Nov 2021 08:40) (109/67 - 143/74)  BP(mean): --  RR: 16 (29 Nov 2021 08:40) (16 - 17)  SpO2: 99% (29 Nov 2021 08:40) (94% - 99%)    T(C): 36.6 (11-29-21 @ 08:40), Max: 37.1 (11-27-21 @ 20:08)  T(C): 36.6 (11-29-21 @ 08:40), Max: 37.1 (11-27-21 @ 20:08)  T(C): 36.6 (11-29-21 @ 08:40), Max: 37.1 (11-27-21 @ 20:08)    PHYSICAL EXAM:  HEENT: NC atraumatic  Neck: supple  Respiratory: no accessory muscle use, breathing comfortably  Cardiovascular: distant  Gastrointestinal: normal appearing, nondistended  Extremities: no clubbing, no cyanosis,        LABS:                          9.3    5.61  )-----------( 369      ( 29 Nov 2021 08:09 )             29.9       5.61 11-29 @ 08:09  8.27 11-26 @ 05:40  8.75 11-25 @ 07:45  9.07 11-24 @ 09:31  10.50 11-23 @ 07:32      11-29    141  |  106  |  17  ----------------------------<  94  4.0   |  31  |  0.93    Ca    9.5      29 Nov 2021 08:09        Creatinine, Serum: 0.93 mg/dL (11-29-21 @ 08:09)  Creatinine, Serum: 1.10 mg/dL (11-26-21 @ 05:40)  Creatinine, Serum: 0.90 mg/dL (11-25-21 @ 07:45)  Creatinine, Serum: 1.00 mg/dL (11-24-21 @ 09:31)  Creatinine, Serum: 0.93 mg/dL (11-23-21 @ 07:32)                INFLAMMATORY MARKERS  Auto Neutrophil #: 6.14 K/uL (11-23-21 @ 07:32)  Auto Lymphocyte #: 0.49 K/uL (11-23-21 @ 07:32)  Auto Neutrophil #: 8.32 K/uL (11-21-21 @ 20:59)  Auto Lymphocyte #: 0.48 K/uL (11-21-21 @ 20:59)    Lactate, Blood: 0.7 mmol/L (11-21-21 @ 23:46)    Auto Eosinophil #: 0.07 K/uL (11-23-21 @ 07:32)  Auto Eosinophil #: 0.00 K/uL (11-21-21 @ 20:59)    Ferritin, Serum: 781 ng/mL (11-22-21 @ 09:13)        Activated Partial Thromboplastin Time: 33.8 sec (11-21-21 @ 20:59)  INR: 1.31 ratio (11-21-21 @ 20:59)    D-Dimer Assay, Quantitative: 574 ng/mL DDU (11-22-21 @ 03:43)      MICROBIOLOGY:              RADIOLOGY & ADDITIONAL STUDIES:

## 2021-11-29 NOTE — PROGRESS NOTE ADULT - ASSESSMENT
Patient is a 66 year old male with PMH of CLL on Venetoclax, MDS, Melanoma, Paroxysmal AFib (not on AC), HTN, HLD, came in for increased left foot pain and found with fever in ER.  Febrile in .5 and then fever 103F 11/22 afternoon -- afebrile since   Leukocytosis, h/o CLL - wbc normalized   L foot fracture with swelling, pain with very slight erythema, clinically not c/w cellulitis.   MRI L foot reviewed as above, soft tissue swelling and enhancement, rim enhancing fluid collection dorsal to middle cuneiform extending over base of 2nd metatarsal and small collection subjacent to middle cuneiform ?hematoma vs abscess. Multiple signal abnormalities as above - ddx charcot arthropathy, nondisplaced/stress fx, r/o infection, malignancy  Open biopsy, bone, foot 26-Nov-2021 19:14:28  Keiry Howard  Open drainage of left foot 26-Nov-2021 19:15:21  Keiry Howard.    Recommendations:  Blood cultures NGTD x2  No growth of MRSA or pseudomonas changed CEFTRIAXONE 11/27 11/28-noted staph aureus in culture, prior MRSA but for current isolate but no sens yet so continue current abx for now pending sensitivities and path    -recs to follow  Podiatry and Ortho following  Monitor temps/cbc     We will follow along in the care of this patient. Please call us at [phone] or text me directly on my cell# at [phone] with any concerns.

## 2021-11-29 NOTE — DIETITIAN INITIAL EVALUATION ADULT. - CHIEF COMPLAINT
66y Male with PMH of encephalitis, CLL, TMJ, Anemia. Pt mostly bed bound s/p foot fracture. Admitted on 11/22 complaining of fever. Now POD3 L foot bone biopsy.

## 2021-11-29 NOTE — PROVIDER CONTACT NOTE (OTHER) - SITUATION
Pt s/p L foot biopsy 11/26. + MRSA.   B/L SCD in place per orders. Pt c/o new pain to LLE where SCD are in place (calf)
Pt admitted with temp of 103.5

## 2021-11-29 NOTE — DIETITIAN INITIAL EVALUATION ADULT. - PROBLEM SELECTOR PLAN 5
Patient has a history of HTN. BP well controlled. Currently not on any medications   - Continue routine hemodynamic monitoring

## 2021-11-29 NOTE — CHART NOTE - NSCHARTNOTEFT_GEN_A_CORE
Called by RN: patient complaining of LLE calf pain. Patient is s/p LLE bone biopsy, found to have MRSA. Patient seen and examined at bedside. He states that he had his SCDs on prior to the pain starting, but says that every time the SCDs would squeeze, his calf would be hurting more than usual. He describes it as sharp pain all throughout the calf, radiating down to foot. Denies any chest pain, palpitations, SOB.     T(C): 36.8 (11-29-21 @ 13:45), Max: 36.8 (11-29-21 @ 13:45)  HR: 76 (11-29-21 @ 13:45) (64 - 76)  BP: 128/71 (11-29-21 @ 13:45) (125/79 - 143/74)  RR: 16 (11-29-21 @ 13:45) (16 - 16)  SpO2: 95% (11-29-21 @ 13:45) (95% - 99%)    GENERAL: patient appears well, no acute distress, appropriate, pleasant  LUNGS: good air entry bilaterally, clear to auscultation, symmetric breath sounds, no wheezing or rhonchi appreciated  HEART: soft S1/S2, regular rate and rhythm, no murmurs noted, no lower extremity edema  EXTREMITIES: Left foot, bandaged, c/d/i, no swelling, erythema, mild TTP over the back of the calf. Sensation intact. Can move all extremities freely     A/P   65 yo male with history of CLL and MDS overlap syndrome, most recently treated with Gazyva X6 months/Venetoclax, autoimmune encephalitis and left foot fracture now with pain in foot admitted for foot pain bone bx, s/p bone biopsy on 11/26. Pt now complaining of left calf pain.   - Left calf pain and tenderness occurring acutely overnight, now swelling or erythema noted   - Patient has been receiving prophylactic Lovenox and has had SCDs on   - Will order LLE doppler to r/o DVT given that patient has been immobile on the left foot since biopsy   - Patient is requesting Tylenol for pain control, will administer dose   - Hemodynamically stable, RN to call if any changes. Called by RN: patient complaining of LLE calf pain. Patient is s/p LLE bone biopsy, found to have MRSA. Patient seen and examined at bedside. He states that he had his SCDs on prior to the pain starting, but says that every time the SCDs would squeeze, his calf would be hurting more than usual. He describes it as sharp pain all throughout the calf, radiating down to foot. Denies any chest pain, palpitations, SOB.     T(C): 36.8 (11-29-21 @ 13:45), Max: 36.8 (11-29-21 @ 13:45)  HR: 76 (11-29-21 @ 13:45) (64 - 76)  BP: 128/71 (11-29-21 @ 13:45) (125/79 - 143/74)  RR: 16 (11-29-21 @ 13:45) (16 - 16)  SpO2: 95% (11-29-21 @ 13:45) (95% - 99%)    GENERAL: patient appears well, no acute distress, appropriate, pleasant  LUNGS: good air entry bilaterally, clear to auscultation, symmetric breath sounds, no wheezing or rhonchi appreciated  HEART: soft S1/S2, regular rate and rhythm, no murmurs noted, no lower extremity edema  EXTREMITIES: Left foot, bandaged, c/d/i, no swelling, erythema, mild TTP over the back of the calf. Sensation intact. Can move all extremities freely     A/P   67 yo male with history of CLL and MDS overlap syndrome, most recently treated with Gazyva X6 months/Venetoclax, autoimmune encephalitis and left foot fracture now with pain in foot admitted for foot pain bone bx, s/p bone biopsy on 11/26. Pt now complaining of left calf pain.   - Left calf pain and tenderness occurring acutely overnight, now swelling or erythema noted   - Patient has been receiving prophylactic Lovenox and has had SCDs on   - Will order LLE doppler to r/o DVT given that patient has been immobile on the left foot since biopsy   - Patient is requesting Tylenol for pain control, will administer dose   - Hemodynamically stable, RN to call if any changes.    ADDENDUM    -Reviewed doppler study with [facility], no DVT on prelim read

## 2021-11-29 NOTE — DIETITIAN INITIAL EVALUATION ADULT. - PROBLEM SELECTOR PLAN 4
Patient has a history of Atrial Fibrillation not on AC. EKG shows NSR. Currently rate and rhythm controlled.   - remote tele   - Continue home medications: Asprin 81mg QD

## 2021-11-29 NOTE — DIETITIAN INITIAL EVALUATION ADULT. - ORAL INTAKE PTA/DIET HISTORY
PTA per pt  -Intake: good PO intake; denies following therapeutic diets   -Chewing/Swallowing: denies difficulty   -Allergies/Intolerances: NKFA

## 2021-11-29 NOTE — PROGRESS NOTE ADULT - PROBLEM SELECTOR PLAN 1
Patient came in with weakness that worsened 2/2 to L foot fracture. Mildly elevated WBC 11.89 and Temp of 103.5. On PE L , warm and swollen. Chest XRay looks clear. RVP and Covid negative. UA positive for proteins, small blood and 3-5 RBC.   - h/o CLL   - C/W rocephin  - F/U BC x 2 and UC   - F/U XRay of Left Foot   - Tylenol PRN for fever   - Infectious Disease Consulted (Dr. Coker)  -

## 2021-11-29 NOTE — PROGRESS NOTE ADULT - SUBJECTIVE AND OBJECTIVE BOX
HPI: 65 y/o M pt with PMH of CLL on Venetoclax, MDS, and melanoma seen bedside s/p left midfoot bone biopsy (DOS: 11/26/21). He denies any pain to the foot at this time. No other complaints at this time with the foot. He has been keeping it elevated with 1 pillow. Pt denies nausea, vomiting, fever, chills, shortness of breath, chest pain and calf pain.    HPI on admission: Pt with recent hospitalization for what was suspected to be a cva, he was later diagnosed with autoimmune encephalitis. Pt notes that he began to have excruciating left foot pain about four weeks ago. He denies any trauma or inciting events. His outpt Podiatrist Dr. Anguiano did x-rays got an MRI, which did not show any obvious fractures but there were marrow edema within the midfoot indicating a possible stress reaction/fracture. Pt was place in an ACE wrap and CAM boot. He was instructed to rest his foot. Pt states that the pain was so bad over the weekend, that he felt an overall weakness and couldn't walk. He was fully ambulatory prior to this. Admits to a tingling sensation in the foot from time to time and states that pain is about a 6/10 at baseline on the top of his left foot.    PAST MEDICAL & SURGICAL HISTORY:  Osteopenia    CLL (Chronic Lymphocytic Leukemia)  SINCE 1988    Avascular Necrosis of Femur Head  B/L    TMJ Syndrome  limited jaw opening due to TMJ    Herniated Cervical Disc    Bulging Disc  LUMBAR    Melanoma    Deviated nasal septum    Nasal congestion    Rosacea    Deviated nasal septum    Other specified disorders of nose and nasal sinuses    Anemia    Encephalitis    S/P Splenectomy  1993    Avascular Necrosis of Femur Head  RIGHT - CORE DECOMPRESSION- 1993    Avascular Necrosis of Femur Head  LEFT - CORE DECOMPRESSION - 1993    Abnormal Jaw Closure  LEFT JAW SURGERY - 1988    Status Post Eye Surgery X 3  3/2011 right eye scleral buckle; left eye in 2013    S/P Tonsillectomy  1960    Bilateral cataracts  removed in 2011    Allergies    No Known Allergies    Intolerances    MEDICATIONS  (STANDING):  aspirin  chewable 81 milliGRAM(s) Oral daily  atorvastatin 80 milliGRAM(s) Oral at bedtime  cefTRIAXone   IVPB 2000 milliGRAM(s) IV Intermittent every 24 hours  enoxaparin Injectable 40 milliGRAM(s) SubCutaneous daily  famotidine    Tablet 20 milliGRAM(s) Oral daily  folic acid 1 milliGRAM(s) Oral daily    MEDICATIONS  (PRN):  acetaminophen     Tablet .. 650 milliGRAM(s) Oral every 6 hours PRN Temp greater or equal to 38C (100.4F)  aluminum hydroxide/magnesium hydroxide/simethicone Suspension 30 milliLiter(s) Oral every 4 hours PRN Dyspepsia  ibuprofen  Tablet. 600 milliGRAM(s) Oral every 6 hours PRN Moderate Pain (4 - 6)  ibuprofen  Tablet. 400 milliGRAM(s) Oral every 6 hours PRN Mild Pain (1 - 3)  melatonin 3 milliGRAM(s) Oral at bedtime PRN Insomnia      Social History:  Lives with wife for assists, Denies tobacco, alcohol or drug use. (22 Nov 2021 01:11)      FAMILY HISTORY:  Family history of multiple myeloma    Family history of malignant melanoma  age 89    FH: blood dyscrasia  MGUS    FH: hypertension  mother    FH: renal failure  mother    Vital Signs Last 24 Hrs  T(C): 36.6 (29 Nov 2021 08:40), Max: 37 (28 Nov 2021 19:55)  T(F): 97.8 (29 Nov 2021 08:40), Max: 98.6 (28 Nov 2021 19:55)  HR: 64 (29 Nov 2021 08:40) (64 - 74)  BP: 125/79 (29 Nov 2021 08:40) (109/67 - 143/74)  BP(mean): --  RR: 16 (29 Nov 2021 08:40) (16 - 17)  SpO2: 99% (29 Nov 2021 08:40) (94% - 99%)    PHYSICAL EXAM:  Vascular: DP & PT palpable bilaterally, Capillary refill 3 seconds, Digital hair present bilaterally. Non-pitting left foot edema (improved since admission and since the procedure). Left midfoot is warmer to touch than the remainder of the foot, otherwise skin temp wnl b/l.  Neurological: Light touch sensation intact bilaterally.  Musculoskeletal: Decreased pain on palpation at the left dorsal midfoot.  Dermatological: Surgical site at the left dorsal midfoot with sutures intact. Erythema at the left dorsal midfoot is improving. No other open wounds or lesions.    CBC Full  -  ( 29 Nov 2021 08:09 )  WBC Count : 5.61 K/uL  RBC Count : 2.80 M/uL  Hemoglobin : 9.3 g/dL  Hematocrit : 29.9 %  Platelet Count - Automated : 369 K/uL  Mean Cell Volume : 106.8 fl  Mean Cell Hemoglobin : 33.2 pg  Mean Cell Hemoglobin Concentration : 31.1 gm/dL  Auto Neutrophil # : x  Auto Lymphocyte # : x  Auto Monocyte # : x  Auto Eosinophil # : x  Auto Basophil # : x  Auto Neutrophil % : x  Auto Lymphocyte % : x  Auto Monocyte % : x  Auto Eosinophil % : x  Auto Basophil % : x    11-29    141  |  106  |  17  ----------------------------<  94  4.0   |  31  |  0.93    Ca    9.5      29 Nov 2021 08:09

## 2021-11-29 NOTE — PROGRESS NOTE ADULT - SUBJECTIVE AND OBJECTIVE BOX
Patient seen and examined;  Chart reviewed and events noted;   continues to have some minimal pain in the left foot; still remains non-weight bearing      MEDICATIONS  (STANDING):  aspirin  chewable 81 milliGRAM(s) Oral daily  atorvastatin 80 milliGRAM(s) Oral at bedtime  cefTRIAXone   IVPB 2000 milliGRAM(s) IV Intermittent every 24 hours  enoxaparin Injectable 40 milliGRAM(s) SubCutaneous daily  famotidine    Tablet 20 milliGRAM(s) Oral daily  folic acid 1 milliGRAM(s) Oral daily    MEDICATIONS  (PRN):  acetaminophen     Tablet .. 650 milliGRAM(s) Oral every 6 hours PRN Temp greater or equal to 38C (100.4F)  aluminum hydroxide/magnesium hydroxide/simethicone Suspension 30 milliLiter(s) Oral every 4 hours PRN Dyspepsia  ibuprofen  Tablet. 600 milliGRAM(s) Oral every 6 hours PRN Moderate Pain (4 - 6)  ibuprofen  Tablet. 400 milliGRAM(s) Oral every 6 hours PRN Mild Pain (1 - 3)  melatonin 3 milliGRAM(s) Oral at bedtime PRN Insomnia      Vital Signs Last 24 Hrs  T(C): 36.6 (29 Nov 2021 08:40), Max: 37 (28 Nov 2021 19:55)  T(F): 97.8 (29 Nov 2021 08:40), Max: 98.6 (28 Nov 2021 19:55)  HR: 64 (29 Nov 2021 08:40) (64 - 74)  BP: 125/79 (29 Nov 2021 08:40) (109/67 - 143/74)  RR: 16 (29 Nov 2021 08:40) (16 - 17)  SpO2: 99% (29 Nov 2021 08:40) (94% - 99%)    PHYSICAL EXAM  General: adult in NAD  HEENT: clear oropharynx, anicteric sclera, pink conjunctivae  Neck: supple  CV: normal S1S2 with no murmur rubs or gallops  Lungs: clear to auscultation, no wheezes, no rhales  Abdomen: soft non-tender non-distended, no hepato/splenomegaly  Ext: left foot bandages C/D/I  Skin: no rashes and no petichiae  Neuro: alert and oriented X3 no focal deficits      LABS:                        9.3    5.61  )-----------( 369      ( 29 Nov 2021 08:09 )             29.9     Hemoglobin: 9.3 g/dL (11-29 @ 08:09)  Hemoglobin: 8.9 g/dL (11-26 @ 05:40)  Hemoglobin: 8.6 g/dL (11-25 @ 07:45)    WBC Count: 5.61 K/uL (11-29 @ 08:09)  WBC Count: 8.27 K/uL (11-26 @ 05:40)  WBC Count: 8.75 K/uL (11-25 @ 07:45)      11-29    141  |  106  |  17  ----------------------------<  94  4.0   |  31  |  0.93    Ca    9.5      29 Nov 2021 08:09

## 2021-11-29 NOTE — DIETITIAN INITIAL EVALUATION ADULT. - PROBLEM SELECTOR PLAN 3
days. Yes Rhbev Hernandes APRN   methylPREDNISolone (MEDROL DOSEPACK) 4 MG tablet Take by mouth. Yes Rhbev Ache APRN   nabumetone (RELAFEN) 500 MG tablet Take 1 tablet by mouth 2 times daily Yes Rhbev Ache, APRN   irbesartan (AVAPRO) 300 MG tablet Take 300 mg by mouth nightly Yes Historical Provider, MD   amLODIPine (NORVASC) 5 MG tablet Take 5 mg by mouth daily Yes Historical Provider, MD   blood glucose test strips (ASCENSIA AUTODISC VI;ONE TOUCH ULTRA TEST VI) strip 1 each by In Vitro route daily As needed. One Touch Verio Yes Trinity Hernandes APRN   montelukast (SINGULAIR) 10 MG tablet Take 1 tablet by mouth nightly Yes Trinity Hernandes APRN   pantoprazole (PROTONIX) 40 MG tablet Take 1 tablet by mouth daily Yes Trinity Hernandes APRN   aspirin 81 MG chewable tablet Take 1 tablet by mouth daily Yes NA Lake   Cholecalciferol ([employer] D3) 2000 units CAPS Take 1 capsule by mouth daily Yes Trinity Hernandes APRN   SITagliptin (JANUVIA) 100 MG tablet Take 1 tablet by mouth daily Yes Trinity Hernandes APRN   fenofibrate (TRICOR) 145 MG tablet Take 1 tablet by mouth daily  Patient taking differently: Take 145 mg by mouth nightly  Yes Trinity Hernandes APRN   rosuvastatin (CRESTOR) 10 MG tablet Take 1 tablet by mouth nightly STOP ATORVASTATIN Yes Trinity Hernandes APRN   metoprolol succinate (TOPROL XL) 25 MG extended release tablet Take 1 tablet by mouth daily  Patient taking differently: Take 25 mg by mouth nightly  Yes Trinity Hernandes APRN   glucose monitoring kit (FREESTYLE) monitoring kit 1 kit by Does not apply route daily Yes NA Lake   Multiple Vitamins-Minerals (MULTIVITAMIN ADULT PO) Take 1 tablet by mouth daily  Yes Historical Provider, MD       Allergies: Patient has no known allergies.     Past Medical History:   Diagnosis Date    Diabetes [facility])     unsure if is or not    Fatigue     Hyperlipidemia     Hypertension     Tachycardia     on toprol xl left  Cont preent  - gabapentin (NEURONTIN) 600 MG tablet; Take 1 tablet by mouth 3 times daily for 90 days. Dispense: 270 tablet; Refill: 1    3. Toe amputation status, left ([facility])  Monitor  As issues with feeling  Checking feet daily  - gabapentin (NEURONTIN) 600 MG tablet; Take 1 tablet by mouth 3 times daily for 90 days. Dispense: 270 tablet; Refill: 1    4. Bilateral hand numbness  Will do steroids  Then take relafen hand numbness  And up gabapentin   - gabapentin (NEURONTIN) 600 MG tablet; Take 1 tablet by mouth 3 times daily for 90 days. Dispense: 270 tablet; Refill: 1  - XR CERVICAL SPINE (4-5 VIEWS); Future  - nabumetone (RELAFEN) 500 MG tablet; Take 1 tablet by mouth 2 times daily  Dispense: 60 tablet; Refill: 0    5. Chronic pain of both shoulders  Will do xray  And monitor    - methylPREDNISolone (MEDROL DOSEPACK) 4 MG tablet; Take by mouth. Dispense: 1 kit; Refill: 0  - nabumetone (RELAFEN) 500 MG tablet; Take 1 tablet by mouth 2 times daily  Dispense: 60 tablet; Refill: 0      Orders Placed This Encounter   Procedures    XR CERVICAL SPINE (4-5 VIEWS)    POCT glycosylated hemoglobin (Hb A1C)        Return in about 3 months (around 10/16/2019) for yearly check up and labs and foot exam.     Patient Instructions       Patient Education        Learning About Diabetes Food Guidelines  Your Care Instructions    Meal planning is important to manage diabetes. It helps keep your blood sugar at a target level (which you set with your doctor). You don't have to eat special foods. You can eat what your family eats, including sweets once in a while. But you do have to pay attention to how often you eat and how much you eat of certain foods. You may want to work with a dietitian or a certified diabetes educator (CDE) to help you plan meals and snacks. A dietitian or CDE can also help you lose weight if that is one of your goals. What should you know about eating carbs?   Managing the amount of carbohydrate received counseling on the following healthy behaviors: none    Patient giveneducational materials on plan of care    I have instructed Key Lomax to complete a self tracking handout on none and instructed them to bring it with them to his next appointment. Discussed use, benefit, and side effects of prescribed medications. Barriers to medication compliance addressed. All patient questions answered. Pt voiced understanding.      NA Jimenez Patient has a history of Anemia. Patient of Dr. Fry. Per chart review baseline Hgb appears to be between 11-12. Hgb on admission is 9.3. MCV above 100.   - F/U Iron Studies   - Continue to monitor daily CBCs   - Continue to take home medications: Folic Acid.   - Hem/Onc (Dr. Fry) consulted

## 2021-11-29 NOTE — PROGRESS NOTE ADULT - SUBJECTIVE AND OBJECTIVE BOX
Patient is a 66y old  Male who presents with a chief complaint of fever (29 Nov 2021 13:46)      INTERVAL /OVERNIGHT EVENTS: waiting for path    MEDICATIONS  (STANDING):  aspirin  chewable 81 milliGRAM(s) Oral daily  atorvastatin 80 milliGRAM(s) Oral at bedtime  enoxaparin Injectable 40 milliGRAM(s) SubCutaneous daily  famotidine    Tablet 20 milliGRAM(s) Oral daily  folic acid 1 milliGRAM(s) Oral daily  linezolid    Tablet 600 milliGRAM(s) Oral every 12 hours    MEDICATIONS  (PRN):  acetaminophen     Tablet .. 650 milliGRAM(s) Oral every 6 hours PRN Temp greater or equal to 38C (100.4F)  aluminum hydroxide/magnesium hydroxide/simethicone Suspension 30 milliLiter(s) Oral every 4 hours PRN Dyspepsia  ibuprofen  Tablet. 600 milliGRAM(s) Oral every 6 hours PRN Moderate Pain (4 - 6)  ibuprofen  Tablet. 400 milliGRAM(s) Oral every 6 hours PRN Mild Pain (1 - 3)  melatonin 3 milliGRAM(s) Oral at bedtime PRN Insomnia      Allergies    No Known Allergies    Intolerances        REVIEW OF SYSTEMS:  CONSTITUTIONAL: No fever, weight loss, or fatigue  EYES: No eye pain, visual disturbances, or discharge  ENMT:  No difficulty hearing, tinnitus, vertigo; No sinus or throat pain  NECK: No pain or stiffness  RESPIRATORY: No cough, wheezing, chills or hemoptysis; No shortness of breath  CARDIOVASCULAR: No chest pain, palpitations, dizziness, or leg swelling  GASTROINTESTINAL: No abdominal or epigastric pain. No nausea, vomiting, or hematemesis; No diarrhea or constipation. No melena or hematochezia.  GENITOURINARY: No dysuria, frequency, hematuria, or incontinence  NEUROLOGICAL: No headaches, memory loss, loss of strength, numbness, or tremors  SKIN: No itching, burning, rashes, or lesions   LYMPH NODES: No enlarged glands  ENDOCRINE: No heat or cold intolerance; No hair loss; No polydipsia or polyuria  MUSCULOSKELETAL: No joint pain or swelling; No muscle, back, or extremity pain  PSYCHIATRIC: No depression, anxiety, mood swings, or difficulty sleeping  HEME/LYMPH: No easy bruising, or bleeding gums  ALLERGY AND IMMUNOLOGIC: No hives or eczema    Vital Signs Last 24 Hrs  T(C): 36.8 (29 Nov 2021 13:45), Max: 37 (28 Nov 2021 19:55)  T(F): 98.3 (29 Nov 2021 13:45), Max: 98.6 (28 Nov 2021 19:55)  HR: 76 (29 Nov 2021 13:45) (64 - 76)  BP: 128/71 (29 Nov 2021 13:45) (125/79 - 143/74)  BP(mean): --  RR: 16 (29 Nov 2021 13:45) (16 - 17)  SpO2: 95% (29 Nov 2021 13:45) (95% - 99%)    PHYSICAL EXAM:  GENERAL: NAD, well-groomed, well-developed  HEAD:  Atraumatic, Normocephalic  EYES: EOMI, PERRLA, conjunctiva and sclera clear  ENMT: No tonsillar erythema, exudates, or enlargement; Moist mucous membranes, Good dentition, No lesions  NECK: Supple, No JVD, Normal thyroid  NERVOUS SYSTEM:  Alert & Oriented X3, Good concentration; Motor Strength 5/5 B/L upper and lower extremities; DTRs 2+ intact and symmetric  CHEST/LUNG: Clear to auscultation bilaterally; No rales, rhonchi, wheezing, or rubs  HEART: Regular rate and rhythm; No murmurs, rubs, or gallops  ABDOMEN: Soft, Nontender, Nondistended; Bowel sounds present  EXTREMITIES:  2+ Peripheral Pulses, No clubbing, cyanosis, or edema  LYMPH: No lymphadenopathy noted  SKIN: No rashes or lesions    LABS:                        9.3    5.61  )-----------( 369      ( 29 Nov 2021 08:09 )             29.9     29 Nov 2021 08:09    141    |  106    |  17     ----------------------------<  94     4.0     |  31     |  0.93     Ca    9.5        29 Nov 2021 08:09          CAPILLARY BLOOD GLUCOSE          RADIOLOGY & ADDITIONAL TESTS:    Notes Reviewed:  x] YES  [ ] NO    Care Discussed with Consultants/Other Providers x] YES  [ ] NO

## 2021-11-29 NOTE — DIETITIAN INITIAL EVALUATION ADULT. - PROBLEM SELECTOR PLAN 6
Patient has a history of CLL on Venetoclax. Patient follows with Dr. Fry. Patient is immunocompromised.  - Continue to monitor daily CBCs  - Hem/Onc (Dr. Fry) consulted

## 2021-11-29 NOTE — PROGRESS NOTE ADULT - ASSESSMENT
67 yo male with history of CLL and MDS overlap syndrome, most recently treated with Gazyva X6 months/Venetoclax, autoimmune encephalitis and left foot fracture now with pain in foot admitted for foot pain bone bx planned by podiatry to eval for osteo today. Cardiology consulted for pre-op eval.     PAF/HLD  - Patient with episode of afib on 7/2020 admission  - SR on tele during previous admission in 9/2021   - EKG NSR with PACs  - No signs of ischemia or volume overload  - Echocardiogram 9/2021 unremarkable with EF of 60%  - BP and HR within normal limits  - continue aspirin, statin    Bone biopsy  - S/P Left LE bone biopsy, tolerated procedure well from CV standpoint  - wound care, pain management and abx per primary team/ID    - Monitor and replete Lytes. Keep K > 4 and Mg > 2  - All other medical needs as per primary team.  - Other cardiovascular workup will depend on clinical course.  - Will continue to follow.    Yady Tavares [facility]  Nurse Pracitioner - Cardiology   Spectra #[id number]/ [phone]

## 2021-11-29 NOTE — PROGRESS NOTE ADULT - ASSESSMENT
Patient is a 66 year old male with PMH of CLL on Venetoclax, MDS, Melanoma, Paroxysmal AFib (not on AC), HTN, HLD, came in for increased left foot pain and found with fever in ER.  Febrile in .5 and then fever 103F 11/22 afternoon -- afebrile since   Leukocytosis, h/o CLL - wbc normalized   L foot fracture with swelling, pain with very slight erythema, clinically not c/w cellulitis.   MRI L foot reviewed as above, soft tissue swelling and enhancement, rim enhancing fluid collection dorsal to middle cuneiform extending over base of 2nd metatarsal and small collection subjacent to middle cuneiform ?hematoma vs abscess. Multiple signal abnormalities as above - ddx charcot arthropathy, nondisplaced/stress fx, r/o infection, malignancy  Open biopsy, bone, foot 26-Nov-2021 19:14:28  Keiry Howard  Open drainage of left foot 26-Nov-2021 19:15:21  Keiry Howard.    Recommendations:  Blood cultures NGTD x2  No growth of MRSA or pseudomonas changed CEFTRIAXONE 11/27 11/28-noted staph aureus in culture noted MRSA   changed to Linezolid  path still pending    -recs to follow  Podiatry and Ortho following  Monitor temps/cbc     We will follow along in the care of this patient. Please call us at [phone] or text me directly on my cell# at [phone] with any concerns.

## 2021-11-29 NOTE — DIETITIAN INITIAL EVALUATION ADULT. - OTHER INFO
GI/Intake:   -Per pt and Wife, very good appetite; consumes 100% of meals in addition to snack Wife provides   -Last BM noted 11/28     Weight Hx:  -Current: 140 pounds  -Per Alejandra HIE: 133 pounds (10/2/21)   -Per pt, UBW: 162 pounds x 1.5 years ago - piror to CLL treatment

## 2021-11-29 NOTE — PROVIDER CONTACT NOTE (OTHER) - ASSESSMENT
Pt stable
No erythema, swelling, or warmth noted. Pt endorses pain. SCD removed from LLE. MD made aware and will come see patient. Endorsed to night RN.

## 2021-11-29 NOTE — DIETITIAN INITIAL EVALUATION ADULT. - PROBLEM SELECTOR PLAN 2
Patient came in with weakness that worsened 2/2 to L foot fracture. Patient admits to being bed bound, Mildly elevated WBC 11.89 and Temp of 103.5. On PE L , warm and swollen.   - F/U XRay of Left Foot   - F/U D-Dimer   - F/U Doppler of LE B/L   - F/U PT and OT eval   - Ibuprofen PRN for pain   - Infectious Disease Consulted (Dr. Coker)

## 2021-11-30 ENCOUNTER — APPOINTMENT (OUTPATIENT)
Dept: HEMATOLOGY ONCOLOGY | Facility: CLINIC | Age: 66
End: 2021-11-30

## 2021-11-30 DIAGNOSIS — L03.116 CELLULITIS OF LEFT LOWER LIMB: ICD-10-CM

## 2021-11-30 LAB — SARS-COV-2 RNA SPEC QL NAA+PROBE: SIGNIFICANT CHANGE UP

## 2021-11-30 PROCEDURE — 92507 TX SP LANG VOICE COMM INDIV: CPT

## 2021-11-30 PROCEDURE — 97530 THERAPEUTIC ACTIVITIES: CPT

## 2021-11-30 PROCEDURE — 93971 EXTREMITY STUDY: CPT | Mod: 26,LT

## 2021-11-30 PROCEDURE — 97167 OT EVAL HIGH COMPLEX 60 MIN: CPT

## 2021-11-30 PROCEDURE — 93005 ELECTROCARDIOGRAM TRACING: CPT

## 2021-11-30 PROCEDURE — 74230 X-RAY XM SWLNG FUNCJ C+: CPT

## 2021-11-30 PROCEDURE — 92523 SPEECH SOUND LANG COMPREHEN: CPT

## 2021-11-30 PROCEDURE — 36415 COLL VENOUS BLD VENIPUNCTURE: CPT

## 2021-11-30 PROCEDURE — 97163 PT EVAL HIGH COMPLEX 45 MIN: CPT

## 2021-11-30 PROCEDURE — 80053 COMPREHEN METABOLIC PANEL: CPT

## 2021-11-30 PROCEDURE — 87186 SC STD MICRODIL/AGAR DIL: CPT

## 2021-11-30 PROCEDURE — 93971 EXTREMITY STUDY: CPT

## 2021-11-30 PROCEDURE — 85027 COMPLETE CBC AUTOMATED: CPT

## 2021-11-30 PROCEDURE — 99232 SBSQ HOSP IP/OBS MODERATE 35: CPT

## 2021-11-30 PROCEDURE — 92611 MOTION FLUOROSCOPY/SWALLOW: CPT

## 2021-11-30 PROCEDURE — 85025 COMPLETE CBC W/AUTO DIFF WBC: CPT

## 2021-11-30 PROCEDURE — 90686 IIV4 VACC NO PRSV 0.5 ML IM: CPT

## 2021-11-30 PROCEDURE — 97110 THERAPEUTIC EXERCISES: CPT

## 2021-11-30 PROCEDURE — 92526 ORAL FUNCTION THERAPY: CPT

## 2021-11-30 PROCEDURE — 97535 SELF CARE MNGMENT TRAINING: CPT

## 2021-11-30 PROCEDURE — 87070 CULTURE OTHR SPECIMN AEROBIC: CPT

## 2021-11-30 PROCEDURE — 97116 GAIT TRAINING THERAPY: CPT

## 2021-11-30 PROCEDURE — 94640 AIRWAY INHALATION TREATMENT: CPT

## 2021-11-30 PROCEDURE — U0003: CPT

## 2021-11-30 PROCEDURE — 87075 CULTR BACTERIA EXCEPT BLOOD: CPT

## 2021-11-30 PROCEDURE — U0005: CPT

## 2021-11-30 PROCEDURE — 87205 SMEAR GRAM STAIN: CPT

## 2021-11-30 RX ADMIN — Medication 1 MILLIGRAM(S): at 11:32

## 2021-11-30 RX ADMIN — LINEZOLID 600 MILLIGRAM(S): 600 INJECTION, SOLUTION INTRAVENOUS at 05:24

## 2021-11-30 RX ADMIN — Medication 81 MILLIGRAM(S): at 11:32

## 2021-11-30 RX ADMIN — LINEZOLID 600 MILLIGRAM(S): 600 INJECTION, SOLUTION INTRAVENOUS at 17:09

## 2021-11-30 RX ADMIN — FAMOTIDINE 20 MILLIGRAM(S): 10 INJECTION INTRAVENOUS at 11:32

## 2021-11-30 RX ADMIN — ATORVASTATIN CALCIUM 80 MILLIGRAM(S): 80 TABLET, FILM COATED ORAL at 21:44

## 2021-11-30 RX ADMIN — ENOXAPARIN SODIUM 40 MILLIGRAM(S): 100 INJECTION SUBCUTANEOUS at 11:32

## 2021-11-30 RX ADMIN — Medication 3 MILLIGRAM(S): at 21:44

## 2021-11-30 NOTE — PROGRESS NOTE ADULT - ASSESSMENT
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, most recently treated with Gazyva X6 months/Venetoclax which was stopped in October 2021 2 months short of 1 year full therapy when he was admitted for autoimmune encephalitis which was treated with IVIG and steroids; unfortunately he suffered left foot fracture likely from prolonged steroid use  Patient presented on 11/22/21 with pain in his foot and noted to have fever 103; afebrile since  Blood cx NGTD  - 11/26/21 underwent Bone bx by podiatry to eval for osteo  - tissue culture negative; aspirate material from foot Staph Aureus, started on antibiotics  - cont antibiotics as per ID  - CBC still pending today, yesterday stable, reflect known CLL/MDS w assoc marrow compromise and chemo effect, as well as acute infection/inflam  - post IVIG 11/27/21  - cont serial monitor

## 2021-11-30 NOTE — CHART NOTE - NSCHARTNOTEFT_GEN_A_CORE
Do you have Advance Directives (HCP / LV / Organ donation / Documentation of oral advance Directive):   x)  yes    (      )    NO                                                                            Do you have LV - Living will :                                                                                                                                             (   x )  yes    (      )   No    Do you have HCP - Health Care Proxy:                                                                                                                            (   x  )  yes   (       ) N0    Do you have DNR- Do Not Resuscitate :                                                                                                                           (      )  yes  (    x    )  No    Do you have DNI- Do Not intubate  :                                                                                                                               (      )  yes   (   x    ) No    Do you have MOLST - Medical orders for Life sustaining treatment  :                                                                    (      ) yes    (       ) No    Decision Maker :  (  x   ) Patient     (      )  HCA   (     ) Public Health Law Surrogate     (      ) Surrogate  (       ) Guardian    Goals of Care :  (   x   )   Complete Care     (       ) No Limitations                              (       )   Comfort Care       (       )  Hospice                               (      )   Limited medical Intervention / s    Medical Interventions :   (   x     )   CPR       (        )  DNR                                               (    x    )  Intubation with MV - Mechanical Ventilation  (  x    ) BIPAP/CPAP    (         )   DNI                                               (    x     )  Artificial Nutrition -  IVF, TPN / PPN, Tube Feeds             (         )   No Feeding Tube                                                (   x     ) Use Antibiotics                         (          ) No Antibiotics                                                (    x     ) Blood and Blood Products     (         )   No Blood or Blood products                                                (      x    )  Dialysis                                    (         )  No Dialysis                                                (          )  Medical Management only  (         )  No Invasive Interventions or Surgery  Time spent :                        (    x   ) upto 30 minutes                       (           )   more than 30 minutes  ACP reviewed and discussed

## 2021-11-30 NOTE — PROGRESS NOTE ADULT - SUBJECTIVE AND OBJECTIVE BOX
Patient is a 66y old  Male who presents with a chief complaint of fever (29 Nov 2021 13:46)      INTERVAL /OVERNIGHT EVENTS: path pending    MEDICATIONS  (STANDING):  aspirin  chewable 81 milliGRAM(s) Oral daily  atorvastatin 80 milliGRAM(s) Oral at bedtime  enoxaparin Injectable 40 milliGRAM(s) SubCutaneous daily  famotidine    Tablet 20 milliGRAM(s) Oral daily  folic acid 1 milliGRAM(s) Oral daily  linezolid    Tablet 600 milliGRAM(s) Oral every 12 hours    MEDICATIONS  (PRN):  acetaminophen     Tablet .. 650 milliGRAM(s) Oral every 6 hours PRN Temp greater or equal to 38C (100.4F)  aluminum hydroxide/magnesium hydroxide/simethicone Suspension 30 milliLiter(s) Oral every 4 hours PRN Dyspepsia  ibuprofen  Tablet. 600 milliGRAM(s) Oral every 6 hours PRN Moderate Pain (4 - 6)  ibuprofen  Tablet. 400 milliGRAM(s) Oral every 6 hours PRN Mild Pain (1 - 3)  melatonin 3 milliGRAM(s) Oral at bedtime PRN Insomnia      Allergies    No Known Allergies    Intolerances        REVIEW OF SYSTEMS:  CONSTITUTIONAL: No fever, weight loss, or fatigue  EYES: No eye pain, visual disturbances, or discharge  ENMT:  No difficulty hearing, tinnitus, vertigo; No sinus or throat pain  NECK: No pain or stiffness  RESPIRATORY: No cough, wheezing, chills or hemoptysis; No shortness of breath  CARDIOVASCULAR: No chest pain, palpitations, dizziness, or leg swelling  GASTROINTESTINAL: No abdominal or epigastric pain. No nausea, vomiting, or hematemesis; No diarrhea or constipation. No melena or hematochezia.  GENITOURINARY: No dysuria, frequency, hematuria, or incontinence  NEUROLOGICAL: No headaches, memory loss, loss of strength, numbness, or tremors  SKIN: No itching, burning, rashes, or lesions   LYMPH NODES: No enlarged glands  ENDOCRINE: No heat or cold intolerance; No hair loss; No polydipsia or polyuria  MUSCULOSKELETAL: + joint pain or swelling; No muscle, back, or extremity pain  PSYCHIATRIC: No depression, anxiety, mood swings, or difficulty sleeping  HEME/LYMPH: No easy bruising, or bleeding gums  ALLERGY AND IMMUNOLOGIC: No hives or eczema    Vital Signs Last 24 Hrs  T(C): 36.6 (30 Nov 2021 12:14), Max: 36.8 (29 Nov 2021 21:14)  T(F): 97.9 (30 Nov 2021 12:14), Max: 98.3 (29 Nov 2021 21:14)  HR: 68 (30 Nov 2021 12:14) (65 - 77)  BP: 126/78 (30 Nov 2021 12:14) (118/75 - 126/78)  BP(mean): --  RR: 18 (30 Nov 2021 12:14) (16 - 18)  SpO2: 95% (30 Nov 2021 12:14) (94% - 96%)    PHYSICAL EXAM:  GENERAL: NAD, well-groomed, well-developed  HEAD:  Atraumatic, Normocephalic  EYES: EOMI, PERRLA, conjunctiva and sclera clear  ENMT: No tonsillar erythema, exudates, or enlargement; Moist mucous membranes, Good dentition, No lesions  NECK: Supple, No JVD, Normal thyroid  NERVOUS SYSTEM:  Alert & Oriented X3, Good concentration; Motor Strength 5/5 B/L upper and lower extremities; DTRs 2+ intact and symmetric  CHEST/LUNG: Clear to auscultation bilaterally; No rales, rhonchi, wheezing, or rubs  HEART: Regular rate and rhythm; No murmurs, rubs, or gallops  ABDOMEN: Soft, Nontender, Nondistended; Bowel sounds present  EXTREMITIES:  2+ Peripheral Pulses, No clubbing, cyanosis, or edema  LYMPH: No lymphadenopathy noted  SKIN: No rashes or lesions    LABS:      Ca    9.5        29 Nov 2021 08:09          CAPILLARY BLOOD GLUCOSE          RADIOLOGY & ADDITIONAL TESTS:    Notes Reviewed:  [x ] YES  [ ] NO    Care Discussed with Consultants/Other Providers [x ] YES  [ ] NO

## 2021-11-30 NOTE — PROGRESS NOTE ADULT - SUBJECTIVE AND OBJECTIVE BOX
All interim records and events noted.    left foot still edematous but less pain      MEDICATIONS  (STANDING):  aspirin  chewable 81 milliGRAM(s) Oral daily  atorvastatin 80 milliGRAM(s) Oral at bedtime  enoxaparin Injectable 40 milliGRAM(s) SubCutaneous daily  famotidine    Tablet 20 milliGRAM(s) Oral daily  folic acid 1 milliGRAM(s) Oral daily  linezolid    Tablet 600 milliGRAM(s) Oral every 12 hours    MEDICATIONS  (PRN):  acetaminophen     Tablet .. 650 milliGRAM(s) Oral every 6 hours PRN Temp greater or equal to 38C (100.4F)  aluminum hydroxide/magnesium hydroxide/simethicone Suspension 30 milliLiter(s) Oral every 4 hours PRN Dyspepsia  ibuprofen  Tablet. 600 milliGRAM(s) Oral every 6 hours PRN Moderate Pain (4 - 6)  ibuprofen  Tablet. 400 milliGRAM(s) Oral every 6 hours PRN Mild Pain (1 - 3)  melatonin 3 milliGRAM(s) Oral at bedtime PRN Insomnia      Vital Signs Last 24 Hrs  T(C): 36.5 (30 Nov 2021 06:04), Max: 36.8 (29 Nov 2021 13:45)  T(F): 97.7 (30 Nov 2021 06:04), Max: 98.3 (29 Nov 2021 13:45)  HR: 65 (30 Nov 2021 06:04) (65 - 77)  BP: 122/73 (30 Nov 2021 06:04) (118/75 - 128/71)  BP(mean): --  RR: 16 (30 Nov 2021 06:04) (16 - 16)  SpO2: 96% (30 Nov 2021 06:04) (94% - 96%)    PHYSICAL EXAM  General: well developed  well nourished, in no acute distress  Head: atraumatic, normocephalic  ENT: sclera anicteric, buccal mucosa moist  Neck: supple, trachea midline  CV: S1 S2, regular rate and rhythm  Lungs: clear to auscultation, no wheezes/rhonchi  Abdomen: soft, nontender, bowel sounds present, no palpable nasses  Extrem: no clubbing/cyanosis, left foot trace edema in clean dressing  Skin: no significant increased ecchymosis/petechiae  Neuro: alert and oriented X3,  no focal deficits      LABS:             9.3    5.61  )-----------( 369      ( 11-29 @ 08:09 )             29.9       11-29    141  |  106  |  17  ----------------------------<  94  4.0   |  31  |  0.93    Ca    9.5      29 Nov 2021 08:09          RADIOLOGY & ADDITIONAL STUDIES:    IMPRESSION/RECOMMENDATIONS:

## 2021-11-30 NOTE — PROGRESS NOTE ADULT - SUBJECTIVE AND OBJECTIVE BOX
Neurology follow up note    SARAHI [id number]      Interval History:    Patient feels ok no new complaints does not want to move foot     Allergies    No Known Allergies    Intolerances        MEDICATIONS    acetaminophen     Tablet .. 650 milliGRAM(s) Oral every 6 hours PRN  aluminum hydroxide/magnesium hydroxide/simethicone Suspension 30 milliLiter(s) Oral every 4 hours PRN  aspirin  chewable 81 milliGRAM(s) Oral daily  atorvastatin 80 milliGRAM(s) Oral at bedtime  enoxaparin Injectable 40 milliGRAM(s) SubCutaneous daily  famotidine    Tablet 20 milliGRAM(s) Oral daily  folic acid 1 milliGRAM(s) Oral daily  ibuprofen  Tablet. 600 milliGRAM(s) Oral every 6 hours PRN  ibuprofen  Tablet. 400 milliGRAM(s) Oral every 6 hours PRN  linezolid    Tablet 600 milliGRAM(s) Oral every 12 hours  melatonin 3 milliGRAM(s) Oral at bedtime PRN              Vital Signs Last 24 Hrs  T(C): 36.5 (30 Nov 2021 06:04), Max: 36.8 (29 Nov 2021 13:45)  T(F): 97.7 (30 Nov 2021 06:04), Max: 98.3 (29 Nov 2021 13:45)  HR: 65 (30 Nov 2021 06:04) (65 - 77)  BP: 122/73 (30 Nov 2021 06:04) (118/75 - 128/71)  BP(mean): --  RR: 16 (30 Nov 2021 06:04) (16 - 16)  SpO2: 96% (30 Nov 2021 06:04) (94% - 96%)      REVIEW OF SYSTEMS:  Constitutional:  The patient denies fever, chills, or night sweats.  Head:  No headache.  Eyes:  No double vision or blurry vision.  Ears:  No ringing in the ears.  Neck:  No neck pain.  Respiratory:  No shortness of breath.  Cardiovascular:  No chest pain.  Abdomen:  No nausea, vomiting, or abdominal pain.  Extremities/Neurological:  No numbness or tingling.  Musculoskeletal:  Positive pain in his left foot.    PHYSICAL EXAMINATION:   HEENT:  Head:  Normocephalic, atraumatic.  Eyes:  No scleral icterus.  Ears:  Hearing bilaterally intact.  NECK:  Supple.  RESPIRATORY:  Good air entry bilaterally.  CARDIOVASCULAR:  S1 and S2 heard.  ABDOMEN:  Soft and nontender.  EXTREMITIES:  No clubbing or cyanosis was noted.      NEUROLOGIC:  The patient is awake and alert.  Location was [facility], year was 2021, month was November.  Was able to name simple objects.  Recall was 1/3 without prompting, remained 1/3 with prompting, was able to name simple objects.  Follows complex commands.  Extraocular movements were intact.  Speech was fluent.  Smile was symmetric.  Motor:  Bilateral upper and lower were 4/5.  Sensory:  Bilateral upper and lower intact to light touch.                      LABS:  CBC Full  -  ( 29 Nov 2021 08:09 )  WBC Count : 5.61 K/uL  RBC Count : 2.80 M/uL  Hemoglobin : 9.3 g/dL  Hematocrit : 29.9 %  Platelet Count - Automated : 369 K/uL  Mean Cell Volume : 106.8 fl  Mean Cell Hemoglobin : 33.2 pg  Mean Cell Hemoglobin Concentration : 31.1 gm/dL  Auto Neutrophil # : x  Auto Lymphocyte # : x  Auto Monocyte # : x  Auto Eosinophil # : x  Auto Basophil # : x  Auto Neutrophil % : x  Auto Lymphocyte % : x  Auto Monocyte % : x  Auto Eosinophil % : x  Auto Basophil % : x      11-29    141  |  106  |  17  ----------------------------<  94  4.0   |  31  |  0.93    Ca    9.5      29 Nov 2021 08:09      Hemoglobin A1C:       Vitamin B12         RADIOLOGY      ANALYSIS AND PLAN:  This is a 66-year-old with an episode of altered mental status, history of autoimmune encephalitis.  For episode of altered mental status, generalized weakness, and tremors, as per my conversation with the spouse, this occurred when the patient was having fevers, suspect most likely metabolic.  Infection workup as needed.  For history of hypertension, continue the patient to monitor systolic blood pressure.  For history of hyperlipidemia, statin as needed.  S/P  biopsy   patient refuses to move foot   pain medications as needed   I do not see any clear signs to suggest reactivation of the patient's autoimmune encephalitis at present.    Spoke with the spouse, Javier, at [phone], alternate number is [phone] 11/25    Greater than 20 minutes of time was spent with the patient, plan of care, reviewing data, speaking to the multidisciplinary healthcare team with greater than 50% of that time in counseling and care coordination.    Thank you for the courtesy of this consultation.

## 2021-11-30 NOTE — PROGRESS NOTE ADULT - SUBJECTIVE AND OBJECTIVE BOX
[facility] DIVISION of INFECTIOUS DISEASE  Donny Coker MD PhD, Beverly Avitia MD, Cydney Pruitt MD, Liana Harden MD, Mp Huddleston MD  and providing coverage with Roberta Tam MD and Corin Espinal MD  Providing Infectious Disease Consultations at [facility], [facility]'s    Office# [phone] to schedule follow up appointments  Answering Service for urgent calls or New Consults [phone]  Cell# to text for urgent issues Donny Coker [phone]     infectious diseases progress note:    SARAHI BARNHART is a 66y y. o. Male patient    No concerning overnight events    Allergies    No Known Allergies    Intolerances        ANTIBIOTICS/RELEVANT:  antimicrobials  linezolid    Tablet 600 milliGRAM(s) Oral every 12 hours    immunologic:    OTHER:  acetaminophen     Tablet .. 650 milliGRAM(s) Oral every 6 hours PRN  aluminum hydroxide/magnesium hydroxide/simethicone Suspension 30 milliLiter(s) Oral every 4 hours PRN  aspirin  chewable 81 milliGRAM(s) Oral daily  atorvastatin 80 milliGRAM(s) Oral at bedtime  enoxaparin Injectable 40 milliGRAM(s) SubCutaneous daily  famotidine    Tablet 20 milliGRAM(s) Oral daily  folic acid 1 milliGRAM(s) Oral daily  ibuprofen  Tablet. 600 milliGRAM(s) Oral every 6 hours PRN  ibuprofen  Tablet. 400 milliGRAM(s) Oral every 6 hours PRN  melatonin 3 milliGRAM(s) Oral at bedtime PRN      Objective:  Vital Signs Last 24 Hrs  T(C): 36.5 (30 Nov 2021 06:04), Max: 36.8 (29 Nov 2021 13:45)  T(F): 97.7 (30 Nov 2021 06:04), Max: 98.3 (29 Nov 2021 13:45)  HR: 65 (30 Nov 2021 06:04) (65 - 77)  BP: 122/73 (30 Nov 2021 06:04) (118/75 - 128/71)  BP(mean): --  RR: 16 (30 Nov 2021 06:04) (16 - 16)  SpO2: 96% (30 Nov 2021 06:04) (94% - 96%)    T(C): 36.5 (11-30-21 @ 06:04), Max: 37 (11-28-21 @ 19:55)  T(C): 36.5 (11-30-21 @ 06:04), Max: 37.1 (11-27-21 @ 20:08)  T(C): 36.5 (11-30-21 @ 06:04), Max: 37.1 (11-27-21 @ 20:08)    PHYSICAL EXAM:  HEENT: NC atraumatic  Neck: supple  Respiratory: no accessory muscle use, breathing comfortably  Cardiovascular: distant  Gastrointestinal: normal appearing, nondistended  Extremities: no clubbing, no cyanosis, foot wrapped        LABS:                          9.3    5.61  )-----------( 369      ( 29 Nov 2021 08:09 )             29.9       5.61 11-29 @ 08:09  8.27 11-26 @ 05:40  8.75 11-25 @ 07:45  9.07 11-24 @ 09:31      11-29    141  |  106  |  17  ----------------------------<  94  4.0   |  31  |  0.93    Ca    9.5      29 Nov 2021 08:09        Creatinine, Serum: 0.93 mg/dL (11-29-21 @ 08:09)  Creatinine, Serum: 1.10 mg/dL (11-26-21 @ 05:40)  Creatinine, Serum: 0.90 mg/dL (11-25-21 @ 07:45)  Creatinine, Serum: 1.00 mg/dL (11-24-21 @ 09:31)                INFLAMMATORY MARKERS  Auto Neutrophil #: 6.14 K/uL (11-23-21 @ 07:32)  Auto Lymphocyte #: 0.49 K/uL (11-23-21 @ 07:32)  Auto Neutrophil #: 8.32 K/uL (11-21-21 @ 20:59)  Auto Lymphocyte #: 0.48 K/uL (11-21-21 @ 20:59)    Lactate, Blood: 0.7 mmol/L (11-21-21 @ 23:46)    Auto Eosinophil #: 0.07 K/uL (11-23-21 @ 07:32)  Auto Eosinophil #: 0.00 K/uL (11-21-21 @ 20:59)                  Ferritin, Serum: 781 ng/mL (11-22-21 @ 09:13)        Activated Partial Thromboplastin Time: 33.8 sec (11-21-21 @ 20:59)  INR: 1.31 ratio (11-21-21 @ 20:59)    D-Dimer Assay, Quantitative: 574 ng/mL DDU (11-22-21 @ 03:43)        MICROBIOLOGY:              RADIOLOGY & ADDITIONAL STUDIES:

## 2021-11-30 NOTE — PROGRESS NOTE ADULT - SUBJECTIVE AND OBJECTIVE BOX
[facility] -- Sveta Romano, Ebony Sparrow, Dereck Deleon Savella, [place]: Office # [phone]    Follow Up:  Pre and Postop Cardiac Optimization Hx of AFib HTN HLD    Subjective/Observations:  Patient seen and examined, awake, alert, resting comfortably in bed, no complaints of chest pain, dyspnea, palpitations or dizziness, orthopnea and PND. Tolerating room air. left foot dressing intact     REVIEW OF SYSTEMS: All review of systems is negative for eye, ENT, GI, , allergic, dermatologic, musculoskeletal and neurologic except as described above    PAST MEDICAL & SURGICAL HISTORY:  Osteopenia    CLL (Chronic Lymphocytic Leukemia)  SINCE 1988    Avascular Necrosis of Femur Head  B/L    TMJ Syndrome  limited jaw opening due to TMJ    Herniated Cervical Disc    Bulging Disc  LUMBAR    Melanoma    Deviated nasal septum    Nasal congestion    Rosacea    Deviated nasal septum    Other specified disorders of nose and nasal sinuses    Anemia    Encephalitis    S/P Splenectomy  1993    Avascular Necrosis of Femur Head  RIGHT - CORE DECOMPRESSION- 1993    Avascular Necrosis of Femur Head  LEFT - CORE DECOMPRESSION - 1993    Abnormal Jaw Closure  LEFT JAW SURGERY - 1988    Status Post Eye Surgery X 3  3/2011 right eye scleral buckle; left eye in 2013    S/P Tonsillectomy  1960    Bilateral cataracts  removed in 2011        MEDICATIONS  (STANDING):  aspirin  chewable 81 milliGRAM(s) Oral daily  atorvastatin 80 milliGRAM(s) Oral at bedtime  enoxaparin Injectable 40 milliGRAM(s) SubCutaneous daily  famotidine    Tablet 20 milliGRAM(s) Oral daily  folic acid 1 milliGRAM(s) Oral daily  linezolid    Tablet 600 milliGRAM(s) Oral every 12 hours    MEDICATIONS  (PRN):  acetaminophen     Tablet .. 650 milliGRAM(s) Oral every 6 hours PRN Temp greater or equal to 38C (100.4F)  aluminum hydroxide/magnesium hydroxide/simethicone Suspension 30 milliLiter(s) Oral every 4 hours PRN Dyspepsia  ibuprofen  Tablet. 600 milliGRAM(s) Oral every 6 hours PRN Moderate Pain (4 - 6)  ibuprofen  Tablet. 400 milliGRAM(s) Oral every 6 hours PRN Mild Pain (1 - 3)  melatonin 3 milliGRAM(s) Oral at bedtime PRN Insomnia    Allergies    No Known Allergies    Intolerances      Vital Signs Last 24 Hrs  T(C): 36.6 (30 Nov 2021 12:14), Max: 36.8 (29 Nov 2021 13:45)  T(F): 97.9 (30 Nov 2021 12:14), Max: 98.3 (29 Nov 2021 13:45)  HR: 68 (30 Nov 2021 12:14) (65 - 77)  BP: 126/78 (30 Nov 2021 12:14) (118/75 - 128/71)  BP(mean): --  RR: 18 (30 Nov 2021 12:14) (16 - 18)  SpO2: 95% (30 Nov 2021 12:14) (94% - 96%)  I&O's Summary        TELE: Not on telemetry   PHYSICAL EXAM:  Appearance: NAD, no distress, alert,  HEENT: Moist Mucous Membranes, Anicteric  Cardiovascular: Regular rate and rhythm, Normal S1 S2, No JVD, No murmurs, No rubs, gallops or clicks  Respiratory: Non-labored, Clear to auscultation, No rales, No rhonchi, No wheezing.   Gastrointestinal:  Soft, Non-tender, + BS  Neurologic: Non-focal  Skin: Warm and dry, No visible rashes or ulcers, No ecchymosis, No cyanosis  Musculoskeletal: No clubbing, No cyanosis, No joint swelling/tenderness  Psychiatry: Mood & affect appropriate  Lymph: No peripheral edema.     LABS: All Labs Reviewed:                        9.3    5.61  )-----------( 369      ( 29 Nov 2021 08:09 )             29.9     29 Nov 2021 08:09    141    |  106    |  17     ----------------------------<  94     4.0     |  31     |  0.93     Ca    9.5        29 Nov 2021 08:09      12 Lead ECG:   Ventricular Rate 98 BPM    Atrial Rate 98 BPM    P-R Interval 148 ms    QRS Duration 100 ms    Q-T Interval 350 ms    QTC Calculation(Bazett) 446 ms    P Axis 52 degrees    R Axis 47 degrees    T Axis 60 degrees    Diagnosis Line Normal sinus rhythm  Normal ECG  Confirmed by EARL DELEON ([id number]) on 11/22/2021 12:52:57 PM (11-21-21 @ 20:10)    < from: Xray Chest 1 View AP/PA (11.21.21 @ 21:24) >    EXAM:  XR CHEST AP OR PA 1V                            PROCEDURE DATE:  11/21/2021          INTERPRETATION:  Chest one view    HISTORY: Fever    COMPARISON STUDY: 9/14/2021    Frontal view of the chest shows the heart to be normal in size. The lungs are clear and there is no evidence of pneumothorax nor pleural effusion.    IMPRESSION:  No active pulmonary disease.    Thank you for the courtesy of this referral.    --- End of Report ---            MILES BECK MD; Attending Interventional Radiologist  This document has been electronically signed. Nov 23 2021  8:39AM    < end of copied text >  < from: TTE Echo Complete w/o Contrast w/ Doppler (09.15.21 @ 09:10) >     EXAM:  ECHO TTE WO CON COMP W DOPP         PROCEDURE DATE:  09/15/2021        INTERPRETATION:  INDICATION: CVA  Sonographer PH    Blood Pressure 140/60    Height 177 cm     Weight 71 kg       BSA 1.8 sq m    Dimensions:  LA unavailable       Normal Values: 2.0 - 4.0 cm  Ao unavailable        Normal Values: 2.0 - 3.8 cm  SEPTUM unavailable       Normal Values: 0.6 - 1.2 cm  PWT unavailable       Normal Values: 0.6 - 1.1 cm  LVIDd unavailable         Normal Values: 3.0 - 5.6 cm  LVIDs unavailable         Normal Values: 1.8 - 4.0 cm      OBSERVATIONS:  Technically difficult and limited study, patient unable to cooperate  Mitral Valve: normal, trace physiologic MR.  Aortic Valve/Aorta: Not well-visualized  Tricuspid Valve: normal with trace TR.  Pulmonic Valve: Not well-visualized  Left Atrium: normal  Right Atrium: Not well-visualized  Left Ventricle: normal LV size and systolic function, estimated LVEF of 60%.  Right Ventricle: Grossly normal size and systolic function.  Pericardium: no significant pericardial effusion.        IMPRESSION:  Technically difficult and limited study, patient unable to cooperate  Normal left ventricular internal dimensions and systolic function, estimated LVEF of 60%.  Grossly normal RV size and systolic function.  Aortic valve is not well-visualized  Trace physiologic MR and TR.  No significant pericardial effusion.    --- End of Report ---              MARTY IBRAHIM MD; Attending Cardiologist  This document has been electronically signed. Sep 16 2021 12:54PM    < end of copied text >

## 2021-11-30 NOTE — PROGRESS NOTE ADULT - ASSESSMENT
Patient is a 66 year old male with PMH of CLL on Venetoclax, MDS, Melanoma, Paroxysmal AFib (not on AC), HTN, HLD, came in for increased left foot pain and found with fever in ER.  Febrile in .5 and then fever 103F 11/22 afternoon -- afebrile since   Leukocytosis, h/o CLL - wbc normalized   L foot fracture with swelling, pain with very slight erythema, clinically not c/w cellulitis.   MRI L foot reviewed as above, soft tissue swelling and enhancement, rim enhancing fluid collection dorsal to middle cuneiform extending over base of 2nd metatarsal and small collection subjacent to middle cuneiform ?hematoma vs abscess. Multiple signal abnormalities as above - ddx charcot arthropathy, nondisplaced/stress fx, r/o infection, malignancy  Open biopsy, bone, foot 26-Nov-2021 19:14:28  Keiry Howard  Open drainage of left foot 26-Nov-2021 19:15:21  Keiry Howard.    Recommendations:  Blood cultures NGTD x2  No growth of MRSA or pseudomonas changed CEFTRIAXONE 11/27 11/28-noted staph aureus in culture noted MRSA   continue Linezolid  path still pending    -recs to follow  Podiatry and Ortho following  Monitor temps/cbc     We will follow along in the care of this patient. Please call us at [phone] or text me directly on my cell# at [phone] with any concerns.

## 2021-11-30 NOTE — PROGRESS NOTE ADULT - PROBLEM SELECTOR PLAN 1
Pt seen and evaluated.  s/p left midfoot bone biopsy POD #4  Assessed surgical site and applied DSD with ACE wrap.  c/w elevation to the left lower extremity.  Pt is to continue non weight bearing to the left lower extremity with CAM boot.  f/u OR pathology results.  Pt will be followed by Podiatry while in house. Pt seen and evaluated.  s/p left midfoot bone biopsy POD #4  Assessed surgical site and applied DSD with ACE wrap.  c/w elevation to the left lower extremity.  Pt is to continue non weight bearing to the left lower extremity with CAM boot.  f/u OR pathology results and ID recs.  Pt will be followed by Podiatry while in house.    WOUND CARE INSTRUCTIONS  1. Cleanse wound with sterile saline solution and gently pat dry.  2. Dress wound with betadine and dry, sterile dressing.  3. Change dressings every other day and monitor for any signs of infection.  4. Weight bearing as tolerated to the left lower extremity in a CAM boot.  5. Patient can follow up with outpatient Podiatrist Dr. Anguiano upon discharge or at the [facility] with Dr. Ruby ([phone]).

## 2021-11-30 NOTE — PROGRESS NOTE ADULT - SUBJECTIVE AND OBJECTIVE BOX
HPI: 65 y/o M pt with PMH of CLL on Venetoclax, MDS, and melanoma seen bedside s/p left midfoot bone biopsy (DOS: 11/26/21). He denies any pain to the foot at this time. He states that he was seen by Physical Therapy yesterday, who placed him in the CAM boot and attempted to have him move to his chair and back. Shortly afterwards he noticed left calf pain. He was given pain medication and the SCD compression was removed from that side. Is not experiencing pain at this time. He has been keeping it elevated with 1 pillow. Pt denies nausea, vomiting, fever, chills, shortness of breath, chest pain and calf pain.    HPI on admission: Pt with recent hospitalization for what was suspected to be a cva, he was later diagnosed with autoimmune encephalitis. Pt notes that he began to have excruciating left foot pain about four weeks ago. He denies any trauma or inciting events. His outpt Podiatrist Dr. Anguiano did x-rays got an MRI, which did not show any obvious fractures but there were marrow edema within the midfoot indicating a possible stress reaction/fracture. Pt was place in an ACE wrap and CAM boot. He was instructed to rest his foot. Pt states that the pain was so bad over the weekend, that he felt an overall weakness and couldn't walk. He was fully ambulatory prior to this. Admits to a tingling sensation in the foot from time to time and states that pain is about a 6/10 at baseline on the top of his left foot.    PAST MEDICAL & SURGICAL HISTORY:  Osteopenia    CLL (Chronic Lymphocytic Leukemia)  SINCE 1988    Avascular Necrosis of Femur Head  B/L    TMJ Syndrome  limited jaw opening due to TMJ    Herniated Cervical Disc    Bulging Disc  LUMBAR    Melanoma    Deviated nasal septum    Nasal congestion    Rosacea    Deviated nasal septum    Other specified disorders of nose and nasal sinuses    Anemia    Encephalitis    S/P Splenectomy  1993    Avascular Necrosis of Femur Head  RIGHT - CORE DECOMPRESSION- 1993    Avascular Necrosis of Femur Head  LEFT - CORE DECOMPRESSION - 1993    Abnormal Jaw Closure  LEFT JAW SURGERY - 1988    Status Post Eye Surgery X 3  3/2011 right eye scleral buckle; left eye in 2013    S/P Tonsillectomy  1960    Bilateral cataracts  removed in 2011    Allergies    No Known Allergies    Intolerances    MEDICATIONS  (STANDING):  aspirin  chewable 81 milliGRAM(s) Oral daily  atorvastatin 80 milliGRAM(s) Oral at bedtime  enoxaparin Injectable 40 milliGRAM(s) SubCutaneous daily  famotidine    Tablet 20 milliGRAM(s) Oral daily  folic acid 1 milliGRAM(s) Oral daily  linezolid    Tablet 600 milliGRAM(s) Oral every 12 hours    MEDICATIONS  (PRN):  acetaminophen     Tablet .. 650 milliGRAM(s) Oral every 6 hours PRN Temp greater or equal to 38C (100.4F)  aluminum hydroxide/magnesium hydroxide/simethicone Suspension 30 milliLiter(s) Oral every 4 hours PRN Dyspepsia  ibuprofen  Tablet. 600 milliGRAM(s) Oral every 6 hours PRN Moderate Pain (4 - 6)  ibuprofen  Tablet. 400 milliGRAM(s) Oral every 6 hours PRN Mild Pain (1 - 3)  melatonin 3 milliGRAM(s) Oral at bedtime PRN Insomnia      Social History:  Lives with wife for assists, Denies tobacco, alcohol or drug use. (22 Nov 2021 01:11)      FAMILY HISTORY:  Family history of multiple myeloma    Family history of malignant melanoma  age 89    FH: blood dyscrasia  MGUS    FH: hypertension  mother    FH: renal failure  mother    Vital Signs Last 24 Hrs  T(C): 36.5 (30 Nov 2021 06:04), Max: 36.8 (29 Nov 2021 13:45)  T(F): 97.7 (30 Nov 2021 06:04), Max: 98.3 (29 Nov 2021 13:45)  HR: 65 (30 Nov 2021 06:04) (65 - 77)  BP: 122/73 (30 Nov 2021 06:04) (118/75 - 128/71)  BP(mean): --  RR: 16 (30 Nov 2021 06:04) (16 - 16)  SpO2: 96% (30 Nov 2021 06:04) (94% - 96%)    PHYSICAL EXAM:  Vascular: DP & PT palpable bilaterally, Capillary refill 3 seconds, Digital hair present bilaterally. Non-pitting left foot edema (improved since admission and since the procedure). Left midfoot is warmer to touch than the remainder of the foot, otherwise skin temp wnl b/l.  Neurological: Light touch sensation intact bilaterally.  Musculoskeletal: Decreased pain on palpation at the left dorsal midfoot.  Dermatological: Surgical site at the left dorsal midfoot with sutures intact. Erythema at the left dorsal midfoot is improving. No other open wounds or lesions.    CBC Full  -  ( 29 Nov 2021 08:09 )  WBC Count : 5.61 K/uL  RBC Count : 2.80 M/uL  Hemoglobin : 9.3 g/dL  Hematocrit : 29.9 %  Platelet Count - Automated : 369 K/uL  Mean Cell Volume : 106.8 fl  Mean Cell Hemoglobin : 33.2 pg  Mean Cell Hemoglobin Concentration : 31.1 gm/dL  Auto Neutrophil # : x  Auto Lymphocyte # : x  Auto Monocyte # : x  Auto Eosinophil # : x  Auto Basophil # : x  Auto Neutrophil % : x  Auto Lymphocyte % : x  Auto Monocyte % : x  Auto Eosinophil % : x  Auto Basophil % : x    11-29    141  |  106  |  17  ----------------------------<  94  4.0   |  31  |  0.93    Ca    9.5      29 Nov 2021 08:09    Culture - Tissue with Gram Stain (11.27.21 @ 00:37)    Gram Stain:   No polymorphonuclear cells seen per low power field  No organisms seen per oil power field    Specimen Source: .Tissue Other, left mid foot bone c/s    Culture Results:   No growth to date.    Culture - Aspirate with Gram Stain (11.27.21 @ 00:38)    Specimen Source: .Aspirate    Organism: Methicillin resistant Staphylococcus aureus    Organism Identification: Methicillin resistant Staphylococcus aureus    Method Type: KONG    -  Ampicillin/Sulbactam: R <=8/4    -  Cefazolin: R <=4    -  Clindamycin: R <=0.25 This isolate is presumed to be clindamycin resistant based on detection of inducible resistance. Clindamycin may still be effective in some patients.    -  Daptomycin: S <=0.25    -  Erythromycin: R >4    -  Gentamicin: S <=1 Should not be used as monotherapy    -  Linezolid: S 2    -  Oxacillin: R >2    -  Penicillin: R >8    -  RIF- Rifampin: S <=1 Should not be used as monotherapy    -  Tetra/Doxy: S <=1    -  Trimethoprim/Sulfamethoxazole: S <=0.5/9.5    -  Vancomycin: S 1    Culture Results:   Moderate Methicillin Resistant Staphylococcus aureus

## 2021-11-30 NOTE — PROGRESS NOTE ADULT - ASSESSMENT
65 yo male with history of CLL and MDS overlap syndrome, most recently treated with Gazyva X6 months/Venetoclax, autoimmune encephalitis and left foot fracture now with pain in foot admitted for foot pain bone bx planned by podiatry to eval for osteo today. Cardiology consulted for pre-op eval.     PAF/HLD  - Patient with episode of afib on 7/2020 admission  - SR on tele during previous admission in 9/2021   - EKG NSR with PACs  - No signs of ischemia or volume overload  - Echocardiogram 9/2021 unremarkable with EF of 60%  - BP and HR within normal limits  - continue ASA, Statin  - Monitor and replete Lytes. Keep K > 4 and Mg > 2    Bone biopsy  - S/P Left LE bone biopsy, tolerated procedure well from CV standpoint  - wound care, pain management and abx per primary team/ID    - All other medical needs as per primary team.  - Other cardiovascular workup will depend on clinical course.  - Will continue to follow.    Yady Tavares [facility]  Nurse Practitioner - Cardiology   Spectra #[phone]/ [phone]

## 2021-12-01 ENCOUNTER — TRANSCRIPTION ENCOUNTER (OUTPATIENT)
Age: 66
End: 2021-12-01

## 2021-12-01 VITALS
HEART RATE: 72 BPM | OXYGEN SATURATION: 98 % | SYSTOLIC BLOOD PRESSURE: 122 MMHG | DIASTOLIC BLOOD PRESSURE: 73 MMHG | RESPIRATION RATE: 17 BRPM | TEMPERATURE: 98 F

## 2021-12-01 LAB
BASOPHILS # BLD AUTO: 0 K/UL — SIGNIFICANT CHANGE UP (ref 0–0.2)
BASOPHILS NFR BLD AUTO: 0 % — SIGNIFICANT CHANGE UP (ref 0–2)
EOSINOPHIL # BLD AUTO: 0.07 K/UL — SIGNIFICANT CHANGE UP (ref 0–0.5)
EOSINOPHIL NFR BLD AUTO: 1 % — SIGNIFICANT CHANGE UP (ref 0–6)
HCT VFR BLD CALC: 30.3 % — LOW (ref 39–50)
HGB BLD-MCNC: 10 G/DL — LOW (ref 13–17)
LYMPHOCYTES # BLD AUTO: 0.69 K/UL — LOW (ref 1–3.3)
LYMPHOCYTES # BLD AUTO: 10 % — LOW (ref 13–44)
MANUAL SMEAR VERIFICATION: SIGNIFICANT CHANGE UP
MCHC RBC-ENTMCNC: 33 GM/DL — SIGNIFICANT CHANGE UP (ref 32–36)
MCHC RBC-ENTMCNC: 34.1 PG — HIGH (ref 27–34)
MCV RBC AUTO: 103.4 FL — HIGH (ref 80–100)
MONOCYTES # BLD AUTO: 1.38 K/UL — HIGH (ref 0–0.9)
MONOCYTES NFR BLD AUTO: 20 % — HIGH (ref 2–14)
NEUTROPHILS # BLD AUTO: 4.77 K/UL — SIGNIFICANT CHANGE UP (ref 1.8–7.4)
NEUTROPHILS NFR BLD AUTO: 67 % — SIGNIFICANT CHANGE UP (ref 43–77)
NEUTS BAND # BLD: 2 % — SIGNIFICANT CHANGE UP (ref 0–8)
NRBC # BLD: 1 — SIGNIFICANT CHANGE UP
NRBC # BLD: SIGNIFICANT CHANGE UP /100 WBCS (ref 0–0)
PLAT MORPH BLD: NORMAL — SIGNIFICANT CHANGE UP
PLATELET # BLD AUTO: 364 K/UL — SIGNIFICANT CHANGE UP (ref 150–400)
RBC # BLD: 2.93 M/UL — LOW (ref 4.2–5.8)
RBC # FLD: 16.6 % — HIGH (ref 10.3–14.5)
RBC BLD AUTO: NORMAL — SIGNIFICANT CHANGE UP
WBC # BLD: 6.92 K/UL — SIGNIFICANT CHANGE UP (ref 3.8–10.5)
WBC # FLD AUTO: 6.92 K/UL — SIGNIFICANT CHANGE UP (ref 3.8–10.5)

## 2021-12-01 PROCEDURE — 36573 INSJ PICC RS&I 5 YR+: CPT

## 2021-12-01 PROCEDURE — 99232 SBSQ HOSP IP/OBS MODERATE 35: CPT

## 2021-12-01 RX ORDER — VANCOMYCIN HCL 1 G
1000 VIAL (EA) INTRAVENOUS EVERY 12 HOURS
Refills: 0 | Status: DISCONTINUED | OUTPATIENT
Start: 2021-12-01 | End: 2021-12-01

## 2021-12-01 RX ORDER — SODIUM CHLORIDE 9 MG/ML
10 INJECTION INTRAMUSCULAR; INTRAVENOUS; SUBCUTANEOUS
Refills: 0 | Status: DISCONTINUED | OUTPATIENT
Start: 2021-12-01 | End: 2021-12-01

## 2021-12-01 RX ORDER — VANCOMYCIN HCL 1 G
1 VIAL (EA) INTRAVENOUS
Qty: 0 | Refills: 0 | DISCHARGE
Start: 2021-12-01

## 2021-12-01 RX ORDER — ENOXAPARIN SODIUM 100 MG/ML
40 INJECTION SUBCUTANEOUS
Qty: 0 | Refills: 0 | DISCHARGE
Start: 2021-12-01

## 2021-12-01 RX ORDER — CHLORHEXIDINE GLUCONATE 213 G/1000ML
1 SOLUTION TOPICAL
Refills: 0 | Status: DISCONTINUED | OUTPATIENT
Start: 2021-12-01 | End: 2021-12-01

## 2021-12-01 RX ADMIN — FAMOTIDINE 20 MILLIGRAM(S): 10 INJECTION INTRAVENOUS at 11:12

## 2021-12-01 RX ADMIN — Medication 81 MILLIGRAM(S): at 11:12

## 2021-12-01 RX ADMIN — Medication 1 MILLIGRAM(S): at 11:12

## 2021-12-01 RX ADMIN — ENOXAPARIN SODIUM 40 MILLIGRAM(S): 100 INJECTION SUBCUTANEOUS at 11:13

## 2021-12-01 RX ADMIN — LINEZOLID 600 MILLIGRAM(S): 600 INJECTION, SOLUTION INTRAVENOUS at 05:30

## 2021-12-01 NOTE — PROGRESS NOTE ADULT - PROBLEM SELECTOR PROBLEM 1
Edema of left foot
Acute febrile illness

## 2021-12-01 NOTE — DISCHARGE NOTE NURSING/CASE MANAGEMENT/SOCIAL WORK - NSDCPETBCESMAN_GEN_ALL_CORE
If you are a smoker, it is important for your health to stop smoking. Please be aware that second hand smoke is also harmful.

## 2021-12-01 NOTE — PROGRESS NOTE ADULT - SUBJECTIVE AND OBJECTIVE BOX
[facility] DIVISION of INFECTIOUS DISEASE  Donny Coker MD PhD, Beverly Avitia MD, Cydney Pruitt MD, Liana Harden MD, Mp Huddleston MD  and providing coverage with Roberta Tam MD and Corin Espinal MD  Providing Infectious Disease Consultations at [facility], [facility]'s    Office# [phone] to schedule follow up appointments  Answering Service for urgent calls or New Consults [phone]  Cell# to text for urgent issues Donny Coker [phone]     infectious diseases progress note:    SARAHI BARNHART is a 66y y. o. Male patient    No concerning overnight events    Allergies    No Known Allergies    Intolerances        ANTIBIOTICS/RELEVANT:  antimicrobials  vancomycin  IVPB 1000 milliGRAM(s) IV Intermittent every 12 hours    immunologic:    OTHER:  acetaminophen     Tablet .. 650 milliGRAM(s) Oral every 6 hours PRN  aluminum hydroxide/magnesium hydroxide/simethicone Suspension 30 milliLiter(s) Oral every 4 hours PRN  aspirin  chewable 81 milliGRAM(s) Oral daily  atorvastatin 80 milliGRAM(s) Oral at bedtime  enoxaparin Injectable 40 milliGRAM(s) SubCutaneous daily  famotidine    Tablet 20 milliGRAM(s) Oral daily  folic acid 1 milliGRAM(s) Oral daily  ibuprofen  Tablet. 600 milliGRAM(s) Oral every 6 hours PRN  ibuprofen  Tablet. 400 milliGRAM(s) Oral every 6 hours PRN  melatonin 3 milliGRAM(s) Oral at bedtime PRN      Objective:  Vital Signs Last 24 Hrs  T(C): 37.3 (01 Dec 2021 05:28), Max: 37.3 (01 Dec 2021 05:28)  T(F): 99.1 (01 Dec 2021 05:28), Max: 99.1 (01 Dec 2021 05:28)  HR: 89 (01 Dec 2021 05:28) (68 - 89)  BP: 126/78 (01 Dec 2021 05:28) (126/78 - 142/73)  BP(mean): --  RR: 17 (01 Dec 2021 05:28) (17 - 18)  SpO2: 97% (01 Dec 2021 05:28) (93% - 97%)    T(C): 37.3 (12-01-21 @ 05:28), Max: 37.3 (12-01-21 @ 05:28)  T(C): 37.3 (12-01-21 @ 05:28), Max: 37.3 (12-01-21 @ 05:28)  T(C): 37.3 (12-01-21 @ 05:28), Max: 37.3 (12-01-21 @ 05:28)    PHYSICAL EXAM:  HEENT: NC atraumatic  Neck: supple  Respiratory: no accessory muscle use, breathing comfortably  Cardiovascular: distant  Gastrointestinal: normal appearing, nondistended  Extremities: no clubbing, no cyanosis, foot wrapped        LABS:                          10.0   6.92  )-----------( 364      ( 01 Dec 2021 08:16 )             30.3       6.92 12-01 @ 08:16  5.61 11-29 @ 08:09  8.27 11-26 @ 05:40  8.75 11-25 @ 07:45              Creatinine, Serum: 0.93 mg/dL (11-29-21 @ 08:09)  Creatinine, Serum: 1.10 mg/dL (11-26-21 @ 05:40)  Creatinine, Serum: 0.90 mg/dL (11-25-21 @ 07:45)                INFLAMMATORY MARKERS  Auto Neutrophil #: 4.77 K/uL (12-01-21 @ 08:16)  Auto Lymphocyte #: 0.69 K/uL (12-01-21 @ 08:16)  Auto Neutrophil #: 6.14 K/uL (11-23-21 @ 07:32)  Auto Lymphocyte #: 0.49 K/uL (11-23-21 @ 07:32)  Auto Neutrophil #: 8.32 K/uL (11-21-21 @ 20:59)  Auto Lymphocyte #: 0.48 K/uL (11-21-21 @ 20:59)    Lactate, Blood: 0.7 mmol/L (11-21-21 @ 23:46)    Auto Eosinophil #: 0.07 K/uL (12-01-21 @ 08:16)  Auto Eosinophil #: 0.07 K/uL (11-23-21 @ 07:32)  Auto Eosinophil #: 0.00 K/uL (11-21-21 @ 20:59)                  Ferritin, Serum: 781 ng/mL (11-22-21 @ 09:13)        Activated Partial Thromboplastin Time: 33.8 sec (11-21-21 @ 20:59)  INR: 1.31 ratio (11-21-21 @ 20:59)    D-Dimer Assay, Quantitative: 574 ng/mL DDU (11-22-21 @ 03:43)        MICROBIOLOGY:              RADIOLOGY & ADDITIONAL STUDIES:

## 2021-12-01 NOTE — DISCHARGE NOTE NURSING/CASE MANAGEMENT/SOCIAL WORK - NSDCPELOVENOX_GEN_ALL_CORE
Enoxaparin/Lovenox - Compliance/Enoxaparin/Lovenox - Dietary Advice/Enoxaparin/Lovenox - Follow up monitoring/Enoxaparin/Lovenox - Potential for adverse drug reactions and interactions

## 2021-12-01 NOTE — DISCHARGE NOTE NURSING/CASE MANAGEMENT/SOCIAL WORK - NSDCPEFALRISK_GEN_ALL_CORE
For information on Fall & Injury Prevention, visit: [email or web address] OR  [email or web address] OR  https://www.cdc.gov/steadi/patient.html
For information on Fall & Injury Prevention, visit: [email or web address] OR  [email or web address]-[email or web address]-[email or web address]-injury OR  https://www.cdc.gov/steadi/patient.html
For information on Fall & Injury Prevention, visit: [email or web address]-[email or web address] OR  [email or web address]-[email or web address]-[email or web address]-injury OR  https://www.cdc.gov/steadi/patient.html

## 2021-12-01 NOTE — DISCHARGE NOTE NURSING/CASE MANAGEMENT/SOCIAL WORK - NSDCPELOVENOXREACT_GEN_ALL_CORE
Enoxaparin/Lovenox increases your risk for bleeding. Notify your doctor if you experience any of the following side effects: unusual bleeding or bruising, coughing up or vomiting blood, red or black stool, blood in your urine, itching or hives, chest tightness, chest pain, shortness of breath, trouble breathing, swelling in your face or hands, swelling in your mouth or throat, fever, large flat blue or purplish patches on the skin, numbness or weakness in your arm or leg on one side, pain in your lower leg, sudden or severe headache with vision, speech or walking problems. When Enoxaparin/Lovenox is taken with other medicines, they can affect how it works. Taking other medications such as aspirin, blood thinners, and nonsteroidal anti-inflammatories increases your risk of bleeding. It is very important to tell your health care provider about all of the other medicines, including over-the-counter medications, herbs, and vitamins you are taking. DO NOT start, stop, or change the dosage of any medicine, including over-the-counter medicines, vitamins, and herbal products without your doctor’s approval. Any products containing aspirin or are nonsteroidal anti-inflammatories lessen the blood’s ability to form clots and adds to the effect of Enoxaparin/Lovenox. Never take aspirin or medicines that contain aspirin without speaking to your doctor.

## 2021-12-01 NOTE — PROGRESS NOTE ADULT - SUBJECTIVE AND OBJECTIVE BOX
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]  Herniated Cervical Disc [722.0]  Bulging Disc [722.2]  Melanoma [C43.9]  Deviated nasal septum [J34.2]  Nasal congestion [R09.81]  Rosacea [L71.9]  Deviated nasal septum [J34.2]  Other specified disorders of nose and nasal sinuses [J34.89]  Anemia [D64.9]  Encephalitis [G04.90]  S/P Splenectomy [V15.29]  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 Patient has had multiple hospitalization in the past. 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 Patient was discharged with home PT and steroids. Patient completed home PT but 3 weeks ago when he was walking he fractured a bone in his L foot and is using a wrap and boot for stabilization and pain is relived with Iboprofen. Ever since the fracture patient walks minimally and states he is "bed bound" for the most part. Patient admits to pain in his L foot. Also admits to SOB from the pain. He was found to be febrile with a temp of 103. On physical exam, L foot was swollen however patient says it has improved from when it started. Denies CP, N/V/D and change in urinary or bowel habits.     Of note, pt with recent hospitalization for what was suspected to be a cva, he was later diagnosed with autoimmune encephalitis. please see d/c summary for details of that recent hospitalization.    In ED   Vitals: T: 103.5, HR: 99, BP: 127/67, RR: 16, O2: 99%   Labs: WBC: 11.89, H/H 9.3/29.0, MCV: 103.6, Albumin: 2.7, UA has proteins, blood and occasional bacteria   Chest XRAY: Appears clear (wet read)   EKG: NSR   Medications: Zosyn x IVPB, Vancomycine x IVPB, NS Bolus x 2, Motrin 600 x1 and Tylenol 650 x 1   (2021 01:11)        MEDICATIONS  (STANDING):  aspirin  chewable 81 milliGRAM(s) Oral daily  atorvastatin 80 milliGRAM(s) Oral at bedtime  chlorhexidine 4% Liquid 1 Application(s) Topical <User Schedule>  enoxaparin Injectable 40 milliGRAM(s) SubCutaneous daily  famotidine    Tablet 20 milliGRAM(s) Oral daily  folic acid 1 milliGRAM(s) Oral daily  vancomycin  IVPB 1000 milliGRAM(s) IV Intermittent every 12 hours    MEDICATIONS  (PRN):  acetaminophen     Tablet .. 650 milliGRAM(s) Oral every 6 hours PRN Temp greater or equal to 38C (100.4F)  aluminum hydroxide/magnesium hydroxide/simethicone Suspension 30 milliLiter(s) Oral every 4 hours PRN Dyspepsia  ibuprofen  Tablet. 600 milliGRAM(s) Oral every 6 hours PRN Moderate Pain (4 - 6)  ibuprofen  Tablet. 400 milliGRAM(s) Oral every 6 hours PRN Mild Pain (1 - 3)  melatonin 3 milliGRAM(s) Oral at bedtime PRN Insomnia  sodium chloride 0.9% lock flush 10 milliLiter(s) IV Push every 1 hour PRN Pre/post blood products, medications, blood draw, and to maintain line patency    Vital Signs Last 24 Hrs  T(C): 36.7 (01 Dec 2021 17:06), Max: 37.3 (01 Dec 2021 05:28)  T(F): 98 (01 Dec 2021 17:06), Max: 99.1 (01 Dec 2021 05:28)  HR: 72 (01 Dec 2021 17:06) (72 - 89)  BP: 122/73 (01 Dec 2021 17:06) (122/73 - 142/73)  BP(mean): --  RR: 17 (01 Dec 2021 17:06) (17 - 18)  SpO2: 98% (01 Dec 2021 17:06) (93% - 98%)  Daily     Daily Weight in k.5 (01 Dec 2021 05:28)      [PHYSICAL EXAM]  General: adult in NAD,  WN,  WD.  HEENT: clear oropharynx, anicteric sclera, pink conjunctivae.  Neck: supple, no masses.  CV: normal S1S2, no murmur, no rubs, no gallops.  Lungs: clear to auscultation, no wheezes, no rales, no rhonchi.  Abdomen: soft, non-tender, non-distended, no hepatosplenomegaly, normal BS, no guarding.  Ext: no clubbing, no cyanosis, no edema.  Skin: no rashes,  no petechiae, no venous stasis changes.    Neuro: alert and oriented X  , no focal motor deficits.  LN: no SC MICHAEL.      [LABS:]                        10.0   6.92  )-----------( 364      ( 01 Dec 2021 08:16 )             30.3         COVID-19 PCR: NotDetec (2021 11:55)  COVID-19 PCR: NotDetec (2021 22:23)  SARS-CoV-2: NotDetec (2021 21:19)  COVID-19 PCR: NotDetec (2021 20:59)  COVID-19 PCR: NotDetec (07 Oct 2021 05:00)  COVID-19 PCR: NotDetec (01 Oct 2021 20:05)  COVID-19 PCR: NotDetec (30 Sep 2021 13:54)  COVID-19 PCR: NotDetec (27 Sep 2021 11:06)  COVID-19 PCR: NotDetec (24 Sep 2021 10:53)  COVID-19 PCR: NotDetec (14 Sep 2021 18:47)        [RADIOLOGY STUDIES:]

## 2021-12-01 NOTE — PROCEDURE NOTE - NSICDXPROCEDURE_GEN_ALL_CORE_FT
PROCEDURES:  Insertion of peripherally inserted central catheter (PICC) with imaging guidance 01-Dec-2021 13:16:22  Barrington Liu

## 2021-12-01 NOTE — DISCHARGE NOTE NURSING/CASE MANAGEMENT/SOCIAL WORK - NURSING SECTION COMPLETE
Patient/Caregiver provided printed discharge information.

## 2021-12-01 NOTE — DISCHARGE NOTE NURSING/CASE MANAGEMENT/SOCIAL WORK - NSDCPELOVENOXDIET_GEN_ALL_CORE
Eat healthy foods you enjoy. Enoxaparin/Lovenox DOES NOT have a special diet. Limit your alcohol intake.

## 2021-12-01 NOTE — PROGRESS NOTE ADULT - PROBLEM SELECTOR PROBLEM 2
History of foot fracture

## 2021-12-01 NOTE — PROGRESS NOTE ADULT - PROVIDER SPECIALTY LIST ADULT
Anesthesia
Cardiology
Heme/Onc
Infectious Disease
Neurology
Cardiology
Family Medicine
Heme/Onc
Infectious Disease
Neurology
Neurology
Cardiology
Heme/Onc
Infectious Disease
Podiatry
Hospitalist
Family Medicine
Family Medicine
Hospitalist
Family Medicine
Family Medicine

## 2021-12-01 NOTE — PROGRESS NOTE ADULT - ATTENDING COMMENTS
- S/P Left LE bone biopsy, tolerated procedure well from CV standpoint  - wound care, pain management and abx per primary team/ID
Tolerated bone biopsy with no cardiac complications.  To follow closely while admitted.
- S/P Left LE bone biopsy, tolerated procedure well from CV standpoint
s/p pod procedure  no cv complications postop
Agree with above findings and plan

## 2021-12-01 NOTE — PROGRESS NOTE ADULT - ASSESSMENT
Patient is a 66 year old male with PMH of CLL on Venetoclax, MDS, Melanoma, Paroxysmal AFib (not on AC), HTN, HLD, came in for increased left foot pain and found with fever in ER.  Febrile in .5 and then fever 103F 11/22 afternoon -- afebrile since   Leukocytosis, h/o CLL - wbc normalized   L foot fracture with swelling, pain with very slight erythema, clinically not c/w cellulitis.   MRI L foot reviewed as above, soft tissue swelling and enhancement, rim enhancing fluid collection dorsal to middle cuneiform extending over base of 2nd metatarsal and small collection subjacent to middle cuneiform ?hematoma vs abscess. Multiple signal abnormalities as above - ddx charcot arthropathy, nondisplaced/stress fx, r/o infection, malignancy  Open biopsy, bone, foot 26-Nov-2021 19:14:28  Keiry Howard  Open drainage of left foot 26-Nov-2021 19:15:21  Keiry Howard.    Recommendations:  Blood cultures NGTD x2  No growth of MRSA or pseudomonas changed CEFTRIAXONE 11/27 11/28-noted staph aureus in culture noted MRSA   path with osteo but of note growth only from aspirate   discussed with podiatry team, wife and hospitalist and rec  Vanco 1 gram IV q12 with goal trough 10-15 dosing per pharmacy protocol and end date 6 weeks so last day 1/11  will need weekly labs CBC, CMP, ESR, vanco trough  -PICC ordered  -may need dc to rehab with mobility issues     We will follow along in the care of this patient. Please call us at [phone] or text me directly on my cell# at [phone] with any concerns.

## 2021-12-01 NOTE — PROCEDURE NOTE - PROCEDURE FINDINGS AND DETAILS
40cm bard power picc inserted into patent right basilic vein under sterile conditions and ultrasound and fluoroscopic guidance.

## 2021-12-01 NOTE — PROGRESS NOTE ADULT - PROBLEM SELECTOR PLAN 6
Patient has a history of CLL on Venetoclax. Patient follows with Dr. Fry. Patient is immunocompromised.  - Continue to monitor daily CBCs  - Hem/Onc (Dr. Fry) consulted
Patient has a history of CLL on Venetoclax. Patient follows with Dr. Fry. Patient is immunocompromised.  - Continue to monitor daily CBCs  - Hem/Onc (Dr. Fry) consulted  discussed with heme-  s/p IVIG yesterday
Patient has a history of CLL on Venetoclax. Patient follows with Dr. Fry. Patient is immunocompromised.  - Continue to monitor daily CBCs  - Hem/Onc (Dr. Fry) consulted
Patient has a history of CLL on Venetoclax. Patient follows with Dr. Fry. Patient is immunocompromised.  - Continue to monitor CBCs  - Hem/Onc (Dr. Fry) consulted  discussed with heme-  s/p IVIG
Patient has a history of CLL on Venetoclax. Patient follows with Dr. Fry. Patient is immunocompromised.  - Continue to monitor daily CBCs  - Hem/Onc (Dr. Fry) consulted  discussed with heme-  s/p IVIG today
Patient has a history of CLL on Venetoclax. Patient follows with Dr. Fry. Patient is immunocompromised.  - Continue to monitor daily CBCs  - Hem/Onc (Dr. Fry) consulted
Patient has a history of CLL on Venetoclax. Patient follows with Dr. Fry. Patient is immunocompromised.  - Continue to monitor daily CBCs  - Hem/Onc (Dr. Fry) consulted  discussed with heme- plan for ivig tomorrow
Patient has a history of CLL on Venetoclax. Patient follows with Dr. Fry. Patient is immunocompromised.  - Continue to monitor daily CBCs  - Hem/Onc (Dr. Fry) consulted  discussed with heme-  s/p IVIG yesterday
Patient has a history of CLL on Venetoclax. Patient follows with Dr. Fry. Patient is immunocompromised.  - Continue to monitor daily CBCs  - Hem/Onc (Dr. Fry) consulted  discussed with heme-  s/p IVIG yesterday
Patient has a history of CLL on Venetoclax. Patient follows with Dr. Fry. Patient is immunocompromised.  - Continue to monitor daily CBCs  - Hem/Onc (Dr. Fry) consulted

## 2021-12-01 NOTE — DISCHARGE NOTE NURSING/CASE MANAGEMENT/SOCIAL WORK - NSDCVIVACCINE_GEN_ALL_CORE_FT
influenza, injectable, quadrivalent, preservative free; 08-Oct-2021 11:39; Alex Cantor (KALEN); [employer]; JL5C3 (Exp. Date: 22-Jun-2022); IntraMuscular; Deltoid Right.; 0.5 milliLiter(s); VIS (VIS Published: 15-Aug-2019, VIS Presented: 08-Oct-2021);   
influenza, injectable, quadrivalent, preservative free; 08-Oct-2021 11:39; Alex Cantor (KALEN); [employer]; JL5C3 (Exp. Date: 22-Jun-2022); IntraMuscular; Deltoid Right.; 0.5 milliLiter(s); VIS (VIS Published: 15-Aug-2019, VIS Presented: 08-Oct-2021);   
influenza, injectable, quadrivalent, preservative free; 08-Oct-2021 11:39; lAex Cantor (KALEN); [employer]; JL5C3 (Exp. Date: 22-Jun-2022); IntraMuscular; Deltoid Right.; 0.5 milliLiter(s); VIS (VIS Published: 15-Aug-2019, VIS Presented: 08-Oct-2021);

## 2021-12-01 NOTE — DISCHARGE NOTE NURSING/CASE MANAGEMENT/SOCIAL WORK - NSDCPELOVENOXCOMP_GEN_ALL_CORE
Enoxaparin/Lovenox is used to treat and prevent blood clots. Use a different body area each time you give yourself a shot. Keep track of where you give each shot to make sure you rotate body areas. Use a new needle and syringe each time you inject your medicine. Never skip a dose of Enoxaparin/Lovenox. You must use this medicine on a fixed schedule.  NEVER TAKE A DOUBLE DOSE. Call your doctor or pharmacist if you miss a dose. Take Enoxaparin/Lovenox at the same time each morning and/or evening.

## 2021-12-01 NOTE — DISCHARGE NOTE NURSING/CASE MANAGEMENT/SOCIAL WORK - BRAND OF COVID-19 VACCINATION
MEDICINE, PROGRESS NOTE [phone]    FRAN ALEXANDRA 69y [id number]    Patient seen and examined.  Patient is a 69y old  Female who presents with a chief complaint of shortness of breath, abdominal pain (28 Nov 2017 22:19)  Pt was seen in er, c/o not getting her meds and difficulty breathing.    PAST MEDICAL & SURGICAL HISTORY:  Hyperthyroidism  JOSE (obstructive sleep apnea)  Epistaxis  GIB (gastrointestinal bleeding)  CAD S/P percutaneous coronary angioplasty  Afib  Pulmonary HTN  GERD (gastroesophageal reflux disease)  Obesity  Cardiomegaly  Valvular heart disease  COPD (chronic obstructive pulmonary disease): Home O2  Sleep Apnea: by criteria  Loose, teeth: 3 bottom front loose teeth  Female stress incontinence  Shoulder pain, right  Constipation  Arthritis of Knee  H/O heartburn  History of lung cancer  Obese  Hx of hyperlipidemia  Asthma  Diabetes mellitus: type 2  dx about 4-5 years ago   no daily fingerstick  H/O: HTN (hypertension)  Enlarged lymph nodes  Lymph nodes enlarged: s/p biopsy mediastinal  benign  H/O endoscopy  left upper lobectomy    MEDICATIONS  (STANDING):  ALBUTerol/ipratropium for Nebulization 3 milliLiter(s) Nebulizer every 6 hours  allopurinol 200 milliGRAM(s) Oral daily  aspirin enteric coated 81 milliGRAM(s) Oral daily  atorvastatin 20 milliGRAM(s) Oral at bedtime  clopidogrel Tablet 75 milliGRAM(s) Oral daily  diltiazem    milliGRAM(s) Oral daily  docusate sodium 100 milliGRAM(s) Oral two times a day  furosemide Infusion 10 mG/Hr (5 mL/Hr) IV Continuous <Continuous>  heparin  Injectable 5000 Unit(s) SubCutaneous every 8 hours  macitentan (OPSUMIT) 10 milliGRAM(s) 10 milliGRAM(s) Oral daily  methimazole 5 milliGRAM(s) Oral daily  metoprolol     tartrate 25 milliGRAM(s) Oral every 8 hours  pantoprazole    Tablet 40 milliGRAM(s) Oral before breakfast  predniSONE   Tablet 5 milliGRAM(s) Oral daily  roflumilast 500 MICROGram(s) Oral daily  tiotropium 2.5 MICROgram(s)/olodaterol 2.5 MICROgram(s) Inhaler 2 Puff(s) Inhalation daily    MEDICATIONS  (PRN):  acetaminophen   Tablet 650 milliGRAM(s) Oral every 6 hours PRN Moderate Pain (4 - 6)  ondansetron    Tablet 4 milliGRAM(s) Oral every 8 hours PRN Nausea and/or Vomiting    Allergies    No Known Allergies    Intolerances    albuterol (Unknown)      PHYSICAL EXAM:  Constitutional: tachypnieic  HEENT: Normocephalic, EOMI  Neck:  + JVD  Respiratory: CTA B/L ulf, dec bs at bases  Cardiovascular: S1, S2, iRRR, + systolic murmur  Gastrointestinal: BS hypo, distended, + fluid wave, mild tenderness  Extremities: + peripheral edema le b/l, PP +  Neurological: AAOX3, no focal deficits  Psychiatric: Normal mood, normal affect  : No Gottlieb    Vital Signs Last 24 Hrs  T(C): 36.3 (29 Nov 2017 16:50), Max: 37.1 (28 Nov 2017 19:16)  T(F): 97.3 (29 Nov 2017 16:50), Max: 98.8 (28 Nov 2017 19:16)  HR: 59 (29 Nov 2017 16:50) (59 - 110)  BP: 97/59 (29 Nov 2017 16:50) (97/59 - 118/68)  BP(mean): --  RR: 20 (29 Nov 2017 16:50) (20 - 24)  SpO2: 94% (29 Nov 2017 16:50) (92% - 97%)  I&O's Summary      LABS:                        8.9    5.56  )-----------( 190      ( 29 Nov 2017 07:05 )             30.0     11-29    141  |  96  |  67<H>  ----------------------------<  104<H>  4.0   |  33<H>  |  1.85<H>    Ca    9.1      29 Nov 2017 07:14    TPro  5.8<L>  /  Alb  3.4  /  TBili  0.7  /  DBili  x   /  AST  12  /  ALT  5<L>  /  AlkPhos  45  11-29    CARDIAC MARKERS ( 29 Nov 2017 05:51 )  x     / 0.07 ng/mL / x     / x     / x      CARDIAC MARKERS ( 28 Nov 2017 19:16 )  x     / 0.07 ng/mL / x     / x     / x
Moderna dose 1, 2, and 3

## 2021-12-01 NOTE — PROGRESS NOTE ADULT - PROBLEM SELECTOR PLAN 1
Patient came in with weakness that worsened 2/2 to L foot fracture. Mildly elevated WBC 11.89 and Temp of 103.5. On PE L , warm and swollen. Chest XRay looks clear. RVP and Covid negative. UA positive for proteins, small blood and 3-5 RBC.   - h/o CLL   - Iv vanco per id  - blood cx ng   - F/U XRay of Left Foot   - Tylenol PRN for fever   - Infectious Disease Consulted (Dr. Coker)  -

## 2021-12-01 NOTE — PROGRESS NOTE ADULT - SUBJECTIVE AND OBJECTIVE BOX
[facility] -- Sveta Romano, Ebony Sparrow, Dereck Deleon Savella, [place]: Office # [phone]    Follow Up:  Pre and Postop Cardiac Optimization Hx of AFib HTN HLD    Subjective/Observations: Patient seen and examined, awake, alert, resting comfortably in bed, no complaints of chest pain, dyspnea, palpitations or dizziness, orthopnea and PND. Tolerating room air. left foot dressing intact     REVIEW OF SYSTEMS: All review of systems is negative for eye, ENT, GI, , allergic, dermatologic, musculoskeletal and neurologic except as described above    PAST MEDICAL & SURGICAL HISTORY:  Osteopenia    CLL (Chronic Lymphocytic Leukemia)  SINCE 1988    Avascular Necrosis of Femur Head  B/L    TMJ Syndrome  limited jaw opening due to TMJ    Herniated Cervical Disc    Bulging Disc  LUMBAR    Melanoma    Deviated nasal septum    Nasal congestion    Rosacea    Deviated nasal septum    Other specified disorders of nose and nasal sinuses    Anemia    Encephalitis    S/P Splenectomy  1993    Avascular Necrosis of Femur Head  RIGHT - CORE DECOMPRESSION- 1993    Avascular Necrosis of Femur Head  LEFT - CORE DECOMPRESSION - 1993    Abnormal Jaw Closure  LEFT JAW SURGERY - 1988    Status Post Eye Surgery X 3  3/2011 right eye scleral buckle; left eye in 2013    S/P Tonsillectomy  1960    Bilateral cataracts  removed in 2011        MEDICATIONS  (STANDING):  aspirin  chewable 81 milliGRAM(s) Oral daily  atorvastatin 80 milliGRAM(s) Oral at bedtime  enoxaparin Injectable 40 milliGRAM(s) SubCutaneous daily  famotidine    Tablet 20 milliGRAM(s) Oral daily  folic acid 1 milliGRAM(s) Oral daily  vancomycin  IVPB 1000 milliGRAM(s) IV Intermittent every 12 hours    MEDICATIONS  (PRN):  acetaminophen     Tablet .. 650 milliGRAM(s) Oral every 6 hours PRN Temp greater or equal to 38C (100.4F)  aluminum hydroxide/magnesium hydroxide/simethicone Suspension 30 milliLiter(s) Oral every 4 hours PRN Dyspepsia  ibuprofen  Tablet. 600 milliGRAM(s) Oral every 6 hours PRN Moderate Pain (4 - 6)  ibuprofen  Tablet. 400 milliGRAM(s) Oral every 6 hours PRN Mild Pain (1 - 3)  melatonin 3 milliGRAM(s) Oral at bedtime PRN Insomnia    Allergies    No Known Allergies    Intolerances      Vital Signs Last 24 Hrs  T(C): 37.3 (01 Dec 2021 05:28), Max: 37.3 (01 Dec 2021 05:28)  T(F): 99.1 (01 Dec 2021 05:28), Max: 99.1 (01 Dec 2021 05:28)  HR: 89 (01 Dec 2021 05:28) (68 - 89)  BP: 126/78 (01 Dec 2021 05:28) (126/78 - 142/73)  BP(mean): --  RR: 17 (01 Dec 2021 05:28) (17 - 18)  SpO2: 97% (01 Dec 2021 05:28) (93% - 97%)  I&O's Summary    30 Nov 2021 07:01  -  01 Dec 2021 07:00  --------------------------------------------------------  IN: 100 mL / OUT: 0 mL / NET: 100 mL          TELE:   PHYSICAL EXAM:  Appearance: NAD, no distress, alert,  HEENT: Moist Mucous Membranes, Anicteric  Cardiovascular: Regular rate and rhythm, Normal S1 S2, No JVD, No murmurs, No rubs, gallops or clicks  Respiratory: Non-labored, Clear to auscultation, No rales, No rhonchi, No wheezing.   Gastrointestinal:  Soft, Non-tender, + BS  Neurologic: Non-focal  Skin: Warm and dry, No visible rashes or ulcers, No ecchymosis, No cyanosis  Musculoskeletal: No clubbing, No cyanosis, No joint swelling/tenderness  Psychiatry: Mood & affect appropriate  Lymph: No peripheral edema.     LABS: All Labs Reviewed:                        10.0   6.92  )-----------( 364      ( 01 Dec 2021 08:16 )             30.3                         9.3    5.61  )-----------( 369      ( 29 Nov 2021 08:09 )             29.9     29 Nov 2021 08:09    141    |  106    |  17     ----------------------------<  94     4.0     |  31     |  0.93     Ca    9.5        29 Nov 2021 08:09      12 Lead ECG:   Ventricular Rate 98 BPM    Atrial Rate 98 BPM    P-R Interval 148 ms    QRS Duration 100 ms    Q-T Interval 350 ms    QTC Calculation(Bazett) 446 ms    P Axis 52 degrees    R Axis 47 degrees    T Axis 60 degrees    Diagnosis Line Normal sinus rhythm  Normal ECG  Confirmed by EARL DELEON ([id number]) on 11/22/2021 12:52:57 PM (11-21-21 @ 20:10)    < from: US Duplex Venous Lower Ext Ltd, Left (11.30.21 @ 00:55) >    EXAM:  US DPLX LWR EXT VEINS LTD LT                            PROCEDURE DATE:  11/30/2021          INTERPRETATION:  CLINICAL INDICATION: left lower extremity pain. Patient has been immobile on leg for the past few days after procedure.    TECHNIQUE: Grayscale, color Doppler and spectral Doppler was utilized to evaluate the left lower extremity deep venous system.    COMPARISON: 11/22/2021.    FINDINGS: There is no obvious thrombus in the left common femoral vein, femoral vein or popliteal vein. Visualized posterior tibial vein and peroneal vein are patent. The gastrocnemius vein and soleal vein were not visualized.    IMPRESSION:    The left gastrocnemius vein and soleal vein were not visualized, limiting complete evaluation. No obvious thrombus in the visualized left lower extremity deep veins.    Discussed with Dr. Powell.    --- End of Report ---            JOSE MANUEL OTT MD; Attending Radiologist  This document has been electronically signed. Nov 30 2021  2:19AM    < end of copied text >  < from: Xray Chest 1 View AP/PA (11.21.21 @ 21:24) >    EXAM:  XR CHEST AP OR PA 1V                            PROCEDURE DATE:  11/21/2021          INTERPRETATION:  Chest one view    HISTORY: Fever    COMPARISON STUDY: 9/14/2021    Frontal view of the chest shows the heart to be normal in size. The lungs are clear and there is no evidence of pneumothorax nor pleural effusion.    IMPRESSION:  No active pulmonary disease.    Thank you for the courtesy of this referral.    --- End of Report ---            MILES BECK MD; Attending Interventional Radiologist  This document has been electronically signed. Nov 23 2021  8:39AM    < end of copied text >

## 2021-12-01 NOTE — PROGRESS NOTE ADULT - SUBJECTIVE AND OBJECTIVE BOX
Patient is a 66y old  Male who presents with a chief complaint of acute febrile illness (30 Nov 2021 11:25)  events noted  discussed with ID  await PICC for outpt iv vanco course      INTERVAL HPI/OVERNIGHT EVENTS:  T(C): 37.3 (12-01-21 @ 05:28), Max: 37.3 (12-01-21 @ 05:28)  HR: 89 (12-01-21 @ 05:28) (68 - 89)  BP: 126/78 (12-01-21 @ 05:28) (126/78 - 142/73)  RR: 17 (12-01-21 @ 05:28) (17 - 18)  SpO2: 97% (12-01-21 @ 05:28) (93% - 97%)  Wt(kg): --  I&O's Summary    30 Nov 2021 07:01  -  01 Dec 2021 07:00  --------------------------------------------------------  IN: 100 mL / OUT: 0 mL / NET: 100 mL        LABS:                        10.0   6.92  )-----------( 364      ( 01 Dec 2021 08:16 )             30.3               CAPILLARY BLOOD GLUCOSE                MEDICATIONS  (STANDING):  aspirin  chewable 81 milliGRAM(s) Oral daily  atorvastatin 80 milliGRAM(s) Oral at bedtime  enoxaparin Injectable 40 milliGRAM(s) SubCutaneous daily  famotidine    Tablet 20 milliGRAM(s) Oral daily  folic acid 1 milliGRAM(s) Oral daily  vancomycin  IVPB 1000 milliGRAM(s) IV Intermittent every 12 hours    MEDICATIONS  (PRN):  acetaminophen     Tablet .. 650 milliGRAM(s) Oral every 6 hours PRN Temp greater or equal to 38C (100.4F)  aluminum hydroxide/magnesium hydroxide/simethicone Suspension 30 milliLiter(s) Oral every 4 hours PRN Dyspepsia  ibuprofen  Tablet. 600 milliGRAM(s) Oral every 6 hours PRN Moderate Pain (4 - 6)  ibuprofen  Tablet. 400 milliGRAM(s) Oral every 6 hours PRN Mild Pain (1 - 3)  melatonin 3 milliGRAM(s) Oral at bedtime PRN Insomnia      REVIEW OF SYSTEMS:  CONSTITUTIONAL: No fever, weight loss, or fatigue  EYES: No eye pain, visual disturbances, or discharge  ENMT:  No difficulty hearing, tinnitus, vertigo; No sinus or throat pain  NECK: No pain or stiffness  RESPIRATORY: No cough, wheezing, chills or hemoptysis; No shortness of breath  CARDIOVASCULAR: No chest pain, palpitations, dizziness, or leg swelling  GASTROINTESTINAL: No abdominal or epigastric pain. No nausea, vomiting, or hematemesis; No diarrhea or constipation. No melena or hematochezia.  GENITOURINARY: No dysuria, frequency, hematuria, or incontinence  NEUROLOGICAL: No headaches, memory loss, loss of strength, numbness, or tremors  SKIN: No itching, burning, rashes, or lesions   LYMPH NODES: No enlarged glands  ENDOCRINE: No heat or cold intolerance; No hair loss  MUSCULOSKELETAL: less foot pain  PSYCHIATRIC: No depression, anxiety, mood swings, or difficulty sleeping  HEME/LYMPH: No easy bruising, or bleeding gums  ALLERY AND IMMUNOLOGIC: No hives or eczema    RADIOLOGY & ADDITIONAL TESTS:    Imaging Personally Reviewed:  [x ] YES  [ ] NO    Consultant(s) Notes Reviewed:  [x ] YES  [ ] NO    PHYSICAL EXAM:  GENERAL: NAD, well-groomed, well-developed  HEAD:  Atraumatic, Normocephalic  EYES: EOMI, PERRLA, conjunctiva and sclera clear  ENMT: No tonsillar erythema, exudates, or enlargement; Moist mucous membranes, Good dentition, No lesions  NECK: Supple, No JVD, Normal thyroid  NERVOUS SYSTEM:  Alert & Oriented X3, Good concentration; Motor Strength 5/5 B/L upper and lower extremities; DTRs 2+ intact and symmetric  CHEST/LUNG: Clear to percussion bilaterally; No rales, rhonchi, wheezing, or rubs  HEART: Regular rate and rhythm; No murmurs, rubs, or gallops  ABDOMEN: Soft, Nontender, Nondistended; Bowel sounds present  EXTREMITIES:  2+ Peripheral Pulses, No clubbing, cyanosis, or edema  LYMPH: No lymphadenopathy noted  SKIN: No rashes or lesions    Care Discussed with Consultants/Other Providers [x YES  [ ] NO

## 2021-12-01 NOTE — PROGRESS NOTE ADULT - PROBLEM SELECTOR PLAN 7
Patient has a history of HLD.   - Continue home medications: Atorvastatin 80mg OD

## 2021-12-01 NOTE — DISCHARGE NOTE NURSING/CASE MANAGEMENT/SOCIAL WORK - PATIENT PORTAL LINK FT
You can access the FollowMyHealth Patient Portal offered by [facility] by registering at the following website: [email or web address]. By joining [employer]’s FollowMyHealth portal, you will also be able to view your health information using other applications (apps) compatible with our system.
You can access the FollowMyHealth Patient Portal offered by [facility] by registering at the following website: [email or web address]. By joining [employer]’s FollowMyHealth portal, you will also be able to view your health information using other applications (apps) compatible with our system.
You can access the FollowMyHealth Patient Portal offered by [facility] by registering at the following website: [email or web address]. By joining [facility]’s FollowMyHealth portal, you will also be able to view your health information using other applications (apps) compatible with our system.

## 2021-12-01 NOTE — PROGRESS NOTE ADULT - ASSESSMENT
67 yo male with history of CLL and MDS overlap syndrome, most recently treated with Gazyva X6 months/Venetoclax, autoimmune encephalitis and left foot fracture now with pain in foot admitted for foot pain bone bx planned by podiatry to eval for osteo today. Cardiology consulted for pre-op eval.     PAF/HLD  - Patient with episode of afib on 7/2020 admission  - SR on tele during previous admission in 9/2021   - EKG NSR with PACs  - No signs of ischemia or volume overload  - Echocardiogram 9/2021 unremarkable with EF of 60%  - BP and HR within normal limits  - continue ASA, Statin  - Monitor and replete Lytes. Keep K > 4 and Mg > 2    pre/post cardiac optimization Bone biopsy  - S/P Left LE bone biopsy, tolerated procedure well from CV standpoint  - wound care, pain management and abx per primary team/ID    - All other medical needs as per primary team.  - Other cardiovascular workup will depend on clinical course.  - Will continue to follow.    Yady Tavares [facility]  Nurse Practitioner - Cardiology   Spectra #[id number]/ [phone]

## 2021-12-01 NOTE — PROGRESS NOTE ADULT - PROBLEM SELECTOR PLAN 3
Patient has a history of Anemia. Patient of Dr. Fry. Per chart review baseline Hgb appears to be between 11-12. Hgb on admission is 9.3. MCV above 100.   - F/U Iron Studies   - Continue to monitor daily CBCs   - Continue to take home medications: Folic Acid.   - Hem/Onc (Dr. Fry) consulted

## 2021-12-01 NOTE — PROGRESS NOTE ADULT - PROBLEM SELECTOR PROBLEM 6
CLL (chronic lymphocytic leukemia)

## 2021-12-01 NOTE — PROGRESS NOTE ADULT - PROBLEM SELECTOR PLAN 2
Patient came in with weakness that worsened 2/2 to L foot fracture. Patient admits to being bed bound, Mildly elevated WBC 11.89 and Temp of 103.5. On PE L , warm and swollen.   - F/U XRay of Left Foot   - F/U D-Dimer   - F/U Doppler of LE B/L   - F/U PT and OT eval   - Ibuprofen PRN for pain   - Infectious Disease Consulted (Dr. Coker)
Patient came in with weakness that worsened 2/2 to L foot fracture. Patient admits to being bed bound, Mildly elevated WBC 11.89 and Temp of 103.5. On PE L , warm and swollen.   - F/U XRay of Left Foot   - F/U D-Dimer   - F/U Doppler of LE B/L   - F/U PT and OT eval   - Ibuprofen PRN for pain   - ortho and podiatry eval  follow up biopsy results
Patient came in with weakness that worsened 2/2 to L foot fracture. Patient admits to being bed bound, Mildly elevated WBC 11.89 and Temp of 103.5. On PE L , warm and swollen.   - F/U XRay of Left Foot   - F/U D-Dimer   - F/U Doppler of LE B/L   - F/U PT and OT eval   - Ibuprofen PRN for pain   - ortho and podiatry eval  follow up biopsy results
Patient came in with weakness that worsened 2/2 to L foot fracture. Patient admits to being bed bound, Mildly elevated WBC 11.89 and Temp of 103.5. On PE L , warm and swollen.   - F/U XRay of Left Foot   - F/U D-Dimer   - F/U Doppler of LE B/L   - F/U PT and OT eval   - Ibuprofen PRN for pain   - ortho and podiatry eval
Patient came in with weakness that worsened 2/2 to L foot fracture. Patient admits to being bed bound, Mildly elevated WBC 11.89 and Temp of 103.5. On PE L , warm and swollen.   - F/U XRay of Left Foot   - F/U D-Dimer   - F/U Doppler of LE B/L   - F/U PT and OT eval   - Ibuprofen PRN for pain   - ortho and podiatry eval  There are no medical contraindications to planned podiatry procedure 11/26/21  anticoagulants held
Patient came in with weakness that worsened 2/2 to L foot fracture. Patient admits to being bed bound, Mildly elevated WBC 11.89 and Temp of 103.5. On PE L , warm and swollen.   - F/U XRay of Left Foot   - F/U D-Dimer   - F/U Doppler of LE B/L   - F/U PT and OT eval   - Ibuprofen PRN for pain   - ortho and podiatry eval  follow up biopsy results
Patient came in with weakness that worsened 2/2 to L foot fracture. Patient admits to being bed bound, Mildly elevated WBC 11.89 and Temp of 103.5. On PE L , warm and swollen.   - F/U XRay of Left Foot   - F/U D-Dimer   - F/U Doppler of LE B/L   - F/U PT and OT eval   - Ibuprofen PRN for pain   - ortho and podiatry eval  There are no medical contraindications to planned podiatry procedure 11/26/21  anticoagulants held
Patient came in with weakness that worsened 2/2 to L foot fracture. Patient admits to being bed bound, Mildly elevated WBC 11.89 and Temp of 103.5. On PE L , warm and swollen.   - F/U XRay of Left Foot   - F/U D-Dimer   - F/U Doppler of LE B/L   - F/U PT and OT eval   - Ibuprofen PRN for pain   - ortho and podiatry eval  follow up biopsy results noted  cleared for dc to rehab
Patient came in with weakness that worsened 2/2 to L foot fracture. Patient admits to being bed bound, Mildly elevated WBC 11.89 and Temp of 103.5. On PE L , warm and swollen.   - F/U XRay of Left Foot   - F/U D-Dimer   - F/U Doppler of LE B/L   - F/U PT and OT eval   - Ibuprofen PRN for pain   - Infectious Disease Consulted (Dr. Coker)
Patient came in with weakness that worsened 2/2 to L foot fracture. Patient admits to being bed bound, Mildly elevated WBC 11.89 and Temp of 103.5. On PE L , warm and swollen.   - F/U XRay of Left Foot   - F/U D-Dimer   - F/U Doppler of LE B/L   - F/U PT and OT eval   - Ibuprofen PRN for pain   - ortho and podiatry eval  follow up biopsy results

## 2021-12-01 NOTE — PROGRESS NOTE ADULT - SUBJECTIVE AND OBJECTIVE BOX
Neurology follow up note    SARAHI [id number]      Interval History:    Patient feels ok no new complaints.    Allergies    No Known Allergies    Intolerances        MEDICATIONS    acetaminophen     Tablet .. 650 milliGRAM(s) Oral every 6 hours PRN  aluminum hydroxide/magnesium hydroxide/simethicone Suspension 30 milliLiter(s) Oral every 4 hours PRN  aspirin  chewable 81 milliGRAM(s) Oral daily  atorvastatin 80 milliGRAM(s) Oral at bedtime  enoxaparin Injectable 40 milliGRAM(s) SubCutaneous daily  famotidine    Tablet 20 milliGRAM(s) Oral daily  folic acid 1 milliGRAM(s) Oral daily  ibuprofen  Tablet. 600 milliGRAM(s) Oral every 6 hours PRN  ibuprofen  Tablet. 400 milliGRAM(s) Oral every 6 hours PRN  melatonin 3 milliGRAM(s) Oral at bedtime PRN  vancomycin  IVPB 1000 milliGRAM(s) IV Intermittent every 12 hours              Vital Signs Last 24 Hrs  T(C): 37.3 (01 Dec 2021 05:28), Max: 37.3 (01 Dec 2021 05:28)  T(F): 99.1 (01 Dec 2021 05:28), Max: 99.1 (01 Dec 2021 05:28)  HR: 89 (01 Dec 2021 05:28) (68 - 89)  BP: 126/78 (01 Dec 2021 05:28) (126/78 - 142/73)  BP(mean): --  RR: 17 (01 Dec 2021 05:28) (17 - 18)  SpO2: 97% (01 Dec 2021 05:28) (93% - 97%)    REVIEW OF SYSTEMS:  Constitutional:  The patient denies fever, chills, or night sweats.  Head:  No headache.  Eyes:  No double vision or blurry vision.  Ears:  No ringing in the ears.  Neck:  No neck pain.  Respiratory:  No shortness of breath.  Cardiovascular:  No chest pain.  Abdomen:  No nausea, vomiting, or abdominal pain.  Extremities/Neurological:  No numbness or tingling.  Musculoskeletal:  Positive pain in his left foot.    PHYSICAL EXAMINATION:   HEENT:  Head:  Normocephalic, atraumatic.  Eyes:  No scleral icterus.  Ears:  Hearing bilaterally intact.  NECK:  Supple.  RESPIRATORY:  Good air entry bilaterally.  CARDIOVASCULAR:  S1 and S2 heard.  ABDOMEN:  Soft and nontender.  EXTREMITIES:  No clubbing or cyanosis was noted.      NEUROLOGIC:  The patient is awake and alert.  Location was [facility], year was 2021, month was November.  Was able to name simple objects.  Recall was 1/3 without prompting, remained 1/3 with prompting, was able to name simple objects.  Follows complex commands.  Extraocular movements were intact.  Speech was fluent.  Smile was symmetric.  Motor:  Bilateral upper and lower were 4/5.  Sensory:  Bilateral upper and lower intact to light touch.                   LABS:  CBC Full  -  ( 01 Dec 2021 08:16 )  WBC Count : 6.92 K/uL  RBC Count : 2.93 M/uL  Hemoglobin : 10.0 g/dL  Hematocrit : 30.3 %  Platelet Count - Automated : 364 K/uL  Mean Cell Volume : 103.4 fl  Mean Cell Hemoglobin : 34.1 pg  Mean Cell Hemoglobin Concentration : 33.0 gm/dL  Auto Neutrophil # : 4.77 K/uL  Auto Lymphocyte # : 0.69 K/uL  Auto Monocyte # : 1.38 K/uL  Auto Eosinophil # : 0.07 K/uL  Auto Basophil # : 0.00 K/uL  Auto Neutrophil % : 67.0 %  Auto Lymphocyte % : 10.0 %  Auto Monocyte % : 20.0 %  Auto Eosinophil % : 1.0 %  Auto Basophil % : 0.0 %            Hemoglobin A1C:       Vitamin B12         RADIOLOGY    ANALYSIS AND PLAN:  This is a 66-year-old with an episode of altered mental status, history of autoimmune encephalitis.  For episode of altered mental status, generalized weakness, and tremors, as per my conversation with the spouse, this occurred when the patient was having fevers, suspect most likely metabolic.  Infection workup as needed.  For history of hypertension, continue the patient to monitor systolic blood pressure.  For history of hyperlipidemia, statin as needed.  S/P  biopsy   patient refuses to move foot   pain medications as needed   I do not see any clear signs to suggest reactivation of the patient's autoimmune encephalitis at present.    Spoke with the spouse, Javier, at [phone], alternate number is [phone] 11/25    Greater than 20 minutes of time was spent with the patient, plan of care, reviewing data, speaking to the multidisciplinary healthcare team with greater than 50% of that time in counseling and care coordination.    Thank you for the courtesy of this consultation.

## 2021-12-01 NOTE — PROGRESS NOTE ADULT - PROBLEM SELECTOR PLAN 8
- Lovenox 40 to be held for procedure  resume when ok with podiatry team
cont lovenox
- Lovenox 40
cont lovenox
- Lovenox 40
resume lovenox and asa tomorrow as ordered
- Lovenox 40
cont lovenox

## 2021-12-01 NOTE — DISCHARGE NOTE NURSING/CASE MANAGEMENT/SOCIAL WORK - NSDCPELOVENOXFU_GEN_ALL_CORE
Go for blood tests as directed. Your doctor will do lab tests at regular visits to monitor the effects of this medicine. Please follow up with your doctor and keep your health care provider appointments

## 2021-12-02 PROBLEM — G04.90 ENCEPHALITIS AND ENCEPHALOMYELITIS, UNSPECIFIED: Chronic | Status: ACTIVE | Noted: 2021-11-21

## 2021-12-02 LAB
CULTURE RESULTS: SIGNIFICANT CHANGE UP
CULTURE RESULTS: SIGNIFICANT CHANGE UP
ORGANISM # SPEC MICROSCOPIC CNT: SIGNIFICANT CHANGE UP
ORGANISM # SPEC MICROSCOPIC CNT: SIGNIFICANT CHANGE UP
SPECIMEN SOURCE: SIGNIFICANT CHANGE UP
SPECIMEN SOURCE: SIGNIFICANT CHANGE UP

## 2021-12-07 NOTE — SWALLOW BEDSIDE ASSESSMENT ADULT - ASR SWALLOW LABIAL MOBILITY
Admission Reconciliation is Completed  Discharge Reconciliation is Completed
within functional limits

## 2021-12-15 ENCOUNTER — NON-APPOINTMENT (OUTPATIENT)
Age: 66
End: 2021-12-15

## 2021-12-16 ENCOUNTER — APPOINTMENT (OUTPATIENT)
Dept: NEUROLOGY | Facility: CLINIC | Age: 66
End: 2021-12-16

## 2021-12-16 ENCOUNTER — OUTPATIENT (OUTPATIENT)
Dept: OUTPATIENT SERVICES | Facility: HOSPITAL | Age: 66
LOS: 1 days | Discharge: HOME | End: 2021-12-16
Payer: MEDICARE

## 2021-12-16 ENCOUNTER — APPOINTMENT (OUTPATIENT)
Dept: WOUND CARE | Facility: HOSPITAL | Age: 66
End: 2021-12-16
Payer: MEDICARE

## 2021-12-16 VITALS
DIASTOLIC BLOOD PRESSURE: 76 MMHG | HEIGHT: 69 IN | SYSTOLIC BLOOD PRESSURE: 136 MMHG | OXYGEN SATURATION: 100 % | BODY MASS INDEX: 21.48 KG/M2 | WEIGHT: 145 LBS | RESPIRATION RATE: 18 BRPM | HEART RATE: 70 BPM | TEMPERATURE: 97.2 F

## 2021-12-16 DIAGNOSIS — Z78.9 OTHER SPECIFIED HEALTH STATUS: ICD-10-CM

## 2021-12-16 DIAGNOSIS — Z98.890 OTHER SPECIFIED POSTPROCEDURAL STATES: ICD-10-CM

## 2021-12-16 DIAGNOSIS — I48.91 UNSPECIFIED ATRIAL FIBRILLATION: ICD-10-CM

## 2021-12-16 DIAGNOSIS — M86.172 OTHER ACUTE OSTEOMYELITIS, LEFT ANKLE AND FOOT: ICD-10-CM

## 2021-12-16 DIAGNOSIS — E78.00 PURE HYPERCHOLESTEROLEMIA, UNSPECIFIED: ICD-10-CM

## 2021-12-16 DIAGNOSIS — C91.10 CHRONIC LYMPHOCYTIC LEUKEMIA OF B-CELL TYPE NOT HAVING ACHIEVED REMISSION: ICD-10-CM

## 2021-12-16 DIAGNOSIS — Z80.9 FAMILY HISTORY OF MALIGNANT NEOPLASM, UNSPECIFIED: ICD-10-CM

## 2021-12-16 DIAGNOSIS — S91.309A UNSPECIFIED OPEN WOUND, UNSPECIFIED FOOT, INITIAL ENCOUNTER: ICD-10-CM

## 2021-12-16 DIAGNOSIS — Z86.79 PERSONAL HISTORY OF OTHER DISEASES OF THE CIRCULATORY SYSTEM: ICD-10-CM

## 2021-12-16 DIAGNOSIS — Z86.39 PERSONAL HISTORY OF OTHER ENDOCRINE, NUTRITIONAL AND METABOLIC DISEASE: ICD-10-CM

## 2021-12-16 DIAGNOSIS — Z98.49 CATARACT EXTRACTION STATUS, UNSPECIFIED EYE: ICD-10-CM

## 2021-12-16 DIAGNOSIS — Z92.3 PERSONAL HISTORY OF IRRADIATION: ICD-10-CM

## 2021-12-16 DIAGNOSIS — H26.9 UNSPECIFIED CATARACT: Chronic | ICD-10-CM

## 2021-12-16 DIAGNOSIS — Z87.81 PERSONAL HISTORY OF (HEALED) TRAUMATIC FRACTURE: ICD-10-CM

## 2021-12-16 DIAGNOSIS — Z80.7 FAMILY HISTORY OF OTHER MALIGNANT NEOPLASMS OF LYMPHOID, HEMATOPOIETIC AND RELATED TISSUES: ICD-10-CM

## 2021-12-16 DIAGNOSIS — I11.9 HYPERTENSIVE HEART DISEASE WITHOUT HEART FAILURE: ICD-10-CM

## 2021-12-16 DIAGNOSIS — Z79.82 LONG TERM (CURRENT) USE OF ASPIRIN: ICD-10-CM

## 2021-12-16 DIAGNOSIS — Z79.899 OTHER LONG TERM (CURRENT) DRUG THERAPY: ICD-10-CM

## 2021-12-16 DIAGNOSIS — Z92.21 PERSONAL HISTORY OF ANTINEOPLASTIC CHEMOTHERAPY: ICD-10-CM

## 2021-12-16 PROCEDURE — G0463: CPT

## 2021-12-16 PROCEDURE — 99214 OFFICE O/P EST MOD 30 MIN: CPT

## 2021-12-16 RX ORDER — ASPIRIN 81 MG/1
81 TABLET, CHEWABLE ORAL DAILY
Refills: 0 | Status: ACTIVE | COMMUNITY

## 2021-12-16 RX ORDER — FOLIC ACID 1 MG/1
1 TABLET ORAL DAILY
Refills: 0 | Status: ACTIVE | COMMUNITY

## 2021-12-16 NOTE — REVIEW OF SYSTEMS
[Fever] : no fever [Eye Pain] : no eye pain [Earache] : no earache [Chest Pain] : no chest pain [Shortness Of Breath] : no shortness of breath [Cough] : no cough [Abdominal Pain] : no abdominal pain [de-identified] : left foot incision closed with sutures

## 2021-12-16 NOTE — PHYSICAL EXAM
[2+] : left 2+ [Ankle Swelling (On Exam)] : not present [] : not present [de-identified] : A&Ox3, NAD [de-identified] : left midfoot s/p bone biopsy [de-identified] : Left dorsal midfoot incision closed with sutures [de-identified] : Light touch sensation intact bilaterally [FreeTextEntry1] : Left Dorsal Foot- Incision line D&I with sutures in place- all sutures removed & steri strips applied by Dr Ruby [de-identified] : Intact [de-identified] : No Dressing/ White cotton socks [de-identified] : Cleansed with Normal saline\par  [de-identified] : CIRCULATION\par Dorsalis Pedis: R palpable  L palpable\par Posterior Tibialis: R palpable L palpable\par Extremity Color: Pigmented\par Extremity Temperature: Warm\par Capillary Refill: < 3 seconds bilaterally\par Vascular studies not ordered by Dr Ruby\par \par  [TWNoteComboBox4] : None [de-identified] : None [de-identified] : None

## 2021-12-16 NOTE — PLAN
[FreeTextEntry1] : Patient examined and evaluated at this time.\par Sutures removed without incident\par Pt to stay nonweight bearing to the left foot.\par Continue local wound care and offloading.\par Continue abx as per ID recommendations.\par Spent 30 minutes for patient care and medical decision making.\par Patient to follow up in 2 weeks.\par

## 2021-12-16 NOTE — HISTORY OF PRESENT ILLNESS
[FreeTextEntry1] : The wound is located on Left Foot- pt states that in 11/2021 he developed pain & swelling Left Foot & was unable to put weight on Foot so he went to [facility] (Dr Anguiano) who sent him for CT Scan & MRI (questionable Fx or Cyst) & placed pt on NWB status- a few weeks later no improvement & pt developed weakness & deterioration of condition so [facility] sent pt to [facility]- pt was hospitalized [facility] 11/22/21-12/01/21 & underwent Left midfoot bone biopsy and left soft tissue bone biopsy and left foot fluid aspiration (Pathology + Acute OM) by Dr Ruby on 11/26/21- pt seen by ID (Dr Coker) & PICC line inserted & Vancomycin in progress- pt was transferred on 12/01/21 to [facility] & instructed to f/u to [facility]\par

## 2021-12-16 NOTE — ASSESSMENT
[Verbal] : Verbal [Demo] : Demo [Patient] : Patient [Spouse] : Spouse [Good - alert, interested, motivated] : Good - alert, interested, motivated [Verbalizes knowledge/Understanding] : Verbalizes knowledge/understanding [Dressing changes] : dressing changes [Foot Care] : foot care [Skin Care] : skin care [Pressure relief] : pressure relief [Signs and symptoms of infection] : sign and symptoms of infection [How and When to Call] : how and when to call [Labs and Tests] : labs and tests [Off-loading] : off-loading [Patient responsibility to plan of care] : patient responsibility to plan of care [] : Yes [Stable] : stable [Home] : Home [Wheelchair] : Wheelchair [FreeTextEntry2] : Alteration in skin integrity- promote optimal skin integrity\par  [FreeTextEntry4] : Dr Ruby/ Photo taken\par Pt receives IV antibiotics via PICC line & to schedule follow up with Dr Coker\par Pt to remain NWB at present as per Dr Ruby\par F/U to [facility] in 2 weeks\par

## 2021-12-22 PROCEDURE — 82962 GLUCOSE BLOOD TEST: CPT

## 2021-12-22 PROCEDURE — 73700 CT LOWER EXTREMITY W/O DYE: CPT

## 2021-12-22 PROCEDURE — 87086 URINE CULTURE/COLONY COUNT: CPT

## 2021-12-22 PROCEDURE — 93971 EXTREMITY STUDY: CPT

## 2021-12-22 PROCEDURE — 85018 HEMOGLOBIN: CPT

## 2021-12-22 PROCEDURE — 82728 ASSAY OF FERRITIN: CPT

## 2021-12-22 PROCEDURE — 97161 PT EVAL LOW COMPLEX 20 MIN: CPT

## 2021-12-22 PROCEDURE — 87186 SC STD MICRODIL/AGAR DIL: CPT

## 2021-12-22 PROCEDURE — 76376 3D RENDER W/INTRP POSTPROCES: CPT

## 2021-12-22 PROCEDURE — 87635 SARS-COV-2 COVID-19 AMP PRB: CPT

## 2021-12-22 PROCEDURE — 36573 INSJ PICC RS&I 5 YR+: CPT

## 2021-12-22 PROCEDURE — 88108 CYTOPATH CONCENTRATE TECH: CPT

## 2021-12-22 PROCEDURE — 86923 COMPATIBILITY TEST ELECTRIC: CPT

## 2021-12-22 PROCEDURE — 73620 X-RAY EXAM OF FOOT: CPT

## 2021-12-22 PROCEDURE — 85025 COMPLETE CBC W/AUTO DIFF WBC: CPT

## 2021-12-22 PROCEDURE — 85730 THROMBOPLASTIN TIME PARTIAL: CPT

## 2021-12-22 PROCEDURE — 85027 COMPLETE CBC AUTOMATED: CPT

## 2021-12-22 PROCEDURE — 73720 MRI LWR EXTREMITY W/O&W/DYE: CPT

## 2021-12-22 PROCEDURE — 36415 COLL VENOUS BLD VENIPUNCTURE: CPT

## 2021-12-22 PROCEDURE — 93970 EXTREMITY STUDY: CPT

## 2021-12-22 PROCEDURE — 86900 BLOOD TYPING SEROLOGIC ABO: CPT

## 2021-12-22 PROCEDURE — 77001 FLUOROGUIDE FOR VEIN DEVICE: CPT

## 2021-12-22 PROCEDURE — 97530 THERAPEUTIC ACTIVITIES: CPT

## 2021-12-22 PROCEDURE — 87070 CULTURE OTHR SPECIMN AEROBIC: CPT

## 2021-12-22 PROCEDURE — 93005 ELECTROCARDIOGRAM TRACING: CPT

## 2021-12-22 PROCEDURE — 80048 BASIC METABOLIC PNL TOTAL CA: CPT

## 2021-12-22 PROCEDURE — 73630 X-RAY EXAM OF FOOT: CPT

## 2021-12-22 PROCEDURE — 97166 OT EVAL MOD COMPLEX 45 MIN: CPT

## 2021-12-22 PROCEDURE — 87205 SMEAR GRAM STAIN: CPT

## 2021-12-22 PROCEDURE — 83540 ASSAY OF IRON: CPT

## 2021-12-22 PROCEDURE — U0005: CPT

## 2021-12-22 PROCEDURE — 85014 HEMATOCRIT: CPT

## 2021-12-22 PROCEDURE — 81001 URINALYSIS AUTO W/SCOPE: CPT

## 2021-12-22 PROCEDURE — 83605 ASSAY OF LACTIC ACID: CPT

## 2021-12-22 PROCEDURE — 88304 TISSUE EXAM BY PATHOLOGIST: CPT

## 2021-12-22 PROCEDURE — 97116 GAIT TRAINING THERAPY: CPT

## 2021-12-22 PROCEDURE — 86769 SARS-COV-2 COVID-19 ANTIBODY: CPT

## 2021-12-22 PROCEDURE — 71250 CT THORAX DX C-: CPT

## 2021-12-22 PROCEDURE — C1751: CPT

## 2021-12-22 PROCEDURE — 85610 PROTHROMBIN TIME: CPT

## 2021-12-22 PROCEDURE — 80053 COMPREHEN METABOLIC PANEL: CPT

## 2021-12-22 PROCEDURE — 83550 IRON BINDING TEST: CPT

## 2021-12-22 PROCEDURE — 88311 DECALCIFY TISSUE: CPT

## 2021-12-22 PROCEDURE — 0225U NFCT DS DNA&RNA 21 SARSCOV2: CPT

## 2021-12-22 PROCEDURE — A9579: CPT

## 2021-12-22 PROCEDURE — 99285 EMERGENCY DEPT VISIT HI MDM: CPT

## 2021-12-22 PROCEDURE — 76937 US GUIDE VASCULAR ACCESS: CPT

## 2021-12-22 PROCEDURE — U0003: CPT

## 2021-12-22 PROCEDURE — 74176 CT ABD & PELVIS W/O CONTRAST: CPT

## 2021-12-22 PROCEDURE — 85379 FIBRIN DEGRADATION QUANT: CPT

## 2021-12-22 PROCEDURE — 71045 X-RAY EXAM CHEST 1 VIEW: CPT

## 2021-12-22 PROCEDURE — 86901 BLOOD TYPING SEROLOGIC RH(D): CPT

## 2021-12-22 PROCEDURE — 87075 CULTR BACTERIA EXCEPT BLOOD: CPT

## 2021-12-22 PROCEDURE — 86850 RBC ANTIBODY SCREEN: CPT

## 2021-12-22 PROCEDURE — 88305 TISSUE EXAM BY PATHOLOGIST: CPT

## 2021-12-22 PROCEDURE — 87040 BLOOD CULTURE FOR BACTERIA: CPT

## 2021-12-28 ENCOUNTER — INPATIENT (INPATIENT)
Facility: HOSPITAL | Age: 66
LOS: 6 days | Discharge: NURSING, REHAB OR LONG-TERM CARE | DRG: 178 | End: 2022-01-04
Attending: PSYCHIATRY & NEUROLOGY | Admitting: PSYCHIATRY & NEUROLOGY
Payer: MEDICARE

## 2021-12-28 ENCOUNTER — APPOINTMENT (OUTPATIENT)
Dept: WOUND CARE | Facility: HOSPITAL | Age: 66
End: 2021-12-28

## 2021-12-28 ENCOUNTER — EMERGENCY (EMERGENCY)
Facility: HOSPITAL | Age: 66
LOS: 1 days | Discharge: ANOTHER HOSPITAL | End: 2021-12-28
Attending: STUDENT IN AN ORGANIZED HEALTH CARE EDUCATION/TRAINING PROGRAM | Admitting: STUDENT IN AN ORGANIZED HEALTH CARE EDUCATION/TRAINING PROGRAM
Payer: MEDICARE

## 2021-12-28 VITALS
SYSTOLIC BLOOD PRESSURE: 147 MMHG | RESPIRATION RATE: 20 BRPM | HEART RATE: 88 BPM | OXYGEN SATURATION: 99 % | TEMPERATURE: 100 F | DIASTOLIC BLOOD PRESSURE: 78 MMHG

## 2021-12-28 VITALS
HEART RATE: 74 BPM | DIASTOLIC BLOOD PRESSURE: 84 MMHG | TEMPERATURE: 98 F | HEIGHT: 70 IN | OXYGEN SATURATION: 96 % | RESPIRATION RATE: 16 BRPM | SYSTOLIC BLOOD PRESSURE: 148 MMHG

## 2021-12-28 VITALS
DIASTOLIC BLOOD PRESSURE: 71 MMHG | RESPIRATION RATE: 18 BRPM | SYSTOLIC BLOOD PRESSURE: 144 MMHG | OXYGEN SATURATION: 96 % | TEMPERATURE: 100 F | HEART RATE: 71 BPM

## 2021-12-28 DIAGNOSIS — G04.90 ENCEPHALITIS AND ENCEPHALOMYELITIS, UNSPECIFIED: ICD-10-CM

## 2021-12-28 DIAGNOSIS — H26.9 UNSPECIFIED CATARACT: Chronic | ICD-10-CM

## 2021-12-28 LAB
ALBUMIN SERPL ELPH-MCNC: 3.7 G/DL — SIGNIFICANT CHANGE UP (ref 3.3–5)
ALP SERPL-CCNC: 87 U/L — SIGNIFICANT CHANGE UP (ref 40–120)
ALT FLD-CCNC: 19 U/L — SIGNIFICANT CHANGE UP (ref 12–78)
ANION GAP SERPL CALC-SCNC: 8 MMOL/L — SIGNIFICANT CHANGE UP (ref 5–17)
ANISOCYTOSIS BLD QL: SLIGHT — SIGNIFICANT CHANGE UP
APPEARANCE UR: CLEAR — SIGNIFICANT CHANGE UP
APTT BLD: 39.2 SEC — HIGH (ref 27.5–35.5)
AST SERPL-CCNC: 15 U/L — SIGNIFICANT CHANGE UP (ref 15–37)
BASOPHILS # BLD AUTO: 0 K/UL — SIGNIFICANT CHANGE UP (ref 0–0.2)
BASOPHILS NFR BLD AUTO: 0 % — SIGNIFICANT CHANGE UP (ref 0–2)
BILIRUB SERPL-MCNC: 1.2 MG/DL — SIGNIFICANT CHANGE UP (ref 0.2–1.2)
BILIRUB UR-MCNC: NEGATIVE — SIGNIFICANT CHANGE UP
BUN SERPL-MCNC: 12 MG/DL — SIGNIFICANT CHANGE UP (ref 7–23)
BURR CELLS BLD QL SMEAR: PRESENT — SIGNIFICANT CHANGE UP
CALCIUM SERPL-MCNC: 8.9 MG/DL — SIGNIFICANT CHANGE UP (ref 8.5–10.1)
CHLORIDE SERPL-SCNC: 108 MMOL/L — SIGNIFICANT CHANGE UP (ref 96–108)
CO2 SERPL-SCNC: 25 MMOL/L — SIGNIFICANT CHANGE UP (ref 22–31)
COLOR SPEC: SIGNIFICANT CHANGE UP
CREAT SERPL-MCNC: 1 MG/DL — SIGNIFICANT CHANGE UP (ref 0.5–1.3)
DIFF PNL FLD: ABNORMAL
EOSINOPHIL # BLD AUTO: 0 K/UL — SIGNIFICANT CHANGE UP (ref 0–0.5)
EOSINOPHIL NFR BLD AUTO: 0 % — SIGNIFICANT CHANGE UP (ref 0–6)
EPI CELLS # UR: NEGATIVE — SIGNIFICANT CHANGE UP
FLUAV AG NPH QL: SIGNIFICANT CHANGE UP
FLUBV AG NPH QL: SIGNIFICANT CHANGE UP
GLUCOSE SERPL-MCNC: 94 MG/DL — SIGNIFICANT CHANGE UP (ref 70–99)
GLUCOSE UR QL: NEGATIVE — SIGNIFICANT CHANGE UP
HCT VFR BLD CALC: 34.6 % — LOW (ref 39–50)
HGB BLD-MCNC: 11.6 G/DL — LOW (ref 13–17)
INR BLD: 1.04 RATIO — SIGNIFICANT CHANGE UP (ref 0.88–1.16)
KETONES UR-MCNC: NEGATIVE — SIGNIFICANT CHANGE UP
LACTATE SERPL-SCNC: 2 MMOL/L — SIGNIFICANT CHANGE UP (ref 0.7–2)
LEUKOCYTE ESTERASE UR-ACNC: NEGATIVE — SIGNIFICANT CHANGE UP
LYMPHOCYTES # BLD AUTO: 0.58 K/UL — LOW (ref 1–3.3)
LYMPHOCYTES # BLD AUTO: 10 % — LOW (ref 13–44)
MACROCYTES BLD QL: SLIGHT — SIGNIFICANT CHANGE UP
MANUAL SMEAR VERIFICATION: SIGNIFICANT CHANGE UP
MCHC RBC-ENTMCNC: 33.5 GM/DL — SIGNIFICANT CHANGE UP (ref 32–36)
MCHC RBC-ENTMCNC: 34.2 PG — HIGH (ref 27–34)
MCV RBC AUTO: 102.1 FL — HIGH (ref 80–100)
MICROCYTES BLD QL: SLIGHT — SIGNIFICANT CHANGE UP
MONOCYTES # BLD AUTO: 2.27 K/UL — HIGH (ref 0–0.9)
MONOCYTES NFR BLD AUTO: 39 % — HIGH (ref 2–14)
NEUTROPHILS # BLD AUTO: 2.92 K/UL — SIGNIFICANT CHANGE UP (ref 1.8–7.4)
NEUTROPHILS NFR BLD AUTO: 49 % — SIGNIFICANT CHANGE UP (ref 43–77)
NEUTS BAND # BLD: 1 % — SIGNIFICANT CHANGE UP (ref 0–8)
NITRITE UR-MCNC: NEGATIVE — SIGNIFICANT CHANGE UP
NRBC # BLD: 1 — SIGNIFICANT CHANGE UP
NRBC # BLD: SIGNIFICANT CHANGE UP /100 WBCS (ref 0–0)
PH UR: 8 — SIGNIFICANT CHANGE UP (ref 5–8)
PLAT MORPH BLD: NORMAL — SIGNIFICANT CHANGE UP
PLATELET # BLD AUTO: 289 K/UL — SIGNIFICANT CHANGE UP (ref 150–400)
POIKILOCYTOSIS BLD QL AUTO: SLIGHT — SIGNIFICANT CHANGE UP
POTASSIUM SERPL-MCNC: 3.4 MMOL/L — LOW (ref 3.5–5.3)
POTASSIUM SERPL-SCNC: 3.4 MMOL/L — LOW (ref 3.5–5.3)
PROT SERPL-MCNC: 7 G/DL — SIGNIFICANT CHANGE UP (ref 6–8.3)
PROT UR-MCNC: NEGATIVE — SIGNIFICANT CHANGE UP
PROTHROM AB SERPL-ACNC: 12.2 SEC — SIGNIFICANT CHANGE UP (ref 10.6–13.6)
RBC # BLD: 3.39 M/UL — LOW (ref 4.2–5.8)
RBC # FLD: 16.8 % — HIGH (ref 10.3–14.5)
RBC BLD AUTO: ABNORMAL
RBC CASTS # UR COMP ASSIST: SIGNIFICANT CHANGE UP /HPF (ref 0–4)
RSV RNA NPH QL NAA+NON-PROBE: SIGNIFICANT CHANGE UP
SARS-COV-2 RNA SPEC QL NAA+PROBE: DETECTED
SCHISTOCYTES BLD QL AUTO: SLIGHT — SIGNIFICANT CHANGE UP
SODIUM SERPL-SCNC: 141 MMOL/L — SIGNIFICANT CHANGE UP (ref 135–145)
SP GR SPEC: 1.01 — SIGNIFICANT CHANGE UP (ref 1.01–1.02)
TROPONIN I, HIGH SENSITIVITY RESULT: 21 NG/L — SIGNIFICANT CHANGE UP
UROBILINOGEN FLD QL: NEGATIVE — SIGNIFICANT CHANGE UP
VARIANT LYMPHS # BLD: 1 % — SIGNIFICANT CHANGE UP (ref 0–6)
WBC # BLD: 5.83 K/UL — SIGNIFICANT CHANGE UP (ref 3.8–10.5)
WBC # FLD AUTO: 5.83 K/UL — SIGNIFICANT CHANGE UP (ref 3.8–10.5)
WBC UR QL: SIGNIFICANT CHANGE UP

## 2021-12-28 PROCEDURE — 71045 X-RAY EXAM CHEST 1 VIEW: CPT | Mod: 26

## 2021-12-28 PROCEDURE — 36415 COLL VENOUS BLD VENIPUNCTURE: CPT

## 2021-12-28 PROCEDURE — 82962 GLUCOSE BLOOD TEST: CPT

## 2021-12-28 PROCEDURE — 87086 URINE CULTURE/COLONY COUNT: CPT

## 2021-12-28 PROCEDURE — 71045 X-RAY EXAM CHEST 1 VIEW: CPT | Mod: 26,77

## 2021-12-28 PROCEDURE — 87040 BLOOD CULTURE FOR BACTERIA: CPT

## 2021-12-28 PROCEDURE — 87637 SARSCOV2&INF A&B&RSV AMP PRB: CPT

## 2021-12-28 PROCEDURE — 85730 THROMBOPLASTIN TIME PARTIAL: CPT

## 2021-12-28 PROCEDURE — 70498 CT ANGIOGRAPHY NECK: CPT | Mod: 26,MA

## 2021-12-28 PROCEDURE — 85610 PROTHROMBIN TIME: CPT

## 2021-12-28 PROCEDURE — 81001 URINALYSIS AUTO W/SCOPE: CPT

## 2021-12-28 PROCEDURE — 80053 COMPREHEN METABOLIC PANEL: CPT

## 2021-12-28 PROCEDURE — 70498 CT ANGIOGRAPHY NECK: CPT | Mod: MA

## 2021-12-28 PROCEDURE — 99291 CRITICAL CARE FIRST HOUR: CPT | Mod: CS

## 2021-12-28 PROCEDURE — 99291 CRITICAL CARE FIRST HOUR: CPT

## 2021-12-28 PROCEDURE — 85025 COMPLETE CBC W/AUTO DIFF WBC: CPT

## 2021-12-28 PROCEDURE — 70496 CT ANGIOGRAPHY HEAD: CPT | Mod: 26,MA

## 2021-12-28 PROCEDURE — 0042T: CPT

## 2021-12-28 PROCEDURE — 96374 THER/PROPH/DIAG INJ IV PUSH: CPT | Mod: XU

## 2021-12-28 PROCEDURE — 71045 X-RAY EXAM CHEST 1 VIEW: CPT

## 2021-12-28 PROCEDURE — 84484 ASSAY OF TROPONIN QUANT: CPT

## 2021-12-28 PROCEDURE — 70496 CT ANGIOGRAPHY HEAD: CPT | Mod: MA

## 2021-12-28 PROCEDURE — 70450 CT HEAD/BRAIN W/O DYE: CPT | Mod: MA

## 2021-12-28 PROCEDURE — 83605 ASSAY OF LACTIC ACID: CPT

## 2021-12-28 PROCEDURE — 93010 ELECTROCARDIOGRAM REPORT: CPT | Mod: 76

## 2021-12-28 PROCEDURE — 93005 ELECTROCARDIOGRAM TRACING: CPT

## 2021-12-28 RX ORDER — LANOLIN ALCOHOL/MO/W.PET/CERES
3 CREAM (GRAM) TOPICAL AT BEDTIME
Refills: 0 | Status: DISCONTINUED | OUTPATIENT
Start: 2021-12-28 | End: 2022-01-04

## 2021-12-28 RX ORDER — FOLIC ACID 0.8 MG
1 TABLET ORAL DAILY
Refills: 0 | Status: DISCONTINUED | OUTPATIENT
Start: 2021-12-28 | End: 2022-01-04

## 2021-12-28 RX ORDER — VANCOMYCIN HCL 1 G
1000 VIAL (EA) INTRAVENOUS EVERY 12 HOURS
Refills: 0 | Status: DISCONTINUED | OUTPATIENT
Start: 2021-12-29 | End: 2022-01-04

## 2021-12-28 RX ORDER — FAMOTIDINE 10 MG/ML
20 INJECTION INTRAVENOUS DAILY
Refills: 0 | Status: DISCONTINUED | OUTPATIENT
Start: 2021-12-28 | End: 2022-01-04

## 2021-12-28 RX ORDER — ENOXAPARIN SODIUM 100 MG/ML
30 INJECTION SUBCUTANEOUS DAILY
Refills: 0 | Status: DISCONTINUED | OUTPATIENT
Start: 2021-12-28 | End: 2021-12-28

## 2021-12-28 RX ORDER — ASPIRIN/CALCIUM CARB/MAGNESIUM 324 MG
81 TABLET ORAL DAILY
Refills: 0 | Status: DISCONTINUED | OUTPATIENT
Start: 2021-12-28 | End: 2022-01-04

## 2021-12-28 RX ORDER — ENOXAPARIN SODIUM 100 MG/ML
40 INJECTION SUBCUTANEOUS DAILY
Refills: 0 | Status: DISCONTINUED | OUTPATIENT
Start: 2021-12-28 | End: 2022-01-04

## 2021-12-28 RX ORDER — ATORVASTATIN CALCIUM 80 MG/1
80 TABLET, FILM COATED ORAL AT BEDTIME
Refills: 0 | Status: DISCONTINUED | OUTPATIENT
Start: 2021-12-28 | End: 2022-01-04

## 2021-12-28 RX ORDER — LEVETIRACETAM 250 MG/1
1000 TABLET, FILM COATED ORAL ONCE
Refills: 0 | Status: COMPLETED | OUTPATIENT
Start: 2021-12-28 | End: 2021-12-28

## 2021-12-28 RX ORDER — VANCOMYCIN HCL 1 G
1000 VIAL (EA) INTRAVENOUS ONCE
Refills: 0 | Status: COMPLETED | OUTPATIENT
Start: 2021-12-28 | End: 2021-12-29

## 2021-12-28 RX ORDER — VANCOMYCIN HCL 1 G
VIAL (EA) INTRAVENOUS
Refills: 0 | Status: DISCONTINUED | OUTPATIENT
Start: 2021-12-29 | End: 2022-01-04

## 2021-12-28 RX ADMIN — LEVETIRACETAM 440 MILLIGRAM(S): 250 TABLET, FILM COATED ORAL at 10:27

## 2021-12-28 NOTE — ED PROVIDER NOTE - CRITICAL CARE ATTENDING CONTRIBUTION TO CARE
Upon my evaluation, this patient had a high probability of imminent or life-threatening deterioration due to __ro cva _______, which required my direct attention, intervention, and personal management.  The patient has a  medical condition that impairs one or more vital organ systems.  Frequent personal assessment and adjustment of medical interventions was performed.      I have personally provided _60__ minutes of critical care time exclusive of time spent on separately billable procedures. Time includes review of laboratory data, radiology results, discussion with consultants, patient and family; monitoring for potential decompensation, as well as time spent retrieving data and reviewing the chart and documenting the visit. Interventions were performed as documented above.

## 2021-12-28 NOTE — ED ADULT NURSE NOTE - NSIMPLEMENTINTERV_GEN_ALL_ED
Implemented All Fall with Harm Risk Interventions:  [place] to call system. Call bell, personal items and telephone within reach. Instruct patient to call for assistance. Room bathroom lighting operational. Non-slip footwear when patient is off stretcher. Physically safe environment: no spills, clutter or unnecessary equipment. Stretcher in lowest position, wheels locked, appropriate side rails in place. Provide visual cue, wrist band, yellow gown, etc. Monitor gait and stability. Monitor for mental status changes and reorient to person, place, and time. Review medications for side effects contributing to fall risk. Reinforce activity limits and safety measures with patient and family. Provide visual clues: red socks.

## 2021-12-28 NOTE — ED ADULT NURSE NOTE - OBJECTIVE STATEMENT
Patient from [facility] with acute change in mental status without a known time. Patient normally performs ADLs himself but this morning was unable to. Patient unable to describe how he's feeling, what's happening to him and when it began. PICC line to right arm for osteomyelitis. FS 80, rectal temp 99. Pending CT scan to r.o stroke, will cont. to monitor.

## 2021-12-28 NOTE — H&P ADULT - ATTENDING COMMENTS
66 y.o. RH M with PMH of CLL on Venetoclax, MDS, melanoma, PAF (not on AC), AIE, and osteomyelitis of foot, diagnosis of AIE in September after presenting with right hemiparesis and speech impairment. MRI brain at that time negative. EEG showed slowing over the left temporal lobe. Encephalitis panel negative. LP with elevated cell count and protein. Pt had been in the  hospital recently with left foot osteomyelitis. Pt presents now with sudden onset of AMS, speech impairment and right hemiparesis similar to presentation in September. He was transferred from [place] for further management and monitoring. He was also found to be positive for COVID 19. On arrival yesterday he still had symptoms. Today his symptoms have fully resolved and he is back to baseline. He only has a mild cough.   On exam he is full strength, language is normal.   Given return to baseline unlikely this is a stroke or flair of AIE. Would favor seizure   Pending MRI brain   PEnding EEG  WIll dose IVIG today as he is due for his CLL  F/u medicine recs for management of COVID  check b12, folate, copper, TSH for MCV of 105. 66 y.o. RH M with PMH of CLL on Venetoclax, MDS, melanoma, PAF (not on AC), AIE, and osteomyelitis of foot, diagnosis of AIE in September after presenting with right hemiparesis and speech impairment. MRI brain at that time negative. EEG showed slowing over the left temporal lobe. Encephalitis panel negative. LP with elevated cell count and protein. Pt had been in the  hospital recently with left foot osteomyelitis. Pt presenting for further management and monitoring. He was also found to be positive for COVID 19. On arrival yesterday he still had symptoms. Today his symptoms have fully resolved and he is back to baseline. He only has a mild cough.   On exam he is full strength, language is normal.   Given return to baseline unlikely this is a stroke or flair of AIE. Would favor seizure   Pending MRI brain   PEnding EEG  WIll dose IVIG today as he is due for his CLL  F/u medicine recs for management of COVID  check b12, folate, copper, TSH for MCV of 105.  neuro checks and vitals q4hrs  seizure precautions  Vancomycin 1g until 1/11 for osteomyelitis  Pt hasn't been on AEDs

## 2021-12-28 NOTE — ED PROVIDER NOTE - PHYSICAL EXAMINATION
Gen: Well appearing in NAD  Head: NC/AT  Neck: trachea midline  cv: rrr  Resp:  No distress  Ext: no deformities  Neuro:  right sided weakness, expressive aphasia   Skin:  Warm and dry as visualized  Psych:  Normal affect and mood

## 2021-12-28 NOTE — H&P ADULT - NSHPLABSRESULTS_GEN_ALL_CORE
LABS:                        11.6   5.83  )-----------( 289      ( 28 Dec 2021 09:38 )             34.6     28 Dec 2021 11:47    141    |  108    |  12     ----------------------------<  94     3.4     |  25     |  1.00     Ca    8.9        28 Dec 2021 11:47    TPro  7.0    /  Alb  3.7    /  TBili  1.2    /  DBili  x      /  AST  15     /  ALT  19     /  AlkPhos  87     28 Dec 2021 11:47    PT/INR - ( 28 Dec 2021 09:38 )   PT: 12.2 sec;   INR: 1.04 ratio         PTT - ( 28 Dec 2021 09:38 )  PTT:39.2 sec    RADIOLOGY:  < from: CT Angio Head w/ IV Cont (12.28.21 @ 09:58) >    IMPRESSION:  No evidence of an acute intracranial hemorrhage, midline shift, or   hydrocephalus.  NO HEMODYNAMIC SIGNIFICANT NARROWING WITHIN THE NECK.  NO PROXIMAL LARGE VESSEL OCCLUSION INTRACRANIALLY.  SMALL AREA OF ISCHEMIA (4 ML) INVOLVING THE INFERIOR ASPECT OF THE LEFT   TEMPORAL LOBE).    < end of copied text >    Prior EEG: Abnormal EEG due to the presence of focal slowing in the left hemisphere with higher amplitude theta and delta waves. This suggests an underlying focal abnormality. Recommend correlate with imaging if necessary. EEG done on 9/15/2021 showed continuous focal slowing with higher amplitude theta and delta waves from the left hemisphere.

## 2021-12-28 NOTE — H&P ADULT - ASSESSMENT
Plan:  24 hr vEEG  MRI brain w/wo with epilepsy protocol  Has TTE from 9/15/21 but technically limited study; pursue TTE if doing stroke work up  A1c and lipid panel  No AEDs at this time 66 y.o. RH M with PMH of CLL on Venetoclax, MDS, melanoma, PAF (not on AC), AIE, and osteomyelitis of foot presented as a transfer from [facility] for stroke and seizure work up. He presented to [facility] from [facility] rehab due to AMS and R sided weakness. Wife provided hx at [facility]. LKN 21:00 hr on 12/27. Woke up with change in mental status and R weakness. NIHSS 12(answered one q, partial hemianopia, no effort against gravity in RUE and RLE, ataxia, mild-mod sensory loss, mild-mod aphasia) . GCS 14. CTH/CTA were done and results were negative. CTP showed 4cc area of ischemia involving the inferior aspect of the L temporal lobe. No tPA since outside the window and no thrombectomy since no LVO on imaging. Takes ASA 81 mg qd and atorvastatin 80 mg qhs. No anticoagulation. Has R PICC for osteomyelitis tx and had rectal temp 99 upon presentation.  Received 1g of Keppra x1. Neuro exam at [facility] w/ full strength b/l, perseveration, mixed aphasia (expressive > receptive). 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   -Patient follows with Dr. Fry. Patient is immunocompromised.  -Continue to monitor CBCs    #macrocytic anemia  - c/w home folic acid   66 y.o. RH M with PMH of CLL on Venetoclax, MDS, melanoma, PAF (not on AC), AIE, and osteomyelitis of foot presented as a transfer from hospitals for stroke and seizure work up. He presented to hospitals from [facility] rehab due to AMS and R sided weakness. Wife provided hx at hospitals. LKN 21:00 hr on 12/27. Woke up with change in mental status and R weakness. NIHSS 12(answered one q, partial hemianopia, no effort against gravity in RUE and RLE, ataxia, mild-mod sensory loss, mild-mod aphasia) . GCS 14. CTH/CTA were done and results were negative. CTP showed 4cc area of ischemia involving the inferior aspect of the L temporal lobe. No tPA since outside the window and no thrombectomy since no LVO on imaging. Takes ASA 81 mg qd and atorvastatin 80 mg qhs. No anticoagulation. Has R PICC for osteomyelitis tx and had rectal temp 99 upon presentation.  Received 1g of Keppra x1. Neuro exam at [facility] w/ full strength b/l, perseveration, mixed aphasia (expressive > receptive). 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, less likely stroke    Plan:  #AMS  -s/p 1g keppra at hospitals hospital  -24 hr vEEG  -MRI brain w/wo with epilepsy protocol  -Has TTE from 9/15/21 but technically limited study; LVEF 60%, pursue TTE if doing stroke work up  -A1c and lipid panel  -No AEDs at this time  -neurochecks q4h  -fall and seizure precautions    #osteomyelitis  -vanco 1g BID until 1/11/22 per ID last admission (6 week course)  -monitor fever curve    #paroxysmal atrial fibrillation  - CHADsVASC 2  - per cardiology last admission, patient with episode of afib on 7/2020 admission  - per cardiology, on aspirin 81mg daily    #CLL (chronic lymphocytic leukemia).   -Patient follows with Dr. Fry. Patient is immunocompromised.  -Continue to monitor CBCs    #macrocytic anemia  - c/w home folic acid    #HLD  -c/w home atorvastatin 80    #DVT ppx: lovenox  #Diet: DASH

## 2021-12-28 NOTE — ED PROVIDER NOTE - PROGRESS NOTE DETAILS
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

## 2021-12-28 NOTE — H&P ADULT - HISTORY OF PRESENT ILLNESS
66 y.o. RH M with PMH of CLL on Venetoclax, MDS, melanoma, PAF (not on AC), AIE, and osteomyelitis of foot presented as a transfer from [facility] for stroke and seizure work up. He presented to [facility] from [facility] rehab due to AMS and R sided weakness. Wife provided hx at [facility]. LKN 21:00 hr on 12/27. Woke up with change in mental status and R weakness. NIHSS 12(answered one q, partial hemianopia, no effort against gravity in RUE and RLE, ataxia, mild-mod sensory loss, mild-mod aphasia) . GCS 14. CTH/CTA were done and results were negative. CTP showed 4cc area of ischemia involving the inferior aspect of the L temporal lobe. No tPA since outside the window and no thrombectomy since no LVO on imaging. Takes ASA 81 mg qd and atorvastatin 80 mg qhs. No anticoagulation. Has R PICC for osteomyelitis tx and had rectal temp 99 upon presentation.  Received 1g of Keppra x1.     Of note, pt had multiple admissions this year:    9/14-9/16/2021: presented with aphasia and R sided weakness, received tPA. Exam at that time showed RUE spasticity. There was concern for subclinical seizure and AIE and was transferred to [facility] for work up. EEG 9/15: showed intermittent polymorphic delta slowing in the L hemispheric slowing. No epileptiform or seizures were seen. Received IVIG for 3 days and high dose steroid for 5d with taper. LP at that time showed glucose 73, protein 55, WBC 3, neg cultures. No AEDs were started at that time.  COVID positive.     10/1-10/8/2021: Rehab stay, significant for L arm infected cyst s/p drainage. Was dc'ed 10/8 to home.     11/22-12/1/2021: [facility] for osteomyelitis of L ankle s/p vancomycin.    Last outpatient neurology follow up on 11/3/2021 with Dr. Azul. Plan was to follow with [facility]monc provider for further tx of CLL & follow up with neurology as needed.

## 2021-12-28 NOTE — H&P ADULT - NSHPPHYSICALEXAM_GEN_ALL_CORE
Vital Signs Last 24 Hrs  T(C): 37.8 (28 Dec 2021 16:25), Max: 37.8 (28 Dec 2021 16:25)  T(F): 100 (28 Dec 2021 16:25), Max: 100 (28 Dec 2021 16:25)  HR: 88 (28 Dec 2021 16:25) (71 - 91)  BP: 147/78 (28 Dec 2021 16:25) (144/71 - 151/70)  BP(mean): --  RR: 20 (28 Dec 2021 16:25) (16 - 20)  SpO2: 99% (28 Dec 2021 16:25) (96% - 99%)    General:  Constitutional: Obese, appears stated age, in no apparent distress including pain  Head: Normocephalic & atraumatic.   Respiratory: No increased work of breathing  Skin: No rashes, bruising, or discoloration.    Neurological (>12):  MS: Awake, alert, oriented to person, place, situation, time. Normal affect. Follows all commands.  Language: Speech is clear, fluent with good repetition & comprehension (able to name objects___)    CNs: PERRL (R = 3mm, L = 3mm). VFF. EOMI no nystagmus. V1-3 intact to LT/pinprick, well developed masseter muscles b/l. No facial asymmetry b/l, full eye closure strength b/l. Hearing grossly normal (rubbing fingers) b/l. Symmetric palate elevation in midline. Gag reflex deferred. Head turning & shoulder shrug intact b/l. Tongue midline, normal movements, no atrophy.    Motor: Normal muscle bulk. No noticeable resting tremor. No pronator drift.              Deltoid	Biceps	Triceps	Wrist	Finger ABd	   R	    5	                5	        5	        5	        5		        5 	  L	    5	                5	        5	        5	        5		        5    	H-Flex	H-Ext	H-ABd	H-ADd	K-Flex	K-Ext	D-Flex	P-Flex  R	5	        5	        5	        5	        5	        5	        5	        5 	   L	5	        5	        5	        5	        5	        5	        5	        5	     Sensation: Intact to LT/PP/Temp/Vibration/Position b/l throughout.   Cortical: Extinction on DSS (neglect): none    Reflexes:              Biceps(C5)       BR(C6)         Triceps(C7)       Patellar(L4)    Achilles(S1)    Plantar Resp  R	    2	                      2	             2		              2		      2		       Down   L	    2	                      2	             2		              2		      2		       Down     Coordination: No dysmetria to FTN/HTS. Intact rapid-alt movements.   Gait: Normal Romberg. No postural instability. Normal stance and tandem gait. Vital Signs Last 24 Hrs  T(C): 37.8 (28 Dec 2021 16:25), Max: 37.8 (28 Dec 2021 16:25)  T(F): 100 (28 Dec 2021 16:25), Max: 100 (28 Dec 2021 16:25)  HR: 88 (28 Dec 2021 16:25) (71 - 91)  BP: 147/78 (28 Dec 2021 16:25) (144/71 - 151/70)  BP(mean): --  RR: 20 (28 Dec 2021 16:25) (16 - 20)  SpO2: 99% (28 Dec 2021 16:25) (96% - 99%)    General:  Constitutional: appears stated age, in no apparent distress including pain  Head: Normocephalic & atraumatic.   Respiratory: No increased work of breathing  Skin: No rashes, bruising, or discoloration.    Neurological (>12):  MS: Awake, alert, oriented to person (able to say full name), place (hospital, "but I don't know which one",  time (able to answer correctly with choices). Normal affect. Follows all simple commands, unable to follow crossed or complex commands  Language: Speech is clear, fluent with unable to repeat, perseverating ("2021" repeatedly) & comprehension (able to name most simple commands)    CNs: VFF to counting fingers. EOMI no nystagmus. V1-3 intact to LT, well developed masseter muscles b/l. No facial asymmetry b/l, full eye closure strength b/l. Hearing grossly normal (rubbing fingers) b/l. Symmetric palate elevation in midline. Gag reflex deferred. Head turning & shoulder shrug intact b/l. Tongue midline, normal movements, no atrophy.    Motor: Normal muscle bulk. No noticeable resting tremor. No pronator drift.              Deltoid	Biceps	Triceps	Wrist	Finger ABd	   R	    5	   5	        5	        5	        		        5 	  L	    5	   5	        5	        5	        		        5    	H-Flex	HK-Flex	K-Ext	D-Flex	P-Flex  R	5	        5	        5	        5	        5	     L	5	        5	        5	        5	        5	         Sensation: Intact to LT b/l throughout.     Coordination: No dysmetria to FTN  Gait: deferred

## 2021-12-28 NOTE — ED PROVIDER NOTE - CLINICAL SUMMARY MEDICAL DECISION MAKING FREE TEXT BOX
67yo M with R sided weakness and ams since waking up this morning, ro stroke ro infectious or metabolic etiology

## 2021-12-28 NOTE — ED PROVIDER NOTE - OBJECTIVE STATEMENT
67yo M w/ PMHx of CLL on Venetoclax, MDS, Melanoma, Paroxysmal AFib (not on AC), HTN, HLD, recent admissions ofr autoimmune encephalitis, more recently for osteo of foot, now in [facility] for abx and rehab pw ams and right sided weakness since waking up this morning. pt unabel to provide history, ho per ems, and unable to reach family. pt denies pain  per wife, pt was in [facility] yesterday, was out at drs appts was scheduled for emergency lens surgery today, wife came to pick him up this morning and was informed of change in MS, unknown time of onset, but per her last known normal was lastnight at 9pm 67yo M w/ PMHx of CLL on Venetoclax, MDS, Melanoma, Paroxysmal AFib (not on AC), HTN, HLD, recent admissions ofr autoimmune encephalitis, more recently for osteo of foot, now in [facility] for abx and rehab pw ams and right sided weakness since waking up this morning. pt unabel to provide history, ho per ems, and unable to reach family. pt denies pain  per wife, [facility], wife came to pick him up this morning and was informed of change in MS, unknown time of onset, but per her last known normal was lastnight at 9pm    confirmed with [facility] that patient woke up with symptoms, the nursing staff ahd to get him dressed

## 2021-12-29 DIAGNOSIS — Z29.9 ENCOUNTER FOR PROPHYLACTIC MEASURES, UNSPECIFIED: ICD-10-CM

## 2021-12-29 DIAGNOSIS — E87.6 HYPOKALEMIA: ICD-10-CM

## 2021-12-29 DIAGNOSIS — U07.1 COVID-19: ICD-10-CM

## 2021-12-29 DIAGNOSIS — I48.0 PAROXYSMAL ATRIAL FIBRILLATION: ICD-10-CM

## 2021-12-29 DIAGNOSIS — G93.40 ENCEPHALOPATHY, UNSPECIFIED: ICD-10-CM

## 2021-12-29 DIAGNOSIS — M86.60 OTHER CHRONIC OSTEOMYELITIS, UNSPECIFIED SITE: ICD-10-CM

## 2021-12-29 DIAGNOSIS — C91.10 CHRONIC LYMPHOCYTIC LEUKEMIA OF B-CELL TYPE NOT HAVING ACHIEVED REMISSION: ICD-10-CM

## 2021-12-29 LAB
ANION GAP SERPL CALC-SCNC: 14 MMOL/L — SIGNIFICANT CHANGE UP (ref 5–17)
BUN SERPL-MCNC: 13 MG/DL — SIGNIFICANT CHANGE UP (ref 7–23)
CALCIUM SERPL-MCNC: 9.5 MG/DL — SIGNIFICANT CHANGE UP (ref 8.4–10.5)
CHLORIDE SERPL-SCNC: 101 MMOL/L — SIGNIFICANT CHANGE UP (ref 96–108)
CK SERPL-CCNC: 29 U/L — LOW (ref 30–200)
CO2 SERPL-SCNC: 23 MMOL/L — SIGNIFICANT CHANGE UP (ref 22–31)
CREAT SERPL-MCNC: 1.04 MG/DL — SIGNIFICANT CHANGE UP (ref 0.5–1.3)
CULTURE RESULTS: SIGNIFICANT CHANGE UP
GLUCOSE SERPL-MCNC: 85 MG/DL — SIGNIFICANT CHANGE UP (ref 70–99)
HCT VFR BLD CALC: 31.5 % — LOW (ref 39–50)
HGB BLD-MCNC: 9.9 G/DL — LOW (ref 13–17)
IGA FLD-MCNC: 4 MG/DL — LOW (ref 84–499)
IGG FLD-MCNC: 681 MG/DL — SIGNIFICANT CHANGE UP (ref 610–1660)
IGM SERPL-MCNC: <10 MG/DL — LOW (ref 35–242)
KAPPA LC SER QL IFE: 0.52 MG/DL — SIGNIFICANT CHANGE UP (ref 0.33–1.94)
KAPPA/LAMBDA FREE LIGHT CHAIN RATIO, SERUM: 1.93 RATIO — HIGH (ref 0.26–1.65)
LAMBDA LC SER QL IFE: 0.27 MG/DL — LOW (ref 0.57–2.63)
MCHC RBC-ENTMCNC: 31.4 GM/DL — LOW (ref 32–36)
MCHC RBC-ENTMCNC: 33.2 PG — SIGNIFICANT CHANGE UP (ref 27–34)
MCV RBC AUTO: 105.7 FL — HIGH (ref 80–100)
NRBC # BLD: 0 /100 WBCS — SIGNIFICANT CHANGE UP (ref 0–0)
PLATELET # BLD AUTO: 253 K/UL — SIGNIFICANT CHANGE UP (ref 150–400)
POTASSIUM SERPL-MCNC: 3.3 MMOL/L — LOW (ref 3.5–5.3)
POTASSIUM SERPL-SCNC: 3.3 MMOL/L — LOW (ref 3.5–5.3)
RBC # BLD: 2.98 M/UL — LOW (ref 4.2–5.8)
RBC # FLD: 16.9 % — HIGH (ref 10.3–14.5)
SARS-COV-2 RNA SPEC QL NAA+PROBE: DETECTED
SODIUM SERPL-SCNC: 138 MMOL/L — SIGNIFICANT CHANGE UP (ref 135–145)
SPECIMEN SOURCE: SIGNIFICANT CHANGE UP
WBC # BLD: 5.43 K/UL — SIGNIFICANT CHANGE UP (ref 3.8–10.5)
WBC # FLD AUTO: 5.43 K/UL — SIGNIFICANT CHANGE UP (ref 3.8–10.5)

## 2021-12-29 PROCEDURE — 95720 EEG PHY/QHP EA INCR W/VEEG: CPT

## 2021-12-29 PROCEDURE — 99223 1ST HOSP IP/OBS HIGH 75: CPT | Mod: GC

## 2021-12-29 PROCEDURE — 99223 1ST HOSP IP/OBS HIGH 75: CPT

## 2021-12-29 RX ORDER — SODIUM CHLORIDE 9 MG/ML
250 INJECTION INTRAMUSCULAR; INTRAVENOUS; SUBCUTANEOUS
Refills: 0 | Status: COMPLETED | OUTPATIENT
Start: 2021-12-29 | End: 2021-12-29

## 2021-12-29 RX ORDER — POTASSIUM CHLORIDE 20 MEQ
40 PACKET (EA) ORAL ONCE
Refills: 0 | Status: COMPLETED | OUTPATIENT
Start: 2021-12-29 | End: 2021-12-29

## 2021-12-29 RX ORDER — DEXAMETHASONE 0.5 MG/5ML
10 ELIXIR ORAL ONCE
Refills: 0 | Status: COMPLETED | OUTPATIENT
Start: 2021-12-29 | End: 2021-12-29

## 2021-12-29 RX ORDER — SOTROVIMAB 62.5 MG/ML
500 INJECTION, SOLUTION, CONCENTRATE INTRAVENOUS ONCE
Refills: 0 | Status: COMPLETED | OUTPATIENT
Start: 2021-12-29 | End: 2021-12-29

## 2021-12-29 RX ORDER — ACETAMINOPHEN 500 MG
325 TABLET ORAL ONCE
Refills: 0 | Status: COMPLETED | OUTPATIENT
Start: 2021-12-29 | End: 2021-12-29

## 2021-12-29 RX ORDER — IMMUNE GLOBULIN (HUMAN) 10 G/100ML
30 INJECTION INTRAVENOUS; SUBCUTANEOUS ONCE
Refills: 0 | Status: COMPLETED | OUTPATIENT
Start: 2021-12-29 | End: 2021-12-29

## 2021-12-29 RX ORDER — DIPHENHYDRAMINE HCL 50 MG
25 CAPSULE ORAL ONCE
Refills: 0 | Status: COMPLETED | OUTPATIENT
Start: 2021-12-29 | End: 2021-12-29

## 2021-12-29 RX ADMIN — ENOXAPARIN SODIUM 40 MILLIGRAM(S): 100 INJECTION SUBCUTANEOUS at 12:01

## 2021-12-29 RX ADMIN — Medication 250 MILLIGRAM(S): at 12:01

## 2021-12-29 RX ADMIN — ATORVASTATIN CALCIUM 80 MILLIGRAM(S): 80 TABLET, FILM COATED ORAL at 21:42

## 2021-12-29 RX ADMIN — Medication 325 MILLIGRAM(S): at 16:44

## 2021-12-29 RX ADMIN — FAMOTIDINE 20 MILLIGRAM(S): 10 INJECTION INTRAVENOUS at 12:01

## 2021-12-29 RX ADMIN — Medication 81 MILLIGRAM(S): at 13:43

## 2021-12-29 RX ADMIN — Medication 40 MILLIEQUIVALENT(S): at 13:43

## 2021-12-29 RX ADMIN — Medication 250 MILLIGRAM(S): at 00:10

## 2021-12-29 RX ADMIN — Medication 102 MILLIGRAM(S): at 16:45

## 2021-12-29 RX ADMIN — Medication 1 MILLIGRAM(S): at 12:01

## 2021-12-29 RX ADMIN — IMMUNE GLOBULIN (HUMAN) 50 GRAM(S): 10 INJECTION INTRAVENOUS; SUBCUTANEOUS at 17:24

## 2021-12-29 RX ADMIN — Medication 25 MILLIGRAM(S): at 16:44

## 2021-12-29 NOTE — CONSULT NOTE ADULT - PROBLEM SELECTOR RECOMMENDATION 3
Reported to be on IVIG and venetoclax, does not know last time he received txt  - cont IVIG per outpt onc  - f/u w outpt onc acute hypokalemia today,   - s/p repletion  - cont to monitor qd

## 2021-12-29 NOTE — CONSULT NOTE ADULT - PROBLEM SELECTOR RECOMMENDATION 4
EKG with SR and PACs   - cont home ASA and lovenox Reported to be on IVIG and venetoclax, does not know last time he received txt  - cont IVIG per outpt onc  - f/u w outpt onc

## 2021-12-29 NOTE — CONSULT NOTE ADULT - PROBLEM SELECTOR RECOMMENDATION 6
VTE ppx: lovenox   PT: from rehab, PT eval prior to d/c Hx of L ankle OM with MRSA, on vancomycin. Followed by [facility]/Dr Coker. Plan was for IV vanco until 1/11.   Has RUE PICC in place  - cont vanco 1gq12  - check vanc trough pre 4th  - monitor renal fxn  - f/u with ID Dr Coker [facility] (private)

## 2021-12-29 NOTE — CONSULT NOTE ADULT - SUBJECTIVE AND OBJECTIVE BOX
[facility] Division of Hospital Medicine  Delfino Joshi  Pager: [phone]    Chief Complaint:    HPI:  66 y.o. RH M with PMH of CLL on Venetoclax, MDS, melanoma, PAF (not on AC), AIE, and osteomyelitis of foot presented as a transfer from [facility] for stroke and seizure work up. He presented [facility] and R sided weakness. Wife provided hx at [facility]. LKN 21:00 hr on . Woke up with change in mental status and R weakness. NIHSS 12(answered one q, partial hemianopia, no effort against gravity in RUE and RLE, ataxia, mild-mod sensory loss, mild-mod aphasia) . GCS 14. CTH/CTA were done and results were negative. CTP showed 4cc area of ischemia involving the inferior aspect of the L temporal lobe. No tPA since outside the window and no thrombectomy since no LVO on imaging. Takes ASA 81 mg qd and atorvastatin 80 mg qhs. No anticoagulation. Has R PICC for osteomyelitis tx and had rectal temp 99 upon presentation.  Received 1g of Keppra x1.     Of note, pt had multiple admissions this year:    -2021: presented with aphasia and R sided weakness, received tPA. Exam at that time showed RUE spasticity. There was concern for subclinical seizure and AIE and was transferred to [facility] for work up. EEG 9/15: showed intermittent polymorphic delta slowing in the L hemispheric slowing. No epileptiform or seizures were seen. Received IVIG for 3 days and high dose steroid for 5d with taper. LP at that time showed glucose 73, protein 55, WBC 3, neg cultures. No AEDs were started at that time.  COVID positive.     10/1-10/8/2021: Rehab stay, significant for L arm infected cyst s/p drainage. Was dc'ed 10/8 to home.     -2021: [facility] for osteomyelitis of L ankle s/p vancomycin. Followed by [facility]/Dr Coker. Plan was for IV vanco until .     Last outpatient neurology follow up on 11/3/2021 with Dr. Azul. Plan was to follow with [facility] provider for further tx of CLL & follow up with neurology as needed.      PAST MEDICAL & SURGICAL HISTORY:  Osteopenia    CLL (Chronic Lymphocytic Leukemia)  SINCE     Avascular Necrosis of Femur Head  B/L    TMJ Syndrome  limited jaw opening due to TMJ    Herniated Cervical Disc    Bulging Disc  LUMBAR    Melanoma    Deviated nasal septum    Nasal congestion    Rosacea    Deviated nasal septum    Other specified disorders of nose and nasal sinuses    Anemia    Encephalitis    S/P Splenectomy      Avascular Necrosis of Femur Head  RIGHT - CORE DECOMPRESSION-     Avascular Necrosis of Femur Head  LEFT - CORE DECOMPRESSION -     Abnormal Jaw Closure  LEFT JAW SURGERY -     Status Post Eye Surgery X 3  3/2011 right eye scleral buckle; left eye in     S/P Tonsillectomy  1960    Bilateral cataracts  removed in         Review of Systems:   CONSTITUTIONAL: No fever.  EYES: No eye pain or discharge.  ENMT:  No sinus or throat pain  NECK: No pain or stiffness  RESPIRATORY: No cough, wheezing, chills or hemoptysis; No shortness of breath  CARDIOVASCULAR: No chest pain, palpitations, dizziness, or leg swelling  GASTROINTESTINAL: No abdominal or epigastric pain. No nausea, vomiting, or hematemesis; No diarrhea or constipation. No melena or hematochezia.  GENITOURINARY: No dysuria or incontinence  NEUROLOGICAL: No headaches, memory loss, loss of strength, numbness, or tremors  SKIN: No rashes.  LYMPH NODES: No enlarged glands  ENDOCRINE: No heat or cold intolerance; No hair loss  MUSCULOSKELETAL: No joint pain or swelling; No muscle, back, or extremity pain  PSYCHIATRIC: No depression, anxiety, mood swings, or difficulty sleeping  HEME/LYMPH: No easy bruising, or bleeding gums  ALLERY AND IMMUNOLOGIC: No hives or eczema    Allergies    No Known Allergies    Intolerances        Social History:     FAMILY HISTORY:  Family history of multiple myeloma    Family history of malignant melanoma  age 89    FH: blood dyscrasia  MGUS    FH: hypertension  mother    FH: renal failure  mother        Home Medications:  acetaminophen 325 mg oral tablet: 2 tab(s) orally every 6 hours, As needed, Temp greater or equal to 38C (100.4F) (28 Dec 2021 23:05)  aluminum hydroxide-magnesium hydroxide 200 mg-200 mg/5 mL oral suspension: 30 milliliter(s) orally every 4 hours, As needed, Dyspepsia (28 Dec 2021 23:05)  aspirin 81 mg oral tablet, chewable: 1 tab(s) orally once a day (28 Dec 2021 23:05)  atorvastatin 80 mg oral tablet: 1 tab(s) orally once a day (at bedtime) (28 Dec 2021 23:05)  enoxaparin: 40 milligram(s) subcutaneously once a day (28 Dec 2021 23:05)  famotidine 20 mg oral tablet: 1 tab(s) orally once a day (28 Dec 2021 23:05)  melatonin 3 mg oral tablet: 1 tab(s) orally once a day (at bedtime), As needed, Insomnia (28 Dec 2021 23:05)  vancomycin 1 g intravenous injection: 1 gram(s) intravenous every 12 hours, last day 21 (28 Dec 2021 23:05)      MEDICATIONS  (STANDING):  acetaminophen     Tablet .. 325 milliGRAM(s) Oral once  aspirin  chewable 81 milliGRAM(s) Oral daily  atorvastatin 80 milliGRAM(s) Oral at bedtime  dexAMETHasone  IVPB 10 milliGRAM(s) IV Intermittent once  diphenhydrAMINE 25 milliGRAM(s) Oral once  enoxaparin Injectable 40 milliGRAM(s) SubCutaneous daily  famotidine    Tablet 20 milliGRAM(s) Oral daily  folic acid 1 milliGRAM(s) Oral daily  immune   globulin 10% (GAMMAGARD) IVPB 30 Gram(s) IV Intermittent once  vancomycin  IVPB 1000 milliGRAM(s) IV Intermittent every 12 hours  vancomycin  IVPB        MEDICATIONS  (PRN):  aluminum hydroxide/magnesium hydroxide/simethicone Suspension 30 milliLiter(s) Oral every 4 hours PRN Dyspepsia  melatonin 3 milliGRAM(s) Oral at bedtime PRN Insomnia      CAPILLARY BLOOD GLUCOSE        I&O's Summary    28 Dec 2021 07:  -  29 Dec 2021 07:00  --------------------------------------------------------  IN: 100 mL / OUT: 400 mL / NET: -300 mL    29 Dec 2021 07:  -  29 Dec 2021 16:31  --------------------------------------------------------  IN: 960 mL / OUT: 1000 mL / NET: -40 mL        PHYSICAL EXAM:  Vital Signs Last 24 Hrs  T(C): 37.1 (29 Dec 2021 10:33), Max: 37.6 (29 Dec 2021 01:55)  T(F): 98.8 (29 Dec 2021 10:33), Max: 99.7 (29 Dec 2021 01:55)  HR: 72 (29 Dec 2021 10:33) (72 - 76)  BP: 96/58 (29 Dec 2021 10:33) (96/58 - 128/73)  BP(mean): --  RR: 18 (29 Dec 2021 10:33) (18 - 18)  SpO2: 96% (29 Dec 2021 10:33) (95% - 97%)  GENERAL: NAD, well-developed  HEAD:  Atraumatic, Normocephalic  EYES: EOMI, PERRLA, conjunctiva and sclera clear  NECK: Supple, No JVD  CHEST/LUNG: Clear to auscultation bilaterally; No wheeze  HEART: Regular rate and rhythm; No murmurs, rubs, or gallops  ABDOMEN: Soft, Nontender, Nondistended; Bowel sounds present  EXTREMITIES:  2+ Peripheral Pulses, No clubbing, cyanosis, or edema  PSYCH: AAOx3  NEUROLOGY: non-focal  SKIN: No rashes or lesions    LABS:                        9.9    5.43  )-----------( 253      ( 29 Dec 2021 06:53 )             31.5     12-    138  |  101  |  13  ----------------------------<  85  3.3<L>   |  23  |  1.04    Ca    9.5      29 Dec 2021 06:53    TPro  7.0  /  Alb  3.7  /  TBili  1.2  /  DBili  x   /  AST  15  /  ALT  19  /  AlkPhos  87  12-28    PT/INR - ( 28 Dec 2021 09:38 )   PT: 12.2 sec;   INR: 1.04 ratio         PTT - ( 28 Dec 2021 09:38 )  PTT:39.2 sec  CARDIAC MARKERS ( 29 Dec 2021 06:53 )  x     / x     / 29 U/L / x     / x          Urinalysis Basic - ( 28 Dec 2021 10:51 )    Color: Pale Yellow / Appearance: Clear / S.010 / pH: x  Gluc: x / Ketone: Negative  / Bili: Negative / Urobili: Negative   Blood: x / Protein: Negative / Nitrite: Negative   Leuk Esterase: Negative / RBC: 0-2 /HPF / WBC 0-2   Sq Epi: x / Non Sq Epi: Negative / Bacteria: x        RADIOLOGY & ADDITIONAL TESTS:    Imaging Personally Reviewed:    EKG Personally Reviewed:    Consultant(s) Notes Reviewed:      Care Discussed with Consultants/Other Providers:   [facility] Division of Hospital Medicine  Delfino Joshi  Pager: [phone]    Chief Complaint: AMS    HPI:  66 y.o. RH M with PMH of CLL on Venetoclax, MDS, melanoma, PAF (not on AC), AIE, and osteomyelitis of foot presented as a transfer from [facility] for stroke and seizure work up. [facility] and R sided weakness. Wife provided hx at [facility]. LKN 21:00 hr on . Woke up with change in mental status and R weakness. NIHSS 12(answered one q, partial hemianopia, no effort against gravity in RUE and RLE, ataxia, mild-mod sensory loss, mild-mod aphasia) . GCS 14. CTH/CTA were done and results were negative. CTP showed 4cc area of ischemia involving the inferior aspect of the L temporal lobe. No tPA since outside the window and no thrombectomy since no LVO on imaging. Takes ASA 81 mg qd and atorvastatin 80 mg qhs. No anticoagulation. Has R PICC for osteomyelitis tx and had rectal temp 99 upon presentation.  Received 1g of Keppra x1.     Of note, pt had multiple admissions this year:    -2021: presented with aphasia and R sided weakness, received tPA. Exam at that time showed RUE spasticity. There was concern for subclinical seizure and AIE and was transferred to [facility] for work up. EEG 9/15: showed intermittent polymorphic delta slowing in the L hemispheric slowing. No epileptiform or seizures were seen. Received IVIG for 3 days and high dose steroid for 5d with taper. LP at that time showed glucose 73, protein 55, WBC 3, neg cultures. No AEDs were started at that time.  COVID positive.     10/1-10/8/2021: Rehab stay, significant for L arm infected cyst s/p drainage. Was dc'ed 10/8 to home.     -2021: [facility] for osteomyelitis of L ankle s/p vancomycin. Followed by [facility]/Dr Coker. Plan was for IV vanco until .     Last outpatient neurology follow up on 11/3/2021 with Dr. Azul. Plan was to follow with [facility] provider for further tx of CLL & follow up with neurology as needed.    Pt transferred to [facility] . Neuro concerned for stroke. Started on scheduled IVIG for CLL hx.   Now reports feeling back to baseline. Believes this episode is similar to last period.  Received moderna vax x2 in early  and JnJ x1 in .       PAST MEDICAL & SURGICAL HISTORY:  Osteopenia    CLL (Chronic Lymphocytic Leukemia)  SINCE     Avascular Necrosis of Femur Head  B/L    TMJ Syndrome  limited jaw opening due to TMJ    Herniated Cervical Disc    Bulging Disc  LUMBAR    Melanoma    Deviated nasal septum    Nasal congestion    Rosacea    Deviated nasal septum    Other specified disorders of nose and nasal sinuses    Anemia    Encephalitis    S/P Splenectomy      Avascular Necrosis of Femur Head  RIGHT - CORE DECOMPRESSION-     Avascular Necrosis of Femur Head  LEFT - CORE DECOMPRESSION -     Abnormal Jaw Closure  LEFT JAW SURGERY -     Status Post Eye Surgery X 3  3/2011 right eye scleral buckle; left eye in     S/P Tonsillectomy  1960    Bilateral cataracts  removed in         Review of Systems:   CONSTITUTIONAL: No fever.  EYES: No eye pain or discharge.  ENMT:  No sinus or throat pain  NECK: No pain or stiffness  RESPIRATORY: No cough, wheezing, chills or hemoptysis; No shortness of breath  CARDIOVASCULAR: No chest pain, palpitations, dizziness, or leg swelling  GASTROINTESTINAL: No abdominal or epigastric pain. No nausea, vomiting, or hematemesis; No diarrhea or constipation. No melena or hematochezia.  GENITOURINARY: No dysuria or incontinence  NEUROLOGICAL:+memory loss, + loss of strength No headaches, numbness, or tremors  SKIN: No rashes.  LYMPH NODES: No enlarged glands  ENDOCRINE: No heat or cold intolerance; No hair loss  MUSCULOSKELETAL: No joint pain or swelling; No muscle, back, or extremity pain  PSYCHIATRIC: No depression, anxiety, mood swings, or difficulty sleeping  HEME/LYMPH: No easy bruising, or bleeding gums  ALLERY AND IMMUNOLOGIC: No hives or eczema    Allergies    No Known Allergies    Intolerances        Social History:     FAMILY HISTORY:  Family history of multiple myeloma    Family history of malignant melanoma  age 89    FH: blood dyscrasia  MGUS    FH: hypertension  mother    FH: renal failure  mother        Home Medications:  acetaminophen 325 mg oral tablet: 2 tab(s) orally every 6 hours, As needed, Temp greater or equal to 38C (100.4F) (28 Dec 2021 23:05)  aluminum hydroxide-magnesium hydroxide 200 mg-200 mg/5 mL oral suspension: 30 milliliter(s) orally every 4 hours, As needed, Dyspepsia (28 Dec 2021 23:05)  aspirin 81 mg oral tablet, chewable: 1 tab(s) orally once a day (28 Dec 2021 23:05)  atorvastatin 80 mg oral tablet: 1 tab(s) orally once a day (at bedtime) (28 Dec 2021 23:05)  enoxaparin: 40 milligram(s) subcutaneously once a day (28 Dec 2021 23:05)  famotidine 20 mg oral tablet: 1 tab(s) orally once a day (28 Dec 2021 23:05)  melatonin 3 mg oral tablet: 1 tab(s) orally once a day (at bedtime), As needed, Insomnia (28 Dec 2021 23:05)  vancomycin 1 g intravenous injection: 1 gram(s) intravenous every 12 hours, last day 21 (28 Dec 2021 23:05)      MEDICATIONS  (STANDING):  acetaminophen     Tablet .. 325 milliGRAM(s) Oral once  aspirin  chewable 81 milliGRAM(s) Oral daily  atorvastatin 80 milliGRAM(s) Oral at bedtime  dexAMETHasone  IVPB 10 milliGRAM(s) IV Intermittent once  diphenhydrAMINE 25 milliGRAM(s) Oral once  enoxaparin Injectable 40 milliGRAM(s) SubCutaneous daily  famotidine    Tablet 20 milliGRAM(s) Oral daily  folic acid 1 milliGRAM(s) Oral daily  immune   globulin 10% (GAMMAGARD) IVPB 30 Gram(s) IV Intermittent once  vancomycin  IVPB 1000 milliGRAM(s) IV Intermittent every 12 hours  vancomycin  IVPB        MEDICATIONS  (PRN):  aluminum hydroxide/magnesium hydroxide/simethicone Suspension 30 milliLiter(s) Oral every 4 hours PRN Dyspepsia  melatonin 3 milliGRAM(s) Oral at bedtime PRN Insomnia      CAPILLARY BLOOD GLUCOSE        I&O's Summary    28 Dec 2021 07:  -  29 Dec 2021 07:00  --------------------------------------------------------  IN: 100 mL / OUT: 400 mL / NET: -300 mL    29 Dec 2021 07:  -  29 Dec 2021 16:31  --------------------------------------------------------  IN: 960 mL / OUT: 1000 mL / NET: -40 mL        PHYSICAL EXAM:  Vital Signs Last 24 Hrs  T(C): 37.1 (29 Dec 2021 10:33), Max: 37.6 (29 Dec 2021 01:55)  T(F): 98.8 (29 Dec 2021 10:33), Max: 99.7 (29 Dec 2021 01:55)  HR: 72 (29 Dec 2021 10:33) (72 - 76)  BP: 96/58 (29 Dec 2021 10:33) (96/58 - 128/73)  BP(mean): --  RR: 18 (29 Dec 2021 10:33) (18 - 18)  SpO2: 96% (29 Dec 2021 10:33) (95% - 97%)  GENERAL: NAD, well-developed  HEAD:  Atraumatic, Normocephalic  EYES: EOMI, PERRLA, conjunctiva and sclera clear  NECK: Supple, No JVD  CHEST/LUNG: Clear to auscultation bilaterally; No wheeze  HEART: Regular rate and rhythm; No murmurs, rubs, or gallops  ABDOMEN: Soft, Nontender, Nondistended; Bowel sounds present  EXTREMITIES:  2+ Peripheral Pulses, No clubbing, cyanosis, or edema  PSYCH: AAOx3  NEUROLOGY: non-focal  SKIN: left foot top of foot with healed scab apprx 2 inch    LABS:                        9.9    5.43  )-----------( 253      ( 29 Dec 2021 06:53 )             31.5     12-    138  |  101  |  13  ----------------------------<  85  3.3<L>   |  23  |  1.04    Ca    9.5      29 Dec 2021 06:53    TPro  7.0  /  Alb  3.7  /  TBili  1.2  /  DBili  x   /  AST  15  /  ALT  19  /  AlkPhos  87  12-28    PT/INR - ( 28 Dec 2021 09:38 )   PT: 12.2 sec;   INR: 1.04 ratio         PTT - ( 28 Dec 2021 09:38 )  PTT:39.2 sec  CARDIAC MARKERS ( 29 Dec 2021 06:53 )  x     / x     / 29 U/L / x     / x          Urinalysis Basic - ( 28 Dec 2021 10:51 )    Color: Pale Yellow / Appearance: Clear / S.010 / pH: x  Gluc: x / Ketone: Negative  / Bili: Negative / Urobili: Negative   Blood: x / Protein: Negative / Nitrite: Negative   Leuk Esterase: Negative / RBC: 0-2 /HPF / WBC 0-2   Sq Epi: x / Non Sq Epi: Negative / Bacteria: x        RADIOLOGY & ADDITIONAL TESTS:  < from: Xray Chest 1 View- PORTABLE-Urgent (Xray Chest 1 View- PORTABLE-Urgent .) (21 @ 21:06) >  IMPRESSION:  Clear lungs.    < end of copied text >    Imaging Personally Reviewed:    < from: Xray Chest 1 View- PORTABLE-Urgent (Xray Chest 1 View- PORTABLE-Urgent .) (21 @ 10:25) >  IMPRESSION: Right PICC line in place.    < end of copied text >    < from: CT Angio Head w/ IV Cont (21 @ 09:58) >  FINDINGS:  *  HEMORRHAGE:  No evidence of intracranial hemorrhage.  *  BRAIN PARENCHYMA:  Mild atrophy. Minimal periventricular white matter   ischemic changes. No mass or mass effect.  *  VENTRICLES / SHIFT:  No hydrocephalus. No midline shift.  *  EXTRA-AXIAL / BASAL CISTERNS:  No extra-axial mass. Basalcisterns   preserved.  *  CALVARIUM AND EXTRACRANIAL SOFT TISSUES:  No depressed calvarial   fracture.  *  SINUSES, ORBITS, MASTOIDS:  Retention cysts are seen within the left   frontal sinus. There are bilateral scleral carla.    CTA TECHNIQUE:  CT angiography of the neck and brain was performed during the dynamic   administration of intravenous contrast.  MIP reconstructions were   performed and reviewed. Multiplanar reformations were obtained.  Contrast administered 60 cc Omnipaque 350, contrast discarded 10 cc    CTA FINDINGS:  NECK  RIGHT CAROTID CIRCULATION:  Evaluation of the right carotid circulation demonstrates no hemodynamic   significant narrowing utilizing a distal reference. There is tortuosity   of the right internal carotid artery    LEFT CAROTID CIRCULATION:  Evaluation of the left carotid circulation demonstrates no hemodynamic   significant narrowing utilizing a distal reference.    VERTEBRAL ARTERIES:  Evaluation of the vertebral arteries reveals no evidence of a vertebral   artery occlusion or dissection. Left vertebral artery has a direct origin   off the aortic arch    BRAIN  ANTERIOR CIRCULATION:  Distal internal carotid arteries are patent.  Anterior cerebral arteries are patent.  Middle cerebral arteries are patent.    POSTERIOR CIRCULATION:  Distal vertebral arteries are patent. Bilateral posterior inferior   cerebellar arteries are seen  Basilar artery is patent. Superior cerebellar arteries are patent  Posterior cerebral arteries are patent. There are small bilateral   posterior communicating arteries CT    PERFUSION TECHNIQUE:  CT perfusion was performed during the administration of intravenous   contrast.  There was a total of 8 cm brain coverage.  The images were processed utilizing RAPID software.  Contrast administered 90 cc Omnipaque 350, contrast discarded 10 cc    Ischemic tissue is defined as TMAX > 6 seconds.  Core infarct is defined as CBF < 30%.    CT PERFUSION FINDINGS:  There is a small region of ischemia involving the inferior aspect of the   left temporal lobe.    The volume of ischemic tissue (core infarct and penumbra) measures 4 mL.    The core infarct measures 0 mL.    The mismatch volume (penumbra) measure 4 mL.    The mismatch ratio (ischemic tissue / core) is: infinite      IMPRESSION:  No evidence of an acute intracranial hemorrhage, midline shift, or   hydrocephalus.  NO HEMODYNAMIC SIGNIFICANT NARROWING WITHIN THE NECK.  NO PROXIMAL LARGE VESSEL OCCLUSION INTRACRANIALLY.  SMALL AREA OF ISCHEMIA (4 ML) INVOLVING THE INFERIOR ASPECT OF THE LEFT   TEMPORAL LOBE).    < end of copied text >      EKG Personally Reviewed: y  EKG withSR and PACs;  VR 94,     Consultant(s) Notes Reviewed:  y    Care Discussed with Consultants/Other Providers: ramo   [facility] Division of Hospital Medicine  Delfino Joshi  Pager: [phone]    Chief Complaint: AMS    HPI:  66 y.o. RH M with PMH of CLL on Venetoclax, MDS, melanoma, PAF (not on AC), AIE, and osteomyelitis of foot presented as a transfer from [facility] for stroke and seizure work up. He presented to [facility] and R sided weakness. Wife provided hx at [facility]. LKN 21:00 hr on . Woke up with change in mental status and R weakness. NIHSS 12(answered one q, partial hemianopia, no effort against gravity in RUE and RLE, ataxia, mild-mod sensory loss, mild-mod aphasia) . GCS 14. CTH/CTA were done and results were negative. CTP showed 4cc area of ischemia involving the inferior aspect of the L temporal lobe. No tPA since outside the window and no thrombectomy since no LVO on imaging. Takes ASA 81 mg qd and atorvastatin 80 mg qhs. No anticoagulation. Has R PICC for osteomyelitis tx and had rectal temp 99 upon presentation.  Received 1g of Keppra x1.     Of note, pt had multiple admissions this year:    -2021: presented with aphasia and R sided weakness, received tPA. Exam at that time showed RUE spasticity. There was concern for subclinical seizure and AIE and was transferred to [facility] for work up. EEG 9/15: showed intermittent polymorphic delta slowing in the L hemispheric slowing. No epileptiform or seizures were seen. Received IVIG for 3 days and high dose steroid for 5d with taper. LP at that time showed glucose 73, protein 55, WBC 3, neg cultures. No AEDs were started at that time.  COVID positive.     10/1-10/8/2021: Rehab stay, significant for L arm infected cyst s/p drainage. Was dc'ed 10/8 to home.     -2021: [facility] for osteomyelitis of L ankle s/p vancomycin. Followed by [facility]/Dr Coker. Plan was for IV vanco until .     Last outpatient neurology follow up on 11/3/2021 with Dr. Azul. Plan was to follow with [facility] provider for further tx of CLL & follow up with neurology as needed.    Pt transferred to [facility] . Neuro concerned for stroke. Started on scheduled IVIG for CLL hx.   Now reports feeling back to baseline. Believes this episode is similar to last period.  Received moderna vax x2 in early  and JnJ x1 in .       PAST MEDICAL & SURGICAL HISTORY:  Osteopenia    CLL (Chronic Lymphocytic Leukemia)  SINCE     Avascular Necrosis of Femur Head  B/L    TMJ Syndrome  limited jaw opening due to TMJ    Herniated Cervical Disc    Bulging Disc  LUMBAR    Melanoma    Deviated nasal septum    Nasal congestion    Rosacea    Deviated nasal septum    Other specified disorders of nose and nasal sinuses    Anemia    Encephalitis    S/P Splenectomy      Avascular Necrosis of Femur Head  RIGHT - CORE DECOMPRESSION-     Avascular Necrosis of Femur Head  LEFT - CORE DECOMPRESSION -     Abnormal Jaw Closure  LEFT JAW SURGERY -     Status Post Eye Surgery X 3  3/2011 right eye scleral buckle; left eye in     S/P Tonsillectomy  1960    Bilateral cataracts  removed in         Review of Systems:   CONSTITUTIONAL: No fever.  EYES: No eye pain or discharge.  ENMT:  No sinus or throat pain  NECK: No pain or stiffness  RESPIRATORY: No cough, wheezing, chills or hemoptysis; No shortness of breath  CARDIOVASCULAR: No chest pain, palpitations, dizziness, or leg swelling  GASTROINTESTINAL: No abdominal or epigastric pain. No nausea, vomiting, or hematemesis; No diarrhea or constipation. No melena or hematochezia.  GENITOURINARY: No dysuria or incontinence  NEUROLOGICAL:+memory loss, + loss of strength No headaches, numbness, or tremors  SKIN: No rashes.  LYMPH NODES: No enlarged glands  ENDOCRINE: No heat or cold intolerance; No hair loss  MUSCULOSKELETAL: No joint pain or swelling; No muscle, back, or extremity pain  PSYCHIATRIC: No depression, anxiety, mood swings, or difficulty sleeping  HEME/LYMPH: No easy bruising, or bleeding gums  ALLERY AND IMMUNOLOGIC: No hives or eczema    Allergies    No Known Allergies    Intolerances        Social History:     FAMILY HISTORY:  Family history of multiple myeloma    Family history of malignant melanoma  age 89    FH: blood dyscrasia  MGUS    FH: hypertension  mother    FH: renal failure  mother        Home Medications:  acetaminophen 325 mg oral tablet: 2 tab(s) orally every 6 hours, As needed, Temp greater or equal to 38C (100.4F) (28 Dec 2021 23:05)  aluminum hydroxide-magnesium hydroxide 200 mg-200 mg/5 mL oral suspension: 30 milliliter(s) orally every 4 hours, As needed, Dyspepsia (28 Dec 2021 23:05)  aspirin 81 mg oral tablet, chewable: 1 tab(s) orally once a day (28 Dec 2021 23:05)  atorvastatin 80 mg oral tablet: 1 tab(s) orally once a day (at bedtime) (28 Dec 2021 23:05)  enoxaparin: 40 milligram(s) subcutaneously once a day (28 Dec 2021 23:05)  famotidine 20 mg oral tablet: 1 tab(s) orally once a day (28 Dec 2021 23:05)  melatonin 3 mg oral tablet: 1 tab(s) orally once a day (at bedtime), As needed, Insomnia (28 Dec 2021 23:05)  vancomycin 1 g intravenous injection: 1 gram(s) intravenous every 12 hours, last day 21 (28 Dec 2021 23:05)      MEDICATIONS  (STANDING):  acetaminophen     Tablet .. 325 milliGRAM(s) Oral once  aspirin  chewable 81 milliGRAM(s) Oral daily  atorvastatin 80 milliGRAM(s) Oral at bedtime  dexAMETHasone  IVPB 10 milliGRAM(s) IV Intermittent once  diphenhydrAMINE 25 milliGRAM(s) Oral once  enoxaparin Injectable 40 milliGRAM(s) SubCutaneous daily  famotidine    Tablet 20 milliGRAM(s) Oral daily  folic acid 1 milliGRAM(s) Oral daily  immune   globulin 10% (GAMMAGARD) IVPB 30 Gram(s) IV Intermittent once  vancomycin  IVPB 1000 milliGRAM(s) IV Intermittent every 12 hours  vancomycin  IVPB        MEDICATIONS  (PRN):  aluminum hydroxide/magnesium hydroxide/simethicone Suspension 30 milliLiter(s) Oral every 4 hours PRN Dyspepsia  melatonin 3 milliGRAM(s) Oral at bedtime PRN Insomnia      CAPILLARY BLOOD GLUCOSE        I&O's Summary    28 Dec 2021 07:  -  29 Dec 2021 07:00  --------------------------------------------------------  IN: 100 mL / OUT: 400 mL / NET: -300 mL    29 Dec 2021 07:  -  29 Dec 2021 16:31  --------------------------------------------------------  IN: 960 mL / OUT: 1000 mL / NET: -40 mL        PHYSICAL EXAM:  Vital Signs Last 24 Hrs  T(C): 37.1 (29 Dec 2021 10:33), Max: 37.6 (29 Dec 2021 01:55)  T(F): 98.8 (29 Dec 2021 10:33), Max: 99.7 (29 Dec 2021 01:55)  HR: 72 (29 Dec 2021 10:33) (72 - 76)  BP: 96/58 (29 Dec 2021 10:33) (96/58 - 128/73)  BP(mean): --  RR: 18 (29 Dec 2021 10:33) (18 - 18)  SpO2: 96% (29 Dec 2021 10:33) (95% - 97%)  GENERAL: NAD, well-developed  HEAD:  Atraumatic, Normocephalic  EYES: EOMI, PERRLA, conjunctiva and sclera clear  NECK: Supple, No JVD  CHEST/LUNG: Clear to auscultation bilaterally; No wheeze  HEART: Regular rate and rhythm; No murmurs, rubs, or gallops  ABDOMEN: Soft, Nontender, Nondistended; Bowel sounds present  EXTREMITIES:  2+ Peripheral Pulses, No clubbing, cyanosis, or edema  PSYCH: AAOx3  NEUROLOGY: non-focal  SKIN: R arm PICC in place; left foot top of foot with healed scab apprx 2 inch    LABS:                        9.9    5.43  )-----------( 253      ( 29 Dec 2021 06:53 )             31.5     12-    138  |  101  |  13  ----------------------------<  85  3.3<L>   |  23  |  1.04    Ca    9.5      29 Dec 2021 06:53    TPro  7.0  /  Alb  3.7  /  TBili  1.2  /  DBili  x   /  AST  15  /  ALT  19  /  AlkPhos  87  12-28    PT/INR - ( 28 Dec 2021 09:38 )   PT: 12.2 sec;   INR: 1.04 ratio         PTT - ( 28 Dec 2021 09:38 )  PTT:39.2 sec  CARDIAC MARKERS ( 29 Dec 2021 06:53 )  x     / x     / 29 U/L / x     / x          Urinalysis Basic - ( 28 Dec 2021 10:51 )    Color: Pale Yellow / Appearance: Clear / S.010 / pH: x  Gluc: x / Ketone: Negative  / Bili: Negative / Urobili: Negative   Blood: x / Protein: Negative / Nitrite: Negative   Leuk Esterase: Negative / RBC: 0-2 /HPF / WBC 0-2   Sq Epi: x / Non Sq Epi: Negative / Bacteria: x        RADIOLOGY & ADDITIONAL TESTS:  < from: Xray Chest 1 View- PORTABLE-Urgent (Xray Chest 1 View- PORTABLE-Urgent .) (21 @ 21:06) >  IMPRESSION:  Clear lungs.    < end of copied text >    Imaging Personally Reviewed:    < from: Xray Chest 1 View- PORTABLE-Urgent (Xray Chest 1 View- PORTABLE-Urgent .) (21 @ 10:25) >  IMPRESSION: Right PICC line in place.    < end of copied text >    < from: CT Angio Head w/ IV Cont (21 @ 09:58) >  FINDINGS:  *  HEMORRHAGE:  No evidence of intracranial hemorrhage.  *  BRAIN PARENCHYMA:  Mild atrophy. Minimal periventricular white matter   ischemic changes. No mass or mass effect.  *  VENTRICLES / SHIFT:  No hydrocephalus. No midline shift.  *  EXTRA-AXIAL / BASAL CISTERNS:  No extra-axial mass. Basalcisterns   preserved.  *  CALVARIUM AND EXTRACRANIAL SOFT TISSUES:  No depressed calvarial   fracture.  *  SINUSES, ORBITS, MASTOIDS:  Retention cysts are seen within the left   frontal sinus. There are bilateral scleral carla.    CTA TECHNIQUE:  CT angiography of the neck and brain was performed during the dynamic   administration of intravenous contrast.  MIP reconstructions were   performed and reviewed. Multiplanar reformations were obtained.  Contrast administered 60 cc Omnipaque 350, contrast discarded 10 cc    CTA FINDINGS:  NECK  RIGHT CAROTID CIRCULATION:  Evaluation of the right carotid circulation demonstrates no hemodynamic   significant narrowing utilizing a distal reference. There is tortuosity   of the right internal carotid artery    LEFT CAROTID CIRCULATION:  Evaluation of the left carotid circulation demonstrates no hemodynamic   significant narrowing utilizing a distal reference.    VERTEBRAL ARTERIES:  Evaluation of the vertebral arteries reveals no evidence of a vertebral   artery occlusion or dissection. Left vertebral artery has a direct origin   off the aortic arch    BRAIN  ANTERIOR CIRCULATION:  Distal internal carotid arteries are patent.  Anterior cerebral arteries are patent.  Middle cerebral arteries are patent.    POSTERIOR CIRCULATION:  Distal vertebral arteries are patent. Bilateral posterior inferior   cerebellar arteries are seen  Basilar artery is patent. Superior cerebellar arteries are patent  Posterior cerebral arteries are patent. There are small bilateral   posterior communicating arteries CT    PERFUSION TECHNIQUE:  CT perfusion was performed during the administration of intravenous   contrast.  There was a total of 8 cm brain coverage.  The images were processed utilizing RAPID software.  Contrast administered 90 cc Omnipaque 350, contrast discarded 10 cc    Ischemic tissue is defined as TMAX > 6 seconds.  Core infarct is defined as CBF < 30%.    CT PERFUSION FINDINGS:  There is a small region of ischemia involving the inferior aspect of the   left temporal lobe.    The volume of ischemic tissue (core infarct and penumbra) measures 4 mL.    The core infarct measures 0 mL.    The mismatch volume (penumbra) measure 4 mL.    The mismatch ratio (ischemic tissue / core) is: infinite      IMPRESSION:  No evidence of an acute intracranial hemorrhage, midline shift, or   hydrocephalus.  NO HEMODYNAMIC SIGNIFICANT NARROWING WITHIN THE NECK.  NO PROXIMAL LARGE VESSEL OCCLUSION INTRACRANIALLY.  SMALL AREA OF ISCHEMIA (4 ML) INVOLVING THE INFERIOR ASPECT OF THE LEFT   TEMPORAL LOBE).    < end of copied text >      EKG Personally Reviewed: y  EKG withSR and PACs;  VR 94,     Consultant(s) Notes Reviewed:  y    Care Discussed with Consultants/Other Providers: ramo

## 2021-12-29 NOTE — CONSULT NOTE ADULT - NSCONSULTADDITIONALINFOA_GEN_ALL_CORE
Pt consented for sotrovimab with Neuro Resident Dr Clarita Avitia. Consent in Chart. Discussed with ID and pharmacy for approval. Pt consented for sotrovimab with Neuro Resident Dr Clarita Avitia. Consent in Chart. Discussed with ID and pharmacy for approval.    Rec's discussed with primary Neuro team.

## 2021-12-29 NOTE — CONSULT NOTE ADULT - PROBLEM SELECTOR RECOMMENDATION 9
Presentation concerning for recurrence of AIH vs seizure. CT Perfusion with 4cc area of ischemia involving the inferior aspect of the L temporal lobe. Could be potentially due to new covid infection.  Now reports feeling back to baseline. Believes this episode is similar to last period.  - Workup per neuro  - would rec to treat covid with remdesivir as below  - reports he is now back to baseline Presentation concerning for recurrence of AIH vs seizure. CT Perfusion with 4cc area of ischemia involving the inferior aspect of the L temporal lobe. Could be potentially due to new covid infection.  Now reports feeling back to baseline. Believes this episode is similar to last period.  - Workup per neuro  - would rec to treat covid with monoclonal AB or remdesivir as below  - reports he is now back to baseline

## 2021-12-29 NOTE — CONSULT NOTE ADULT - PROBLEM SELECTOR RECOMMENDATION 5
Hx of L ankle OM with MRSA, on vancomycin. Followed by [facility]/Dr Coker. Plan was for IV vanco until 1/11.   - cont vanco 1gq12  - check vanc trough pre 4th  - monitor renal fxn  - f/u with ID Dr Coker [facility] (private) Hx of L ankle OM with MRSA, on vancomycin. Followed by [facility]/Dr Coker. Plan was for IV vanco until 1/11.   Has RUE PICC in place  - cont vanco 1gq12  - check vanc trough pre 4th  - monitor renal fxn  - f/u with ID Dr Coker [facility] outpt (private) EKG with SR and PACs   - cont home ASA and lovenox

## 2021-12-29 NOTE — CONSULT NOTE ADULT - PROBLEM SELECTOR RECOMMENDATION 2
Mild with possible induced encephalopathy. CXR is clear. HE is on RA with 4 days of cough but nonproductive.   Received moderna vax x2 in early 2021 and JnJ x1 in June.   -s tart remdesivir 200mg x1, 100 x 4 for total 5 days  - monitor LFTs and Cr fxn daily  - consider mRNA vax on d/c, will discuss with Neuro Mild with possible induced encephalopathy. CXR is clear. HE is on RA with 4 days of cough but nonproductive.   Received moderna vax x2 in early 2021 and JnJ x1 in June.   -s tart remdesivir 200mg x1, 100 x 4 for total 5 days  - monitor LFTs and Cr fxn daily  - check inflammatory markers baseline - ddimer, procal, ferritin, LDH  - consider mRNA vax on d/c, will discuss with Neuro  - on lovenox for AF Mild with possible induced encephalopathy. CXR is clear. HE is on RA with 4 days of cough but nonproductive.   Received moderna vax x2 in early 2021 and JnJ x1 in June.   - He is>65 and immunocompromised. Not on Oxygen and not admitted for covid.  - will see if eligible for mAB if not start remdesivir 200mg x1, 100 x 4 for total 5 days  - monitor LFTs and Cr fxn daily  - check inflammatory markers baseline - ddimer, procal, ferritin, LDH  - consider mRNA vax on d/c, will discuss with Neuro  - on lovenox for AF

## 2021-12-29 NOTE — PATIENT PROFILE ADULT - FALL HARM RISK - HARM RISK INTERVENTIONS

## 2021-12-29 NOTE — CHART NOTE - NSCHARTNOTEFT_GEN_A_CORE
Patient seen this AM. Stated that he was in the hospital because he had confusion but feels better now. Does not recall presenting to [facility].    Brief Exam:  Awake, alert, oriented to self, location, time, situation. Follows all commands.  CN II-XII intact  Trace R UE drift. At least 4+/5 in all extremities all throughout.  1+ reflexes throughout.    Discussed case this morning with covering oncology for patient, Dr. Espinal. Patient was due for IVIG today in setting of ongoing cancer treatment.   In setting of patient's ongoing COVID and osteomyelitis, patient is advised to have his IVIG which may be administered while in the hospital.    Advised to obtain immunoglobulin panel and order IVIG.  IVIG one time dose of 30gm is ordered to be run over 6 hours  Premedication with Benadryl 25mg, Acetaminophen 325mg, and Dexamethasone 10mg IV to be given prior to dose.    Internal medicine to see patient as well.     From neurological standpoint will follow up on MRI brain with seizure protocol and vEEG.   Will order for encephalopathy panel.   Continue vancomycin 1g bid.
EEG reviewed until ~1500.    No seizures recorded.    Final report to be completed at the completion of the study tomorrow morning

## 2021-12-29 NOTE — PATIENT PROFILE ADULT - FUNCTIONAL ASSESSMENT - BASIC MOBILITY 2.
Insurance will only cover 7 days of medication.   PA needed, faxed to PA RN.    3 = A little assistance

## 2021-12-30 LAB
ALBUMIN SERPL ELPH-MCNC: 4.6 G/DL — SIGNIFICANT CHANGE UP (ref 3.3–5)
ALP SERPL-CCNC: 85 U/L — SIGNIFICANT CHANGE UP (ref 40–120)
ALT FLD-CCNC: 14 U/L — SIGNIFICANT CHANGE UP (ref 10–45)
ANION GAP SERPL CALC-SCNC: 15 MMOL/L — SIGNIFICANT CHANGE UP (ref 5–17)
APTT BLD: 35.5 SEC — SIGNIFICANT CHANGE UP (ref 27.5–35.5)
AST SERPL-CCNC: 13 U/L — SIGNIFICANT CHANGE UP (ref 10–40)
BASOPHILS # BLD AUTO: 0 K/UL — SIGNIFICANT CHANGE UP (ref 0–0.2)
BASOPHILS NFR BLD AUTO: 0 % — SIGNIFICANT CHANGE UP (ref 0–2)
BILIRUB SERPL-MCNC: 0.8 MG/DL — SIGNIFICANT CHANGE UP (ref 0.2–1.2)
BUN SERPL-MCNC: 17 MG/DL — SIGNIFICANT CHANGE UP (ref 7–23)
CALCIUM SERPL-MCNC: 10.3 MG/DL — SIGNIFICANT CHANGE UP (ref 8.4–10.5)
CHLORIDE SERPL-SCNC: 100 MMOL/L — SIGNIFICANT CHANGE UP (ref 96–108)
CO2 SERPL-SCNC: 22 MMOL/L — SIGNIFICANT CHANGE UP (ref 22–31)
CREAT SERPL-MCNC: 0.93 MG/DL — SIGNIFICANT CHANGE UP (ref 0.5–1.3)
CRP SERPL-MCNC: 30 MG/L — HIGH (ref 0–4)
D DIMER BLD IA.RAPID-MCNC: 167 NG/ML DDU — SIGNIFICANT CHANGE UP
EOSINOPHIL # BLD AUTO: 0 K/UL — SIGNIFICANT CHANGE UP (ref 0–0.5)
EOSINOPHIL NFR BLD AUTO: 0 % — SIGNIFICANT CHANGE UP (ref 0–6)
ERYTHROCYTE [SEDIMENTATION RATE] IN BLOOD: 88 MM/HR — HIGH (ref 0–20)
FERRITIN SERPL-MCNC: 670 NG/ML — HIGH (ref 30–400)
FOLATE SERPL-MCNC: >20 NG/ML — SIGNIFICANT CHANGE UP
GLUCOSE SERPL-MCNC: 120 MG/DL — HIGH (ref 70–99)
HCT VFR BLD CALC: 36.7 % — LOW (ref 39–50)
HGB BLD-MCNC: 11.6 G/DL — LOW (ref 13–17)
HYPOSEGMENTATION: PRESENT — SIGNIFICANT CHANGE UP
INR BLD: 1.04 RATIO — SIGNIFICANT CHANGE UP (ref 0.88–1.16)
LYMPHOCYTES # BLD AUTO: 0.04 K/UL — LOW (ref 1–3.3)
LYMPHOCYTES # BLD AUTO: 1 % — LOW (ref 13–44)
MAGNESIUM SERPL-MCNC: 2 MG/DL — SIGNIFICANT CHANGE UP (ref 1.6–2.6)
MANUAL SMEAR VERIFICATION: SIGNIFICANT CHANGE UP
MCHC RBC-ENTMCNC: 31.6 GM/DL — LOW (ref 32–36)
MCHC RBC-ENTMCNC: 33.2 PG — SIGNIFICANT CHANGE UP (ref 27–34)
MCV RBC AUTO: 105.2 FL — HIGH (ref 80–100)
MONOCYTES # BLD AUTO: 0.45 K/UL — SIGNIFICANT CHANGE UP (ref 0–0.9)
MONOCYTES NFR BLD AUTO: 11 % — SIGNIFICANT CHANGE UP (ref 2–14)
NEUTROPHILS # BLD AUTO: 3.63 K/UL — SIGNIFICANT CHANGE UP (ref 1.8–7.4)
NEUTROPHILS NFR BLD AUTO: 81 % — HIGH (ref 43–77)
NEUTS BAND # BLD: 7 % — SIGNIFICANT CHANGE UP (ref 0–8)
NRBC # BLD: 0 /100 — SIGNIFICANT CHANGE UP (ref 0–0)
PHOSPHATE SERPL-MCNC: 4.5 MG/DL — SIGNIFICANT CHANGE UP (ref 2.5–4.5)
PLAT MORPH BLD: NORMAL — SIGNIFICANT CHANGE UP
PLATELET # BLD AUTO: 245 K/UL — SIGNIFICANT CHANGE UP (ref 150–400)
POTASSIUM SERPL-MCNC: 4.1 MMOL/L — SIGNIFICANT CHANGE UP (ref 3.5–5.3)
POTASSIUM SERPL-SCNC: 4.1 MMOL/L — SIGNIFICANT CHANGE UP (ref 3.5–5.3)
PROCALCITONIN SERPL-MCNC: 0.06 NG/ML — SIGNIFICANT CHANGE UP (ref 0.02–0.1)
PROT SERPL-MCNC: 8 G/DL — SIGNIFICANT CHANGE UP (ref 6–8.3)
PROTHROM AB SERPL-ACNC: 12.4 SEC — SIGNIFICANT CHANGE UP (ref 10.6–13.6)
RBC # BLD: 3.49 M/UL — LOW (ref 4.2–5.8)
RBC # FLD: 16.5 % — HIGH (ref 10.3–14.5)
RBC BLD AUTO: SIGNIFICANT CHANGE UP
SODIUM SERPL-SCNC: 137 MMOL/L — SIGNIFICANT CHANGE UP (ref 135–145)
TSH SERPL-MCNC: 1.71 UIU/ML — SIGNIFICANT CHANGE UP (ref 0.27–4.2)
VANCOMYCIN TROUGH SERPL-MCNC: 15.8 UG/ML — SIGNIFICANT CHANGE UP (ref 10–20)
VIT B12 SERPL-MCNC: 414 PG/ML — SIGNIFICANT CHANGE UP (ref 232–1245)
WBC # BLD: 4.13 K/UL — SIGNIFICANT CHANGE UP (ref 3.8–10.5)
WBC # FLD AUTO: 4.13 K/UL — SIGNIFICANT CHANGE UP (ref 3.8–10.5)

## 2021-12-30 PROCEDURE — 95718 EEG PHYS/QHP 2-12 HR W/VEEG: CPT

## 2021-12-30 PROCEDURE — 70551 MRI BRAIN STEM W/O DYE: CPT | Mod: 26

## 2021-12-30 RX ADMIN — ENOXAPARIN SODIUM 40 MILLIGRAM(S): 100 INJECTION SUBCUTANEOUS at 12:21

## 2021-12-30 RX ADMIN — Medication 250 MILLIGRAM(S): at 02:01

## 2021-12-30 RX ADMIN — FAMOTIDINE 20 MILLIGRAM(S): 10 INJECTION INTRAVENOUS at 12:22

## 2021-12-30 RX ADMIN — Medication 250 MILLIGRAM(S): at 16:28

## 2021-12-30 RX ADMIN — Medication 250 MILLIGRAM(S): at 23:52

## 2021-12-30 RX ADMIN — SODIUM CHLORIDE 25 MILLILITER(S): 9 INJECTION INTRAMUSCULAR; INTRAVENOUS; SUBCUTANEOUS at 00:51

## 2021-12-30 RX ADMIN — ATORVASTATIN CALCIUM 80 MILLIGRAM(S): 80 TABLET, FILM COATED ORAL at 22:50

## 2021-12-30 RX ADMIN — SOTROVIMAB 116 MILLIGRAM(S): 62.5 INJECTION, SOLUTION, CONCENTRATE INTRAVENOUS at 00:13

## 2021-12-30 RX ADMIN — Medication 1 MILLIGRAM(S): at 12:21

## 2021-12-30 RX ADMIN — Medication 81 MILLIGRAM(S): at 12:21

## 2021-12-30 NOTE — CONSULT NOTE ADULT - ASSESSMENT
66 M s/p L foot bone biopsy @ [facility]  - pt seen and evaluated   - L foot bone biopsy incision site healed, no acute SOI  - pt to remain nonweightbearing in CAM boot to LLE as instructed by surgeon/podiatrist at [facility]   - pt to follow up with operating surgeon upon d/c  - Patient can follow up with outpatient Podiatrist Dr. Anguiano upon discharge or at the [facility] with Dr. Ruby ([phone]).  - reconsult as needed  - discussed w/ attending
66 y.o. RH M with PMH of CLL on Venetoclax, MDS, melanoma, PAF (not on AC), AIE, and osteomyelitis of foot presented as a transfer from [facility] for stroke and seizure work up. He presented to [facility] ED on 12/28 from [facility] rehab due to AMS and R sided weakness. Wife provided hx at [facility]. LKN 21:00 hr on 12/27. Woke up with change in mental status and R weakness.    Pt transferred to [facility] 12/28. Neuro concerned for stroke. Started on scheduled IVIG for CLL hx.

## 2021-12-30 NOTE — CONSULT NOTE ADULT - SUBJECTIVE AND OBJECTIVE BOX
Podiatry pager #: [phone] ([facility])/ [phone] ([facility])    Patient is a 66y old  Male who presents with a chief complaint of AMS (30 Dec 2021 10:12)      HPI:  66 y.o. RH M with PMH of CLL on Venetoclax, MDS, melanoma, PAF (not on AC), AIE, and osteomyelitis of foot presented as a transfer from [facility] for stroke and seizure work up. [facility]MS and R sided weakness. Wife provided hx at [facility]. LKN 21:00 hr on 12/27. Woke up with change in mental status and R weakness. NIHSS 12(answered one q, partial hemianopia, no effort against gravity in RUE and RLE, ataxia, mild-mod sensory loss, mild-mod aphasia) . GCS 14. CTH/CTA were done and results were negative. CTP showed 4cc area of ischemia involving the inferior aspect of the L temporal lobe. No tPA since outside the window and no thrombectomy since no LVO on imaging. Takes ASA 81 mg qd and atorvastatin 80 mg qhs. No anticoagulation. Has R PICC for osteomyelitis tx and had rectal temp 99 upon presentation.  Received 1g of Keppra x1.     Of note, pt had multiple admissions this year:    9/14-9/16/2021: presented with aphasia and R sided weakness, received tPA. Exam at that time showed RUE spasticity. There was concern for subclinical seizure and AIE and was transferred to [facility] for work up. EEG 9/15: showed intermittent polymorphic delta slowing in the L hemispheric slowing. No epileptiform or seizures were seen. Received IVIG for 3 days and high dose steroid for 5d with taper. LP at that time showed glucose 73, protein 55, WBC 3, neg cultures. No AEDs were started at that time.  COVID positive.     10/1-10/8/2021: Rehab stay, significant for L arm infected cyst s/p drainage. Was dc'ed 10/8 to home.     11/22-12/1/2021: V for osteomyelitis of L ankle s/p vancomycin.    Last outpatient neurology follow up on 11/3/2021 with Dr. Azul. Plan was to follow with hemon provider for further tx of CLL & follow up with neurology as needed. (28 Dec 2021 19:59)      PAST MEDICAL & SURGICAL HISTORY:  Osteopenia    CLL (Chronic Lymphocytic Leukemia)  SINCE 1988    Avascular Necrosis of Femur Head  B/L    TMJ Syndrome  limited jaw opening due to TMJ    Herniated Cervical Disc    Bulging Disc  LUMBAR    Melanoma    Deviated nasal septum    Nasal congestion    Rosacea    Deviated nasal septum    Other specified disorders of nose and nasal sinuses    Anemia    Encephalitis    S/P Splenectomy  1993    Avascular Necrosis of Femur Head  RIGHT - CORE DECOMPRESSION- 1993    Avascular Necrosis of Femur Head  LEFT - CORE DECOMPRESSION - 1993    Abnormal Jaw Closure  LEFT JAW SURGERY - 1988    Status Post Eye Surgery X 3  3/2011 right eye scleral buckle; left eye in 2013    S/P Tonsillectomy  1960    Bilateral cataracts  removed in 2011        MEDICATIONS  (STANDING):  aspirin  chewable 81 milliGRAM(s) Oral daily  atorvastatin 80 milliGRAM(s) Oral at bedtime  enoxaparin Injectable 40 milliGRAM(s) SubCutaneous daily  famotidine    Tablet 20 milliGRAM(s) Oral daily  folic acid 1 milliGRAM(s) Oral daily  vancomycin  IVPB 1000 milliGRAM(s) IV Intermittent every 12 hours  vancomycin  IVPB        MEDICATIONS  (PRN):  aluminum hydroxide/magnesium hydroxide/simethicone Suspension 30 milliLiter(s) Oral every 4 hours PRN Dyspepsia  melatonin 3 milliGRAM(s) Oral at bedtime PRN Insomnia      Allergies    No Known Allergies    Intolerances        VITALS:    Vital Signs Last 24 Hrs  T(C): 36.7 (30 Dec 2021 04:23), Max: 37 (29 Dec 2021 17:45)  T(F): 98.1 (30 Dec 2021 04:23), Max: 98.6 (29 Dec 2021 17:45)  HR: 56 (30 Dec 2021 04:23) (55 - 69)  BP: 134/60 (30 Dec 2021 04:23) (107/65 - 134/60)  BP(mean): --  RR: 18 (30 Dec 2021 04:23) (16 - 18)  SpO2: 97% (30 Dec 2021 04:23) (95% - 97%)    LABS:                          11.6   4.13  )-----------( 245      ( 30 Dec 2021 06:59 )             36.7       12-30    137  |  100  |  17  ----------------------------<  120<H>  4.1   |  22  |  0.93    Ca    10.3      30 Dec 2021 06:59  Phos  4.5     12-30  Mg     2.0     12-30    TPro  8.0  /  Alb  4.6  /  TBili  0.8  /  DBili  x   /  AST  13  /  ALT  14  /  AlkPhos  85  12-30      CAPILLARY BLOOD GLUCOSE          PT/INR - ( 30 Dec 2021 06:59 )   PT: 12.4 sec;   INR: 1.04 ratio         PTT - ( 30 Dec 2021 06:59 )  PTT:35.5 sec    LOWER EXTREMITY PHYSICAL EXAM:  Vasc: DP/PT 2/4, temperature gradient warm to cool  Neuro: protective sensation intact  Ortho: no POP L foot  Derm: L foot bone biopsy incision site healed, no acute SOI      RADIOLOGY & ADDITIONAL STUDIES:

## 2021-12-30 NOTE — PROGRESS NOTE ADULT - SUBJECTIVE AND OBJECTIVE BOX
*************************************  NEUROLOGY PROGRESS NOTE  **************************************    SARAHI BARNHART  Male  MRN-[id number]    Subjective:  Patient was seen and examined at the bedside. Stated that he was feeling well. Continued to state that he had some discomfort of his LLE but overall is improved. No acute neurological complaints. Received IVIG and monoclonal Ab yesterday and tolerated well.     MEDICATIONS  (STANDING):  aspirin  chewable 81 milliGRAM(s) Oral daily  atorvastatin 80 milliGRAM(s) Oral at bedtime  enoxaparin Injectable 40 milliGRAM(s) SubCutaneous daily  famotidine    Tablet 20 milliGRAM(s) Oral daily  folic acid 1 milliGRAM(s) Oral daily  vancomycin  IVPB 1000 milliGRAM(s) IV Intermittent every 12 hours  vancomycin  IVPB        MEDICATIONS  (PRN):  aluminum hydroxide/magnesium hydroxide/simethicone Suspension 30 milliLiter(s) Oral every 4 hours PRN Dyspepsia  melatonin 3 milliGRAM(s) Oral at bedtime PRN Insomnia    VITAL SIGNS:  Vital Signs Last 24 Hrs  T(C): 36.7 (30 Dec 2021 04:23), Max: 37.1 (29 Dec 2021 10:33)  T(F): 98.1 (30 Dec 2021 04:23), Max: 98.8 (29 Dec 2021 10:33)  HR: 56 (30 Dec 2021 04:23) (55 - 72)  BP: 134/60 (30 Dec 2021 04:23) (96/58 - 134/60)  BP(mean): --  RR: 18 (30 Dec 2021 04:23) (16 - 18)  SpO2: 97% (30 Dec 2021 04:23) (95% - 97%)    PHYSICAL EXAMINATION:  General: Well-developed, well nourished, in no acute distress.  Respiratory: on room air, no use of accessory muscles  Neurologic:  - Mental Status:  Alert, awake, oriented to person, place, and time; Speech is fluent with intact naming, repetition, and comprehension  - Cranial Nerves II-XII:  EOMI, PERRL, V1-V3 intact, no facial asymmetry, t/p midline, SCM/trap intact.  - Motor:  Strength is 5/5 throughout except for L dorsi/plantar flexion which is 4/5. Bilateral pronator drift. Normal muscle bulk and tone throughout.  - Sensory:  Intact to light touch throughout.  - Coordination:  Finger-nose-finger without dysmetria.   - Gait: Deferred due to patient being non weight bearing on the L leg    LABS:                          11.6   4.13  )-----------( 245      ( 30 Dec 2021 06:59 )             36.7     12-30    137  |  100  |  17  ----------------------------<  120<H>  4.1   |  22  |  0.93    Ca    10.3      30 Dec 2021 06:59  Phos  4.5     12-30  Mg     2.0     12-30    TPro  8.0  /  Alb  4.6  /  TBili  0.8  /  DBili  x   /  AST  13  /  ALT  14  /  AlkPhos  85  12-30    PT/INR - ( 30 Dec 2021 06:59 )   PT: 12.4 sec;   INR: 1.04 ratio       PTT - ( 30 Dec 2021 06:59 )  PTT:35.5 sec    RADIOLOGY & ADDITIONAL STUDIES:      CT Angio Head w/ IV Cont (12.28.21)  IMPRESSION:  No evidence of an acute intracranial hemorrhage, midline shift, or   hydrocephalus.  NO HEMODYNAMIC SIGNIFICANT NARROWING WITHIN THE NECK.  NO PROXIMAL LARGE VESSEL OCCLUSION INTRACRANIALLY.  SMALL AREA OF ISCHEMIA (4 ML) INVOLVING THE INFERIOR ASPECT OF THE LEFT   TEMPORAL LOBE).    EEG  12/30/2021    EEG Summary / Classification:    Abnormal EEG in the awake / drowsy / asleep states.  - Intermittent polymorphic theta/delta slowing over the left hemisphere      EEG Impression / Clinical Correlate:    Focal dysfunction in the left hemisphere.  No epileptic pattern or seizure seen.       *************************************  NEUROLOGY PROGRESS NOTE  **************************************    SARAHI BARNHART  Male  MRN-[id number]    Subjective:  Patient was seen and examined at the bedside. Stated that he was feeling well. Continued to state that he had some discomfort of his LLE but overall is improved. No acute neurological complaints. Received IVIG and monoclonal Ab yesterday and tolerated well.     MEDICATIONS  (STANDING):  aspirin  chewable 81 milliGRAM(s) Oral daily  atorvastatin 80 milliGRAM(s) Oral at bedtime  enoxaparin Injectable 40 milliGRAM(s) SubCutaneous daily  famotidine    Tablet 20 milliGRAM(s) Oral daily  folic acid 1 milliGRAM(s) Oral daily  vancomycin  IVPB 1000 milliGRAM(s) IV Intermittent every 12 hours  vancomycin  IVPB        MEDICATIONS  (PRN):  aluminum hydroxide/magnesium hydroxide/simethicone Suspension 30 milliLiter(s) Oral every 4 hours PRN Dyspepsia  melatonin 3 milliGRAM(s) Oral at bedtime PRN Insomnia    VITAL SIGNS:  Vital Signs Last 24 Hrs  T(C): 36.7 (30 Dec 2021 04:23), Max: 37.1 (29 Dec 2021 10:33)  T(F): 98.1 (30 Dec 2021 04:23), Max: 98.8 (29 Dec 2021 10:33)  HR: 56 (30 Dec 2021 04:23) (55 - 72)  BP: 134/60 (30 Dec 2021 04:23) (96/58 - 134/60)  BP(mean): --  RR: 18 (30 Dec 2021 04:23) (16 - 18)  SpO2: 97% (30 Dec 2021 04:23) (95% - 97%)    PHYSICAL EXAMINATION:  General: Well-developed, well nourished, in no acute distress  Respiratory: on room air, no use of accessory muscles  Neurologic:  - Mental Status:  Alert, awake, oriented to person, place, and time; Speech is fluent with intact naming, repetition, and comprehension  - Cranial Nerves II-XII:  EOMI, PERRL, V1-V3 intact, no facial asymmetry, t/p midline, SCM/trap intact.  - Motor:  Strength is 5/5 throughout except for L dorsi/plantar flexion which is 4/5. Bilateral pronator drift. Normal muscle bulk and tone throughout.  - Sensory:  Intact to light touch throughout.  - Coordination:  Finger-nose-finger without dysmetria.   - Gait: Deferred due to patient being non weight bearing on the L leg    LABS:                          11.6   4.13  )-----------( 245      ( 30 Dec 2021 06:59 )             36.7     12-30    137  |  100  |  17  ----------------------------<  120<H>  4.1   |  22  |  0.93    Ca    10.3      30 Dec 2021 06:59  Phos  4.5     12-30  Mg     2.0     12-30    TPro  8.0  /  Alb  4.6  /  TBili  0.8  /  DBili  x   /  AST  13  /  ALT  14  /  AlkPhos  85  12-30    PT/INR - ( 30 Dec 2021 06:59 )   PT: 12.4 sec;   INR: 1.04 ratio       PTT - ( 30 Dec 2021 06:59 )  PTT:35.5 sec    RADIOLOGY & ADDITIONAL STUDIES:      CT Angio Head w/ IV Cont (12.28.21)  IMPRESSION:  No evidence of an acute intracranial hemorrhage, midline shift, or   hydrocephalus.  NO HEMODYNAMIC SIGNIFICANT NARROWING WITHIN THE NECK.  NO PROXIMAL LARGE VESSEL OCCLUSION INTRACRANIALLY.  SMALL AREA OF ISCHEMIA (4 ML) INVOLVING THE INFERIOR ASPECT OF THE LEFT   TEMPORAL LOBE).    EEG  12/30/2021    EEG Summary / Classification:    Abnormal EEG in the awake / drowsy / asleep states.  - Intermittent polymorphic theta/delta slowing over the left hemisphere      EEG Impression / Clinical Correlate:    Focal dysfunction in the left hemisphere.  No epileptic pattern or seizure seen.       *************************************  NEUROLOGY PROGRESS NOTE  **************************************    SARAHI BARNHART  Male  MRN-[id number]    Subjective:  Patient was seen and examined at the bedside. Stated that he was feeling well. Continued to state that he had some discomfort of his LLE but overall is improved. No acute neurological complaints. Received IVIG and monoclonal Ab yesterday and tolerated well.     MEDICATIONS  (STANDING):  aspirin  chewable 81 milliGRAM(s) Oral daily  atorvastatin 80 milliGRAM(s) Oral at bedtime  enoxaparin Injectable 40 milliGRAM(s) SubCutaneous daily  famotidine    Tablet 20 milliGRAM(s) Oral daily  folic acid 1 milliGRAM(s) Oral daily  vancomycin  IVPB 1000 milliGRAM(s) IV Intermittent every 12 hours  vancomycin  IVPB        MEDICATIONS  (PRN):  aluminum hydroxide/magnesium hydroxide/simethicone Suspension 30 milliLiter(s) Oral every 4 hours PRN Dyspepsia  melatonin 3 milliGRAM(s) Oral at bedtime PRN Insomnia    VITAL SIGNS:  Vital Signs Last 24 Hrs  T(C): 36.7 (30 Dec 2021 04:23), Max: 37.1 (29 Dec 2021 10:33)  T(F): 98.1 (30 Dec 2021 04:23), Max: 98.8 (29 Dec 2021 10:33)  HR: 56 (30 Dec 2021 04:23) (55 - 72)  BP: 134/60 (30 Dec 2021 04:23) (96/58 - 134/60)  BP(mean): --  RR: 18 (30 Dec 2021 04:23) (16 - 18)  SpO2: 97% (30 Dec 2021 04:23) (95% - 97%)    PHYSICAL EXAMINATION:  General: Well-developed, well nourished, in no acute distress  Respiratory: on room air, no use of accessory muscles  Neurologic:  - Mental Status:  Alert, awake, oriented to person, place, and time; Speech is fluent with intact naming, repetition, and comprehension  - Cranial Nerves II-XII:  EOMI, PERRL, V1-V3 intact, no facial asymmetry, t/p midline, SCM/trap intact.  - Motor:  Strength is 5/5 throughout except for L dorsi/plantar flexion which is 4/5. Bilateral pronator drift. Normal muscle bulk and tone throughout.  - Sensory:  Intact to light touch throughout.  - Coordination:  Finger-nose-finger without dysmetria.   - Gait: Deferred due to patient being non weight bearing on the L leg    LABS:                          11.6   4.13  )-----------( 245      ( 30 Dec 2021 06:59 )             36.7     12-30    137  |  100  |  17  ----------------------------<  120<H>  4.1   |  22  |  0.93    Ca    10.3      30 Dec 2021 06:59  Phos  4.5     12-30  Mg     2.0     12-30    TPro  8.0  /  Alb  4.6  /  TBili  0.8  /  DBili  x   /  AST  13  /  ALT  14  /  AlkPhos  85  12-30    PT/INR - ( 30 Dec 2021 06:59 )   PT: 12.4 sec;   INR: 1.04 ratio       PTT - ( 30 Dec 2021 06:59 )  PTT:35.5 sec    RADIOLOGY & ADDITIONAL STUDIES:      CT Angio Head w/ IV Cont (12.28.21)  IMPRESSION:  No evidence of an acute intracranial hemorrhage, midline shift, or   hydrocephalus.  NO HEMODYNAMIC SIGNIFICANT NARROWING WITHIN THE NECK.  NO PROXIMAL LARGE VESSEL OCCLUSION INTRACRANIALLY.  SMALL AREA OF ISCHEMIA (4 ML) INVOLVING THE INFERIOR ASPECT OF THE LEFT   TEMPORAL LOBE).    EEG  12/30/2021    EEG Summary / Classification:    Abnormal EEG in the awake / drowsy / asleep states.  - Intermittent polymorphic theta/delta slowing over the left hemisphere      EEG Impression / Clinical Correlate:    Focal dysfunction in the left hemisphere.  No epileptic pattern or seizure seen.

## 2021-12-30 NOTE — EEG REPORT - NS EEG TEXT BOX
REPORT OF CONTINUOUS VIDEO EEG   [facility]: [address] [facility] , [address], [place] [address], Ph#: [phone] [facility]: [phone] [place], [place] [address], [place] [address], Ph#: [phone] Office: [address] [place] [address], [place] [address] Ph#: [phone]  Patient Name: Daniel Pinto   Age: 66 year, : 1955 MRN #: [id number] [id number], Mcleod: -4  W Covid-19 + Referring Physician: - EEG #: [id number]  Study Date: 2021   Start Time: 2:23:51 PM    End Date: 21         End Time: 08:00:02 AM    Study Duration: ~17.5H  Study Information:  EEG Recording Technique: The patient underwent continuous Video-EEG monitoring, using Telemetry System hardware on the XLTek Digital System. EEG and video data were stored on a computer hard drive with important events saved in digital archive files. The material was reviewed by a physician (electroencephalographer / epileptologist) on a daily basis. Franko and seizure detection algorithms were utilized and reviewed. An EEG Technician attended to the patient, and was available throughout daytime work hours.  The epilepsy center neurologist was available in person or on call 24-hours per day.  EEG Placement and Labeling of Electrodes: The EEG was performed utilizing 20 channel referential EEG connections (coronal over temporal over parasagittal montage) using all standard 10-20 electrode placements with EKG, with additional electrodes placed in the inferior temporal region using the modified 10-10 montage electrode placements for elective admissions, or if deemed necessary. Recording was at a sampling rate of 256 samples per second per channel. Time synchronized digital video recording was done simultaneously with EEG recording. A low light infrared camera was used for low light recording.   History:  VEEG performed at bedside  COR: A&OX2 65 y/o male with h/o CLL, MDS, PAF, Anemia, encephalitis p/w AMS Patient is Covid-19 poistive.  No Phoitic performed due to patient contraindictations\ No HV protocol due to Covid 19 protocol  Medication No Data.  Interpretation:  [Abbreviation Key:  PDR=alpha rhythm/posterior dominant rhythm. A-P=anterior posterior gradient.  Amplitude: ‘very low’:<20; ‘low’:20-50; ‘medium’:; ‘high’:>200uV.  Persistence for periodic/rhythmic patterns (% of epoch) ‘rare’:<1%; ‘occasional’:1-10%; ‘frequent’:10-50%; ‘abundant’:50-90%; ‘continuous’:>90%.  Persistence for sporadic discharges: ‘rare’:<1/hr; ‘occasional’:1/min-1/hr; ‘frequent’:>1/min; ‘abundant’:>1/10 sec.  GRDA=generalized rhythmic delta activity, LRDA=lateralized rhythmic delta activity, TIRDA=temporal intermittent rhythmic delta activity, FIRDA=frontal intermittent rhythmic activity. LPD=PLED=lateralized periodic discharges, GPD=generalized periodic discharges, BiPDs=BiPLEDs=bilateral independent periodic epileptiform discharges, SIRPID=stimulus induced rhythmic, periodic, or ictal appearing discharges.  Modifiers: +F=with fast component, +S=with spike component, +R=with rhythmic component.  S-B=burst suppression pattern.  PFA: paroxysmal fast activity. Max=maximal. N1-drowsy, N2-stage II sleep, N3-slow wave sleep.  HV=hyperventilation, PS=photic stimulation]   Daily EEG Visual Analysis  FINDINGS: The background was continuous, spontaneously variable and reactive. During wakefulness, the posterior dominant rhythm consisted of symmetric, well-modulated 8 Hz activity, with amplitude to 30 uV, that attenuated to eye opening. Low amplitude frontal beta was noted in wakefulness.    Background Slowing: Generalized slowing: None was present. Focal Slowing: intermittent polymorphic theta/delta slowing over the left hemisphere.  Sleep Background: Drowsiness was characterized by fragmentation, attenuation, and slowing of the background activity.   Sleep was characterized by the presence of vertex waves, symmetric spindles, and K-complexes.  Other Non-Epileptiform Findings: None were present.  Interictal Epileptiform Activity:  None were present.  Events: Clinical events: None recorded. Seizures: None recorded.  Activation Procedures:  Hyperventilation was not performed.   Photic stimulation was not performed.  Artifacts: Intermittent myogenic and movement artifacts were noted.  ECG: The heart rate on single channel ECG was predominantly between 60-70 BPM.  EEG Summary / Classification:  Abnormal EEG in the awake / drowsy / asleep states. - Intermittent polymorphic theta/delta slowing over the left hemisphere   EEG Impression / Clinical Correlate:  Focal dysfunction in the left hemisphere. No epileptic pattern or seizure seen.  Preliminary Fellow Report, final report pending attending review  Devan Lovelace MD Epilepsy Fellow  Reading Room: [phone] On Call Service After Hours: [phone]    REPORT OF CONTINUOUS VIDEO EEG   [facility]: [address] [facility] , [address], [place] [address], Ph#: [phone] [facility]: [phone] [place], [place] [address], [place] [address], Ph#: [phone] Office: [address] [place] [address], [place] [address] Ph#: [phone]  Patient Name: Daniel Pinto   Age: 66 year, : 1955 MRN #: [id number], Mcleod: -4  W Covid-19 + Referring Physician: - EEG #: [id number]  Study Date: 2021   Start Time: 2:23:51 PM    End Date: 21         End Time: 08:00:02 AM    Study Duration: ~17.5H  Study Information:  EEG Recording Technique: The patient underwent continuous Video-EEG monitoring, using Telemetry System hardware on the XLTek Digital System. EEG and video data were stored on a computer hard drive with important events saved in digital archive files. The material was reviewed by a physician (electroencephalographer / epileptologist) on a daily basis. Franko and seizure detection algorithms were utilized and reviewed. An EEG Technician attended to the patient, and was available throughout daytime work hours.  The epilepsy center neurologist was available in person or on call 24-hours per day.  EEG Placement and Labeling of Electrodes: The EEG was performed utilizing 20 channel referential EEG connections (coronal over temporal over parasagittal montage) using all standard 10-20 electrode placements with EKG, with additional electrodes placed in the inferior temporal region using the modified 10-10 montage electrode placements for elective admissions, or if deemed necessary. Recording was at a sampling rate of 256 samples per second per channel. Time synchronized digital video recording was done simultaneously with EEG recording. A low light infrared camera was used for low light recording.   History:  VEEG performed at bedside  COR: A&OX2 65 y/o male with h/o CLL, MDS, PAF, Anemia, encephalitis p/w AMS Patient is Covid-19 poistive.  No Phoitic performed due to patient contraindictations\ No HV protocol due to Covid 19 protocol  Medication No Data.  Interpretation:  [Abbreviation Key:  PDR=alpha rhythm/posterior dominant rhythm. A-P=anterior posterior gradient.  Amplitude: ‘very low’:<20; ‘low’:20-50; ‘medium’:; ‘high’:>200uV.  Persistence for periodic/rhythmic patterns (% of epoch) ‘rare’:<1%; ‘occasional’:1-10%; ‘frequent’:10-50%; ‘abundant’:50-90%; ‘continuous’:>90%.  Persistence for sporadic discharges: ‘rare’:<1/hr; ‘occasional’:1/min-1/hr; ‘frequent’:>1/min; ‘abundant’:>1/10 sec.  GRDA=generalized rhythmic delta activity, LRDA=lateralized rhythmic delta activity, TIRDA=temporal intermittent rhythmic delta activity, FIRDA=frontal intermittent rhythmic activity. LPD=PLED=lateralized periodic discharges, GPD=generalized periodic discharges, BiPDs=BiPLEDs=bilateral independent periodic epileptiform discharges, SIRPID=stimulus induced rhythmic, periodic, or ictal appearing discharges.  Modifiers: +F=with fast component, +S=with spike component, +R=with rhythmic component.  S-B=burst suppression pattern.  PFA: paroxysmal fast activity. Max=maximal. N1-drowsy, N2-stage II sleep, N3-slow wave sleep.  HV=hyperventilation, PS=photic stimulation]   Daily EEG Visual Analysis  FINDINGS: The background was continuous, spontaneously variable and reactive. During wakefulness, the posterior dominant rhythm consisted of symmetric, well-modulated 8 Hz activity, with amplitude to 30 uV, that attenuated to eye opening. Low amplitude frontal beta was noted in wakefulness.    Background Slowing: Generalized slowing: None was present. Focal Slowing: intermittent polymorphic theta/delta slowing over the left hemisphere.  Sleep Background: Drowsiness was characterized by fragmentation, attenuation, and slowing of the background activity.   Sleep was characterized by the presence of vertex waves, symmetric spindles, and K-complexes.  Other Non-Epileptiform Findings: None were present.  Interictal Epileptiform Activity:  None were present.  Events: Clinical events: None recorded. Seizures: None recorded.  Activation Procedures:  Hyperventilation was not performed.   Photic stimulation was not performed.  Artifacts: Intermittent myogenic and movement artifacts were noted.  ECG: The heart rate on single channel ECG was predominantly between 60-70 BPM.  EEG Summary / Classification:  Abnormal EEG in the awake / drowsy / asleep states. - Intermittent polymorphic theta/delta slowing over the left hemisphere   EEG Impression / Clinical Correlate:  Focal dysfunction in the left hemisphere. No epileptic pattern or seizure seen.  Devan Lovelace MD Epilepsy Fellow  Reading Room: [phone] On Call Service After Hours: [phone]

## 2021-12-30 NOTE — PROGRESS NOTE ADULT - ASSESSMENT
66 y.o. RH M with PMH of CLL on Venetoclax, MDS, melanoma, PAF (not on AC), AIE, and osteomyelitis of foot presented as a transfer from [facility] for stroke and seizure work up. He presented to [facility] from [facility] rehab due to AMS and R sided weakness. Wife provided hx at [facility]. LKN 21:00 hr on 12/27. Woke up with change in mental status and R weakness. NIHSS 12(answered one q, partial hemianopia, no effort against gravity in RUE and RLE, ataxia, mild-mod sensory loss, mild-mod aphasia) . GCS 14. CTH/CTA were done and results were negative. CTP showed 4cc area of ischemia involving the inferior aspect of the L temporal lobe. No tPA since outside the window and no thrombectomy since no LVO on imaging. Takes ASA 81 mg qd and atorvastatin 80 mg qhs. No anticoagulation. Has R PICC for osteomyelitis tx and had rectal temp 99 upon presentation.  Received 1g of Keppra x1. Neuro exam at [facility] w/ full strength b/l, perseveration, mixed aphasia (expressive > receptive). 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 Now back to baseline. May be in setting of COVID infection.     Plan:  #AMS - now resolved  [x] vEEG  [x] MRI brain w/wo with epilepsy protocol to be done  [] No AEDs at this time  [] neurochecks q4h  [] fall and seizure precautions    #Osteomyelitis  [] vanco 1g BID until 1/11/22 per ID last admission (6 week course)  [] vanco trough before next dose  [] will get podiatry to reassess patient for weight bearing    Paroxysmal atrial fibrillation  [] CHADsVASC 2  [] per cardiology last admission, patient with episode of afib on 7/2020 admission  [] per cardiology, on aspirin 81mg daily    #CLL (chronic lymphocytic leukemia).   [] Patient follows with Dr. Fry. Patient is immunocompromised.  [] Continue to monitor CBCs  [] s/p IVIG on 12/29    COVID  [] s/p Monoclonal Ab on 12/29  [] will check CRE, LFTs, ddimer, procal, ferretin, LDH    #DVT ppx: lovenox  #Diet: DASH  #Dispo: will confirm with case management as patient prefers to go home vs rehab in setting of LLE weakness and osteomyelitis    Patient seen and case discussed with neurology attending, Dr. Gerard.  66 y.o. RH M with PMH of CLL on Venetoclax, MDS, melanoma, PAF (not on AC), AIE, and osteomyelitis of foot presented as a transfer from [facility] for stroke and seizure work up. He presented to [facility] from [facility] rehab due to AMS and R sided weakness. Wife provided hx at [facility]. LKN 21:00 hr on 12/27. Woke up with change in mental status and R weakness. NIHSS 12(answered one q, partial hemianopia, no effort against gravity in RUE and RLE, ataxia, mild-mod sensory loss, mild-mod aphasia) . GCS 14. CTH/CTA were done and results were negative. CTP showed 4cc area of ischemia involving the inferior aspect of the L temporal lobe. No tPA since outside the window and no thrombectomy since no LVO on imaging. Takes ASA 81 mg qd and atorvastatin 80 mg qhs. No anticoagulation. Has R PICC for osteomyelitis tx and had rectal temp 99 upon presentation.  Received 1g of Keppra x1. Neuro exam at [facility] w/ full strength b/l, perseveration, mixed aphasia (expressive > receptive). 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 Now back to baseline. May be in setting of COVID infection.     Plan:  #AMS - now resolved  [x] vEEG  [x] MRI brain w/wo with epilepsy protocol to be done  [] No AEDs at this time  [] neurochecks q4h  [] fall and seizure precautions    #Osteomyelitis  [] vanco 1g BID until 1/11/22 per ID last admission (6 week course)  [] vanco trough before next dose  [] will get podiatry to reassess patient for weight bearing    Paroxysmal atrial fibrillation  [] CHADsVASC 2  [] per cardiology last admission, patient with episode of afib on 7/2020 admission  [] per cardiology, on aspirin 81mg daily    #CLL (chronic lymphocytic leukemia).   [] Patient follows with Dr. Fry. Patient is immunocompromised.  [] Continue to monitor CBCs  [] s/p IVIG on 12/29    COVID  [] s/p Monoclonal Ab on 12/29  [] will check CRE, LFTs, ddimer, procal, ferretin, LDH    #DVT ppx: lovenox  #Diet: DASH  #Dispo: will confirm with case management as patient prefers to go home vs rehab in setting of LLE weakness and osteomyelitis    Above plan was discussed with patient's wife upon request and all questions were answered to satisfaction.     Patient seen and case discussed with neurology attending, Dr. Gerard.

## 2021-12-30 NOTE — EEG REPORT - NS EEG TEXT BOX
REPORT OF CONTINUOUS VIDEO EEG   [facility]: [address], [place] [address], Ph#: [phone] [facility]: [id number], [place] [address], Ph#: [phone] Office: [address] [place] [address], [place] [address] Ph#: [phone]  Patient Name: Daniel Pinto   Age: 66 year, : 1955 MRN #: MRN [id number], Mcleod: -4  W Covid-19 + Referring Physician: - EEG #: [id number]  Study Date: 2021   Start Time: 0800    End Date: 21         End Time: 11:01    Study Duration: ~3H  Study Information:  EEG Recording Technique: The patient underwent continuous Video-EEG monitoring, using Telemetry System hardware on the XLTek Digital System. EEG and video data were stored on a computer hard drive with important events saved in digital archive files. The material was reviewed by a physician (electroencephalographer / epileptologist) on a daily basis. Franko and seizure detection algorithms were utilized and reviewed. An EEG Technician attended to the patient, and was available throughout daytime work hours.  The epilepsy center neurologist was available in person or on call 24-hours per day.  EEG Placement and Labeling of Electrodes: The EEG was performed utilizing 20 channel referential EEG connections (coronal over temporal over parasagittal montage) using all standard 10-20 electrode placements with EKG, with additional electrodes placed in the inferior temporal region using the modified 10-10 montage electrode placements for elective admissions, or if deemed necessary. Recording was at a sampling rate of 256 samples per second per channel. Time synchronized digital video recording was done simultaneously with EEG recording. A low light infrared camera was used for low light recording.   History:  VEEG performed at bedside  COR: A&OX2 67 y/o male with h/o CLL, MDS, PAF, Anemia, encephalitis p/w AMS Patient is Covid-19 poistive.  No Phoitic performed due to patient contraindictations\ No HV protocol due to Covid 19 protocol  Medication No Data.  Interpretation:  [Abbreviation Key:  PDR=alpha rhythm/posterior dominant rhythm. A-P=anterior posterior gradient.  Amplitude: ‘very low’:<20; ‘low’:20-50; ‘medium’:; ‘high’:>200uV.  Persistence for periodic/rhythmic patterns (% of epoch) ‘rare’:<1%; ‘occasional’:1-10%; ‘frequent’:10-50%; ‘abundant’:50-90%; ‘continuous’:>90%.  Persistence for sporadic discharges: ‘rare’:<1/hr; ‘occasional’:1/min-1/hr; ‘frequent’:>1/min; ‘abundant’:>1/10 sec.  GRDA=generalized rhythmic delta activity, LRDA=lateralized rhythmic delta activity, TIRDA=temporal intermittent rhythmic delta activity, FIRDA=frontal intermittent rhythmic activity. LPD=PLED=lateralized periodic discharges, GPD=generalized periodic discharges, BiPDs=BiPLEDs=bilateral independent periodic epileptiform discharges, SIRPID=stimulus induced rhythmic, periodic, or ictal appearing discharges.  Modifiers: +F=with fast component, +S=with spike component, +R=with rhythmic component.  S-B=burst suppression pattern.  PFA: paroxysmal fast activity. Max=maximal. N1-drowsy, N2-stage II sleep, N3-slow wave sleep.  HV=hyperventilation, PS=photic stimulation]   Daily EEG Visual Analysis  FINDINGS: The background was continuous, spontaneously variable and reactive. During wakefulness, the posterior dominant rhythm consisted of symmetric, well-modulated 8 Hz activity, with amplitude to 30 uV, that attenuated to eye opening. Low amplitude frontal beta was noted in wakefulness.    Background Slowing: Generalized slowing: None was present. Focal Slowing: intermittent polymorphic theta/delta slowing over the left hemisphere.  Sleep Background: Drowsiness was characterized by fragmentation, attenuation, and slowing of the background activity.   Sleep was characterized by the presence of vertex waves, symmetric spindles, and K-complexes.  Other Non-Epileptiform Findings: None were present.  Interictal Epileptiform Activity:  None were present.  Events: Clinical events: None recorded. Seizures: None recorded.  Activation Procedures:  Hyperventilation was not performed.   Photic stimulation was not performed.  Artifacts: Intermittent myogenic and movement artifacts were noted.  ECG: The heart rate on single channel ECG was predominantly between 60-80 BPM.  EEG Summary / Classification:  Abnormal EEG in the awake / drowsy / asleep states. - Intermittent polymorphic theta/delta slowing over the left hemisphere   EEG Impression / Clinical Correlate:  Focal dysfunction in the left hemisphere. No epileptic pattern or seizure seen.  Preliminary Fellow Report, final report pending attending review  Devan Lovelace MD Epilepsy Fellow  Reading Room: [phone] On Call Service After Hours: [phone]    REPORT OF CONTINUOUS VIDEO EEG   [facility]: [address] , [address], [place] [address], Ph#: [phone] [facility]: [address], [place] [address], Ph#: [phone] Office: [address] [place] [address], [place] [address] Ph#: [phone]  Patient Name: Daniel Pinto   Age: 66 year, : 1955 MRN #: MRN [id number], Mcleod: -4  W Covid-19 + Referring Physician: - EEG #: [id number]  Study Date: 2021   Start Time: 0800    End Date: 21         End Time: 11:01    Study Duration: ~3H  Study Information:  EEG Recording Technique: The patient underwent continuous Video-EEG monitoring, using Telemetry System hardware on the XLTek Digital System. EEG and video data were stored on a computer hard drive with important events saved in digital archive files. The material was reviewed by a physician (electroencephalographer / epileptologist) on a daily basis. Franko and seizure detection algorithms were utilized and reviewed. An EEG Technician attended to the patient, and was available throughout daytime work hours.  The epilepsy center neurologist was available in person or on call 24-hours per day.  EEG Placement and Labeling of Electrodes: The EEG was performed utilizing 20 channel referential EEG connections (coronal over temporal over parasagittal montage) using all standard 10-20 electrode placements with EKG, with additional electrodes placed in the inferior temporal region using the modified 10-10 montage electrode placements for elective admissions, or if deemed necessary. Recording was at a sampling rate of 256 samples per second per channel. Time synchronized digital video recording was done simultaneously with EEG recording. A low light infrared camera was used for low light recording.   History:  VEEG performed at bedside  COR: A&OX2 67 y/o male with h/o CLL, MDS, PAF, Anemia, encephalitis p/w AMS Patient is Covid-19 poistive.  No Phoitic performed due to patient contraindictations\ No HV protocol due to Covid 19 protocol  Medication No Data.  Interpretation:  [Abbreviation Key:  PDR=alpha rhythm/posterior dominant rhythm. A-P=anterior posterior gradient.  Amplitude: ‘very low’:<20; ‘low’:20-50; ‘medium’:; ‘high’:>200uV.  Persistence for periodic/rhythmic patterns (% of epoch) ‘rare’:<1%; ‘occasional’:1-10%; ‘frequent’:10-50%; ‘abundant’:50-90%; ‘continuous’:>90%.  Persistence for sporadic discharges: ‘rare’:<1/hr; ‘occasional’:1/min-1/hr; ‘frequent’:>1/min; ‘abundant’:>1/10 sec.  GRDA=generalized rhythmic delta activity, LRDA=lateralized rhythmic delta activity, TIRDA=temporal intermittent rhythmic delta activity, FIRDA=frontal intermittent rhythmic activity. LPD=PLED=lateralized periodic discharges, GPD=generalized periodic discharges, BiPDs=BiPLEDs=bilateral independent periodic epileptiform discharges, SIRPID=stimulus induced rhythmic, periodic, or ictal appearing discharges.  Modifiers: +F=with fast component, +S=with spike component, +R=with rhythmic component.  S-B=burst suppression pattern.  PFA: paroxysmal fast activity. Max=maximal. N1-drowsy, N2-stage II sleep, N3-slow wave sleep.  HV=hyperventilation, PS=photic stimulation]   Daily EEG Visual Analysis  FINDINGS: The background was continuous, spontaneously variable and reactive. During wakefulness, the posterior dominant rhythm consisted of symmetric, well-modulated 8 Hz activity, with amplitude to 30 uV, that attenuated to eye opening. Low amplitude frontal beta was noted in wakefulness.    Background Slowing: Generalized slowing: None was present. Focal Slowing: intermittent polymorphic theta/delta slowing over the left hemisphere.  Sleep Background: Drowsiness was characterized by fragmentation, attenuation, and slowing of the background activity.   Sleep was characterized by the presence of vertex waves, symmetric spindles, and K-complexes.  Other Non-Epileptiform Findings: None were present.  Interictal Epileptiform Activity:  None were present.  Events: Clinical events: None recorded. Seizures: None recorded.  Activation Procedures:  Hyperventilation was not performed.   Photic stimulation was not performed.  Artifacts: Intermittent myogenic and movement artifacts were noted.  ECG: The heart rate on single channel ECG was predominantly between 60-80 BPM.  EEG Summary / Classification:  Abnormal EEG in the awake / drowsy / asleep states. - Intermittent polymorphic theta/delta slowing over the left hemisphere   EEG Impression / Clinical Correlate:  Focal dysfunction in the left hemisphere. No epileptic pattern or seizure seen.   Devan Lovelace MD Epilepsy Fellow  Reading Room: [phone] On Call Service After Hours: [phone]

## 2021-12-31 LAB
ALBUMIN SERPL ELPH-MCNC: 3.9 G/DL — SIGNIFICANT CHANGE UP (ref 3.3–5)
ALP SERPL-CCNC: 69 U/L — SIGNIFICANT CHANGE UP (ref 40–120)
ALT FLD-CCNC: 18 U/L — SIGNIFICANT CHANGE UP (ref 10–45)
ANION GAP SERPL CALC-SCNC: 13 MMOL/L — SIGNIFICANT CHANGE UP (ref 5–17)
AST SERPL-CCNC: 18 U/L — SIGNIFICANT CHANGE UP (ref 10–40)
BILIRUB SERPL-MCNC: 0.6 MG/DL — SIGNIFICANT CHANGE UP (ref 0.2–1.2)
BUN SERPL-MCNC: 19 MG/DL — SIGNIFICANT CHANGE UP (ref 7–23)
CALCIUM SERPL-MCNC: 9 MG/DL — SIGNIFICANT CHANGE UP (ref 8.4–10.5)
CHLORIDE SERPL-SCNC: 102 MMOL/L — SIGNIFICANT CHANGE UP (ref 96–108)
CO2 SERPL-SCNC: 23 MMOL/L — SIGNIFICANT CHANGE UP (ref 22–31)
CREAT SERPL-MCNC: 1.05 MG/DL — SIGNIFICANT CHANGE UP (ref 0.5–1.3)
D DIMER BLD IA.RAPID-MCNC: <150 NG/ML DDU — SIGNIFICANT CHANGE UP
FERRITIN SERPL-MCNC: 707 NG/ML — HIGH (ref 30–400)
GLUCOSE SERPL-MCNC: 87 MG/DL — SIGNIFICANT CHANGE UP (ref 70–99)
HCT VFR BLD CALC: 32.5 % — LOW (ref 39–50)
HGB BLD-MCNC: 10.2 G/DL — LOW (ref 13–17)
LDH SERPL L TO P-CCNC: 148 U/L — SIGNIFICANT CHANGE UP (ref 50–242)
MAGNESIUM SERPL-MCNC: 1.9 MG/DL — SIGNIFICANT CHANGE UP (ref 1.6–2.6)
MCHC RBC-ENTMCNC: 31.4 GM/DL — LOW (ref 32–36)
MCHC RBC-ENTMCNC: 33.4 PG — SIGNIFICANT CHANGE UP (ref 27–34)
MCV RBC AUTO: 106.6 FL — HIGH (ref 80–100)
NRBC # BLD: 0 /100 WBCS — SIGNIFICANT CHANGE UP (ref 0–0)
PLATELET # BLD AUTO: 225 K/UL — SIGNIFICANT CHANGE UP (ref 150–400)
POTASSIUM SERPL-MCNC: 3.7 MMOL/L — SIGNIFICANT CHANGE UP (ref 3.5–5.3)
POTASSIUM SERPL-SCNC: 3.7 MMOL/L — SIGNIFICANT CHANGE UP (ref 3.5–5.3)
PROCALCITONIN SERPL-MCNC: 0.06 NG/ML — SIGNIFICANT CHANGE UP (ref 0.02–0.1)
PROT SERPL-MCNC: 6.9 G/DL — SIGNIFICANT CHANGE UP (ref 6–8.3)
RBC # BLD: 3.05 M/UL — LOW (ref 4.2–5.8)
RBC # FLD: 16.4 % — HIGH (ref 10.3–14.5)
SODIUM SERPL-SCNC: 138 MMOL/L — SIGNIFICANT CHANGE UP (ref 135–145)
VANCOMYCIN TROUGH SERPL-MCNC: 17.3 UG/ML — SIGNIFICANT CHANGE UP (ref 10–20)
WBC # BLD: 4.19 K/UL — SIGNIFICANT CHANGE UP (ref 3.8–10.5)
WBC # FLD AUTO: 4.19 K/UL — SIGNIFICANT CHANGE UP (ref 3.8–10.5)

## 2021-12-31 PROCEDURE — 99232 SBSQ HOSP IP/OBS MODERATE 35: CPT

## 2021-12-31 PROCEDURE — 99231 SBSQ HOSP IP/OBS SF/LOW 25: CPT

## 2021-12-31 RX ORDER — BENZOCAINE AND MENTHOL 5; 1 G/100ML; G/100ML
1 LIQUID ORAL EVERY 4 HOURS
Refills: 0 | Status: DISCONTINUED | OUTPATIENT
Start: 2021-12-31 | End: 2022-01-04

## 2021-12-31 RX ADMIN — Medication 250 MILLIGRAM(S): at 12:32

## 2021-12-31 RX ADMIN — Medication 1 MILLIGRAM(S): at 12:27

## 2021-12-31 RX ADMIN — ENOXAPARIN SODIUM 40 MILLIGRAM(S): 100 INJECTION SUBCUTANEOUS at 12:27

## 2021-12-31 RX ADMIN — Medication 81 MILLIGRAM(S): at 12:27

## 2021-12-31 RX ADMIN — Medication 3 MILLIGRAM(S): at 22:33

## 2021-12-31 RX ADMIN — ATORVASTATIN CALCIUM 80 MILLIGRAM(S): 80 TABLET, FILM COATED ORAL at 22:28

## 2021-12-31 RX ADMIN — FAMOTIDINE 20 MILLIGRAM(S): 10 INJECTION INTRAVENOUS at 12:27

## 2021-12-31 NOTE — PROGRESS NOTE ADULT - PROBLEM SELECTOR PLAN 6
VTE ppx: lovenox   PT: from rehab, PT eval ordered for DC planning  Dispo: DC likely to [facility] pending PT eval w/ continuation of iv abx through 1/11 per ID through PICC. Some rehabs are accepting COVID+ patients within 5 days of symptoms/COVID+. Patient will be discussed during weekend IDR.

## 2021-12-31 NOTE — PROGRESS NOTE ADULT - ASSESSMENT
66 y.o. RH M with PMH of CLL on Venetoclax, MDS, melanoma, PAF (not on AC), AIE, and osteomyelitis of foot presented as a transfer from [facility] for stroke and seizure work up. He presented to [facility] from [facility] rehab due to AMS and R sided weakness. Wife provided hx at [facility]. LKN 21:00 hr on 12/27. Woke up with change in mental status and R weakness. NIHSS 12(answered one q, partial hemianopia, no effort against gravity in RUE and RLE, ataxia, mild-mod sensory loss, mild-mod aphasia) . GCS 14. CTH/CTA were done and results were negative. CTP showed 4cc area of ischemia involving the inferior aspect of the L temporal lobe. No tPA since outside the window and no thrombectomy since no LVO on imaging. Takes ASA 81 mg qd and atorvastatin 80 mg qhs. No anticoagulation. Has R PICC for osteomyelitis tx and had rectal temp 99 upon presentation.  Received 1g of Keppra x1. Neuro exam at [facility] w/ full strength b/l, perseveration, mixed aphasia (expressive > receptive). 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 Now back to baseline. May be in setting of COVID infection.     Plan:  #AMS - now resolved  [x] vEEG  [x] MRI brain w/wo with epilepsy protocol to be done  [] No AEDs at this time  [] neurochecks q4h  [] fall and seizure precautions    #Osteomyelitis  [] vanco 1g BID until 1/11/22 per ID last admission (6 week course through PICC line)  [] vanco trough before next dose  [] will get podiatry to reassess patient for weight bearing    Paroxysmal atrial fibrillation  [] CHADsVASC 2  [] per cardiology last admission, patient with episode of afib on 7/2020 admission  [] per cardiology, on aspirin 81mg daily    #CLL (chronic lymphocytic leukemia).   [] Patient follows with Dr. Fry. Patient is immunocompromised.  [] Continue to monitor CBCs  [] s/p IVIG on 12/29    COVID  [] s/p Monoclonal Ab on 12/29  [] monitor inflammatory markers - ddimer, procal, ferretin, LDH    #DVT ppx: lovenox  #Diet: DASH  #Dispo: will confirm with case management as patient prefers to go home vs rehab in setting of LLE weakness and osteomyelitis    Patient seen and case discussed with neurology attending, Dr. Gerard.

## 2021-12-31 NOTE — PROGRESS NOTE ADULT - PROBLEM SELECTOR PLAN 5
Hx of L ankle OM with MRSA, on vancomycin. Followed by [facility]/Dr Coker. Plan was for IV vanco until 1/11.   Has RUE PICC in place  - cont vanco 1gq12  - check vanc trough pre 4th  - monitor renal fxn  - f/u with ID Dr Coker [facility] out[facility] (private).

## 2021-12-31 NOTE — PROGRESS NOTE ADULT - PROBLEM SELECTOR PLAN 2
Mild with possible induced encephalopathy. CXR is clear.   Received moderna vax x2 in early 2021 and JnJ x1 in June  S/P mAB 12/29   - monitor inflammatory markers- ddimer, procal, ferritin, LDH q48-72 hrs  - on lovenox QD  - cepachol prn  - outpt mrna booster needed

## 2021-12-31 NOTE — PROGRESS NOTE ADULT - PROBLEM SELECTOR PLAN 3
Reported to be on IVIG and venetoclax, does not know last time he received txt  s/p ivig 12/29  - cont IVIG per outpt onc

## 2021-12-31 NOTE — PROGRESS NOTE ADULT - PROBLEM SELECTOR PLAN 1
RESOLVED. Presentation concerning for recurrence of AIH vs AMS 2/2 COVID. CT Perfusion with 4cc area of ischemia involving the inferior aspect of the L temporal lobe. EEG neg for seizures  - as per neuro

## 2021-12-31 NOTE — PROGRESS NOTE ADULT - SUBJECTIVE AND OBJECTIVE BOX
*************************************  NEUROLOGY PROGRESS NOTE  **************************************    SARAHI BARNHART  Male  MRN-[id number]    Subjective:  Patient was seen and examined at the bedside. Stated that he was feeling well except a minor cough, weakness has improved. He is awaiting rehab. No acute neurological complaints.     MEDICATIONS  (STANDING):  aspirin  chewable 81 milliGRAM(s) Oral daily  atorvastatin 80 milliGRAM(s) Oral at bedtime  enoxaparin Injectable 40 milliGRAM(s) SubCutaneous daily  famotidine    Tablet 20 milliGRAM(s) Oral daily  folic acid 1 milliGRAM(s) Oral daily  vancomycin  IVPB 1000 milliGRAM(s) IV Intermittent every 12 hours  vancomycin  IVPB        MEDICATIONS  (PRN):  aluminum hydroxide/magnesium hydroxide/simethicone Suspension 30 milliLiter(s) Oral every 4 hours PRN Dyspepsia  benzocaine 15 mG/menthol 3.6 mG (Sugar-Free) Lozenge 1 Lozenge Oral every 4 hours PRN Sore Throat or cough  melatonin 3 milliGRAM(s) Oral at bedtime PRN Insomnia    VITAL SIGNS:  Vital Signs Last 24 Hrs  T(C): 36.6 (31 Dec 2021 12:56), Max: 36.8 (30 Dec 2021 20:55)  T(F): 97.9 (31 Dec 2021 12:56), Max: 98.2 (30 Dec 2021 20:55)  HR: 59 (31 Dec 2021 12:56) (55 - 59)  BP: 118/70 (31 Dec 2021 12:56) (118/70 - 124/77)  BP(mean): --  RR: 18 (31 Dec 2021 12:56) (18 - 18)  SpO2: 96% (31 Dec 2021 12:56) (96% - 97%)    PHYSICAL EXAMINATION:  General: Well-developed, well nourished, in no acute distress  Respiratory: on room air, no use of accessory muscles  Neurologic:  - Mental Status:  Alert, awake, oriented to person, place, and time; Speech is fluent with intact naming, repetition, and comprehension  - Cranial Nerves II-XII:  EOMI, PERRL, V1-V3 intact, no facial asymmetry, t/p midline, SCM/trap intact.  - Motor:  Strength is 5/5 throughout except for L dorsi/plantar flexion which is 4/5. Bilateral pronator drift. Normal muscle bulk and tone throughout.  - Sensory:  Intact to light touch throughout.  - Coordination:  Finger-nose-finger without dysmetria.   - Gait: Deferred due to patient being non weight bearing on the L leg    LABS:                          11.6   4.13  )-----------( 245      ( 30 Dec 2021 06:59 )             36.7     12-30    137  |  100  |  17  ----------------------------<  120<H>  4.1   |  22  |  0.93    Ca    10.3      30 Dec 2021 06:59  Phos  4.5     12-30  Mg     2.0     12-30    TPro  8.0  /  Alb  4.6  /  TBili  0.8  /  DBili  x   /  AST  13  /  ALT  14  /  AlkPhos  85  12-30    PT/INR - ( 30 Dec 2021 06:59 )   PT: 12.4 sec;   INR: 1.04 ratio       PTT - ( 30 Dec 2021 06:59 )  PTT:35.5 sec    RADIOLOGY & ADDITIONAL STUDIES:      CT Angio Head w/ IV Cont (12.28.21)  IMPRESSION:  No evidence of an acute intracranial hemorrhage, midline shift, or   hydrocephalus.  NO HEMODYNAMIC SIGNIFICANT NARROWING WITHIN THE NECK.  NO PROXIMAL LARGE VESSEL OCCLUSION INTRACRANIALLY.  SMALL AREA OF ISCHEMIA (4 ML) INVOLVING THE INFERIOR ASPECT OF THE LEFT   TEMPORAL LOBE).    EEG  12/30/2021    EEG Summary / Classification:    Abnormal EEG in the awake / drowsy / asleep states.  - Intermittent polymorphic theta/delta slowing over the left hemisphere      EEG Impression / Clinical Correlate:    Focal dysfunction in the left hemisphere.  No epileptic pattern or seizure seen.

## 2021-12-31 NOTE — PROGRESS NOTE ADULT - PROBLEM SELECTOR PLAN 4
EKG with SR and PACs   - cont home ASA as per cardiology who had recently seen him in early December

## 2021-12-31 NOTE — PROGRESS NOTE ADULT - NSPROGADDITIONALINFOA_GEN_ALL_CORE
d/w neuro resident    At this point, dispo planning. PT ordered for DC likely to [facility]. Medicine will sign off, but please feel free to reconsult as needed.   This patient will be discussed during the weekend Interdisciplinary rounds to facilitate DC planning.

## 2021-12-31 NOTE — PROGRESS NOTE ADULT - SUBJECTIVE AND OBJECTIVE BOX
[facility] MEDICINE PROGRESS NOTE    Sierra Tierney MD  Division of Hospital Medicine  Pager [phone]  If no response or off hours, page [phone]    Patient is a 66y old  Male who presents with a chief complaint of AMS (30 Dec 2021 12:48)    SUBJECTIVE / OVERNIGHT EVENTS:  overnight no events  pt reports some cough but not bothering him very much  no sob, n/v/f/chills, cp, abd pain.  states he feels no speech impairment or weakness any longer  is concerned about going to a [facility] bc he caught COVID in [facility], but wants to stay isolated away from immunocompromised wife.    CAPILLARY BLOOD GLUCOSE    I&O's Summary    30 Dec 2021 07:01  -  31 Dec 2021 07:00  --------------------------------------------------------  IN: 360 mL / OUT: 600 mL / NET: -240 mL    Vital Signs Last 24 Hrs  T(C): 36.6 (31 Dec 2021 12:56), Max: 36.8 (30 Dec 2021 20:55)  T(F): 97.9 (31 Dec 2021 12:56), Max: 98.2 (30 Dec 2021 20:55)  HR: 59 (31 Dec 2021 12:56) (55 - 59)  BP: 118/70 (31 Dec 2021 12:56) (118/70 - 124/77)  BP(mean): --  RR: 18 (31 Dec 2021 12:56) (18 - 18)  SpO2: 96% (31 Dec 2021 12:56) (96% - 97%)    PHYSICAL EXAM:  GENERAL:  Well appearing, in NAD  HEAD:  NCAT  EYES: PERRLA, conjunctiva clear  NECK: Supple, No JVD  CHEST/LUNG: CTA B/L.  HEART: Reg rate. Normal S1, S2.   ABDOMEN: SNTND. Bowel sounds present  EXTREMITIES:  2+ Peripheral Pulses, No clubbing, cyanosis, edema.  RUE w/ PICC in place  PSYCH: AAOx3, appropriate affect  SKIN: No rashes or lesions    LABS:                        10.2   4.19  )-----------( 225      ( 31 Dec 2021 06:35 )             32.5     12-31    138  |  102  |  19  ----------------------------<  87  3.7   |  23  |  1.05    Ca    9.0      31 Dec 2021 06:35  Phos  4.5     12-30  Mg     1.9     12-31    TPro  6.9  /  Alb  3.9  /  TBili  0.6  /  DBili  x   /  AST  18  /  ALT  18  /  AlkPhos  69  12-31    PT/INR - ( 30 Dec 2021 06:59 )   PT: 12.4 sec;   INR: 1.04 ratio         PTT - ( 30 Dec 2021 06:59 )  PTT:35.5 sec    MEDICATIONS  (STANDING):  aspirin  chewable 81 milliGRAM(s) Oral daily  atorvastatin 80 milliGRAM(s) Oral at bedtime  enoxaparin Injectable 40 milliGRAM(s) SubCutaneous daily  famotidine    Tablet 20 milliGRAM(s) Oral daily  folic acid 1 milliGRAM(s) Oral daily  vancomycin  IVPB 1000 milliGRAM(s) IV Intermittent every 12 hours  vancomycin  IVPB        MEDICATIONS  (PRN):  aluminum hydroxide/magnesium hydroxide/simethicone Suspension 30 milliLiter(s) Oral every 4 hours PRN Dyspepsia  benzocaine 15 mG/menthol 3.6 mG (Sugar-Free) Lozenge 1 Lozenge Oral every 4 hours PRN Sore Throat or cough  melatonin 3 milliGRAM(s) Oral at bedtime PRN Insomnia

## 2021-12-31 NOTE — PROGRESS NOTE ADULT - ASSESSMENT
66 y.o. RH M with PMH of CLL on Venetoclax, MDS, melanoma, PAF (not on AC), AIE, and osteomyelitis of foot presented as a transfer from [facility] for stroke and seizure work up. He presented to [facility] ED on 12/28 from [facility] rehab due to AMS and R sided weakness. Wife provided hx at [facility]. LKN 21:00 hr on 12/27. Woke up with change in mental status and R weakness.    Pt transferred to [facility] 12/28. Neuro concerned for stroke. Started on scheduled IVIG for CLL hx.

## 2022-01-01 LAB
ALBUMIN SERPL ELPH-MCNC: 3.8 G/DL — SIGNIFICANT CHANGE UP (ref 3.3–5)
ALP SERPL-CCNC: 74 U/L — SIGNIFICANT CHANGE UP (ref 40–120)
ALT FLD-CCNC: 17 U/L — SIGNIFICANT CHANGE UP (ref 10–45)
ANION GAP SERPL CALC-SCNC: 12 MMOL/L — SIGNIFICANT CHANGE UP (ref 5–17)
AST SERPL-CCNC: 15 U/L — SIGNIFICANT CHANGE UP (ref 10–40)
BILIRUB SERPL-MCNC: 0.6 MG/DL — SIGNIFICANT CHANGE UP (ref 0.2–1.2)
BUN SERPL-MCNC: 18 MG/DL — SIGNIFICANT CHANGE UP (ref 7–23)
CALCIUM SERPL-MCNC: 8.7 MG/DL — SIGNIFICANT CHANGE UP (ref 8.4–10.5)
CHLORIDE SERPL-SCNC: 103 MMOL/L — SIGNIFICANT CHANGE UP (ref 96–108)
CO2 SERPL-SCNC: 23 MMOL/L — SIGNIFICANT CHANGE UP (ref 22–31)
CREAT SERPL-MCNC: 0.94 MG/DL — SIGNIFICANT CHANGE UP (ref 0.5–1.3)
GLUCOSE SERPL-MCNC: 83 MG/DL — SIGNIFICANT CHANGE UP (ref 70–99)
HCT VFR BLD CALC: 30.4 % — LOW (ref 39–50)
HGB BLD-MCNC: 9.7 G/DL — LOW (ref 13–17)
MCHC RBC-ENTMCNC: 31.9 GM/DL — LOW (ref 32–36)
MCHC RBC-ENTMCNC: 33.3 PG — SIGNIFICANT CHANGE UP (ref 27–34)
MCV RBC AUTO: 104.5 FL — HIGH (ref 80–100)
NRBC # BLD: 0 /100 WBCS — SIGNIFICANT CHANGE UP (ref 0–0)
PLATELET # BLD AUTO: 200 K/UL — SIGNIFICANT CHANGE UP (ref 150–400)
POTASSIUM SERPL-MCNC: 3.6 MMOL/L — SIGNIFICANT CHANGE UP (ref 3.5–5.3)
POTASSIUM SERPL-SCNC: 3.6 MMOL/L — SIGNIFICANT CHANGE UP (ref 3.5–5.3)
PROT SERPL-MCNC: 6.5 G/DL — SIGNIFICANT CHANGE UP (ref 6–8.3)
RBC # BLD: 2.91 M/UL — LOW (ref 4.2–5.8)
RBC # FLD: 16.4 % — HIGH (ref 10.3–14.5)
SODIUM SERPL-SCNC: 138 MMOL/L — SIGNIFICANT CHANGE UP (ref 135–145)
WBC # BLD: 4.53 K/UL — SIGNIFICANT CHANGE UP (ref 3.8–10.5)
WBC # FLD AUTO: 4.53 K/UL — SIGNIFICANT CHANGE UP (ref 3.8–10.5)

## 2022-01-01 PROCEDURE — 99232 SBSQ HOSP IP/OBS MODERATE 35: CPT

## 2022-01-01 RX ADMIN — Medication 250 MILLIGRAM(S): at 23:10

## 2022-01-01 RX ADMIN — Medication 1 MILLIGRAM(S): at 13:06

## 2022-01-01 RX ADMIN — Medication 250 MILLIGRAM(S): at 12:55

## 2022-01-01 RX ADMIN — FAMOTIDINE 20 MILLIGRAM(S): 10 INJECTION INTRAVENOUS at 13:06

## 2022-01-01 RX ADMIN — Medication 81 MILLIGRAM(S): at 13:06

## 2022-01-01 RX ADMIN — ATORVASTATIN CALCIUM 80 MILLIGRAM(S): 80 TABLET, FILM COATED ORAL at 21:39

## 2022-01-01 RX ADMIN — ENOXAPARIN SODIUM 40 MILLIGRAM(S): 100 INJECTION SUBCUTANEOUS at 12:50

## 2022-01-01 RX ADMIN — Medication 250 MILLIGRAM(S): at 00:29

## 2022-01-01 RX ADMIN — Medication 3 MILLIGRAM(S): at 21:39

## 2022-01-01 NOTE — PHYSICAL THERAPY INITIAL EVALUATION ADULT - PERTINENT HX OF CURRENT PROBLEM, REHAB EVAL
66 y.o. RH M with PMH of CLL on Venetoclax, MDS, melanoma, PAF (not on AC), AIE, and osteomyelitis of foot presented as a transfer from [facility] for stroke and seizure work up. He presented to [facility] ED on 12/28 from [facility] rehab due to AMS and R sided weakness. Wife provided hx at [facility]. LKN 21:00 hr on 12/27. Woke up with change in mental status and R weakness., Perfusion with 4cc area of ischemia involving the inferior aspect of the L temporal lobe. EEG neg for seizures

## 2022-01-01 NOTE — PROGRESS NOTE ADULT - ASSESSMENT
66 y.o. RH M with PMH of CLL on Venetoclax, MDS, melanoma, PAF (not on AC), AIE, and osteomyelitis of foot presented as a transfer from \[facility]\"" for stroke and seizure work up. He presented to \[facility]\"" from [facility] rehab due to AMS and R sided weakness. Wife provided hx at \[facility]\"". LKN 21:00 hr on 12/27. Woke up with change in mental status and R weakness. NIHSS 12(answered one q, partial hemianopia, no effort against gravity in RUE and RLE, ataxia, mild-mod sensory loss, mild-mod aphasia) . GCS 14. CTH/CTA were done and results were negative. CTP showed 4cc area of ischemia involving the inferior aspect of the L temporal lobe. No tPA since outside the window and no thrombectomy since no LVO on imaging. Takes ASA 81 mg qd and atorvastatin 80 mg qhs. No anticoagulation. Has R PICC for osteomyelitis tx and had rectal temp 99 upon presentation.  Received 1g of Keppra x1. Neuro exam at [facility] w/ full strength b/l, perseveration, mixed aphasia (expressive > receptive). 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 Now back to baseline. May be in setting of COVID infection.     Plan:  #AMS - now resolved  [x] vEEG  [x] MRI brain w/wo with epilepsy protocol to be done  [] No AEDs at this time  [] neurochecks q4h  [] fall and seizure precautions    #Osteomyelitis  [] vanco 1g BID until 1/11/22 per ID last admission (6 week course through PICC line)  [] Podiatry consult and recommendations appreciated:  pt to remain nonweightbearing in CAM boot to LLE as instructed by surgeon/podiatrist at [facility].      #Paroxysmal atrial fibrillation  [] CHADsVASC 2  [] per cardiology last admission, patient with episode of afib on 7/2020 admission  [] per cardiology, on aspirin 81mg daily    #CLL (chronic lymphocytic leukemia).   [] Patient follows with Dr. Fry. Patient is immunocompromised.  [] Continue to monitor CBCs  [] s/p IVIG on 12/29    COVID  [] s/p Monoclonal Ab on 12/29  [] monitor inflammatory markers - ddimer, procal, ferretin, LDH    #DVT ppx: lovenox  #Diet: DASH  #Dispo: case management for possible rehab    Case seen and discussed with neurology attending Dr. Cummins.

## 2022-01-01 NOTE — PROGRESS NOTE ADULT - SUBJECTIVE AND OBJECTIVE BOX
Neurology  Progress Note  01-01-22    Name:  SARAHI BARNHART; 66y    Interval History: Patient seen and examined at bedside. No overnight events. Patient reports he has a mild cough but otherwise denies acute complaints. Continues to report interest in disposition to rehab.    Medications:  aluminum hydroxide/magnesium hydroxide/simethicone Suspension 30 milliLiter(s) Oral every 4 hours PRN  aspirin  chewable 81 milliGRAM(s) Oral daily  atorvastatin 80 milliGRAM(s) Oral at bedtime  benzocaine 15 mG/menthol 3.6 mG (Sugar-Free) Lozenge 1 Lozenge Oral every 4 hours PRN  enoxaparin Injectable 40 milliGRAM(s) SubCutaneous daily  famotidine    Tablet 20 milliGRAM(s) Oral daily  folic acid 1 milliGRAM(s) Oral daily  melatonin 3 milliGRAM(s) Oral at bedtime PRN  sodium chloride 0.9%. 250 milliLiter(s) IV Continuous <Continuous>  vancomycin  IVPB 1000 milliGRAM(s) IV Intermittent every 12 hours      Vitals:  T(C): 37.1 (12-31-21 @ 20:32), Max: 37.1 (12-31-21 @ 20:32)  HR: 63 (12-31-21 @ 20:32) (59 - 63)  BP: 136/78 (12-31-21 @ 20:32) (118/70 - 136/78)  RR: 18 (12-31-21 @ 20:32) (18 - 18)  SpO2: 96% (12-31-21 @ 20:32) (96% - 96%)    Physical Examination:  General: Well-developed, well nourished, in no acute distress  Respiratory: on room air, no use of accessory muscles  Neurologic:  - Mental Status:  Alert, awake, oriented to person, place, and time; Speech is fluent with intact naming, repetition, and comprehension. Grossly follows all commands.  - Cranial Nerves II-XII:  EOMI, PERRL, V1-V3 intact, no facial asymmetry, t/p midline, SCM/trap intact.  - Motor: Strength is 5/5 throughout except for L dorsi/plantar flexion which is 4/5. Bilateral pronator drift. Normal muscle bulk and tone throughout.  - Sensory: Intact to light touch throughout.  - Coordination:  Finger-nose-finger without dysmetria.  - Gait: Deferred due to patient being non weight bearing on the L leg.    Labs:                        9.7    4.53  )-----------( 200      ( 01 Jan 2022 07:07 )             30.4     01-01    138  |  103  |  18  ----------------------------<  83  3.6   |  23  |  0.94    Ca    8.7      01 Jan 2022 06:56  Mg     1.9     12-31    TPro  6.5  /  Alb  3.8  /  TBili  0.6  /  DBili  x   /  AST  15  /  ALT  17  /  AlkPhos  74  01-01    CAPILLARY BLOOD GLUCOSE        LIVER FUNCTIONS - ( 01 Jan 2022 06:56 )  Alb: 3.8 g/dL / Pro: 6.5 g/dL / ALK PHOS: 74 U/L / ALT: 17 U/L / AST: 15 U/L / GGT: x             EEG reports:  12/30/2021  EEG Summary / Classification:  Abnormal EEG in the awake / drowsy / asleep states.  - Intermittent polymorphic theta/delta slowing over the left hemisphere      EEG Impression / Clinical Correlate:  Focal dysfunction in the left hemisphere.  No epileptic pattern or seizure seen.      Radiology:  MR Head No Cont:  (30 Dec 2021 22:47)  IMPRESSION: No evidence of acute hemorrhage mass mass effect acute   territorial infarcts seen.

## 2022-01-02 LAB
CULTURE RESULTS: SIGNIFICANT CHANGE UP
CULTURE RESULTS: SIGNIFICANT CHANGE UP
SPECIMEN SOURCE: SIGNIFICANT CHANGE UP
SPECIMEN SOURCE: SIGNIFICANT CHANGE UP

## 2022-01-02 PROCEDURE — 99232 SBSQ HOSP IP/OBS MODERATE 35: CPT

## 2022-01-02 RX ADMIN — Medication 250 MILLIGRAM(S): at 13:29

## 2022-01-02 RX ADMIN — Medication 250 MILLIGRAM(S): at 23:13

## 2022-01-02 RX ADMIN — Medication 3 MILLIGRAM(S): at 21:49

## 2022-01-02 RX ADMIN — ATORVASTATIN CALCIUM 80 MILLIGRAM(S): 80 TABLET, FILM COATED ORAL at 21:48

## 2022-01-02 RX ADMIN — ENOXAPARIN SODIUM 40 MILLIGRAM(S): 100 INJECTION SUBCUTANEOUS at 13:28

## 2022-01-02 RX ADMIN — BENZOCAINE AND MENTHOL 1 LOZENGE: 5; 1 LIQUID ORAL at 13:47

## 2022-01-02 RX ADMIN — Medication 81 MILLIGRAM(S): at 13:28

## 2022-01-02 RX ADMIN — FAMOTIDINE 20 MILLIGRAM(S): 10 INJECTION INTRAVENOUS at 13:28

## 2022-01-02 RX ADMIN — Medication 1 MILLIGRAM(S): at 13:29

## 2022-01-02 NOTE — PROGRESS NOTE ADULT - SUBJECTIVE AND OBJECTIVE BOX
MRN-[id number]    Subjective: 67 yo male seen and examined at bedside. No events overnight, no complaints.    PAST MEDICAL & SURGICAL HISTORY:  Osteopenia    CLL (Chronic Lymphocytic Leukemia)  SINCE 1988    Avascular Necrosis of Femur Head  B/L    TMJ Syndrome  limited jaw opening due to TMJ    Herniated Cervical Disc    Bulging Disc  LUMBAR    Melanoma    Deviated nasal septum    Nasal congestion    Rosacea    Deviated nasal septum    Other specified disorders of nose and nasal sinuses    Anemia    Encephalitis    S/P Splenectomy  1993    Avascular Necrosis of Femur Head  RIGHT - CORE DECOMPRESSION- 1993    Avascular Necrosis of Femur Head  LEFT - CORE DECOMPRESSION - 1993    Abnormal Jaw Closure  LEFT JAW SURGERY - 1988    Status Post Eye Surgery X 3  3/2011 right eye scleral buckle; left eye in 2013    S/P Tonsillectomy  1960    Bilateral cataracts  removed in 2011    FAMILY HISTORY:  Family history of multiple myeloma    Family history of malignant melanoma  age 89    FH: blood dyscrasia  MGUS    FH: hypertension  mother    FH: renal failure  mother    Social Hx:  Nonsmoker, no drug or alcohol use    Home Medications:  acetaminophen 325 mg oral tablet: 2 tab(s) orally every 6 hours, As needed, Temp greater or equal to 38C (100.4F) (28 Dec 2021 23:05)  aluminum hydroxide-magnesium hydroxide 200 mg-200 mg/5 mL oral suspension: 30 milliliter(s) orally every 4 hours, As needed, Dyspepsia (28 Dec 2021 23:05)  aspirin 81 mg oral tablet, chewable: 1 tab(s) orally once a day (28 Dec 2021 23:05)  atorvastatin 80 mg oral tablet: 1 tab(s) orally once a day (at bedtime) (28 Dec 2021 23:05)  enoxaparin: 40 milligram(s) subcutaneously once a day (28 Dec 2021 23:05)  famotidine 20 mg oral tablet: 1 tab(s) orally once a day (28 Dec 2021 23:05)  melatonin 3 mg oral tablet: 1 tab(s) orally once a day (at bedtime), As needed, Insomnia (28 Dec 2021 23:05)  vancomycin 1 g intravenous injection: 1 gram(s) intravenous every 12 hours, last day 1/11/21 (28 Dec 2021 23:05)    MEDICATIONS  (STANDING):  aspirin  chewable 81 milliGRAM(s) Oral daily  atorvastatin 80 milliGRAM(s) Oral at bedtime  enoxaparin Injectable 40 milliGRAM(s) SubCutaneous daily  famotidine    Tablet 20 milliGRAM(s) Oral daily  folic acid 1 milliGRAM(s) Oral daily  vancomycin  IVPB 1000 milliGRAM(s) IV Intermittent every 12 hours  vancomycin  IVPB        MEDICATIONS  (PRN):  aluminum hydroxide/magnesium hydroxide/simethicone Suspension 30 milliLiter(s) Oral every 4 hours PRN Dyspepsia  benzocaine 15 mG/menthol 3.6 mG (Sugar-Free) Lozenge 1 Lozenge Oral every 4 hours PRN Sore Throat or cough  melatonin 3 milliGRAM(s) Oral at bedtime PRN Insomnia    Allergies  No Known Allergies	    ROS: Pertinent positives above, all other ROS were reviewed and are negative.      Vital Signs Last 24 Hrs  T(C): 37.1 (02 Jan 2022 03:30), Max: 37.1 (02 Jan 2022 03:30)  T(F): 98.7 (02 Jan 2022 03:30), Max: 98.7 (02 Jan 2022 03:30)  HR: 58 (02 Jan 2022 03:30) (55 - 63)  BP: 134/77 (02 Jan 2022 03:30) (104/59 - 134/77)  RR: 18 (02 Jan 2022 03:30) (18 - 18)  SpO2: 95% (02 Jan 2022 03:30) (94% - 98%)    GENERAL EXAM:  Constitutional: Lying in bed, NAD.  Head: Normocephalic, atraumatic.  Extremities: L foot OM, in CAM boot.    NEUROLOGICAL EXAM:  MS: Alert, eyes open spontaneously. Oriented to self, location, year. Speech is fluent, not slurred. Follows commands.  CN: VFF. EOMI. Face symmetric. Tongue midline.   Motor: All extremities antigravity..   Coordination: No dysmetria on FNF bilaterally.  Gait: Deferred.    Labs:   cbc                      9.7    4.53  )-----------( 200      ( 01 Jan 2022 07:07 )             30.4     [id number]    138  |  103  |  18  ----------------------------<  83  3.6   |  23  |  0.94    Ca    8.7      01 Jan 2022 06:56    TPro  6.5  /  Alb  3.8  /  TBili  0.6  /  DBili  x   /  AST  15  /  ALT  17  /  AlkPhos  74  01-01    LIVER FUNCTIONS - ( 01 Jan 2022 06:56 )  Alb: 3.8 g/dL / Pro: 6.5 g/dL / ALK PHOS: 74 U/L / ALT: 17 U/L / AST: 15 U/L / GGT: x           Radiology/EEG:  -12/28 CTH: No evidence of an acute intracranial hemorrhage, midline shift, or   hydrocephalus.  -12/28 CTA N: NO HEMODYNAMIC SIGNIFICANT NARROWING WITHIN THE NECK.  -12/28 CTA H: NO PROXIMAL LARGE VESSEL OCCLUSION INTRACRANIALLY.  -12/28 CTP: SMALL AREA OF ISCHEMIA (4 ML) INVOLVING THE INFERIOR ASPECT OF THE LEFT TEMPORAL LOBE.  -12/30 MRI Head: No evidence of acute hemorrhage, mass, mass effect, or acute territorial infarcts seen.    -12/30 EEG Summary / Classification:  Abnormal EEG in the awake / drowsy / asleep states.  - Intermittent polymorphic theta/delta slowing over the left hemisphere  EEG Impression / Clinical Correlate:  Focal dysfunction in the left hemisphere.  No epileptic pattern or seizure seen.

## 2022-01-02 NOTE — PROGRESS NOTE ADULT - ATTENDING COMMENTS
I reviewed the medical record.  He was transferred from [facility] with focal neurologic symptoms.  He recovered.  Imaging did not reveal acute stroke.  There was concern regarding seizure or less likely a cerebrovascular event.  He presented in a similar fashion in September and received TPA.  He has a remote history of paroxysmal atrial fibrillation but has been maintained on aspirin only.    The possible indication for systemic anticoagulation for paroxysmal atrial fibrillation should be reassessed.  He presented with 2 episodes of focal neurologic symptoms within the past 3 months.  Cardiology input might be prudent.
Patient was interviewed and examined.  He is clinically stable.  MRI negative.  He is receiving intravenous vancomycin for osteomyelitis.  He is COVID-19 positive.
Pt doing well. No return in symptoms. Still pending MRI brain. D/w hospitalist, he is medically stable for discharge. They will d/w case management and see if there is a facility that will accept after 5 days of quarantine.
66 M hx of AIE, CLL on IVIG, COVID positive p/w episode of right hemiparesis and speech impairment. EEG shows left temporal intermittent delta slowing, no epileptiform activity. Pending MRI brain to r/o stroke although doubtful.  Podiatry consulted and recommended no weight bearing with CAM boot. Pt does not want to go home because wife is immunocompromised. Will have to be quarantined for 10days after last COVID test before being able to go to a rehab.

## 2022-01-02 NOTE — PROGRESS NOTE ADULT - ASSESSMENT
Assessment: 65 yo RH male with a PMHx of CLL (on Venetoclax), MDS, melanoma, PAF (not on AC), AIE, and osteomyelitis of foot presented as a transfer from [facility] for stroke and seizure work up. He initially presented to [facility] from rehab due to AMS and R sided weakness. LKN 21:00 hr on 12/27. Woke up with change in mental status and R weakness. NIHSS 12. GCS 14. CTH/CTA were done and results were negative. CTP showed 4cc area of ischemia involving the inferior aspect of the L temporal lobe. COVID +. MRI Head unremarkable. EEG shows focal dysfunction in the left hemisphere but no epileptic pattern or seizure seen. Patient has returned to his baseline.    Impression: Change in mental status w/ R hemiparesis, now resolved. Possibly 2/2 seizure with Hao's paralysis, less likely stroke. Now back to baseline. May be in setting of COVID infection.     Plan:   #AMS - now resolved  [x] vEEG.  [x] MRI brain wo.  [] No AEDs at this time.  [] Neuro checks q4h.  [] fall and seizure precautions.    #Osteomyelitis  [] vanco 1g BID until 1/11/22 per ID last admission (6 week course through PICC line).  [] Podiatry consult and recommendations appreciated: pt to remain nonweightbearing in CAM boot to LLE as instructed by surgeon/podiatrist at [facility].    #Paroxysmal atrial fibrillation  [x] CHADsVASC 2.  [] per cardiology last admission, patient with episode of atrial fibrillation on 7/2020 admission.  [] per cardiology, on aspirin 81mg daily.    #CLL (chronic lymphocytic leukemia).   [] Patient follows with Dr. Fry. Patient is immunocompromised.  [] Continue to monitor CBCs.  [x] s/p IVIG on 12/29.    #COVID  [x] s/p Monoclonal Ab on 12/29.  [x] check inflammatory markers - ddimer 167, procal 0.06, ferritin 707 <H>, .    #DVT ppx: Lovenox.  #Diet: DASH.  #Dispo: PT recommending rehab.    Case seen and discussed with neurology attending Dr. Cummins.

## 2022-01-03 LAB
ANION GAP SERPL CALC-SCNC: 13 MMOL/L — SIGNIFICANT CHANGE UP (ref 5–17)
BUN SERPL-MCNC: 14 MG/DL — SIGNIFICANT CHANGE UP (ref 7–23)
CALCIUM SERPL-MCNC: 9.2 MG/DL — SIGNIFICANT CHANGE UP (ref 8.4–10.5)
CHLORIDE SERPL-SCNC: 103 MMOL/L — SIGNIFICANT CHANGE UP (ref 96–108)
CO2 SERPL-SCNC: 25 MMOL/L — SIGNIFICANT CHANGE UP (ref 22–31)
CREAT SERPL-MCNC: 0.88 MG/DL — SIGNIFICANT CHANGE UP (ref 0.5–1.3)
GLUCOSE SERPL-MCNC: 97 MG/DL — SIGNIFICANT CHANGE UP (ref 70–99)
HCT VFR BLD CALC: 32.5 % — LOW (ref 39–50)
HGB BLD-MCNC: 10.5 G/DL — LOW (ref 13–17)
MAGNESIUM SERPL-MCNC: 2 MG/DL — SIGNIFICANT CHANGE UP (ref 1.6–2.6)
MCHC RBC-ENTMCNC: 32.3 GM/DL — SIGNIFICANT CHANGE UP (ref 32–36)
MCHC RBC-ENTMCNC: 33.3 PG — SIGNIFICANT CHANGE UP (ref 27–34)
MCV RBC AUTO: 103.2 FL — HIGH (ref 80–100)
NRBC # BLD: 1 /100 WBCS — HIGH (ref 0–0)
PHOSPHATE SERPL-MCNC: 3.6 MG/DL — SIGNIFICANT CHANGE UP (ref 2.5–4.5)
PLATELET # BLD AUTO: 253 K/UL — SIGNIFICANT CHANGE UP (ref 150–400)
POTASSIUM SERPL-MCNC: 3.6 MMOL/L — SIGNIFICANT CHANGE UP (ref 3.5–5.3)
POTASSIUM SERPL-SCNC: 3.6 MMOL/L — SIGNIFICANT CHANGE UP (ref 3.5–5.3)
RBC # BLD: 3.15 M/UL — LOW (ref 4.2–5.8)
RBC # FLD: 16.1 % — HIGH (ref 10.3–14.5)
SODIUM SERPL-SCNC: 141 MMOL/L — SIGNIFICANT CHANGE UP (ref 135–145)
WBC # BLD: 5.79 K/UL — SIGNIFICANT CHANGE UP (ref 3.8–10.5)
WBC # FLD AUTO: 5.79 K/UL — SIGNIFICANT CHANGE UP (ref 3.8–10.5)

## 2022-01-03 PROCEDURE — 99231 SBSQ HOSP IP/OBS SF/LOW 25: CPT | Mod: GC

## 2022-01-03 RX ADMIN — Medication 81 MILLIGRAM(S): at 11:20

## 2022-01-03 RX ADMIN — Medication 250 MILLIGRAM(S): at 11:19

## 2022-01-03 RX ADMIN — Medication 1 MILLIGRAM(S): at 11:20

## 2022-01-03 RX ADMIN — ENOXAPARIN SODIUM 40 MILLIGRAM(S): 100 INJECTION SUBCUTANEOUS at 11:20

## 2022-01-03 RX ADMIN — FAMOTIDINE 20 MILLIGRAM(S): 10 INJECTION INTRAVENOUS at 11:20

## 2022-01-03 RX ADMIN — ATORVASTATIN CALCIUM 80 MILLIGRAM(S): 80 TABLET, FILM COATED ORAL at 21:24

## 2022-01-03 NOTE — PROGRESS NOTE ADULT - ASSESSMENT
Assessment: 65 yo RH male with a PMHx of CLL (on Venetoclax), MDS, melanoma, PAF (not on AC), AIE, and osteomyelitis of foot presented as a transfer from \[facility]\"" for stroke and seizure work up. He initially presented to \[facility]\"" from rehab due to AMS and R sided weakness. LKN 21:00 hr on 12/27. Woke up with change in mental status and R weakness. NIHSS 12. GCS 14. CTH/CTA were done and results were negative. CTP showed 4cc area of ischemia involving the inferior aspect of the L temporal lobe. COVID +. MRI Head unremarkable. EEG shows focal dysfunction in the left hemisphere but no epileptic pattern or seizure seen. Patient has returned to his baseline.    Impression: Change in mental status w/ R hemiparesis, now resolved. Possibly 2/2 seizure with Hao's paralysis, less likely stroke. Now back to baseline. May be in setting of COVID infection.     Plan:   #AMS - now resolved  [x] vEEG.  [x] MRI brain wo.  [] No AEDs at this time.  [] Neuro checks q4h.  [] fall and seizure precautions.    #Osteomyelitis  [] vanco 1g BID until 1/11/22 per ID last admission (6 week course through PICC line).  [x] Podiatry consult and recommendations appreciated: pt to remain nonweightbearing in CAM boot to LLE as instructed by surgeon/podiatrist at [facility].    #Paroxysmal atrial fibrillation  [x] CHADsVASC 2.  [] per cardiology last admission, patient with episode of atrial fibrillation on 7/2020 admission.  [] per cardiology, on aspirin 81mg daily. Will ask for reassessment due to 2 episodes of transient neurologic symptoms in last several months    #CLL (chronic lymphocytic leukemia).   [] Patient follows with Dr. Fry. Patient is immunocompromised.  [] Continue to monitor CBCs.  [x] s/p IVIG on 12/29.    #COVID  [x] s/p Monoclonal Ab on 12/29.  [x] check inflammatory markers    #DVT ppx: Lovenox.  #Diet: DASH.  #Dispo: PT recommending rehab.    Case seen and discussed with neurology attending Dr. Gerard.  Assessment: 65 yo RH male with a PMHx of CLL (on Venetoclax), MDS, melanoma, PAF (not on AC), AIE, and osteomyelitis of foot presented as a transfer from [facility] for stroke and seizure work up. He initially presented to [facility] from rehab due to AMS and R sided weakness. LKN 21:00 hr on 12/27. Woke up with change in mental status and R weakness. NIHSS 12. GCS 14. CTH/CTA were done and results were negative. CTP showed 4cc area of ischemia involving the inferior aspect of the L temporal lobe. COVID +. MRI Head unremarkable. EEG shows focal dysfunction in the left hemisphere but no epileptic pattern or seizure seen. Patient has returned to his baseline.    Impression: Change in mental status w/ R hemiparesis, now resolved. Possibly 2/2 seizure with Hao's paralysis, less likely stroke. Now back to baseline. May be in setting of COVID infection.     Plan:   #AMS - now resolved  [x] vEEG completed  [x] MRI brain wo as above  [] No AEDs at this time.  [] Neuro checks q4h.  [] fall and seizure precautions.    #Osteomyelitis  [] vanco 1g BID until 1/11/22 per ID last admission (6 week course through PICC line).  [x] Podiatry consult and recommendations appreciated: pt to remain nonweightbearing in CAM boot to LLE as instructed by surgeon/podiatrist at [facility].    #Paroxysmal atrial fibrillation  [x] CHADsVASC 2.  [] per cardiology last admission, patient with episode of atrial fibrillation on 7/2020 admission.  [] per cardiology, on aspirin 81mg daily. Will follow up with outpatient cardiology    #CLL (chronic lymphocytic leukemia).   [] Patient follows with Dr. Fry. Patient is immunocompromised.  [] Continue to monitor CBCs.  [x] s/p IVIG on 12/29.    #COVID  [x] s/p Monoclonal Ab on 12/29.  [x] check inflammatory markers    #DVT ppx: Lovenox.  #Diet: DASH.  #Dispo: PT recommending rehab.    Case seen and discussed with neurology attending Dr. Gerard.  Assessment: 65 yo RH male with a PMHx of CLL (on Venetoclax), MDS, melanoma, PAF (not on AC), AIE, and osteomyelitis of foot presented as a transfer from [facility] for stroke and seizure work up. He initially presented to [facility] from rehab due to AMS and R sided weakness. LKN 21:00 hr on 12/27. Woke up with change in mental status and R weakness. NIHSS 12. GCS 14. CTH/CTA were done and results were negative. CTP showed 4cc area of ischemia involving the inferior aspect of the L temporal lobe. COVID +. MRI Head unremarkable. EEG shows focal dysfunction in the left hemisphere but no epileptic pattern or seizure seen. Patient has returned to his baseline.    Impression: Change in mental status w/ R hemiparesis, now resolved. Possibly 2/2 seizure with Hao's paralysis, less likely stroke. Now back to baseline. May be in setting of COVID infection.     Plan:   #AMS - now resolved  [x] vEEG completed  [x] MRI brain wo as above  [] No AEDs at this time.  [] Neuro checks q4h.  [] fall and seizure precautions.    #Osteomyelitis  [] vanco 1g BID until 1/11/22 per ID last admission (6 week course through PICC line).  [x] Podiatry consult and recommendations appreciated: pt to remain nonweightbearing in CAM boot to LLE as instructed by surgeon/podiatrist at [facility].    #Paroxysmal atrial fibrillation  [x] CHADsVASC 2.  [] per cardiology last admission, patient with episode of atrial fibrillation on 7/2020 admission.  [] per cardiology, on aspirin 81mg daily. Will follow up with outpatient cardiology    #CLL (chronic lymphocytic leukemia).   [] Patient follows with Dr. Fry. Patient is immunocompromised.  [] Continue to monitor CBCs.  [x] s/p IVIG on 12/29.  [] case discussed on 1/3 with Dr. Espinal (partner of Dr. Fry) and at this time there is no further treatment of CLL except for patient's monthly IVIG treatments. Further treatment will be discussed when patient is discharged from rehabilitation and will have discussion with his oncologist    #COVID  [x] s/p Monoclonal Ab on 12/29.  [x] check inflammatory markers    #DVT ppx: Lovenox.  #Diet: DASH.  #Dispo: PT recommending rehab.    Case seen and discussed with neurology attending Dr. Gerard.  Assessment: 67 yo RH male with a PMHx of CLL (on Venetoclax), MDS, melanoma, PAF (not on AC), AIE, and osteomyelitis of foot presented as a transfer from [facility] for stroke and seizure work up. He initially presented to [facility] from rehab due to AMS and R sided weakness. LKN 21:00 hr on 12/27. Woke up with change in mental status and R weakness. NIHSS 12. GCS 14. CTH/CTA were done and results were negative. CTP showed 4cc area of ischemia involving the inferior aspect of the L temporal lobe. COVID +. MRI Head unremarkable. EEG shows focal dysfunction in the left hemisphere but no epileptic pattern or seizure seen. Patient has returned to his baseline.    Impression: Change in mental status w/ R hemiparesis, now resolved. Possibly 2/2 seizure with Hao's paralysis, less likely stroke. Now back to baseline. May be in setting of COVID infection.     Plan:   #AMS - now resolved  [x] vEEG completed  [x] MRI brain wo as above  [] No AEDs at this time.  [] Neuro checks q4h.  [] fall and seizure precautions.    #Osteomyelitis  [] vanco 1g BID until 1/11/22 per ID last admission (6 week course through PICC line).  [x] Podiatry consult and recommendations appreciated: on 1/3, after discussion with podiatrist Dr. Dash Ruby at [place], patient is now weight bearing as tolerated but must have CAM boot on when ambulating    #Paroxysmal atrial fibrillation  [x] CHADsVASC 2.  [] per cardiology last admission, patient with episode of atrial fibrillation on 7/2020 admission.  [] per cardiology, on aspirin 81mg daily. Will follow up with outpatient cardiology    #CLL (chronic lymphocytic leukemia).   [] Patient follows with Dr. Fry. Patient is immunocompromised.  [] Continue to monitor CBCs.  [x] s/p IVIG on 12/29.  [] case discussed on 1/3 with Dr. Espinal (partner of Dr. Fry) and at this time there is no further treatment of CLL except for patient's monthly IVIG treatments. Further treatment will be discussed when patient is discharged from rehabilitation and will have discussion with his oncologist    #COVID  [x] s/p Monoclonal Ab on 12/29.  [x] check inflammatory markers    #DVT ppx: Lovenox.  #Diet: DASH.  #Dispo: PT recommending rehab.    All above was discussed with patient's wife, Javier, on request of the patient. All questions were answered to patient's and wife's satisfaction.    Case seen and discussed with neurology attending Dr. Gerard.

## 2022-01-03 NOTE — PROGRESS NOTE ADULT - SUBJECTIVE AND OBJECTIVE BOX
**************************************************  NEUROLOGY PROGRESS NOTE  **************************************************    MRN-[id number]    Subjective: Patient seen and examined at the bedside. No acute overnight complaints.     MEDICATIONS  (STANDING):  aspirin  chewable 81 milliGRAM(s) Oral daily  atorvastatin 80 milliGRAM(s) Oral at bedtime  enoxaparin Injectable 40 milliGRAM(s) SubCutaneous daily  famotidine    Tablet 20 milliGRAM(s) Oral daily  folic acid 1 milliGRAM(s) Oral daily  vancomycin  IVPB 1000 milliGRAM(s) IV Intermittent every 12 hours  vancomycin  IVPB        MEDICATIONS  (PRN):  aluminum hydroxide/magnesium hydroxide/simethicone Suspension 30 milliLiter(s) Oral every 4 hours PRN Dyspepsia  benzocaine 15 mG/menthol 3.6 mG (Sugar-Free) Lozenge 1 Lozenge Oral every 4 hours PRN Sore Throat or cough  melatonin 3 milliGRAM(s) Oral at bedtime PRN Insomnia    Allergies  No Known Allergies	    ROS: Pertinent positives above, all other ROS were reviewed and are negative.      VITALS SIGNS  Vital Signs Last 24 Hrs  T(C): 37.1 (03 Jan 2022 04:45), Max: 37.1 (03 Jan 2022 04:45)  T(F): 98.8 (03 Jan 2022 04:45), Max: 98.8 (03 Jan 2022 04:45)  HR: 60 (03 Jan 2022 04:45) (56 - 60)  BP: 138/72 (03 Jan 2022 04:45) (115/66 - 142/78)  BP(mean): --  RR: 18 (03 Jan 2022 04:45) (18 - 18)  SpO2: 97% (03 Jan 2022 04:45) (97% - 97%)    GENERAL EXAM:  Constitutional: Lying in bed, NAD.  Head: Normocephalic, atraumatic.  Extremities: L foot OM, in CAM boot.    NEUROLOGICAL EXAM:  MS: Alert, eyes open spontaneously. Oriented to self, location, year. Speech is fluent, not slurred. Follows commands.  CN: VFF. EOMI. Face symmetric. Tongue midline.   Motor: All extremities antigravity.  Coordination: No dysmetria on FNF bilaterally.  Gait: Note assessed due to non weight bearing status    Labs:                           10.5   5.79  )-----------( 253      ( 03 Jan 2022 04:40 )             32.5     01-03    141  |  103  |  14  ----------------------------<  97  3.6   |  25  |  0.88    Ca    9.2      03 Jan 2022 04:40  Phos  3.6     01-03  Mg     2.0     01-03    Radiology/EEG:  -12/28 CTH: No evidence of an acute intracranial hemorrhage, midline shift, or   hydrocephalus.  -12/28 CTA N: NO HEMODYNAMIC SIGNIFICANT NARROWING WITHIN THE NECK.  -12/28 CTA H: NO PROXIMAL LARGE VESSEL OCCLUSION INTRACRANIALLY.  -12/28 CTP: SMALL AREA OF ISCHEMIA (4 ML) INVOLVING THE INFERIOR ASPECT OF THE LEFT TEMPORAL LOBE.  -12/30 MRI Head: No evidence of acute hemorrhage, mass, mass effect, or acute territorial infarcts seen.    -12/30 EEG Summary / Classification:  Abnormal EEG in the awake / drowsy / asleep states.  - Intermittent polymorphic theta/delta slowing over the left hemisphere  EEG Impression / Clinical Correlate:  Focal dysfunction in the left hemisphere.  No epileptic pattern or seizure seen.

## 2022-01-04 ENCOUNTER — TRANSCRIPTION ENCOUNTER (OUTPATIENT)
Age: 67
End: 2022-01-04

## 2022-01-04 VITALS
TEMPERATURE: 99 F | DIASTOLIC BLOOD PRESSURE: 72 MMHG | RESPIRATION RATE: 18 BRPM | HEART RATE: 59 BPM | OXYGEN SATURATION: 96 % | SYSTOLIC BLOOD PRESSURE: 109 MMHG

## 2022-01-04 LAB
ANION GAP SERPL CALC-SCNC: 14 MMOL/L — SIGNIFICANT CHANGE UP (ref 5–17)
BASOPHILS # BLD AUTO: 0.05 K/UL — SIGNIFICANT CHANGE UP (ref 0–0.2)
BASOPHILS NFR BLD AUTO: 0.9 % — SIGNIFICANT CHANGE UP (ref 0–2)
BUN SERPL-MCNC: 20 MG/DL — SIGNIFICANT CHANGE UP (ref 7–23)
CALCIUM SERPL-MCNC: 9.3 MG/DL — SIGNIFICANT CHANGE UP (ref 8.4–10.5)
CHLORIDE SERPL-SCNC: 102 MMOL/L — SIGNIFICANT CHANGE UP (ref 96–108)
CO2 SERPL-SCNC: 23 MMOL/L — SIGNIFICANT CHANGE UP (ref 22–31)
COPPER SERPL-MCNC: 129 UG/DL — SIGNIFICANT CHANGE UP (ref 69–132)
CREAT SERPL-MCNC: 0.93 MG/DL — SIGNIFICANT CHANGE UP (ref 0.5–1.3)
CRP SERPL-MCNC: 3 MG/L — SIGNIFICANT CHANGE UP (ref 0–4)
D DIMER BLD IA.RAPID-MCNC: 163 NG/ML DDU — SIGNIFICANT CHANGE UP
EOSINOPHIL # BLD AUTO: 0.2 K/UL — SIGNIFICANT CHANGE UP (ref 0–0.5)
EOSINOPHIL NFR BLD AUTO: 3.5 % — SIGNIFICANT CHANGE UP (ref 0–6)
ERYTHROCYTE [SEDIMENTATION RATE] IN BLOOD: 69 MM/HR — HIGH (ref 0–20)
FERRITIN SERPL-MCNC: 611 NG/ML — HIGH (ref 30–400)
GLUCOSE SERPL-MCNC: 113 MG/DL — HIGH (ref 70–99)
HCT VFR BLD CALC: 36.1 % — LOW (ref 39–50)
HGB BLD-MCNC: 11.4 G/DL — LOW (ref 13–17)
HYPOSEGMENTATION: PRESENT — SIGNIFICANT CHANGE UP
LYMPHOCYTES # BLD AUTO: 0.34 K/UL — LOW (ref 1–3.3)
LYMPHOCYTES # BLD AUTO: 6.1 % — LOW (ref 13–44)
MAGNESIUM SERPL-MCNC: 2.1 MG/DL — SIGNIFICANT CHANGE UP (ref 1.6–2.6)
MANUAL SMEAR VERIFICATION: SIGNIFICANT CHANGE UP
MCHC RBC-ENTMCNC: 31.6 GM/DL — LOW (ref 32–36)
MCHC RBC-ENTMCNC: 33.3 PG — SIGNIFICANT CHANGE UP (ref 27–34)
MCV RBC AUTO: 105.6 FL — HIGH (ref 80–100)
MONOCYTES # BLD AUTO: 1.52 K/UL — HIGH (ref 0–0.9)
MONOCYTES NFR BLD AUTO: 27 % — HIGH (ref 2–14)
MYELOCYTES NFR BLD: 4.3 % — HIGH (ref 0–0)
NEUTROPHILS # BLD AUTO: 3.28 K/UL — SIGNIFICANT CHANGE UP (ref 1.8–7.4)
NEUTROPHILS NFR BLD AUTO: 46.9 % — SIGNIFICANT CHANGE UP (ref 43–77)
NEUTS BAND # BLD: 11.3 % — HIGH (ref 0–8)
NRBC # BLD: 4 /100 — HIGH (ref 0–0)
PLAT MORPH BLD: NORMAL — SIGNIFICANT CHANGE UP
PLATELET # BLD AUTO: 296 K/UL — SIGNIFICANT CHANGE UP (ref 150–400)
POTASSIUM SERPL-MCNC: 4.2 MMOL/L — SIGNIFICANT CHANGE UP (ref 3.5–5.3)
POTASSIUM SERPL-SCNC: 4.2 MMOL/L — SIGNIFICANT CHANGE UP (ref 3.5–5.3)
RBC # BLD: 3.42 M/UL — LOW (ref 4.2–5.8)
RBC # FLD: 16.3 % — HIGH (ref 10.3–14.5)
RBC BLD AUTO: SIGNIFICANT CHANGE UP
SARS-COV-2 RNA SPEC QL NAA+PROBE: DETECTED
SODIUM SERPL-SCNC: 139 MMOL/L — SIGNIFICANT CHANGE UP (ref 135–145)
WBC # BLD: 5.64 K/UL — SIGNIFICANT CHANGE UP (ref 3.8–10.5)
WBC # FLD AUTO: 5.64 K/UL — SIGNIFICANT CHANGE UP (ref 3.8–10.5)

## 2022-01-04 PROCEDURE — 80048 BASIC METABOLIC PNL TOTAL CA: CPT

## 2022-01-04 PROCEDURE — 36415 COLL VENOUS BLD VENIPUNCTURE: CPT

## 2022-01-04 PROCEDURE — 86140 C-REACTIVE PROTEIN: CPT

## 2022-01-04 PROCEDURE — 82550 ASSAY OF CK (CPK): CPT

## 2022-01-04 PROCEDURE — 83615 LACTATE (LD) (LDH) ENZYME: CPT

## 2022-01-04 PROCEDURE — 82784 ASSAY IGA/IGD/IGG/IGM EACH: CPT

## 2022-01-04 PROCEDURE — 99238 HOSP IP/OBS DSCHRG MGMT 30/<: CPT

## 2022-01-04 PROCEDURE — 82525 ASSAY OF COPPER: CPT

## 2022-01-04 PROCEDURE — 95700 EEG CONT REC W/VID EEG TECH: CPT

## 2022-01-04 PROCEDURE — 84100 ASSAY OF PHOSPHORUS: CPT

## 2022-01-04 PROCEDURE — 82728 ASSAY OF FERRITIN: CPT

## 2022-01-04 PROCEDURE — 85027 COMPLETE CBC AUTOMATED: CPT

## 2022-01-04 PROCEDURE — 80202 ASSAY OF VANCOMYCIN: CPT

## 2022-01-04 PROCEDURE — 85610 PROTHROMBIN TIME: CPT

## 2022-01-04 PROCEDURE — 80053 COMPREHEN METABOLIC PANEL: CPT

## 2022-01-04 PROCEDURE — 82607 VITAMIN B-12: CPT

## 2022-01-04 PROCEDURE — 82746 ASSAY OF FOLIC ACID SERUM: CPT

## 2022-01-04 PROCEDURE — 83519 RIA NONANTIBODY: CPT

## 2022-01-04 PROCEDURE — U0005: CPT

## 2022-01-04 PROCEDURE — 86255 FLUORESCENT ANTIBODY SCREEN: CPT

## 2022-01-04 PROCEDURE — 85379 FIBRIN DEGRADATION QUANT: CPT

## 2022-01-04 PROCEDURE — 84145 PROCALCITONIN (PCT): CPT

## 2022-01-04 PROCEDURE — 71045 X-RAY EXAM CHEST 1 VIEW: CPT

## 2022-01-04 PROCEDURE — 83735 ASSAY OF MAGNESIUM: CPT

## 2022-01-04 PROCEDURE — 86341 ISLET CELL ANTIBODY: CPT

## 2022-01-04 PROCEDURE — 85025 COMPLETE CBC W/AUTO DIFF WBC: CPT

## 2022-01-04 PROCEDURE — 85652 RBC SED RATE AUTOMATED: CPT

## 2022-01-04 PROCEDURE — 97161 PT EVAL LOW COMPLEX 20 MIN: CPT

## 2022-01-04 PROCEDURE — 95714 VEEG EA 12-26 HR UNMNTR: CPT

## 2022-01-04 PROCEDURE — 95711 VEEG 2-12 HR UNMONITORED: CPT

## 2022-01-04 PROCEDURE — U0003: CPT

## 2022-01-04 PROCEDURE — 85730 THROMBOPLASTIN TIME PARTIAL: CPT

## 2022-01-04 PROCEDURE — 70551 MRI BRAIN STEM W/O DYE: CPT

## 2022-01-04 PROCEDURE — 84443 ASSAY THYROID STIM HORMONE: CPT

## 2022-01-04 RX ORDER — CHLORHEXIDINE GLUCONATE 213 G/1000ML
1 SOLUTION TOPICAL
Refills: 0 | Status: DISCONTINUED | OUTPATIENT
Start: 2022-01-04 | End: 2022-01-04

## 2022-01-04 RX ADMIN — Medication 1 MILLIGRAM(S): at 13:56

## 2022-01-04 RX ADMIN — Medication 81 MILLIGRAM(S): at 13:55

## 2022-01-04 RX ADMIN — FAMOTIDINE 20 MILLIGRAM(S): 10 INJECTION INTRAVENOUS at 13:55

## 2022-01-04 RX ADMIN — Medication 250 MILLIGRAM(S): at 13:00

## 2022-01-04 RX ADMIN — ENOXAPARIN SODIUM 40 MILLIGRAM(S): 100 INJECTION SUBCUTANEOUS at 12:55

## 2022-01-04 RX ADMIN — Medication 250 MILLIGRAM(S): at 00:49

## 2022-01-04 NOTE — DISCHARGE NOTE PROVIDER - CARE PROVIDER_API CALL
Violeta Azul)  EEGEpilepsy; Neurology  [address], [address]  [place] [address]  Phone: [phone]  Fax: ()-  Follow Up Time:     Jeronimo Godinez  HEMATOLOGY  [address], [address]  [place] [address]  Phone: [phone]  Fax: [phone]  Follow Up Time:     Dash Ruby (DP)  [facility] [address] [address]  Phone: [phone]  Fax: [phone]  Follow Up Time:    Violeta Azul)  EEGEpilepsy; Neurology  [address], [address]  [place] [address]  Phone: [phone]  Fax: ()-  Follow Up Time: 1 month    Jeronimo Godinez  [facility]  [address], [address]  [place] [address]  Phone: [phone]  Fax: [phone]  Follow Up Time: 1 month    Dash Ruby (DP)  [place] [address]  Phone: [phone]  Fax: [phone]  Follow Up Time: 2 weeks    Mekhi Lentz (DO)  Cardiovascular Disease; Internal Medicine  [facility], [address], [address]  [place] [address]  Phone: [phone]  Fax: [phone]  Follow Up Time: 1 month

## 2022-01-04 NOTE — DISCHARGE NOTE PROVIDER - PROVIDER TOKENS
PROVIDER:[TOKEN:[[id number]]],PROVIDER:[TOKEN:[[id number]]],PROVIDER:[TOKEN:[[id number]]] PROVIDER:[TOKEN:[[id number]],FOLLOWUP:[1 month]],PROVIDER:[TOKEN:[[id number]],FOLLOWUP:[1 month]],PROVIDER:[TOKEN:[[id number]],FOLLOWUP:[2 weeks]],PROVIDER:[TOKEN:[[id number]],FOLLOWUP:[1 month]]

## 2022-01-04 NOTE — DISCHARGE NOTE PROVIDER - HOSPITAL COURSE
Daniel Pinto is a 66 year old RH male with a past medical history of CLL on Venetoclax, MDS, melanoma, PAF (not on AC), AIE, and osteomyelitis of foot presented as a transfer from [facility] for stroke and seizure work up. He presented to [facility] from [facility] rehab due to AMS and R sided weakness. Wife provided hx at [facility]. LKN 21:00 hr on 12/27. Woke up with change in mental status and R weakness. NIHSS 12(answered one q, partial hemianopia, no effort against gravity in RUE and RLE, ataxia, mild-mod sensory loss, mild-mod aphasia) . GCS 14. CTH/CTA were done and results were negative. CTP showed 4cc area of ischemia involving the inferior aspect of the L temporal lobe. No tPA since outside the window and no thrombectomy since no LVO on imaging. Takes ASA 81 mg qd and atorvastatin 80 mg qhs. No anticoagulation. Has R PICC for osteomyelitis tx and had rectal temp 99 upon presentation.  Received 1g of Keppra x1.     Of note, pt had multiple admissions this year:    9/14-9/16/2021: presented with aphasia and R sided weakness, received tPA. Exam at that time showed RUE spasticity. There was concern for subclinical seizure and AIE and was transferred to [facility] for work up. EEG 9/15: showed intermittent polymorphic delta slowing in the L hemispheric slowing. No epileptiform or seizures were seen. Received IVIG for 3 days and high dose steroid for 5d with taper. LP at that time showed glucose 73, protein 55, WBC 3, neg cultures. No AEDs were started at that time.  COVID positive.   10/1-10/8/2021: Rehab stay, significant for L arm infected cyst s/p drainage. Was dc'ed 10/8 to home.   11/22-12/1/2021: [facility] for osteomyelitis of L ankle s/p vancomycin.    Hospital Course:  Patient was transferred to this facility. Was found to be COVID positive. On subsequent day, the patient had returned back to his baseline. MRI of the brain is as follows:  MR Head No Cont (12.30.21 @ 22:47)  IMPRESSION: No evidence of acute hemorrhage mass effect acute territorial infarcts seen.    EEG was performed and is as follows:  EEG Summary / Classification:    Abnormal EEG in the awake / drowsy / asleep states.  - Intermittent polymorphic theta/delta slowing over the left hemisphere    EEG Impression / Clinical Correlate:  Focal dysfunction in the left hemisphere.  No epileptic pattern or seizure seen.      Patient remained stable from a neurological perspective. The patient was seen by internal medicine and received monoclonal Ab for COVID. After discussion with patient's outpatient oncologist, it was recommended to continue to give patient his monthly IVIG and further treatment of CLL will be determined upon patient's outpatient follow up with his oncologist. After discussion with Dr. Ruby, patient's podiatrist, the patient was made weight bearing as tolerated with CAM boot. Patient remained on vancomycin 1g bid for osteomyelitis and is to complete his last dose on 1/11/2021. Patient is to follow up with outpatient cardiology for assessment if he is a good candidate for anticoagulation in the setting of patient's history of atrial fibrillation. Was seen by physical therapy and determined to be a candidate for rehab.     Patient has remained neurologically and medically stable for discharge to rehabilitation.      Daniel Pinto is a 66 year old RH male with a past medical history of CLL on Venetoclax, MDS, melanoma, PAF (not on AC), AIE, and osteomyelitis of foot presented as a transfer from [facility] for stroke and seizure work up. He presented to [facility] from [facility] rehab due to AMS and R sided weakness. Wife provided hx at [facility]. LKN 21:00 hr on 12/27. Woke up with change in mental status and R weakness. NIHSS 12(answered one q, partial hemianopia, no effort against gravity in RUE and RLE, ataxia, mild-mod sensory loss, mild-mod aphasia) . GCS 14. CTH/CTA were done and results were negative. CTP showed 4cc area of ischemia involving the inferior aspect of the L temporal lobe. No tPA since outside the window and no thrombectomy since no LVO on imaging. Takes ASA 81 mg qd and atorvastatin 80 mg qhs. No anticoagulation. Has R PICC for osteomyelitis tx and had rectal temp 99 upon presentation.  Received 1g of Keppra x1.     Of note, pt had multiple admissions this year:    9/14-9/16/2021: presented with aphasia and R sided weakness, received tPA. Exam at that time showed RUE spasticity. There was concern for subclinical seizure and AIE and was transferred to [facility] for work up. EEG 9/15: showed intermittent polymorphic delta slowing in the L hemispheric slowing. No epileptiform or seizures were seen. Received IVIG for 3 days and high dose steroid for 5d with taper. LP at that time showed glucose 73, protein 55, WBC 3, neg cultures. No AEDs were started at that time.  COVID positive.   10/1-10/8/2021: Rehab stay, significant for L arm infected cyst s/p drainage. Was dc'ed 10/8 to home.   11/22-12/1/2021: [facility] for osteomyelitis of L ankle s/p vancomycin.    Hospital Course:  Patient was transferred to this facility. Was found to be COVID positive. On subsequent day, the patient had returned back to his baseline. MRI of the brain is as follows:  MR Head No Cont (12.30.21 @ 22:47)  IMPRESSION: No evidence of acute hemorrhage mass effect acute territorial infarcts seen.    EEG was performed and is as follows:  EEG Summary / Classification:    Abnormal EEG in the awake / drowsy / asleep states.  - Intermittent polymorphic theta/delta slowing over the left hemisphere    EEG Impression / Clinical Correlate:  Focal dysfunction in the left hemisphere.  No epileptic pattern or seizure seen.      Patient remained stable from a neurological perspective. The patient was seen by internal medicine and received monoclonal Ab for COVID. After discussion with patient's outpatient oncologist, it was recommended to continue to give patient his monthly IVIG and further treatment of CLL will be determined upon patient's outpatient follow up with his oncologist. After discussion with Dr. Ruby, patient's podiatrist, the patient was made weight bearing as tolerated with CAM boot. Patient remained on vancomycin 1g bid for osteomyelitis and is to complete his last dose on 1/11/2021. Patient is to follow up with outpatient cardiology for assessment if he is a good candidate for anticoagulation in the setting of patient's history of atrial fibrillation. Was seen by physical therapy and determined to be a candidate for rehab.   Spoken with wife who will bring the patient's CAM boot to the rehabilitation center.     Patient has remained neurologically and medically stable for discharge to rehabilitation.

## 2022-01-04 NOTE — PROGRESS NOTE ADULT - ASSESSMENT
Assessment: 65 yo RH male with a PMHx of CLL (on Venetoclax), MDS, melanoma, PAF (not on AC), AIE, and osteomyelitis of foot presented as a transfer from [facility] for stroke and seizure work up. He initially presented to [facility] from rehab due to AMS and R sided weakness. LKN 21:00 hr on 12/27. Woke up with change in mental status and R weakness. NIHSS 12. GCS 14. CTH/CTA were done and results were negative. CTP showed 4cc area of ischemia involving the inferior aspect of the L temporal lobe. COVID +. MRI Head unremarkable. EEG shows focal dysfunction in the left hemisphere but no epileptic pattern or seizure seen. Patient has returned to his baseline.    Impression: Change in mental status w/ R hemiparesis, now resolved. Possibly 2/2 seizure with Hao's paralysis, less likely stroke. Now back to baseline. May be in setting of COVID infection.     Plan:   #AMS - now resolved  [x] vEEG completed  [x] MRI brain wo as above  [] Neuro checks q4h.  [] fall precautions.    #Osteomyelitis  [] vanco 1g BID until 1/11/22 per ID last admission (6 week course through PICC line).  [x] Podiatry consult and recommendations appreciated: on 1/3, after discussion with podiatrist Dr. Dash Ruby at [place], patient is now weight bearing as tolerated but must have CAM boot on when ambulating. Will ask family to bring in CAM boot for ambualtion    #Paroxysmal atrial fibrillation  [x] CHADsVASC 2.  [] per cardiology last admission, patient with episode of atrial fibrillation on 7/2020 admission.  [] per cardiology, on aspirin 81mg daily. Will follow up with outpatient cardiology    #CLL (chronic lymphocytic leukemia).   [] Patient follows with Dr. Fry. Patient is immunocompromised.  [] Continue to monitor CBCs.  [x] s/p IVIG on 12/29.  [] case discussed on 1/3 with Dr. Espinal and Dr. Soto) and at this time there is no further treatment of CLL except for patient's monthly IVIG treatments. Further treatment will be discussed when patient is discharged from rehabilitation and will have discussion with his oncologist    #COVID  [x] s/p Monoclonal Ab on 12/29.  [x] check inflammatory markers    #DVT ppx: Lovenox.  #Diet: DASH.  #Dispo: PT recommending rehab.    All above was discussed with patient's wife, Javier, on request of the patient. All questions were answered to patient's and wife's satisfaction.    Case seen and discussed with neurology attending Dr. Gerard.

## 2022-01-04 NOTE — PROGRESS NOTE ADULT - SUBJECTIVE AND OBJECTIVE BOX
**************************************************  NEUROLOGY PROGRESS NOTE  **************************************************    MRN-[id number]    Subjective: Patient seen and examined at the bedside. Stated that he has a cough. Otherwise, notes that he feels well.    MEDICATIONS  (STANDING):  aspirin  chewable 81 milliGRAM(s) Oral daily  atorvastatin 80 milliGRAM(s) Oral at bedtime  enoxaparin Injectable 40 milliGRAM(s) SubCutaneous daily  famotidine    Tablet 20 milliGRAM(s) Oral daily  folic acid 1 milliGRAM(s) Oral daily  vancomycin  IVPB 1000 milliGRAM(s) IV Intermittent every 12 hours  vancomycin  IVPB        MEDICATIONS  (PRN):  aluminum hydroxide/magnesium hydroxide/simethicone Suspension 30 milliLiter(s) Oral every 4 hours PRN Dyspepsia  benzocaine 15 mG/menthol 3.6 mG (Sugar-Free) Lozenge 1 Lozenge Oral every 4 hours PRN Sore Throat or cough  melatonin 3 milliGRAM(s) Oral at bedtime PRN Insomnia    Allergies  No Known Allergies	    ROS: Pertinent positives above, all other ROS were reviewed and are negative.      VITALS SIGNS  Vital Signs Last 24 Hrs  T(C): 36.5 (04 Jan 2022 04:31), Max: 37.2 (03 Jan 2022 11:01)  T(F): 97.7 (04 Jan 2022 04:31), Max: 99 (03 Jan 2022 11:01)  HR: 55 (04 Jan 2022 04:31) (55 - 68)  BP: 120/70 (04 Jan 2022 04:31) (116/70 - 121/77)  BP(mean): --  RR: 17 (04 Jan 2022 04:31) (17 - 18)  SpO2: 98% (04 Jan 2022 04:31) (98% - 99%)    GENERAL EXAM:  Constitutional: Lying in bed, NAD.  Head: Normocephalic, atraumatic.  Extremities: L foot OM.    NEUROLOGICAL EXAM:  MS: Alert, eyes open spontaneously. Oriented to self, location, year. Speech is fluent, not slurred. Follows commands.  CN: VFF. EOMI. Face symmetric. Tongue midline.   Motor: 5/5 deltoids, biceps, triceps,  bilaterally. 5/5 hip flexion, knee extension bilaterally. 5/5 dorsi/plantar flexion on the R. Dorsi/plantar flexion on the L limited by discomfort and osteomyelitis  Coordination: No dysmetria on FNF bilaterally.  Reflexes: 2+ in the patellar bilaterally  Gait: Note assessed due to CAM boot not on for ambulation    Labs:                           11.4   5.64  )-----------( 296      ( 04 Jan 2022 07:57 )             36.1     01-04    139  |  102  |  20  ----------------------------<  113<H>  4.2   |  23  |  0.93    Ca    9.3      04 Jan 2022 07:54  Phos  3.6     01-03  Mg     2.1     01-04    Radiology/EEG:  -12/28 CTH: No evidence of an acute intracranial hemorrhage, midline shift, or   hydrocephalus.  -12/28 CTA N: NO HEMODYNAMIC SIGNIFICANT NARROWING WITHIN THE NECK.  -12/28 CTA H: NO PROXIMAL LARGE VESSEL OCCLUSION INTRACRANIALLY.  -12/28 CTP: SMALL AREA OF ISCHEMIA (4 ML) INVOLVING THE INFERIOR ASPECT OF THE LEFT TEMPORAL LOBE.  -12/30 MRI Head: No evidence of acute hemorrhage, mass, mass effect, or acute territorial infarcts seen.    -12/30 EEG Summary / Classification:  Abnormal EEG in the awake / drowsy / asleep states.  - Intermittent polymorphic theta/delta slowing over the left hemisphere  EEG Impression / Clinical Correlate:  Focal dysfunction in the left hemisphere.  No epileptic pattern or seizure seen.

## 2022-01-04 NOTE — DISCHARGE NOTE PROVIDER - NSDCMRMEDTOKEN_GEN_ALL_CORE_FT
aluminum hydroxide-magnesium hydroxide 200 mg-200 mg/5 mL oral suspension: 30 milliliter(s) orally every 4 hours, As needed, Dyspepsia  aspirin 81 mg oral tablet, chewable: 1 tab(s) orally once a day  atorvastatin 80 mg oral tablet: 1 tab(s) orally once a day (at bedtime)  enoxaparin: 40 milligram(s) subcutaneously once a day  famotidine 20 mg oral tablet: 1 tab(s) orally once a day  folic acid 1 mg oral tablet: 1 tab(s) orally once a day  melatonin 3 mg oral tablet: 1 tab(s) orally once a day (at bedtime), As needed, Insomnia  vancomycin 1 g intravenous injection: 1 gram(s) intravenous every 12 hours, last day 1/11/21

## 2022-01-04 NOTE — DISCHARGE NOTE PROVIDER - CARE PROVIDERS DIRECT ADDRESSES
,[email or web address],[email or web address],DirectAddress_Unknown ,[email or web address],DirectAddress_Unknown,DirectAddress_Unknown,[email or web address]

## 2022-01-04 NOTE — DISCHARGE NOTE NURSING/CASE MANAGEMENT/SOCIAL WORK - NSDCVIVACCINE_GEN_ALL_CORE_FT
influenza, injectable, quadrivalent, preservative free; 08-Oct-2021 11:39; Alex Cantor (KALEN); [employer]Â®; JL5C3 (Exp. Date: 22-Jun-2022); IntraMuscular; Deltoid Right.; 0.5 milliLiter(s); VIS (VIS Published: 15-Aug-2019, VIS Presented: 08-Oct-2021);

## 2022-01-04 NOTE — DISCHARGE NOTE NURSING/CASE MANAGEMENT/SOCIAL WORK - PATIENT PORTAL LINK FT
You can access the FollowMyHealth Patient Portal offered by [facility] by registering at the following website: [email or web address]. By joining [employer]’s FollowMyHealth portal, you will also be able to view your health information using other applications (apps) compatible with our system.

## 2022-01-04 NOTE — DISCHARGE NOTE PROVIDER - NSFOLLOWUPCLINICS_GEN_ALL_ED_FT
[facility]  Cardiology  [address] [place] [id number]  Phone: [phone]  Fax:

## 2022-01-04 NOTE — DISCHARGE NOTE PROVIDER - NSDCCPCAREPLAN_GEN_ALL_CORE_FT
PRINCIPAL DISCHARGE DIAGNOSIS  Diagnosis: Encephalopathy due to COVID-19 virus  Assessment and Plan of Treatment: You had an episode of confusion which is likely due to your COVID-19 infection. You returned back to your usual self. You received the monoclonal antibody while you were admitted. You had an MRI which did not show any acute abnormalities. You had an EEG which showed similar slowing on the left side of the brain. You are advised to follow up with your outpatient neurologist.      SECONDARY DISCHARGE DIAGNOSES  Diagnosis: 2019 novel coronavirus disease (COVID-19)  Assessment and Plan of Treatment: You were found to be positive for COVID-19. You remained stable throughout your admission. Please monitor your symptoms for shortness of breath, chest pain, increased cough, runny nose, fevers, headaches, loss of taste/smell. Please notify your providers if your symptoms worsen and if you will require further medical attention for these symptoms.    Diagnosis: Osteomyelitis  Assessment and Plan of Treatment: You have had ongoing treatment of your osteomyelitis of your left leg. You are on vancomycin 1 gram twice a day. Your last dose will be on the evening of January 11th. Please continue to take your infusions while you are in rehabilitation.    Diagnosis: CLL (chronic lymphocytic leukemia)  Assessment and Plan of Treatment: You received a dose of IVIG on December 29th. Please continue to get IVIG monthly and follow up with your oncologist for further discussions of treatment.    Diagnosis: Atrial fibrillation  Assessment and Plan of Treatment: You have a history of atrial fibrillation. Please follow up with your cardiologist to determine if you would be a good candidate for anticoagualtion. For now, please continue to take your aspirin 81mg daily.     PRINCIPAL DISCHARGE DIAGNOSIS  Diagnosis: Encephalopathy due to COVID-19 virus  Assessment and Plan of Treatment: You had an episode of confusion which is likely due to your COVID-19 infection. You returned back to your usual self. You received the monoclonal antibody while you were admitted. You had an MRI which did not show any acute abnormalities. You had an EEG which showed similar slowing on the left side of the brain. You are advised to follow up with your outpatient neurologist.      SECONDARY DISCHARGE DIAGNOSES  Diagnosis: 2019 novel coronavirus disease (COVID-19)  Assessment and Plan of Treatment: You were found to be positive for COVID-19. You remained stable throughout your admission. Please monitor your symptoms for shortness of breath, chest pain, increased cough, runny nose, fevers, headaches, loss of taste/smell. Please notify your providers if your symptoms worsen and if you will require further medical attention for these symptoms. Please ensure with your providers at the rehabilitation center that you are no longer contagious prior to returning home.    Diagnosis: Osteomyelitis  Assessment and Plan of Treatment: You have had ongoing treatment of your osteomyelitis of your left leg. You are on vancomycin 1 gram IV twice a day. Your last dose will be on the evening of January 11th. Please continue to take your infusions while you are in rehabilitation.    Diagnosis: CLL (chronic lymphocytic leukemia)  Assessment and Plan of Treatment: You received a dose of IVIG on December 29th. Please continue to get IVIG monthly and follow up with your oncologist for further discussions of treatment.    Diagnosis: Atrial fibrillation  Assessment and Plan of Treatment: You have a history of atrial fibrillation. Please follow up with your cardiologist to determine if you would be a good candidate for anticoagualtion. For now, please continue to take your aspirin 81mg daily. Follow up with Dr. Lentz upon discharge.

## 2022-01-04 NOTE — DISCHARGE NOTE PROVIDER - DISCHARGE DIET
CARDIOLOGY  NOTE        Name:  Lennox Ang /Age/Sex: 1962  (62 y.o. female)   MRN & CSN:  [id number] & [id number] Admission Date/Time: 10/19/2020 10:07 PM   Location:  -A PCP: Jennifer Gunderson MD       Hospital Day: 10        PLAN FROM CARDIOLOGY FOR TODAY:   Await holter      - cardiology consult is for:  Per effusion, a fib    -  Interval history: In RRR    · ASSESSMENT/ PLAN:      A fib in SR now  CPM        Subjective: Todays complain: Patient  No CP    HPI:  Mundo Sewell is a 62 y. o.year old who and presents with had concerns including Cough; Headache; and Chills. Chief Complaint   Patient presents with    Cough    Headache    Chills           Objective: Temperature:  Current - Temp: 98 °F (36.7 °C);  Max - Temp  Av.5 °F (36.9 °C)  Min: 98 °F (36.7 °C)  Max: 98.7 °F (37.1 °C)    Respiratory Rate : Resp  Av.6  Min: 14  Max: 28    Pulse Range: Pulse  Av.2  Min: 72  Max: 102    Blood Presuure Range:  Systolic (14KGZ), SPC:897 , Min:109 , AHM:329   ; Diastolic (05WKT), ECJ:62, Min:43, Max:115      Pulse ox Range: SpO2  Av %  Min: 86 %  Max: 99 %    24hr I & O:      Intake/Output Summary (Last 24 hours) at 10/28/2020 1438  Last data filed at 10/28/2020 1300  Gross per 24 hour   Intake 780 ml   Output --   Net 780 ml         /61   Pulse 84   Temp 98 °F (36.7 °C) (Oral)   Resp 22   Ht 5' 2.01\" (1.575 m)   Wt 204 lb 12.9 oz (92.9 kg)   SpO2 97%   BMI 37.45 kg/m²           Review of Systems:  All 14 systems reviewed, all negative       TELEMETRY: Atrial flutter    has a past medical history of Anxiety, Arthritis, Back pain at L4-L5 level, Bipolar 1 disorder (HCC), COPD (chronic obstructive pulmonary disease) ([facility] [address] Emphysema of lung ([address] FH: CAD (coronary artery disease), Fibromyalgia, H/O Doppler ultrasound, H/O echocardiogram, History of blood transfusion, Hyperlipidemia LDL goal <100, Hypertension, DASH Diet

## 2022-01-04 NOTE — DISCHARGE NOTE NURSING/CASE MANAGEMENT/SOCIAL WORK - NSDCPEFALRISK_GEN_ALL_CORE
For information on Fall & Injury Prevention, visit: [email or web address] OR  [email or web address] OR  https://www.cdc.gov/steadi/patient.html

## 2022-01-05 NOTE — PATIENT PROFILE ADULT - MEDICATIONS/VISITS
Artem Ravi  2022  1:26 PM          Artem Ravi is a 80 y.o. female       The patient was seen. She has no chief complaints today. She has been fitted for a # 4; ring with support type pessary. She states all of her symptoms that she had prior to the pessary have been relieved with its use. She denies any vaginal bleeding. She last had her pessary cleaned 8-12 weeks ago. She denies to any vaginal discharge or odor. Her bowels are regular and her voiding pattern is normal.     Blood pressure 118/70, pulse 111, height 5' 6\" (1.676 m), weight 162 lb 12.8 oz (73.8 kg), SpO2 95 %, not currently breastfeeding. Chaperone for Intimate Exam   Chaperone was offered and accepted as part of the rooming process.  Chaperone: Janice        Abdomen: Soft and non-tender; good bowel sounds; no guarding, rebound or rigidity; no CVA tenderness bilaterally. Extremities: No calf tenderness bilaterally. DTR 2/4 bilaterally. No edema. Perineum/Speculum: There is not any signs of infection; The vaginal vault is without any signs of erythema or erosion. There is no vaginal discharge or odor appreciated. The pessary was cleansed and replaced without any problems and the patient tolerated the procedure well. T.O.S. Ointment was placed with the pessary to decrease discharge/odor. Assessment:   Diagnosis Orders   1. Pelvic relaxation due to uterovaginal prolapse     2. Presence of pessary #4 ring     3.  History of hysterectomy       Chief Complaint   Patient presents with    Procedure     pessary cleaning     Patient Active Problem List    Diagnosis Date Noted    Lower abdominal pain 2021     Priority: High    Presence of pessary #4 ring 10/13/2021     Priority: Medium    Pelvic relaxation due to uterovaginal prolapse 2021     Priority: Medium    Atrial flutter ([address] 2021    Hyponatremia 2021    Esophageal candidiasis ([address] 2021    Vaginal erosion secondary to pessary use ([facility] [address] 01/21/2021    Postmenopausal bleeding 01/21/2021    History of hysterectomy 01/21/2021    Arthritis of right hand 01/13/2020    Venous stasis dermatitis of both lower extremities 12/08/2017    Anticoagulation monitoring by pharmacist 10/19/2017    Paroxysmal atrial fibrillation with rapid ventricular response ([facility] [address] 09/21/2017    Lumbar radicular pain 08/03/2017    Lumbar spine pain 08/03/2017    Left hip pain 08/03/2017    Arthritis of left hip 08/03/2017    Esophageal stricture     Gastritis without bleeding     Bradycardia     Drug-induced constipation 06/15/2017    Trochanteric bursitis of left hip 05/22/2017         Plan:  1. Return to the office 10-12 weeks  2. Report any vaginal bleeding or discharge  3. Abstinence  4. Annual Follow-up reviewed with patient. They will schedule appointment    The patient, Sudhir Kaufman is a 80 y.o. female, was seen with a total time spent of 20 minutes for the visit on this date of service by the E/M provider. The time component had both face to face and non face to face time spent in determining the total time component. Counseling and education regarding her diagnosis listed below and her options regarding those diagnoses were also included in determining her time component. Diagnosis Orders   1. Pelvic relaxation due to uterovaginal prolapse     2. Presence of pessary #4 ring     3. History of hysterectomy          The patient had her preventative health maintenance recommendations and follow-up reviewed with her at the completion of her visit. no

## 2022-01-14 ENCOUNTER — APPOINTMENT (OUTPATIENT)
Dept: WOUND CARE | Facility: HOSPITAL | Age: 67
End: 2022-01-14
Payer: MEDICARE

## 2022-01-14 ENCOUNTER — OUTPATIENT (OUTPATIENT)
Dept: OUTPATIENT SERVICES | Facility: HOSPITAL | Age: 67
LOS: 1 days | Discharge: HOME | End: 2022-01-14
Payer: MEDICARE

## 2022-01-14 VITALS
OXYGEN SATURATION: 99 % | HEART RATE: 69 BPM | SYSTOLIC BLOOD PRESSURE: 154 MMHG | HEIGHT: 69 IN | TEMPERATURE: 97.9 F | BODY MASS INDEX: 21.48 KG/M2 | WEIGHT: 145 LBS | DIASTOLIC BLOOD PRESSURE: 74 MMHG | RESPIRATION RATE: 20 BRPM

## 2022-01-14 DIAGNOSIS — S91.309A UNSPECIFIED OPEN WOUND, UNSPECIFIED FOOT, INITIAL ENCOUNTER: ICD-10-CM

## 2022-01-14 DIAGNOSIS — H26.9 UNSPECIFIED CATARACT: Chronic | ICD-10-CM

## 2022-01-14 PROCEDURE — G0463: CPT

## 2022-01-14 PROCEDURE — 99213 OFFICE O/P EST LOW 20 MIN: CPT

## 2022-01-17 DIAGNOSIS — M86.172 OTHER ACUTE OSTEOMYELITIS, LEFT ANKLE AND FOOT: ICD-10-CM

## 2022-01-17 DIAGNOSIS — Z79.82 LONG TERM (CURRENT) USE OF ASPIRIN: ICD-10-CM

## 2022-01-17 DIAGNOSIS — I48.91 UNSPECIFIED ATRIAL FIBRILLATION: ICD-10-CM

## 2022-01-17 DIAGNOSIS — E78.00 PURE HYPERCHOLESTEROLEMIA, UNSPECIFIED: ICD-10-CM

## 2022-01-17 DIAGNOSIS — C91.10 CHRONIC LYMPHOCYTIC LEUKEMIA OF B-CELL TYPE NOT HAVING ACHIEVED REMISSION: ICD-10-CM

## 2022-01-17 DIAGNOSIS — Z98.49 CATARACT EXTRACTION STATUS, UNSPECIFIED EYE: ICD-10-CM

## 2022-01-17 DIAGNOSIS — Z92.21 PERSONAL HISTORY OF ANTINEOPLASTIC CHEMOTHERAPY: ICD-10-CM

## 2022-01-17 DIAGNOSIS — Z79.899 OTHER LONG TERM (CURRENT) DRUG THERAPY: ICD-10-CM

## 2022-01-17 DIAGNOSIS — Z80.9 FAMILY HISTORY OF MALIGNANT NEOPLASM, UNSPECIFIED: ICD-10-CM

## 2022-01-17 DIAGNOSIS — Z80.7 FAMILY HISTORY OF OTHER MALIGNANT NEOPLASMS OF LYMPHOID, HEMATOPOIETIC AND RELATED TISSUES: ICD-10-CM

## 2022-01-17 DIAGNOSIS — Z87.81 PERSONAL HISTORY OF (HEALED) TRAUMATIC FRACTURE: ICD-10-CM

## 2022-01-17 DIAGNOSIS — Z98.890 OTHER SPECIFIED POSTPROCEDURAL STATES: ICD-10-CM

## 2022-01-17 DIAGNOSIS — I10 ESSENTIAL (PRIMARY) HYPERTENSION: ICD-10-CM

## 2022-01-17 NOTE — ASSESSMENT
[Verbal] : Verbal [Written] : Written [Demo] : Demo [Patient] : Patient [Spouse] : Spouse [Good - alert, interested, motivated] : Good - alert, interested, motivated [Verbalizes knowledge/Understanding] : Verbalizes knowledge/understanding [Foot Care] : foot care [Signs and symptoms of infection] : sign and symptoms of infection [How and When to Call] : how and when to call [] : Yes [Stable] : stable [Home] : Home [Walker] : Walker [Not Applicable - Long Term Care/Home Health Agency] : Long Term Care/Home Health Agency: Not Applicable [FreeTextEntry2] : Infection prevention\par  Goal remaining pain free\par  [FreeTextEntry4] : Patient may start to apply more pressure and not use walker. \par Follow up in 2 weeks

## 2022-01-17 NOTE — REVIEW OF SYSTEMS
[Fever] : no fever [Eye Pain] : no eye pain [Earache] : no earache [Chest Pain] : no chest pain [Shortness Of Breath] : no shortness of breath [Cough] : no cough [Abdominal Pain] : no abdominal pain [de-identified] : left foot incision closed

## 2022-01-17 NOTE — PLAN
[FreeTextEntry1] : Patient examined and evaluated at this time.\par Pt to transition to partial bearing to the left foot in CAM boot\par Spent 20 minutes for patient care and medical decision making.\par Patient to follow up in 2 weeks with new radiographs.\par

## 2022-01-17 NOTE — HISTORY OF PRESENT ILLNESS
[FreeTextEntry1] : Pt seen for follow up left foot osteomyelitis s/p biopsy. Pt relates that his left foot feels much better at this time and intermittently does not use his walker. Denies any other complaints at this time.

## 2022-01-17 NOTE — PHYSICAL EXAM
[2+] : left 2+ [Ankle Swelling (On Exam)] : not present [] : not present [de-identified] : A&Ox3, NAD [de-identified] : left midfoot s/p bone biopsy, nontender to palpation and ROM [de-identified] : Left dorsal midfoot incision healed [de-identified] : Light touch sensation intact bilaterally [FreeTextEntry1] : Left dorsal foot surgical site - Closed  [de-identified] : White sock  [de-identified] : Cleansed with NS\par  [TWNoteComboBox4] : None [de-identified] : Normal [de-identified] : None [de-identified] : None [de-identified] : No [de-identified] : Other

## 2022-01-20 ENCOUNTER — NON-APPOINTMENT (OUTPATIENT)
Age: 67
End: 2022-01-20

## 2022-01-20 ENCOUNTER — APPOINTMENT (OUTPATIENT)
Dept: INTERNAL MEDICINE | Facility: CLINIC | Age: 67
End: 2022-01-20
Payer: MEDICARE

## 2022-01-20 VITALS
SYSTOLIC BLOOD PRESSURE: 126 MMHG | DIASTOLIC BLOOD PRESSURE: 80 MMHG | WEIGHT: 140 LBS | HEIGHT: 69 IN | BODY MASS INDEX: 20.73 KG/M2 | OXYGEN SATURATION: 95 % | HEART RATE: 74 BPM | RESPIRATION RATE: 14 BRPM

## 2022-01-20 DIAGNOSIS — Z86.14 PERSONAL HISTORY OF METHICILLIN RESISTANT STAPHYLOCOCCUS AUREUS INFECTION: ICD-10-CM

## 2022-01-20 DIAGNOSIS — H26.9 UNSPECIFIED CATARACT: ICD-10-CM

## 2022-01-20 DIAGNOSIS — U07.1 COVID-19: ICD-10-CM

## 2022-01-20 DIAGNOSIS — Z01.818 ENCOUNTER FOR OTHER PREPROCEDURAL EXAMINATION: ICD-10-CM

## 2022-01-20 PROCEDURE — 93000 ELECTROCARDIOGRAM COMPLETE: CPT

## 2022-01-20 PROCEDURE — 99204 OFFICE O/P NEW MOD 45 MIN: CPT | Mod: 25

## 2022-01-20 RX ORDER — VANCOMYCIN 1 G/200ML
1000 INJECTION, SOLUTION INTRAVENOUS
Refills: 0 | Status: DISCONTINUED | COMMUNITY
End: 2022-01-20

## 2022-01-20 RX ORDER — ENEMA 19; 7 G/133ML; G/133ML
7-19 ENEMA RECTAL
Refills: 0 | Status: DISCONTINUED | COMMUNITY
End: 2022-01-20

## 2022-01-20 RX ORDER — ENOXAPARIN SODIUM 100 MG/ML
40 INJECTION SUBCUTANEOUS DAILY
Refills: 0 | Status: DISCONTINUED | COMMUNITY
End: 2022-01-20

## 2022-01-20 RX ORDER — AZELASTINE HYDROCHLORIDE 137 UG/1
0.1 SPRAY, METERED NASAL
Qty: 30 | Refills: 0 | Status: DISCONTINUED | COMMUNITY
Start: 2016-10-17 | End: 2022-01-20

## 2022-01-20 RX ORDER — MAGNESIUM HYDROXIDE 400 MG/5ML
400 SUSPENSION ORAL DAILY
Refills: 0 | Status: DISCONTINUED | COMMUNITY
End: 2022-01-20

## 2022-01-20 NOTE — HISTORY OF PRESENT ILLNESS
[Atrial Fibrillation] : atrial fibrillation [No Pertinent Pulmonary History] : no history of asthma, COPD, sleep apnea, or smoking [No Adverse Anesthesia Reaction] : no adverse anesthesia reaction in self or family member [(Patient denies any chest pain, claudication, dyspnea on exertion, orthopnea, palpitations or syncope)] : Patient denies any chest pain, claudication, dyspnea on exertion, orthopnea, palpitations or syncope [Chronic Anticoagulation] : no chronic anticoagulation [Chronic Kidney Disease] : no chronic kidney disease [Diabetes] : no diabetes [FreeTextEntry1] : Left Pars Plans vitrectomy, IOL explant, SEC IOL [FreeTextEntry2] : 1/31/22 [FreeTextEntry3] : Dr. Corbin Morris [FreeTextEntry4] : SARAHI OBEY,  55, is here for presurgical evaluation.\par Pt is overall feeling well.\par Pt denies chest pain, dyspnea, palpitations, lightheadedness, fever, chills, sweats [FreeTextEntry6] : Had one episode of A-fib in 2020

## 2022-01-20 NOTE — ASSESSMENT
[Patient Optimized for Surgery] : Patient optimized for surgery [No Further Testing Recommended] : no further testing recommended [FreeTextEntry4] : EKG sinus bradycardia within normal limits\par There are no contraindications to proceeding with the planned surgery

## 2022-01-24 ENCOUNTER — NON-APPOINTMENT (OUTPATIENT)
Age: 67
End: 2022-01-24

## 2022-01-25 ENCOUNTER — APPOINTMENT (OUTPATIENT)
Dept: WOUND CARE | Facility: HOSPITAL | Age: 67
End: 2022-01-25
Payer: MEDICARE

## 2022-01-25 ENCOUNTER — OUTPATIENT (OUTPATIENT)
Dept: OUTPATIENT SERVICES | Facility: HOSPITAL | Age: 67
LOS: 1 days | Discharge: HOME | End: 2022-01-25
Payer: MEDICARE

## 2022-01-25 VITALS
TEMPERATURE: 97.3 F | BODY MASS INDEX: 20.73 KG/M2 | WEIGHT: 140 LBS | OXYGEN SATURATION: 100 % | SYSTOLIC BLOOD PRESSURE: 118 MMHG | HEART RATE: 60 BPM | RESPIRATION RATE: 20 BRPM | HEIGHT: 69 IN | DIASTOLIC BLOOD PRESSURE: 68 MMHG

## 2022-01-25 DIAGNOSIS — Z86.14 PERSONAL HISTORY OF METHICILLIN RESISTANT STAPHYLOCOCCUS AUREUS INFECTION: ICD-10-CM

## 2022-01-25 DIAGNOSIS — Z80.9 FAMILY HISTORY OF MALIGNANT NEOPLASM, UNSPECIFIED: ICD-10-CM

## 2022-01-25 DIAGNOSIS — Z92.21 PERSONAL HISTORY OF ANTINEOPLASTIC CHEMOTHERAPY: ICD-10-CM

## 2022-01-25 DIAGNOSIS — Z87.81 PERSONAL HISTORY OF (HEALED) TRAUMATIC FRACTURE: ICD-10-CM

## 2022-01-25 DIAGNOSIS — H26.9 UNSPECIFIED CATARACT: Chronic | ICD-10-CM

## 2022-01-25 DIAGNOSIS — Z98.890 OTHER SPECIFIED POSTPROCEDURAL STATES: ICD-10-CM

## 2022-01-25 DIAGNOSIS — Z83.1 FAMILY HISTORY OF OTHER INFECTIOUS AND PARASITIC DISEASES: ICD-10-CM

## 2022-01-25 DIAGNOSIS — E78.00 PURE HYPERCHOLESTEROLEMIA, UNSPECIFIED: ICD-10-CM

## 2022-01-25 DIAGNOSIS — M86.172 OTHER ACUTE OSTEOMYELITIS, LEFT ANKLE AND FOOT: ICD-10-CM

## 2022-01-25 DIAGNOSIS — I48.91 UNSPECIFIED ATRIAL FIBRILLATION: ICD-10-CM

## 2022-01-25 DIAGNOSIS — Z86.16 PERSONAL HISTORY OF COVID-19: ICD-10-CM

## 2022-01-25 DIAGNOSIS — Z98.49 CATARACT EXTRACTION STATUS, UNSPECIFIED EYE: ICD-10-CM

## 2022-01-25 DIAGNOSIS — I10 ESSENTIAL (PRIMARY) HYPERTENSION: ICD-10-CM

## 2022-01-25 DIAGNOSIS — C91.10 CHRONIC LYMPHOCYTIC LEUKEMIA OF B-CELL TYPE NOT HAVING ACHIEVED REMISSION: ICD-10-CM

## 2022-01-25 DIAGNOSIS — Z80.7 FAMILY HISTORY OF OTHER MALIGNANT NEOPLASMS OF LYMPHOID, HEMATOPOIETIC AND RELATED TISSUES: ICD-10-CM

## 2022-01-25 DIAGNOSIS — Z79.82 LONG TERM (CURRENT) USE OF ASPIRIN: ICD-10-CM

## 2022-01-25 DIAGNOSIS — Z79.899 OTHER LONG TERM (CURRENT) DRUG THERAPY: ICD-10-CM

## 2022-01-25 PROCEDURE — G0463: CPT

## 2022-01-25 PROCEDURE — 99213 OFFICE O/P EST LOW 20 MIN: CPT

## 2022-01-25 NOTE — VITALS
[Pain related to present condition?] : The patient's  pain is related to present condition. [Throbbing] : throbbing [Occasional] : occasional [] : No [de-identified] : pt states 5/10 throbbing pain that is caused by increased edema of the left foot/ankle region, pain is relieved when elevating and compression via ace wraps are applied, DPM aware and no interventions performed at this time. [FreeTextEntry3] :  Left foot/ankle

## 2022-01-25 NOTE — HISTORY OF PRESENT ILLNESS
[FreeTextEntry1] : Pt seen for follow up left foot osteomyelitis s/p biopsy. Pt relates that his left foot feels better at this time and has been walking in a CAM boot as advised. Denies any other complaints at this time.

## 2022-01-25 NOTE — PLAN
[FreeTextEntry1] : Patient examined and evaluated at this time.\par Pt to transition to weight bearing to the left foot in regular shoes.\par Spent 20 minutes for patient care and medical decision making.\par Patient to follow up in 2 weeks with new radiographs.\par

## 2022-01-25 NOTE — PHYSICAL EXAM
[2+] : left 2+ [Ankle Swelling (On Exam)] : not present [] : not present [de-identified] : A&Ox3, NAD [de-identified] : left midfoot s/p bone biopsy, nontender to palpation and ROM [de-identified] : Left dorsal midfoot incision healed [de-identified] : Light touch sensation intact bilaterally [FreeTextEntry1] : Left dorsal foot surgical site - Closed  [de-identified] : White sock  [de-identified] : Cleansed with NS\par  [TWNoteComboBox4] : None [de-identified] : Normal [de-identified] : None [de-identified] : None [de-identified] : No [de-identified] : Other

## 2022-01-25 NOTE — ASSESSMENT
[Verbal] : Verbal [Written] : Written [Demo] : Demo [Patient] : Patient [Spouse] : Spouse [Good - alert, interested, motivated] : Good - alert, interested, motivated [Verbalizes knowledge/Understanding] : Verbalizes knowledge/understanding [Foot Care] : foot care [Signs and symptoms of infection] : sign and symptoms of infection [How and When to Call] : how and when to call [] : Yes [Stable] : stable [Home] : Home [Not Applicable - Long Term Care/Home Health Agency] : Long Term Care/Home Health Agency: Not Applicable [Skin Care] : skin care [Nutrition] : nutrition [Pain Management] : pain management [Compression Therapy] : compression therapy [Ambulatory] : Ambulatory [FreeTextEntry2] : Infection prevention\par Promote Skin Integrity\par Compression Compliance\par elevation\par low Na+ diet\par  [FreeTextEntry4] : As per DPM, pt allowed to slowly transition to regular shoes from CAM boot\par F/U 3 weeks\par Xray ordered, script provided, pt to have done by next assessment

## 2022-01-25 NOTE — REVIEW OF SYSTEMS
[Fever] : no fever [Eye Pain] : no eye pain [Earache] : no earache [Chest Pain] : no chest pain [Shortness Of Breath] : no shortness of breath [Cough] : no cough [Abdominal Pain] : no abdominal pain [de-identified] : left foot incision closed

## 2022-02-10 ENCOUNTER — APPOINTMENT (OUTPATIENT)
Dept: WOUND CARE | Facility: HOSPITAL | Age: 67
End: 2022-02-10
Payer: MEDICARE

## 2022-02-10 ENCOUNTER — OUTPATIENT (OUTPATIENT)
Dept: OUTPATIENT SERVICES | Facility: HOSPITAL | Age: 67
LOS: 1 days | Discharge: HOME | End: 2022-02-10
Payer: MEDICARE

## 2022-02-10 VITALS
TEMPERATURE: 97.1 F | SYSTOLIC BLOOD PRESSURE: 119 MMHG | DIASTOLIC BLOOD PRESSURE: 73 MMHG | HEART RATE: 63 BPM | WEIGHT: 140 LBS | OXYGEN SATURATION: 99 % | RESPIRATION RATE: 20 BRPM | BODY MASS INDEX: 20.73 KG/M2 | HEIGHT: 69 IN

## 2022-02-10 DIAGNOSIS — M86.172 OTHER ACUTE OSTEOMYELITIS, LEFT ANKLE AND FOOT: ICD-10-CM

## 2022-02-10 DIAGNOSIS — H26.9 UNSPECIFIED CATARACT: Chronic | ICD-10-CM

## 2022-02-10 PROCEDURE — 99213 OFFICE O/P EST LOW 20 MIN: CPT

## 2022-02-10 PROCEDURE — G0463: CPT

## 2022-02-10 NOTE — PLAN
[FreeTextEntry1] : Patient examined and evaluated at this time.\par Pt happy with outcome of treatment. Pt to use compression sock and continue weight bearing in regular shoegear as tolerated.\par Spent 20 minutes for patient care and medical decision making.\par Pt to follow up in 2 months.\par

## 2022-02-10 NOTE — REVIEW OF SYSTEMS
[Fever] : no fever [Eye Pain] : no eye pain [Earache] : no earache [Chest Pain] : no chest pain [Shortness Of Breath] : no shortness of breath [Cough] : no cough [Abdominal Pain] : no abdominal pain [de-identified] : left foot incision closed

## 2022-02-10 NOTE — PHYSICAL EXAM
[2+] : left 2+ [Ankle Swelling (On Exam)] : not present [] : not present [de-identified] : A&Ox3, NAD [de-identified] : left midfoot s/p bone biopsy, nontender to palpation and ROM [de-identified] : Left dorsal midfoot incision healed [de-identified] : Light touch sensation intact bilaterally [FreeTextEntry1] : Left dorsal foot surgical site - Resolved  [de-identified] : White sock  [de-identified] : Cleansed with Normal Saline. \par  [TWNoteComboBox4] : None [de-identified] : Normal [de-identified] : None [de-identified] : None [de-identified] : No [de-identified] : Other

## 2022-02-10 NOTE — ASSESSMENT
[Verbal] : Verbal [Written] : Written [Demo] : Demo [Patient] : Patient [Spouse] : Spouse [Good - alert, interested, motivated] : Good - alert, interested, motivated [Verbalizes knowledge/Understanding] : Verbalizes knowledge/understanding [Foot Care] : foot care [Skin Care] : skin care [Signs and symptoms of infection] : sign and symptoms of infection [How and When to Call] : how and when to call [Compression Therapy] : compression therapy [] : Yes [Stable] : stable [Home] : Home [Ambulatory] : Ambulatory [Not Applicable - Long Term Care/Home Health Agency] : Long Term Care/Home Health Agency: Not Applicable [Patient responsibility to plan of care] : patient responsibility to plan of care [FreeTextEntry2] : Maintain Skin Integrity \par Compression Therapy  [FreeTextEntry4] : DPM reviewed Xray with Pt. Pt Verbalized Understanding \par Pt to F/U to [facility] in 2 Months

## 2022-02-10 NOTE — HISTORY OF PRESENT ILLNESS
[FreeTextEntry1] : Pt seen for follow up left foot osteomyelitis s/p biopsy. Pt relates that his left foot feels better at this time and has been walking in regular shoegear as advised. Denies any other complaints at this time.

## 2022-02-11 DIAGNOSIS — Z86.14 PERSONAL HISTORY OF METHICILLIN RESISTANT STAPHYLOCOCCUS AUREUS INFECTION: ICD-10-CM

## 2022-02-11 DIAGNOSIS — I10 ESSENTIAL (PRIMARY) HYPERTENSION: ICD-10-CM

## 2022-02-11 DIAGNOSIS — Z86.16 PERSONAL HISTORY OF COVID-19: ICD-10-CM

## 2022-02-11 DIAGNOSIS — Z98.49 CATARACT EXTRACTION STATUS, UNSPECIFIED EYE: ICD-10-CM

## 2022-02-11 DIAGNOSIS — Z83.1 FAMILY HISTORY OF OTHER INFECTIOUS AND PARASITIC DISEASES: ICD-10-CM

## 2022-02-11 DIAGNOSIS — Z80.7 FAMILY HISTORY OF OTHER MALIGNANT NEOPLASMS OF LYMPHOID, HEMATOPOIETIC AND RELATED TISSUES: ICD-10-CM

## 2022-02-11 DIAGNOSIS — E78.00 PURE HYPERCHOLESTEROLEMIA, UNSPECIFIED: ICD-10-CM

## 2022-02-11 DIAGNOSIS — Z98.890 OTHER SPECIFIED POSTPROCEDURAL STATES: ICD-10-CM

## 2022-02-11 DIAGNOSIS — Z79.82 LONG TERM (CURRENT) USE OF ASPIRIN: ICD-10-CM

## 2022-02-11 DIAGNOSIS — I48.91 UNSPECIFIED ATRIAL FIBRILLATION: ICD-10-CM

## 2022-02-11 DIAGNOSIS — Z87.81 PERSONAL HISTORY OF (HEALED) TRAUMATIC FRACTURE: ICD-10-CM

## 2022-02-11 DIAGNOSIS — M86.172 OTHER ACUTE OSTEOMYELITIS, LEFT ANKLE AND FOOT: ICD-10-CM

## 2022-02-11 DIAGNOSIS — Z80.9 FAMILY HISTORY OF MALIGNANT NEOPLASM, UNSPECIFIED: ICD-10-CM

## 2022-02-11 DIAGNOSIS — C91.10 CHRONIC LYMPHOCYTIC LEUKEMIA OF B-CELL TYPE NOT HAVING ACHIEVED REMISSION: ICD-10-CM

## 2022-02-11 DIAGNOSIS — Z79.899 OTHER LONG TERM (CURRENT) DRUG THERAPY: ICD-10-CM

## 2022-02-11 DIAGNOSIS — Z92.21 PERSONAL HISTORY OF ANTINEOPLASTIC CHEMOTHERAPY: ICD-10-CM

## 2022-02-28 ENCOUNTER — APPOINTMENT (OUTPATIENT)
Dept: HEMATOLOGY ONCOLOGY | Facility: CLINIC | Age: 67
End: 2022-02-28
Payer: MEDICARE

## 2022-02-28 VITALS
BODY MASS INDEX: 21.86 KG/M2 | SYSTOLIC BLOOD PRESSURE: 127 MMHG | DIASTOLIC BLOOD PRESSURE: 79 MMHG | HEART RATE: 60 BPM | WEIGHT: 148 LBS | RESPIRATION RATE: 14 BRPM

## 2022-02-28 PROCEDURE — 85025 COMPLETE CBC W/AUTO DIFF WBC: CPT | Mod: GC

## 2022-02-28 PROCEDURE — 99205 OFFICE O/P NEW HI 60 MIN: CPT | Mod: GC

## 2022-02-28 PROCEDURE — G2212 PROLONG OUTPT/OFFICE VIS: CPT | Mod: GC

## 2022-02-28 NOTE — ASSESSMENT
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 \par \par Plan:\par -His blood counts from 2/21/22 were reviewed and compared to today are overall stable. \par -His last IVIG was on 2/21 with Dr. Godinez. Can c/w with montly IVIG with Dr. Godinez. \par -will obtain outpt med records regarding prior treatment from Dr. Godinez's office. his most recent bone marrow biopsy from 7/2020 was reviewed and included in HPI. no evidence of MDS on that marrow. need to confirm what dates he was on ibrutinib and what dates he received gazyva and venetoclax, although he believes that he received 6 doses of gazyva and about 11 months of venetoclax\par -will send flow for MRD testing (need to see if insurance will cover) \par -at next visit will check T cell subset (given prior fludarabine treatment), CMP, LDH, uric acid, hep panel, immunoglobulin levels\par -he doesn't meet criteria for CLL directed therapy at this point and will continue to monitor. patient is still recovering from recent hospitalizations \par -can c/w daily folic acid\par -encouraged routine health maintenance: f/u with dermatology, age appropriate cancer screening \par -last COVID booster August 2021, and he received mab Dec 2021. agree with him discussing any further booster or mab with his outpt neurologist. Vianey discussed \par \par RTC in 3 months, continue to follow with Dr. Leigh and we will discuss plan moving forward with Dr. Leigh\par \par d/w Dr. Gregg \par Dominic Roy Heme/onc PGY5

## 2022-02-28 NOTE — RESULTS/DATA
[FreeTextEntry1] : CBC 2/21/22 ([facility]) \par WBC: 4.7 \par Hb: 12 \par Plt: 28 \par ALC; 1.5 \par ANC: 2.4

## 2022-02-28 NOTE — PHYSICAL EXAM
[Normal] : grossly intact [Ambulatory and capable of all self care but unable to carry out any work activities] : Status 2- Ambulatory and capable of all self care but unable to carry out any work activities. Up and about more than 50% of waking hours [de-identified] : SINDY

## 2022-02-28 NOTE — HISTORY OF PRESENT ILLNESS
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 \par \par His CLL was managed by Dr. Jeronimo Godinez (with assistance from Dr. Haji). CLL first dx'd 1/1991 post multiple regimens. S/p RT to spleen 1992 and then splenectomy 1993, had seen Dr. Tam previously. Dr. Tam offered to stay on a consultant (was travelling) and was under care of Dr. Burger. Has had numerous courses of treatment w/ Fludarabine. 3/2020 change to Ibrutinib. CBC had showed progressive macrocytic anemia, leukocytosis and bone marrow biopsy on 7/22/20 w CLL and new del 17p in addition to his known del 11q and del 13q. Report obtained from Dr. Godinez's office: hypercellular marrow for age (70% cellularity) involved by CLL/SLL (30% cellularity), no morphological evidence of granuloma, fibrosis, or metastatic tumors. Storage iron is decreased, no evidence of ringed sideroblasts, myeloid precursors are rpesent and show complete maturation, erythroid precurosrs are decreased and show complete maturation, megakaryocytes are adequate in number with predominantly normal morphology. Flow showing persistent recurrent CLL/SLL, CD38(-), ZAP70(-), comprising 86% of total analyzed events. Cytogenetics/FISH: t(2;12), del 11q, 13q, 17p. His CLL therapy was changed sometime after the BM Bx (patient does not know what date) for ibrutinib failure and he was transitioned to gazyva and venetoclax. He completed 6 months of Gazyva and continued on venetoclax with reported excellent response but unfortunately in Sept 2021 was admitted with altered mental status and initially thought to have stroke (received tPA) but ultimately diagnosed with autoimmune encephalitis (diagnosed on LP). He was treated with IVIG and then started on oral steroids with prednisone and tapered off by 10/26/21. He was noted to have fracture of the left foot which initially required boot for stabilization.\par \par Subsequent admissions:\par 10/1-10/8/2021: Rehab stay, significant for L arm infected cyst s/p drainage. Was dc'ed 10/8 to home.\par 11/22-12/1/2021: PLV for osteomyelitis of L ankle s/p vancomycin.\par 12/28-1/4/22: Admitted for AMS. On subsequent day, the patient had returned back to his baseline. MRI of the brain: No evidence of acute hemorrhage mass effect acute territorial infarcts seen. AMS may have been from COVID? Received COVID mab. Abx for osteo to finish on 1/11. On 1/4/22 his WBC was 5.6, hgb 11.4, plt 296, ALC 0.34, ANC 3.28. Discharged back to rehab. Left rehab and had emergency cataract repair on 1/31 on left eye. Had suture failure and needed repeat surgery 2/14. \par \par Today is coming from home. Neurologically he reports being almost back to baseline. He still has some general weakness from previous hospitalizations. No focal neuro deficits. Wife has noticed little things like he filled out a check incorrectly a couple of weeks ago (may be having some issues with judgment/memory) Not driving yet out of safety concern and because of recent eye surgery. Because of his hospitalizations he lost about 30 pounds and is now regaining weight. Appetite is okay. 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 Reports no fevers, no night sweats. No other complaints. He reports being on IVIG for years. He was treated for his CLL previously based on his blood counts. He never developed any lymphadenopathy. Labs with Dr. Godinez on 2/21: WBC 4.7, hgb 12, plt 286, ALC 1.5, ANC 2.4. He received IVIG on 2/21. \par \par Surgical history: splenectomy, b/l hip repair for avascular necrosis \par COVID booster: June 2021 J+J\par Neurologist: Dr. Violeta Chang ([place])

## 2022-02-28 NOTE — END OF VISIT
[FreeTextEntry3] : Patient seen and case discussed with Dr. Roy- 65 yo male with history of relapsed/refractory CLL, most recently treated with Billy-Obi, currently off therapy. Has had a complicated course with multiple infections and autoimmune encephalitis treated with IVIG and steroids. Overall improving. CBC WNL, no palpable adenopathy. Would hold further CLL directed therapy, can consider re-treatment with venetoclax. Will continue to follow up with Dr. Fry  [Time Spent: ___ minutes] : I have spent [unfilled] minutes of time on the encounter.

## 2022-02-28 NOTE — REVIEW OF SYSTEMS
[Fever] : no fever [Eye Pain] : no eye pain [Dysphagia] : no dysphagia [Chest Pain] : no chest pain [Abdominal Pain] : no abdominal pain [Shortness Of Breath] : no shortness of breath [Dysuria] : no dysuria [Joint Pain] : no joint pain [Muscle Weakness] : no muscle weakness [Skin Rash] : no skin rash [Difficulty Walking] : no difficulty walking [Easy Bleeding] : no tendency for easy bleeding [Easy Bruising] : no tendency for easy bruising

## 2022-04-28 NOTE — OCCUPATIONAL THERAPY INITIAL EVALUATION ADULT - WEIGHT-BEARING RESTRICTIONS: SIT/STAND, REHAB EVAL
Keira Mace, RODRI Almaguer,H Im Phone Nurse Msg Pool 3 minutes ago (10:27 AM)     EW    Good Morning,     Dr. Ferrell had patient complete a full lab panel. Patients A1C was elevated at 6.9. Patient was recommended to follow up with Dr. Almaguer. Thank you.          weight-bearing as tolerated

## 2022-05-10 NOTE — ASU PREOP CHECKLIST - WEIGHT IN KG
76 Partial Purse String (Simple) Text: Given the location of the defect and the characteristics of the surrounding skin a simple purse string closure was deemed most appropriate.  Undermining was performed circumfirentially around the surgical defect.  A purse string suture was then placed and tightened. Wound tension only allowed a partial closure of the circular defect.

## 2022-06-02 ENCOUNTER — APPOINTMENT (OUTPATIENT)
Dept: HEMATOLOGY ONCOLOGY | Facility: CLINIC | Age: 67
End: 2022-06-02
Payer: MEDICARE

## 2022-06-02 VITALS
SYSTOLIC BLOOD PRESSURE: 118 MMHG | DIASTOLIC BLOOD PRESSURE: 64 MMHG | HEART RATE: 85 BPM | RESPIRATION RATE: 14 BRPM | OXYGEN SATURATION: 99 %

## 2022-06-02 VITALS — WEIGHT: 153 LBS | BODY MASS INDEX: 22.59 KG/M2

## 2022-06-02 PROCEDURE — 99215 OFFICE O/P EST HI 40 MIN: CPT

## 2022-06-02 RX ORDER — ATORVASTATIN CALCIUM 80 MG/1
80 TABLET, FILM COATED ORAL DAILY
Refills: 0 | Status: COMPLETED | COMMUNITY
End: 2022-06-02

## 2022-06-02 RX ORDER — BENZONATATE 100 MG/1
100 CAPSULE ORAL TWICE DAILY
Refills: 0 | Status: COMPLETED | COMMUNITY
End: 2022-06-02

## 2022-06-02 RX ORDER — FAMOTIDINE 20 MG/1
20 TABLET, FILM COATED ORAL DAILY
Refills: 0 | Status: COMPLETED | COMMUNITY
End: 2022-06-02

## 2022-06-02 RX ORDER — GLUCOSAMINE HCL/CHONDROITIN SU 500-400 MG
3 CAPSULE ORAL AT BEDTIME
Refills: 0 | Status: COMPLETED | COMMUNITY
End: 2022-06-02

## 2022-06-02 NOTE — RESULTS/DATA
[FreeTextEntry1] : CBC 22 ([facility]) \par WBC: 4.7 \par Hb: 12 \par Plt: 28 \par ALC; 1.5 \par ANC: 2.4 \par \par CBC 22 (OSH) \par WBC: 4.1\par Hb: 12.7\par Plt: 258\par A.50\par ANC: 2.1

## 2022-06-02 NOTE — ASSESSMENT
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 \par \par Plan:\par -His blood counts from 5/18/22 were reviewed and compared to today are overall stable. \par -c/w with monthly IVIG with Dr. Godinez. \par -will obtain outpt med records regarding prior treatment from Dr. Godinez's office. his most recent bone marrow biopsy from 7/2020 was reviewed and included in HPI. no evidence of MDS on that marrow.\par -will send flow for MRD testing (need to see if insurance will cover) \par -at next visit will check T cell subset (given prior fludarabine treatment), CMP, LDH, uric acid, hep panel, immunoglobulin levels\par -he doesn't meet criteria for CLL directed therapy at this point and will continue to monitor. patient is still recovering from recent hospitalizations \par -can c/w daily folic acid\par -encouraged routine health maintenance: f/u with dermatology, age appropriate cancer screening \par -last COVID booster August 2021, and he received mab Dec 2021. agree with him discussing any further booster or mab with his outpt neurologist. Vianey discussed \par \par RTC in 3 months, continue to follow with Dr. Leigh and we will discuss plan moving forward with Dr. Leigh\par \par d/w Dr. Gregg \par Dominic Roy Heme/onc PGY5

## 2022-06-02 NOTE — REVIEW OF SYSTEMS
[Fever] : no fever [Eye Pain] : no eye pain [Dysphagia] : no dysphagia [Chest Pain] : no chest pain [Shortness Of Breath] : no shortness of breath [Abdominal Pain] : no abdominal pain [Dysuria] : no dysuria [Joint Pain] : no joint pain [Muscle Weakness] : no muscle weakness [Skin Rash] : no skin rash [Difficulty Walking] : no difficulty walking [Easy Bleeding] : no tendency for easy bleeding [Easy Bruising] : no tendency for easy bruising

## 2022-06-02 NOTE — PHYSICAL EXAM
[Ambulatory and capable of all self care but unable to carry out any work activities] : Status 2- Ambulatory and capable of all self care but unable to carry out any work activities. Up and about more than 50% of waking hours [Normal] : affect appropriate [de-identified] : SINDY

## 2022-06-02 NOTE — HISTORY OF PRESENT ILLNESS
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 \par \par His CLL was managed by Dr. Jeronimo Godinez (with assistance from Dr. Haji). CLL first dx'd 1/1991 post multiple regimens. S/p RT to spleen 1992 and then splenectomy 1993, had seen Dr. Tam previously. Dr. Tam offered to stay on a consultant (was travelling) and was under care of Dr. Burger. Has had numerous courses of treatment w/ Fludarabine. 3/2020 change to Ibrutinib. CBC had showed progressive macrocytic anemia, leukocytosis and bone marrow biopsy on 7/22/20 w CLL and new del 17p in addition to his known del 11q and del 13q. Report obtained from Dr. Godinez's office: hypercellular marrow for age (70% cellularity) involved by CLL/SLL (30% cellularity), no morphological evidence of granuloma, fibrosis, or metastatic tumors. Storage iron is decreased, no evidence of ringed sideroblasts, myeloid precursors are rpesent and show complete maturation, erythroid precurosrs are decreased and show complete maturation, megakaryocytes are adequate in number with predominantly normal morphology. Flow showing persistent recurrent CLL/SLL, CD38(-), ZAP70(-), comprising 86% of total analyzed events. Cytogenetics/FISH: t(2;12), del 11q, 13q, 17p. His CLL therapy was changed sometime after the BM Bx (9/2020) for ibrutinib failure and he was transitioned to gazyva and venetoclax September 2020. He completed 6 months of Gazyva and continued on venetoclax (began in November or December 2020, dose reduced from 400 mg to 300 mg) with reported excellent response which he took until mid 2021. Unfortunately in Sept 2021 was admitted with altered mental status and initially thought to have stroke (received tPA) but ultimately diagnosed with autoimmune encephalitis (diagnosed on LP). He was treated with IVIG and then started on oral steroids with prednisone and tapered off by 10/26/21. He was noted to have fracture of the left foot which initially required boot for stabilization.He also received procrit beginning in December 2020 which was stopped in May 2021, \par \par Subsequent admissions:\par 10/1-10/8/2021: Rehab stay, significant for L arm infected cyst s/p drainage. Was dc'ed 10/8 to home.\par 11/22-12/1/2021: PLV for osteomyelitis of L ankle s/p vancomycin.\par 12/28-1/4/22: Admitted for AMS. On subsequent day, the patient had returned back to his baseline. MRI of the brain: No evidence of acute hemorrhage mass effect acute territorial infarcts seen. AMS may have been from COVID? Received COVID mab. Abx for osteo to finish on 1/11. On 1/4/22 his WBC was 5.6, hgb 11.4, plt 296, ALC 0.34, ANC 3.28. Discharged back to rehab. Left rehab and had emergency cataract repair on 1/31 on left eye. Had suture failure and needed repeat surgery 2/14. \par \par Today is coming from home. Neurologically he reports being almost back to baseline. He still has some general weakness from previous hospitalizations. No focal neuro deficits. Wife has noticed little things like he filled out a check incorrectly a couple of weeks ago (may be having some issues with judgment/memory) Not driving yet out of safety concern and because of recent eye surgery. Because of his hospitalizations he lost about 30 pounds and is now regaining weight. Appetite is okay. 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 Reports no fevers, no night sweats. No other complaints. He reports being on IVIG for years. He was treated for his CLL previously based on his blood counts. He never developed any lymphadenopathy. Labs with Dr. Godinez on 2/21: WBC 4.7, hgb 12, plt 286, ALC 1.5, ANC 2.4. He received IVIG on 2/21. \par \par Surgical history: splenectomy, b/l hip repair for avascular necrosis \par COVID booster: June 2021 J+J\par Neurologist: Dr. Violeta Chang ([place])  [de-identified] : 6/2/22\par Since his last visit, he has had two left eye surgeries (1/2022, 2/2022) for lens/cataract. Overall, feeling well, but notes he is going to sleep earlier than he used to. His wife notes overall his energy is improving. No fevers, chills, night sweats, weight loss. He has gained back which he previously lost. They continue to notice improvement in his neurologic status with less confusion overall. Occasional episodes, but these are less frequent. He recently began driving. No fevers, chills or night sweats. No new lymphadenopathy. He is noticing some anxiety at night. No chest pain, palpitations, shortness of breath. \par \par His brother was recently diagnosed with CLL while he was hospitalized for an infection.

## 2022-06-11 ENCOUNTER — NON-APPOINTMENT (OUTPATIENT)
Age: 67
End: 2022-06-11

## 2022-07-07 LAB
COVID-19 NUCLEOCAPSID  GAM ANTIBODY INTERPRETATION: POSITIVE
COVID-19 SPIKE DOMAIN ANTIBODY INTERPRETATION: POSITIVE
SARS-COV-2 AB SERPL IA-ACNC: >250 U/ML
SARS-COV-2 AB SERPL QL IA: 13.8 INDEX

## 2022-07-18 ENCOUNTER — NON-APPOINTMENT (OUTPATIENT)
Age: 67
End: 2022-07-18

## 2022-08-12 NOTE — CHART NOTE - NSCHARTNOTESELECT_GEN_ALL_CORE
Event Note
Transfer Note
Event Note
Event Note
Hematology
Nutrition Services
Speech and Swallow
Speech and Swallow
Quality 226: Preventive Care And Screening: Tobacco Use: Screening And Cessation Intervention: Patient screened for tobacco use and is an ex/non-smoker
Quality 431: Preventive Care And Screening: Unhealthy Alcohol Use - Screening: Patient not identified as an unhealthy alcohol user when screened for unhealthy alcohol use using a systematic screening method
Quality 130: Documentation Of Current Medications In The Medical Record: Current Medications Documented
Detail Level: Detailed
Quality 402: Tobacco Use And Help With Quitting Among Adolescents: Patient screened for tobacco and never smoked

## 2022-10-01 NOTE — PATIENT PROFILE ADULT - CAREGIVER OBTAIN DETAILS
-LFTs wnl throughout pregnancy, HBV DNA viral load in third trimester Log 2.71, 510 IU/mL, does not meet criteria for antiviral tx  -patient following with hepatology clinic   AMS

## 2022-10-10 ENCOUNTER — APPOINTMENT (OUTPATIENT)
Dept: HEMATOLOGY ONCOLOGY | Facility: CLINIC | Age: 67
End: 2022-10-10

## 2022-10-10 VITALS
OXYGEN SATURATION: 99 % | DIASTOLIC BLOOD PRESSURE: 70 MMHG | SYSTOLIC BLOOD PRESSURE: 120 MMHG | TEMPERATURE: 97.5 F | HEART RATE: 78 BPM

## 2022-10-10 PROCEDURE — 99213 OFFICE O/P EST LOW 20 MIN: CPT | Mod: GC

## 2022-10-10 NOTE — END OF VISIT
[FreeTextEntry3] : Patient seen and case discussed with INOCENCIO De Los Santos. I agree with above and have edited the note where needed.\par

## 2022-10-10 NOTE — PHYSICAL EXAM
[Ambulatory and capable of all self care but unable to carry out any work activities] : Status 2- Ambulatory and capable of all self care but unable to carry out any work activities. Up and about more than 50% of waking hours [Normal] : affect appropriate [de-identified] : SINDY

## 2022-10-10 NOTE — HISTORY OF PRESENT ILLNESS
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 \par \par His CLL was managed by Dr. Jeronimo Godinez (with assistance from Dr. Haji). CLL first dx'd 1/1991 post multiple regimens. S/p RT to spleen 1992 and then splenectomy 1993, had seen Dr. Tam previously. Dr. Tam offered to stay on a consultant (was travelling) and was under care of Dr. Burger. Has had numerous courses of treatment w/ Fludarabine. 3/2020 change to Ibrutinib. CBC had showed progressive macrocytic anemia, leukocytosis and bone marrow biopsy on 7/22/20 w CLL and new del 17p in addition to his known del 11q and del 13q. Report obtained from Dr. Godinez's office: hypercellular marrow for age (70% cellularity) involved by CLL/SLL (30% cellularity), no morphological evidence of granuloma, fibrosis, or metastatic tumors. Storage iron is decreased, no evidence of ringed sideroblasts, myeloid precursors are rpesent and show complete maturation, erythroid precurosrs are decreased and show complete maturation, megakaryocytes are adequate in number with predominantly normal morphology. Flow showing persistent recurrent CLL/SLL, CD38(-), ZAP70(-), comprising 86% of total analyzed events. Cytogenetics/FISH: t(2;12), del 11q, 13q, 17p. His CLL therapy was changed sometime after the BM Bx (9/2020) for ibrutinib failure and he was transitioned to gazyva and venetoclax September 2020. He completed 6 months of Gazyva and continued on venetoclax (began in November or December 2020, dose reduced from 400 mg to 300 mg) with reported excellent response which he took until mid 2021. Unfortunately in Sept 2021 was admitted with altered mental status and initially thought to have stroke (received tPA) but ultimately diagnosed with autoimmune encephalitis (diagnosed on LP). He was treated with IVIG and then started on oral steroids with prednisone and tapered off by 10/26/21. He was noted to have fracture of the left foot which initially required boot for stabilization.He also received procrit beginning in December 2020 which was stopped in May 2021, \par \par Subsequent admissions:\par 10/1-10/8/2021: Rehab stay, significant for L arm infected cyst s/p drainage. Was dc'ed 10/8 to home.\par 11/22-12/1/2021: PLV for osteomyelitis of L ankle s/p vancomycin.\par 12/28-1/4/22: Admitted for AMS. On subsequent day, the patient had returned back to his baseline. MRI of the brain: No evidence of acute hemorrhage mass effect acute territorial infarcts seen. AMS may have been from COVID? Received COVID mab. Abx for osteo to finish on 1/11. On 1/4/22 his WBC was 5.6, hgb 11.4, plt 296, ALC 0.34, ANC 3.28. Discharged back to rehab. Left rehab and had emergency cataract repair on 1/31 on left eye. Had suture failure and needed repeat surgery 2/14. \par \par Today is coming from home. Neurologically he reports being almost back to baseline. He still has some general weakness from previous hospitalizations. No focal neuro deficits. Wife has noticed little things like he filled out a check incorrectly a couple of weeks ago (may be having some issues with judgment/memory) Not driving yet out of safety concern and because of recent eye surgery. Because of his hospitalizations he lost about 30 pounds and is now regaining weight. Appetite is okay. 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 Reports no fevers, no night sweats. No other complaints. He reports being on IVIG for years. He was treated for his CLL previously based on his blood counts. He never developed any lymphadenopathy. Labs with Dr. Godinez on 2/21: WBC 4.7, hgb 12, plt 286, ALC 1.5, ANC 2.4. He received IVIG on 2/21. \par \par Surgical history: splenectomy, b/l hip repair for avascular necrosis \par COVID booster: June 2021 J+J\par Neurologist: Dr. Violeta Chang ([place])  [de-identified] : 10/10/22:\par Overall, feels well and has no complaints. He continues to receive monthly IVIG with Dr. Godinez. No fevers, chills, night sweats, weight loss. He continues to notice improvement in his neurologic status with less confusion overall. Occasional episodes, but these are less frequent.  No fevers, chills or night sweats. No new lymphadenopathy No chest pain, palpitations, shortness of breath. No recent/recurrent infections\par \par His brother was recently diagnosed with CLL while he was hospitalized for an infection.

## 2022-10-10 NOTE — RESULTS/DATA
[FreeTextEntry1] : CBC 22 Alejandra johnson\par \par CBC 22 ([facility]) \par WBC: 4.7 \par Hb: 12 \par Plt: 28 \par ALC; 1.5 \par ANC: 2.4 \par \par CBC 22 (OS[facility]) \par WBC: 4.1\par Hb: 12.7\par Plt: 258\par A.50\par ANC: 2.1

## 2022-10-10 NOTE — ASSESSMENT
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 Godinez. \par -will obtain outpt med records regarding prior treatment from Dr. Godinez's office. his most recent bone marrow biopsy from 7/2020 was reviewed and included in HPI. no evidence of MDS on that marrow.\par -at next visit will check T cell subset (given prior fludarabine treatment), CMP, LDH, uric acid, hep panel, immunoglobulin levels-patient prefers to have this checked with Dr. Godinez \par -he doesn't meet criteria for CLL directed therapy at this point and will continue to monitor. \par -can c/w daily folic acid\par -encouraged routine health maintenance: f/u with dermatology, age appropriate cancer screening \par -last COVID booster August 2021, and he received mab Dec 2021. agree with him discussing any further booster or mab with his outpt neurologist. Vianey discussed, received 6/2022 without adverse events, would continue every 6 months \par \par RTC in 4 months, continue to follow with Dr. Leigh and we will discuss plan moving forward with Dr. Leigh\par \par

## 2022-10-10 NOTE — REVIEW OF SYSTEMS
[Negative] : Allergic/Immunologic [Eye Pain] : no eye pain [Dysphagia] : no dysphagia [Muscle Weakness] : no muscle weakness [Difficulty Walking] : no difficulty walking

## 2022-11-09 ENCOUNTER — INPATIENT (INPATIENT)
Facility: HOSPITAL | Age: 67
LOS: 0 days | Discharge: HOME | DRG: 391 | End: 2022-11-10
Attending: STUDENT IN AN ORGANIZED HEALTH CARE EDUCATION/TRAINING PROGRAM | Admitting: STUDENT IN AN ORGANIZED HEALTH CARE EDUCATION/TRAINING PROGRAM
Payer: COMMERCIAL

## 2022-11-09 VITALS
HEART RATE: 84 BPM | OXYGEN SATURATION: 100 % | TEMPERATURE: 98 F | SYSTOLIC BLOOD PRESSURE: 154 MMHG | DIASTOLIC BLOOD PRESSURE: 85 MMHG | RESPIRATION RATE: 18 BRPM

## 2022-11-09 DIAGNOSIS — E78.5 HYPERLIPIDEMIA, UNSPECIFIED: ICD-10-CM

## 2022-11-09 DIAGNOSIS — I10 ESSENTIAL (PRIMARY) HYPERTENSION: ICD-10-CM

## 2022-11-09 DIAGNOSIS — I48.0 PAROXYSMAL ATRIAL FIBRILLATION: ICD-10-CM

## 2022-11-09 DIAGNOSIS — H26.9 UNSPECIFIED CATARACT: Chronic | ICD-10-CM

## 2022-11-09 DIAGNOSIS — C91.10 CHRONIC LYMPHOCYTIC LEUKEMIA OF B-CELL TYPE NOT HAVING ACHIEVED REMISSION: ICD-10-CM

## 2022-11-09 DIAGNOSIS — R53.1 WEAKNESS: ICD-10-CM

## 2022-11-09 DIAGNOSIS — Z29.9 ENCOUNTER FOR PROPHYLACTIC MEASURES, UNSPECIFIED: ICD-10-CM

## 2022-11-09 DIAGNOSIS — R41.82 ALTERED MENTAL STATUS, UNSPECIFIED: ICD-10-CM

## 2022-11-09 DIAGNOSIS — R19.7 DIARRHEA, UNSPECIFIED: ICD-10-CM

## 2022-11-09 DIAGNOSIS — R29.898 OTHER SYMPTOMS AND SIGNS INVOLVING THE MUSCULOSKELETAL SYSTEM: ICD-10-CM

## 2022-11-09 LAB
ALBUMIN SERPL ELPH-MCNC: 4 G/DL — SIGNIFICANT CHANGE UP (ref 3.3–5)
ALP SERPL-CCNC: 68 U/L — SIGNIFICANT CHANGE UP (ref 40–120)
ALT FLD-CCNC: 19 U/L — SIGNIFICANT CHANGE UP (ref 12–78)
AMMONIA BLD-MCNC: 18 UMOL/L — SIGNIFICANT CHANGE UP (ref 11–32)
ANION GAP SERPL CALC-SCNC: 9 MMOL/L — SIGNIFICANT CHANGE UP (ref 5–17)
APPEARANCE UR: CLEAR — SIGNIFICANT CHANGE UP
APTT BLD: 20 SEC — LOW (ref 27.5–35.5)
AST SERPL-CCNC: 25 U/L — SIGNIFICANT CHANGE UP (ref 15–37)
BASOPHILS # BLD AUTO: 0 K/UL — SIGNIFICANT CHANGE UP (ref 0–0.2)
BASOPHILS NFR BLD AUTO: 0 % — SIGNIFICANT CHANGE UP (ref 0–2)
BILIRUB SERPL-MCNC: 1.4 MG/DL — HIGH (ref 0.2–1.2)
BILIRUB UR-MCNC: NEGATIVE — SIGNIFICANT CHANGE UP
BUN SERPL-MCNC: 20 MG/DL — SIGNIFICANT CHANGE UP (ref 7–23)
CALCIUM SERPL-MCNC: 9.1 MG/DL — SIGNIFICANT CHANGE UP (ref 8.5–10.1)
CHLORIDE SERPL-SCNC: 108 MMOL/L — SIGNIFICANT CHANGE UP (ref 96–108)
CK SERPL-CCNC: 133 U/L — SIGNIFICANT CHANGE UP (ref 26–308)
CO2 SERPL-SCNC: 24 MMOL/L — SIGNIFICANT CHANGE UP (ref 22–31)
COLOR SPEC: YELLOW — SIGNIFICANT CHANGE UP
CREAT SERPL-MCNC: 1.3 MG/DL — SIGNIFICANT CHANGE UP (ref 0.5–1.3)
DIFF PNL FLD: ABNORMAL
EGFR: 60 ML/MIN/1.73M2 — SIGNIFICANT CHANGE UP
EOSINOPHIL # BLD AUTO: 0 K/UL — SIGNIFICANT CHANGE UP (ref 0–0.5)
EOSINOPHIL NFR BLD AUTO: 0 % — SIGNIFICANT CHANGE UP (ref 0–6)
EPI CELLS # UR: NEGATIVE — SIGNIFICANT CHANGE UP
GLUCOSE SERPL-MCNC: 112 MG/DL — HIGH (ref 70–99)
GLUCOSE UR QL: NEGATIVE — SIGNIFICANT CHANGE UP
HCT VFR BLD CALC: 40.1 % — SIGNIFICANT CHANGE UP (ref 39–50)
HGB BLD-MCNC: 13.2 G/DL — SIGNIFICANT CHANGE UP (ref 13–17)
INR BLD: 1.04 RATIO — SIGNIFICANT CHANGE UP (ref 0.88–1.16)
KETONES UR-MCNC: NEGATIVE — SIGNIFICANT CHANGE UP
LACTATE SERPL-SCNC: 1.3 MMOL/L — SIGNIFICANT CHANGE UP (ref 0.7–2)
LACTATE SERPL-SCNC: 2.7 MMOL/L — HIGH (ref 0.7–2)
LEUKOCYTE ESTERASE UR-ACNC: NEGATIVE — SIGNIFICANT CHANGE UP
LG PLATELETS BLD QL AUTO: SLIGHT — SIGNIFICANT CHANGE UP
LYMPHOCYTES # BLD AUTO: 0.77 K/UL — LOW (ref 1–3.3)
LYMPHOCYTES # BLD AUTO: 9 % — LOW (ref 13–44)
MANUAL SMEAR VERIFICATION: SIGNIFICANT CHANGE UP
MCHC RBC-ENTMCNC: 32.4 PG — SIGNIFICANT CHANGE UP (ref 27–34)
MCHC RBC-ENTMCNC: 32.9 GM/DL — SIGNIFICANT CHANGE UP (ref 32–36)
MCV RBC AUTO: 98.3 FL — SIGNIFICANT CHANGE UP (ref 80–100)
METAMYELOCYTES # FLD: 1 % — HIGH (ref 0–0)
MONOCYTES # BLD AUTO: 2.41 K/UL — HIGH (ref 0–0.9)
MONOCYTES NFR BLD AUTO: 28 % — HIGH (ref 2–14)
NEUTROPHILS # BLD AUTO: 5.34 K/UL — SIGNIFICANT CHANGE UP (ref 1.8–7.4)
NEUTROPHILS NFR BLD AUTO: 62 % — SIGNIFICANT CHANGE UP (ref 43–77)
NITRITE UR-MCNC: NEGATIVE — SIGNIFICANT CHANGE UP
NRBC # BLD: 0 — SIGNIFICANT CHANGE UP
NRBC # BLD: SIGNIFICANT CHANGE UP /100 WBCS (ref 0–0)
NT-PROBNP SERPL-SCNC: 168 PG/ML — HIGH (ref 0–125)
PH UR: 6.5 — SIGNIFICANT CHANGE UP (ref 5–8)
PLAT MORPH BLD: NORMAL — SIGNIFICANT CHANGE UP
PLATELET # BLD AUTO: 237 K/UL — SIGNIFICANT CHANGE UP (ref 150–400)
POTASSIUM SERPL-MCNC: 4.5 MMOL/L — SIGNIFICANT CHANGE UP (ref 3.5–5.3)
POTASSIUM SERPL-SCNC: 4.5 MMOL/L — SIGNIFICANT CHANGE UP (ref 3.5–5.3)
PROT SERPL-MCNC: 8.1 G/DL — SIGNIFICANT CHANGE UP (ref 6–8.3)
PROT UR-MCNC: 30 MG/DL
PROTHROM AB SERPL-ACNC: 12.2 SEC — SIGNIFICANT CHANGE UP (ref 10.5–13.4)
RAPID RVP RESULT: SIGNIFICANT CHANGE UP
RBC # BLD: 4.08 M/UL — LOW (ref 4.2–5.8)
RBC # FLD: 14.6 % — HIGH (ref 10.3–14.5)
RBC BLD AUTO: NORMAL — SIGNIFICANT CHANGE UP
RBC CASTS # UR COMP ASSIST: SIGNIFICANT CHANGE UP /HPF (ref 0–4)
SARS-COV-2 RNA SPEC QL NAA+PROBE: SIGNIFICANT CHANGE UP
SODIUM SERPL-SCNC: 141 MMOL/L — SIGNIFICANT CHANGE UP (ref 135–145)
SP GR SPEC: 1.01 — SIGNIFICANT CHANGE UP (ref 1.01–1.02)
TROPONIN I, HIGH SENSITIVITY RESULT: 21.8 NG/L — SIGNIFICANT CHANGE UP
UROBILINOGEN FLD QL: NEGATIVE — SIGNIFICANT CHANGE UP
WBC # BLD: 8.61 K/UL — SIGNIFICANT CHANGE UP (ref 3.8–10.5)
WBC # FLD AUTO: 8.61 K/UL — SIGNIFICANT CHANGE UP (ref 3.8–10.5)
WBC UR QL: SIGNIFICANT CHANGE UP

## 2022-11-09 PROCEDURE — 99223 1ST HOSP IP/OBS HIGH 75: CPT | Mod: GC,AI

## 2022-11-09 PROCEDURE — 70498 CT ANGIOGRAPHY NECK: CPT | Mod: 26,MA

## 2022-11-09 PROCEDURE — 99285 EMERGENCY DEPT VISIT HI MDM: CPT

## 2022-11-09 PROCEDURE — 0042T: CPT | Mod: MA

## 2022-11-09 PROCEDURE — 71045 X-RAY EXAM CHEST 1 VIEW: CPT | Mod: 26

## 2022-11-09 PROCEDURE — 70450 CT HEAD/BRAIN W/O DYE: CPT | Mod: 26,MA,59

## 2022-11-09 PROCEDURE — 70496 CT ANGIOGRAPHY HEAD: CPT | Mod: 26,MA

## 2022-11-09 RX ORDER — ASPIRIN/CALCIUM CARB/MAGNESIUM 324 MG
81 TABLET ORAL DAILY
Refills: 0 | Status: DISCONTINUED | OUTPATIENT
Start: 2022-11-09 | End: 2022-11-10

## 2022-11-09 RX ORDER — ACETAMINOPHEN 500 MG
650 TABLET ORAL EVERY 6 HOURS
Refills: 0 | Status: DISCONTINUED | OUTPATIENT
Start: 2022-11-09 | End: 2022-11-10

## 2022-11-09 RX ORDER — ENOXAPARIN SODIUM 100 MG/ML
40 INJECTION SUBCUTANEOUS EVERY 24 HOURS
Refills: 0 | Status: DISCONTINUED | OUTPATIENT
Start: 2022-11-09 | End: 2022-11-10

## 2022-11-09 RX ORDER — FEXOFENADINE HCL 30 MG
1 TABLET ORAL
Qty: 0 | Refills: 0 | DISCHARGE

## 2022-11-09 RX ORDER — ONDANSETRON 8 MG/1
4 TABLET, FILM COATED ORAL ONCE
Refills: 0 | Status: DISCONTINUED | OUTPATIENT
Start: 2022-11-09 | End: 2022-11-10

## 2022-11-09 RX ORDER — ONDANSETRON 8 MG/1
4 TABLET, FILM COATED ORAL EVERY 8 HOURS
Refills: 0 | Status: DISCONTINUED | OUTPATIENT
Start: 2022-11-09 | End: 2022-11-09

## 2022-11-09 RX ORDER — LANOLIN ALCOHOL/MO/W.PET/CERES
3 CREAM (GRAM) TOPICAL AT BEDTIME
Refills: 0 | Status: DISCONTINUED | OUTPATIENT
Start: 2022-11-09 | End: 2022-11-10

## 2022-11-09 RX ORDER — PREDNISOLONE SODIUM PHOSPHATE 1 %
1 DROPS OPHTHALMIC (EYE) DAILY
Refills: 0 | Status: DISCONTINUED | OUTPATIENT
Start: 2022-11-09 | End: 2022-11-10

## 2022-11-09 RX ORDER — FOLIC ACID 0.8 MG
1 TABLET ORAL DAILY
Refills: 0 | Status: DISCONTINUED | OUTPATIENT
Start: 2022-11-09 | End: 2022-11-10

## 2022-11-09 RX ORDER — SODIUM CHLORIDE 9 MG/ML
1000 INJECTION INTRAMUSCULAR; INTRAVENOUS; SUBCUTANEOUS ONCE
Refills: 0 | Status: COMPLETED | OUTPATIENT
Start: 2022-11-09 | End: 2022-11-09

## 2022-11-09 RX ADMIN — Medication 3 MILLIGRAM(S): at 22:22

## 2022-11-09 RX ADMIN — ENOXAPARIN SODIUM 40 MILLIGRAM(S): 100 INJECTION SUBCUTANEOUS at 22:21

## 2022-11-09 RX ADMIN — SODIUM CHLORIDE 1000 MILLILITER(S): 9 INJECTION INTRAMUSCULAR; INTRAVENOUS; SUBCUTANEOUS at 08:04

## 2022-11-09 NOTE — ED PROVIDER NOTE - SEVERE SEPSIS CRITERIA MET YN (MLM)
No. KYLIE screening performed.  STOP BANG Legend: 0-2 = LOW Risk; 3-4 = INTERMEDIATE Risk; 5-8 = HIGH Risk Sepsis Criteria were met:

## 2022-11-09 NOTE — H&P ADULT - PROBLEM SELECTOR PLAN 5
Pt states he no longer takes a statin as his cholesterol is normal   -Lipid panel ordered Pt states he no longer takes a statin as his cholesterol is normal   -Lipid panel ordered  -Dash/TLC diet Pt states he no longer takes a statin as his cholesterol is normal  -Continue home baby aspirin  -Lipid panel ordered  -Dash/TLC diet

## 2022-11-09 NOTE — ED ADULT NURSE NOTE - GLASGOW COMA SCALE
"CXR (8/11): "patchy bibasilar airspace".  Likely, due to vomiting.   - meropenem  - [facility] solution started for ST on 8/16.  Poor visualization of throat on exam.    " baseline

## 2022-11-09 NOTE — H&P ADULT - ASSESSMENT
Pt is a 66M w/ PMHx of CLL on Venetoclax and monthly IVIG infusions, MDS, melanoma, PAF (not on AC), AIE, HTN, HLD, and osteomyelitis of foot who presents with one day of AMS and weakness with negative CT, admitted for AMS workup

## 2022-11-09 NOTE — H&P ADULT - HISTORY OF PRESENT ILLNESS
Pt is a 66M w/ PMHx of CLL on Venetoclax and monthly IVIG infusions, MDS, melanoma, PAF (not on AC), AIE, HTN, HLD, and osteomyelitis of foot who presents with one day of AMS and weakness. Pt has been having some general weakness and difficulty gait since yesterday a.m., prior to his IVIG infusion. Last night, pt developed some some confusion/acting stranger than usual as per family.  No numbness/loss of sensation.     IN THE ED:  Temp 98.5F, HR 84, /85, RR 18, 100 SpO2 on room air  Labs significant for: lactate 2.7, bili 1.4  Imaging  CT head non con: No acute transcortical infarct or intracranial hemorrhage.  CT angiogram head/neck: No vaso-occlusive disease.  CT perfusion:  -37 mL at risk ischemic tissue in the bilateral MCA watershed   territories, which could represent some degree of global hypoperfusion.  -No complete core infarct identified.  -Recommend MRI brain for further evaluation.  EKG: Accelerated Junctional rhythm  Received in ED: 1L NS  Consults: Neurology Pt is a 66M w/ PMHx of CLL on Venetoclax and monthly IVIG infusions, MDS, melanoma, PAF (not on AC), AIE, HTN, HLD, and osteomyelitis of foot who presents with one day of AMS and weakness. Yesterday (11/8) morning, prior to his monthly IVIG infusions, pt started experiencing general weakness, and a couple episodes of blurry vision. His wife Javier noted that pt appeared "stumbly" of two occasions. Pt was noted not to have any fever/chills during or after the infusion. In the PM of 11/8, pt began had episode of L hand numbness w/ decreased dexterity. He also had repeated episodes of full body chills throughout the night, nausea with two episodes of NBNB vomiting and diarrhea. The wife states the pt was confused the night of 11/8 shouting at her to open the bathroom door when it was already open, and stating that he couldn't turn off the water faucet in the sink due to his hand numbness without trying to use the other hand. This morning, wife was very concerned about another episode of autoimmune encephalitis and called EMS. Pt denies any dizziness, chest pain, palpitations or LOC.    IN THE ED:  Temp 98.5F, HR 84, /85, RR 18, 100 SpO2 on room air  Labs significant for: lactate 2.7, bili 1.4  Imaging  CT head non con: No acute transcortical infarct or intracranial hemorrhage.  CT angiogram head/neck: No vaso-occlusive disease.  CT perfusion:  -37 mL at risk ischemic tissue in the bilateral MCA watershed   territories, which could represent some degree of global hypoperfusion.  -No complete core infarct identified.  -Recommend MRI brain for further evaluation.  EKG: Accelerated Junctional rhythm  Received in ED: 1L NS  Consults: Neurology Pt is a 66M w/ PMHx of CLL on Venetoclax and monthly IVIG infusions, MDS, melanoma, PAF (not on AC), AIE, HTN, HLD, and osteomyelitis of foot who presents with one day of AMS and weakness. Yesterday (11/8) morning, prior to his monthly IVIG infusions, pt started experiencing general weakness, and a couple episodes of blurry vision. His wife Javier noted that pt appeared "stumbly" of two occasions. Pt was noted not to have any fever/chills during or after the infusion. In the PM of 11/8, pt began had episode of L hand numbness w/ decreased dexterity. He also had repeated episodes of full body chills throughout the night, nausea with two episodes of NBNB vomiting, and diarrhea. The wife states the pt was confused the night of 11/8 shouting at her to open the bathroom door when it was already open, and stating that he couldn't turn off the water faucet in the sink due to his hand numbness without trying to use the other hand. This morning, wife was very concerned about another episode of autoimmune encephalitis and called EMS. Pt denies any dizziness, chest pain, palpitations or LOC.    IN THE ED:  Temp 98.5F, HR 84, /85, RR 18, 100 SpO2 on room air  Labs significant for: lactate 2.7, bili 1.4  Imaging  CT head non con: No acute transcortical infarct or intracranial hemorrhage.  CT angiogram head/neck: No vaso-occlusive disease.  CT perfusion:  -37 mL at risk ischemic tissue in the bilateral MCA watershed   territories, which could represent some degree of global hypoperfusion.  -No complete core infarct identified.  -Recommend MRI brain for further evaluation.  EKG: Accelerated Junctional rhythm  Received in ED: 1L NS  Consults: Neurology Pt is a 67M w/ PMHx of CLL (on monthly IVIG infusions), MDS, melanoma, PAF (not on AC), AIE, HTN, HLD, and osteomyelitis of foot who presents with one day of AMS and weakness. Yesterday (11/8) morning, prior to his monthly IVIG infusions, pt started experiencing general weakness, and a couple episodes of blurry vision. His wife Javier noted that pt appeared "stumbly" of two occasions. Pt was noted not to have any fever/chills during or after the infusion. In the PM of 11/8, pt began had episode of L hand numbness w/ decreased dexterity. He also had repeated episodes of full body chills throughout the night, nausea with two episodes of NBNB vomiting, and diarrhea. The wife states the pt was confused the night of 11/8 shouting at her to open the bathroom door when it was already open, and stating that he couldn't turn off the water faucet in the sink due to his hand numbness without trying to use the other hand. This morning, wife was very concerned about another episode of autoimmune encephalitis and called EMS. Pt denies any dizziness, chest pain, palpitations or LOC.    IN THE ED:  Temp 98.5F, HR 84, /85, RR 18, 100 SpO2 on room air  Labs significant for: lactate 2.7, bili 1.4  Imaging  CT head non con: No acute transcortical infarct or intracranial hemorrhage.  CT angiogram head/neck: No vaso-occlusive disease.  CT perfusion:  -37 mL at risk ischemic tissue in the bilateral MCA watershed   territories, which could represent some degree of global hypoperfusion.  -No complete core infarct identified.  -Recommend MRI brain for further evaluation.  EKG: Accelerated Junctional rhythm  Received in ED: 1L NS  Consults: Neurology

## 2022-11-09 NOTE — ED PROVIDER NOTE - PROGRESS NOTE DETAILS
Pt doing well, seen by neuro and cleared, however after dw pt and wife - pt is still not himself, altered mental status - will admit for further monitoring / castillo. Dr Gooden in agreement. Dw Dr Owens, will see pt to admit (Jovany)

## 2022-11-09 NOTE — H&P ADULT - PROBLEM SELECTOR PLAN 3
On monthly IVIG infusions, no longer on chemotherapy. Stable as per pt and wife  -WBC wnl on admission w/ normal smear  -Last IVIG infusion 11/8  -F/u outpt with heme/onc On monthly IVIG infusions, no longer on chemotherapy. Stable as per pt and wife  -WBC wnl on admission w/ normal smear  -Last IVIG infusion 11/8/22  -F/u outpt with heme/onc - Spoke w/ outpatient hematologist for background info

## 2022-11-09 NOTE — PHYSICAL THERAPY INITIAL EVALUATION ADULT - RANGE OF MOTION EXAMINATION, REHAB EVAL
Left UE ROM was WNL (within normal limits)/Right UE ROM was WNL (within normal limits)/Left LE ROM was WNL (within normal limits)/Right LE ROM was WNL (within normal limits)

## 2022-11-09 NOTE — PHYSICAL THERAPY INITIAL EVALUATION ADULT - ADDITIONAL COMMENTS
Prior to admission, pt was living with his wife on the first floor of a private ranch home.  4-5 QAMAR.  Pt was independent in ambulation without AD and was independent in all ADLs.

## 2022-11-09 NOTE — H&P ADULT - PROBLEM SELECTOR PLAN 6
Pt states he no longer takes a BP meds as his home BP is normal  -/85 on admission  -Monitor hemodynamics Pt states he no longer takes a BP meds as his home BP is normal  -/85 on admission  -Allow for permissive HTN in first 24hrs  -Monitor hemodynamics

## 2022-11-09 NOTE — PATIENT PROFILE ADULT - FALL HARM RISK - UNIVERSAL INTERVENTIONS
Bed in lowest position, wheels locked, appropriate side rails in place/Call bell, personal items and telephone in reach/Instruct patient to call for assistance before getting out of bed or chair/Non-slip footwear when patient is out of bed/[place] to call system/Physically safe environment - no spills, clutter or unnecessary equipment/Purposeful Proactive Rounding/Room/bathroom lighting operational, light cord in reach

## 2022-11-09 NOTE — ED ADULT NURSE NOTE - OBJECTIVE STATEMENT
Pt presented to ED c/o headache, n/v/d, disorganization.  Denies any chest pain or SOB.  Wife at bedside.  Pt has history of CLL.  Pt states he has a pounding headache and feels extremely cold.  No neuro deficits noted.  Pt appears to be extremely anxious.  Maintain comfort and safety.

## 2022-11-09 NOTE — ED ADULT NURSE NOTE - CHPI ED NUR SYMPTOMS NEG
no blurred vision/no change in level of consciousness/no dizziness/no fever/no loss of consciousness

## 2022-11-09 NOTE — H&P ADULT - NSHPSOCIALHISTORY_GEN_ALL_CORE
Lives at home with wife  Completes all ADLs independently  Ambulates without walker  Rare Alcohol use  Denies smoking history  Denies drug use

## 2022-11-09 NOTE — H&P ADULT - ATTENDING COMMENTS
The patient was seen and evaluated independently by the attending physician.  - I have personally reviewed the pt's labs, imaging, micro data and consultant recommendations.    #encephalopathy/confusion, unclear etiology but I suspect infectious etiology as he is warm/flushed now and has abd discomfort w/ diarrhea  - monitor for fevers. 99.7F po temp at time of admission, will request rectal temp and monitor closely for fevers - he is immunocompromised so will exercise low threshold for adding empiric abx - if febrile, will suggest adding zosyn and consult ID  - check GI PCR  - blood/urine cultures collected in the ED - u/a is benign and he has no concerning  symptoms  - resp viral panel neg  - lactic acidosis resolved w/ 1L NS bolus    #CLL on IVIG, last infusion yesterday, was symptomatic before the infusion  - no heme intervention needed currently  - heme will consult    #left hand weakness, has h/o cervical vertebral disc disease w/ herniation and he has chronic neck pain - LUE weakness may be secondary to this but will suggest serial clinical assessments  - NIHSS was 1 at time of admission  - plan for MRI/MRA w/ [facility]    #PAF, this is remote history of short episode of AF when he was acutely ill and admitted w/ autoimmune encephalopathy   - not on AC, no need to add AC at present  - monitor on remote tele    #HTN  - permissive HTN for now  - on no home antihypertensives    I personally spoke w/ heme and neuro along w/ the ED attending. My resident spoke w/ family and updated them on plan. All questions addressed and all in agreement at time of admission.

## 2022-11-09 NOTE — ED PROVIDER NOTE - OBJECTIVE STATEMENT
66-year-old male with history of CLL, status post gammaglobulin treatment yesterday which she gets monthly, hypertension, hyperlipidemia, paroxysmal A. fib not on anticoagulation, status post autoimmune encephalitis in 2021 presents with status post gammaglobulin yesterday, has been having some general weakness and difficulty gait since yesterday a.m., prior to his gammaglobulin, and last night developing some confusion/acting stranger than usual.  No numbness/loss of sensation.  Positive weakness/spasm in left arm.  No focal weakness otherwise noted.  No change in his speech.  Patient's wife states that his mental status is not the same as when he had his encephalitis, but he is more confused than usual.  No known COVID exposures.  Patient vaccinated for COVID with little response, status post evashield 6 months ago.  Status post COVID 6 months ago as well.  No other acute injuries or complaints at this time.  No known fall or recent trauma.

## 2022-11-09 NOTE — PHYSICAL THERAPY INITIAL EVALUATION ADULT - PERTINENT HX OF CURRENT PROBLEM, REHAB EVAL
Pt is a 66M w/ PMHx of CLL on Venetoclax and monthly IVIG infusions, MDS, melanoma, PAF (not on AC), AIE, HTN, HLD, and osteomyelitis of foot who presents with one day of AMS and weakness with negative CT, admitted for AMS workup.

## 2022-11-09 NOTE — H&P ADULT - NSHPPHYSICALEXAM_GEN_ALL_CORE
GENERAL:  Not in acute distress, lying in bed comfortably  HEENT:  NC/AT,  PERRLA  CHEST:  CTAB  HEART:  Regular rate and rhythm, no murmurs, rubs, gallops  ABDOMEN:  Soft, non-tender, non-distended,  EXTREMITIES:  no cyanosis, no LE edema  SKIN:  +LE pale and cold. Rest of skin warm, well perfused  NEURO:  Alert, Conversing appropriately, A&Ox3, CN II-12 intact, 5/5 strength in upper and lower extremities, no pronator drift. Sensation to touch equal and intact in upper and lower extremities.  PSYCH: Not emotionally labile, appropriate affect PE:  GENERAL:  Not in acute distress, lying in bed comfortably  HEENT:  NC/AT,  PERRLA  CHEST:  CTAB, no wheezing  HEART:  Regular rate and rhythm, no murmurs, rubs, gallops  ABDOMEN:  Soft, non-tender, non-distended   EXTREMITIES:  no cyanosis, no LE edema  SKIN:  skin is warm, flushed throughout  NEURO:  alert, conversing appropriately, A&Ox3, CN II-12 intact, 5/5 strength in upper and lower extremities, no pronator drift. Sensation to touch equal and intact in upper and lower extremities  PSYCH: Not emotionally labile, appropriate affect

## 2022-11-09 NOTE — ED PROVIDER NOTE - NSICDXPASTMEDICALHX_GEN_ALL_CORE_FT
No
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

## 2022-11-09 NOTE — H&P ADULT - NSHPREVIEWOFSYSTEMS_GEN_ALL_CORE
REVIEW OF SYSTEMS:  CONSTITUTIONAL: +Fatigue. No fever/chills.  HENMT: +3/10 dull headache, lightheadedness/dizziness  RESPIRATORY: No cough, wheezing, hemoptysis; No shortness of breath.  CARDIOVASCULAR: No chest pain, palpitations.  GASTROINTESTINAL: No abdominal or epigastric pain. No nausea or vomiting; No diarrhea or constipation currently, has had well formed stool since episodes of diarrhea.  GENITOURINARY: No dysuria, changes in frequency, hematuria, or incontinence  NEUROLOGICAL: Baseline strength. Sensation intact bilaterally.  SKIN: No itching, rashes  MUSCULOSKELETAL: +Neck pain. No joint pain or swelling; No muscle, back, or extremity pain REVIEW OF SYSTEMS:  CONSTITUTIONAL: +Fatigue, no fever/chills  HEENT: +3/10 dull headache, lightheadedness/dizziness  RESPIRATORY: No cough, wheezing, hemoptysis; No shortness of breath  CARDIOVASCULAR: No chest pain, palpitations  GASTROINTESTINAL: No abdominal or epigastric pain. No nausea or vomiting; No diarrhea or constipation currently, has had well formed stool since episodes of diarrhea  GENITOURINARY: No dysuria, changes in frequency, hematuria, or incontinence  NEUROLOGICAL: Baseline strength. Sensation intact bilaterally  SKIN: No itching, rashes  MUSCULOSKELETAL: chronic neck pain reported to be at his baseline. No joint pain or swelling; No muscle, back, or extremity pain  PSYCH: no recent changes in anxiety or depression

## 2022-11-09 NOTE — H&P ADULT - PROBLEM SELECTOR PLAN 2
Multiple episodes of non-bloody diarrhea in the setting of rigors w/ nausea and vomiting  -Afebrile without leukocytosis on admission, does not meet SIRS criteria  -Pt states he has had one episode of formed stool since arriving to the ED  -GI stool PCR + O/P ordered Multiple episodes of non-bloody diarrhea in the setting of rigors w/ nausea and vomiting  -Afebrile without leukocytosis on admission, does not meet SIRS criteria  -Pt states he has had one episode of formed stool since arriving to the ED, no longer endorses nausea  -GI stool PCR + O/P ordered  -Zofran PRN for nausea

## 2022-11-09 NOTE — PHYSICAL THERAPY INITIAL EVALUATION ADULT - PREDICTED DURATION OF THERAPY (DAYS/WKS), PT EVAL
Alert-The patient is alert, awake and responds to voice. The patient is oriented to time, place, and person. The triage nurse is able to obtain subjective information.
2 weeks

## 2022-11-09 NOTE — H&P ADULT - PROBLEM SELECTOR PLAN 1
Pt with 24hr of AMS, weakness, and confusion  -Admit to telemetry  -CT head -negative for acute bleeds  -11/9 Neuro consulted: "Doubt autoimmune encephalitis most likely viral gi issues. Neurologic wise stable appears  at baseline at present"  -Will need TTE, MRA/MRI head/neck  -Aspiration, seizure, fall precaution  -neuro checks Q4  -PT and OT consultation  -Check A1c, lipid profile, TSH for further risk stratification  -start statin after dysphagia screen  -start ASA when cleared for PO diet. Pt with 24hr of AMS, weakness, and confusion  -Admit to telemetry  -11/9 CT head: negative for acute bleeds  -11/9 CT head perfusion: 37 mL at risk ischemic tissue in the bilateral MCA watershed  territories, which could represent some degree of global hypoperfusion.  -Neuro consulted: "Doubt autoimmune encephalitis most likely viral gi issues. Neurologic wise stable appears  at baseline at present"  -Will need TTE, MRA/MRI head/neck  -Aspiration, seizure, fall precaution  -neuro checks Q4  -PT and OT consultation  -Check A1c, lipid profile, TSH for further risk stratification  -Continue home aspirin Pt with 24hr of AMS, weakness, and confusion  -Admit to telemetry  -11/9 CT head: negative for acute bleeds  -11/9 CT head perfusion: 37 mL at risk ischemic tissue in the bilateral MCA watershed  territories, which could represent some degree of global hypoperfusion.  -Neuro consulted: "Doubt autoimmune encephalitis most likely viral gi issues. Neurologic wise stable appears  at baseline at present"  -Will need TTE, MRA/MRI head  -Aspiration, seizure, fall precaution  -neuro checks Q4  -PT and OT consultation  -Check A1c, lipid profile, TSH for further risk stratification  -Continue home aspirin Pt with 24hr of AMS, weakness, and confusion  -Admit to telemetry  -11/9 CT head: negative for acute bleeds  -11/9 CT head perfusion: 37 mL at risk ischemic tissue in the bilateral MCA watershed  territories, which could represent some degree of global hypoperfusion.  -Neuro consulted: "Doubt autoimmune encephalitis most likely viral gi issues. Neurologic wise stable appears  at baseline at present"  -Will need TTE, MRA/MRI head  -Aspiration, seizure, fall precaution  -will allow for permissive HTN for next 24hrs  -neuro checks Q4  -PT and OT consultation  -Check A1c, lipid profile, TSH for further risk stratification   -Continue home aspirin

## 2022-11-09 NOTE — CONSULT NOTE ADULT - SUBJECTIVE AND OBJECTIVE BOX
S/P IVIG yesterday had long day had N/V diarrhea  chills  with weak and disorientated now resolved  doubt autoimmune encephalitis most  likely viral viral gi issues    neurologic wise stable appears  at baseline  seen with spouse at bedside     S/P IVIG yesterday had long day had N/V diarrhea  chills  with weak and disorientated now resolved  doubt autoimmune encephalitis most  likely viral viral gi issues    neurologic wise stable appears  at baseline at present  neck pain ortho evaluation as needed   seen with spouse at bedside     S/P IVIG yesterday had long day had N/V diarrhea  chills  with weak and disorientated now resolved  doubt autoimmune encephalitis most  likely  viral gi issues    neurologic wise stable appears  at baseline at present  neck pain ortho evaluation as needed   seen with spouse at bedside     S/P IVIG yesterday had long day had N/V diarrhea  chills  with weak and disorientated now resolved  doubt autoimmune encephalitis most  likely  viral gi issues    neurologic wise stable appears  at baseline at present  neck pain ortho evaluation as needed   seen with spouse at bedside  if admitted rec if no contraindication mri mra head with [facility]
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
## 2022-11-10 ENCOUNTER — TRANSCRIPTION ENCOUNTER (OUTPATIENT)
Age: 67
End: 2022-11-10

## 2022-11-10 VITALS
RESPIRATION RATE: 18 BRPM | TEMPERATURE: 98 F | HEART RATE: 61 BPM | SYSTOLIC BLOOD PRESSURE: 147 MMHG | DIASTOLIC BLOOD PRESSURE: 68 MMHG | OXYGEN SATURATION: 97 %

## 2022-11-10 LAB
A1C WITH ESTIMATED AVERAGE GLUCOSE RESULT: 6 % — HIGH (ref 4–5.6)
AMMONIA BLD-MCNC: 26 UMOL/L — SIGNIFICANT CHANGE UP (ref 11–32)
ANION GAP SERPL CALC-SCNC: 5 MMOL/L — SIGNIFICANT CHANGE UP (ref 5–17)
BASOPHILS # BLD AUTO: 0.04 K/UL — SIGNIFICANT CHANGE UP (ref 0–0.2)
BASOPHILS NFR BLD AUTO: 0.9 % — SIGNIFICANT CHANGE UP (ref 0–2)
BUN SERPL-MCNC: 14 MG/DL — SIGNIFICANT CHANGE UP (ref 7–23)
CALCIUM SERPL-MCNC: 8.5 MG/DL — SIGNIFICANT CHANGE UP (ref 8.5–10.1)
CHLORIDE SERPL-SCNC: 107 MMOL/L — SIGNIFICANT CHANGE UP (ref 96–108)
CHOLEST SERPL-MCNC: 166 MG/DL — SIGNIFICANT CHANGE UP
CO2 SERPL-SCNC: 29 MMOL/L — SIGNIFICANT CHANGE UP (ref 22–31)
CREAT SERPL-MCNC: 1 MG/DL — SIGNIFICANT CHANGE UP (ref 0.5–1.3)
CULTURE RESULTS: NO GROWTH — SIGNIFICANT CHANGE UP
EGFR: 82 ML/MIN/1.73M2 — SIGNIFICANT CHANGE UP
EOSINOPHIL # BLD AUTO: 0.03 K/UL — SIGNIFICANT CHANGE UP (ref 0–0.5)
EOSINOPHIL NFR BLD AUTO: 0.7 % — SIGNIFICANT CHANGE UP (ref 0–6)
ESTIMATED AVERAGE GLUCOSE: 126 MG/DL — HIGH (ref 68–114)
GLUCOSE SERPL-MCNC: 93 MG/DL — SIGNIFICANT CHANGE UP (ref 70–99)
HCT VFR BLD CALC: 36.9 % — LOW (ref 39–50)
HDLC SERPL-MCNC: 40 MG/DL — LOW
HGB BLD-MCNC: 11.9 G/DL — LOW (ref 13–17)
IMM GRANULOCYTES NFR BLD AUTO: 1.2 % — HIGH (ref 0–0.9)
LIPID PNL WITH DIRECT LDL SERPL: 90 MG/DL — SIGNIFICANT CHANGE UP
LYMPHOCYTES # BLD AUTO: 0.83 K/UL — LOW (ref 1–3.3)
LYMPHOCYTES # BLD AUTO: 19.3 % — SIGNIFICANT CHANGE UP (ref 13–44)
MCHC RBC-ENTMCNC: 32 PG — SIGNIFICANT CHANGE UP (ref 27–34)
MCHC RBC-ENTMCNC: 32.2 GM/DL — SIGNIFICANT CHANGE UP (ref 32–36)
MCV RBC AUTO: 99.2 FL — SIGNIFICANT CHANGE UP (ref 80–100)
MONOCYTES # BLD AUTO: 1.25 K/UL — HIGH (ref 0–0.9)
MONOCYTES NFR BLD AUTO: 29 % — HIGH (ref 2–14)
NEUTROPHILS # BLD AUTO: 2.11 K/UL — SIGNIFICANT CHANGE UP (ref 1.8–7.4)
NEUTROPHILS NFR BLD AUTO: 48.9 % — SIGNIFICANT CHANGE UP (ref 43–77)
NON HDL CHOLESTEROL: 126 MG/DL — SIGNIFICANT CHANGE UP
NRBC # BLD: 0 /100 WBCS — SIGNIFICANT CHANGE UP (ref 0–0)
PLATELET # BLD AUTO: 227 K/UL — SIGNIFICANT CHANGE UP (ref 150–400)
POTASSIUM SERPL-MCNC: 3.8 MMOL/L — SIGNIFICANT CHANGE UP (ref 3.5–5.3)
POTASSIUM SERPL-SCNC: 3.8 MMOL/L — SIGNIFICANT CHANGE UP (ref 3.5–5.3)
RBC # BLD: 3.72 M/UL — LOW (ref 4.2–5.8)
RBC # FLD: 14.9 % — HIGH (ref 10.3–14.5)
SODIUM SERPL-SCNC: 141 MMOL/L — SIGNIFICANT CHANGE UP (ref 135–145)
SPECIMEN SOURCE: SIGNIFICANT CHANGE UP
TRIGL SERPL-MCNC: 183 MG/DL — HIGH
TSH SERPL-MCNC: 7.98 UIU/ML — HIGH (ref 0.36–3.74)
VIT B12 SERPL-MCNC: 323 PG/ML — SIGNIFICANT CHANGE UP (ref 232–1245)
WBC # BLD: 4.31 K/UL — SIGNIFICANT CHANGE UP (ref 3.8–10.5)
WBC # FLD AUTO: 4.31 K/UL — SIGNIFICANT CHANGE UP (ref 3.8–10.5)

## 2022-11-10 PROCEDURE — 93306 TTE W/DOPPLER COMPLETE: CPT | Mod: 26

## 2022-11-10 PROCEDURE — 93005 ELECTROCARDIOGRAM TRACING: CPT

## 2022-11-10 PROCEDURE — 99285 EMERGENCY DEPT VISIT HI MDM: CPT | Mod: 25

## 2022-11-10 PROCEDURE — 82607 VITAMIN B-12: CPT

## 2022-11-10 PROCEDURE — 85610 PROTHROMBIN TIME: CPT

## 2022-11-10 PROCEDURE — 97530 THERAPEUTIC ACTIVITIES: CPT

## 2022-11-10 PROCEDURE — 0225U NFCT DS DNA&RNA 21 SARSCOV2: CPT

## 2022-11-10 PROCEDURE — 70544 MR ANGIOGRAPHY HEAD W/O DYE: CPT | Mod: 26,59

## 2022-11-10 PROCEDURE — 70544 MR ANGIOGRAPHY HEAD W/O DYE: CPT

## 2022-11-10 PROCEDURE — 71045 X-RAY EXAM CHEST 1 VIEW: CPT

## 2022-11-10 PROCEDURE — 84484 ASSAY OF TROPONIN QUANT: CPT

## 2022-11-10 PROCEDURE — 84443 ASSAY THYROID STIM HORMONE: CPT

## 2022-11-10 PROCEDURE — 99239 HOSP IP/OBS DSCHRG MGMT >30: CPT

## 2022-11-10 PROCEDURE — 80048 BASIC METABOLIC PNL TOTAL CA: CPT

## 2022-11-10 PROCEDURE — A9579: CPT

## 2022-11-10 PROCEDURE — 82550 ASSAY OF CK (CPK): CPT

## 2022-11-10 PROCEDURE — 70553 MRI BRAIN STEM W/O & W/DYE: CPT | Mod: 26

## 2022-11-10 PROCEDURE — 70553 MRI BRAIN STEM W/O & W/DYE: CPT

## 2022-11-10 PROCEDURE — 85730 THROMBOPLASTIN TIME PARTIAL: CPT

## 2022-11-10 PROCEDURE — 83036 HEMOGLOBIN GLYCOSYLATED A1C: CPT

## 2022-11-10 PROCEDURE — 97165 OT EVAL LOW COMPLEX 30 MIN: CPT

## 2022-11-10 PROCEDURE — 87086 URINE CULTURE/COLONY COUNT: CPT

## 2022-11-10 PROCEDURE — 83605 ASSAY OF LACTIC ACID: CPT

## 2022-11-10 PROCEDURE — 97116 GAIT TRAINING THERAPY: CPT

## 2022-11-10 PROCEDURE — 70450 CT HEAD/BRAIN W/O DYE: CPT | Mod: MA

## 2022-11-10 PROCEDURE — 80053 COMPREHEN METABOLIC PANEL: CPT

## 2022-11-10 PROCEDURE — 97162 PT EVAL MOD COMPLEX 30 MIN: CPT

## 2022-11-10 PROCEDURE — 83880 ASSAY OF NATRIURETIC PEPTIDE: CPT

## 2022-11-10 PROCEDURE — 70498 CT ANGIOGRAPHY NECK: CPT | Mod: MA

## 2022-11-10 PROCEDURE — 0042T: CPT | Mod: MA

## 2022-11-10 PROCEDURE — 82140 ASSAY OF AMMONIA: CPT

## 2022-11-10 PROCEDURE — 36415 COLL VENOUS BLD VENIPUNCTURE: CPT

## 2022-11-10 PROCEDURE — 80061 LIPID PANEL: CPT

## 2022-11-10 PROCEDURE — 93306 TTE W/DOPPLER COMPLETE: CPT

## 2022-11-10 PROCEDURE — 87040 BLOOD CULTURE FOR BACTERIA: CPT

## 2022-11-10 PROCEDURE — 85025 COMPLETE CBC W/AUTO DIFF WBC: CPT

## 2022-11-10 PROCEDURE — 70496 CT ANGIOGRAPHY HEAD: CPT | Mod: MA

## 2022-11-10 PROCEDURE — 81001 URINALYSIS AUTO W/SCOPE: CPT

## 2022-11-10 RX ORDER — ASPIRIN/CALCIUM CARB/MAGNESIUM 324 MG
1 TABLET ORAL
Qty: 0 | Refills: 0 | DISCHARGE
Start: 2022-11-10

## 2022-11-10 RX ORDER — PREDNISOLONE SODIUM PHOSPHATE 1 %
1 DROPS OPHTHALMIC (EYE)
Qty: 0 | Refills: 0 | DISCHARGE

## 2022-11-10 RX ORDER — FOLIC ACID 0.8 MG
1 TABLET ORAL
Qty: 0 | Refills: 0 | DISCHARGE
Start: 2022-11-10

## 2022-11-10 RX ORDER — ASPIRIN/CALCIUM CARB/MAGNESIUM 324 MG
81 TABLET ORAL
Qty: 0 | Refills: 0 | DISCHARGE

## 2022-11-10 RX ORDER — PREDNISOLONE SODIUM PHOSPHATE 1 %
1 DROPS OPHTHALMIC (EYE)
Qty: 0 | Refills: 0 | DISCHARGE
Start: 2022-11-10

## 2022-11-10 RX ADMIN — Medication 1 MILLIGRAM(S): at 11:38

## 2022-11-10 RX ADMIN — Medication 81 MILLIGRAM(S): at 11:39

## 2022-11-10 RX ADMIN — Medication 1 DROP(S): at 11:40

## 2022-11-10 NOTE — PROGRESS NOTE ADULT - SUBJECTIVE AND OBJECTIVE BOX
Neurology follow up note    SARAHI [id number]      Interval History:    Patient feels ok no new complaints.    Allergies    No Known Allergies    Intolerances        MEDICATIONS    acetaminophen     Tablet .. 650 milliGRAM(s) Oral every 6 hours PRN  aluminum hydroxide/magnesium hydroxide/simethicone Suspension 30 milliLiter(s) Oral every 4 hours PRN  aspirin  chewable 81 milliGRAM(s) Oral daily  enoxaparin Injectable 40 milliGRAM(s) SubCutaneous every 24 hours  folic acid 1 milliGRAM(s) Oral daily  melatonin 3 milliGRAM(s) Oral at bedtime PRN  ondansetron Injectable 4 milliGRAM(s) IV Push once PRN  prednisoLONE acetate 1% Suspension 1 Drop(s) Left EYE daily            Weight (kg): 69.3 ( @ 20:12)    Vital Signs Last 24 Hrs  T(C): 36.9 (10 Nov 2022 05:19), Max: 37.6 (2022 14:09)  T(F): 98.5 (10 Nov 2022 05:19), Max: 99.7 (2022 14:09)  HR: 55 (10 Nov 2022 05:19) (55 - 78)  BP: 138/65 (10 Nov 2022 05:19) (128/75 - 159/81)  BP(mean): --  RR: 16 (10 Nov 2022 05:19) (16 - 17)  SpO2: 95% (10 Nov 2022 05:19) (95% - 99%)    Parameters below as of 10 Nov 2022 05:19  Patient On (Oxygen Delivery Method): room air      REVIEW OF SYSTEMS:  Constitutional:  The patient denies fever, chills, or night sweats at present.  Neck:  Complains of a neck pain but does have herniated disc.  Neck pain appears to run down his left arm.  Eyes:  Positive history of poor vision, which is not new.  Ears:  No ringing in the ears.  Chest:  No chest pain.  Respiratory:  No shortness of breath.  Abdomen:  No nausea, vomiting, or abdominal pain.  Extremities/Neurological:  No numbness or tingling.  Musculoskeletal:  No joint pain.    PHYSICAL EXAMINATION:   HEENT:  Head:  Normocephalic, atraumatic.  Eyes:  No scleral icterus.  Ears:  Hearing bilaterally appeared intact.  NECK:  Supple.  RESPIRATORY:  Good air entry bilaterally.  CARDIOVASCULAR:  S1 and S2 heard.  ABDOMEN:  Soft and nontender.  EXTREMITIES:  No clubbing or cyanosis was noted.     NEUROLOGIC:  The patient is awake and alert.  Location was hospital.  Year was .  Month was November.   Was able to name simple objects.  Extraocular movements were intact, but does have poor vision, not new.  Neck:  Subtle cervical tenderness was noted, but had full range of motion.  Speech was fluent.  Smile symmetric.  Motor:  Bilateral upper and lower 4+/5.  Sensory:  Bilateral upper and lower intact to light touch.                LABS:  CBC Full  -  ( 10 Nov 2022 06:05 )  WBC Count : 4.31 K/uL  RBC Count : 3.72 M/uL  Hemoglobin : 11.9 g/dL  Hematocrit : 36.9 %  Platelet Count - Automated : 227 K/uL  Mean Cell Volume : 99.2 fl  Mean Cell Hemoglobin : 32.0 pg  Mean Cell Hemoglobin Concentration : 32.2 gm/dL  Auto Neutrophil # : 2.11 K/uL  Auto Lymphocyte # : 0.83 K/uL  Auto Monocyte # : 1.25 K/uL  Auto Eosinophil # : 0.03 K/uL  Auto Basophil # : 0.04 K/uL  Auto Neutrophil % : 48.9 %  Auto Lymphocyte % : 19.3 %  Auto Monocyte % : 29.0 %  Auto Eosinophil % : 0.7 %  Auto Basophil % : 0.9 %    Urinalysis Basic - ( 2022 10:45 )    Color: Yellow / Appearance: Clear / S.010 / pH: x  Gluc: x / Ketone: Negative  / Bili: Negative / Urobili: Negative   Blood: x / Protein: 30 mg/dL / Nitrite: Negative   Leuk Esterase: Negative / RBC: 0-2 /HPF / WBC 0-2   Sq Epi: x / Non Sq Epi: Negative / Bacteria: x      11-10    141  |  107  |  14  ----------------------------<  93  3.8   |  29  |  1.00    Ca    8.5      10 Nov 2022 06:05    TPro  8.1  /  Alb  4.0  /  TBili  1.4<H>  /  DBili  x   /  AST  25  /  ALT  19  /  AlkPhos  68  11-09    Hemoglobin A1C:     LIVER FUNCTIONS - ( 2022 07:15 )  Alb: 4.0 g/dL / Pro: 8.1 g/dL / ALK PHOS: 68 U/L / ALT: 19 U/L / AST: 25 U/L / GGT: x           Vitamin B12   PT/INR - ( 2022 07:15 )   PT: 12.2 sec;   INR: 1.04 ratio         PTT - ( 2022 07:15 )  PTT:20.0 sec      RADIOLOGY    ANALYSIS AND PLAN:  This is a 67-year-old with episode yesterday of a long day with nausea, vomiting, diarrhea, altered mental status, and possibly chills.  Clinical impression could have possibly been a viral syndrome leading to alteration in consciousness and metabolic encephalopathy.  The patient at present appears to be answering all questions accordingly, suspect less likely this is a reactivation of his autoimmune encephalitis.  The patient did have IVIG yesterday.  For herniated disc and neck pain, would recommend orthopedic followup and imaging as needed.  For history of atrial fibrillation, risks versus benefits of anticoagulation.  For history of hyperlipidemia, continue the patient on his statin.  Fall precautions.  Physical therapy evaluation.  for mri head  if mri normal ok to dc  patient appears back to normal self ok to dc       The patient seen and examined with spouse at bedside.  Overall, the patient's mental status appears to have improved.    Spouse's name is Javier, her telephone number is [phone], alternate number [phone]. 11/10    Greater than 40 minutes of time was spent with the patient, plan of care, reviewing data, with greater than 50% of the visit was spent counseling and/or coordinating care with multidisciplinary healthcare team   Neurology follow up note    SARAHI [id number]      Interval History:    Patient feels ok no new complaints neck pain resolving     Allergies    No Known Allergies    Intolerances        MEDICATIONS    acetaminophen     Tablet .. 650 milliGRAM(s) Oral every 6 hours PRN  aluminum hydroxide/magnesium hydroxide/simethicone Suspension 30 milliLiter(s) Oral every 4 hours PRN  aspirin  chewable 81 milliGRAM(s) Oral daily  enoxaparin Injectable 40 milliGRAM(s) SubCutaneous every 24 hours  folic acid 1 milliGRAM(s) Oral daily  melatonin 3 milliGRAM(s) Oral at bedtime PRN  ondansetron Injectable 4 milliGRAM(s) IV Push once PRN  prednisoLONE acetate 1% Suspension 1 Drop(s) Left EYE daily            Weight (kg): 69.3 ( @ 20:12)    Vital Signs Last 24 Hrs  T(C): 36.9 (10 Nov 2022 05:19), Max: 37.6 (2022 14:09)  T(F): 98.5 (10 Nov 2022 05:19), Max: 99.7 (2022 14:09)  HR: 55 (10 Nov 2022 05:19) (55 - 78)  BP: 138/65 (10 Nov 2022 05:19) (128/75 - 159/81)  BP(mean): --  RR: 16 (10 Nov 2022 05:19) (16 - 17)  SpO2: 95% (10 Nov 2022 05:19) (95% - 99%)    Parameters below as of 10 Nov 2022 05:19  Patient On (Oxygen Delivery Method): room air      REVIEW OF SYSTEMS:  Constitutional:  The patient denies fever, chills, or night sweats at present.  Neck:  Complains of a neck pain but does have herniated disc.  Neck pain appears to run down his left arm.  Eyes:  Positive history of poor vision, which is not new.  Ears:  No ringing in the ears.  Chest:  No chest pain.  Respiratory:  No shortness of breath.  Abdomen:  No nausea, vomiting, or abdominal pain.  Extremities/Neurological:  No numbness or tingling.  Musculoskeletal:  No joint pain.    PHYSICAL EXAMINATION:   HEENT:  Head:  Normocephalic, atraumatic.  Eyes:  No scleral icterus.  Ears:  Hearing bilaterally appeared intact.  NECK:  Supple.  RESPIRATORY:  Good air entry bilaterally.  CARDIOVASCULAR:  S1 and S2 heard.  ABDOMEN:  Soft and nontender.  EXTREMITIES:  No clubbing or cyanosis was noted.     NEUROLOGIC:  The patient is awake and alert.  Location was hospital.  Year was .  Month was November.   Was able to name simple objects.  Extraocular movements were intact, but does have poor vision, not new.  Neck:  Subtle cervical tenderness was noted, but had full range of motion.  Speech was fluent.  Smile symmetric.  Motor:  Bilateral upper and lower 4+/5.  Sensory:  Bilateral upper and lower intact to light touch.                LABS:  CBC Full  -  ( 10 Nov 2022 06:05 )  WBC Count : 4.31 K/uL  RBC Count : 3.72 M/uL  Hemoglobin : 11.9 g/dL  Hematocrit : 36.9 %  Platelet Count - Automated : 227 K/uL  Mean Cell Volume : 99.2 fl  Mean Cell Hemoglobin : 32.0 pg  Mean Cell Hemoglobin Concentration : 32.2 gm/dL  Auto Neutrophil # : 2.11 K/uL  Auto Lymphocyte # : 0.83 K/uL  Auto Monocyte # : 1.25 K/uL  Auto Eosinophil # : 0.03 K/uL  Auto Basophil # : 0.04 K/uL  Auto Neutrophil % : 48.9 %  Auto Lymphocyte % : 19.3 %  Auto Monocyte % : 29.0 %  Auto Eosinophil % : 0.7 %  Auto Basophil % : 0.9 %    Urinalysis Basic - ( 2022 10:45 )    Color: Yellow / Appearance: Clear / S.010 / pH: x  Gluc: x / Ketone: Negative  / Bili: Negative / Urobili: Negative   Blood: x / Protein: 30 mg/dL / Nitrite: Negative   Leuk Esterase: Negative / RBC: 0-2 /HPF / WBC 0-2   Sq Epi: x / Non Sq Epi: Negative / Bacteria: x      11-10    141  |  107  |  14  ----------------------------<  93  3.8   |  29  |  1.00    Ca    8.5      10 Nov 2022 06:05    TPro  8.1  /  Alb  4.0  /  TBili  1.4<H>  /  DBili  x   /  AST  25  /  ALT  19  /  AlkPhos  68  11-09    Hemoglobin A1C:     LIVER FUNCTIONS - ( 2022 07:15 )  Alb: 4.0 g/dL / Pro: 8.1 g/dL / ALK PHOS: 68 U/L / ALT: 19 U/L / AST: 25 U/L / GGT: x           Vitamin B12   PT/INR - ( 2022 07:15 )   PT: 12.2 sec;   INR: 1.04 ratio         PTT - ( 2022 07:15 )  PTT:20.0 sec      RADIOLOGY    ANALYSIS AND PLAN:  This is a 67-year-old with episode yesterday of a long day with nausea, vomiting, diarrhea, altered mental status, and possibly chills.  Clinical impression could have possibly been a viral syndrome leading to alteration in consciousness and metabolic encephalopathy.  The patient at present appears to be answering all questions accordingly, suspect less likely this is a reactivation of his autoimmune encephalitis.  The patient did have IVIG yesterday.  For herniated disc and neck pain, would recommend orthopedic followup and imaging as needed.  For history of atrial fibrillation, risks versus benefits of anticoagulation.  For history of hyperlipidemia, continue the patient on his statin.  Fall precautions.  Physical therapy evaluation.  for mri head  if mri normal ok to dc  patient appears back to normal self ok to dc       The patient seen and examined with spouse at bedside.  Overall, the patient's mental status appears to have improved.    Spouse's name is Javier, her telephone number is [phone], alternate number [phone]. 11/10    Greater than 40 minutes of time was spent with the patient, plan of care, reviewing data, with greater than 50% of the visit was spent counseling and/or coordinating care with multidisciplinary healthcare team

## 2022-11-10 NOTE — OCCUPATIONAL THERAPY INITIAL EVALUATION ADULT - ADDITIONAL COMMENTS
Pt lives in a 2 level house with 4 steps with handrail to enter. Bedroom and bathroom main level. Pt has a bathtub with grab bars (2) and curtain. Pt owns a cane and rolling walker. Pt performed functional ambulation in hospital room with no devices at an independent level.

## 2022-11-10 NOTE — DISCHARGE NOTE PROVIDER - PROVIDER TOKENS
PROVIDER:[TOKEN:[[id number]]],PROVIDER:[TOKEN:[[id number]]],PROVIDER:[TOKEN:[[id number]]] PROVIDER:[TOKEN:[[id number]]],PROVIDER:[TOKEN:[[id number]]],PROVIDER:[TOKEN:[[id number]]],PROVIDER:[TOKEN:[[id number]]]

## 2022-11-10 NOTE — CONSULT NOTE ADULT - ASSESSMENT
History of CLL currently being observed off treatment with prior normal CBC admitted with AMS.  Had history approx 1 year ago of autoimmune encephalitis treated with IVIG.  Receives IVIG as an outpatient monthly due to history of hypogammaglobulinemia and recurrent sinus infection doing well with decreased infections.    Neurologically better today    Recommendations:  Seen by neurology and felt AMS related to possible viral infection and not reactivation of autoimmune encephalitis.   neurologically stable for discharge  will followup hematologically as an outpatient

## 2022-11-10 NOTE — OCCUPATIONAL THERAPY INITIAL EVALUATION ADULT - PERTINENT HX OF CURRENT PROBLEM, REHAB EVAL
68 y/o male with PMH CLL on Venetoclax and monthly IVIG infusions, MDS, melanoma, PAF (not on AC), AIE, HTN, HLD, and osteomyelitis of foot who presents with one day of AMS and weakness with negative CT, admitted 11/9/22 for AMS workup.  11/9 CT head: negative for acute bleeds. 11/9 CT head perfusion: 37 mL at risk ischemic tissue in the bilateral MCA watershed  territories, which could represent some degree of global hypoperfusion.

## 2022-11-10 NOTE — DISCHARGE NOTE PROVIDER - NSDCCPCAREPLAN_GEN_ALL_CORE_FT
PRINCIPAL DISCHARGE DIAGNOSIS  Diagnosis: Altered mental status  Assessment and Plan of Treatment: You presented with a change in mental status. MRI and MRA scans of your head showed no abnormal findings. Please follow up with your PCP and neurologist (Dr. Riggins) within 1 week of discharge.      SECONDARY DISCHARGE DIAGNOSES  Diagnosis: Elevated TSH  Assessment and Plan of Treatment: Your TSH was 7.98 likely secondary to acute illness. Please follow up with your PCP and endocrinologist (Dr. Perlman) for further management.     PRINCIPAL DISCHARGE DIAGNOSIS  Diagnosis: Altered mental status  Assessment and Plan of Treatment: You presented with a change in mental status. MRI and MRA scans of your head showed no abnormal findings. Please follow up with your PCP and neurologist (Dr. Andres) within 1 week of discharge.      SECONDARY DISCHARGE DIAGNOSES  Diagnosis: Elevated TSH  Assessment and Plan of Treatment: Your TSH was 7.98 likely secondary to acute illness. Please follow up with your PCP and endocrinologist (Dr. Perlman) for further management.

## 2022-11-10 NOTE — DISCHARGE NOTE PROVIDER - HOSPITAL COURSE
ADMISSION H+P:    HPI:  Pt is a 67M w/ PMHx of CLL (on monthly IVIG infusions), MDS, melanoma, PAF (not on AC), AIE, HTN, HLD, and osteomyelitis of foot who presents with one day of AMS and weakness. Yesterday (11/8) morning, prior to his monthly IVIG infusions, pt started experiencing general weakness, and a couple episodes of blurry vision. His wife Javier noted that pt appeared "stumbly" of two occasions. Pt was noted not to have any fever/chills during or after the infusion. In the PM of 11/8, pt began had episode of L hand numbness w/ decreased dexterity. He also had repeated episodes of full body chills throughout the night, nausea with two episodes of NBNB vomiting, and diarrhea. The wife states the pt was confused the night of 11/8 shouting at her to open the bathroom door when it was already open, and stating that he couldn't turn off the water faucet in the sink due to his hand numbness without trying to use the other hand. This morning, wife was very concerned about another episode of autoimmune encephalitis and called EMS. Pt denies any dizziness, chest pain, palpitations or LOC.    IN THE ED:  Temp 98.5F, HR 84, /85, RR 18, 100 SpO2 on room air  Labs significant for: lactate 2.7, bili 1.4  Imaging  CT head non con: No acute transcortical infarct or intracranial hemorrhage.  CT angiogram head/neck: No vaso-occlusive disease.  CT perfusion:  -37 mL at risk ischemic tissue in the bilateral MCA watershed   territories, which could represent some degree of global hypoperfusion.  -No complete core infarct identified.  -Recommend MRI brain for further evaluation.  EKG: Accelerated Junctional rhythm  Received in ED: 1L NS  Consults: Neurology (09 Nov 2022 11:46)      ---  HOSPITAL COURSE: Patient was admitted for altered mental status likely 2/2 viral syndrome leading to alteration in consciousness and metabolic encephalopathy. Neurology (Dr. Gooden) was consulted. MRI Brain: No acute infarct, hemorrhage, or mass identified. MRA Brain: No vaso-occlusive disease. Patient was advised to follow up with orthopedic surgery outpatient for herniated disc and neck pain. PT evaluated patient and recommended discharge home.     Patient was medically optimized and improved clinically throughout hospital course. Patient seen and examined on day of discharge.    Vital Signs  T(C): 36.9 (10 Nov 2022 05:19), Max: 37.6 (09 Nov 2022 14:09)  T(F): 98.5 (10 Nov 2022 05:19), Max: 99.7 (09 Nov 2022 14:09)  HR: 58 (10 Nov 2022 11:44) (54 - 78)  BP: 131/94 (10 Nov 2022 11:44) (128/75 - 159/81)  RR: 16 (10 Nov 2022 05:19) (16 - 17)  SpO2: 97% (10 Nov 2022 11:44) (95% - 99%)    Physical Exam:  General: well-developed, well-nourished, NAD  HEENT: normocephalic, atraumatic, EOMI, moist mucous membranes   Neck: supple, non-tender, no masses  Neurology: awake, alert, sensation intact  Respiratory: clear to auscultation bilaterally; no wheezes, rhonchi, or rales  CV: regular rate and rhythm, soft S1/S2, no murmurs, rubs, or gallops  Abdominal: soft, non-tender, non-distended, bowel sounds present  Extremities: no clubbing, cyanosis, or edema; palpable peripheral pulses  Musculoskeletal: no joint erythema or warmth, no joint swelling   Skin: warm, dry, normal color    Patient is medically stable for discharge to home with outpatient follow up.  ---  CONSULTANTS:   Neuro: Dr. Gooden     ---  TIME SPENT:   I, the attending physician, was physically present for the key portions of the evaluation and management (E/M) service provided. The total amount of time spent reviewing the hospital course, laboratory values, imaging findings, assessing/counseling the patient, discussing with consultant physicians, social work, nursing staff was -- minutes.     ---  FINAL DISCHARGE DIAGNOSIS LIST:  Please see last daily progress note for final discharge diagnoses         ADMISSION H+P:    HPI:  Pt is a 67M w/ PMHx of CLL (on monthly IVIG infusions), MDS, melanoma, PAF (not on AC), AIE, HTN, HLD, and osteomyelitis of foot who presents with one day of AMS and weakness. Yesterday (11/8) morning, prior to his monthly IVIG infusions, pt started experiencing general weakness, and a couple episodes of blurry vision. His wife Javier noted that pt appeared "stumbly" of two occasions. Pt was noted not to have any fever/chills during or after the infusion. In the PM of 11/8, pt began had episode of L hand numbness w/ decreased dexterity. He also had repeated episodes of full body chills throughout the night, nausea with two episodes of NBNB vomiting, and diarrhea. The wife states the pt was confused the night of 11/8 shouting at her to open the bathroom door when it was already open, and stating that he couldn't turn off the water faucet in the sink due to his hand numbness without trying to use the other hand. This morning, wife was very concerned about another episode of autoimmune encephalitis and called EMS. Pt denies any dizziness, chest pain, palpitations or LOC.    IN THE ED:  Temp 98.5F, HR 84, /85, RR 18, 100 SpO2 on room air  Labs significant for: lactate 2.7, bili 1.4  Imaging  CT head non con: No acute transcortical infarct or intracranial hemorrhage.  CT angiogram head/neck: No vaso-occlusive disease.  CT perfusion:  -37 mL at risk ischemic tissue in the bilateral MCA watershed   territories, which could represent some degree of global hypoperfusion.  -No complete core infarct identified.  -Recommend MRI brain for further evaluation.  EKG: Accelerated Junctional rhythm  Received in ED: 1L NS  Consults: Neurology (09 Nov 2022 11:46)      ---  HOSPITAL COURSE: Patient was admitted for altered mental status likely 2/2 viral syndrome leading to alteration in consciousness and metabolic encephalopathy. Neurology (Dr. Gooden) was consulted. MRI Brain: No acute infarct, hemorrhage, or mass identified. MRA Brain: No vaso-occlusive disease. Patient and wife were encouraged to follow up with outpatient neurologist (Dr. Andres) within 1 week of discharge. Patient was advised to follow up with orthopedic surgery outpatient for herniated disc and neck pain. PT evaluated patient and recommended discharge home.     Patient was medically optimized and improved clinically throughout hospital course. Patient seen and examined on day of discharge.    Vital Signs  T(C): 36.9 (10 Nov 2022 05:19), Max: 37.6 (09 Nov 2022 14:09)  T(F): 98.5 (10 Nov 2022 05:19), Max: 99.7 (09 Nov 2022 14:09)  HR: 58 (10 Nov 2022 11:44) (54 - 78)  BP: 131/94 (10 Nov 2022 11:44) (128/75 - 159/81)  RR: 16 (10 Nov 2022 05:19) (16 - 17)  SpO2: 97% (10 Nov 2022 11:44) (95% - 99%)    Physical Exam:  General: well-developed, well-nourished, NAD  HEENT: normocephalic, atraumatic, EOMI, moist mucous membranes   Neck: supple, non-tender, no masses  Neurology: awake, alert, sensation intact  Respiratory: clear to auscultation bilaterally; no wheezes, rhonchi, or rales  CV: regular rate and rhythm, soft S1/S2, no murmurs, rubs, or gallops  Abdominal: soft, non-tender, non-distended, bowel sounds present  Extremities: no clubbing, cyanosis, or edema; palpable peripheral pulses  Musculoskeletal: no joint erythema or warmth, no joint swelling   Skin: warm, dry, normal color    Patient is medically stable for discharge to home with outpatient follow up.  ---  CONSULTANTS:   Neuro: Dr. Gooden     ---  TIME SPENT:   I, the attending physician, was physically present for the key portions of the evaluation and management (E/M) service provided. The total amount of time spent reviewing the hospital course, laboratory values, imaging findings, assessing/counseling the patient, discussing with consultant physicians, social work, nursing staff was 35 minutes.     ---  FINAL DISCHARGE DIAGNOSIS LIST:  Please see last daily progress note for final discharge diagnoses

## 2022-11-10 NOTE — CONSULT NOTE ADULT - SUBJECTIVE AND OBJECTIVE BOX
Patient is a 67y old  Male who presents with a chief complaint of AMS (10 Nov 2022 12:06)      HPI:  Pt is a 67M w/ PMHx of CLL (on monthly IVIG infusions), MDS, melanoma, PAF (not on AC), AIE, HTN, HLD, and osteomyelitis of foot who presents with one day of AMS and weakness. Yesterday (11/8) morning, prior to his monthly IVIG infusions, pt started experiencing general weakness, and a couple episodes of blurry vision. His wife Javier noted that pt appeared "stumbly" of two occasions. Pt was noted not to have any fever/chills during or after the infusion. In the PM of 11/8, pt began had episode of L hand numbness w/ decreased dexterity. He also had repeated episodes of full body chills throughout the night, nausea with two episodes of NBNB vomiting, and diarrhea. The wife states the pt was confused the night of 11/8 shouting at her to open the bathroom door when it was already open, and stating that he couldn't turn off the water faucet in the sink due to his hand numbness without trying to use the other hand. This morning, wife was very concerned about another episode of autoimmune encephalitis and called EMS. Pt denies any dizziness, chest pain, palpitations or LOC.    IN THE ED:  Temp 98.5F, HR 84, /85, RR 18, 100 SpO2 on room air  Labs significant for: lactate 2.7, bili 1.4  Imaging  CT head non con: No acute transcortical infarct or intracranial hemorrhage.  CT angiogram head/neck: No vaso-occlusive disease.  CT perfusion:  -37 mL at risk ischemic tissue in the bilateral MCA watershed   territories, which could represent some degree of global hypoperfusion.  -No complete core infarct identified.  -Recommend MRI brain for further evaluation.  EKG: Accelerated Junctional rhythm  Received in ED: 1L NS  Consults: Neurology (09 Nov 2022 11:46)       ROS:  Negative except for:    PAST MEDICAL & SURGICAL HISTORY:  Osteopenia      CLL (Chronic Lymphocytic Leukemia)  SINCE 1988      Avascular Necrosis of Femur Head  B/L      TMJ Syndrome  limited jaw opening due to TMJ      Herniated Cervical Disc      Bulging Disc  LUMBAR      Melanoma      Deviated nasal septum      Nasal congestion      Rosacea      Deviated nasal septum      Other specified disorders of nose and nasal sinuses      Anemia      Encephalitis      S/P Splenectomy  1993      Avascular Necrosis of Femur Head  RIGHT - CORE DECOMPRESSION- 1993      Avascular Necrosis of Femur Head  LEFT - CORE DECOMPRESSION - 1993      Abnormal Jaw Closure  LEFT JAW SURGERY - 1988      Status Post Eye Surgery X 3  3/2011 right eye scleral buckle; left eye in 2013      S/P Tonsillectomy  1960      Bilateral cataracts  removed in 2011          SOCIAL HISTORY:    FAMILY HISTORY:  Family history of multiple myeloma    Family history of malignant melanoma  age 89    FH: blood dyscrasia  MGUS    FH: hypertension  mother    FH: renal failure  mother        MEDICATIONS  (STANDING):  aspirin  chewable 81 milliGRAM(s) Oral daily  enoxaparin Injectable 40 milliGRAM(s) SubCutaneous every 24 hours  folic acid 1 milliGRAM(s) Oral daily  prednisoLONE acetate 1% Suspension 1 Drop(s) Left EYE daily    MEDICATIONS  (PRN):  acetaminophen     Tablet .. 650 milliGRAM(s) Oral every 6 hours PRN Temp greater or equal to 38C (100.4F), Mild Pain (1 - 3)  aluminum hydroxide/magnesium hydroxide/simethicone Suspension 30 milliLiter(s) Oral every 4 hours PRN Dyspepsia  melatonin 3 milliGRAM(s) Oral at bedtime PRN Insomnia  ondansetron Injectable 4 milliGRAM(s) IV Push once PRN Nausea and/or Vomiting      Allergies    No Known Allergies    Intolerances        Vital Signs Last 24 Hrs  T(C): 36.7 (10 Nov 2022 12:21), Max: 36.9 (10 Nov 2022 05:19)  T(F): 98 (10 Nov 2022 12:21), Max: 98.5 (10 Nov 2022 05:19)  HR: 61 (10 Nov 2022 12:21) (54 - 61)  BP: 147/68 (10 Nov 2022 12:21) (131/94 - 147/68)  BP(mean): --  RR: 18 (10 Nov 2022 12:21) (16 - 18)  SpO2: 97% (10 Nov 2022 12:21) (95% - 97%)    Parameters below as of 10 Nov 2022 12:21  Patient On (Oxygen Delivery Method): room air        PHYSICAL EXAM  General: adult in NAD  HEENT: clear oropharynx, anicteric sclera, pink conjunctivae  Neck: supple  CV: normal S1S2 with no murmur rubs or gallops  Lungs: clear to auscultation, no wheezes, no rhales  Abdomen: soft non-tender non-distended, no hepato/splenomegaly  Ext: no clubbing cyanosis or edema  Skin: no rashes and no petichiae  Neuro: alert and oriented X3 no focal deficits      LABS:    CBC Full  -  ( 10 Nov 2022 06:05 )  WBC Count : 4.31 K/uL  RBC Count : 3.72 M/uL  Hemoglobin : 11.9 g/dL  Hematocrit : 36.9 %  Platelet Count - Automated : 227 K/uL  Mean Cell Volume : 99.2 fl  Mean Cell Hemoglobin : 32.0 pg  Mean Cell Hemoglobin Concentration : 32.2 gm/dL  Auto Neutrophil # : 2.11 K/uL  Auto Lymphocyte # : 0.83 K/uL  Auto Monocyte # : 1.25 K/uL  Auto Eosinophil # : 0.03 K/uL  Auto Basophil # : 0.04 K/uL  Auto Neutrophil % : 48.9 %  Auto Lymphocyte % : 19.3 %  Auto Monocyte % : 29.0 %  Auto Eosinophil % : 0.7 %  Auto Basophil % : 0.9 %    11-10    141  |  107  |  14  ----------------------------<  93  3.8   |  29  |  1.00    Ca    8.5      10 Nov 2022 06:05    TPro  8.1  /  Alb  4.0  /  TBili  1.4<H>  /  DBili  x   /  AST  25  /  ALT  19  /  AlkPhos  68  11-09    PT/INR - ( 09 Nov 2022 07:15 )   PT: 12.2 sec;   INR: 1.04 ratio         PTT - ( 09 Nov 2022 07:15 )  PTT:20.0 sec          BLOOD SMEAR INTERPRETATION:    RADIOLOGY & ADDITIONAL STUDIES:    < from: MR Head w/wo IV Cont (11.10.22 @ 09:13) >  ACC: [id number] EXAM:  MR BRAIN WAW                         ACC: [id number] EXAM:  MR ANGIO BRAIN                          PROCEDURE DATE:  11/10/2022          INTERPRETATION:  CLINICAL INDICATION: Altered mental status, history of   CLL, autoimmune encephalitis.    TECHNIQUE: Multi-planar multi-sequential MR imaging of the brain was   performed with and without intravenous contrast. MRA of the brain was   performed utilizing 3-D time-of-flight, without intravenous contrast.   Contrast dose: 7 ccGadavist IV contrast.    COMPARISON: CT head 11/9/2022. MRI brain 12/30/2021.    FINDINGS:    MRI BRAIN:  No acute infarction, intracranial hemorrhage or mass lesion.    No hydrocephalus. No extra-axial fluid collections.    Status post bilateral cataract surgery. Bilateral scleral banding noted.   Small mucous retention cysts in the maxillary sinuses. The mastoid air   cells are clear. The visualized soft tissues and osseous structures   appear normal.      MRA HEAD:    Normal distal internal carotid arteries.  The proximal anterior, middle and posterior cerebral arteries are patent   bilaterally.  Normal vertebrobasilar system.  No evidence of vascular stenosis, occlusion, aneurysm or vascular   malformation.    IMPRESSION:  MRI Brain:  -Noacute infarct, hemorrhage, or mass identified.    MRA Brain:  -No vaso-occlusive disease.    --- End of Report ---            DARREN OWENS MD; Attending Radiologist  This document has been electronically signed. Nov 10 2022  9:49AM    < end of copied text >

## 2022-11-10 NOTE — DISCHARGE NOTE PROVIDER - CARE PROVIDERS DIRECT ADDRESSES
,[email or web address],DirectAddress_Unknown,DirectAddress_Unknown [email or web address],DirectAddress_Unknown,DirectAddress_Unknown,DirectAddress_Unknown

## 2022-11-10 NOTE — OCCUPATIONAL THERAPY INITIAL EVALUATION ADULT - RANGE OF MOTION EXAMINATION, UPPER EXTREMITY
Muscle strength UE's: WFL's. Fine motor skills & opposition: WNL's/bilateral UE Active ROM was WFL  (within functional limits)

## 2022-11-10 NOTE — DISCHARGE NOTE PROVIDER - NSDCMRMEDTOKEN_GEN_ALL_CORE_FT
aspirin 81 mg oral tablet, chewable: 1 tab(s) orally once a day  folic acid 1 mg oral tablet: 1 tab(s) orally once a day  prednisoLONE acetate 1% ophthalmic suspension: 1 drop(s) to each affected eye once a day

## 2022-11-10 NOTE — DISCHARGE NOTE NURSING/CASE MANAGEMENT/SOCIAL WORK - NSDCPEFALRISK_GEN_ALL_CORE
For information on Fall & Injury Prevention, visit: [email or web address] OR  [email or web address] OR  https://www.cdc.gov/steadi/patient.html

## 2022-11-10 NOTE — DISCHARGE NOTE PROVIDER - NSDCDCMDCOMP_GEN_ALL_CORE
Impression: Tear film insufficiency of bilateral lacrimal glands: H04.123.  Plan: will trial PF glc drops  did not notice any difference 
change to systane balance 
blink fully avoid fans 
stay hydrated This document is complete and the patient is ready for discharge.

## 2022-11-10 NOTE — DISCHARGE NOTE PROVIDER - NSDCFUSCHEDAPPT_GEN_ALL_CORE_FT
Violeta Azul  [facility]  NEUROLOGY [address] [facility]  Scheduled Appointment: 12/28/2022    Alexandria Gregg  [facility]  HEMONC [address] [facility]  Scheduled Appointment: 02/06/2023

## 2022-11-10 NOTE — DISCHARGE NOTE PROVIDER - CARE PROVIDER_API CALL
Jaya Manzo (MD)  Internal Medicine  [address] [place] [address]  Phone: [phone]  Fax: [phone]  Follow Up Time:     Perlman, Craig D (DO)  Internal Medicine  [address] [place] [address] [address]  [place] [address]  Phone: [phone]  Fax: [phone]  Follow Up Time:     Johnny Richmond (DO)  Orthopaedic Surgery  [address] [place] [address]  Phone: [phone]  Fax: [phone]  Follow Up Time:    Jaya Manzo (MD)  Internal Medicine  [address] [place] [address]  Phone: [phone]  Fax: [phone]  Follow Up Time:     Perlman, Craig D (DO)  Internal Medicine  [address] [place] [address] [address]  [place] [address]  Phone: [phone]  Fax: [phone]  Follow Up Time:     Johnny Richmond (DO)  Orthopaedic Surgery  [address] [place] [address]  Phone: [phone]  Fax: [phone]  Follow Up Time:     IFTIKHAR MATTHEWS  Neurology  Phone: [phone]  Follow Up Time:

## 2022-11-10 NOTE — DISCHARGE NOTE NURSING/CASE MANAGEMENT/SOCIAL WORK - PATIENT PORTAL LINK FT
You can access the FollowMyHealth Patient Portal offered by [facility] by registering at the following website: [email or web address]. By joining [employer]’s FollowMyHealth portal, you will also be able to view your health information using other applications (apps) compatible with our system.

## 2022-11-11 ENCOUNTER — APPOINTMENT (OUTPATIENT)
Dept: INTERNAL MEDICINE | Facility: CLINIC | Age: 67
End: 2022-11-11

## 2022-11-11 ENCOUNTER — NON-APPOINTMENT (OUTPATIENT)
Age: 67
End: 2022-11-11

## 2022-11-18 ENCOUNTER — APPOINTMENT (OUTPATIENT)
Dept: INTERNAL MEDICINE | Facility: CLINIC | Age: 67
End: 2022-11-18

## 2022-11-18 VITALS
HEIGHT: 69 IN | SYSTOLIC BLOOD PRESSURE: 122 MMHG | HEART RATE: 79 BPM | BODY MASS INDEX: 22.81 KG/M2 | TEMPERATURE: 97 F | OXYGEN SATURATION: 99 % | DIASTOLIC BLOOD PRESSURE: 80 MMHG | WEIGHT: 154 LBS | RESPIRATION RATE: 15 BRPM

## 2022-11-18 PROCEDURE — 99495 TRANSJ CARE MGMT MOD F2F 14D: CPT

## 2022-11-18 NOTE — PHYSICAL EXAM
[No Acute Distress] : no acute distress [Well Nourished] : well nourished [Well Developed] : well developed [Well-Appearing] : well-appearing [Normal Sclera/Conjunctiva] : normal sclera/conjunctiva [PERRL] : pupils equal round and reactive to light [EOMI] : extraocular movements intact [Normal Outer Ear/Nose] : the outer ears and nose were normal in appearance [Normal Oropharynx] : the oropharynx was normal [No JVD] : no jugular venous distention [No Lymphadenopathy] : no lymphadenopathy [Supple] : supple [Thyroid Normal, No Nodules] : the thyroid was normal and there were no nodules present [No Respiratory Distress] : no respiratory distress  [No Accessory Muscle Use] : no accessory muscle use [Clear to Auscultation] : lungs were clear to auscultation bilaterally [Normal Rate] : normal rate  [Regular Rhythm] : with a regular rhythm [Normal S1, S2] : normal S1 and S2 [No Murmur] : no murmur heard [No Carotid Bruits] : no carotid bruits [No Abdominal Bruit] : a ~M bruit was not heard ~T in the abdomen [No Varicosities] : no varicosities [Pedal Pulses Present] : the pedal pulses are present [No Edema] : there was no peripheral edema [No Palpable Aorta] : no palpable aorta [No Extremity Clubbing/Cyanosis] : no extremity clubbing/cyanosis [Soft] : abdomen soft [Non Tender] : non-tender [Non-distended] : non-distended [No Masses] : no abdominal mass palpated [No HSM] : no HSM [Normal Bowel Sounds] : normal bowel sounds [Normal Posterior Cervical Nodes] : no posterior cervical lymphadenopathy [Normal Anterior Cervical Nodes] : no anterior cervical lymphadenopathy [No CVA Tenderness] : no CVA  tenderness [No Spinal Tenderness] : no spinal tenderness [No Joint Swelling] : no joint swelling [Grossly Normal Strength/Tone] : grossly normal strength/tone [No Rash] : no rash [Coordination Grossly Intact] : coordination grossly intact [No Focal Deficits] : no focal deficits [Normal Gait] : normal gait [Deep Tendon Reflexes (DTR)] : deep tendon reflexes were 2+ and symmetric [Normal Affect] : the affect was normal [Normal Insight/Judgement] : insight and judgment were intact [09191 - Moderate Complexity requires multiple possible diagnoses and/or the management options, moderate complexity of the medical data (tests, etc.) to be reviewed, and moderate risk of significant complications, morbidity, and/or mortality as well as co] : Moderate Complexity

## 2022-11-18 NOTE — HISTORY OF PRESENT ILLNESS
[Post-hospitalization from ___ Hospital] : Post-hospitalization from [unfilled] Hospital [Admitted on: ___] : The patient was admitted on [unfilled] [Discharged on ___] : discharged on [unfilled] [Discharge Summary] : discharge summary [Pertinent Labs] : pertinent labs [Radiology Findings] : radiology findings [Discharge Med List] : discharge medication list [Med Reconciliation] : medication reconciliation has been completed [Patient Contacted By: ____] : and contacted by [unfilled] [FreeTextEntry2] : Pt was admitted with change of mental status which quickly improved.\par Pt was thought to have MS due to a virus.\par He was discharged the day after admission

## 2022-11-19 ENCOUNTER — TRANSCRIPTION ENCOUNTER (OUTPATIENT)
Age: 67
End: 2022-11-19

## 2022-11-21 LAB
T3 SERPL-MCNC: 107 NG/DL
T3FREE SERPL-MCNC: 2.94 PG/ML
T4 FREE SERPL-MCNC: 0.9 NG/DL
T4 SERPL-MCNC: 5.9 UG/DL
TSH SERPL-ACNC: 10.3 UIU/ML

## 2022-11-21 NOTE — ED PROVIDER NOTE - NSSTROKETPAINCL_DEFICIT
Update History & Physical    The patient's History and Physical of November 10, 2022 was reviewed with the patient and I examined the patient. There was no change. Plan left inguinal hernia repair. Left groin marked. The surgical site was confirmed by the patient and me. Plan: The risks, benefits, expected outcome, and alternative to the recommended procedure have been discussed with the patient. Patient understands and wants to proceed with the procedure.      Electronically signed by Anne Alfaro MD on 11/21/2022 at 8:12 AM Ischemic Stroke with a measurable deficit on NIH Stroke Scale or a non-measurable deficit that is deemed disabling by the Attending Stroke Neurologist

## 2022-12-20 ENCOUNTER — APPOINTMENT (OUTPATIENT)
Dept: ORTHOPEDIC SURGERY | Facility: CLINIC | Age: 67
End: 2022-12-20

## 2022-12-20 VITALS — WEIGHT: 154 LBS | BODY MASS INDEX: 22.81 KG/M2 | HEIGHT: 69 IN

## 2022-12-20 DIAGNOSIS — M75.42 IMPINGEMENT SYNDROME OF LEFT SHOULDER: ICD-10-CM

## 2022-12-20 PROCEDURE — 20611 DRAIN/INJ JOINT/BURSA W/US: CPT | Mod: LT

## 2022-12-20 PROCEDURE — 99203 OFFICE O/P NEW LOW 30 MIN: CPT | Mod: 25

## 2022-12-20 PROCEDURE — 73030 X-RAY EXAM OF SHOULDER: CPT | Mod: LT

## 2022-12-20 RX ORDER — ACETAMINOPHEN 325 MG/1
325 TABLET, FILM COATED ORAL EVERY 6 HOURS
Refills: 0 | Status: COMPLETED | COMMUNITY
End: 2022-12-20

## 2022-12-20 RX ORDER — METRONIDAZOLE 10 MG/G
GEL TOPICAL
Refills: 0 | Status: COMPLETED | COMMUNITY
End: 2022-12-20

## 2022-12-20 RX ORDER — METHYLPREDNISOLONE ACETATE 40 MG/ML
40 INJECTION, SUSPENSION INTRA-ARTICULAR; INTRALESIONAL; INTRAMUSCULAR; SOFT TISSUE
Refills: 0 | Status: COMPLETED | OUTPATIENT
Start: 2022-12-20

## 2022-12-20 RX ADMIN — METHYLPREDNISOLONE ACETATE MG/ML: 40 INJECTION, SUSPENSION INTRA-ARTICULAR; INTRALESIONAL; INTRAMUSCULAR; SOFT TISSUE at 00:00

## 2022-12-20 NOTE — DISCUSSION/SUMMARY
[de-identified] : I offered the patient a cortisone injection left shoulder today.  Risks benefits and the alternatives were discussed.  \par He has history of CLL.  I spoke to his oncologist Dr. Jeronimo Godinez who indicated this was okay\par Continue ice and Advil p.r.n.\par He was instructed in range of motion and stretching exercises to do on a regular basis to prevent frozen shoulder\par \par Impression:\par Left shoulder impingement/acute bursitis

## 2022-12-20 NOTE — IMAGING
[de-identified] : Cervical spine\par Inspection normal\par Full active range of motion\par No tenderness\par -negative foraminal closing test bilaterally\par \par Left shoulder\par No swelling\par Mild to moderate tenderness anterior subacromial region\par Active forward flexion 170°, external rotation 60°, internal rotation lower lumbar region\par Passive forward flexion 100°\par Positive impingement 140°\par Supraspinatus strength 4-/5\par Radial pulse 2+\par  [Left] : left shoulder [FreeTextEntry1] : Reviewed and interpreted.  Left shoulder external rotation and outlet views-Type II acromion

## 2022-12-20 NOTE — PROCEDURE
[Large Joint Injection] : Large joint injection [Left] : of the left [Subacromial Space] : subacromial space [Pain] : pain [Alcohol] : alcohol [Betadine] : betadine [Ethyl Chloride sprayed topically] : ethyl chloride sprayed topically [Sterile technique used] : sterile technique used [___ cc    1%] : Lidocaine ~Vcc of 1%  [___ cc    40mg] : Methylprednisolone (Depomedrol) ~Vcc of 40 mg  [] : Patient tolerated procedure well [Patient was advised to rest the joint(s) for ____ days] : patient was advised to rest the joint(s) for [unfilled] days [Risks, benefits, alternatives discussed / Verbal consent obtained] : the risks benefits, and alternatives have been discussed, and verbal consent was obtained [All ultrasound images have been permanently captured and stored accordingly in our picture archiving and communication system] : All ultrasound images have been permanently captured and stored accordingly in our picture archiving and communication system [de-identified] : To ensure subacromial injection [FreeTextEntry3] : Do not submerge underwater for 24 hours

## 2022-12-20 NOTE — HISTORY OF PRESENT ILLNESS
[Left Arm] : left arm [Gradual] : gradual [10] : 10 [Localized] : localized [Sharp] : sharp [Constant] : constant [Household chores] : household chores [Leisure] : leisure [Rest] : rest [Retired] : Work status: retired [de-identified] : Patient is a 67-year-old right-hand-dominant male with moderate to severe sharp pain left shoulder over the past 2 weeks.  No injury.  He has pain reaching above him and behind him.  Occasional awakening from sleep at night.  Taking Advil p.r.n.  No swelling.  No recent illnesses.  No fever or chills.  No neck pain.  No chest pain or shortness of breath.  Seen today with his wife, Javier [] : Post Surgical Visit: no

## 2022-12-28 ENCOUNTER — APPOINTMENT (OUTPATIENT)
Dept: NEUROLOGY | Facility: CLINIC | Age: 67
End: 2022-12-28
Payer: MEDICARE

## 2022-12-28 VITALS
HEIGHT: 69 IN | SYSTOLIC BLOOD PRESSURE: 152 MMHG | HEART RATE: 55 BPM | WEIGHT: 154 LBS | BODY MASS INDEX: 22.81 KG/M2 | DIASTOLIC BLOOD PRESSURE: 86 MMHG

## 2022-12-28 PROCEDURE — 99214 OFFICE O/P EST MOD 30 MIN: CPT

## 2022-12-30 LAB
THYROGLOB AB SERPL-ACNC: <20 IU/ML
THYROGLOB SERPL-MCNC: 15.7 NG/ML
THYROPEROXIDASE AB SERPL IA-ACNC: <10 IU/ML
TSH SERPL-ACNC: 4.57 UIU/ML
VZV AB TITR SER: POSITIVE
VZV IGG SER IF-ACNC: 1088 INDEX

## 2023-01-04 LAB — TSH RECEPTOR AB: <1.1 IU/L

## 2023-01-05 ENCOUNTER — APPOINTMENT (OUTPATIENT)
Dept: ORTHOPEDIC SURGERY | Facility: CLINIC | Age: 68
End: 2023-01-05
Payer: MEDICARE

## 2023-01-05 VITALS — WEIGHT: 154 LBS | BODY MASS INDEX: 22.81 KG/M2 | HEIGHT: 69 IN

## 2023-01-05 DIAGNOSIS — M75.52 BURSITIS OF LEFT SHOULDER: ICD-10-CM

## 2023-01-05 PROCEDURE — 99213 OFFICE O/P EST LOW 20 MIN: CPT

## 2023-01-05 NOTE — IMAGING
[Left] : left shoulder [de-identified] : Cervical spine\par Inspection normal\par Full active range of motion\par No tenderness\par -negative foraminal closing test bilaterally\par \par Left shoulder\par No swelling\par No tenderness\par Full active range of motion\par Negative impingement\par Supraspinatus strength 5 minus/5\par Radial pulse 2+\par  [FreeTextEntry1] : Reviewed and interpreted.  Left shoulder external rotation and outlet views-Type II acromion

## 2023-01-05 NOTE — DISCUSSION/SUMMARY
[de-identified] : The patient was instructed in stretching and strengthening exercises to do on a regular basis\par He did not want to do formal PT\par Continue ice and Advil p.r.n.\par \par \par Impression:\par Resolving left shoulder impingement/bursitis

## 2023-01-05 NOTE — HISTORY OF PRESENT ILLNESS
[Left Arm] : left arm [Gradual] : gradual [1] : 2 [0] : 0 [Dull/Aching] : dull/aching [Intermittent] : intermittent [Rest] : rest [Retired] : Work status: retired [de-identified] : Patient is feeling significantly better since last visit.  He said the cortisone injection left shoulder helped him tremendously.  He has minimal pain with activities.  No awakening from sleep at night.  He had been using Salonpas patches that this irritated her skin.  Seen today with his wife, Javier [] : Post Surgical Visit: no [de-identified] : 12/20/2022 [de-identified] : Dr. Tadeo

## 2023-01-09 ENCOUNTER — APPOINTMENT (OUTPATIENT)
Dept: ENDOCRINOLOGY | Facility: CLINIC | Age: 68
End: 2023-01-09
Payer: MEDICARE

## 2023-01-09 VITALS
OXYGEN SATURATION: 99 % | WEIGHT: 154 LBS | BODY MASS INDEX: 22.81 KG/M2 | HEART RATE: 58 BPM | HEIGHT: 69 IN | DIASTOLIC BLOOD PRESSURE: 92 MMHG | SYSTOLIC BLOOD PRESSURE: 162 MMHG

## 2023-01-09 VITALS — SYSTOLIC BLOOD PRESSURE: 150 MMHG | DIASTOLIC BLOOD PRESSURE: 94 MMHG

## 2023-01-09 PROCEDURE — 99204 OFFICE O/P NEW MOD 45 MIN: CPT

## 2023-01-09 NOTE — HISTORY OF PRESENT ILLNESS
[FreeTextEntry1] : *** 11/03/2021 ***\par \par SARAHI BARNHART is here for follow up.\par he continue to do well. his history is as below:\par \par \par \par HPI:\par Patient is a 65 yo Rt handed male with PMH of CLL on Venetoclax, MDS,\par melanoma, paroxysmal AFib (not on AC) presented as transfer from [facility] on 9/16 for event capture. Patient was initially at [facility] on\par 9/14 with confusion, aphasia and R sided weakness. Patient was stroke code and\par was given s/p tPA. On admission, patient had CTH, CTA, CT perfusion during code\[facility] stroke protocol with no acute findings. MRI Brain was performed at  9/16\par with no ischemic findings. EEG performed 9/15 showed diffuse L hemispheric\par slowing. Pt remained aphasic with AMS. +RUE spasticity was noted on exam.\par \par Patient was transferred to [facility] on 9/16 for Epilepsy Monitoring Unit admission\par to evaluate for subclinical seizures. Patient was seen by neurologist and\par cardiologist. Patient was noted to have episodes agitation with episodes of\par somnolence with out medication needed. AC was held per cardio since there was\par no definitive CVA noted but advised to c/w ASA and statin. LP was performed on\par 9/17. EEG overnight (9/16-9/17) did not show seizures however did show L sided\par slowing concerning for a possible structural etiology. He was started on IVIG\par and steroids on 9/17 for autoimmune encephalitis. He was seen by SS for\par evaluation and Dysphagia 2 with honey thick liquid was recommended. He was also\par started on ABT for right elbow cellulitis/ septic bursitis. LP results- Glucose\par 73, Protein 55, WBC 3, Gram stain no growth to date, HSV neg, Cryptococcal neg;\par infectious w/u thus far negative; AIE panel pending f/u results. Patient also\par experience visual hallucination of which opthalmology wad consulted - no\par evidence of retinal detachment.\par \par Patient was evaluated by PM&R and therapy for functional deficits, gait/ADL\par impairments and acute rehabilitation was recommended. Patient was medically\par optimized for discharge to [facility] on 10/1.\par \par  (01 Oct 2021 13:32)\par \par \par Rehab course significant for left arm infected cyst which was drained by\par surgery and treated with antibiotics pending culture results.\par \par All other medical co-morbidites were stable.\par \par Pt tolerated course of inpatient pt/ot/slp rehab with significant functional\par improvements and met rehab goals prior to discharge.\par \par Pt was medically cleared on 10/8/21 for discharge to Home

## 2023-01-10 NOTE — HISTORY OF PRESENT ILLNESS
[FreeTextEntry1] : Mr. SARAHI BARNHART is a 67 year old male with a past medical history of CLL on IGG (monthly), chemotherapy, splenectomy, autoimmune encephalitis, Osteopenia presents for evaluation of abnormal thyroid levels and Osteopenia. \par \par Re TSH\par Patient was admitted with AMS. He was found to have TSH in the 10s. He denies any previous history of thyroid disease. He does not have family history. His recent levels are improved. He was not started on LT4. \par \par Re Osteopenia\par Bone History:\par Osteopenia diagnosed years ago\par Fracture history: Foot fracture\par Family history: No parental history of osteoporosis\par Treatment: Fosamax (years ago)\par \par Falls: No\par Height loss: No\par Kidney stones: No\par Dental health: Regular appointments, no history of implants, no upcoming procedures planned\par Exercise: none\par Dairy intake: lactose intolerant\par Calcium supplements: None\par Multivitamin: None\par Vitamin D supplements: Vitamin D3 \par \par

## 2023-01-10 NOTE — PHYSICAL EXAM
[Alert] : alert [Well Nourished] : well nourished [No Acute Distress] : no acute distress [Well Developed] : well developed [Normal Sclera/Conjunctiva] : normal sclera/conjunctiva [EOMI] : extra ocular movement intact [No Proptosis] : no proptosis [Normal Oropharynx] : the oropharynx was normal [No Thyroid Nodules] : no palpable thyroid nodules [No Respiratory Distress] : no respiratory distress [No Accessory Muscle Use] : no accessory muscle use [Clear to Auscultation] : lungs were clear to auscultation bilaterally [Normal S1, S2] : normal S1 and S2 [Normal Rate] : heart rate was normal [Regular Rhythm] : with a regular rhythm [No Edema] : no peripheral edema [Pedal Pulses Normal] : the pedal pulses are present [Normal Bowel Sounds] : normal bowel sounds [Not Tender] : non-tender [Not Distended] : not distended [Soft] : abdomen soft [Normal Anterior Cervical Nodes] : no anterior cervical lymphadenopathy [Normal Posterior Cervical Nodes] : no posterior cervical lymphadenopathy [No Spinal Tenderness] : no spinal tenderness [Spine Straight] : spine straight [No Stigmata of Cushings Syndrome] : no stigmata of Cushings Syndrome [Normal Gait] : normal gait [Normal Strength/Tone] : muscle strength and tone were normal [No Rash] : no rash [Acanthosis Nigricans] : no acanthosis nigricans [Normal Reflexes] : deep tendon reflexes were 2+ and symmetric [No Tremors] : no tremors [Oriented x3] : oriented to person, place, and time [de-identified] : nodule palpated

## 2023-01-10 NOTE — CONSULT LETTER
[Dear  ___] : Dear  [unfilled], [Consult Letter:] : I had the pleasure of evaluating your patient, [unfilled]. [Please see my note below.] : Please see my note below. [Consult Closing:] : Thank you very much for allowing me to participate in the care of this patient.  If you have any questions, please do not hesitate to contact me. [Sincerely,] : Sincerely, [FreeTextEntry3] : Luh Headley MS. DO.\par Endocrinology, Diabetes and Metabolism\par [address] [place] [address]\[place] [address]\par Tel [phone]\par Fax [phone]\par

## 2023-01-10 NOTE — ASSESSMENT
[FreeTextEntry1] : 67 year old male with a past medical history of CLL on IGG (monthly), chemotherapy, splenectomy, autoimmune encephalitis, Osteopenia presents for evaluation of abnormal thyroid levels and Osteopenia\par \par 1. Subclinical Hypothyroidism\par Antibodies are negative\par I believe this is from transient thyroiditis\par Will cont to monitor\par Possible nodule\par Rec US Thyroid\par \par 2. Osteopenia\par Will send for bone density\par Concerned for possible osteoporosis\par May need treatment\par Discussed calcium and Vitamin D\par He will get level with his next blood work\par Will review after DEXA

## 2023-02-01 ENCOUNTER — OUTPATIENT (OUTPATIENT)
Dept: OUTPATIENT SERVICES | Facility: HOSPITAL | Age: 68
LOS: 1 days | Discharge: HOME | End: 2023-02-01

## 2023-02-01 DIAGNOSIS — D64.9 ANEMIA, UNSPECIFIED: ICD-10-CM

## 2023-02-01 DIAGNOSIS — H26.9 UNSPECIFIED CATARACT: Chronic | ICD-10-CM

## 2023-02-03 ENCOUNTER — APPOINTMENT (OUTPATIENT)
Dept: INTERNAL MEDICINE | Facility: CLINIC | Age: 68
End: 2023-02-03
Payer: MEDICARE

## 2023-02-03 ENCOUNTER — APPOINTMENT (OUTPATIENT)
Dept: INTERNAL MEDICINE | Facility: CLINIC | Age: 68
End: 2023-02-03

## 2023-02-03 VITALS
SYSTOLIC BLOOD PRESSURE: 164 MMHG | DIASTOLIC BLOOD PRESSURE: 96 MMHG | BODY MASS INDEX: 22.81 KG/M2 | WEIGHT: 154 LBS | HEART RATE: 62 BPM | TEMPERATURE: 98.7 F | HEIGHT: 69 IN | OXYGEN SATURATION: 98 %

## 2023-02-03 VITALS — SYSTOLIC BLOOD PRESSURE: 138 MMHG | DIASTOLIC BLOOD PRESSURE: 70 MMHG

## 2023-02-03 DIAGNOSIS — R05.9 COUGH, UNSPECIFIED: ICD-10-CM

## 2023-02-03 DIAGNOSIS — J20.8 ACUTE BRONCHITIS DUE TO OTHER SPECIFIED ORGANISMS: ICD-10-CM

## 2023-02-03 PROCEDURE — 99214 OFFICE O/P EST MOD 30 MIN: CPT

## 2023-02-03 NOTE — HISTORY OF PRESENT ILLNESS
[FreeTextEntry8] : Pt c/o cough for 6 days. Has sinus pressure\par Tested negative for covid.\par No fever. Not short of breath

## 2023-02-06 ENCOUNTER — APPOINTMENT (OUTPATIENT)
Dept: HEMATOLOGY ONCOLOGY | Facility: CLINIC | Age: 68
End: 2023-02-06
Payer: MEDICARE

## 2023-02-06 PROCEDURE — 99213 OFFICE O/P EST LOW 20 MIN: CPT | Mod: GC,95

## 2023-02-06 NOTE — PHYSICAL EXAM
[Ambulatory and capable of all self care but unable to carry out any work activities] : Status 2- Ambulatory and capable of all self care but unable to carry out any work activities. Up and about more than 50% of waking hours [Normal] : affect appropriate [de-identified] : SINDY

## 2023-02-06 NOTE — ASSESSMENT
[FreeTextEntry1] : 67M h/o CLL (TP53 mutation, most recently treated with gazyva, venetoclax) w/ reported overlapping MDS, melanoma in situ,  paroxysmal AFib (not on AC), autoimmune encephalitis Sept 2021, left ankle osteomyelitis Nov 2021, here for follow up. \par \par Plan:\par -His blood counts from 1/31/23 were reviewed and stable\par -c/w with monthly IVIG with Dr. Godinez. \par -he doesn't meet criteria for CLL directed therapy at this point and will continue to monitor. \par -can c/w daily folic acid\par -encouraged routine health maintenance: f/u with dermatology, age appropriate cancer screening \par -continue follow up with endocrinology \par -last COVID booster August 2021, and he received mab Dec 2021. agree with him discussing any further booster or mab with his outpt neurologist. Vianey discussed, received 6/2022 without adverse events, would continue every 6 months \par \par RTC in 4 months, continue to follow with Dr. Gutierrez and we will discuss plan moving forward with Dr. Gutierrez\par \par

## 2023-02-06 NOTE — HISTORY OF PRESENT ILLNESS
[Home] : at home, [unfilled] , at the time of the visit. [Medical Office: ([facility])___] : at the medical office located in  [de-identified] : 67M h/o CLL (TP53 mutation, most recently treated with gazyva, venetoclax) w/ reported overlapping MDS, melanoma in situ,  paroxysmal AFib (not on AC), autoimmune encephalitis, osteomyelitis, here for new patient visit. \par \par His CLL was managed by Dr. Jeronimo Godinez (with assistance from Dr. Haji). CLL first dx'd 1/1991 post multiple regimens. S/p RT to spleen 1992 and then splenectomy 1993, had seen Dr. Tam previously. Dr. Tam offered to stay on a consultant (was travelling) and was under care of Dr. Burger. Has had numerous courses of treatment w/ Fludarabine. 3/2020 change to Ibrutinib. CBC had showed progressive macrocytic anemia, leukocytosis and bone marrow biopsy on 7/22/20 w CLL and new del 17p in addition to his known del 11q and del 13q. Report obtained from Dr. Godinez's office: hypercellular marrow for age (70% cellularity) involved by CLL/SLL (30% cellularity), no morphological evidence of granuloma, fibrosis, or metastatic tumors. Storage iron is decreased, no evidence of ringed sideroblasts, myeloid precursors are rpesent and show complete maturation, erythroid precurosrs are decreased and show complete maturation, megakaryocytes are adequate in number with predominantly normal morphology. Flow showing persistent recurrent CLL/SLL, CD38(-), ZAP70(-), comprising 86% of total analyzed events. Cytogenetics/FISH: t(2;12), del 11q, 13q, 17p. His CLL therapy was changed sometime after the BM Bx (9/2020) for ibrutinib failure and he was transitioned to gazyva and venetoclax September 2020. He completed 6 months of Gazyva and continued on venetoclax (began in November or December 2020, dose reduced from 400 mg to 300 mg) with reported excellent response which he took until mid 2021. Unfortunately in Sept 2021 was admitted with altered mental status and initially thought to have stroke (received tPA) but ultimately diagnosed with autoimmune encephalitis (diagnosed on LP). He was treated with IVIG and then started on oral steroids with prednisone and tapered off by 10/26/21. He was noted to have fracture of the left foot which initially required boot for stabilization.He also received procrit beginning in December 2020 which was stopped in May 2021, \par \par Subsequent admissions:\par 10/1-10/8/2021: Rehab stay, significant for L arm infected cyst s/p drainage. Was dc'ed 10/8 to home.\par 11/22-12/1/2021: PLV for osteomyelitis of L ankle s/p vancomycin.\par 12/28-1/4/22: Admitted for AMS. On subsequent day, the patient had returned back to his baseline. MRI of the brain: No evidence of acute hemorrhage mass effect acute territorial infarcts seen. AMS may have been from COVID? Received COVID mab. Abx for osteo to finish on 1/11. On 1/4/22 his WBC was 5.6, hgb 11.4, plt 296, ALC 0.34, ANC 3.28. Discharged back to rehab. Left rehab and had emergency cataract repair on 1/31 on left eye. Had suture failure and needed repeat surgery 2/14. \par \par Today is coming from home. Neurologically he reports being almost back to baseline. He still has some general weakness from previous hospitalizations. No focal neuro deficits. Wife has noticed little things like he filled out a check incorrectly a couple of weeks ago (may be having some issues with judgment/memory) Not driving yet out of safety concern and because of recent eye surgery. Because of his hospitalizations he lost about 30 pounds and is now regaining weight. Appetite is okay. 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 Reports no fevers, no night sweats. No other complaints. He reports being on IVIG for years. He was treated for his CLL previously based on his blood counts. He never developed any lymphadenopathy. Labs with Dr. Godinez on 2/21: WBC 4.7, hgb 12, plt 286, ALC 1.5, ANC 2.4. He received IVIG on 2/21. \par \par Surgical history: splenectomy, b/l hip repair for avascular necrosis \par COVID booster: June 2021 J+J\par Neurologist: Dr. Violeta Chang ([place])  [de-identified] : 10/10/22:\par Overall, feels well and has no complaints. He continues to receive monthly IVIG with Dr. Godinez. No fevers, chills, night sweats, weight loss. He continues to notice improvement in his neurologic status with less confusion overall. Occasional episodes, but these are less frequent.  No fevers, chills or night sweats. No new lymphadenopathy No chest pain, palpitations, shortness of breath. No recent/recurrent infections\par \par His brother was recently diagnosed with CLL while he was hospitalized for an infection. \par \par 2/6/23: Since his last visit, he was admitted from 11/9-11/10/22 for a presumed flare of his autoimmune encephalitis (work up for AMS unrevealing). Since then, he has felt well. He received Evusheld and his spike antibody levels are elevated. He overall has been feeling well since that time. He had a viral infection last week and has been on antibiotics.

## 2023-02-23 NOTE — DISCHARGE NOTE PROVIDER - NSCORESITESY/N_GEN_A_CORE_RD
No Render In Strict Bullet Format?: No Detail Level: Zone Continue Regimen: WED Melamin QHS x 2 months on/1 month off Never

## 2023-03-07 ENCOUNTER — APPOINTMENT (OUTPATIENT)
Dept: NEUROLOGY | Facility: CLINIC | Age: 68
End: 2023-03-07
Payer: MEDICARE

## 2023-03-07 VITALS
DIASTOLIC BLOOD PRESSURE: 82 MMHG | SYSTOLIC BLOOD PRESSURE: 166 MMHG | BODY MASS INDEX: 22.51 KG/M2 | WEIGHT: 152 LBS | HEART RATE: 55 BPM | HEIGHT: 69 IN

## 2023-03-07 PROCEDURE — 99214 OFFICE O/P EST MOD 30 MIN: CPT

## 2023-04-04 NOTE — HISTORY OF PRESENT ILLNESS
[FreeTextEntry1] : *** 11/03/2021 ***\par \par SARAHI BARNHART is here for follow up.\par he continue to do well. his history is as below:\par \par \par \par HPI:\par Patient is a 67 yo Rt handed male with PMH of CLL on Venetoclax, MDS,\par melanoma, paroxysmal AFib (not on AC) presented as transfer from [facility] on 9/16 for event capture. Patient was initially at [facility] on\par 9/14 with confusion, aphasia and R sided weakness. Patient was stroke code and\par was given s/p tPA. On admission, patient had CTH, CTA, CT perfusion during code\[facility] stroke protocol with no acute findings. MRI Brain was performed at  9/16\par with no ischemic findings. EEG performed 9/15 showed diffuse L hemispheric\par slowing. Pt remained aphasic with AMS. +RUE spasticity was noted on exam.\par \par Patient was transferred to [facility] on 9/16 for Epilepsy Monitoring Unit admission\par to evaluate for subclinical seizures. Patient was seen by neurologist and\par cardiologist. Patient was noted to have episodes agitation with episodes of\par somnolence with out medication needed. AC was held per cardio since there was\par no definitive CVA noted but advised to c/w ASA and statin. LP was performed on\par 9/17. EEG overnight (9/16-9/17) did not show seizures however did show L sided\par slowing concerning for a possible structural etiology. He was started on IVIG\par and steroids on 9/17 for autoimmune encephalitis. He was seen by SS for\par evaluation and Dysphagia 2 with honey thick liquid was recommended. He was also\par started on ABT for right elbow cellulitis/ septic bursitis. LP results- Glucose\par 73, Protein 55, WBC 3, Gram stain no growth to date, HSV neg, Cryptococcal neg;\par infectious w/u thus far negative; AIE panel pending f/u results. Patient also\par experience visual hallucination of which opthalmology wad consulted - no\par evidence of retinal detachment.\par \par Patient was evaluated by PM&R and therapy for functional deficits, gait/ADL\par impairments and acute rehabilitation was recommended. Patient was medically\par optimized for [facility] to [facility] on 10/1.\par \par  (01 Oct 2021 13:32)\par \par \par Rehab course significant for left arm infected cyst which was drained by\par surgery and treated with antibiotics pending culture results.\par \par All other medical co-morbidites were stable.\par \par Pt tolerated course of inpatient pt/ot/slp rehab with significant functional\par improvements and met rehab goals prior to discharge.\par \par Pt was medically cleared on 10/8/21 for discharge to Home

## 2023-04-12 ENCOUNTER — APPOINTMENT (OUTPATIENT)
Dept: ENDOCRINOLOGY | Facility: CLINIC | Age: 68
End: 2023-04-12
Payer: MEDICARE

## 2023-04-12 VITALS — OXYGEN SATURATION: 97 % | DIASTOLIC BLOOD PRESSURE: 89 MMHG | HEART RATE: 61 BPM | SYSTOLIC BLOOD PRESSURE: 148 MMHG

## 2023-04-12 VITALS
BODY MASS INDEX: 23.55 KG/M2 | DIASTOLIC BLOOD PRESSURE: 85 MMHG | HEART RATE: 60 BPM | WEIGHT: 159 LBS | HEIGHT: 69 IN | OXYGEN SATURATION: 99 % | SYSTOLIC BLOOD PRESSURE: 168 MMHG

## 2023-04-12 PROCEDURE — 99214 OFFICE O/P EST MOD 30 MIN: CPT

## 2023-04-14 NOTE — HISTORY OF PRESENT ILLNESS
[FreeTextEntry1] : Mr. SARAHI BARNHART is a 67 year old male with a past medical history of CLL on IGG (monthly), chemotherapy, splenectomy, autoimmune encephalitis, Osteopenia presents for evaluation of abnormal thyroid levels and Osteopenia. \par \par Re TSH\par Patient was admitted with AMS. He was found to have TSH in the 10s. He denies any previous history of thyroid disease. He does not have family history. His recent levels are improved. He was not started on LT4. TSH improving.\par \par Re Osteopenia\par Bone History:\par Osteopenia diagnosed years ago, Recent DEXA shows Osteoporosis\par Fracture history: Foot fracture\par Family history: No parental history of osteoporosis\par Treatment: Fosamax (years ago)\par \par Falls: No\par Height loss: No\par Kidney stones: No\par Dental health: Regular appointments, no history of implants, no upcoming procedures planned\par Exercise: none\par Dairy intake: lactose intolerant\par Calcium supplements: None\par Multivitamin: None\par Vitamin D supplements: Vitamin D3 \par \par

## 2023-04-14 NOTE — ASSESSMENT
[FreeTextEntry1] : 67 year old male with a past medical history of CLL on IGG (monthly), chemotherapy, splenectomy, autoimmune encephalitis, Osteopenia presents for evaluation of abnormal thyroid levels and Osteopenia\par \par 1. Subclinical Hypothyroidism\par Antibodies are negative\par I believe this is from transient thyroiditis\par Will recheck levels\par US unremarkable\par \par 2. Osteoporosis\par Patient warrants treatment for her OP given that he is at high risk for fractures. \par Diet, weight bearing and muscle strengthening exercise and fall prevention were discussed. Smoking cessation and alcohol consumption (no more then an average 2 drinks/day) was discussed. \par I counseled the patient regarding calcium and vitamin D intake. Calcium 1200 mg daily from diet and supplements (to be taken in divided doses as no more than 500-600 mg can be absorbed at one time)\par We discussed the potential benefits and risks of the osteoanabolic and antiresorptive classes of pharmacologic osteoporosis therapy. If history of radiation therapy, teriparatide and abaloparatide are contraindicated, however, we can consider romosozumab therapy. We discussed the potential benefits and risks of romosozumab at length, including but not limited to osteonecrosis of the jaw, atypical femoral fracture, cardiovascular risk including heart attack and stroke. We also discussed the potential benefits and risks of antiresorptive osteoporosis therapy with denosumab or zoledronic acid at length, including but not limited to osteonecrosis of the jaw and atypical femoral fracture. We discussed that denosumab must be dosed every 6 months due to rebound increase in bone breakdown with abrupt discontinuation of therapy, with transition to bisphosphonate therapy prior to a "drug holiday." \par Recommend Prolia

## 2023-04-26 ENCOUNTER — OUTPATIENT (OUTPATIENT)
Dept: OUTPATIENT SERVICES | Facility: HOSPITAL | Age: 68
LOS: 1 days | Discharge: HOME | End: 2023-04-26

## 2023-04-26 DIAGNOSIS — D64.9 ANEMIA, UNSPECIFIED: ICD-10-CM

## 2023-04-28 LAB
25(OH)D3 SERPL-MCNC: 17.4 NG/ML
ALBUMIN SERPL ELPH-MCNC: 4.7 G/DL
ALP BLD-CCNC: 85 U/L
ALT SERPL-CCNC: 11 U/L
ANION GAP SERPL CALC-SCNC: 15 MMOL/L
AST SERPL-CCNC: 15 U/L
BILIRUB SERPL-MCNC: 1 MG/DL
BUN SERPL-MCNC: 15 MG/DL
CALCIUM SERPL-MCNC: 9.4 MG/DL
CHLORIDE SERPL-SCNC: 98 MMOL/L
CO2 SERPL-SCNC: 25 MMOL/L
CREAT SERPL-MCNC: 1.16 MG/DL
EGFR: 69 ML/MIN/1.73M2
GLUCOSE SERPL-MCNC: 89 MG/DL
POTASSIUM SERPL-SCNC: 4.4 MMOL/L
PROT SERPL-MCNC: 6.7 G/DL
SODIUM SERPL-SCNC: 138 MMOL/L
T3 SERPL-MCNC: 134 NG/DL
T4 FREE SERPL-MCNC: 0.9 NG/DL
THYROGLOB AB SERPL-ACNC: <20 IU/ML
THYROPEROXIDASE AB SERPL IA-ACNC: 18 IU/ML
TSH SERPL-ACNC: 6.47 UIU/ML

## 2023-05-03 ENCOUNTER — APPOINTMENT (OUTPATIENT)
Dept: HEMATOLOGY ONCOLOGY | Facility: CLINIC | Age: 68
End: 2023-05-03
Payer: MEDICARE

## 2023-05-03 PROCEDURE — 99213 OFFICE O/P EST LOW 20 MIN: CPT | Mod: GC,95

## 2023-05-03 NOTE — HISTORY OF PRESENT ILLNESS
[Home] : at home, [unfilled] , at the time of the visit. [Medical Office: ([facility])___] : at the medical office located in  [Spouse] : spouse [Verbal consent obtained from patient] : the patient, [unfilled] [de-identified] : 67M h/o CLL (TP53 mutation, most recently treated with gazyva, venetoclax) w/ reported overlapping MDS, melanoma in situ,  paroxysmal AFib (not on AC), autoimmune encephalitis, osteomyelitis, here for new patient visit. \par \par His CLL was managed by Dr. Jeronimo Godinez (with assistance from Dr. Haji). CLL first dx'd 1/1991 post multiple regimens. S/p RT to spleen 1992 and then splenectomy 1993, had seen Dr. Tam previously. Dr. Tam offered to stay on a consultant (was travelling) and was under care of Dr. Burger. Has had numerous courses of treatment w/ Fludarabine. 3/2020 change to Ibrutinib. CBC had showed progressive macrocytic anemia, leukocytosis and bone marrow biopsy on 7/22/20 w CLL and new del 17p in addition to his known del 11q and del 13q. Report obtained from Dr. Godinez's office: hypercellular marrow for age (70% cellularity) involved by CLL/SLL (30% cellularity), no morphological evidence of granuloma, fibrosis, or metastatic tumors. Storage iron is decreased, no evidence of ringed sideroblasts, myeloid precursors are rpesent and show complete maturation, erythroid precurosrs are decreased and show complete maturation, megakaryocytes are adequate in number with predominantly normal morphology. Flow showing persistent recurrent CLL/SLL, CD38(-), ZAP70(-), comprising 86% of total analyzed events. Cytogenetics/FISH: t(2;12), del 11q, 13q, 17p. His CLL therapy was changed sometime after the BM Bx (9/2020) for ibrutinib failure and he was transitioned to gazyva and venetoclax September 2020. He completed 6 months of Gazyva and continued on venetoclax (began in November or December 2020, dose reduced from 400 mg to 300 mg) with reported excellent response which he took until mid 2021. Unfortunately in Sept 2021 was admitted with altered mental status and initially thought to have stroke (received tPA) but ultimately diagnosed with autoimmune encephalitis (diagnosed on LP). He was treated with IVIG and then started on oral steroids with prednisone and tapered off by 10/26/21. He was noted to have fracture of the left foot which initially required boot for stabilization.He also received procrit beginning in December 2020 which was stopped in May 2021, \par \par Subsequent admissions:\par 10/1-10/8/2021: Rehab stay, significant for L arm infected cyst s/p drainage. Was dc'ed 10/8 to home.\par 11/22-12/1/2021: PLV for osteomyelitis of L ankle s/p vancomycin.\par 12/28-1/4/22: Admitted for AMS. On subsequent day, the patient had returned back to his baseline. MRI of the brain: No evidence of acute hemorrhage mass effect acute territorial infarcts seen. AMS may have been from COVID? Received COVID mab. Abx for osteo to finish on 1/11. On 1/4/22 his WBC was 5.6, hgb 11.4, plt 296, ALC 0.34, ANC 3.28. Discharged back to rehab. Left rehab and had emergency cataract repair on 1/31 on left eye. Had suture failure and needed repeat surgery 2/14. \par \par Today is coming from home. Neurologically he reports being almost back to baseline. He still has some general weakness from previous hospitalizations. No focal neuro deficits. Wife has noticed little things like he filled out a check incorrectly a couple of weeks ago (may be having some issues with judgment/memory) Not driving yet out of safety concern and because of recent eye surgery. Because of his hospitalizations he lost about 30 pounds and is now regaining weight. Appetite is okay. 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 Reports no fevers, no night sweats. No other complaints. He reports being on IVIG for years. He was treated for his CLL previously based on his blood counts. He never developed any lymphadenopathy. Labs with Dr. Godinez on 2/21: WBC 4.7, hgb 12, plt 286, ALC 1.5, ANC 2.4. He received IVIG on 2/21. \par \par Surgical history: splenectomy, b/l hip repair for avascular necrosis \par COVID booster: June 2021 J+J\par Neurologist: Dr. Violeta Chang ([place])  [de-identified] : 10/10/22:\par Overall, feels well and has no complaints. He continues to receive monthly IVIG with Dr. Godinez. No fevers, chills, night sweats, weight loss. He continues to notice improvement in his neurologic status with less confusion overall. Occasional episodes, but these are less frequent.  No fevers, chills or night sweats. No new lymphadenopathy No chest pain, palpitations, shortness of breath. No recent/recurrent infections\par \par His brother was recently diagnosed with CLL while he was hospitalized for an infection. \par \par 2/6/23: Since his last visit, he was admitted from 11/9-11/10/22 for a presumed flare of his autoimmune encephalitis (work up for AMS unrevealing). Since then, he has felt well. He received Evusheld and his spike antibody levels are elevated. He overall has been feeling well since that time. He had a viral infection last week and has been on antibiotics. \par \par 5/3/23: Since his last visit, he continues to feel well. he is following with endocrinology for osteoporosis, altered thyroid level. He is tolerating IVIG well. He has not had any new episodes of autoimmune encephalitis.

## 2023-05-03 NOTE — ASSESSMENT
[FreeTextEntry1] : 67M h/o CLL (TP53 mutation, most recently treated with gazyva, venetoclax) w/ reported overlapping MDS, melanoma in situ,  paroxysmal AFib (not on AC), autoimmune encephalitis Sept 2021, left ankle osteomyelitis Nov 2021, with recurrent episode in 2022, now on IVIG monthly,  here for follow up. \par \par Plan:\par -His blood counts from 4/2023 were reviewed by patient with me and are WNL \par -c/w with monthly IVIG with Dr. Godinez. This will help both the autoimmune encephalitis as well as his hypogammaglobulinemia \par -he doesn't meet criteria for CLL directed therapy at this point and will continue to monitor. \par -can c/w daily folic acid\par -encouraged routine health maintenance: f/u with dermatology, age appropriate cancer screening, getting colonoscopy soon \par -continue follow up with endocrinology \par -last COVID booster August 2021, and he received mab Dec 2021. agree with him discussing any further booster or mab with his outpt neurologist. Vianey discussed, received 6/2022 without adverse events, would continue every 6 months \par \par RTC in 4 months, continue to follow with Dr. Godinez and has an appointment with Dr. Galvez \par \par

## 2023-05-10 ENCOUNTER — NON-APPOINTMENT (OUTPATIENT)
Age: 68
End: 2023-05-10

## 2023-05-11 ENCOUNTER — APPOINTMENT (OUTPATIENT)
Dept: INTERNAL MEDICINE | Facility: CLINIC | Age: 68
End: 2023-05-11
Payer: MEDICARE

## 2023-05-11 VITALS
WEIGHT: 160 LBS | HEART RATE: 71 BPM | RESPIRATION RATE: 15 BRPM | SYSTOLIC BLOOD PRESSURE: 130 MMHG | HEIGHT: 69 IN | DIASTOLIC BLOOD PRESSURE: 70 MMHG | BODY MASS INDEX: 23.7 KG/M2 | TEMPERATURE: 99.5 F | OXYGEN SATURATION: 97 %

## 2023-05-11 DIAGNOSIS — J01.90 ACUTE SINUSITIS, UNSPECIFIED: ICD-10-CM

## 2023-05-11 PROCEDURE — 99214 OFFICE O/P EST MOD 30 MIN: CPT

## 2023-05-11 RX ORDER — CLARITHROMYCIN 500 MG/1
500 TABLET, FILM COATED ORAL TWICE DAILY
Qty: 20 | Refills: 0 | Status: COMPLETED | COMMUNITY
Start: 2023-02-03 | End: 2023-05-11

## 2023-05-11 RX ORDER — BENZONATATE 200 MG/1
200 CAPSULE ORAL 3 TIMES DAILY
Qty: 30 | Refills: 1 | Status: COMPLETED | COMMUNITY
Start: 2023-02-03 | End: 2023-05-11

## 2023-05-11 NOTE — PHYSICAL EXAM
[No Acute Distress] : no acute distress [Normal Sclera/Conjunctiva] : normal sclera/conjunctiva [PERRL] : pupils equal round and reactive to light [EOMI] : extraocular movements intact [No Respiratory Distress] : no respiratory distress  [No Accessory Muscle Use] : no accessory muscle use [Clear to Auscultation] : lungs were clear to auscultation bilaterally [Normal Rate] : normal rate  [Regular Rhythm] : with a regular rhythm [Normal S1, S2] : normal S1 and S2 [de-identified] : PND, TM erythema

## 2023-05-11 NOTE — HISTORY OF PRESENT ILLNESS
[Initial Evaluation] : an initial evaluation of [Nasal Congestion] : nasal congestion [Currently Experiencing] : The patient is currently experiencing symptoms. [Gradual] : gradual [___ Day(s) Ago] : [unfilled] day(s) ago [Frequent] : frequently [Daily] : occur daily [Moderate] : moderate in severity [Worsening] : worsening [Fever] : no fever [Chills] : no chills [Cough] : cough

## 2023-05-11 NOTE — PROVIDER CONTACT NOTE (CRITICAL VALUE NOTIFICATION) - NS PROVIDER READ BACK TO LAB
yes Hydroquinone Pregnancy And Lactation Text: This medication has not been assigned a Pregnancy Risk Category but animal studies failed to show danger with the topical medication. It is unknown if the medication is excreted in breast milk.

## 2023-05-11 NOTE — ASSESSMENT
[FreeTextEntry1] : Rhinosinusitis: Symptoms worsening. Patient has CLL. Discussed cefdinir 300mg twice daily empirically. Start benzonatate 100mg three times daily as needed. \par

## 2023-05-11 NOTE — REVIEW OF SYSTEMS
[Fever] : no fever [Chills] : no chills [Night Sweats] : no night sweats [Chest Pain] : no chest pain [Palpitations] : no palpitations [Lower Ext Edema] : no lower extremity edema [Shortness Of Breath] : no shortness of breath [Wheezing] : no wheezing [Dyspnea on Exertion] : not dyspnea on exertion [Abdominal Pain] : no abdominal pain [Nausea] : no nausea [Vomiting] : no vomiting [Dysuria] : no dysuria

## 2023-05-15 ENCOUNTER — APPOINTMENT (OUTPATIENT)
Dept: ENDOCRINOLOGY | Facility: CLINIC | Age: 68
End: 2023-05-15

## 2023-05-18 ENCOUNTER — APPOINTMENT (OUTPATIENT)
Dept: HEMATOLOGY ONCOLOGY | Facility: CLINIC | Age: 68
End: 2023-05-18

## 2023-06-05 ENCOUNTER — APPOINTMENT (OUTPATIENT)
Dept: INTERNAL MEDICINE | Facility: CLINIC | Age: 68
End: 2023-06-05
Payer: MEDICARE

## 2023-06-05 VITALS
HEIGHT: 69 IN | OXYGEN SATURATION: 97 % | WEIGHT: 159 LBS | HEART RATE: 95 BPM | RESPIRATION RATE: 14 BRPM | TEMPERATURE: 98.5 F | BODY MASS INDEX: 23.55 KG/M2 | DIASTOLIC BLOOD PRESSURE: 84 MMHG | SYSTOLIC BLOOD PRESSURE: 130 MMHG

## 2023-06-05 DIAGNOSIS — R05.3 CHRONIC COUGH: ICD-10-CM

## 2023-06-05 PROCEDURE — 99214 OFFICE O/P EST MOD 30 MIN: CPT

## 2023-06-05 NOTE — HISTORY OF PRESENT ILLNESS
[FreeTextEntry8] : Pt is c/o a persistent cough for 1 month. He was here 5/11/23 and was prescribed cefdinir and benzonatate.\par He is still coughing. Pt has CLL.

## 2023-06-06 LAB
ALBUMIN SERPL ELPH-MCNC: 4.6 G/DL
ALP BLD-CCNC: 91 U/L
ALT SERPL-CCNC: 9 U/L
ANION GAP SERPL CALC-SCNC: 14 MMOL/L
AST SERPL-CCNC: 17 U/L
BILIRUB SERPL-MCNC: 0.9 MG/DL
BUN SERPL-MCNC: 15 MG/DL
CALCIUM SERPL-MCNC: 9.7 MG/DL
CHLORIDE SERPL-SCNC: 104 MMOL/L
CO2 SERPL-SCNC: 26 MMOL/L
CREAT SERPL-MCNC: 1.34 MG/DL
EGFR: 58 ML/MIN/1.73M2
GLUCOSE SERPL-MCNC: 84 MG/DL
POTASSIUM SERPL-SCNC: 4.5 MMOL/L
PROT SERPL-MCNC: 7.2 G/DL
SODIUM SERPL-SCNC: 144 MMOL/L

## 2023-06-12 ENCOUNTER — APPOINTMENT (OUTPATIENT)
Dept: ENDOCRINOLOGY | Facility: CLINIC | Age: 68
End: 2023-06-12
Payer: MEDICARE

## 2023-06-12 PROCEDURE — 96372 THER/PROPH/DIAG INJ SC/IM: CPT

## 2023-06-12 RX ADMIN — DENOSUMAB 0 MG/ML: 60 INJECTION SUBCUTANEOUS at 00:00

## 2023-06-14 RX ORDER — DENOSUMAB 60 MG/ML
60 INJECTION SUBCUTANEOUS
Qty: 1 | Refills: 0 | Status: COMPLETED | OUTPATIENT
Start: 2023-06-12

## 2023-08-14 ENCOUNTER — OUTPATIENT (OUTPATIENT)
Dept: OUTPATIENT SERVICES | Facility: HOSPITAL | Age: 68
LOS: 1 days | Discharge: HOME | End: 2023-08-14

## 2023-08-14 DIAGNOSIS — D64.9 ANEMIA, UNSPECIFIED: ICD-10-CM

## 2023-08-14 DIAGNOSIS — H26.9 UNSPECIFIED CATARACT: Chronic | ICD-10-CM

## 2023-08-22 ENCOUNTER — APPOINTMENT (OUTPATIENT)
Dept: HEMATOLOGY ONCOLOGY | Facility: CLINIC | Age: 68
End: 2023-08-22
Payer: MEDICARE

## 2023-08-22 VITALS
SYSTOLIC BLOOD PRESSURE: 182 MMHG | HEIGHT: 69.49 IN | HEART RATE: 58 BPM | OXYGEN SATURATION: 98 % | RESPIRATION RATE: 16 BRPM | WEIGHT: 158.29 LBS | BODY MASS INDEX: 22.92 KG/M2 | TEMPERATURE: 97.7 F | DIASTOLIC BLOOD PRESSURE: 91 MMHG

## 2023-08-22 DIAGNOSIS — Z80.7 FAMILY HISTORY OF OTHER MALIGNANT NEOPLASMS OF LYMPHOID, HEMATOPOIETIC AND RELATED TISSUES: ICD-10-CM

## 2023-08-22 PROCEDURE — 99214 OFFICE O/P EST MOD 30 MIN: CPT

## 2023-08-24 NOTE — HISTORY OF PRESENT ILLNESS
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 \par  \par  His CLL was managed by Dr. Jeronimo Godinez (with assistance from Dr. Haji). CLL first dx'd 1/1991 post multiple regimens. S/p RT to spleen 1992 and then splenectomy 1993, had seen Dr. Tam previously. Dr. Tam offered to stay on a consultant (was travelling) and was under care of Dr. Burger. Has had numerous courses of treatment w/ Fludarabine. 3/2020 change to Ibrutinib. CBC had showed progressive macrocytic anemia, leukocytosis and bone marrow biopsy on 7/22/20 w CLL and new del 17p in addition to his known del 11q and del 13q. Report obtained from Dr. Godinez's office: hypercellular marrow for age (70% cellularity) involved by CLL/SLL (30% cellularity), no morphological evidence of granuloma, fibrosis, or metastatic tumors. Storage iron is decreased, no evidence of ringed sideroblasts, myeloid precursors are rpesent and show complete maturation, erythroid precurosrs are decreased and show complete maturation, megakaryocytes are adequate in number with predominantly normal morphology. Flow showing persistent recurrent CLL/SLL, CD38(-), ZAP70(-), comprising 86% of total analyzed events. Cytogenetics/FISH: t(2;12), del 11q, 13q, 17p. His CLL therapy was changed sometime after the BM Bx (9/2020) for ibrutinib failure and he was transitioned to gazyva and venetoclax September 2020. He completed 6 months of Gazyva and continued on venetoclax (began in November or December 2020, dose reduced from 400 mg to 300 mg) with reported excellent response which he took until mid 2021. Unfortunately in Sept 2021 was admitted with altered mental status and initially thought to have stroke (received tPA) but ultimately diagnosed with autoimmune encephalitis (diagnosed on LP). He was treated with IVIG and then started on oral steroids with prednisone and tapered off by 10/26/21. He was noted to have fracture of the left foot which initially required boot for stabilization.He also received procrit beginning in December 2020 which was stopped in May 2021, \par  \par  Subsequent admissions:\par  10/1-10/8/2021: Rehab stay, significant for L arm infected cyst s/p drainage. Was dc'ed 10/8 to home.\par  11/22-12/1/2021: PLV for osteomyelitis of L ankle s/p vancomycin.\par  12/28-1/4/22: Admitted for AMS. On subsequent day, the patient had returned back to his baseline. MRI of the brain: No evidence of acute hemorrhage mass effect acute territorial infarcts seen. AMS may have been from COVID? Received COVID mab. Abx for osteo to finish on 1/11. On 1/4/22 his WBC was 5.6, hgb 11.4, plt 296, ALC 0.34, ANC 3.28. Discharged back to rehab. Left rehab and had emergency cataract repair on 1/31 on left eye. Had suture failure and needed repeat surgery 2/14. \par  \par  Today is coming from home. Neurologically he reports being almost back to baseline. He still has some general weakness from previous hospitalizations. No focal neuro deficits. Wife has noticed little things like he filled out a check incorrectly a couple of weeks ago (may be having some issues with judgment/memory) Not driving yet out of safety concern and because of recent eye surgery. Because of his hospitalizations he lost about 30 pounds and is now regaining weight. Appetite is okay. 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 Reports no fevers, no night sweats. No other complaints. He reports being on IVIG for years. He was treated for his CLL previously based on his blood counts. He never developed any lymphadenopathy. Labs with Dr. Godinez on 2/21: WBC 4.7, hgb 12, plt 286, ALC 1.5, ANC 2.4. He received IVIG on 2/21. \par  \par  Surgical history: splenectomy, b/l hip repair for avascular necrosis \par  COVID booster: June 2021 J+J\par  Neurologist: Dr. Violeta Chang ([place])  [de-identified] : 10/10/22: Overall, feels well and has no complaints. He continues to receive monthly IVIG with Dr. Godinez. No fevers, chills, night sweats, weight loss. He continues to notice improvement in his neurologic status with less confusion overall. Occasional episodes, but these are less frequent.  No fevers, chills or night sweats. No new lymphadenopathy No chest pain, palpitations, shortness of breath. No recent/recurrent infections  His brother was recently diagnosed with CLL while he was hospitalized for an infection.   2/6/23: Since his last visit, he was admitted from 11/9-11/10/22 for a presumed flare of his autoimmune encephalitis (work up for AMS unrevealing). Since then, he has felt well. He received Evusheld and his spike antibody levels are elevated. He overall has been feeling well since that time. He had a viral infection last week and has been on antibiotics.   5/3/23: Since his last visit, he continues to feel well. he is following with endocrinology for osteoporosis, altered thyroid level. He is tolerating IVIG well.   8/22/23 Doing well continues to receive IVIG Last episode of poss flare of autoimmune encephalitis 11/2022 had confusion, left arm tingling, ? triggered by virus receiving prolia for osteoporosis

## 2023-08-24 NOTE — REVIEW OF SYSTEMS
[Negative] : Allergic/Immunologic [Eye Pain] : no eye pain [Dysphagia] : no dysphagia [Muscle Weakness] : no muscle weakness [Difficulty Walking] : no difficulty walking [FreeTextEntry7] : colonoscopy 2013, small  polyp, told to f/u in ten yrs [FreeTextEntry9] : occas LBP [de-identified] : osteoporosis

## 2023-08-24 NOTE — ASSESSMENT
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 Godinez.   -therapy for autoimmune neurologic dz and hypogammaglobulinemia -CLL presently quiescent -c/w daily folic acid-cont. prolia for osteoporosis  -continue follow up with neurology   RTC in 4-6 months, continue to follow with Dr. Godinez    [Palliative] : Goals of care discussed with patient: Palliative [Palliative Care Plan] : not applicable at this time

## 2023-09-12 ENCOUNTER — APPOINTMENT (OUTPATIENT)
Dept: NEUROLOGY | Facility: CLINIC | Age: 68
End: 2023-09-12
Payer: MEDICARE

## 2023-09-12 VITALS
DIASTOLIC BLOOD PRESSURE: 70 MMHG | HEIGHT: 69.5 IN | SYSTOLIC BLOOD PRESSURE: 145 MMHG | HEART RATE: 58 BPM | WEIGHT: 159 LBS | BODY MASS INDEX: 23.02 KG/M2

## 2023-09-12 PROCEDURE — 99214 OFFICE O/P EST MOD 30 MIN: CPT

## 2023-10-01 PROBLEM — Z92.3 HISTORY OF RADIATION THERAPY: Status: RESOLVED | Noted: 2017-05-09 | Resolved: 2023-10-01

## 2023-10-17 NOTE — ED ADULT NURSE NOTE - NS PRO AD PATIENT TYPE
Health Care Proxy (HCP) Clindamycin Pregnancy And Lactation Text: This medication can be used in pregnancy if certain situations. Clindamycin is also present in breast milk.

## 2023-11-02 ENCOUNTER — APPOINTMENT (OUTPATIENT)
Dept: ENDOCRINOLOGY | Facility: CLINIC | Age: 68
End: 2023-11-02
Payer: MEDICARE

## 2023-11-02 VITALS
SYSTOLIC BLOOD PRESSURE: 164 MMHG | HEIGHT: 69.5 IN | OXYGEN SATURATION: 99 % | HEART RATE: 51 BPM | DIASTOLIC BLOOD PRESSURE: 87 MMHG | WEIGHT: 155 LBS | BODY MASS INDEX: 22.44 KG/M2

## 2023-11-02 VITALS — SYSTOLIC BLOOD PRESSURE: 154 MMHG | DIASTOLIC BLOOD PRESSURE: 82 MMHG

## 2023-11-02 VITALS — DIASTOLIC BLOOD PRESSURE: 78 MMHG | SYSTOLIC BLOOD PRESSURE: 148 MMHG

## 2023-11-02 PROCEDURE — 99214 OFFICE O/P EST MOD 30 MIN: CPT

## 2023-11-20 ENCOUNTER — APPOINTMENT (OUTPATIENT)
Dept: INTERNAL MEDICINE | Facility: CLINIC | Age: 68
End: 2023-11-20
Payer: MEDICARE

## 2023-11-20 ENCOUNTER — NON-APPOINTMENT (OUTPATIENT)
Age: 68
End: 2023-11-20

## 2023-11-20 ENCOUNTER — LABORATORY RESULT (OUTPATIENT)
Age: 68
End: 2023-11-20

## 2023-11-20 VITALS
TEMPERATURE: 98.9 F | HEART RATE: 60 BPM | HEIGHT: 69.5 IN | DIASTOLIC BLOOD PRESSURE: 94 MMHG | OXYGEN SATURATION: 98 % | WEIGHT: 159 LBS | RESPIRATION RATE: 16 BRPM | SYSTOLIC BLOOD PRESSURE: 152 MMHG | BODY MASS INDEX: 23.02 KG/M2

## 2023-11-20 VITALS — SYSTOLIC BLOOD PRESSURE: 154 MMHG | DIASTOLIC BLOOD PRESSURE: 80 MMHG

## 2023-11-20 DIAGNOSIS — Z00.00 ENCOUNTER FOR GENERAL ADULT MEDICAL EXAMINATION W/OUT ABNORMAL FINDINGS: ICD-10-CM

## 2023-11-20 PROCEDURE — G0439: CPT

## 2023-11-20 PROCEDURE — 93000 ELECTROCARDIOGRAM COMPLETE: CPT

## 2023-11-21 LAB
ALBUMIN SERPL ELPH-MCNC: 4.5 G/DL
ALP BLD-CCNC: 68 U/L
ALT SERPL-CCNC: 10 U/L
ANION GAP SERPL CALC-SCNC: 10 MMOL/L
APPEARANCE: CLEAR
AST SERPL-CCNC: 13 U/L
BILIRUB SERPL-MCNC: 0.8 MG/DL
BILIRUBIN URINE: NEGATIVE
BLOOD URINE: NEGATIVE
BUN SERPL-MCNC: 17 MG/DL
CALCIUM SERPL-MCNC: 9.2 MG/DL
CHLORIDE SERPL-SCNC: 103 MMOL/L
CHOLEST SERPL-MCNC: 213 MG/DL
CO2 SERPL-SCNC: 28 MMOL/L
COLOR: YELLOW
CREAT SERPL-MCNC: 1.11 MG/DL
EGFR: 72 ML/MIN/1.73M2
ESTIMATED AVERAGE GLUCOSE: 123 MG/DL
FOLATE SERPL-MCNC: >20 NG/ML
GLUCOSE QUALITATIVE U: NEGATIVE MG/DL
GLUCOSE SERPL-MCNC: 94 MG/DL
HBA1C MFR BLD HPLC: 5.9 %
HCT VFR BLD CALC: 40.6 %
HDLC SERPL-MCNC: 49 MG/DL
HGB BLD-MCNC: 12.9 G/DL
KETONES URINE: NEGATIVE MG/DL
LDLC SERPL CALC-MCNC: 131 MG/DL
LEUKOCYTE ESTERASE URINE: NEGATIVE
MCHC RBC-ENTMCNC: 31.8 GM/DL
MCHC RBC-ENTMCNC: 31.9 PG
MCV RBC AUTO: 100.2 FL
NITRITE URINE: NEGATIVE
NONHDLC SERPL-MCNC: 164 MG/DL
PH URINE: 6
PLATELET # BLD AUTO: 247 K/UL
POTASSIUM SERPL-SCNC: 4.4 MMOL/L
PROT SERPL-MCNC: 6.7 G/DL
PROTEIN URINE: NEGATIVE MG/DL
PSA SERPL-MCNC: 0.69 NG/ML
RBC # BLD: 4.05 M/UL
RBC # FLD: 14.2 %
SODIUM SERPL-SCNC: 141 MMOL/L
SPECIFIC GRAVITY URINE: 1.02
TRIGL SERPL-MCNC: 186 MG/DL
TSH SERPL-ACNC: 5.87 UIU/ML
UROBILINOGEN URINE: 0.2 MG/DL
VIT B12 SERPL-MCNC: 232 PG/ML
WBC # FLD AUTO: 5.74 K/UL

## 2023-12-07 ENCOUNTER — APPOINTMENT (OUTPATIENT)
Dept: INTERNAL MEDICINE | Facility: CLINIC | Age: 68
End: 2023-12-07
Payer: MEDICARE

## 2023-12-07 VITALS
WEIGHT: 159 LBS | HEART RATE: 68 BPM | DIASTOLIC BLOOD PRESSURE: 76 MMHG | RESPIRATION RATE: 14 BRPM | OXYGEN SATURATION: 99 % | HEIGHT: 69.5 IN | SYSTOLIC BLOOD PRESSURE: 140 MMHG | BODY MASS INDEX: 23.02 KG/M2

## 2023-12-07 VITALS — SYSTOLIC BLOOD PRESSURE: 134 MMHG | DIASTOLIC BLOOD PRESSURE: 80 MMHG

## 2023-12-07 PROCEDURE — 99213 OFFICE O/P EST LOW 20 MIN: CPT

## 2023-12-12 ENCOUNTER — APPOINTMENT (OUTPATIENT)
Dept: ENDOCRINOLOGY | Facility: CLINIC | Age: 68
End: 2023-12-12
Payer: MEDICARE

## 2023-12-12 PROCEDURE — 96372 THER/PROPH/DIAG INJ SC/IM: CPT

## 2023-12-12 RX ORDER — DENOSUMAB 60 MG/ML
60 INJECTION SUBCUTANEOUS
Qty: 1 | Refills: 0 | Status: COMPLETED | OUTPATIENT
Start: 2023-12-12

## 2023-12-12 RX ADMIN — DENOSUMAB 60 MG/ML: 60 INJECTION SUBCUTANEOUS at 00:00

## 2023-12-13 ENCOUNTER — OUTPATIENT (OUTPATIENT)
Dept: OUTPATIENT SERVICES | Facility: HOSPITAL | Age: 68
LOS: 1 days | Discharge: HOME | End: 2023-12-13

## 2023-12-13 DIAGNOSIS — H26.9 UNSPECIFIED CATARACT: Chronic | ICD-10-CM

## 2023-12-13 DIAGNOSIS — D64.9 ANEMIA, UNSPECIFIED: ICD-10-CM

## 2023-12-25 ENCOUNTER — TRANSCRIPTION ENCOUNTER (OUTPATIENT)
Age: 68
End: 2023-12-25

## 2023-12-25 ENCOUNTER — EMERGENCY (EMERGENCY)
Facility: HOSPITAL | Age: 68
LOS: 1 days | Discharge: HOME | End: 2023-12-25
Attending: EMERGENCY MEDICINE | Admitting: EMERGENCY MEDICINE
Payer: MEDICARE

## 2023-12-25 VITALS
WEIGHT: 160.06 LBS | TEMPERATURE: 98 F | OXYGEN SATURATION: 100 % | SYSTOLIC BLOOD PRESSURE: 144 MMHG | RESPIRATION RATE: 18 BRPM | DIASTOLIC BLOOD PRESSURE: 81 MMHG | HEIGHT: 70 IN | HEART RATE: 59 BPM

## 2023-12-25 DIAGNOSIS — H26.9 UNSPECIFIED CATARACT: Chronic | ICD-10-CM

## 2023-12-25 PROCEDURE — 90471 IMMUNIZATION ADMIN: CPT

## 2023-12-25 PROCEDURE — 99284 EMERGENCY DEPT VISIT MOD MDM: CPT | Mod: FS

## 2023-12-25 PROCEDURE — 90715 TDAP VACCINE 7 YRS/> IM: CPT

## 2023-12-25 PROCEDURE — 99283 EMERGENCY DEPT VISIT LOW MDM: CPT | Mod: 25

## 2023-12-25 RX ORDER — BACITRACIN ZINC 500 UNIT/G
1 OINTMENT IN PACKET (EA) TOPICAL
Qty: 1 | Refills: 0
Start: 2023-12-25 | End: 2023-12-31

## 2023-12-25 RX ORDER — BACITRACIN ZINC 500 UNIT/G
1 OINTMENT IN PACKET (EA) TOPICAL ONCE
Refills: 0 | Status: COMPLETED | OUTPATIENT
Start: 2023-12-25 | End: 2023-12-25

## 2023-12-25 RX ORDER — TETANUS TOXOID, REDUCED DIPHTHERIA TOXOID AND ACELLULAR PERTUSSIS VACCINE, ADSORBED 5; 2.5; 8; 8; 2.5 [IU]/.5ML; [IU]/.5ML; UG/.5ML; UG/.5ML; UG/.5ML
0.5 SUSPENSION INTRAMUSCULAR ONCE
Refills: 0 | Status: COMPLETED | OUTPATIENT
Start: 2023-12-25 | End: 2023-12-25

## 2023-12-25 RX ADMIN — TETANUS TOXOID, REDUCED DIPHTHERIA TOXOID AND ACELLULAR PERTUSSIS VACCINE, ADSORBED 0.5 MILLILITER(S): 5; 2.5; 8; 8; 2.5 SUSPENSION INTRAMUSCULAR at 11:11

## 2023-12-25 RX ADMIN — Medication 1 APPLICATION(S): at 11:12

## 2023-12-25 NOTE — ED PROVIDER NOTE - PATIENT PORTAL LINK FT
You can access the FollowMyHealth Patient Portal offered by [facility] by registering at the following website: [email or web address]. By joining [employer]’s FollowMyHealth portal, you will also be able to view your health information using other applications (apps) compatible with our system. You can access the FollowMyHealth Patient Portal offered by [facility] by registering at the following website: [email or web address]. By joining [employer]s FollowMyHealth portal, you will also be able to view your health information using other applications (apps) compatible with our system.

## 2023-12-25 NOTE — ED PROVIDER NOTE - PHYSICAL EXAMINATION
Constitutional: Awake, Alert, non-toxic. NAD. Well appearing, well nourished.   HEAD: Normocephalic, atraumatic.   EYES: EOM intact, conjunctiva and sclera are clear bilaterally.   ENT: No rhinorrhea, patent, mucous membranes pink/moist, no drooling or stridor.   NECK: Supple, non-tender  RESPIRATORY: Normal respiratory effort  EXTREMITIES: Full passive and active ROM in all extremities; (+) right 3rd finger volar distal 1cm U shaped flap laceration, finger non-tender to palpation; distal pulses palpable and symmetric  SKIN: Warm, dry; good skin turgor, no apparent lesions or rashes, no ecchymosis, brisk capillary refill.  NEURO: A&O x3. Sensory and motor functions are grossly intact. Speech is normal. Appearance and judgement seem appropriate for gender and age.

## 2023-12-25 NOTE — ED PROVIDER NOTE - OBJECTIVE STATEMENT
68-year-old male with past medical history of CLL presents today due to finger laceration.  Patient was opening up a package with scissors in which she sustained a laceration to his right third digit.  This occurred last night around 930 or 10 PM.  It has been 12 hours since the incident.  Patient reports that the wound is superficial and he controlled the bleeding with a pressure dressing.  Patient denies numbness, weakness, limited range of motion, bony pain, or any other complaints.

## 2023-12-25 NOTE — ED PROVIDER NOTE - NSFOLLOWUPINSTRUCTIONS_ED_ALL_ED_FT
Follow up with your primary care doctor. Return for any signs of infection.       Finger Laceration    WHAT YOU NEED TO KNOW:    What is a finger laceration? A finger laceration is a deep cut in your skin. Your blood vessels, bones, joints, tendons, or nerves may also be injured.    What are the signs and symptoms of a finger laceration? Your symptoms may depend on whether nerves, tendons, or deeper tissues were injured. You may have any of the following:    A cut, tear, or gash in your finger    Bleeding, swelling, or pain    Numbness or tingling in your finger    Trouble moving your finger  How is a finger laceration diagnosed? Tell your healthcare provider how you got your laceration. Your healthcare provider will examine your laceration and decide what treatment you need. An x-ray, ultrasound, or CT may show foreign objects in the wound. Foreign objects include metal, gravel, and glass. The tests may also show damage to deeper tissues. You may be given contrast liquid to help the injured area show up better in the pictures. Tell the healthcare provider if you have ever had an allergic reaction to contrast liquid.    How is a finger laceration treated? Treatment depends on how large and deep the laceration is. It also depends on whether you have damage to deeper tissues. You may need any of the following:    Pressure may be applied to stop any bleeding.    Wound cleaning may be needed to remove dirt or debris. This will decrease the risk of infection. Your healthcare provider may need to look in your laceration for foreign objects or damage to deeper tissues. Before your laceration is cleaned and checked, you may be given medicine to numb the area. You may also be given medicine to help you relax.    Wound closure with stitches, medical glue, or Steri-Strips™ may be needed. These help the wound close and heal. A splint may be placed over your stitches, glue, or Steri-Strips. This will help decrease stress on the wound and prevent it from coming apart.        Medicine may be given to treat pain or decrease your risk for infection. You may also be given a tetanus shot. Your healthcare provider will decide if you need a tetanus shot. Wounds at high risk for tetanus infection include wounds with dirt or saliva in them. Tell your healthcare provider if you have had the tetanus vaccine or a booster within the last 5 years.    Surgery may be needed to clean your wound and remove foreign objects. Surgery may also be needed to repair injuries to tendons, nerves, or bones.  What can I do to manage my symptoms?    Apply ice on your finger for 15 to 20 minutes every hour or as directed. Use an ice pack, or put crushed ice in a plastic bag. Cover it with a towel before you apply it to your skin. Ice helps prevent tissue damage and decreases swelling and pain.    Elevate your hand above the level of your heart as often as you can. This will help decrease swelling and pain. Prop your hand on pillows or blankets to keep it elevated comfortably.    Wear your splint as directed. A splint will decrease movement and stress on your wound. The splint may help your wound heal faster. Ask your healthcare provider how to apply and remove a splint.    Apply ointments to decrease scarring. Do not apply ointments until your healthcare provider says it is okay. You may need to wait until your wound is healed. Ask which ointment to buy and how often to use it.  When should I seek immediate care?    Your wound comes apart.    Blood soaks through your bandage.    You have severe pain in your finger or hand.    Your finger is pale and cold.    You have sudden trouble moving your finger.    Your swelling suddenly gets worse.    You have red streaks on your skin coming from your wound.  When should I call my doctor or hand specialist?    You have new numbness or tingling.    Your finger feels warm, looks swollen or red, and is draining pus.    You have a fever.    You have questions or concerns about your condition or care.  CARE AGREEMENT:    You have the right to help plan your care. Learn about your health condition and how it may be treated. Discuss treatment options with your healthcare providers to decide what care you want to receive. You always have the right to refuse treatment.    © [employer] 1973, 2023

## 2023-12-25 NOTE — ED PROVIDER NOTE - CLINICAL SUMMARY MEDICAL DECISION MAKING FREE TEXT BOX
Patient complaining of laceration to his right middle finger from scissors over 12 hours ago.  Patient relates he was trying to open a box with a scissor and he accidentally harshly avulsed a thin flap of skin from the tip of his right third finger.  Patient relates the wound was superficial he was able to control the bleeding when he applied a pressure dressing.  Patient denies any other complaints.  Patient unsure of last tetanus.    Plan update tetanus wound care

## 2023-12-25 NOTE — ED ADULT NURSE NOTE - NSFALLUNIVINTERV_ED_ALL_ED
Bed/Stretcher in lowest position, wheels locked, appropriate side rails in place/Call bell, personal items and telephone in reach/Instruct patient to call for assistance before getting out of bed/chair/stretcher/Non-slip footwear applied when patient is off stretcher/[place] to call system/Physically safe environment - no spills, clutter or unnecessary equipment/Purposeful proactive rounding/Room/bathroom lighting operational, light cord in reach Bed/Stretcher in lowest position, wheels locked, appropriate side rails in place/Call bell, personal items and telephone in reach/Instruct patient to call for assistance before getting out of bed/chair/stretcher/Non-slip footwear applied when patient is off stretcher/[place] to call system/Physically safe environment - no spills, clutter or unnecessary equipment/Purposeful proactive rounding/Room/bathroom lighting operational, light cord in reach

## 2023-12-28 ENCOUNTER — APPOINTMENT (OUTPATIENT)
Dept: HEMATOLOGY ONCOLOGY | Facility: CLINIC | Age: 68
End: 2023-12-28
Payer: MEDICARE

## 2023-12-28 VITALS
HEIGHT: 69.5 IN | RESPIRATION RATE: 16 BRPM | HEART RATE: 60 BPM | TEMPERATURE: 98.3 F | SYSTOLIC BLOOD PRESSURE: 148 MMHG | WEIGHT: 156.31 LBS | OXYGEN SATURATION: 98 % | BODY MASS INDEX: 22.63 KG/M2 | DIASTOLIC BLOOD PRESSURE: 82 MMHG

## 2023-12-28 DIAGNOSIS — D80.1 NONFAMILIAL HYPOGAMMAGLOBULINEMIA: ICD-10-CM

## 2023-12-28 DIAGNOSIS — M86.172 OTHER ACUTE OSTEOMYELITIS, LEFT ANKLE AND FOOT: ICD-10-CM

## 2023-12-28 PROCEDURE — 99213 OFFICE O/P EST LOW 20 MIN: CPT

## 2023-12-28 NOTE — REVIEW OF SYSTEMS
[Negative] : Allergic/Immunologic [Eye Pain] : no eye pain [Dysphagia] : no dysphagia [Muscle Weakness] : no muscle weakness [Difficulty Walking] : no difficulty walking [FreeTextEntry9] : occas LBP [de-identified] : osteoporosis

## 2023-12-28 NOTE — END OF VISIT
[] : Fellow [FreeTextEntry3] : doing well w/o recurrence of neurologic disability, crediting continued IVIG. No evidence of recurrent CLL. Hgb and plt count remain normal.

## 2023-12-28 NOTE — ASSESSMENT
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 Neurologic status stable now.    #CLL - CBC results reviewed and d/w pt (labs from 8/16/23 in [place]) WBC 6.67, Hgb 14.2, Plt 228K, ANC 4.41, ALC 1.01.  - patient also had other lab work done at Dr. Godinez's office which is yet to be faxed over. -c/w with monthly IVIG with Dr. Godinez-> next IVIG due in Jan 2024. He's been getting IVIG since around 2020. Last episode of neurological episode was in Nov 2022. Per neurology, he needs to continue IVIG for at least 2 yrs.  - therapy for autoimmune neurologic dz and hypogammaglobulinemia -CLL presently quiescent -c/w daily folic acid-cont.  - on prolia for osteoporosis every 6 months -continue follow up with neurology. Couldn't get shingles or more covid vaccine due to his h/o autoimmune encephalitis. - RTC in 4-6 months, continue to follow with Dr. Godinez.  seen and d/w Dr. Lenny Ma MD  PGY5 Fellow, Hematology/Oncology

## 2023-12-28 NOTE — HISTORY OF PRESENT ILLNESS
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   His CLL was managed by Dr. Jeronimo Godinez (with assistance from Dr. Haji). CLL first dx'd 1/1991 post multiple regimens. S/p RT to spleen 1992 and then splenectomy 1993, had seen Dr. Tam previously. Dr. Tam offered to stay on a consultant (was travelling) and was under care of Dr. Burger. Has had numerous courses of treatment w/ Fludarabine. 3/2020 change to Ibrutinib. CBC had showed progressive macrocytic anemia, leukocytosis and bone marrow biopsy on 7/22/20 w CLL and new del 17p in addition to his known del 11q and del 13q. Report obtained from Dr. Godinez's office: hypercellular marrow for age (70% cellularity) involved by CLL/SLL (30% cellularity), no morphological evidence of granuloma, fibrosis, or metastatic tumors. Storage iron is decreased, no evidence of ringed sideroblasts, myeloid precursors are rpesent and show complete maturation, erythroid precurosrs are decreased and show complete maturation, megakaryocytes are adequate in number with predominantly normal morphology. Flow showing persistent recurrent CLL/SLL, CD38(-), ZAP70(-), comprising 86% of total analyzed events. Cytogenetics/FISH: t(2;12), del 11q, 13q, 17p. His CLL therapy was changed sometime after the BM Bx (9/2020) for ibrutinib failure and he was transitioned to gazyva and venetoclax September 2020. He completed 6 months of Gazyva and continued on venetoclax (began in November or December 2020, dose reduced from 400 mg to 300 mg) with reported excellent response which he took until mid 2021 (for which he completed for about 10-11 months). Unfortunately in Sept 2021 was admitted with altered mental status and initially thought to have stroke (received tPA) but ultimately diagnosed with autoimmune encephalitis (diagnosed on LP). He was treated with IVIG and then started on oral steroids with prednisone and tapered off by 10/26/21. He was noted to have fracture of the left foot which initially required boot for stabilization.He also received procrit beginning in December 2020 which was stopped in May 2021,   Subsequent admissions: 10/1-10/8/2021: Rehab stay, significant for L arm infected cyst s/p drainage. Was dc'ed 10/8 to home. 11/22-12/1/2021: PLV for osteomyelitis of L ankle s/p vancomycin. 12/28-1/4/22: Admitted for AMS. On subsequent day, the patient had returned back to his baseline. MRI of the brain: No evidence of acute hemorrhage mass effect acute territorial infarcts seen. AMS may have been from COVID? Received COVID mab. Abx for osteo to finish on 1/11. On 1/4/22 his WBC was 5.6, hgb 11.4, plt 296, ALC 0.34, ANC 3.28. Discharged back to rehab. Left rehab and had emergency cataract repair on 1/31 on left eye. Had suture failure and needed repeat surgery 2/14.   Today is coming from home. Neurologically he reports being almost back to baseline. He still has some general weakness from previous hospitalizations. No focal neuro deficits. Wife has noticed little things like he filled out a check incorrectly a couple of weeks ago (may be having some issues with judgment/memory) Not driving yet out of safety concern and because of recent eye surgery. Because of his hospitalizations he lost about 30 pounds and is now regaining weight. Appetite is okay. 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 Reports no fevers, no night sweats. No other complaints. He reports being on IVIG for years. He was treated for his CLL previously based on his blood counts. He never developed any lymphadenopathy. Labs with Dr. Godinez on 2/21: WBC 4.7, hgb 12, plt 286, ALC 1.5, ANC 2.4. He received IVIG on 2/21.   Surgical history: splenectomy, b/l hip repair for avascular necrosis  COVID booster: June 2021 J+J Neurologist: Dr. Violeta Chang ([place])  [de-identified] : 10/10/22: Overall, feels well and has no complaints. He continues to receive monthly IVIG with Dr. Godinez. No fevers, chills, night sweats, weight loss. He continues to notice improvement in his neurologic status with less confusion overall. Occasional episodes, but these are less frequent.  No fevers, chills or night sweats. No new lymphadenopathy No chest pain, palpitations, shortness of breath. No recent/recurrent infections  His brother was recently diagnosed with CLL while he was hospitalized for an infection.   2/6/23: Since his last visit, he was admitted from 11/9-11/10/22 for a presumed flare of his autoimmune encephalitis (work up for AMS unrevealing). Since then, he has felt well. He received Evusheld and his spike antibody levels are elevated. He overall has been feeling well since that time. He had a viral infection last week and has been on antibiotics.   5/3/23: Since his last visit, he continues to feel well. he is following with endocrinology for osteoporosis, altered thyroid level. He is tolerating IVIG well.   8/22/23 Doing well continues to receive IVIG Last episode of poss flare of autoimmune encephalitis 11/2022 had confusion, left arm tingling, ? triggered by virus receiving prolia for osteoporosis  12/28/23- patient feeling well. He recently had an accidental cut on his right hand and is taking cefadroxil x5 day course. He denies any symptoms; no fever, weight loss, abdominal pain or no new lumps/masses. His tiredness is the same as before. Recently had colonoscopy 2023-> small polyps found.

## 2024-01-10 NOTE — ED ADULT NURSE NOTE - NAME OF THE PERSON WHO RECEIVED REPORT AT THE FACILITY THE PATIENT IS TRANSFERRING TO
Operative Note      Patient: Deepa Cruz  YOB: 1983  MRN: [id number]    Date of Procedure: 1/10/2024    Pre-Op Diagnosis Codes:     * Right foot infection [L08.9]    Post-Op Diagnosis: Same       Procedure(s):  RIGHT FOOT I & D, DEBRIDEMENT  3rd metatarsal excision for infection  2nd metatarsal osteotomy  4th metatarsal ostoetomy    Surgeon(s):  James Nicole MD    Assistant:   * No surgical staff found *    Anesthesia: Monitor Anesthesia Care    Estimated Blood Loss (mL): less than 50     Complications: None    Specimens:   ID Type Source Tests Collected by Time Destination   1 : RIGHT MTP JOINT CULTURE Swab Foot CULTURE, ANAEROBIC, GRAM STAIN James Nicole MD 1/10/2024 1331        Implants:  * No implants in log *      Drains: * No LDAs found *    Findings:   Ulcer underneath third metatarsal head tract directly down to the third metatarsal head with exposed bone at the bottom of the wound.  Ulcer measured 2 cm x 2 cm and track down to bone  Obvious fluid and suspected infection of the third MTP joint        Detailed Description of Procedure:   Patient was brought to the operating room.  Timeout taken to identify the patient, laterally procedure being performed.  Patient was then moved to the operating room table, prepped and draped in normal sterile fashion, and a subsequent timeout was taken.  We then proceeded continue the operation after adequate timeout was obtained.    We first attention the plantar aspect of her foot.  Using a 15 blade knife I excised skin, deep fascia, muscle, and tendon.  This wound measured approximately 2 cm x 2 splinters and tracked all way down to third metatarsal head.  This is excisional debridement.    At the base of this wound was exposed third metatarsal head.  There was fluid noted within the third MTP joint that immersed murky fluid.  I then took cultures this murky fluid.    Therefore I worked proximally up into the third metatarsal shaft, we can assume 
No answer

## 2024-02-16 NOTE — PROGRESS NOTE ADULT - ASSESSMENT
Spoke to pt of lab result.   65 yo Rt handed male PMH CLL on Venetoclax, MDS, melanoma, paroxysmal AFib (not on AC) presenting as transfer from [place] for event capture. Patient was initially at  [facility] on 9/14 with confusion, aphasia and R sided weakness. MRI brain negative for acute pathology. EEG noted to show discharges from Left hemisphere with RUE spasticity There is concern for subclinical seizures thus transferred to [facility] for event capture. Cardiology consulted.  Patient was noted to have episodes agitation with episodes of somnolence with out medication needed. Patient had LP. Cardiology states to hold off on AC as there is no definitive CVA noted. Continue with ASA and statin. Improved s/p 1 dose of IVIG. Still with some mild cognitive delay and now with visual hallucinations. Poss related to ativan, will dc. Wife notes that patient gets IVIG treatment every month as part of his CLL treatment.     # Subclinical seizures   care as per neurology team   MRI noted   LP Done  clear from neurology for discharge   follo wup with neuro in 1 month after discharge     # R elbow abscess  ID eval appreciated  cont antibiotics per ID recs, currently on vanco, switch to oral doxy and keflex  D/C planing   ortho eval appreciated, dry tap   pain control     # A fib  not on AC   card follow up  rate control     # CLL  outpaitent follow up with oncology       DVT and gi PPX

## 2024-03-07 ENCOUNTER — APPOINTMENT (OUTPATIENT)
Dept: INTERNAL MEDICINE | Facility: CLINIC | Age: 69
End: 2024-03-07
Payer: MEDICARE

## 2024-03-07 VITALS
BODY MASS INDEX: 22.44 KG/M2 | TEMPERATURE: 98.1 F | WEIGHT: 155 LBS | SYSTOLIC BLOOD PRESSURE: 142 MMHG | RESPIRATION RATE: 16 BRPM | OXYGEN SATURATION: 97 % | HEART RATE: 86 BPM | HEIGHT: 69.5 IN | DIASTOLIC BLOOD PRESSURE: 80 MMHG

## 2024-03-07 VITALS — DIASTOLIC BLOOD PRESSURE: 70 MMHG | SYSTOLIC BLOOD PRESSURE: 112 MMHG

## 2024-03-07 PROCEDURE — 99213 OFFICE O/P EST LOW 20 MIN: CPT

## 2024-03-07 NOTE — PHYSICAL EXAM
[No Acute Distress] : no acute distress [Well Nourished] : well nourished [Well Developed] : well developed [Normal Sclera/Conjunctiva] : normal sclera/conjunctiva [Well-Appearing] : well-appearing [PERRL] : pupils equal round and reactive to light [EOMI] : extraocular movements intact [Normal Outer Ear/Nose] : the outer ears and nose were normal in appearance [Normal Oropharynx] : the oropharynx was normal [No JVD] : no jugular venous distention [No Lymphadenopathy] : no lymphadenopathy [Supple] : supple [Thyroid Normal, No Nodules] : the thyroid was normal and there were no nodules present [No Respiratory Distress] : no respiratory distress  [No Accessory Muscle Use] : no accessory muscle use [Clear to Auscultation] : lungs were clear to auscultation bilaterally [Normal Rate] : normal rate  [Regular Rhythm] : with a regular rhythm [Normal S1, S2] : normal S1 and S2 [No Murmur] : no murmur heard [No Abdominal Bruit] : a ~M bruit was not heard ~T in the abdomen [No Carotid Bruits] : no carotid bruits [No Varicosities] : no varicosities [Pedal Pulses Present] : the pedal pulses are present [No Edema] : there was no peripheral edema [No Palpable Aorta] : no palpable aorta [Soft] : abdomen soft [No Extremity Clubbing/Cyanosis] : no extremity clubbing/cyanosis [Non Tender] : non-tender [Non-distended] : non-distended [No HSM] : no HSM [No Masses] : no abdominal mass palpated [Normal Bowel Sounds] : normal bowel sounds [Normal Posterior Cervical Nodes] : no posterior cervical lymphadenopathy [Normal Anterior Cervical Nodes] : no anterior cervical lymphadenopathy [No CVA Tenderness] : no CVA  tenderness [No Joint Swelling] : no joint swelling [No Spinal Tenderness] : no spinal tenderness [Grossly Normal Strength/Tone] : grossly normal strength/tone [No Rash] : no rash [Coordination Grossly Intact] : coordination grossly intact [No Focal Deficits] : no focal deficits [Normal Gait] : normal gait [Deep Tendon Reflexes (DTR)] : deep tendon reflexes were 2+ and symmetric [Normal Affect] : the affect was normal [Normal Insight/Judgement] : insight and judgment were intact

## 2024-03-12 ENCOUNTER — APPOINTMENT (OUTPATIENT)
Dept: NEUROLOGY | Facility: CLINIC | Age: 69
End: 2024-03-12
Payer: MEDICARE

## 2024-03-12 VITALS
HEIGHT: 69.5 IN | BODY MASS INDEX: 22.88 KG/M2 | HEART RATE: 73 BPM | WEIGHT: 158 LBS | DIASTOLIC BLOOD PRESSURE: 78 MMHG | SYSTOLIC BLOOD PRESSURE: 129 MMHG

## 2024-03-12 DIAGNOSIS — G04.81 OTHER ENCEPHALITIS AND ENCEPHALOMYELITIS: ICD-10-CM

## 2024-03-12 PROCEDURE — 99214 OFFICE O/P EST MOD 30 MIN: CPT

## 2024-03-20 PROBLEM — G04.81 AUTOIMMUNE ENCEPHALITIS: Status: ACTIVE | Noted: 2021-11-07

## 2024-03-20 NOTE — HISTORY OF PRESENT ILLNESS
[FreeTextEntry1] : *** 03/12/2024 ***  SARAHI BARNHART is here for follow up. stable, no other events on IVIG for his CLL. WILL CONTINUE FOR LONG TIME   *** 11/03/2021 ***  SARAHI BARNHART is here for follow up. he continue to do well. his history is as below:    HPI: Patient is a 67 yo Rt handed male with PMH of CLL on Venetoclax, MDS, melanoma, paroxysmal AFib (not on AC) presented as transfer from [place] on 9/16 for event capture. Patient was initially at [facility] on 9/14 with confusion, aphasia and R sided weakness. Patient was stroke code and was given s/p tPA. On admission, patient had CTH, CTA, CT perfusion during code stroke protocol with no acute findings. MRI Brain was performed at  9/16 with no ischemic findings. EEG performed 9/15 showed diffuse L hemispheric slowing. Pt remained aphasic with AMS. +RUE spasticity was noted on exam.  Patient was transferred to [facility] on 9/16 for Epilepsy Monitoring Unit admission to evaluate for subclinical seizures. Patient was seen by neurologist and cardiologist. Patient was noted to have episodes agitation with episodes of somnolence with out medication needed. AC was held per cardio since there was no definitive CVA noted but advised to c/w ASA and statin. LP was performed on 9/17. EEG overnight (9/16-9/17) did not show seizures however did show L sided slowing concerning for a possible structural etiology. 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 He was seen by SS for evaluation and Dysphagia 2 with honey thick liquid was recommended. He was also started on ABT for right elbow cellulitis/ septic bursitis. LP results- Glucose 73, Protein 55, WBC 3, Gram stain no growth to date, HSV neg, Cryptococcal neg; infectious w/u thus far negative; AIE panel pending f/u results. Patient also experience visual hallucination of which opthalmology wad consulted - no evidence of retinal detachment.  Patient was evaluated by PM&R and therapy for functional deficits, gait/ADL impairments and acute rehabilitation was recommended. Patient was medically optimized for discharge to [facility] IRU on 10/1.   (01 Oct 2021 13:32)   Rehab course significant for left arm infected cyst which was drained by surgery and treated with antibiotics pending culture results.  All other medical co-morbidites were stable.  Pt tolerated course of inpatient pt/ot/slp rehab with significant functional improvements and met rehab goals prior to discharge.  Pt was medically cleared on 10/8/21 for discharge to Home

## 2024-06-11 ENCOUNTER — APPOINTMENT (OUTPATIENT)
Dept: ENDOCRINOLOGY | Facility: CLINIC | Age: 69
End: 2024-06-11
Payer: MEDICARE

## 2024-06-11 VITALS
DIASTOLIC BLOOD PRESSURE: 78 MMHG | SYSTOLIC BLOOD PRESSURE: 120 MMHG | OXYGEN SATURATION: 99 % | WEIGHT: 155 LBS | HEIGHT: 69.5 IN | HEART RATE: 51 BPM | BODY MASS INDEX: 22.44 KG/M2

## 2024-06-11 DIAGNOSIS — R79.89 OTHER SPECIFIED ABNORMAL FINDINGS OF BLOOD CHEMISTRY: ICD-10-CM

## 2024-06-11 DIAGNOSIS — M81.0 AGE-RELATED OSTEOPOROSIS W/OUT CURRENT PATHOLOGICAL FRACTURE: ICD-10-CM

## 2024-06-11 PROCEDURE — 99214 OFFICE O/P EST MOD 30 MIN: CPT | Mod: 25

## 2024-06-11 PROCEDURE — ZZZZZ: CPT

## 2024-06-11 PROCEDURE — 96372 THER/PROPH/DIAG INJ SC/IM: CPT

## 2024-06-11 RX ORDER — FLUTICASONE PROPIONATE 50 UG/1
50 SPRAY, METERED NASAL
Refills: 0 | Status: DISCONTINUED | COMMUNITY
End: 2024-06-11

## 2024-06-11 RX ORDER — DENOSUMAB 60 MG/ML
60 INJECTION SUBCUTANEOUS
Qty: 1 | Refills: 0 | Status: COMPLETED | OUTPATIENT
Start: 2024-06-11

## 2024-06-11 RX ORDER — FAMOTIDINE 20 MG/1
20 TABLET, FILM COATED ORAL
Refills: 0 | Status: DISCONTINUED | COMMUNITY
End: 2024-06-11

## 2024-06-11 RX ADMIN — DENOSUMAB 60 MG/ML: 60 INJECTION SUBCUTANEOUS at 00:00

## 2024-06-11 NOTE — ASSESSMENT
[FreeTextEntry1] : 68 year old male with a past medical history of CLL on IGG (monthly), chemotherapy, splenectomy, autoimmune encephalitis, Osteopenia presents for evaluation of abnormal thyroid levels and Osteopenia  1. Subclinical Hypothyroidism Antibodies are negative I believe this is from transient thyroiditis Repeat levels today US unremarkable  2. Osteoporosis Patient warrants treatment for her OP given that he is at high risk for fractures. Diet, weight bearing and muscle strengthening exercise and fall prevention were discussed. Smoking cessation and alcohol consumption (no more then an average 2 drinks/day) was discussed. I counseled the patient regarding calcium and vitamin D intake. Calcium 1200 mg daily from diet and supplements (to be taken in divided doses as no more than 500-600 mg can be absorbed at one time) Continue Prolia DEXA 6/2025

## 2024-06-11 NOTE — HISTORY OF PRESENT ILLNESS
[FreeTextEntry1] : Mr. SARAHI BARNHART is a 68 year old male with a past medical history of CLL on IGG (monthly), chemotherapy, splenectomy, autoimmune encephalitis, Osteopenia presents for evaluation of abnormal thyroid levels and Osteopenia.  Tolerating Prolia.  Re TSH Patient was admitted with AMS. He was found to have TSH in the 10s. He denies any previous history of thyroid disease. He does not have family history. His recent levels are improved. He was not started on LT4. TSH improving.  Re Osteopenia Bone History: Osteopenia diagnosed years ago, Recent DEXA shows Osteoporosis Fracture history: Foot fracture Family history: No parental history of osteoporosis Treatment: Fosamax (years ago)  Falls: No Height loss: No Kidney stones: No Dental health: Regular appointments, no history of implants, no upcoming procedures planned Exercise: none Dairy intake: lactose intolerant Calcium supplements: None Multivitamin: None Vitamin D supplements: Vitamin D3

## 2024-06-13 ENCOUNTER — APPOINTMENT (OUTPATIENT)
Dept: INTERNAL MEDICINE | Facility: CLINIC | Age: 69
End: 2024-06-13
Payer: MEDICARE

## 2024-06-13 VITALS
TEMPERATURE: 98.2 F | HEART RATE: 64 BPM | SYSTOLIC BLOOD PRESSURE: 118 MMHG | BODY MASS INDEX: 22.59 KG/M2 | DIASTOLIC BLOOD PRESSURE: 78 MMHG | OXYGEN SATURATION: 99 % | RESPIRATION RATE: 16 BRPM | WEIGHT: 156 LBS | HEIGHT: 69.5 IN

## 2024-06-13 DIAGNOSIS — I10 ESSENTIAL (PRIMARY) HYPERTENSION: ICD-10-CM

## 2024-06-13 DIAGNOSIS — C91.10 CHRONIC LYMPHOCYTIC LEUKEMIA OF B-CELL TYPE NOT HAVING ACHIEVED REMISSION: ICD-10-CM

## 2024-06-13 PROCEDURE — 99213 OFFICE O/P EST LOW 20 MIN: CPT

## 2024-06-13 RX ORDER — AMLODIPINE BESYLATE 5 MG/1
5 TABLET ORAL
Qty: 90 | Refills: 1 | Status: ACTIVE | COMMUNITY
Start: 2023-11-20 | End: 1900-01-01

## 2024-06-13 NOTE — HISTORY OF PRESENT ILLNESS
[FreeTextEntry1] : Here for follow-up Overall feeling well [de-identified] : Has seen Heme-Onc and Endocrinology recently

## 2024-06-21 LAB
25(OH)D3 SERPL-MCNC: 31.8 NG/ML
ALBUMIN SERPL ELPH-MCNC: 4.6 G/DL
ALP BLD-CCNC: 64 U/L
ALT SERPL-CCNC: 13 U/L
ANION GAP SERPL CALC-SCNC: 12 MMOL/L
AST SERPL-CCNC: 21 U/L
BILIRUB SERPL-MCNC: 0.8 MG/DL
BUN SERPL-MCNC: 13 MG/DL
CALCIUM SERPL-MCNC: 9.2 MG/DL
CHLORIDE SERPL-SCNC: 102 MMOL/L
CO2 SERPL-SCNC: 25 MMOL/L
COLLAGEN CTX SERPL-MCNC: 50 PG/ML
COLLAGEN NTX UR-SCNC: 57 NMOL BCE
COLLAGEN NTX/CREAT UR-SRTO: 9
CREAT SERPL-MCNC: 1.06 MG/DL
CREAT UR-MCNC: 72.6 MG/DL
EGFR: 76 ML/MIN/1.73M2
GLUCOSE SERPL-MCNC: 94 MG/DL
INTERPRETIVE GUIDE:: NORMAL
POTASSIUM SERPL-SCNC: 4.6 MMOL/L
PROT SERPL-MCNC: 6.8 G/DL
SODIUM SERPL-SCNC: 139 MMOL/L
T4 FREE SERPL-MCNC: 1 NG/DL
TSH SERPL-ACNC: 4.21 UIU/ML

## 2024-07-19 ENCOUNTER — OUTPATIENT (OUTPATIENT)
Dept: OUTPATIENT SERVICES | Facility: HOSPITAL | Age: 69
LOS: 1 days | Discharge: HOME | End: 2024-07-19

## 2024-07-19 DIAGNOSIS — D64.9 ANEMIA, UNSPECIFIED: ICD-10-CM

## 2024-07-19 DIAGNOSIS — H26.9 UNSPECIFIED CATARACT: Chronic | ICD-10-CM

## 2024-07-25 ENCOUNTER — APPOINTMENT (OUTPATIENT)
Dept: HEMATOLOGY ONCOLOGY | Facility: CLINIC | Age: 69
End: 2024-07-25
Payer: MEDICARE

## 2024-07-25 VITALS
RESPIRATION RATE: 16 BRPM | HEART RATE: 65 BPM | WEIGHT: 157.19 LBS | DIASTOLIC BLOOD PRESSURE: 73 MMHG | TEMPERATURE: 97.2 F | BODY MASS INDEX: 22.88 KG/M2 | OXYGEN SATURATION: 99 % | SYSTOLIC BLOOD PRESSURE: 125 MMHG

## 2024-07-25 DIAGNOSIS — M86.172 OTHER ACUTE OSTEOMYELITIS, LEFT ANKLE AND FOOT: ICD-10-CM

## 2024-07-25 DIAGNOSIS — G04.81 OTHER ENCEPHALITIS AND ENCEPHALOMYELITIS: ICD-10-CM

## 2024-07-25 DIAGNOSIS — D80.1 NONFAMILIAL HYPOGAMMAGLOBULINEMIA: ICD-10-CM

## 2024-07-25 DIAGNOSIS — C91.10 CHRONIC LYMPHOCYTIC LEUKEMIA OF B-CELL TYPE NOT HAVING ACHIEVED REMISSION: ICD-10-CM

## 2024-07-25 DIAGNOSIS — Z92.21 PERSONAL HISTORY OF ANTINEOPLASTIC CHEMOTHERAPY: ICD-10-CM

## 2024-07-25 PROCEDURE — G2211 COMPLEX E/M VISIT ADD ON: CPT

## 2024-07-25 PROCEDURE — 99214 OFFICE O/P EST MOD 30 MIN: CPT

## 2024-07-27 PROBLEM — Z92.21 HISTORY OF CHEMOTHERAPY: Status: RESOLVED | Noted: 2017-05-09 | Resolved: 2024-07-27

## 2024-07-27 NOTE — HISTORY OF PRESENT ILLNESS
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   His CLL was managed by Dr. Jeronimo Godinez (with assistance from Dr. Haji). CLL first dx'd 1/1991 post multiple regimens. S/p RT to spleen 1992 and then splenectomy 1993, had seen Dr. Tam previously. Dr. Tam offered to stay on a consultant (was travelling) and was under care of Dr. Burger. Has had numerous courses of treatment w/ Fludarabine. 3/2020 change to Ibrutinib. CBC had showed progressive macrocytic anemia, leukocytosis and bone marrow biopsy on 7/22/20 w CLL and new del 17p in addition to his known del 11q and del 13q. Report obtained from Dr. Godinez's office: hypercellular marrow for age (70% cellularity) involved by CLL/SLL (30% cellularity), no morphological evidence of granuloma, fibrosis, or metastatic tumors. Storage iron is decreased, no evidence of ringed sideroblasts, myeloid precursors are rpesent and show complete maturation, erythroid precurosrs are decreased and show complete maturation, megakaryocytes are adequate in number with predominantly normal morphology. Flow showing persistent recurrent CLL/SLL, CD38(-), ZAP70(-), comprising 86% of total analyzed events. Cytogenetics/FISH: t(2;12), del 11q, 13q, 17p. His CLL therapy was changed sometime after the BM Bx (9/2020) for ibrutinib failure and he was transitioned to gazyva and venetoclax September 2020. He completed 6 months of Gazyva and continued on venetoclax (began in November or December 2020, dose reduced from 400 mg to 300 mg) with reported excellent response which he took until mid 2021 (for which he completed for about 10-11 months). Unfortunately in Sept 2021 was admitted with altered mental status and initially thought to have stroke (received tPA) but ultimately diagnosed with autoimmune encephalitis (diagnosed on LP). He was treated with IVIG and then started on oral steroids with prednisone and tapered off by 10/26/21. He was noted to have fracture of the left foot which initially required boot for stabilization. He also received procrit beginning in December 2020 which was stopped in May 2021,   Subsequent admissions: 10/1-10/8/2021: Rehab stay, significant for L arm infected cyst s/p drainage. Was dc'ed 10/8 to home. 11/22-12/1/2021: PLV for osteomyelitis of L ankle s/p vancomycin. 12/28-1/4/22: Admitted for AMS. On subsequent day, the patient had returned back to his baseline. MRI of the brain: No evidence of acute hemorrhage mass effect acute territorial infarcts seen. AMS may have been from COVID? Received COVID mab. Abx for osteo to finish on 1/11. On 1/4/22 his WBC was 5.6, hgb 11.4, plt 296, ALC 0.34, ANC 3.28. Discharged back to rehab. Left rehab and had emergency cataract repair on 1/31 on left eye. Had suture failure and needed repeat surgery 2/14.   Today is coming from home. Neurologically he reports being almost back to baseline. He still has some general weakness from previous hospitalizations. No focal neuro deficits. Wife has noticed little things like he filled out a check incorrectly a couple of weeks ago (may be having some issues with judgment/memory) Not driving yet out of safety concern and because of recent eye surgery. Because of his hospitalizations he lost about 30 pounds and is now regaining weight. Appetite is okay. 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 Reports no fevers, no night sweats. No other complaints. He reports being on IVIG for years. He was treated for his CLL previously based on his blood counts. He never developed any lymphadenopathy. Labs with Dr. Godinez on 2/21: WBC 4.7, hgb 12, plt 286, ALC 1.5, ANC 2.4. He received IVIG on 2/21.   Surgical history: splenectomy, b/l hip repair for avascular necrosis  COVID booster: June 2021 J+J Neurologist: Dr. Violeta Chang ([place])  [de-identified] : 7/25/24:  Overall, feels well and has no complaints. He continues to receive monthly IVIG with Dr. Godinez. No fevers, chills, night sweats, weight loss. He is planned to continue IVIG for life due to  although he doesn't have recurrent viral infection. He continues to notice improvement in his neurologic status. No fevers, chills or night sweats. No new lymphadenopathy No chest pain, palpitations, shortness of breath. No recent/recurrent infections  His brother was recently diagnosed with CLL while he was hospitalized for an infection.

## 2024-07-27 NOTE — HISTORY OF PRESENT ILLNESS
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   His CLL was managed by Dr. Jeronimo Godinez (with assistance from Dr. Haji). CLL first dx'd 1/1991 post multiple regimens. S/p RT to spleen 1992 and then splenectomy 1993, had seen Dr. Tam previously. Dr. Tam offered to stay on a consultant (was travelling) and was under care of Dr. Burger. Has had numerous courses of treatment w/ Fludarabine. 3/2020 change to Ibrutinib. CBC had showed progressive macrocytic anemia, leukocytosis and bone marrow biopsy on 7/22/20 w CLL and new del 17p in addition to his known del 11q and del 13q. Report obtained from Dr. Godinez's office: hypercellular marrow for age (70% cellularity) involved by CLL/SLL (30% cellularity), no morphological evidence of granuloma, fibrosis, or metastatic tumors. Storage iron is decreased, no evidence of ringed sideroblasts, myeloid precursors are rpesent and show complete maturation, erythroid precurosrs are decreased and show complete maturation, megakaryocytes are adequate in number with predominantly normal morphology. Flow showing persistent recurrent CLL/SLL, CD38(-), ZAP70(-), comprising 86% of total analyzed events. Cytogenetics/FISH: t(2;12), del 11q, 13q, 17p. His CLL therapy was changed sometime after the BM Bx (9/2020) for ibrutinib failure and he was transitioned to gazyva and venetoclax September 2020. He completed 6 months of Gazyva and continued on venetoclax (began in November or December 2020, dose reduced from 400 mg to 300 mg) with reported excellent response which he took until mid 2021 (for which he completed for about 10-11 months). Unfortunately in Sept 2021 was admitted with altered mental status and initially thought to have stroke (received tPA) but ultimately diagnosed with autoimmune encephalitis (diagnosed on LP). He was treated with IVIG and then started on oral steroids with prednisone and tapered off by 10/26/21. He was noted to have fracture of the left foot which initially required boot for stabilization. He also received procrit beginning in December 2020 which was stopped in May 2021,   Subsequent admissions: 10/1-10/8/2021: Rehab stay, significant for L arm infected cyst s/p drainage. Was dc'ed 10/8 to home. 11/22-12/1/2021: PLV for osteomyelitis of L ankle s/p vancomycin. 12/28-1/4/22: Admitted for AMS. On subsequent day, the patient had returned back to his baseline. MRI of the brain: No evidence of acute hemorrhage mass effect acute territorial infarcts seen. AMS may have been from COVID? Received COVID mab. Abx for osteo to finish on 1/11. On 1/4/22 his WBC was 5.6, hgb 11.4, plt 296, ALC 0.34, ANC 3.28. Discharged back to rehab. Left rehab and had emergency cataract repair on 1/31 on left eye. Had suture failure and needed repeat surgery 2/14.   Today is coming from home. Neurologically he reports being almost back to baseline. He still has some general weakness from previous hospitalizations. No focal neuro deficits. Wife has noticed little things like he filled out a check incorrectly a couple of weeks ago (may be having some issues with judgment/memory) Not driving yet out of safety concern and because of recent eye surgery. Because of his hospitalizations he lost about 30 pounds and is now regaining weight. Appetite is okay. 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 Reports no fevers, no night sweats. No other complaints. He reports being on IVIG for years. He was treated for his CLL previously based on his blood counts. He never developed any lymphadenopathy. Labs with Dr. Godinez on 2/21: WBC 4.7, hgb 12, plt 286, ALC 1.5, ANC 2.4. He received IVIG on 2/21.   Surgical history: splenectomy, b/l hip repair for avascular necrosis  COVID booster: June 2021 J+J Neurologist: Dr. Violeta Chang ([place])  [de-identified] : 7/25/24:  Overall, feels well and has no complaints. He continues to receive monthly IVIG with Dr. Godinez. No fevers, chills, night sweats, weight loss. He is planned to continue IVIG for life due to  although he doesn't have recurrent viral infection. He continues to notice improvement in his neurologic status. No fevers, chills or night sweats. No new lymphadenopathy No chest pain, palpitations, shortness of breath. No recent/recurrent infections  His brother was recently diagnosed with CLL while he was hospitalized for an infection.

## 2024-07-27 NOTE — END OF VISIT
[] : Fellow [FreeTextEntry3] : No new neurologic c/o. Advised pt to be guided by neurology regarding how long to remain on IGRT. From the non-neurologic standpoint, could consider reducing frequency of administration.

## 2024-07-27 NOTE — REVIEW OF SYSTEMS
[Negative] : Allergic/Immunologic [Eye Pain] : no eye pain [Dysphagia] : no dysphagia [Muscle Weakness] : no muscle weakness [Difficulty Walking] : no difficulty walking [FreeTextEntry9] : occas LBP [de-identified] : osteoporosis

## 2024-07-27 NOTE — REVIEW OF SYSTEMS
[Negative] : Allergic/Immunologic [Eye Pain] : no eye pain [Dysphagia] : no dysphagia [Muscle Weakness] : no muscle weakness [Difficulty Walking] : no difficulty walking [FreeTextEntry9] : occas LBP [de-identified] : osteoporosis

## 2024-07-27 NOTE — ASSESSMENT
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 Neurologic status stable now.    #CLL - CBC results reviewed and d/w pt (labs from 7/15/24) WBC 8.17, Hgb 12.7, Plt 198K, ANC 5.67, ALC 1.09.  - patient also has other lab work done at Dr. Godinez's office   -c/w with monthly IVIG with Dr. Godinez-> last IVIG on 7/15/24. next IVIG due in August 2024.  He's been getting IVIG since around 2020. Last episode of neurological episode was in Nov 2022. Per neurology, he needs to continue IVIG for at least 2 yrs.  - therapy for autoimmune neurologic dz and hypogammaglobulinemia -CLL presently quiescent -c/w daily folic acid-cont.  - on prolia for osteoporosis every 6 months -continue follow up with neurology. Couldn't get shingles or more covid vaccine due to his h/o autoimmune encephalitis. - RTC in 4-6 months, continue to follow with Dr. Godinez.  seen and d/w Dr. Lenny Ma MD  PGY- 6 Fellow, Hematology/Oncology

## 2024-09-17 ENCOUNTER — APPOINTMENT (OUTPATIENT)
Dept: ORTHOPEDIC SURGERY | Facility: CLINIC | Age: 69
End: 2024-09-17
Payer: MEDICARE

## 2024-09-17 VITALS — BODY MASS INDEX: 22.73 KG/M2 | HEIGHT: 69.5 IN | WEIGHT: 157 LBS

## 2024-09-17 DIAGNOSIS — M70.21 OLECRANON BURSITIS, RIGHT ELBOW: ICD-10-CM

## 2024-09-17 PROCEDURE — 99213 OFFICE O/P EST LOW 20 MIN: CPT

## 2024-09-17 PROCEDURE — 73080 X-RAY EXAM OF ELBOW: CPT | Mod: RT

## 2024-09-17 NOTE — IMAGING
[Right] : right elbow [de-identified] : Right elbow Mild swelling posterior olecranon.  No erythema Mild tenderness over the olecranon with mild fluctuance Full active motion of the elbow Elbow strength 5/5 Radial pulse 2+ [FreeTextEntry1] : Reviewed and interpreted.  Right elbow AP, lateral and oblique views-moderate spur posterior olecranon

## 2024-09-17 NOTE — HISTORY OF PRESENT ILLNESS
[Right Arm] : right arm [Gradual] : gradual [5] : 5 [0] : 0 [Intermittent] : intermittent [Household chores] : household chores [Leisure] : leisure [Rest] : rest [Retired] : Work status: retired [de-identified] : Patient is a 69-year-old right-hand-dominant male with mild swelling and pain right posterior elbow over the past couple of weeks.  He had banged his elbow.  He has mild pain when leaning on his elbow.  Mild swelling.  No redness.  Using light compression sleeve [] : Post Surgical Visit: no [de-identified] : Leaning

## 2024-09-17 NOTE — DISCUSSION/SUMMARY
[de-identified] : Various options were discussed with the patient The patient can use light compression sleeve during the daytime.  Remove when sleeping Ice prn He will keep area clean.  Wash with moisturizing soap and apply moisturizing cream twice daily.  Monitor for any signs of infection, redness, fever, chills and notify office immediately or go to the ER Tylenol prn I discussed possible aspiration if persistent symptoms  Impression: Olecranon bursitis with spur

## 2024-11-14 DIAGNOSIS — R79.89 OTHER SPECIFIED ABNORMAL FINDINGS OF BLOOD CHEMISTRY: ICD-10-CM

## 2024-11-15 ENCOUNTER — LABORATORY RESULT (OUTPATIENT)
Age: 69
End: 2024-11-15

## 2024-11-16 LAB
ALBUMIN SERPL ELPH-MCNC: 4.5 G/DL
ALP BLD-CCNC: 76 U/L
ALT SERPL-CCNC: 9 U/L
ANION GAP SERPL CALC-SCNC: 13 MMOL/L
APPEARANCE: CLEAR
AST SERPL-CCNC: 14 U/L
BILIRUB SERPL-MCNC: 1 MG/DL
BILIRUBIN URINE: NEGATIVE
BLOOD URINE: NEGATIVE
BUN SERPL-MCNC: 13 MG/DL
CALCIUM SERPL-MCNC: 10 MG/DL
CHLORIDE SERPL-SCNC: 102 MMOL/L
CHOLEST SERPL-MCNC: 174 MG/DL
CO2 SERPL-SCNC: 27 MMOL/L
COLOR: YELLOW
CREAT SERPL-MCNC: 1.26 MG/DL
EGFR: 62 ML/MIN/1.73M2
ESTIMATED AVERAGE GLUCOSE: 128 MG/DL
FOLATE SERPL-MCNC: >20 NG/ML
GLUCOSE QUALITATIVE U: NEGATIVE MG/DL
GLUCOSE SERPL-MCNC: 106 MG/DL
HBA1C MFR BLD HPLC: 6.1 %
HCT VFR BLD CALC: 40.3 %
HDLC SERPL-MCNC: 43 MG/DL
HGB BLD-MCNC: 12.7 G/DL
KETONES URINE: NEGATIVE MG/DL
LDLC SERPL CALC-MCNC: 100 MG/DL
LEUKOCYTE ESTERASE URINE: NEGATIVE
MCHC RBC-ENTMCNC: 31.5 G/DL
MCHC RBC-ENTMCNC: 31.8 PG
MCV RBC AUTO: 100.8 FL
NITRITE URINE: NEGATIVE
NONHDLC SERPL-MCNC: 131 MG/DL
PH URINE: 7.5
PLATELET # BLD AUTO: 258 K/UL
POTASSIUM SERPL-SCNC: 3.9 MMOL/L
PROT SERPL-MCNC: 7.1 G/DL
PROTEIN URINE: NEGATIVE MG/DL
PSA SERPL-MCNC: 0.61 NG/ML
RBC # BLD: 4 M/UL
RBC # FLD: 14.6 %
SODIUM SERPL-SCNC: 142 MMOL/L
SPECIFIC GRAVITY URINE: 1.01
TRIGL SERPL-MCNC: 178 MG/DL
TSH SERPL-ACNC: 5.08 UIU/ML
UROBILINOGEN URINE: 0.2 MG/DL
VIT B12 SERPL-MCNC: 207 PG/ML
WBC # FLD AUTO: 5.87 K/UL

## 2024-11-22 ENCOUNTER — APPOINTMENT (OUTPATIENT)
Dept: INTERNAL MEDICINE | Facility: CLINIC | Age: 69
End: 2024-11-22
Payer: MEDICARE

## 2024-11-22 VITALS
OXYGEN SATURATION: 97 % | DIASTOLIC BLOOD PRESSURE: 78 MMHG | HEIGHT: 69.5 IN | RESPIRATION RATE: 14 BRPM | BODY MASS INDEX: 22.88 KG/M2 | WEIGHT: 158 LBS | SYSTOLIC BLOOD PRESSURE: 126 MMHG | TEMPERATURE: 97.9 F | HEART RATE: 58 BPM

## 2024-11-22 DIAGNOSIS — M81.0 AGE-RELATED OSTEOPOROSIS W/OUT CURRENT PATHOLOGICAL FRACTURE: ICD-10-CM

## 2024-11-22 DIAGNOSIS — C91.10 CHRONIC LYMPHOCYTIC LEUKEMIA OF B-CELL TYPE NOT HAVING ACHIEVED REMISSION: ICD-10-CM

## 2024-11-22 DIAGNOSIS — Z00.00 ENCOUNTER FOR GENERAL ADULT MEDICAL EXAMINATION W/OUT ABNORMAL FINDINGS: ICD-10-CM

## 2024-11-22 DIAGNOSIS — E53.8 DEFICIENCY OF OTHER SPECIFIED B GROUP VITAMINS: ICD-10-CM

## 2024-11-22 LAB — T4 FREE SERPL-MCNC: 1.1 NG/DL

## 2024-11-22 PROCEDURE — 96372 THER/PROPH/DIAG INJ SC/IM: CPT

## 2024-11-22 PROCEDURE — G0439: CPT

## 2024-11-22 RX ORDER — CYANOCOBALAMIN 1000 UG/ML
1000 INJECTION, SOLUTION INTRAMUSCULAR; SUBCUTANEOUS
Qty: 0 | Refills: 0 | Status: COMPLETED | OUTPATIENT
Start: 2024-11-22

## 2024-11-22 RX ADMIN — CYANOCOBALAMIN 0 MCG/ML: 1000 INJECTION, SOLUTION INTRAMUSCULAR at 00:00

## 2024-12-12 ENCOUNTER — APPOINTMENT (OUTPATIENT)
Dept: ENDOCRINOLOGY | Facility: CLINIC | Age: 69
End: 2024-12-12
Payer: MEDICARE

## 2024-12-12 VITALS
HEIGHT: 69.5 IN | OXYGEN SATURATION: 99 % | WEIGHT: 150 LBS | HEART RATE: 58 BPM | BODY MASS INDEX: 21.72 KG/M2 | SYSTOLIC BLOOD PRESSURE: 120 MMHG | DIASTOLIC BLOOD PRESSURE: 64 MMHG

## 2024-12-12 DIAGNOSIS — R79.89 OTHER SPECIFIED ABNORMAL FINDINGS OF BLOOD CHEMISTRY: ICD-10-CM

## 2024-12-12 DIAGNOSIS — M81.0 AGE-RELATED OSTEOPOROSIS W/OUT CURRENT PATHOLOGICAL FRACTURE: ICD-10-CM

## 2024-12-12 PROCEDURE — 96372 THER/PROPH/DIAG INJ SC/IM: CPT

## 2024-12-12 PROCEDURE — 99214 OFFICE O/P EST MOD 30 MIN: CPT | Mod: 25

## 2024-12-12 RX ORDER — DENOSUMAB 60 MG/ML
60 INJECTION SUBCUTANEOUS
Qty: 1 | Refills: 0 | Status: COMPLETED | OUTPATIENT
Start: 2024-12-12

## 2024-12-12 RX ORDER — CYANOCOBALAMIN (VITAMIN B-12) 1000 MCG
1000 TABLET ORAL
Refills: 0 | Status: ACTIVE | COMMUNITY

## 2024-12-12 RX ADMIN — DENOSUMAB 60 MG/ML: 60 INJECTION SUBCUTANEOUS at 00:00

## 2024-12-17 NOTE — DISCHARGE NOTE NURSING/CASE MANAGEMENT/SOCIAL WORK - NSDCVIVACCINE_GEN_ALL_CORE_FT
influenza, injectable, quadrivalent, preservative free; 08-Oct-2021 11:39; Alex Cantor ([facility]EN); Phobious; [id number] (Exp. Date: 22-Jun-2022); IntraMuscular; Deltoid Right.; 0.5 milliLiter(s); VIS (VIS Published: 15-Aug-2019, VIS Presented: 08-Oct-2021);    [[id number]]

## 2024-12-19 ENCOUNTER — APPOINTMENT (OUTPATIENT)
Dept: INTERNAL MEDICINE | Facility: CLINIC | Age: 69
End: 2024-12-19

## 2025-01-14 DIAGNOSIS — D80.1 NONFAMILIAL HYPOGAMMAGLOBULINEMIA: ICD-10-CM

## 2025-01-16 ENCOUNTER — RX RENEWAL (OUTPATIENT)
Age: 70
End: 2025-01-16

## 2025-01-29 ENCOUNTER — OUTPATIENT (OUTPATIENT)
Dept: OUTPATIENT SERVICES | Facility: HOSPITAL | Age: 70
LOS: 1 days | Discharge: HOME | End: 2025-01-29

## 2025-01-29 DIAGNOSIS — D64.9 ANEMIA, UNSPECIFIED: ICD-10-CM

## 2025-01-29 DIAGNOSIS — H26.9 UNSPECIFIED CATARACT: Chronic | ICD-10-CM

## 2025-01-30 ENCOUNTER — APPOINTMENT (OUTPATIENT)
Dept: HEMATOLOGY ONCOLOGY | Facility: CLINIC | Age: 70
End: 2025-01-30
Payer: MEDICARE

## 2025-01-30 ENCOUNTER — NON-APPOINTMENT (OUTPATIENT)
Age: 70
End: 2025-01-30

## 2025-01-30 VITALS
DIASTOLIC BLOOD PRESSURE: 67 MMHG | HEART RATE: 52 BPM | OXYGEN SATURATION: 98 % | BODY MASS INDEX: 21.11 KG/M2 | RESPIRATION RATE: 16 BRPM | SYSTOLIC BLOOD PRESSURE: 137 MMHG | TEMPERATURE: 97.5 F | WEIGHT: 145.06 LBS

## 2025-01-30 DIAGNOSIS — Z86.008 PERSONAL HISTORY OF IN-SITU NEOPLASM OF OTHER SITE: ICD-10-CM

## 2025-01-30 DIAGNOSIS — Z92.21 PERSONAL HISTORY OF ANTINEOPLASTIC CHEMOTHERAPY: ICD-10-CM

## 2025-01-30 DIAGNOSIS — C91.10 CHRONIC LYMPHOCYTIC LEUKEMIA OF B-CELL TYPE NOT HAVING ACHIEVED REMISSION: ICD-10-CM

## 2025-01-30 DIAGNOSIS — G04.81 OTHER ENCEPHALITIS AND ENCEPHALOMYELITIS: ICD-10-CM

## 2025-01-30 DIAGNOSIS — M86.172 OTHER ACUTE OSTEOMYELITIS, LEFT ANKLE AND FOOT: ICD-10-CM

## 2025-01-30 DIAGNOSIS — D80.1 NONFAMILIAL HYPOGAMMAGLOBULINEMIA: ICD-10-CM

## 2025-01-30 PROCEDURE — G2211 COMPLEX E/M VISIT ADD ON: CPT

## 2025-01-30 PROCEDURE — 99213 OFFICE O/P EST LOW 20 MIN: CPT

## 2025-02-07 NOTE — PATIENT PROFILE ADULT - NSASFALLWHENOCCURRED_GEN_A_NUR
-- DO NOT REPLY / DO NOT REPLY ALL --  -- This inbox is not monitored. If this was sent to the wrong provider or department, reroute message to  Intrinsic-IDoute pool. --  -- Message is from Engagement Center Operations ([facility]) --    General Patient Message: Gilbert Ita called in on behalf of his mom chris kenney please call him back he needs an update on his mom's medication      ALPRAZolam (XANAX) 1 MG tablet   She is out of this medication   Caller Information       Contact Date/Time Type Contact Phone/Fax    02/04/2025 11:23 AM CST Phone (Incoming) Chris Kenney [phone]    02/04/2025 01:18 PM CST Text Message (Outgoing) Chris Kenney (Self) [phone] (M)     [facility] texted and needs provider review.    02/06/2025 12:06 PM CST Phone (Incoming) GILBRET HERNANDEZ [phone]    02/06/2025 12:15 PM CST Phone (Outgoing) Gilbert Hernandez (Emergency Contact) [phone] (M)    Spoke to Family -  son Gilbert    02/07/2025 10:15 AM CST Phone (Incoming) Gilbert Hernandez (Son) [phone]            Alternative phone number: none    Can a detailed message be left? Yes - Voicemail   Patient has been advised the message will be addressed within 2-3 business days.                 last six months

## 2025-03-05 ENCOUNTER — APPOINTMENT (OUTPATIENT)
Dept: NEUROLOGY | Facility: CLINIC | Age: 70
End: 2025-03-05

## 2025-03-05 VITALS
DIASTOLIC BLOOD PRESSURE: 60 MMHG | WEIGHT: 145 LBS | HEIGHT: 69 IN | HEART RATE: 71 BPM | BODY MASS INDEX: 21.48 KG/M2 | SYSTOLIC BLOOD PRESSURE: 125 MMHG

## 2025-03-05 DIAGNOSIS — G04.81 OTHER ENCEPHALITIS AND ENCEPHALOMYELITIS: ICD-10-CM

## 2025-03-05 PROCEDURE — 99214 OFFICE O/P EST MOD 30 MIN: CPT

## 2025-03-06 ENCOUNTER — APPOINTMENT (OUTPATIENT)
Dept: INTERNAL MEDICINE | Facility: CLINIC | Age: 70
End: 2025-03-06
Payer: MEDICARE

## 2025-03-06 VITALS
WEIGHT: 145 LBS | TEMPERATURE: 98.2 F | OXYGEN SATURATION: 98 % | SYSTOLIC BLOOD PRESSURE: 116 MMHG | RESPIRATION RATE: 14 BRPM | DIASTOLIC BLOOD PRESSURE: 68 MMHG | HEIGHT: 69 IN | BODY MASS INDEX: 21.48 KG/M2 | HEART RATE: 61 BPM

## 2025-03-06 DIAGNOSIS — T86.8499: ICD-10-CM

## 2025-03-06 DIAGNOSIS — Z01.818 ENCOUNTER FOR OTHER PREPROCEDURAL EXAMINATION: ICD-10-CM

## 2025-03-06 PROCEDURE — 99214 OFFICE O/P EST MOD 30 MIN: CPT

## 2025-03-20 ENCOUNTER — OUTPATIENT (OUTPATIENT)
Dept: OUTPATIENT SERVICES | Facility: HOSPITAL | Age: 70
LOS: 1 days | End: 2025-03-20
Payer: MEDICARE

## 2025-03-20 VITALS
DIASTOLIC BLOOD PRESSURE: 83 MMHG | SYSTOLIC BLOOD PRESSURE: 124 MMHG | HEIGHT: 69 IN | HEART RATE: 62 BPM | WEIGHT: 140.21 LBS | RESPIRATION RATE: 12 BRPM | OXYGEN SATURATION: 98 % | TEMPERATURE: 98 F

## 2025-03-20 VITALS
SYSTOLIC BLOOD PRESSURE: 125 MMHG | RESPIRATION RATE: 18 BRPM | DIASTOLIC BLOOD PRESSURE: 92 MMHG | HEART RATE: 97 BPM | OXYGEN SATURATION: 100 %

## 2025-03-20 DIAGNOSIS — Z98.890 OTHER SPECIFIED POSTPROCEDURAL STATES: Chronic | ICD-10-CM

## 2025-03-20 DIAGNOSIS — T85.29XA OTHER MECHANICAL COMPLICATION OF INTRAOCULAR LENS, INITIAL ENCOUNTER: ICD-10-CM

## 2025-03-20 DIAGNOSIS — H26.9 UNSPECIFIED CATARACT: Chronic | ICD-10-CM

## 2025-03-20 PROBLEM — R09.81 NASAL CONGESTION: Chronic | Status: INACTIVE | Noted: 2017-05-18 | Resolved: 2025-03-20

## 2025-03-20 PROCEDURE — 67036 REMOVAL OF INNER EYE FLUID: CPT | Mod: LT

## 2025-03-20 PROCEDURE — 68320 REVISE/GRAFT EYELID LINING: CPT | Mod: LT

## 2025-03-20 PROCEDURE — 66682 REPAIR IRIS & CILIARY BODY: CPT | Mod: LT

## 2025-03-20 PROCEDURE — V2632: CPT

## 2025-03-20 PROCEDURE — 66986 EXCHANGE LENS PROSTHESIS: CPT | Mod: LT

## 2025-03-20 DEVICE — IMPLANTABLE DEVICE: Type: IMPLANTABLE DEVICE | Status: FUNCTIONAL

## 2025-03-20 RX ORDER — DENOSUMAB 60 MG/ML
60 INJECTION SUBCUTANEOUS
Refills: 0 | DISCHARGE

## 2025-03-20 RX ORDER — AMLODIPINE BESYLATE 10 MG/1
1 TABLET ORAL
Refills: 0 | DISCHARGE

## 2025-03-20 NOTE — ASU PATIENT PROFILE, ADULT - FALL HARM RISK - HARM RISK INTERVENTIONS
Assistance with ambulation/Communicate Risk of Fall with Harm to all staff/Monitor for mental status changes/Monitor gait and stability/Reinforce activity limits and safety measures with patient and family/Tailored Fall Risk Interventions/Visual Cue: Yellow wristband and red socks/Bed in lowest position, wheels locked, appropriate side rails in place/Call bell, personal items and telephone in reach/Instruct patient to call for assistance before getting out of bed or chair/Non-slip footwear when patient is out of bed/[place] to call system/Physically safe environment - no spills, clutter or unnecessary equipment/Purposeful Proactive Rounding/Room/bathroom lighting operational, light cord in reach

## 2025-03-20 NOTE — ASU DISCHARGE PLAN (ADULT/PEDIATRIC) - NS MD DC FALL RISK RISK
For information on Fall & Injury Prevention, visit: [email or web address] OR  [email or web address] OR  https://www.cdc.gov/steadi/patient.html

## 2025-03-20 NOTE — ASU DISCHARGE PLAN (ADULT/PEDIATRIC) - CARE PROVIDER_API CALL
Mio Franco  Ophthalmology  [address] [place] [address], [address]  [place] [address]  Phone: [phone]  Fax: [phone]  Scheduled Appointment: 03/20/2025 09:30 AM

## 2025-03-20 NOTE — ASU DISCHARGE PLAN (ADULT/PEDIATRIC) - FINANCIAL ASSISTANCE
[facility] provides services at a reduced cost to those who are determined to be eligible through [facility]’s financial assistance program. Information regarding [facility]’s financial assistance program can be found by going to [email or web address] or by calling [phone].

## 2025-03-20 NOTE — ASU PATIENT PROFILE, ADULT - TRANSFUSION REACTION, PREVIOUS, PROFILE
For Your Information     If your provider ordered any imaging for you today. Our pre-scheduling services will be reaching out to you within 2 business days to schedule this. [facility] can be reached by calling [phone].    If you are in need of a medication refill, please use one of the following options. You can expect your medication to be filled within 2-3 business days.   1. Call your pharmacy for all medication refills and renewals.   2. [facility]- [email or web address]  3. Call your providers office    If your provider ordered testing today, you will be notified of your lab results within 3-5 business days and imaging results within 7-14 business days, unless specified otherwise. If you have not received your results within the allotted business days please call your provider's office. Have you signed up for the [facility] Cm, if not please consider doing so to receive results on the cm as soon as they have been resulted by the system. [email or web address]     Medication Prior Authorizations may take up to 30 days for approval/denial.     You may be receiving a survey.  Please take the time to complete this, as your feedback is very important to us!  We strive to make your experience exceptional, and your comments help us with that goal.  We look forward to hearing from you!    For all future appointments please arrive 15 minutes prior to your scheduled visit.     Patient Contact Center Business Office: assistance with medical billing & financial inquires [phone]              
no

## 2025-03-20 NOTE — ASU DISCHARGE PLAN (ADULT/PEDIATRIC) - NURSING INSTRUCTIONS
Eye shield with instructions , sunglasses and eye kit given to patient.  Intraocular lens implant (IOL)

## 2025-03-20 NOTE — ASU DISCHARGE PLAN (ADULT/PEDIATRIC) - PROVIDER TOKENS
Service Date: 08/03/2021    HISTORY OF PRESENT ILLNESS:  I saw Mr. Kevin for evaluation of new onset atrial fibrillation.  He is a 72-year-old white male who was recently scheduled to undergo a third routine colonoscopy.  During the preparation, he had some nausea reaction.  When he was put on the table for colonoscopy, he was found to have atrial fibrillation with a heart rate about 87 beats per minute.  For that reason, his colonoscopy was cancelled.  The patient saw his family doctor the next day and was put on Inderal 10 mg once a day.  He converted to sinus rhythm in about 24 hours.  He has been monitoring his heart rate over the last few days with a regular rhythm and a stable rate.  He did not check his pulse rate previously.  He had no apparent symptoms during atrial fibrillation.    PAST MEDICAL HISTORY:  Remarkable for varicose veins of the legs.  He was recently found to have mild to moderate aortic valve regurgitation.    PHYSICAL EXAMINATION:    VITAL SIGNS:  Blood pressure was 134/73, heart rate 54 beats per minute, body weight 174 pounds.  HEENT:  Unremarkable.  LUNGS:  Clear.  CARDIAC:  Rhythm was regular and heart sounds were normal with no murmur.  ABDOMEN:  No obesity.  EXTREMITIES:  There was no pedal edema.    EKG today showed sinus bradycardia at 52 beats per minute.    ASSESSMENT AND RECOMMENDATIONS:  Mr. Kevin is a 72-year-old white male who was found to be in atrial fibrillation with controlled heart rate before colonoscopy.  The patient strongly believes that his atrial fibrillation was triggered by the preparation for colonoscopy.  I tend to agree with him, but he may still be at risk for recurrent atrial fibrillation.  For that reason, I have asked him to continue monitoring of the heart rate on a daily basis in the future.  Because he has a sinus bradycardia, I have asked him to stop Inderal.  The patient is taking aspirin 325 mg once a day as recommended by the surgeon who did his  varicose vein procedures.    The patient will have a 2-week Zio Patch monitor.  If the Zio Patch does not detect atrial fibrillation, he will be back for followup with a repeat echo for aortic valve regurgitation.    Francisco Dubon MD      cc:  Abram Villanueva MD  [address], [address]    Francisco Dubon MD        D: 2021   T: 2021   MT: jose francisco    Name:     EYAD BARRAGAN  MRN:      [id number]        Account:      [id number]   :      1949           Service Date: 2021       Document: [id number]     PROVIDER:[TOKEN:[[id number]],SCHEDULEDAPPT:[03/20/2025],SCHEDULEDAPPTTIME:[09:30 AM]]

## 2025-03-20 NOTE — ASU PATIENT PROFILE, ADULT - NSICDXPASTSURGICALHX_GEN_ALL_CORE_FT
PAST SURGICAL HISTORY:  Abnormal Jaw Closure LEFT JAW SURGERY - 1988    Avascular Necrosis of Femur Head RIGHT - CORE DECOMPRESSION- 1993    Avascular Necrosis of Femur Head LEFT - CORE DECOMPRESSION - 1993    Bilateral cataracts removed in 2011    History of foot surgery MRSA    History of nasal septoplasty     S/P Splenectomy 1993    S/P Tonsillectomy 1960    Status Post Eye Surgery X 3 3/2011 right eye scleral buckle; left eye in 2013

## 2025-03-20 NOTE — ASU PATIENT PROFILE, ADULT - NSICDXPASTMEDICALHX_GEN_ALL_CORE_FT
PAST MEDICAL HISTORY:  Abnormal EKG     Anemia     Avascular Necrosis of Femur Head B/L    Bulging Disc LUMBAR    CLL (Chronic Lymphocytic Leukemia) SINCE 1988    Deviated nasal septum     Encephalitis Autoimmune    Herniated Cervical Disc     History of PSVT (paroxysmal supraventricular tachycardia)     History of valvular heart disease     HTN (hypertension)     Melanoma     Osteopenia     Other specified disorders of nose and nasal sinuses     PAF (paroxysmal atrial fibrillation)     Rosacea     TMJ Syndrome limited jaw opening due to TMJ

## 2025-03-20 NOTE — ASU DISCHARGE PLAN (ADULT/PEDIATRIC) - ASU DC SPECIAL INSTRUCTIONSFT
Please keep eye shield on until seen in the office.   You may resume your medication regimen as usual.   Please follow up with your retina surgeon's office for your appointment tomorrow.

## 2025-03-20 NOTE — ASU DISCHARGE PLAN (ADULT/PEDIATRIC) - A. DRIVE A CAR, OPERATE POWER TOOLS OR MACHINERY
Assessment:  Low concern for clinically significant ingestion. Likely pt didn’t take clinically worrisome amount of lithium acutely as pt without GI symptoms; pt also doesn’t appear to be chronically lithium toxic by exam. Ptr likely didn’t take clinically significant amount of escitalopram though missing entire bottle worth of tablets— our concern is mostly for QTc prolongation which can occur almost 24 hours post ingestion    Recommendations:  1. Repeat Lithium level in 6-8 hrs from initial blood draw. If the pts repeat lithium level < 1.0, the pt can be started on home lithium dose regimen  2. Pt should be admitted to telemetry for 24 hour monitoring from time of ingestion for risk of delayed QTc up to 24 hours from time of ingestion in the setting of escitapram ingestion. Do an EKG q4-6 hrs AND for any changes in clinical status.  3. If labs are reassuring and pt remains clinically stable for monitor period, pt can be cleared from toxicologic standpoint   Assessment:  Low concern for clinically significant ingestion. Likely pt didn’t take clinically worrisome amount of lithium acutely as pt without GI symptoms; pt also doesn’t appear to be chronically lithium toxic by exam. Ptr likely didn’t take clinically significant amount of escitalopram though missing entire bottle worth of tablets— our concern is mostly for QTc prolongation which can occur almost 24 hours post ingestion    Recommendations:  1. Repeat Lithium level in 6-8 hrs from initial blood draw. If the pts repeat lithium level < 1.0, the pt can be started on home lithium dose regimen  2. Pt should be admitted to telemetry for 24 hour monitoring from time of ingestion for risk of delayed QTc up to 24 hours from time of ingestion in the setting of escitapram ingestion. Do an EKG q4-6 hrs AND for any changes in clinical status.  3. If labs are reassuring and pt remains clinically stable for monitor period, pt can be cleared from toxicologic standpoint    Attending Malissa:  46-year-old male status post ingestion of escitalopram, upon arrival no symptoms are described.  Patient also had trazodone benztropine lithium paliperidone on his medication list.  Vitals were within normal limits patient was reported not to have a specific toxidrome.  His lab work was unremarkable including a lithium level of 0.7 with normal renal function.  Coingestions negative.  EKG with QRS of 102, QTc of 449.  Agree with above to continue monitoring on telemetry for prolongation of QT.  If no prolongation after this time period and no development of symptoms vital sign abnormalities patient can be cleared from toxicological standpoint.   Statement Selected

## 2025-03-21 ENCOUNTER — RX RENEWAL (OUTPATIENT)
Age: 70
End: 2025-03-21

## 2025-04-01 ENCOUNTER — LABORATORY RESULT (OUTPATIENT)
Age: 70
End: 2025-04-01

## 2025-04-02 LAB
ALBUMIN SERPL ELPH-MCNC: 4.6 G/DL
ALP BLD-CCNC: 80 U/L
ALT SERPL-CCNC: 8 U/L
ANION GAP SERPL CALC-SCNC: 12 MMOL/L
APPEARANCE: CLEAR
AST SERPL-CCNC: 12 U/L
BILIRUB SERPL-MCNC: 1 MG/DL
BILIRUBIN URINE: NEGATIVE
BLOOD URINE: NEGATIVE
BUN SERPL-MCNC: 17 MG/DL
CALCIUM SERPL-MCNC: 9.8 MG/DL
CHLORIDE SERPL-SCNC: 102 MMOL/L
CHOLEST SERPL-MCNC: 171 MG/DL
CO2 SERPL-SCNC: 28 MMOL/L
COLOR: YELLOW
CREAT SERPL-MCNC: 1.2 MG/DL
EGFRCR SERPLBLD CKD-EPI 2021: 65 ML/MIN/1.73M2
ESTIMATED AVERAGE GLUCOSE: 128 MG/DL
FOLATE SERPL-MCNC: >20 NG/ML
GLUCOSE QUALITATIVE U: NEGATIVE MG/DL
GLUCOSE SERPL-MCNC: 95 MG/DL
HBA1C MFR BLD HPLC: 6.1 %
HCT VFR BLD CALC: 38.9 %
HDLC SERPL-MCNC: 46 MG/DL
HGB BLD-MCNC: 12.9 G/DL
KETONES URINE: NEGATIVE MG/DL
LDLC SERPL-MCNC: 105 MG/DL
LEUKOCYTE ESTERASE URINE: NEGATIVE
MCHC RBC-ENTMCNC: 33.2 G/DL
MCHC RBC-ENTMCNC: 33.5 PG
MCV RBC AUTO: 101 FL
NITRITE URINE: NEGATIVE
NONHDLC SERPL-MCNC: 125 MG/DL
PH URINE: 7
PLATELET # BLD AUTO: 298 K/UL
POTASSIUM SERPL-SCNC: 4 MMOL/L
PROT SERPL-MCNC: 6.8 G/DL
PROTEIN URINE: 30 MG/DL
PSA SERPL-MCNC: 0.68 NG/ML
RBC # BLD: 3.85 M/UL
RBC # FLD: 14.8 %
SODIUM SERPL-SCNC: 143 MMOL/L
SPECIFIC GRAVITY URINE: 1.02
TRIGL SERPL-MCNC: 114 MG/DL
TSH SERPL-ACNC: 4.88 UIU/ML
UROBILINOGEN URINE: 0.2 MG/DL
VIT B12 SERPL-MCNC: 1034 PG/ML
WBC # FLD AUTO: 4.64 K/UL

## 2025-05-31 NOTE — PHYSICAL THERAPY INITIAL EVALUATION ADULT - SITTING BALANCE: STATIC
Spoke to patient.  Positive Candida infection in vaginosis swab.  Discussed all test results.  Patient treated with Diflucan.  Answered all questions.  
fair minus

## 2025-06-17 ENCOUNTER — APPOINTMENT (OUTPATIENT)
Dept: ENDOCRINOLOGY | Facility: CLINIC | Age: 70
End: 2025-06-17
Payer: MEDICARE

## 2025-06-17 VITALS
DIASTOLIC BLOOD PRESSURE: 74 MMHG | HEIGHT: 69 IN | OXYGEN SATURATION: 99 % | BODY MASS INDEX: 22.66 KG/M2 | SYSTOLIC BLOOD PRESSURE: 102 MMHG | HEART RATE: 64 BPM | WEIGHT: 153 LBS

## 2025-06-17 PROCEDURE — 99214 OFFICE O/P EST MOD 30 MIN: CPT | Mod: 25

## 2025-06-17 PROCEDURE — 96372 THER/PROPH/DIAG INJ SC/IM: CPT

## 2025-06-17 RX ADMIN — DENOSUMAB 60 MG/ML: 60 INJECTION SUBCUTANEOUS at 00:00

## 2025-06-18 RX ORDER — DENOSUMAB 60 MG/ML
60 INJECTION SUBCUTANEOUS
Qty: 1 | Refills: 0 | Status: COMPLETED | OUTPATIENT
Start: 2025-06-17

## 2025-07-11 NOTE — SWALLOW BEDSIDE ASSESSMENT ADULT - ASR SWALLOW ASPIRATION MONITOR
Detail Level: Detailed
Quality 226: Preventive Care And Screening: Tobacco Use: Screening And Cessation Intervention: Patient screened for tobacco use and is an ex/non-smoker
Monitor for s/s aspiration/laryngeal penetration. If noted:  D/C p.o. intake, provide non-oral nutrition/hydration/meds, and contact this service @ [phone]/change of breathing pattern/cough/gurgly voice/fever/pneumonia/throat clearing/upper respiratory infection

## 2025-07-31 ENCOUNTER — APPOINTMENT (OUTPATIENT)
Dept: HEMATOLOGY ONCOLOGY | Facility: CLINIC | Age: 70
End: 2025-07-31
Payer: MEDICARE

## 2025-07-31 VITALS
DIASTOLIC BLOOD PRESSURE: 74 MMHG | SYSTOLIC BLOOD PRESSURE: 126 MMHG | WEIGHT: 151.68 LBS | RESPIRATION RATE: 16 BRPM | BODY MASS INDEX: 22.4 KG/M2 | OXYGEN SATURATION: 99 % | TEMPERATURE: 97.2 F | HEART RATE: 77 BPM

## 2025-07-31 DIAGNOSIS — C91.10 CHRONIC LYMPHOCYTIC LEUKEMIA OF B-CELL TYPE NOT HAVING ACHIEVED REMISSION: ICD-10-CM

## 2025-07-31 DIAGNOSIS — D80.1 NONFAMILIAL HYPOGAMMAGLOBULINEMIA: ICD-10-CM

## 2025-07-31 DIAGNOSIS — M86.172 OTHER ACUTE OSTEOMYELITIS, LEFT ANKLE AND FOOT: ICD-10-CM

## 2025-07-31 DIAGNOSIS — Z92.3 PERSONAL HISTORY OF IRRADIATION: ICD-10-CM

## 2025-07-31 DIAGNOSIS — G04.81 OTHER ENCEPHALITIS AND ENCEPHALOMYELITIS: ICD-10-CM

## 2025-07-31 PROCEDURE — G2211 COMPLEX E/M VISIT ADD ON: CPT

## 2025-07-31 PROCEDURE — 99213 OFFICE O/P EST LOW 20 MIN: CPT

## 2025-08-03 PROBLEM — Z92.3 HISTORY OF RADIATION THERAPY: Status: RESOLVED | Noted: 2017-05-09 | Resolved: 2025-08-03

## 2025-08-26 DIAGNOSIS — C91.10 CHRONIC LYMPHOCYTIC LEUKEMIA OF B-CELL TYPE NOT HAVING ACHIEVED REMISSION: ICD-10-CM

## 2025-08-26 DIAGNOSIS — Z92.21 PERSONAL HISTORY OF ANTINEOPLASTIC CHEMOTHERAPY: ICD-10-CM

## 2025-08-26 DIAGNOSIS — R79.89 OTHER SPECIFIED ABNORMAL FINDINGS OF BLOOD CHEMISTRY: ICD-10-CM

## 2025-08-26 DIAGNOSIS — I10 ESSENTIAL (PRIMARY) HYPERTENSION: ICD-10-CM

## 2025-08-26 DIAGNOSIS — E53.8 DEFICIENCY OF OTHER SPECIFIED B GROUP VITAMINS: ICD-10-CM

## (undated) DEVICE — GLV 7 PROTEXIS (WHITE)

## (undated) DEVICE — SUT NYLON 10-0 12" CU-5

## (undated) DEVICE — SUT GORETEX CV-8 (7-0) 30" PT-13

## (undated) DEVICE — DRAPE STERI-DRAPE INCISE 13X13"

## (undated) DEVICE — SUT GORETEX CV-8 (7-0) 30" PT-9

## (undated) DEVICE — SOL BALANCE SALT 15ML

## (undated) DEVICE — WARMING BLANKET LOWER ADULT

## (undated) DEVICE — KNFE VITREC 20G

## (undated) DEVICE — CANNULA ALCON PROVISC 27G THREADED HUB

## (undated) DEVICE — SUT VICRYL 10-0 12" CS160-8 DA

## (undated) DEVICE — FORCEP GRIESHABER SERRATED 25G DISP

## (undated) DEVICE — LIGHT SHIELD CORNEAL

## (undated) DEVICE — SUT VICRYL 8-0 12" TG140-8 DA

## (undated) DEVICE — SYE-LASER - CONSTELLATION MACHINE #2 1003466901X: Type: DURABLE MEDICAL EQUIPMENT

## (undated) DEVICE — NDL HYPO SAFE 22G X 1.5" (BLACK)

## (undated) DEVICE — ELCTR ERASER BI-P BVL 45DEG 18G

## (undated) DEVICE — ELCTR BIPOLAR CORD J&J 12FT DISP

## (undated) DEVICE — ELCTR BIPOLAR CORD BAUSCH & LOMB 12FT DISP

## (undated) DEVICE — SYSTEM ENTRY OPHTHALMIC VALVED 23G

## (undated) DEVICE — CONSTELLATION TOTAL PLUS PAK 23G

## (undated) DEVICE — VENODYNE/SCD SLEEVE CALF MEDIUM

## (undated) DEVICE — PACK CONSTELLATION POST 25G 20K

## (undated) DEVICE — KNFE OPTH SLIT ANG 3MM

## (undated) DEVICE — CANNULA ALCON SOFT TIP 25G

## (undated) DEVICE — PACK VITRECTOMY

## (undated) DEVICE — SOL IRR BAL SALT + 500ML